# Patient Record
Sex: FEMALE | Race: WHITE | NOT HISPANIC OR LATINO | Employment: OTHER | URBAN - METROPOLITAN AREA
[De-identification: names, ages, dates, MRNs, and addresses within clinical notes are randomized per-mention and may not be internally consistent; named-entity substitution may affect disease eponyms.]

---

## 2017-10-02 RX ORDER — LEVOTHYROXINE SODIUM 112 UG/1
TABLET ORAL
Qty: 90 TABLET | Refills: 3 | Status: SHIPPED | OUTPATIENT
Start: 2017-10-02 | End: 2024-02-26 | Stop reason: SDUPTHER

## 2017-10-06 RX ORDER — METOPROLOL SUCCINATE 25 MG/1
TABLET, EXTENDED RELEASE ORAL
Qty: 90 TABLET | Refills: 3 | Status: SHIPPED | OUTPATIENT
Start: 2017-10-06 | End: 2024-02-12

## 2018-01-04 RX ORDER — HYDROCHLOROTHIAZIDE 25 MG/1
TABLET ORAL
Qty: 30 TABLET | Refills: 0 | Status: SHIPPED | OUTPATIENT
Start: 2018-01-04 | End: 2018-01-04 | Stop reason: SDUPTHER

## 2018-01-05 RX ORDER — HYDROCHLOROTHIAZIDE 25 MG/1
TABLET ORAL
Qty: 90 TABLET | Refills: 0 | Status: SHIPPED | OUTPATIENT
Start: 2018-01-05 | End: 2018-01-10 | Stop reason: SDUPTHER

## 2018-01-10 RX ORDER — HYDROCHLOROTHIAZIDE 25 MG/1
TABLET ORAL
Qty: 30 TABLET | Refills: 0 | Status: SHIPPED | OUTPATIENT
Start: 2018-01-10 | End: 2018-02-19 | Stop reason: SDUPTHER

## 2018-02-19 ENCOUNTER — OFFICE VISIT (OUTPATIENT)
Dept: PRIMARY CARE CLINIC | Facility: CLINIC | Age: 68
End: 2018-02-19
Payer: MEDICARE

## 2018-02-19 VITALS
OXYGEN SATURATION: 97 % | WEIGHT: 186 LBS | SYSTOLIC BLOOD PRESSURE: 127 MMHG | HEIGHT: 67 IN | BODY MASS INDEX: 29.19 KG/M2 | HEART RATE: 56 BPM | DIASTOLIC BLOOD PRESSURE: 79 MMHG | TEMPERATURE: 98 F | RESPIRATION RATE: 18 BRPM

## 2018-02-19 DIAGNOSIS — E78.5 HYPERLIPIDEMIA, UNSPECIFIED HYPERLIPIDEMIA TYPE: ICD-10-CM

## 2018-02-19 DIAGNOSIS — E89.0 POSTOPERATIVE HYPOTHYROIDISM: Primary | ICD-10-CM

## 2018-02-19 DIAGNOSIS — Z23 NEED FOR VACCINATION: ICD-10-CM

## 2018-02-19 DIAGNOSIS — Z85.850 HISTORY OF THYROID CANCER: ICD-10-CM

## 2018-02-19 DIAGNOSIS — I10 ESSENTIAL HYPERTENSION, BENIGN: ICD-10-CM

## 2018-02-19 PROCEDURE — 1159F MED LIST DOCD IN RCRD: CPT | Mod: ,,, | Performed by: FAMILY MEDICINE

## 2018-02-19 PROCEDURE — 99213 OFFICE O/P EST LOW 20 MIN: CPT | Mod: PBBFAC,PN,25 | Performed by: FAMILY MEDICINE

## 2018-02-19 PROCEDURE — 99214 OFFICE O/P EST MOD 30 MIN: CPT | Mod: S$PBB,25,, | Performed by: FAMILY MEDICINE

## 2018-02-19 PROCEDURE — 99999 PR PBB SHADOW E&M-EST. PATIENT-LVL III: CPT | Mod: PBBFAC,,, | Performed by: FAMILY MEDICINE

## 2018-02-19 PROCEDURE — 90732 PPSV23 VACC 2 YRS+ SUBQ/IM: CPT | Mod: PBBFAC,PN

## 2018-02-19 RX ORDER — FENOFIBRATE 54 MG/1
54 TABLET ORAL DAILY
Qty: 90 TABLET | Refills: 3 | Status: SHIPPED | OUTPATIENT
Start: 2018-02-19 | End: 2018-11-28

## 2018-02-19 RX ORDER — HYDROCHLOROTHIAZIDE 25 MG/1
25 TABLET ORAL DAILY
Qty: 90 TABLET | Refills: 3 | Status: SHIPPED | OUTPATIENT
Start: 2018-02-19 | End: 2018-08-01

## 2018-02-19 RX ORDER — LEVOTHYROXINE SODIUM 112 UG/1
1 TABLET ORAL DAILY
Refills: 3 | COMMUNITY
Start: 2017-12-05 | End: 2018-02-19 | Stop reason: SDUPTHER

## 2018-02-19 RX ORDER — FENOFIBRATE 54 MG/1
1 TABLET ORAL DAILY
Refills: 0 | COMMUNITY
Start: 2018-02-16 | End: 2018-02-19 | Stop reason: SDUPTHER

## 2018-02-19 RX ORDER — METOPROLOL SUCCINATE 25 MG/1
1 TABLET, EXTENDED RELEASE ORAL DAILY
COMMUNITY
Start: 2014-11-20 | End: 2018-02-19 | Stop reason: SDUPTHER

## 2018-02-19 RX ORDER — VITAMIN B COMPLEX
1 CAPSULE ORAL DAILY
Status: ON HOLD | COMMUNITY
End: 2023-05-16 | Stop reason: HOSPADM

## 2018-02-19 RX ORDER — MULTIVITAMIN
1 TABLET ORAL DAILY
COMMUNITY

## 2018-02-19 RX ORDER — METOPROLOL SUCCINATE 25 MG/1
25 TABLET, EXTENDED RELEASE ORAL DAILY
Qty: 90 TABLET | Refills: 3 | Status: SHIPPED | OUTPATIENT
Start: 2018-02-19 | End: 2019-05-08 | Stop reason: SDUPTHER

## 2018-02-19 RX ORDER — LEVOTHYROXINE SODIUM 112 UG/1
112 TABLET ORAL DAILY
Qty: 90 TABLET | Refills: 3 | Status: SHIPPED | OUTPATIENT
Start: 2018-02-19 | End: 2019-02-12 | Stop reason: SDUPTHER

## 2018-02-19 RX ORDER — ESTRADIOL 2 MG/1
1 TABLET ORAL DAILY
Refills: 3 | COMMUNITY
Start: 2017-12-13 | End: 2022-01-27

## 2018-02-19 NOTE — PROGRESS NOTES
Pt ID verified by  and Name. Allergies verified with pt. Pneuomovax 0.5mL vaccine administered to L Deltoid. Pt tolerated well. No adverse reactions noted.

## 2018-02-19 NOTE — PROGRESS NOTES
"Subjective:       Patient ID: Graciela Barton is a 67 y.o. female.    Chief Complaint: Medication Refill    Feeling fine, no particular concerns. Compliant with all meds, no adverse SE.  HTN - BP well controlled  HLD - compliant with meds, overdue for labs  Hypothyroidism - most recent TFT's WNL, due for labs      Review of Systems   Constitutional: Negative for chills, fatigue and fever.   HENT: Negative for congestion.    Eyes: Negative for visual disturbance.   Respiratory: Negative for cough and shortness of breath.    Cardiovascular: Negative for chest pain.   Gastrointestinal: Negative for abdominal pain, nausea and vomiting.   Genitourinary: Negative for difficulty urinating.   Musculoskeletal: Negative for arthralgias.   Skin: Negative for rash.   Neurological: Negative for dizziness.   Psychiatric/Behavioral: Negative for sleep disturbance.       Objective:      Vitals:    02/19/18 1246   BP: 127/79   BP Location: Left arm   Patient Position: Sitting   BP Method: Large (Automatic)   Pulse: (!) 56   Resp: 18   Temp: 98.2 °F (36.8 °C)   TempSrc: Oral   SpO2: 97%   Weight: 84.4 kg (186 lb)   Height: 5' 7" (1.702 m)     Physical Exam   Constitutional: She is oriented to person, place, and time. She appears well-developed and well-nourished.   HENT:   Head: Normocephalic and atraumatic.   Eyes: EOM are normal. Pupils are equal, round, and reactive to light.   Neck: Neck supple. No JVD present. Carotid bruit is not present.   Cardiovascular: Normal rate, regular rhythm and normal heart sounds.    Pulses:       Radial pulses are 2+ on the right side, and 2+ on the left side.   Pulmonary/Chest: Effort normal and breath sounds normal.   Abdominal: Soft. Bowel sounds are normal. She exhibits no distension. There is no tenderness.   Musculoskeletal: She exhibits no edema.   Neurological: She is alert and oriented to person, place, and time.   Skin: Skin is warm and dry.   Psychiatric: She has a normal mood and affect. " Her behavior is normal.   Nursing note and vitals reviewed.      Assessment:       1. Postoperative hypothyroidism    2. History of thyroid cancer    3. Essential hypertension, benign Stable   4. Hyperlipidemia, unspecified hyperlipidemia type    5. Need for vaccination        Plan:       Postoperative hypothyroidism  -     levothyroxine (SYNTHROID) 112 MCG tablet; Take 1 tablet (112 mcg total) by mouth once daily.  Dispense: 90 tablet; Refill: 3  -     TSH; Future; Expected date: 02/26/2018  -     T4, free; Future; Expected date: 02/26/2018  -     T3; Future; Expected date: 02/26/2018    History of thyroid cancer    Essential hypertension, benign  -     hydroCHLOROthiazide (HYDRODIURIL) 25 MG tablet; Take 1 tablet (25 mg total) by mouth once daily.  Dispense: 90 tablet; Refill: 3  -     metoprolol succinate (TOPROL-XL) 25 MG 24 hr tablet; Take 1 tablet (25 mg total) by mouth once daily.  Dispense: 90 tablet; Refill: 3  -     CBC auto differential; Future; Expected date: 02/26/2018    Hyperlipidemia, unspecified hyperlipidemia type  -     fenofibrate (TRICOR) 54 MG tablet; Take 1 tablet (54 mg total) by mouth once daily.  Dispense: 90 tablet; Refill: 3  -     Comprehensive metabolic panel; Future; Expected date: 02/26/2018  -     Lipid panel; Future; Expected date: 02/26/2018    Need for vaccination  -     Cancel: Pneumococcal Conjugate Vaccine (13 Valent) (IM)  -     Pneumococcal Polysaccharide Vaccine (23 Valent) (SQ/IM)      Medication List with Changes/Refills   Current Medications    B COMPLEX VITAMINS CAPSULE    Take 1 capsule by mouth once daily.    ESTRADIOL (ESTRACE) 2 MG TABLET    Take 1 tablet by mouth once daily.    MULTIVITAMIN (THERAGRAN) PER TABLET    Take 1 tablet by mouth once daily.   Changed and/or Refilled Medications    Modified Medication Previous Medication    FENOFIBRATE (TRICOR) 54 MG TABLET fenofibrate (TRICOR) 54 MG tablet       Take 1 tablet (54 mg total) by mouth once daily.    Take 1  tablet by mouth once daily.    HYDROCHLOROTHIAZIDE (HYDRODIURIL) 25 MG TABLET hydroCHLOROthiazide (HYDRODIURIL) 25 MG tablet       Take 1 tablet (25 mg total) by mouth once daily.    TAKE 1 TABLET BY MOUTH EVERY DAY    LEVOTHYROXINE (SYNTHROID) 112 MCG TABLET levothyroxine (SYNTHROID) 112 MCG tablet       Take 1 tablet (112 mcg total) by mouth once daily.    Take 1 tablet by mouth once daily.    METOPROLOL SUCCINATE (TOPROL-XL) 25 MG 24 HR TABLET metoprolol succinate (TOPROL-XL) 25 MG 24 hr tablet       Take 1 tablet (25 mg total) by mouth once daily.    Take 1 tablet by mouth once daily.

## 2018-02-22 LAB
ALBUMIN SERPL-MCNC: 4 G/DL (ref 3.6–5.1)
ALBUMIN/GLOB SERPL: 1.7 (CALC) (ref 1–2.5)
ALP SERPL-CCNC: 44 U/L (ref 33–130)
ALT SERPL-CCNC: 8 U/L (ref 6–29)
AST SERPL-CCNC: 18 U/L (ref 10–35)
BASOPHILS # BLD AUTO: 38 CELLS/UL (ref 0–200)
BASOPHILS NFR BLD AUTO: 0.7 %
BILIRUB SERPL-MCNC: 0.5 MG/DL (ref 0.2–1.2)
BUN SERPL-MCNC: 14 MG/DL (ref 7–25)
BUN/CREAT SERPL: NORMAL (CALC) (ref 6–22)
CALCIUM SERPL-MCNC: 9.8 MG/DL (ref 8.6–10.4)
CHLORIDE SERPL-SCNC: 104 MMOL/L (ref 98–110)
CHOLEST SERPL-MCNC: 205 MG/DL
CHOLEST/HDLC SERPL: 2.4 (CALC)
CO2 SERPL-SCNC: 28 MMOL/L (ref 20–31)
CREAT SERPL-MCNC: 0.93 MG/DL (ref 0.5–0.99)
EOSINOPHIL # BLD AUTO: 151 CELLS/UL (ref 15–500)
EOSINOPHIL NFR BLD AUTO: 2.8 %
ERYTHROCYTE [DISTWIDTH] IN BLOOD BY AUTOMATED COUNT: 12.2 % (ref 11–15)
GFR SERPL CREATININE-BSD FRML MDRD: 64 ML/MIN/1.73M2
GLOBULIN SER CALC-MCNC: 2.3 G/DL (CALC) (ref 1.9–3.7)
GLUCOSE SERPL-MCNC: 88 MG/DL (ref 65–99)
HCT VFR BLD AUTO: 39.4 % (ref 35–45)
HDLC SERPL-MCNC: 86 MG/DL
HGB BLD-MCNC: 13.2 G/DL (ref 11.7–15.5)
LDLC SERPL CALC-MCNC: 98 MG/DL (CALC)
LYMPHOCYTES # BLD AUTO: 1615 CELLS/UL (ref 850–3900)
LYMPHOCYTES NFR BLD AUTO: 29.9 %
MCH RBC QN AUTO: 31.5 PG (ref 27–33)
MCHC RBC AUTO-ENTMCNC: 33.5 G/DL (ref 32–36)
MCV RBC AUTO: 94 FL (ref 80–100)
MONOCYTES # BLD AUTO: 529 CELLS/UL (ref 200–950)
MONOCYTES NFR BLD AUTO: 9.8 %
NEUTROPHILS # BLD AUTO: 3067 CELLS/UL (ref 1500–7800)
NEUTROPHILS NFR BLD AUTO: 56.8 %
NONHDLC SERPL-MCNC: 119 MG/DL (CALC)
PLATELET # BLD AUTO: 292 THOUSAND/UL (ref 140–400)
PMV BLD REES-ECKER: 11.5 FL (ref 7.5–12.5)
POTASSIUM SERPL-SCNC: 4 MMOL/L (ref 3.5–5.3)
PROT SERPL-MCNC: 6.3 G/DL (ref 6.1–8.1)
RBC # BLD AUTO: 4.19 MILLION/UL (ref 3.8–5.1)
SODIUM SERPL-SCNC: 141 MMOL/L (ref 135–146)
T3 SERPL-MCNC: 90 NG/DL (ref 76–181)
T4 FREE SERPL-MCNC: 1.4 NG/DL (ref 0.8–1.8)
TRIGL SERPL-MCNC: 110 MG/DL
TSH SERPL-ACNC: 3.22 MIU/L (ref 0.4–4.5)
WBC # BLD AUTO: 5.4 THOUSAND/UL (ref 3.8–10.8)

## 2018-04-27 RX ORDER — HYDROCHLOROTHIAZIDE 25 MG/1
TABLET ORAL
Qty: 90 TABLET | Refills: 3 | Status: SHIPPED | OUTPATIENT
Start: 2018-04-27 | End: 2019-04-17 | Stop reason: SDUPTHER

## 2018-08-01 ENCOUNTER — OFFICE VISIT (OUTPATIENT)
Dept: PRIMARY CARE CLINIC | Facility: CLINIC | Age: 68
End: 2018-08-01
Payer: MEDICARE

## 2018-08-01 VITALS
SYSTOLIC BLOOD PRESSURE: 124 MMHG | DIASTOLIC BLOOD PRESSURE: 73 MMHG | OXYGEN SATURATION: 98 % | TEMPERATURE: 98 F | HEIGHT: 67 IN | HEART RATE: 63 BPM | WEIGHT: 191 LBS | RESPIRATION RATE: 18 BRPM | BODY MASS INDEX: 29.98 KG/M2

## 2018-08-01 DIAGNOSIS — H81.10 BENIGN PAROXYSMAL POSITIONAL VERTIGO, UNSPECIFIED LATERALITY: Primary | ICD-10-CM

## 2018-08-01 DIAGNOSIS — Z12.31 ENCOUNTER FOR SCREENING MAMMOGRAM FOR BREAST CANCER: ICD-10-CM

## 2018-08-01 DIAGNOSIS — E78.5 HYPERLIPIDEMIA, UNSPECIFIED HYPERLIPIDEMIA TYPE: ICD-10-CM

## 2018-08-01 DIAGNOSIS — Z12.11 COLON CANCER SCREENING: ICD-10-CM

## 2018-08-01 DIAGNOSIS — M89.9 DISORDER OF BONE: ICD-10-CM

## 2018-08-01 DIAGNOSIS — Z11.59 NEED FOR HEPATITIS C SCREENING TEST: ICD-10-CM

## 2018-08-01 DIAGNOSIS — E89.0 POSTOPERATIVE HYPOTHYROIDISM: ICD-10-CM

## 2018-08-01 PROCEDURE — 99213 OFFICE O/P EST LOW 20 MIN: CPT | Mod: S$PBB,,, | Performed by: FAMILY MEDICINE

## 2018-08-01 PROCEDURE — 99999 PR PBB SHADOW E&M-EST. PATIENT-LVL IV: CPT | Mod: PBBFAC,,, | Performed by: FAMILY MEDICINE

## 2018-08-01 PROCEDURE — 99214 OFFICE O/P EST MOD 30 MIN: CPT | Mod: PBBFAC,PN | Performed by: FAMILY MEDICINE

## 2018-08-01 RX ORDER — MECLIZINE HYDROCHLORIDE 25 MG/1
25 TABLET ORAL 3 TIMES DAILY PRN
Qty: 30 TABLET | Refills: 3 | Status: ON HOLD | OUTPATIENT
Start: 2018-08-01 | End: 2023-10-06 | Stop reason: HOSPADM

## 2018-08-01 RX ORDER — PROCHLORPERAZINE MALEATE 10 MG
10 TABLET ORAL EVERY 6 HOURS PRN
Qty: 30 TABLET | Refills: 3 | Status: SHIPPED | OUTPATIENT
Start: 2018-08-01 | End: 2021-07-27 | Stop reason: SDUPTHER

## 2018-08-01 NOTE — PROGRESS NOTES
"Subjective:       Patient ID: Graciela Barton is a 68 y.o. female.    Chief Complaint: Dizziness (off and on x a few months )    Dizziness:   Chronicity:  Chronic with acute exacerbation  Onset:  Over 10 years ago  Progression since onset:  Waxing and waning  Frequency:  Every few weeks  Severity:  Moderate  Dizziness characteristics:  Off-balance, motion-sickness and sensation of movement  Duration of Spells:  2 minutes   Associated symptoms: nausea.no hearing loss, no ear pain, no fever, no tinnitus, no syncope, no slurred speech and no chest pain.  Aggravated by:  Position changes  Treatments tried:  Meclizine  Improvements on treatment:  Significantno head trauma, no ear trauma, no head trauma and no ear tubes.    Review of Systems   Constitutional: Negative for fever.   HENT: Negative for ear pain, hearing loss and tinnitus.    Eyes: Negative for visual disturbance.   Respiratory: Negative for shortness of breath.    Cardiovascular: Negative for chest pain and syncope.   Gastrointestinal: Positive for nausea.   Skin: Negative for rash.   Neurological: Positive for dizziness.       Objective:      Vitals:    08/01/18 1339   BP: 124/73   BP Location: Left arm   Patient Position: Sitting   BP Method: Large (Automatic)   Pulse: 63   Resp: 18   Temp: 97.9 °F (36.6 °C)   TempSrc: Oral   SpO2: 98%   Weight: 86.6 kg (191 lb)   Height: 5' 7" (1.702 m)     Physical Exam   Constitutional: She is oriented to person, place, and time. She appears well-developed and well-nourished.   HENT:   Head: Normocephalic and atraumatic.   Eyes: EOM are normal.   Neck: Neck supple. No JVD present. Carotid bruit is not present.   Cardiovascular: Normal rate, regular rhythm and normal heart sounds.    Pulmonary/Chest: Effort normal and breath sounds normal.   Musculoskeletal: She exhibits no edema.   Neurological: She is alert and oriented to person, place, and time.   Skin: Skin is warm and dry.   Psychiatric: She has a normal mood and " affect. Her behavior is normal.   Nursing note and vitals reviewed.      Assessment:       1. Benign paroxysmal positional vertigo, unspecified laterality    2. Colon cancer screening    3. Need for hepatitis C screening test    4. Disorder of bone    5. Hyperlipidemia, unspecified hyperlipidemia type    6. Postoperative hypothyroidism    7. Encounter for screening mammogram for breast cancer        Plan:       Benign paroxysmal positional vertigo, unspecified laterality  -     CBC auto differential; Future; Expected date: 11/01/2018  -     meclizine (ANTIVERT) 25 mg tablet; Take 1 tablet (25 mg total) by mouth 3 (three) times daily as needed for Dizziness.  Dispense: 30 tablet; Refill: 3  -     prochlorperazine (COMPAZINE) 10 MG tablet; Take 1 tablet (10 mg total) by mouth every 6 (six) hours as needed (nausea).  Dispense: 30 tablet; Refill: 3    Colon cancer screening  -     Ambulatory referral to Colorectal Surgery    Need for hepatitis C screening test  -     Hepatitis C antibody; Future; Expected date: 11/01/2018    Disorder of bone  -     DXA Bone Density Spine And Hip; Future; Expected date: 08/08/2018    Hyperlipidemia, unspecified hyperlipidemia type  -     Comprehensive metabolic panel; Future; Expected date: 11/01/2018  -     Lipid panel; Future; Expected date: 11/01/2018    Postoperative hypothyroidism  -     TSH; Future; Expected date: 11/01/2018  -     T4, free; Future; Expected date: 11/01/2018  -     T3; Future; Expected date: 11/01/2018    Encounter for screening mammogram for breast cancer  -     Mammo Digital Screening Bilat without CA; Future; Expected date: 08/15/2018      Medication List with Changes/Refills   New Medications    MECLIZINE (ANTIVERT) 25 MG TABLET    Take 1 tablet (25 mg total) by mouth 3 (three) times daily as needed for Dizziness.    PROCHLORPERAZINE (COMPAZINE) 10 MG TABLET    Take 1 tablet (10 mg total) by mouth every 6 (six) hours as needed (nausea).   Current Medications     B COMPLEX VITAMINS CAPSULE    Take 1 capsule by mouth once daily.    ESTRADIOL (ESTRACE) 2 MG TABLET    Take 1 tablet by mouth once daily.    FENOFIBRATE (TRICOR) 54 MG TABLET    Take 1 tablet (54 mg total) by mouth once daily.    HYDROCHLOROTHIAZIDE (HYDRODIURIL) 25 MG TABLET    TAKE 1 TABLET BY MOUTH EVERY DAY    LEVOTHYROXINE (SYNTHROID) 112 MCG TABLET    Take 1 tablet (112 mcg total) by mouth once daily.    METOPROLOL SUCCINATE (TOPROL-XL) 25 MG 24 HR TABLET    Take 1 tablet (25 mg total) by mouth once daily.    MULTIVITAMIN (THERAGRAN) PER TABLET    Take 1 tablet by mouth once daily.   Discontinued Medications    HYDROCHLOROTHIAZIDE (HYDRODIURIL) 25 MG TABLET    Take 1 tablet (25 mg total) by mouth once daily.

## 2018-08-03 ENCOUNTER — TELEPHONE (OUTPATIENT)
Dept: PRIMARY CARE CLINIC | Facility: CLINIC | Age: 68
End: 2018-08-03

## 2018-08-03 NOTE — TELEPHONE ENCOUNTER
----- Message from Apryl Leggett sent at 8/3/2018  8:42 AM CDT -----  Contact: Patient  Type:  Patient Returning Call    Who Called:  Graciela, patient  Who Left Message for Patient:  ?  Does the patient know what this is regarding?:  Víctor  Best Call Back Number:  [phone]  Additional Information:  Missed your call, please call her back. Thanks.

## 2018-10-11 ENCOUNTER — TELEPHONE (OUTPATIENT)
Dept: SURGERY | Facility: CLINIC | Age: 68
End: 2018-10-11

## 2018-10-11 DIAGNOSIS — Z12.11 SCREEN FOR COLON CANCER: Primary | ICD-10-CM

## 2018-10-11 RX ORDER — SODIUM CHLORIDE, SODIUM LACTATE, POTASSIUM CHLORIDE, CALCIUM CHLORIDE 600; 310; 30; 20 MG/100ML; MG/100ML; MG/100ML; MG/100ML
INJECTION, SOLUTION INTRAVENOUS CONTINUOUS
Status: CANCELLED | OUTPATIENT
Start: 2018-10-11

## 2018-10-11 RX ORDER — SODIUM CHLORIDE 0.9 % (FLUSH) 0.9 %
3 SYRINGE (ML) INJECTION
Status: CANCELLED | OUTPATIENT
Start: 2018-10-11

## 2018-10-11 NOTE — TELEPHONE ENCOUNTER
----- Message from Rylan Marsh MA sent at 8/1/2018  2:19 PM CDT -----  Please contact patient to schedule a colonoscopy     Thank you

## 2018-10-11 NOTE — TELEPHONE ENCOUNTER
Colonoscopy is scheduled for 10/18/18 arrival time per the preop nurse.  Preop will call from [phone]  Fasting after midnight  Someone to drive you home      THE PREOP NURSE WILL CALL, SOMETIMES AS LATE AS 4 PM IN THE AFTERNOON THE DAY BEFORE SURGERY.        Special Instruction:Bowel Prep is included with this letter, call Dilip if you have any questions or concerns or if you need to reschedule your procedure at [phone].  Colonoscopy is at the [facility].    Yosvany Alejandra MD

## 2018-10-12 ENCOUNTER — TELEPHONE (OUTPATIENT)
Dept: SURGERY | Facility: CLINIC | Age: 68
End: 2018-10-12

## 2018-10-12 NOTE — TELEPHONE ENCOUNTER
----- Message from Janel Ortega sent at 10/12/2018  3:33 PM CDT -----  Contact: Graciela  Type: Needs Medical Advice    Who Called:  patient  Best Call Back Number: [phone]  Additional Information: Contact patient regarding instructions and prep for her colonoscopy scheduled next week. Thanks!

## 2018-10-12 NOTE — TELEPHONE ENCOUNTER
Spoke with patient regarding her pre procedure instructions. Made patient aware that prep instructions could be picked up in the clinic. Patient indicated understanding. No further issues discussed.

## 2018-10-18 PROBLEM — Z12.11 SCREEN FOR COLON CANCER: Status: ACTIVE | Noted: 2018-10-18

## 2018-10-18 PROBLEM — Z86.0100 HISTORY OF COLON POLYPS: Status: ACTIVE | Noted: 2018-10-18

## 2018-10-18 PROBLEM — Z86.010 HISTORY OF COLON POLYPS: Status: ACTIVE | Noted: 2018-10-18

## 2018-11-15 ENCOUNTER — TELEPHONE (OUTPATIENT)
Dept: PRIMARY CARE CLINIC | Facility: CLINIC | Age: 68
End: 2018-11-15

## 2018-11-15 NOTE — TELEPHONE ENCOUNTER
----- Message from Harini Baltazar sent at 11/15/2018  3:50 PM CST -----  Contact: self  Type:  Patient Returning Call    Who Called:  self  Who Left Message for Patient:  Not sure  Does the patient know what this is regarding?:  no  Best Call Back Number:  [phone]  Additional Information:  Patient just missed a call. Please call patient. Thanks!

## 2018-11-19 RX ORDER — METOPROLOL SUCCINATE 25 MG/1
TABLET, EXTENDED RELEASE ORAL
Qty: 90 TABLET | Refills: 0 | OUTPATIENT
Start: 2018-11-19

## 2018-11-28 ENCOUNTER — OFFICE VISIT (OUTPATIENT)
Dept: PRIMARY CARE CLINIC | Facility: CLINIC | Age: 68
End: 2018-11-28
Payer: MEDICARE

## 2018-11-28 VITALS
HEART RATE: 55 BPM | TEMPERATURE: 98 F | SYSTOLIC BLOOD PRESSURE: 133 MMHG | OXYGEN SATURATION: 100 % | DIASTOLIC BLOOD PRESSURE: 59 MMHG | WEIGHT: 197 LBS | RESPIRATION RATE: 18 BRPM | BODY MASS INDEX: 30.92 KG/M2 | HEIGHT: 67 IN

## 2018-11-28 DIAGNOSIS — J06.9 UPPER RESPIRATORY TRACT INFECTION, UNSPECIFIED TYPE: ICD-10-CM

## 2018-11-28 DIAGNOSIS — I10 ESSENTIAL HYPERTENSION, BENIGN: Primary | ICD-10-CM

## 2018-11-28 DIAGNOSIS — E78.00 PURE HYPERCHOLESTEROLEMIA: ICD-10-CM

## 2018-11-28 DIAGNOSIS — Z23 NEED FOR VACCINATION: ICD-10-CM

## 2018-11-28 DIAGNOSIS — E89.0 POSTOPERATIVE HYPOTHYROIDISM: ICD-10-CM

## 2018-11-28 PROCEDURE — 90662 IIV NO PRSV INCREASED AG IM: CPT | Mod: PBBFAC,PN

## 2018-11-28 PROCEDURE — 99213 OFFICE O/P EST LOW 20 MIN: CPT | Mod: PBBFAC,PN | Performed by: FAMILY MEDICINE

## 2018-11-28 PROCEDURE — G0008 ADMIN INFLUENZA VIRUS VAC: HCPCS | Mod: PBBFAC,PN

## 2018-11-28 PROCEDURE — 99214 OFFICE O/P EST MOD 30 MIN: CPT | Mod: S$PBB,,, | Performed by: FAMILY MEDICINE

## 2018-11-28 PROCEDURE — 99999 PR PBB SHADOW E&M-EST. PATIENT-LVL III: CPT | Mod: PBBFAC,,, | Performed by: FAMILY MEDICINE

## 2018-11-29 NOTE — PROGRESS NOTES
"Subjective:       Patient ID: Graciela Barton is a 68 y.o. female.    Chief Complaint: Hyperlipidemia (Patient is here to review lab results ) and Cough (coughing up dark green phelgm x1 week )    Hyperlipidemia-lipid profile looks great, HDL extremely high.  No history of heart disease. Has been following healthy diet.  Hypothyroidism-TFTs look good, patient is status post thyroidectomy  Has been having upper respiratory congestion, cough and runny nose for the past several days, gradually getting better.  No fevers or chills.      Review of Systems   Constitutional: Negative for fever.   HENT: Positive for congestion.    Eyes: Negative for visual disturbance.   Respiratory: Positive for cough. Negative for shortness of breath and wheezing.    Cardiovascular: Negative for chest pain.   Gastrointestinal: Negative for nausea and vomiting.   Genitourinary: Negative for difficulty urinating.   Musculoskeletal: Negative for myalgias.   Skin: Negative for rash.   Allergic/Immunologic: Negative for immunocompromised state.   Neurological: Negative for dizziness.   Hematological: Does not bruise/bleed easily.   Psychiatric/Behavioral: Negative for agitation and confusion.       Objective:      Vitals:    11/28/18 1609   BP: (!) 133/59   BP Location: Left arm   Patient Position: Sitting   BP Method: Large (Automatic)   Pulse: (!) 55   Resp: 18   Temp: 97.8 °F (36.6 °C)   TempSrc: Oral   SpO2: 100%   Weight: 89.4 kg (197 lb)   Height: 5' 7" (1.702 m)     Physical Exam   Constitutional: She is oriented to person, place, and time. She appears well-developed and well-nourished.   HENT:   Head: Normocephalic and atraumatic.   Right Ear: External ear normal.   Left Ear: External ear normal.   Mouth/Throat: Oropharynx is clear and moist.   Neck: Neck supple. No JVD present.   Cardiovascular: Normal rate, regular rhythm and normal heart sounds.   Pulmonary/Chest: Effort normal and breath sounds normal.   Musculoskeletal: She exhibits " no edema.   Neurological: She is alert and oriented to person, place, and time.   Skin: Skin is warm and dry.   Psychiatric: She has a normal mood and affect. Her behavior is normal.   Nursing note and vitals reviewed.      Assessment:       1. Essential hypertension, benign    2. Pure hypercholesterolemia    3. Postoperative hypothyroidism    4. Need for vaccination    5. Upper respiratory tract infection, unspecified type        Plan:       Essential hypertension, benign  Well controlled, continue current meds  Pure hypercholesterolemia  -     Comprehensive metabolic panel; Future; Expected date: 02/28/2019  -     Lipid panel; Future; Expected date: 02/28/2019  Stop fenofibrate, recheck labs in 3-4 months  Postoperative hypothyroidism  -     TSH; Future; Expected date: 02/28/2019  -     T4, free; Future; Expected date: 02/28/2019  -     T3; Future; Expected date: 02/28/2019  Stable  Need for vaccination  -     Influenza - High Dose (65+) (PF) (IM)    Upper respiratory tract infection, unspecified type  Likely viral, treat symptomatically       Medication List           Accurate as of 11/28/18  6:25 PM. If you have any questions, ask your nurse or doctor.               CONTINUE taking these medications    b complex vitamins capsule     estradiol 2 MG tablet  Commonly known as:  ESTRACE     hydroCHLOROthiazide 25 MG tablet  Commonly known as:  HYDRODIURIL  TAKE 1 TABLET BY MOUTH EVERY DAY     levothyroxine 112 MCG tablet  Commonly known as:  SYNTHROID  Take 1 tablet (112 mcg total) by mouth once daily.     meclizine 25 mg tablet  Commonly known as:  ANTIVERT  Take 1 tablet (25 mg total) by mouth 3 (three) times daily as needed for Dizziness.     metoprolol succinate 25 MG 24 hr tablet  Commonly known as:  TOPROL-XL  Take 1 tablet (25 mg total) by mouth once daily.     multivitamin per tablet  Commonly known as:  THERAGRAN     prochlorperazine 10 MG tablet  Commonly known as:  COMPAZINE  Take 1 tablet (10 mg total)  by mouth every 6 (six) hours as needed (nausea).        STOP taking these medications    fenofibrate 54 MG tablet  Commonly known as:  TRICOR  Stopped by:  Rocco Miller MD

## 2019-02-12 DIAGNOSIS — E78.5 HYPERLIPIDEMIA, UNSPECIFIED HYPERLIPIDEMIA TYPE: ICD-10-CM

## 2019-02-12 DIAGNOSIS — E89.0 POSTOPERATIVE HYPOTHYROIDISM: ICD-10-CM

## 2019-02-12 RX ORDER — FENOFIBRATE 54 MG/1
TABLET ORAL
Qty: 90 TABLET | Refills: 0 | OUTPATIENT
Start: 2019-02-12

## 2019-02-12 RX ORDER — LEVOTHYROXINE SODIUM 112 UG/1
TABLET ORAL
Qty: 90 TABLET | Refills: 0 | Status: SHIPPED | OUTPATIENT
Start: 2019-02-12 | End: 2019-05-08 | Stop reason: SDUPTHER

## 2019-04-17 ENCOUNTER — OFFICE VISIT (OUTPATIENT)
Dept: PRIMARY CARE CLINIC | Facility: CLINIC | Age: 69
End: 2019-04-17
Payer: MEDICARE

## 2019-04-17 ENCOUNTER — CLINICAL SUPPORT (OUTPATIENT)
Dept: PRIMARY CARE CLINIC | Facility: CLINIC | Age: 69
End: 2019-04-17
Payer: MEDICARE

## 2019-04-17 VITALS
BODY MASS INDEX: 31.55 KG/M2 | WEIGHT: 201 LBS | HEART RATE: 52 BPM | HEIGHT: 67 IN | OXYGEN SATURATION: 95 % | TEMPERATURE: 98 F | RESPIRATION RATE: 18 BRPM | SYSTOLIC BLOOD PRESSURE: 144 MMHG | DIASTOLIC BLOOD PRESSURE: 78 MMHG

## 2019-04-17 DIAGNOSIS — R11.0 CHRONIC NAUSEA: ICD-10-CM

## 2019-04-17 DIAGNOSIS — E78.00 PURE HYPERCHOLESTEROLEMIA: ICD-10-CM

## 2019-04-17 DIAGNOSIS — I10 ESSENTIAL HYPERTENSION, BENIGN: ICD-10-CM

## 2019-04-17 DIAGNOSIS — E89.0 POSTOPERATIVE HYPOTHYROIDISM: ICD-10-CM

## 2019-04-17 DIAGNOSIS — R06.09 EXERTIONAL DYSPNEA: ICD-10-CM

## 2019-04-17 DIAGNOSIS — R07.89 ATYPICAL CHEST PAIN: Primary | ICD-10-CM

## 2019-04-17 LAB
ALBUMIN SERPL BCP-MCNC: 3.7 G/DL (ref 3.5–5.2)
ALP SERPL-CCNC: 50 U/L (ref 38–126)
ALT SERPL W/O P-5'-P-CCNC: 12 U/L (ref 14–54)
ANION GAP SERPL CALC-SCNC: 10 MMOL/L (ref 8–16)
AST SERPL-CCNC: 23 U/L (ref 15–41)
BILIRUB SERPL-MCNC: 0.4 MG/DL (ref 0.3–1.2)
BUN SERPL-MCNC: 11 MG/DL (ref 8–23)
CALCIUM SERPL-MCNC: 9.4 MG/DL (ref 8.6–10)
CHLORIDE SERPL-SCNC: 102 MMOL/L (ref 101–111)
CHOLEST SERPL-MCNC: 283 MG/DL (ref 80–200)
CHOLEST/HDLC SERPL: 3.5 {RATIO} (ref 2–5)
CO2 SERPL-SCNC: 29 MMOL/L (ref 23–29)
CREAT SERPL-MCNC: 0.8 MG/DL (ref 0.5–1.4)
EST. GFR  (AFRICAN AMERICAN): >60 ML/MIN/1.73 M^2
EST. GFR  (NON AFRICAN AMERICAN): >60 ML/MIN/1.73 M^2
GLUCOSE SERPL-MCNC: 83 MG/DL (ref 74–118)
HDLC SERPL-MCNC: 82 MG/DL (ref 40–75)
HDLC SERPL: 29 % (ref 20–50)
LDLC SERPL CALC-MCNC: 144 MG/DL
NONHDLC SERPL-MCNC: 201 MG/DL
POTASSIUM SERPL-SCNC: 3.7 MMOL/L (ref 3.5–5.1)
PROT SERPL-MCNC: 6.8 G/DL (ref 6–8.4)
SODIUM SERPL-SCNC: 141 MMOL/L (ref 136–145)
T3 SERPL-MCNC: 94 NG/DL (ref 60–180)
T4 FREE SERPL-MCNC: 1.12 NG/DL (ref 0.61–1.12)
TRIGL SERPL-MCNC: 283 MG/DL (ref 30–150)
TSH SERPL DL<=0.005 MIU/L-ACNC: 4.32 UIU/ML (ref 0.45–5.33)

## 2019-04-17 PROCEDURE — 99214 PR OFFICE/OUTPT VISIT, EST, LEVL IV, 30-39 MIN: ICD-10-PCS | Mod: S$PBB,,, | Performed by: FAMILY MEDICINE

## 2019-04-17 PROCEDURE — 84480 ASSAY TRIIODOTHYRONINE (T3): CPT

## 2019-04-17 PROCEDURE — 99214 OFFICE O/P EST MOD 30 MIN: CPT | Mod: PBBFAC,PN,25 | Performed by: FAMILY MEDICINE

## 2019-04-17 PROCEDURE — 80061 LIPID PANEL: CPT

## 2019-04-17 PROCEDURE — 80053 COMPREHEN METABOLIC PANEL: CPT

## 2019-04-17 PROCEDURE — 99214 OFFICE O/P EST MOD 30 MIN: CPT | Mod: S$PBB,,, | Performed by: FAMILY MEDICINE

## 2019-04-17 PROCEDURE — 84443 ASSAY THYROID STIM HORMONE: CPT

## 2019-04-17 PROCEDURE — 84439 ASSAY OF FREE THYROXINE: CPT

## 2019-04-17 PROCEDURE — 99999 PR PBB SHADOW E&M-EST. PATIENT-LVL IV: ICD-10-PCS | Mod: PBBFAC,,, | Performed by: FAMILY MEDICINE

## 2019-04-17 PROCEDURE — 99999 PR PBB SHADOW E&M-EST. PATIENT-LVL IV: CPT | Mod: PBBFAC,,, | Performed by: FAMILY MEDICINE

## 2019-04-17 PROCEDURE — 93010 EKG 12-LEAD: ICD-10-PCS | Mod: S$PBB,,, | Performed by: INTERNAL MEDICINE

## 2019-04-17 PROCEDURE — 93010 ELECTROCARDIOGRAM REPORT: CPT | Mod: S$PBB,,, | Performed by: INTERNAL MEDICINE

## 2019-04-17 PROCEDURE — 93005 ELECTROCARDIOGRAM TRACING: CPT | Mod: PBBFAC,PN | Performed by: INTERNAL MEDICINE

## 2019-04-17 RX ORDER — HYDROCHLOROTHIAZIDE 25 MG/1
25 TABLET ORAL DAILY
Qty: 90 TABLET | Refills: 3 | Status: SHIPPED | OUTPATIENT
Start: 2019-04-17 | End: 2020-04-06

## 2019-04-17 NOTE — PROGRESS NOTES
"Subjective:       Patient ID: Graciela Barton is a 69 y.o. female.    Chief Complaint: Nausea; Belching (x a few months ); and Chest Pain (patient says she had a pain across her chest last week )    Has been having intermittent bouts of nausea at least once a day for the past few months, no identified triggers. Gets temporary relief from belching or taking OTC antacids. Has been taking Tums several times a week. Has never had upper GI workup. No melena or hematochezia. Last week had episode of nonexertional chest tightness, went away after a few minutes. Had one episode of exertional dyspnea last week. Strong FHx of heart disease (father, mother, MGM, MGF). Last stress test ~4 years ago while living in [place].    Review of Systems   Constitutional: Negative for chills, fatigue and fever.   HENT: Negative for congestion.    Eyes: Negative for visual disturbance.   Respiratory: Positive for shortness of breath. Negative for cough.    Cardiovascular: Positive for chest pain. Negative for palpitations.   Gastrointestinal: Positive for nausea. Negative for abdominal pain, blood in stool and vomiting.   Genitourinary: Negative for difficulty urinating.   Musculoskeletal: Negative for arthralgias.   Skin: Negative for rash.   Allergic/Immunologic: Negative for immunocompromised state.   Neurological: Negative for dizziness.   Hematological: Does not bruise/bleed easily.   Psychiatric/Behavioral: Negative for sleep disturbance.       Objective:      Vitals:    04/17/19 0856 04/17/19 0947   BP: (!) 152/70 (!) 144/78   BP Location: Left arm Left arm   Patient Position: Sitting Sitting   BP Method: Large (Automatic) Large (Manual)   Pulse: (!) 52    Resp: 18    Temp: 98.2 °F (36.8 °C)    TempSrc: Oral    SpO2: 95%    Weight: 91.2 kg (201 lb)    Height: 5' 7" (1.702 m)      Physical Exam   Constitutional: She is oriented to person, place, and time. She appears well-developed and well-nourished.   HENT:   Head: Normocephalic " and atraumatic.   Eyes: Pupils are equal, round, and reactive to light. EOM are normal.   Neck: Neck supple. No JVD present. Carotid bruit is not present.   Cardiovascular: Normal rate, regular rhythm and normal heart sounds.   Pulses:       Radial pulses are 2+ on the right side, and 2+ on the left side.   Pulmonary/Chest: Effort normal and breath sounds normal.   Abdominal: Soft. Bowel sounds are normal. She exhibits no distension. There is no tenderness.   Musculoskeletal: She exhibits edema (trace BLE).   Neurological: She is alert and oriented to person, place, and time.   Skin: Skin is warm and dry.   Psychiatric: She has a normal mood and affect. Her behavior is normal.   Nursing note and vitals reviewed.      Assessment:       1. Atypical chest pain    2. Essential hypertension, benign    3. Exertional dyspnea    4. Pure hypercholesterolemia    5. Chronic nausea        Plan:       Atypical chest pain  -     EKG 12-lead  -     Echocardiogram stress test (Cupid Only); Future  No acute ekg findings, but needs further risk stratification  Essential hypertension, benign  Continue current meds, BP check in a few weeks  Exertional dyspnea  -     EKG 12-lead  -     Echocardiogram stress test (Cupid Only); Future    Pure hypercholesterolemia  Labs today  Chronic nausea  -     Ambulatory Referral to Gastroenterology  -     ranitidine (ZANTAC) 150 MG tablet; Take 1 tablet (150 mg total) by mouth 2 (two) times daily.  Dispense: 60 tablet; Refill: 0  Likely needs EGD    Medication List with Changes/Refills   New Medications    RANITIDINE (ZANTAC) 150 MG TABLET    Take 1 tablet (150 mg total) by mouth 2 (two) times daily.   Current Medications    B COMPLEX VITAMINS CAPSULE    Take 1 capsule by mouth once daily.    ESTRADIOL (ESTRACE) 2 MG TABLET    Take 1 tablet by mouth once daily.    HYDROCHLOROTHIAZIDE (HYDRODIURIL) 25 MG TABLET    TAKE 1 TABLET BY MOUTH EVERY DAY    LEVOTHYROXINE (SYNTHROID) 112 MCG TABLET    TAKE 1  TABLET(112 MCG) BY MOUTH EVERY DAY    MECLIZINE (ANTIVERT) 25 MG TABLET    Take 1 tablet (25 mg total) by mouth 3 (three) times daily as needed for Dizziness.    METOPROLOL SUCCINATE (TOPROL-XL) 25 MG 24 HR TABLET    Take 1 tablet (25 mg total) by mouth once daily.    MULTIVITAMIN (THERAGRAN) PER TABLET    Take 1 tablet by mouth once daily.    PROCHLORPERAZINE (COMPAZINE) 10 MG TABLET    Take 1 tablet (10 mg total) by mouth every 6 (six) hours as needed (nausea).

## 2019-04-17 NOTE — TELEPHONE ENCOUNTER
----- Message from Apryl Betsey sent at 4/17/2019  3:30 PM CDT -----  Contact: Patient  Type:  RX Refill Request    Who Called:  Graciela, patient  Refill or New Rx:  Refill  RX Name and Strength:  hydroCHLOROthiazide (HYDRODIURIL) 25 MG tablet  How is the patient currently taking it? (ex. 1XDay):  TAKE 1 TABLET BY MOUTH EVERY DAY  Is this a 30 day or 90 day RX:  90  Preferred Pharmacy with phone number:    [id number] - [place] - [address] AT [facility] [address]  [place]  [address] [address]  Phone: [phone] Fax: [phone]  Local or Mail Order:  Local  Ordering Provider:  Dr Miller  [facility] Call Back Number:  [phone]  Additional Information:  Please advise. Thanks.

## 2019-04-22 DIAGNOSIS — R94.31 ABNORMAL ELECTROCARDIOGRAM DURING EXERCISE STRESS TEST: Primary | ICD-10-CM

## 2019-04-23 ENCOUNTER — TELEPHONE (OUTPATIENT)
Dept: PRIMARY CARE CLINIC | Facility: CLINIC | Age: 69
End: 2019-04-23

## 2019-04-23 NOTE — TELEPHONE ENCOUNTER
----- Message from Aleja Muniz sent at 4/23/2019  8:17 AM CDT -----  Contact: Self  Type:  Patient Returning Call    Who Called:  Patient  Who Left Message for Patient:  Don't know  Does the patient know what this is regarding?:  Possibly results  Best Call Back Number: [phone] (home)     Additional Information:  na

## 2019-05-01 ENCOUNTER — CLINICAL SUPPORT (OUTPATIENT)
Dept: PRIMARY CARE CLINIC | Facility: CLINIC | Age: 69
End: 2019-05-01
Payer: MEDICARE

## 2019-05-01 VITALS — HEART RATE: 57 BPM | SYSTOLIC BLOOD PRESSURE: 124 MMHG | OXYGEN SATURATION: 96 % | DIASTOLIC BLOOD PRESSURE: 68 MMHG

## 2019-05-01 PROCEDURE — 99999 PR PBB SHADOW E&M-EST. PATIENT-LVL I: CPT | Mod: PBBFAC,,,

## 2019-05-01 PROCEDURE — 99999 PR PBB SHADOW E&M-EST. PATIENT-LVL I: ICD-10-PCS | Mod: PBBFAC,,,

## 2019-05-01 PROCEDURE — 99211 OFF/OP EST MAY X REQ PHY/QHP: CPT | Mod: PBBFAC,PN

## 2019-05-01 NOTE — PROGRESS NOTES
Verified pt ID using name and . Obtained bp, p, and sp02. Notified physician of results. Instructed pt to continue current medications and to follow up as needed per PCP order. Pt verbalized understanding

## 2019-05-08 DIAGNOSIS — E89.0 POSTOPERATIVE HYPOTHYROIDISM: ICD-10-CM

## 2019-05-08 DIAGNOSIS — I10 ESSENTIAL HYPERTENSION, BENIGN: ICD-10-CM

## 2019-05-08 DIAGNOSIS — R11.0 CHRONIC NAUSEA: ICD-10-CM

## 2019-05-08 RX ORDER — METOPROLOL SUCCINATE 25 MG/1
TABLET, EXTENDED RELEASE ORAL
Qty: 90 TABLET | Refills: 1 | Status: SHIPPED | OUTPATIENT
Start: 2019-05-08 | End: 2019-11-16 | Stop reason: SDUPTHER

## 2019-05-08 RX ORDER — LEVOTHYROXINE SODIUM 112 UG/1
TABLET ORAL
Qty: 90 TABLET | Refills: 1 | Status: SHIPPED | OUTPATIENT
Start: 2019-05-08 | End: 2019-11-16 | Stop reason: SDUPTHER

## 2019-06-07 DIAGNOSIS — R11.0 CHRONIC NAUSEA: ICD-10-CM

## 2019-06-30 PROBLEM — E78.2 MIXED HYPERLIPIDEMIA: Status: ACTIVE | Noted: 2018-02-19

## 2019-09-03 ENCOUNTER — TELEPHONE (OUTPATIENT)
Dept: PRIMARY CARE CLINIC | Facility: CLINIC | Age: 69
End: 2019-09-03

## 2019-09-03 ENCOUNTER — OFFICE VISIT (OUTPATIENT)
Dept: PRIMARY CARE CLINIC | Facility: CLINIC | Age: 69
End: 2019-09-03
Payer: MEDICARE

## 2019-09-03 VITALS
SYSTOLIC BLOOD PRESSURE: 132 MMHG | TEMPERATURE: 98 F | OXYGEN SATURATION: 96 % | BODY MASS INDEX: 31.44 KG/M2 | HEART RATE: 58 BPM | WEIGHT: 200.31 LBS | DIASTOLIC BLOOD PRESSURE: 58 MMHG | HEIGHT: 67 IN | RESPIRATION RATE: 18 BRPM

## 2019-09-03 DIAGNOSIS — S70.02XA CONTUSION OF LEFT HIP, INITIAL ENCOUNTER: Primary | ICD-10-CM

## 2019-09-03 PROCEDURE — 99999 PR PBB SHADOW E&M-EST. PATIENT-LVL III: CPT | Mod: PBBFAC,,, | Performed by: FAMILY MEDICINE

## 2019-09-03 PROCEDURE — 99213 OFFICE O/P EST LOW 20 MIN: CPT | Mod: PBBFAC,PN | Performed by: FAMILY MEDICINE

## 2019-09-03 PROCEDURE — 99213 PR OFFICE/OUTPT VISIT, EST, LEVL III, 20-29 MIN: ICD-10-PCS | Mod: S$PBB,,, | Performed by: FAMILY MEDICINE

## 2019-09-03 PROCEDURE — 99999 PR PBB SHADOW E&M-EST. PATIENT-LVL III: ICD-10-PCS | Mod: PBBFAC,,, | Performed by: FAMILY MEDICINE

## 2019-09-03 PROCEDURE — 99213 OFFICE O/P EST LOW 20 MIN: CPT | Mod: S$PBB,,, | Performed by: FAMILY MEDICINE

## 2019-09-03 RX ORDER — VITAMIN E 268 MG
400 CAPSULE ORAL
Status: ON HOLD | COMMUNITY
End: 2023-05-16 | Stop reason: HOSPADM

## 2019-09-03 NOTE — TELEPHONE ENCOUNTER
----- Message from Apryl Leggett sent at 9/3/2019  2:23 PM CDT -----  Contact: Patient  Type:  Patient Returning Call    Who Called:  Graciela, patient  Who Left Message for Patient:  Rylan  Does the patient know what this is regarding?:  Xray result  Best Call Back Number:  [phone]  Additional Information:  Missed your call, please call her back. Thanks.

## 2019-09-03 NOTE — TELEPHONE ENCOUNTER
I received the results from [facility]. Gastric emptying study was normal. Tried calling patient, no answer and VM is full.

## 2019-09-03 NOTE — PROGRESS NOTES
"Subjective:       Patient ID: Graciela Barton is a 69 y.o. female.    Chief Complaint: Fall (patient fell 8/23/19. left hip hurts )    Slipped and fell onto left hip on concrete surface while cutting grass 8/23. Extensive bruising initially, slowly improving, but still having pain. No pain while sitting, but has pain with weight-bearing or pressure on that side. No lower leg weakness or numbness    Review of Systems   Constitutional: Negative for fever.   Eyes: Negative for visual disturbance.   Respiratory: Negative for shortness of breath.    Cardiovascular: Negative for chest pain.   Genitourinary: Negative for difficulty urinating.   Musculoskeletal: Positive for arthralgias and gait problem.   Neurological: Negative for dizziness and headaches.       Objective:      Vitals:    09/03/19 1231   BP: (!) 132/58   BP Location: Left arm   Patient Position: Sitting   BP Method: Large (Manual)   Pulse: (!) 58   Resp: 18   Temp: 97.8 °F (36.6 °C)   TempSrc: Oral   SpO2: 96%   Weight: 90.9 kg (200 lb 4.8 oz)   Height: 5' 7" (1.702 m)     Physical Exam   Constitutional: She is oriented to person, place, and time. She appears well-developed and well-nourished.   HENT:   Head: Normocephalic and atraumatic.   Cardiovascular: Normal rate, regular rhythm and normal heart sounds.   Pulmonary/Chest: Effort normal and breath sounds normal.   Musculoskeletal: She exhibits no edema.        Left hip: She exhibits tenderness (tender hematoma laterally with large area of surrounding ecchymosis) and swelling. She exhibits normal strength, no crepitus and no deformity.   Neurological: She is alert and oriented to person, place, and time.   Skin: Skin is warm and dry.   Nursing note and vitals reviewed.      Assessment:       1. Contusion of left hip, initial encounter        Plan:       Contusion of left hip, initial encounter  -     X-Ray Hip 2 View Left; Future; Expected date: 09/03/2019  X-rays negative    Medication List with " Changes/Refills   Current Medications    B COMPLEX VITAMINS CAPSULE    Take 1 capsule by mouth once daily.    ESTRADIOL (ESTRACE) 2 MG TABLET    Take 1 tablet by mouth once daily.    HYDROCHLOROTHIAZIDE (HYDRODIURIL) 25 MG TABLET    Take 1 tablet (25 mg total) by mouth once daily.    LEVOTHYROXINE (SYNTHROID) 112 MCG TABLET    TAKE 1 TABLET(112 MCG) BY MOUTH EVERY DAY    MECLIZINE (ANTIVERT) 25 MG TABLET    Take 1 tablet (25 mg total) by mouth 3 (three) times daily as needed for Dizziness.    METOPROLOL SUCCINATE (TOPROL-XL) 25 MG 24 HR TABLET    TAKE 1 TABLET(25 MG) BY MOUTH EVERY DAY    MULTIVITAMIN (THERAGRAN) PER TABLET    Take 1 tablet by mouth once daily.    PROCHLORPERAZINE (COMPAZINE) 10 MG TABLET    Take 1 tablet (10 mg total) by mouth every 6 (six) hours as needed (nausea).    VITAMIN E 400 UNIT CAPSULE    Take 400 Units by mouth.   Discontinued Medications    RANITIDINE (ZANTAC) 150 MG TABLET    TAKE 1 TABLET(150 MG) BY MOUTH TWICE DAILY

## 2019-09-03 NOTE — TELEPHONE ENCOUNTER
----- Message from Rocco Miller MD sent at 9/3/2019 12:54 PM CDT -----  Patient had gastric emptying study done by Dr. Saul Whatley at [facility], but never got results

## 2019-09-17 ENCOUNTER — TELEPHONE (OUTPATIENT)
Dept: PRIMARY CARE CLINIC | Facility: CLINIC | Age: 69
End: 2019-09-17

## 2019-09-17 NOTE — TELEPHONE ENCOUNTER
----- Message from Indiana Starr sent at 9/17/2019  2:33 PM CDT -----  Contact: self/[phone]  Patient called in regards needing to get the GI doctor name and if is possible the test results. Please call and advise. Thank you.

## 2019-09-17 NOTE — TELEPHONE ENCOUNTER
Patient notified the GI that did her EGD was Dr. Whatley. She was given the contact information to get her report.

## 2019-11-16 DIAGNOSIS — I10 ESSENTIAL HYPERTENSION, BENIGN: ICD-10-CM

## 2019-11-16 DIAGNOSIS — E89.0 POSTOPERATIVE HYPOTHYROIDISM: ICD-10-CM

## 2019-11-18 RX ORDER — LEVOTHYROXINE SODIUM 112 UG/1
TABLET ORAL
Qty: 90 TABLET | Refills: 1 | Status: SHIPPED | OUTPATIENT
Start: 2019-11-18 | End: 2019-12-10

## 2019-11-18 RX ORDER — METOPROLOL SUCCINATE 25 MG/1
TABLET, EXTENDED RELEASE ORAL
Qty: 90 TABLET | Refills: 1 | Status: SHIPPED | OUTPATIENT
Start: 2019-11-18 | End: 2020-05-15

## 2019-12-03 ENCOUNTER — CLINICAL SUPPORT (OUTPATIENT)
Dept: PRIMARY CARE CLINIC | Facility: CLINIC | Age: 69
End: 2019-12-03
Payer: MEDICARE

## 2019-12-03 DIAGNOSIS — Z85.850 HISTORY OF THYROID CANCER: ICD-10-CM

## 2019-12-03 DIAGNOSIS — E78.00 PURE HYPERCHOLESTEROLEMIA: Primary | ICD-10-CM

## 2019-12-03 DIAGNOSIS — I10 ESSENTIAL HYPERTENSION, BENIGN: ICD-10-CM

## 2019-12-03 LAB
ALBUMIN SERPL BCP-MCNC: 3.8 G/DL (ref 3.5–5.2)
ALP SERPL-CCNC: 51 U/L (ref 38–126)
ALT SERPL W/O P-5'-P-CCNC: 12 U/L (ref 14–54)
ANION GAP SERPL CALC-SCNC: 9 MMOL/L (ref 8–16)
AST SERPL-CCNC: 17 U/L (ref 15–41)
BASOPHILS # BLD AUTO: 0.1 K/UL (ref 0–0.2)
BASOPHILS NFR BLD: 0.7 % (ref 0–1.9)
BILIRUB SERPL-MCNC: 0.4 MG/DL (ref 0.3–1.2)
BUN SERPL-MCNC: 15 MG/DL (ref 8–23)
CALCIUM SERPL-MCNC: 9.6 MG/DL (ref 8.6–10)
CHLORIDE SERPL-SCNC: 100 MMOL/L (ref 101–111)
CHOLEST SERPL-MCNC: 268 MG/DL (ref 80–200)
CHOLEST/HDLC SERPL: 3.4 {RATIO} (ref 2–5)
CO2 SERPL-SCNC: 31 MMOL/L (ref 23–29)
CREAT SERPL-MCNC: 0.9 MG/DL (ref 0.5–1.4)
DIFFERENTIAL METHOD: ABNORMAL
EOSINOPHIL # BLD AUTO: 0.2 K/UL (ref 0–0.5)
EOSINOPHIL NFR BLD: 2.7 % (ref 0–8)
ERYTHROCYTE [DISTWIDTH] IN BLOOD BY AUTOMATED COUNT: 12.5 % (ref 11.5–14.5)
EST. GFR  (AFRICAN AMERICAN): >60 ML/MIN/1.73 M^2
EST. GFR  (NON AFRICAN AMERICAN): >60 ML/MIN/1.73 M^2
GLUCOSE SERPL-MCNC: 79 MG/DL (ref 74–118)
HCT VFR BLD AUTO: 39.8 % (ref 37–48.5)
HDLC SERPL-MCNC: 80 MG/DL (ref 40–75)
HDLC SERPL: 29.9 % (ref 20–50)
HGB BLD-MCNC: 13.2 G/DL (ref 12–16)
LDLC SERPL CALC-MCNC: 126 MG/DL
LYMPHOCYTES # BLD AUTO: 1.9 K/UL (ref 1–4.8)
LYMPHOCYTES NFR BLD: 23.6 % (ref 18–48)
MCH RBC QN AUTO: 31.4 PG (ref 27–31)
MCHC RBC AUTO-ENTMCNC: 33.1 G/DL (ref 32–36)
MCV RBC AUTO: 95 FL (ref 82–98)
MONOCYTES # BLD AUTO: 0.7 K/UL (ref 0.3–1)
MONOCYTES NFR BLD: 8.1 % (ref 4–15)
NEUTROPHILS # BLD AUTO: 5.3 K/UL (ref 1.8–7.7)
NEUTROPHILS NFR BLD: 64.9 % (ref 38–73)
NONHDLC SERPL-MCNC: 188 MG/DL
PLATELET # BLD AUTO: 316 K/UL (ref 150–350)
PMV BLD AUTO: 9.7 FL (ref 9.2–12.9)
POTASSIUM SERPL-SCNC: 3.6 MMOL/L (ref 3.5–5.1)
PROT SERPL-MCNC: 7 G/DL (ref 6–8.4)
RBC # BLD AUTO: 4.2 M/UL (ref 4–5.4)
SODIUM SERPL-SCNC: 140 MMOL/L (ref 136–145)
T3 SERPL-MCNC: 86 NG/DL (ref 60–180)
T4 FREE SERPL-MCNC: 1.09 NG/DL (ref 0.61–1.12)
TRIGL SERPL-MCNC: 311 MG/DL (ref 30–150)
TSH SERPL DL<=0.005 MIU/L-ACNC: 6.96 UIU/ML (ref 0.45–5.33)
WBC # BLD AUTO: 8.2 K/UL (ref 3.9–12.7)

## 2019-12-03 PROCEDURE — 80053 COMPREHEN METABOLIC PANEL: CPT

## 2019-12-03 PROCEDURE — 36415 COLL VENOUS BLD VENIPUNCTURE: CPT | Mod: PBBFAC,PN

## 2019-12-03 PROCEDURE — 84439 ASSAY OF FREE THYROXINE: CPT

## 2019-12-03 PROCEDURE — 84443 ASSAY THYROID STIM HORMONE: CPT

## 2019-12-03 PROCEDURE — 80061 LIPID PANEL: CPT

## 2019-12-03 PROCEDURE — 84480 ASSAY TRIIODOTHYRONINE (T3): CPT

## 2019-12-03 PROCEDURE — 85025 COMPLETE CBC W/AUTO DIFF WBC: CPT

## 2019-12-10 ENCOUNTER — OFFICE VISIT (OUTPATIENT)
Dept: PRIMARY CARE CLINIC | Facility: CLINIC | Age: 69
End: 2019-12-10
Payer: MEDICARE

## 2019-12-10 VITALS
SYSTOLIC BLOOD PRESSURE: 126 MMHG | HEART RATE: 60 BPM | BODY MASS INDEX: 31.55 KG/M2 | OXYGEN SATURATION: 99 % | HEIGHT: 67 IN | DIASTOLIC BLOOD PRESSURE: 64 MMHG | RESPIRATION RATE: 16 BRPM | TEMPERATURE: 99 F | WEIGHT: 201 LBS

## 2019-12-10 DIAGNOSIS — I10 ESSENTIAL HYPERTENSION, BENIGN: ICD-10-CM

## 2019-12-10 DIAGNOSIS — E78.2 MIXED HYPERLIPIDEMIA: ICD-10-CM

## 2019-12-10 DIAGNOSIS — E89.0 POSTOPERATIVE HYPOTHYROIDISM: Primary | ICD-10-CM

## 2019-12-10 DIAGNOSIS — Z23 NEED FOR VACCINATION: ICD-10-CM

## 2019-12-10 PROCEDURE — 99999 PR PBB SHADOW E&M-EST. PATIENT-LVL III: CPT | Mod: PBBFAC,,, | Performed by: FAMILY MEDICINE

## 2019-12-10 PROCEDURE — 1126F AMNT PAIN NOTED NONE PRSNT: CPT | Mod: ,,, | Performed by: FAMILY MEDICINE

## 2019-12-10 PROCEDURE — 99999 PR PBB SHADOW E&M-EST. PATIENT-LVL III: ICD-10-PCS | Mod: PBBFAC,,, | Performed by: FAMILY MEDICINE

## 2019-12-10 PROCEDURE — 99214 PR OFFICE/OUTPT VISIT, EST, LEVL IV, 30-39 MIN: ICD-10-PCS | Mod: S$PBB,,, | Performed by: FAMILY MEDICINE

## 2019-12-10 PROCEDURE — 1126F PR PAIN SEVERITY QUANTIFIED, NO PAIN PRESENT: ICD-10-PCS | Mod: ,,, | Performed by: FAMILY MEDICINE

## 2019-12-10 PROCEDURE — 1159F MED LIST DOCD IN RCRD: CPT | Mod: ,,, | Performed by: FAMILY MEDICINE

## 2019-12-10 PROCEDURE — 90662 IIV NO PRSV INCREASED AG IM: CPT | Mod: PBBFAC,PN

## 2019-12-10 PROCEDURE — 99213 OFFICE O/P EST LOW 20 MIN: CPT | Mod: PBBFAC,PN | Performed by: FAMILY MEDICINE

## 2019-12-10 PROCEDURE — 99214 OFFICE O/P EST MOD 30 MIN: CPT | Mod: S$PBB,,, | Performed by: FAMILY MEDICINE

## 2019-12-10 PROCEDURE — 1159F PR MEDICATION LIST DOCUMENTED IN MEDICAL RECORD: ICD-10-PCS | Mod: ,,, | Performed by: FAMILY MEDICINE

## 2019-12-10 RX ORDER — LEVOTHYROXINE SODIUM 125 UG/1
125 TABLET ORAL
Qty: 90 TABLET | Refills: 0 | Status: SHIPPED | OUTPATIENT
Start: 2019-12-10 | End: 2020-03-09

## 2019-12-10 NOTE — PROGRESS NOTES
Verified pt ID using name and . Verified allergies, VIS given. Administered high dose flu vaccine in left deltoid per physician order using aseptic technique. Aspirated and no blood return noted. Pt tolerated well with no adverse reactions noted.

## 2019-12-10 NOTE — PROGRESS NOTES
"Subjective:       Patient ID: Graciela Barton is a 69 y.o. female.    Chief Complaint: Hyperlipidemia (here to review lab results )    Cholesterol and triglycerides elevated on recent labs, but TSH also still elevated.  Compliant with all medications.  Left leg has been giving out, but seems to be getting better.  Denies chest pain, palpitations or shortness of breath.  No bowel or bladder incontinence    Review of Systems   Constitutional: Negative for fatigue and unexpected weight change.   Respiratory: Negative for shortness of breath.    Cardiovascular: Negative for chest pain and palpitations.   Genitourinary: Negative for difficulty urinating.   Musculoskeletal: Positive for back pain.   Skin: Negative for wound.       Objective:      Vitals:    12/10/19 1120   BP: 126/64   BP Location: Right arm   Patient Position: Sitting   BP Method: Large (Manual)   Pulse: 60   Resp: 16   Temp: 98.7 °F (37.1 °C)   TempSrc: Oral   SpO2: 99%   Weight: 91.2 kg (201 lb)   Height: 5' 7" (1.702 m)     Physical Exam   Constitutional: She is oriented to person, place, and time. She appears well-developed and well-nourished.   HENT:   Head: Normocephalic and atraumatic.   Cardiovascular: Normal rate, regular rhythm and normal heart sounds.   Pulmonary/Chest: Effort normal and breath sounds normal.   Musculoskeletal: She exhibits no edema.   Neurological: She is alert and oriented to person, place, and time. She has normal strength.   Reflex Scores:       Patellar reflexes are 2+ on the right side and 2+ on the left side.  Skin: Skin is warm and dry.   Nursing note and vitals reviewed.      Lab Results   Component Value Date    WBC 8.20 12/03/2019    HGB 13.2 12/03/2019    HCT 39.8 12/03/2019     12/03/2019    CHOL 268 (H) 12/03/2019    TRIG 311 (H) 12/03/2019    HDL 80 (H) 12/03/2019    ALT 12 (L) 12/03/2019    AST 17 12/03/2019     12/03/2019    K 3.6 12/03/2019     (L) 12/03/2019    CREATININE 0.9 12/03/2019    " BUN 15 12/03/2019    CO2 31 (H) 12/03/2019    TSH 6.96 (H) 12/03/2019      Assessment:       1. Postoperative hypothyroidism    2. Essential hypertension, benign    3. Mixed hyperlipidemia    4. Need for vaccination        Plan:       Postoperative hypothyroidism  -     levothyroxine (SYNTHROID) 125 MCG tablet; Take 1 tablet (125 mcg total) by mouth before breakfast.  Dispense: 90 tablet; Refill: 0  -     TSH; Future; Expected date: 01/21/2020  Increase levothyroxine, recheck TSH in 6-8 weeks  Essential hypertension, benign  Stable on current regimen  Mixed hyperlipidemia  Reduce carbohydrate intake.  Should also improve with adequate thyroid replacement  Need for vaccination  -     Influenza - High Dose (65+) (PF) (IM)      Medication List with Changes/Refills   Current Medications    B COMPLEX VITAMINS CAPSULE    Take 1 capsule by mouth once daily.    ESTRADIOL (ESTRACE) 2 MG TABLET    Take 1 tablet by mouth once daily.    HYDROCHLOROTHIAZIDE (HYDRODIURIL) 25 MG TABLET    Take 1 tablet (25 mg total) by mouth once daily.    MECLIZINE (ANTIVERT) 25 MG TABLET    Take 1 tablet (25 mg total) by mouth 3 (three) times daily as needed for Dizziness.    METOPROLOL SUCCINATE (TOPROL-XL) 25 MG 24 HR TABLET    TAKE 1 TABLET(25 MG) BY MOUTH EVERY DAY    MULTIVITAMIN (THERAGRAN) PER TABLET    Take 1 tablet by mouth once daily.    PROCHLORPERAZINE (COMPAZINE) 10 MG TABLET    Take 1 tablet (10 mg total) by mouth every 6 (six) hours as needed (nausea).    VITAMIN E 400 UNIT CAPSULE    Take 400 Units by mouth.   Changed and/or Refilled Medications    Modified Medication Previous Medication    LEVOTHYROXINE (SYNTHROID) 125 MCG TABLET levothyroxine (SYNTHROID) 112 MCG tablet       Take 1 tablet (125 mcg total) by mouth before breakfast.    TAKE 1 TABLET(112 MCG) BY MOUTH EVERY DAY

## 2020-01-21 ENCOUNTER — PATIENT OUTREACH (OUTPATIENT)
Dept: ADMINISTRATIVE | Facility: HOSPITAL | Age: 70
End: 2020-01-21

## 2020-02-04 ENCOUNTER — CLINICAL SUPPORT (OUTPATIENT)
Dept: PRIMARY CARE CLINIC | Facility: CLINIC | Age: 70
End: 2020-02-04
Payer: MEDICARE

## 2020-02-04 DIAGNOSIS — E89.0 POSTOPERATIVE HYPOTHYROIDISM: ICD-10-CM

## 2020-02-04 LAB — TSH SERPL DL<=0.005 MIU/L-ACNC: 2.53 UIU/ML (ref 0.45–5.33)

## 2020-02-04 PROCEDURE — 84443 ASSAY THYROID STIM HORMONE: CPT

## 2020-02-04 PROCEDURE — 36415 COLL VENOUS BLD VENIPUNCTURE: CPT | Mod: PBBFAC,PN

## 2020-02-06 ENCOUNTER — TELEPHONE (OUTPATIENT)
Dept: PRIMARY CARE CLINIC | Facility: CLINIC | Age: 70
End: 2020-02-06

## 2020-02-06 NOTE — TELEPHONE ENCOUNTER
Unable to leave  to call back about labs    ----- Message from Rocco Miller MD sent at 2/5/2020  5:47 PM CST -----  TSH now normal, continue current dose of levothyroxine

## 2020-02-11 ENCOUNTER — TELEPHONE (OUTPATIENT)
Dept: PRIMARY CARE CLINIC | Facility: CLINIC | Age: 70
End: 2020-02-11

## 2020-02-11 NOTE — TELEPHONE ENCOUNTER
Message was left in patients chart. She was notified TSH now normal, continue current dose of levothyroxine. She verbalized understanding

## 2020-02-11 NOTE — TELEPHONE ENCOUNTER
----- Message from Kenisha Lee sent at 2/11/2020  9:23 AM CST -----  Contact: Pt  Reason: Pt returning call she stated someone called her on yesterday regarding lab but no message was left in epic.    Communication: [phone]

## 2020-03-06 DIAGNOSIS — E89.0 POSTOPERATIVE HYPOTHYROIDISM: ICD-10-CM

## 2020-03-09 RX ORDER — LEVOTHYROXINE SODIUM 125 UG/1
TABLET ORAL
Qty: 90 TABLET | Refills: 1 | Status: SHIPPED | OUTPATIENT
Start: 2020-03-09 | End: 2020-08-24

## 2020-04-06 RX ORDER — HYDROCHLOROTHIAZIDE 25 MG/1
TABLET ORAL
Qty: 90 TABLET | Refills: 3 | Status: SHIPPED | OUTPATIENT
Start: 2020-04-06 | End: 2021-04-07

## 2020-05-15 DIAGNOSIS — I10 ESSENTIAL HYPERTENSION, BENIGN: ICD-10-CM

## 2020-05-15 RX ORDER — METOPROLOL SUCCINATE 25 MG/1
TABLET, EXTENDED RELEASE ORAL
Qty: 90 TABLET | Refills: 1 | Status: SHIPPED | OUTPATIENT
Start: 2020-05-15 | End: 2020-11-24 | Stop reason: SDUPTHER

## 2020-07-03 DIAGNOSIS — Z12.31 ENCOUNTER FOR SCREENING MAMMOGRAM FOR BREAST CANCER: ICD-10-CM

## 2020-11-24 ENCOUNTER — TELEPHONE (OUTPATIENT)
Dept: PRIMARY CARE CLINIC | Facility: CLINIC | Age: 70
End: 2020-11-24

## 2020-11-24 DIAGNOSIS — I10 ESSENTIAL HYPERTENSION, BENIGN: ICD-10-CM

## 2020-11-24 RX ORDER — METOPROLOL SUCCINATE 25 MG/1
25 TABLET, EXTENDED RELEASE ORAL DAILY
Qty: 90 TABLET | Refills: 0 | Status: SHIPPED | OUTPATIENT
Start: 2020-11-24 | End: 2021-02-22 | Stop reason: SDUPTHER

## 2020-11-24 NOTE — TELEPHONE ENCOUNTER
----- Message from Clare Dietz sent at 11/24/2020  1:14 PM CST -----  Contact: self [phone]  Requesting an RX refill or new RX.  Is this a refill or new RX: refill  RX name and strength:metoprolol succinate (TOPROL-XL) 25 MG 24 hr tablet  Is this a 30 day or 90 day RX:   Pharmacy name and phone # (copy/paste from chart):  [id number] [place] [place] - [address] AT [facility] [address] [phone] (Phone)  [phone] (Fax)  Comments:

## 2020-11-24 NOTE — TELEPHONE ENCOUNTER
----- Message from Clare Dietz sent at 11/24/2020  1:13 PM CST -----  Contact: self [phone]  Pt would like to set up appt to come to get her flu shot in the clinic. Pt states she would like to have a early morning appt. Please call and advise.

## 2020-11-27 ENCOUNTER — OFFICE VISIT (OUTPATIENT)
Dept: PRIMARY CARE CLINIC | Facility: CLINIC | Age: 70
End: 2020-11-27
Payer: MEDICARE

## 2020-11-27 VITALS
WEIGHT: 216.06 LBS | HEART RATE: 98 BPM | SYSTOLIC BLOOD PRESSURE: 138 MMHG | TEMPERATURE: 97 F | OXYGEN SATURATION: 98 % | HEIGHT: 67 IN | BODY MASS INDEX: 33.91 KG/M2 | RESPIRATION RATE: 18 BRPM | DIASTOLIC BLOOD PRESSURE: 84 MMHG

## 2020-11-27 DIAGNOSIS — E78.2 MIXED HYPERLIPIDEMIA: ICD-10-CM

## 2020-11-27 DIAGNOSIS — E89.0 POSTOPERATIVE HYPOTHYROIDISM: ICD-10-CM

## 2020-11-27 DIAGNOSIS — I10 ESSENTIAL HYPERTENSION, BENIGN: ICD-10-CM

## 2020-11-27 DIAGNOSIS — Z23 NEED FOR VACCINATION: ICD-10-CM

## 2020-11-27 DIAGNOSIS — Z00.00 ANNUAL PHYSICAL EXAM: Primary | ICD-10-CM

## 2020-11-27 DIAGNOSIS — Z12.31 ENCOUNTER FOR SCREENING MAMMOGRAM FOR MALIGNANT NEOPLASM OF BREAST: ICD-10-CM

## 2020-11-27 PROCEDURE — 99214 OFFICE O/P EST MOD 30 MIN: CPT | Mod: PBBFAC,PN | Performed by: FAMILY MEDICINE

## 2020-11-27 PROCEDURE — 99999 PR PBB SHADOW E&M-EST. PATIENT-LVL IV: ICD-10-PCS | Mod: PBBFAC,,, | Performed by: FAMILY MEDICINE

## 2020-11-27 PROCEDURE — G0008 ADMIN INFLUENZA VIRUS VAC: HCPCS | Mod: PBBFAC,PN

## 2020-11-27 PROCEDURE — 90694 VACC AIIV4 NO PRSRV 0.5ML IM: CPT | Mod: PBBFAC,PN

## 2020-11-27 PROCEDURE — 99213 OFFICE O/P EST LOW 20 MIN: CPT | Mod: S$PBB,,, | Performed by: FAMILY MEDICINE

## 2020-11-27 PROCEDURE — 99213 PR OFFICE/OUTPT VISIT, EST, LEVL III, 20-29 MIN: ICD-10-PCS | Mod: S$PBB,,, | Performed by: FAMILY MEDICINE

## 2020-11-27 PROCEDURE — 99999 PR PBB SHADOW E&M-EST. PATIENT-LVL IV: CPT | Mod: PBBFAC,,, | Performed by: FAMILY MEDICINE

## 2020-11-27 RX ORDER — ZOSTER VACCINE RECOMBINANT, ADJUVANTED 50 MCG/0.5
0.5 KIT INTRAMUSCULAR ONCE
Qty: 1 EACH | Refills: 0 | Status: SHIPPED | OUTPATIENT
Start: 2020-11-27 | End: 2020-11-27

## 2020-11-27 NOTE — PROGRESS NOTES
"Subjective:       Patient ID: Graciela Barton is a 70 y.o. female.    Chief Complaint: Annual Exam    Feeling generally well, no particular complaints or concerns at this time. Due for labs, but already ate today    Review of Systems   Constitutional: Negative for chills, fatigue and fever.   HENT: Negative for congestion.    Eyes: Negative for visual disturbance.   Respiratory: Negative for cough and shortness of breath.    Cardiovascular: Negative for chest pain.   Gastrointestinal: Negative for abdominal pain, nausea and vomiting.   Endocrine: Negative for polydipsia and polyuria.   Genitourinary: Negative for difficulty urinating.   Musculoskeletal: Positive for arthralgias.   Skin: Negative for rash.   Allergic/Immunologic: Negative for immunocompromised state.   Neurological: Negative for dizziness.   Hematological: Does not bruise/bleed easily.   Psychiatric/Behavioral: Negative for sleep disturbance.       Objective:      Vitals:    11/27/20 1223   BP: 138/84   BP Location: Right arm   Patient Position: Sitting   BP Method: Medium (Manual)   Pulse: 98   Resp: 18   Temp: 97.4 °F (36.3 °C)   TempSrc: Temporal   SpO2: 98%   Weight: 98 kg (216 lb 0.8 oz)   Height: 5' 7" (1.702 m)     Physical Exam  Vitals signs and nursing note reviewed.   Constitutional:       Appearance: She is well-developed.   HENT:      Head: Normocephalic and atraumatic.   Neck:      Vascular: No carotid bruit.   Cardiovascular:      Rate and Rhythm: Normal rate and regular rhythm.      Heart sounds: Normal heart sounds.   Pulmonary:      Effort: Pulmonary effort is normal.      Breath sounds: Normal breath sounds.   Skin:     General: Skin is warm and dry.   Neurological:      Mental Status: She is alert and oriented to person, place, and time.   Psychiatric:         Mood and Affect: Mood normal.         Behavior: Behavior normal.         Lab Results   Component Value Date    WBC 8.20 12/03/2019    HGB 13.2 12/03/2019    HCT 39.8 12/03/2019 "     12/03/2019    CHOL 268 (H) 12/03/2019    TRIG 311 (H) 12/03/2019    HDL 80 (H) 12/03/2019    ALT 12 (L) 12/03/2019    AST 17 12/03/2019     12/03/2019    K 3.6 12/03/2019     (L) 12/03/2019    CREATININE 0.9 12/03/2019    BUN 15 12/03/2019    CO2 31 (H) 12/03/2019    TSH 2.53 02/04/2020      Assessment:       1. Annual physical exam    2. Encounter for screening mammogram for malignant neoplasm of breast    3. Mixed hyperlipidemia    4. Essential hypertension, benign    5. Postoperative hypothyroidism    6. Need for vaccination        Plan:       Annual physical exam  -     CBC Auto Differential; Future; Expected date: 11/27/2020  -     Comprehensive Metabolic Panel; Future; Expected date: 11/27/2020  -     Lipid Panel; Future; Expected date: 11/27/2020  -     TSH; Future; Expected date: 11/27/2020    Encounter for screening mammogram for malignant neoplasm of breast  -     Mammo Digital Screening Bilat; Future; Expected date: 11/27/2020    Mixed hyperlipidemia  -     Comprehensive Metabolic Panel; Future; Expected date: 11/27/2020  -     Lipid Panel; Future; Expected date: 11/27/2020    Essential hypertension, benign  -     CBC Auto Differential; Future; Expected date: 11/27/2020    Postoperative hypothyroidism  -     TSH; Future; Expected date: 11/27/2020    Need for vaccination  -     Influenza - Quadrivalent (Adjuvanted)  -     varicella-zoster gE-AS01B, PF, (SHINGRIX, PF,) 50 mcg/0.5 mL injection; Inject 0.5 mLs into the muscle once. for 1 dose  Dispense: 1 each; Refill: 0      Medication List with Changes/Refills   New Medications    VARICELLA-ZOSTER GE-AS01B, PF, (SHINGRIX, PF,) 50 MCG/0.5 ML INJECTION    Inject 0.5 mLs into the muscle once. for 1 dose   Current Medications    B COMPLEX VITAMINS CAPSULE    Take 1 capsule by mouth once daily.    ESTRADIOL (ESTRACE) 2 MG TABLET    Take 1 tablet by mouth once daily.    HYDROCHLOROTHIAZIDE (HYDRODIURIL) 25 MG TABLET    TAKE 1 TABLET(25 MG)  BY MOUTH EVERY DAY    LEVOTHYROXINE (SYNTHROID) 125 MCG TABLET    TAKE 1 TABLET(125 MCG) BY MOUTH BEFORE BREAKFAST    MECLIZINE (ANTIVERT) 25 MG TABLET    Take 1 tablet (25 mg total) by mouth 3 (three) times daily as needed for Dizziness.    METOPROLOL SUCCINATE (TOPROL-XL) 25 MG 24 HR TABLET    Take 1 tablet (25 mg total) by mouth once daily.    MULTIVITAMIN (THERAGRAN) PER TABLET    Take 1 tablet by mouth once daily.    PROCHLORPERAZINE (COMPAZINE) 10 MG TABLET    Take 1 tablet (10 mg total) by mouth every 6 (six) hours as needed (nausea).    VITAMIN E 400 UNIT CAPSULE    Take 400 Units by mouth.

## 2021-01-25 ENCOUNTER — OFFICE VISIT (OUTPATIENT)
Dept: PRIMARY CARE CLINIC | Facility: CLINIC | Age: 71
End: 2021-01-25
Payer: MEDICARE

## 2021-01-25 VITALS
BODY MASS INDEX: 34.6 KG/M2 | OXYGEN SATURATION: 97 % | DIASTOLIC BLOOD PRESSURE: 88 MMHG | HEIGHT: 67 IN | HEART RATE: 77 BPM | SYSTOLIC BLOOD PRESSURE: 138 MMHG | WEIGHT: 220.44 LBS | TEMPERATURE: 97 F | RESPIRATION RATE: 16 BRPM

## 2021-01-25 DIAGNOSIS — E78.2 MIXED HYPERLIPIDEMIA: Primary | ICD-10-CM

## 2021-01-25 PROCEDURE — 99214 OFFICE O/P EST MOD 30 MIN: CPT | Mod: S$PBB,,, | Performed by: FAMILY MEDICINE

## 2021-01-25 PROCEDURE — 99214 PR OFFICE/OUTPT VISIT, EST, LEVL IV, 30-39 MIN: ICD-10-PCS | Mod: S$PBB,,, | Performed by: FAMILY MEDICINE

## 2021-01-25 PROCEDURE — 99214 OFFICE O/P EST MOD 30 MIN: CPT | Mod: PBBFAC,PN | Performed by: FAMILY MEDICINE

## 2021-01-25 PROCEDURE — 99999 PR PBB SHADOW E&M-EST. PATIENT-LVL IV: ICD-10-PCS | Mod: PBBFAC,,, | Performed by: FAMILY MEDICINE

## 2021-01-25 PROCEDURE — 99999 PR PBB SHADOW E&M-EST. PATIENT-LVL IV: CPT | Mod: PBBFAC,,, | Performed by: FAMILY MEDICINE

## 2021-01-25 RX ORDER — ROSUVASTATIN CALCIUM 10 MG/1
10 TABLET, COATED ORAL DAILY
Qty: 90 TABLET | Refills: 1 | Status: SHIPPED | OUTPATIENT
Start: 2021-01-25 | End: 2021-07-27 | Stop reason: SDUPTHER

## 2021-01-28 ENCOUNTER — PATIENT OUTREACH (OUTPATIENT)
Dept: ADMINISTRATIVE | Facility: HOSPITAL | Age: 71
End: 2021-01-28

## 2021-02-22 DIAGNOSIS — I10 ESSENTIAL HYPERTENSION, BENIGN: ICD-10-CM

## 2021-02-22 DIAGNOSIS — E89.0 POSTOPERATIVE HYPOTHYROIDISM: ICD-10-CM

## 2021-02-22 RX ORDER — METOPROLOL SUCCINATE 25 MG/1
25 TABLET, EXTENDED RELEASE ORAL DAILY
Qty: 90 TABLET | Refills: 1 | Status: SHIPPED | OUTPATIENT
Start: 2021-02-22 | End: 2021-07-27 | Stop reason: SDUPTHER

## 2021-02-22 RX ORDER — LEVOTHYROXINE SODIUM 125 UG/1
125 TABLET ORAL
Qty: 90 TABLET | Refills: 1 | Status: SHIPPED | OUTPATIENT
Start: 2021-02-22 | End: 2021-07-27 | Stop reason: SDUPTHER

## 2021-05-14 ENCOUNTER — PES CALL (OUTPATIENT)
Dept: ADMINISTRATIVE | Facility: CLINIC | Age: 71
End: 2021-05-14

## 2021-07-26 ENCOUNTER — TELEPHONE (OUTPATIENT)
Dept: PRIMARY CARE CLINIC | Facility: CLINIC | Age: 71
End: 2021-07-26

## 2021-07-27 DIAGNOSIS — I10 ESSENTIAL HYPERTENSION, BENIGN: ICD-10-CM

## 2021-07-27 DIAGNOSIS — E89.0 POSTOPERATIVE HYPOTHYROIDISM: ICD-10-CM

## 2021-07-27 DIAGNOSIS — H81.10 BENIGN PAROXYSMAL POSITIONAL VERTIGO, UNSPECIFIED LATERALITY: ICD-10-CM

## 2021-07-27 DIAGNOSIS — E78.2 MIXED HYPERLIPIDEMIA: ICD-10-CM

## 2021-07-27 RX ORDER — PROCHLORPERAZINE MALEATE 10 MG
10 TABLET ORAL EVERY 6 HOURS PRN
Qty: 30 TABLET | Refills: 1 | Status: SHIPPED | OUTPATIENT
Start: 2021-07-27 | End: 2022-08-26

## 2021-07-27 RX ORDER — LEVOTHYROXINE SODIUM 125 UG/1
125 TABLET ORAL
Qty: 90 TABLET | Refills: 1 | Status: SHIPPED | OUTPATIENT
Start: 2021-07-27 | End: 2022-02-02

## 2021-07-27 RX ORDER — METOPROLOL SUCCINATE 25 MG/1
25 TABLET, EXTENDED RELEASE ORAL DAILY
Qty: 90 TABLET | Refills: 1 | Status: SHIPPED | OUTPATIENT
Start: 2021-07-27 | End: 2022-02-25

## 2021-07-27 RX ORDER — HYDROCHLOROTHIAZIDE 25 MG/1
25 TABLET ORAL DAILY
Qty: 90 TABLET | Refills: 1 | Status: SHIPPED | OUTPATIENT
Start: 2021-07-27 | End: 2022-04-20

## 2021-07-27 RX ORDER — ROSUVASTATIN CALCIUM 10 MG/1
10 TABLET, COATED ORAL DAILY
Qty: 90 TABLET | Refills: 1 | Status: SHIPPED | OUTPATIENT
Start: 2021-07-27 | End: 2022-02-02 | Stop reason: SDUPTHER

## 2021-07-29 ENCOUNTER — OFFICE VISIT (OUTPATIENT)
Dept: PRIMARY CARE CLINIC | Facility: CLINIC | Age: 71
End: 2021-07-29
Payer: MEDICARE

## 2021-07-29 VITALS
WEIGHT: 208.13 LBS | HEART RATE: 75 BPM | DIASTOLIC BLOOD PRESSURE: 64 MMHG | SYSTOLIC BLOOD PRESSURE: 122 MMHG | BODY MASS INDEX: 32.67 KG/M2 | TEMPERATURE: 98 F | OXYGEN SATURATION: 95 % | RESPIRATION RATE: 16 BRPM | HEIGHT: 67 IN

## 2021-07-29 DIAGNOSIS — Z78.0 ASYMPTOMATIC MENOPAUSE: ICD-10-CM

## 2021-07-29 DIAGNOSIS — E78.2 MIXED HYPERLIPIDEMIA: Primary | ICD-10-CM

## 2021-07-29 DIAGNOSIS — E89.0 POSTOPERATIVE HYPOTHYROIDISM: ICD-10-CM

## 2021-07-29 DIAGNOSIS — I10 ESSENTIAL HYPERTENSION, BENIGN: ICD-10-CM

## 2021-07-29 DIAGNOSIS — Z23 NEED FOR SHINGLES VACCINE: ICD-10-CM

## 2021-07-29 PROCEDURE — 99214 OFFICE O/P EST MOD 30 MIN: CPT | Mod: S$PBB,,, | Performed by: FAMILY MEDICINE

## 2021-07-29 PROCEDURE — 99214 PR OFFICE/OUTPT VISIT, EST, LEVL IV, 30-39 MIN: ICD-10-PCS | Mod: S$PBB,,, | Performed by: FAMILY MEDICINE

## 2021-07-29 PROCEDURE — 99214 OFFICE O/P EST MOD 30 MIN: CPT | Mod: PBBFAC,PN | Performed by: FAMILY MEDICINE

## 2021-07-29 PROCEDURE — 99999 PR PBB SHADOW E&M-EST. PATIENT-LVL IV: CPT | Mod: PBBFAC,,, | Performed by: FAMILY MEDICINE

## 2021-07-29 PROCEDURE — 99999 PR PBB SHADOW E&M-EST. PATIENT-LVL IV: ICD-10-PCS | Mod: PBBFAC,,, | Performed by: FAMILY MEDICINE

## 2021-07-29 RX ORDER — ZOSTER VACCINE RECOMBINANT, ADJUVANTED 50 MCG/0.5
0.5 KIT INTRAMUSCULAR ONCE
Qty: 1 EACH | Refills: 1 | Status: SHIPPED | OUTPATIENT
Start: 2021-07-29 | End: 2021-07-29

## 2021-07-29 RX ORDER — ZOSTER VACCINE RECOMBINANT, ADJUVANTED 50 MCG/0.5
0.5 KIT INTRAMUSCULAR ONCE
Qty: 1 EACH | Refills: 0 | Status: CANCELLED | OUTPATIENT
Start: 2021-07-29 | End: 2021-07-29

## 2022-01-27 ENCOUNTER — OFFICE VISIT (OUTPATIENT)
Dept: PRIMARY CARE CLINIC | Facility: CLINIC | Age: 72
End: 2022-01-27
Payer: MEDICARE

## 2022-01-27 VITALS
BODY MASS INDEX: 31.66 KG/M2 | DIASTOLIC BLOOD PRESSURE: 70 MMHG | HEART RATE: 73 BPM | OXYGEN SATURATION: 98 % | SYSTOLIC BLOOD PRESSURE: 124 MMHG | WEIGHT: 201.75 LBS | HEIGHT: 67 IN | RESPIRATION RATE: 16 BRPM

## 2022-01-27 DIAGNOSIS — E89.0 POSTOPERATIVE HYPOTHYROIDISM: ICD-10-CM

## 2022-01-27 DIAGNOSIS — E78.2 MIXED HYPERLIPIDEMIA: ICD-10-CM

## 2022-01-27 DIAGNOSIS — I10 ESSENTIAL HYPERTENSION, BENIGN: ICD-10-CM

## 2022-01-27 DIAGNOSIS — Z23 NEED FOR VACCINATION: ICD-10-CM

## 2022-01-27 DIAGNOSIS — S63.501A SPRAIN OF RIGHT WRIST, INITIAL ENCOUNTER: Primary | ICD-10-CM

## 2022-01-27 PROCEDURE — 99999 PR PBB SHADOW E&M-EST. PATIENT-LVL IV: ICD-10-PCS | Mod: PBBFAC,,, | Performed by: FAMILY MEDICINE

## 2022-01-27 PROCEDURE — 99214 PR OFFICE/OUTPT VISIT, EST, LEVL IV, 30-39 MIN: ICD-10-PCS | Mod: S$PBB,,, | Performed by: FAMILY MEDICINE

## 2022-01-27 PROCEDURE — 99214 OFFICE O/P EST MOD 30 MIN: CPT | Mod: PBBFAC,PN | Performed by: FAMILY MEDICINE

## 2022-01-27 PROCEDURE — 90694 VACC AIIV4 NO PRSRV 0.5ML IM: CPT | Mod: PBBFAC,PN

## 2022-01-27 PROCEDURE — G0008 ADMIN INFLUENZA VIRUS VAC: HCPCS | Mod: PBBFAC,PN

## 2022-01-27 PROCEDURE — 99999 PR PBB SHADOW E&M-EST. PATIENT-LVL IV: CPT | Mod: PBBFAC,,, | Performed by: FAMILY MEDICINE

## 2022-01-27 PROCEDURE — 99214 OFFICE O/P EST MOD 30 MIN: CPT | Mod: S$PBB,,, | Performed by: FAMILY MEDICINE

## 2022-01-27 NOTE — PROGRESS NOTES
Pt self identified by name and    Allergy Vancomycin  Flu vaccine age 65+  Admin to left arm using aseptic technique

## 2022-01-27 NOTE — PROGRESS NOTES
"Subjective:       Patient ID: Graciela Barton is a 71 y.o. female.    Chief Complaint: Hyperlipidemia (Here for a check up ) and Wrist Pain (Right wrist pain after she fell a couple of weeks ago )    Tripped and fell at home 2 weeks ago, injured R wrist, still having pain, though a little better. Minimal swelling. No LOC  TSH slightly low, free T4 borderline elevated on recent labs.  Has been on same dose of levothyroxine for quite awhile, but says pharmacy recently changed manufactures, so she is getting a different pill.  Feels fine.  No tremors or weight loss.    Review of Systems   Constitutional: Negative for chills, fatigue and fever.   HENT: Negative for congestion.    Eyes: Negative for visual disturbance.   Respiratory: Negative for cough and shortness of breath.    Cardiovascular: Negative for chest pain.   Gastrointestinal: Negative for abdominal pain, nausea and vomiting.   Genitourinary: Negative for difficulty urinating.   Musculoskeletal: Positive for arthralgias.   Skin: Negative for rash.   Neurological: Negative for dizziness.   Psychiatric/Behavioral: Negative for sleep disturbance.       Objective:      Vitals:    01/27/22 0820   BP: 124/70   BP Location: Right arm   Patient Position: Sitting   Pulse: 73   Resp: 16   SpO2: 98%   Weight: 91.5 kg (201 lb 11.5 oz)   Height: 5' 7" (1.702 m)     Physical Exam  Vitals and nursing note reviewed.   Constitutional:       Appearance: She is well-developed and well-nourished.   HENT:      Head: Normocephalic and atraumatic.   Cardiovascular:      Rate and Rhythm: Normal rate and regular rhythm.      Heart sounds: Normal heart sounds.   Pulmonary:      Effort: Pulmonary effort is normal.      Breath sounds: Normal breath sounds.   Musculoskeletal:         General: No edema.      Right wrist: Tenderness present. No deformity, effusion, snuff box tenderness or crepitus. Normal range of motion. Normal pulse.   Skin:     General: Skin is warm and dry. "   Neurological:      Mental Status: She is alert and oriented to person, place, and time.         Lab Results   Component Value Date    WBC 7.80 01/26/2022    HGB 12.9 01/26/2022    HCT 39.3 01/26/2022     01/26/2022    CHOL 146 01/26/2022    TRIG 119 01/26/2022    HDL 56 01/26/2022    ALT 13 01/26/2022    AST 18 01/26/2022     01/26/2022    K 3.7 01/26/2022     01/26/2022    CREATININE 0.8 01/26/2022    BUN 26 (H) 01/26/2022    CO2 27 01/26/2022    TSH 0.365 (L) 01/26/2022      Assessment:       1. Sprain of right wrist, initial encounter    2. Need for vaccination    3. Postoperative hypothyroidism    4. Mixed hyperlipidemia    5. Essential hypertension, benign        Plan:       Sprain of right wrist, initial encounter  -     X-Ray Wrist Complete 3 views Right; Future; Expected date: 01/27/2022    Need for vaccination  -     Influenza (FLUAD) - Quadrivalent (Adjuvanted) *Preferred* (65+) (PF)    Postoperative hypothyroidism  -     TSH; Future; Expected date: 03/10/2022  Continue current meds for now, recheck TSH in 6-8 weeks.  Adjust dose if still abnormal  Mixed hyperlipidemia  Well controlled  Essential hypertension, benign  Well controlled    Medication List with Changes/Refills   Current Medications    B COMPLEX VITAMINS CAPSULE    Take 1 capsule by mouth once daily.    COLLAGEN MISC    by Misc.(Non-Drug; Combo Route) route.    HYDROCHLOROTHIAZIDE (HYDRODIURIL) 25 MG TABLET    Take 1 tablet (25 mg total) by mouth once daily.    LEVOTHYROXINE (SYNTHROID) 125 MCG TABLET    Take 1 tablet (125 mcg total) by mouth before breakfast.    MECLIZINE (ANTIVERT) 25 MG TABLET    Take 1 tablet (25 mg total) by mouth 3 (three) times daily as needed for Dizziness.    METOPROLOL SUCCINATE (TOPROL-XL) 25 MG 24 HR TABLET    Take 1 tablet (25 mg total) by mouth once daily.    MULTIVITAMIN (THERAGRAN) PER TABLET    Take 1 tablet by mouth once daily.    PROCHLORPERAZINE (COMPAZINE) 10 MG TABLET    Take 1 tablet  (10 mg total) by mouth every 6 (six) hours as needed (nausea).    ROSUVASTATIN (CRESTOR) 10 MG TABLET    Take 1 tablet (10 mg total) by mouth once daily.    VITAMIN E 400 UNIT CAPSULE    Take 400 Units by mouth.   Discontinued Medications    ESTRADIOL (ESTRACE) 2 MG TABLET    Take 1 tablet by mouth once daily.

## 2022-01-29 DIAGNOSIS — E89.0 POSTOPERATIVE HYPOTHYROIDISM: ICD-10-CM

## 2022-01-29 DIAGNOSIS — E78.2 MIXED HYPERLIPIDEMIA: ICD-10-CM

## 2022-01-29 NOTE — TELEPHONE ENCOUNTER
No new care gaps identified.  Powered by [employer]. Reference number: [id number].   1/29/2022 5:11:36 PM CST

## 2022-01-31 ENCOUNTER — PATIENT OUTREACH (OUTPATIENT)
Dept: ADMINISTRATIVE | Facility: HOSPITAL | Age: 72
End: 2022-01-31
Payer: MEDICARE

## 2022-02-02 DIAGNOSIS — E78.2 MIXED HYPERLIPIDEMIA: ICD-10-CM

## 2022-02-02 RX ORDER — LEVOTHYROXINE SODIUM 125 UG/1
TABLET ORAL
Qty: 90 TABLET | Refills: 3 | Status: SHIPPED | OUTPATIENT
Start: 2022-02-02 | End: 2023-02-07

## 2022-02-02 RX ORDER — ROSUVASTATIN CALCIUM 10 MG/1
10 TABLET, COATED ORAL DAILY
Qty: 90 TABLET | Refills: 3 | Status: SHIPPED | OUTPATIENT
Start: 2022-02-02 | End: 2023-03-21

## 2022-02-02 RX ORDER — ROSUVASTATIN CALCIUM 10 MG/1
TABLET, COATED ORAL
Qty: 90 TABLET | Refills: 3 | OUTPATIENT
Start: 2022-02-02

## 2022-02-02 NOTE — TELEPHONE ENCOUNTER
No new care gaps identified.  Powered by [employer]. Reference number: [id number].   2/02/2022 11:17:36 AM CST

## 2022-02-02 NOTE — TELEPHONE ENCOUNTER
Refill Routing Note   Medication(s) are not appropriate for processing by [facility] for the following reason(s):      - Drug-Disease Interaction (rosuvastatin and Postoperative hypothyroidism)    OR action(s):  Defer  Approve Medication-related problems identified: Drug-disease interaction     Medication Therapy Plan: Crestore- DDI; defer. Levothyroxine- mrm resolved; approve  --->Care Gap information included in message below if applicable.   Medication reconciliation completed: No   Automatic Epic Generated Protocol Data:    Orders Placed This Encounter    levothyroxine (SYNTHROID) 125 MCG tablet      Requested Prescriptions   Pending Prescriptions Disp Refills    rosuvastatin (CRESTOR) 10 MG tablet [Pharmacy Med Name: Rosuvastatin Calcium 10 MG Oral Tablet] 90 tablet 3     Sig: Take 1 tablet by mouth once daily       Cardiovascular:  Antilipid - Statins Passed - 1/29/2022  5:12 PM        Passed - Patient is at least 18 years old        Passed - Valid encounter within last 15 months     Recent Visits  Date Type Provider Dept   01/27/22 Office Visit Rocco Miller MD [facility]   07/29/21 Office Visit Rocco Miller MD [facility] [place]   01/25/21 Office Visit Rocco Miller MD [facility]   11/27/20 Office Visit Rocco Miller MD [facility]   Showing recent visits within past 720 days and meeting all other requirements  Future Appointments  No visits were found meeting these conditions.  Showing future appointments within next 150 days and meeting all other requirements      Future Appointments              [facility] month [facility]                Passed - ALT is 131 or below and within 360 days     ALT   Date Value Ref Range Status   01/26/2022 13 10 - 44 U/L Final   04/19/2021 16 10 - 44 U/L Final   12/01/2020 17 14 - 54 U/L Final              Passed - AST is 119 or below and within 360 days      AST   Date Value Ref Range Status   01/26/2022 18 10 - 40 U/L Final   04/19/2021 19 10 - 40 U/L Final   12/01/2020 20 15 - 41 U/L Final              Passed - Total Cholesterol within 360 days     Lab Results   Component Value Date    CHOL 146 01/26/2022    CHOL 152 04/19/2021    CHOL 290 (H) 12/01/2020              Passed - LDL within 360 days     LDL Cholesterol   Date Value Ref Range Status   01/26/2022 66.2 63.0 - 159.0 mg/dL Final     Comment:     The National Cholesterol Education Program (NCEP) has set the  following guidelines (reference values) for LDL Cholesterol:  Optimal.......................<130 mg/dL  Borderline High...............130-159 mg/dL  High..........................160-189 mg/dL  Very High.....................>190 mg/dL              Passed - HDL within 360 days     HDL   Date Value Ref Range Status   01/26/2022 56 40 - 75 mg/dL Final     Comment:     The National Cholesterol Education Program (NCEP) has set the  following guidelines (reference values) for HDL Cholesterol:  Low...............<40 mg/dL  Optimal...........>60 mg/dL              Passed - Triglycerides within 360 days     Lab Results   Component Value Date    TRIG 119 01/26/2022    TRIG 143 04/19/2021    TRIG 213 (H) 12/01/2020               Signed Prescriptions Disp Refills    levothyroxine (SYNTHROID) 125 MCG tablet 90 tablet 3     Sig: TAKE 1 TABLET BY MOUTH BEFORE BREAKFAST       Endocrinology:  Hypothyroid Agents Failed - 1/29/2022  5:12 PM        Failed - TSH in normal range and within 360 days     TSH   Date Value Ref Range Status   01/26/2022 0.365 (L) 0.400 - 4.000 uIU/mL Final   12/01/2020 1.16 0.45 - 5.33 uIU/mL Final   02/04/2020 2.53 0.45 - 5.33 uIU/mL Final              Passed - Patient is at least 18 years old        Passed - Valid encounter within last 15 months     Recent Visits  Date Type Provider Dept   01/27/22 Office Visit Rocco Miller MD [facility]   07/29/21 Office Visit Rocco Miller,  MD [facility]   01/25/21 [facility]   11/27/20 Office Visit Rocco Miller MD [facility]   Showing recent visits within past 720 days and meeting all other requirements  Future Appointments  No visits were found meeting these conditions.  Showing future appointments within next 150 days and meeting all other requirements      Future Appointments              In 1 month LAB, [facility] ([facility]                Passed - Manual Review: FT4 is not required if last TSH is WNL.        Passed - T4 free within 1080 days     T4, Free   Date Value Ref Range Status   02/21/2018 1.4 0.8 - 1.8 ng/dL Final     Free T4   Date Value Ref Range Status   01/26/2022 1.50 0.71 - 1.51 ng/dL Final   12/03/2019 1.09 0.61 - 1.12 ng/dL Final   04/17/2019 1.12 0.61 - 1.12 ng/dL Final                    Appointments  past 12m or future 3m with PCP    Date Provider   Last Visit   1/27/2022 Rocco Miller MD   Next Visit   Visit date not found Rocco Miller MD   ED visits in past 90 days: 0        Note composed:10:40 AM 02/02/2022

## 2022-02-02 NOTE — TELEPHONE ENCOUNTER
----- Message from Bernie Gonsales sent at 2/2/2022 11:09 AM CST -----  Contact: Self   Requesting an RX refill or new RX.  Is this a refill or new RX:   RX name and strength (copy/paste from chart):  rosuvastatin (CRESTOR) 10 MG tablet  Is this a 30 day or 90 day RX: 90  Pharmacy name and phone # (copy/paste from chart):    [facility] - [place] - [address]  [address] [place] [address]  Phone: [phone] Fax: [phone]     The doctors have asked that we provide their patients with the following 2 reminders -- prescription refills can take up to 72 hours, and a friendly reminder that in the future you can use your [facility] account to request refills: yes

## 2022-02-25 DIAGNOSIS — I10 ESSENTIAL HYPERTENSION, BENIGN: ICD-10-CM

## 2022-02-25 RX ORDER — METOPROLOL SUCCINATE 25 MG/1
TABLET, EXTENDED RELEASE ORAL
Qty: 90 TABLET | Refills: 3 | Status: SHIPPED | OUTPATIENT
Start: 2022-02-25 | End: 2023-03-21

## 2022-02-25 NOTE — TELEPHONE ENCOUNTER
Refill Authorization Note   Graciela Barton  is requesting a refill authorization.  Brief Assessment and Rationale for Refill:  Approve     Medication Therapy Plan:       Medication Reconciliation Completed: No   Comments:   --->Care Gap information included below if applicable.       Requested Prescriptions   Pending Prescriptions Disp Refills    metoprolol succinate (TOPROL-XL) 25 MG 24 hr tablet [Pharmacy Med Name: Metoprolol Succinate ER 25 MG Oral Tablet Extended Release 24 Hour] 90 tablet 3     Sig: Take 1 tablet by mouth once daily       Cardiovascular:  Beta Blockers Passed - 2/25/2022 12:09 PM        Passed - Patient is at least 18 years old        Passed - Last BP in normal range within 360 days     BP Readings from Last 1 Encounters:   01/27/22 124/70               Passed - Last Heart Rate in normal range within 360 days     Pulse Readings from Last 1 Encounters:   01/27/22 73              Passed - Valid encounter within last 15 months     Recent Visits  Date Type Provider Dept   01/27/22 Office Visit Rocco Miller MD [facility]   07/29/21 Office Visit Rocco Miller MD [facility]   01/25/21 Office Visit Rocco Miller MD [facility]   11/27/20 Office Visit Rocco Miller MD [facility]   Showing recent visits within past 720 days and meeting all other requirements  Future Appointments  No visits were found meeting these conditions.  Showing future appointments within next 150 days and meeting all other requirements      Future Appointments              In 1 month LAB, [facility]                    Appointments  past 12m or future 3m with PCP    Date Provider   Last Visit   1/27/2022 Rocco Miller MD   Next Visit   Visit date not found Rocco Miller MD   ED visits in past 90 days: 0     Note composed:12:39 PM 02/25/2022

## 2022-02-25 NOTE — TELEPHONE ENCOUNTER
No new care gaps identified.  Powered by [employer]. Reference number: [id number].   2/25/2022 12:09:53 PM CST

## 2022-04-20 RX ORDER — HYDROCHLOROTHIAZIDE 25 MG/1
TABLET ORAL
Qty: 90 TABLET | Refills: 3 | Status: SHIPPED | OUTPATIENT
Start: 2022-04-20 | End: 2022-08-10

## 2022-04-20 NOTE — TELEPHONE ENCOUNTER
Refill Authorization Note   Graciela Barton  is requesting a refill authorization.  Brief Assessment and Rationale for Refill:  Approve     Medication Therapy Plan:       Medication Reconciliation Completed: No   Comments:     No Care Gaps recommended.     Note composed:5:48 PM 04/20/2022

## 2022-04-20 NOTE — TELEPHONE ENCOUNTER
No new care gaps identified.  Powered by [employer]. Reference number: [id number].   4/20/2022 5:38:11 PM CDT

## 2022-06-20 PROBLEM — R53.1 WEAKNESS: Status: ACTIVE | Noted: 2022-06-20

## 2022-06-21 PROBLEM — M17.2 BILATERAL POST-TRAUMATIC OSTEOARTHRITIS OF KNEE: Status: ACTIVE | Noted: 2022-06-21

## 2022-06-22 PROBLEM — I47.9 PAROXYSMAL TACHYCARDIA: Status: ACTIVE | Noted: 2022-06-22

## 2022-06-24 ENCOUNTER — NURSE TRIAGE (OUTPATIENT)
Dept: ADMINISTRATIVE | Facility: CLINIC | Age: 72
End: 2022-06-24
Payer: MEDICARE

## 2022-06-25 NOTE — TELEPHONE ENCOUNTER
Reason for Disposition   Call about patient who is currently hospitalized    Additional Information   Negative: Lab calling with strep throat test results and triager can call in prescription   Negative: Lab calling with urinalysis test results and triager can call in prescription   Negative: Medication questions   Negative: Physician call to PCP   Negative: ED call to PCP    Protocols used:  PCP CALL - NO TRIAGE-A-  pt called re at seen at clinic yest am. sent to [facility] from [place]. pt now at [facility]. Pt states she needs neck surg. had MRI yest and today. yamil luke gave a list of things the matter with pts neck, slipped on floor from walker seat when trying to sit on chair of walker. Pt states told she needs surg of c3-T1 has a jam and T1=T9, trouble in lumbar L1-2, L3-4, L 4-5. Spoke with Naty at [facility] to offer contact info re neurosurg on call. Ashley states she will contact pts nurse and will talk with pt.pt notified. Urgent office message sent to dr bah office. Call back with questions

## 2022-06-27 ENCOUNTER — TELEPHONE (OUTPATIENT)
Dept: NEUROSURGERY | Facility: CLINIC | Age: 72
End: 2022-06-27
Payer: MEDICARE

## 2022-06-27 NOTE — TELEPHONE ENCOUNTER
Spoke with patient and she is getting transferred to [facility] and she thinks they have everything they need. Told patient if she needs anything to ask for Gianna with [facility] and Dr. Miller's office and I can assist her/them with anything they may need. Patient states verbal understanding.

## 2022-06-27 NOTE — TELEPHONE ENCOUNTER
The pt is still in patient at [facility] . She would have to request a transfer from hospital to hospital or be discharged  ----- Message from Janny Pino sent at 6/24/2022  7:00 PM CDT -----  Type:  Sooner Apoointment Request    Caller is requesting a sooner appointment.  Caller declined first available appointment listed below.  Caller will not accept being placed on the waitlist and is requesting a message be sent to doctor.  Name of Caller:pt   When is the first available appointment?  Would the patient rather a call back or a response via [facility]?call back   Best Call Back Number: [phone]  Additional Information: pt would like appt asap with either  or Dr. Dr Lemos  in [place]. Was advised from [facility] advisor to get it done asap.

## 2022-06-28 ENCOUNTER — TELEPHONE (OUTPATIENT)
Dept: NEUROSURGERY | Facility: CLINIC | Age: 72
End: 2022-06-28
Payer: MEDICARE

## 2022-06-28 NOTE — TELEPHONE ENCOUNTER
Patient has been called informed that see would need to speak with the physician at [facility] that was treating to discuss being transferred to [facility].

## 2022-06-28 NOTE — TELEPHONE ENCOUNTER
----- Message from Sen Mcdowell sent at 6/28/2022 12:02 PM CDT -----  Contact: Patient  Patient requesting call back in regards to speaking with a nurse before scheduling a visit.      Patient@[phone]

## 2022-07-05 ENCOUNTER — OFFICE VISIT (OUTPATIENT)
Dept: ORTHOPEDICS | Facility: CLINIC | Age: 72
End: 2022-07-05
Payer: MEDICARE

## 2022-07-05 VITALS — BODY MASS INDEX: 30.72 KG/M2 | WEIGHT: 195.75 LBS | HEIGHT: 67 IN

## 2022-07-05 DIAGNOSIS — M47.816 LUMBAR SPONDYLOSIS: Primary | ICD-10-CM

## 2022-07-05 DIAGNOSIS — M47.14 THORACIC MYELOPATHY: ICD-10-CM

## 2022-07-05 DIAGNOSIS — M54.16 LUMBAR RADICULOPATHY: ICD-10-CM

## 2022-07-05 DIAGNOSIS — M51.36 DDD (DEGENERATIVE DISC DISEASE), LUMBAR: ICD-10-CM

## 2022-07-05 PROCEDURE — 99999 PR PBB SHADOW E&M-EST. PATIENT-LVL III: ICD-10-PCS | Mod: PBBFAC,,, | Performed by: ORTHOPAEDIC SURGERY

## 2022-07-05 PROCEDURE — 99999 PR PBB SHADOW E&M-EST. PATIENT-LVL III: CPT | Mod: PBBFAC,,, | Performed by: ORTHOPAEDIC SURGERY

## 2022-07-05 PROCEDURE — 99204 OFFICE O/P NEW MOD 45 MIN: CPT | Mod: S$PBB,GC,, | Performed by: ORTHOPAEDIC SURGERY

## 2022-07-05 PROCEDURE — 99213 OFFICE O/P EST LOW 20 MIN: CPT | Mod: PBBFAC | Performed by: ORTHOPAEDIC SURGERY

## 2022-07-05 PROCEDURE — 99204 PR OFFICE/OUTPT VISIT, NEW, LEVL IV, 45-59 MIN: ICD-10-PCS | Mod: S$PBB,GC,, | Performed by: ORTHOPAEDIC SURGERY

## 2022-07-05 NOTE — PROGRESS NOTES
DATE: 7/5/2022  PATIENT: Graciela Barton    Attending Physician: Jesús Yoder M.D.    CHIEF COMPLAINT: imbalance, RLE pain    HISTORY:  Graciela Barton is a 72 y.o. female here for initial evaluation of LBP and RLE pain (Lumbar - 2, Leg - 2). The pain has been present for 2 weeks after she sustained a low energy fall. The patient describes the pain as sharp and it radiates laterally down to right shin. The pain is worse with activity and improved by rest. There is associated numbness and tingling. There is subjective weakness. Prior treatments have included tylenol, but no PT, MALKA or surgery.     Patient was admitted to [facility], where they recommended C2-T2 or C3-T1 PCDF (due to hx of dysphagia), and L1-2 and L4-5 laminectomy. She wanted a second opinion.     The Patient endorses myelopathic symptoms such as dropping things, handwriting changes or difficulty with buttons/coins/keys. She has had balance problems for 9 months, at which she started using a walker. Denies perineal paresthesias, bowel/bladder dysfunction.    The patient does not smoke, have DM or endorse IVDU. The patient is not on any blood thinners and does not take chronic narcotics. She is a retired rehab nurse.    PAST MEDICAL/SURGICAL HISTORY:  Past Medical History:   Diagnosis Date    Arthritis     Cancer     Thyroid    Edema     Hyperlipidemia     Hypertension     Thyroid disease      Past Surgical History:   Procedure Laterality Date    ADENOIDECTOMY      COLONOSCOPY  10/18/2018    COLONOSCOPY N/A 10/18/2018    Procedure: COLONOSCOPY;  Surgeon: Yosvany Alejandra MD;  Location: [facility];  Service: General;  Laterality: N/A;    HYSTERECTOMY  1992    total hyst     KNEE SURGERY      16 pins and two plates implanted    THYROIDECTOMY      TONSILLECTOMY         Current Medications:   Current Outpatient Medications:     aspirin (ECOTRIN) 81 MG EC tablet, Take 1 tablet (81 mg total) by mouth once daily., Disp: , Rfl: 0    atorvastatin (LIPITOR)  40 MG tablet, Take 1 tablet (40 mg total) by mouth once daily., Disp: 90 tablet, Rfl: 3    hydroCHLOROthiazide (HYDRODIURIL) 25 MG tablet, Take 1 tablet by mouth once daily, Disp: 90 tablet, Rfl: 3    levothyroxine (SYNTHROID) 125 MCG tablet, TAKE 1 TABLET BY MOUTH BEFORE BREAKFAST, Disp: 90 tablet, Rfl: 3    metoprolol succinate (TOPROL-XL) 25 MG 24 hr tablet, Take 1 tablet by mouth once daily, Disp: 90 tablet, Rfl: 3    prochlorperazine (COMPAZINE) 10 MG tablet, Take 1 tablet (10 mg total) by mouth every 6 (six) hours as needed (nausea)., Disp: 30 tablet, Rfl: 1    rosuvastatin (CRESTOR) 10 MG tablet, Take 1 tablet (10 mg total) by mouth once daily., Disp: 90 tablet, Rfl: 3    b complex vitamins capsule, Take 1 capsule by mouth once daily., Disp: , Rfl:     COLLAGEN MISC, by Misc.(Non-Drug; Combo Route) route., Disp: , Rfl:     dexamethasone (DECADRON) 4 mg/mL injection, Inject 1.5 mLs (6 mg total) into the vein every 6 (six) hours. (Patient not taking: Reported on 7/5/2022), Disp: , Rfl:     meclizine (ANTIVERT) 25 mg tablet, Take 1 tablet (25 mg total) by mouth 3 (three) times daily as needed for Dizziness. (Patient not taking: Reported on 7/5/2022), Disp: 30 tablet, Rfl: 3    multivitamin (THERAGRAN) per tablet, Take 1 tablet by mouth once daily., Disp: , Rfl:     vitamin E 400 UNIT capsule, Take 400 Units by mouth., Disp: , Rfl:     Social History:   Social History     Socioeconomic History    Marital status:    Tobacco Use    Smoking status: Never Smoker    Smokeless tobacco: Never Used   Substance and Sexual Activity    Alcohol use: No    Drug use: No    Sexual activity: Not Currently     Partners: Male       REVIEW OF SYSTEMS:  Constitution: Negative. Negative for chills, fever and night sweats.   Cardiovascular: Negative for chest pain and syncope.   Respiratory: Negative for cough and shortness of breath.   Gastrointestinal: See HPI. Negative for nausea/vomiting. Negative for  "abdominal pain.  Genitourinary: See HPI. Negative for discoloration or dysuria.  Skin: Negative for dry skin, itching and rash.   Hematologic/Lymphatic: negative for bleeding/clotting disorders.   Musculoskeletal: Negative for falls and muscle weakness.   Neurological: See HPI. no history of seizures. no history of cranial surgery or shunts.  Endocrine: Negative for polydipsia, polyphagia and polyuria.   Allergic/Immunologic: Negative for hives and persistent infections.  Psychiatric/Behavioral: Negative for depression and insomnia.         EXAM:  Ht 5' 7" (1.702 m)   Wt 88.8 kg (195 lb 12.3 oz)   BMI 30.66 kg/m²     General: The patient is a 72 y.o. female in no apparent distress, the patient is orientatied to person, place and time.  Psych: Normal mood and affect  HEENT: Vision grossly intact, hearing intact to the spoken word.  Lungs: Respirations unlabored.  Gait: Normal station and gait, no difficulty with toe or heel walk.   Skin: Cervical skin negative for rashes, lesions, hairy patches and surgical scars.  Range of motion: Cervical range of motion is acceptable. There is lower lumbar tenderness to palpation.  Spinal Balance: Global saggital and coronal spinal balance acceptable, no significant for scoliosis and kyphosis.  Musculoskeletal: No pain with the range of motion of the bilateral shoulders and elbows. Normal bulk and contour of the bilateral hands.  Vascular: Bilateral hands warm and well perfused, Radial pulses 2+ bilaterally.  Neurological: Normal strength and tone in all major motor groups in the bilateral upper and lower extremities except for 4/5 b/l hip flexion. Normal sensation to light touch in the C5-T1 and L2-S1 dermatomes bilaterally.  Deep tendon reflexes symmetric 2+ in the bilateral upper and lower extremities.  + L Betancourt's    IMAGING:   Today I independently reviewed the following images and my interpretations are as follows:    AP, Lat and Flex/Ex  upright T and L-spine films " demonstrate spondylosis without fractures or listhesis.    T-MRI showed T7-8 HNP with moderate-severe central stenosis. Increased signal within spinal cord throughout T-spine.    Lumbar MRI showed L1-2 and L5-S1 moderate central stenosis. L3-5 severe central stenosis. Moderate b/l L4-5 and severe b/l L5-S1 foraminal stenosis.    DEXA lumbar spine showed T-score of 5.3.    Body mass index is 30.66 kg/m².  Hemoglobin A1C   Date Value Ref Range Status   06/20/2022 5.5 4.0 - 5.6 % Final     Comment:     ADA Screening Guidelines:  5.7-6.4%  Consistent with prediabetes  >or=6.5%  Consistent with diabetes    High levels of fetal hemoglobin interfere with the HbA1C  assay. Heterozygous hemoglobin variants (HbS, HgC, etc)do  not significantly interfere with this assay.   However, presence of multiple variants may affect accuracy.         ASSESSMENT/PLAN:  Lumbar spondylosis    DDD (degenerative disc disease), lumbar    Thoracic myelopathy    Lumbar radiculopathy      Follow up in about 2 weeks (around 7/19/2022).    Patient has thoracic myelopathy and lumbar stenosis with RLE radiculopathy. She does not have her C-MRI disc with her; she will bring it next time and I will review it. I discussed the natural history of their diagnoses as well as surgical and nonsurgical treatment options. I educated the patient on the importance of core/back strengthening, correct posture, bending/lifting ergonomics, and low-impact aerobic exercises (walking, elliptical, and aquatherapy). Continue medications. Patient will follow up in 2 weeks for MRI review.    Jesús Yoder MD  Orthopaedic Spine Surgeon  Department of Orthopaedic Surgery  [phone]

## 2022-07-06 ENCOUNTER — TELEPHONE (OUTPATIENT)
Dept: ORTHOPEDICS | Facility: CLINIC | Age: 72
End: 2022-07-06
Payer: MEDICARE

## 2022-07-06 NOTE — TELEPHONE ENCOUNTER
Spoke to patient, informed her I have not received anything from [facility] as of yet. She is going to call them about obtaining the cervical MRI disk.

## 2022-07-06 NOTE — TELEPHONE ENCOUNTER
----- Message from Franny Gonzales sent at 7/6/2022  2:17 PM CDT -----      Graciela Barton MRN [id number] has appt w/Dr. Yoder 7/12 - she is asking if her full report has been sent from [facility], states she spoke w/them at 11 today    Please contact patient at your earliest opportunity today    Patient can be contacted @# [phone]

## 2022-07-08 ENCOUNTER — TELEPHONE (OUTPATIENT)
Dept: ORTHOPEDICS | Facility: CLINIC | Age: 72
End: 2022-07-08
Payer: MEDICARE

## 2022-07-08 NOTE — TELEPHONE ENCOUNTER
----- Message from Patricia Jang sent at 7/8/2022  1:20 PM CDT -----  Regarding: PT HAS MRI CD  Contact: Pt  Pt wants to know if its ok to bring in disk on today..    Confirmed contact info below:  Contact Name: Graciela Barton  Phone Number: [phone]

## 2022-07-12 ENCOUNTER — OFFICE VISIT (OUTPATIENT)
Dept: ORTHOPEDICS | Facility: CLINIC | Age: 72
End: 2022-07-12
Payer: MEDICARE

## 2022-07-12 VITALS — WEIGHT: 195 LBS | HEIGHT: 67 IN | BODY MASS INDEX: 30.61 KG/M2

## 2022-07-12 DIAGNOSIS — M54.16 LUMBAR RADICULOPATHY: ICD-10-CM

## 2022-07-12 DIAGNOSIS — M50.30 DDD (DEGENERATIVE DISC DISEASE), CERVICAL: ICD-10-CM

## 2022-07-12 DIAGNOSIS — M47.812 CERVICAL SPONDYLOSIS: ICD-10-CM

## 2022-07-12 DIAGNOSIS — M51.36 DDD (DEGENERATIVE DISC DISEASE), LUMBAR: ICD-10-CM

## 2022-07-12 DIAGNOSIS — M47.14 THORACIC MYELOPATHY: ICD-10-CM

## 2022-07-12 DIAGNOSIS — M47.816 LUMBAR SPONDYLOSIS: Primary | ICD-10-CM

## 2022-07-12 PROCEDURE — 99213 OFFICE O/P EST LOW 20 MIN: CPT | Mod: PBBFAC | Performed by: ORTHOPAEDIC SURGERY

## 2022-07-12 PROCEDURE — 99999 PR PBB SHADOW E&M-EST. PATIENT-LVL III: ICD-10-PCS | Mod: PBBFAC,,, | Performed by: ORTHOPAEDIC SURGERY

## 2022-07-12 PROCEDURE — 99999 PR PBB SHADOW E&M-EST. PATIENT-LVL III: CPT | Mod: PBBFAC,,, | Performed by: ORTHOPAEDIC SURGERY

## 2022-07-12 PROCEDURE — 99214 OFFICE O/P EST MOD 30 MIN: CPT | Mod: S$PBB,,, | Performed by: ORTHOPAEDIC SURGERY

## 2022-07-12 PROCEDURE — 99214 PR OFFICE/OUTPT VISIT, EST, LEVL IV, 30-39 MIN: ICD-10-PCS | Mod: S$PBB,,, | Performed by: ORTHOPAEDIC SURGERY

## 2022-07-12 NOTE — PROGRESS NOTES
"DATE: 7/12/2022  PATIENT: Graciela Barton    Attending Physician: Jesús Yoder M.D.    HISTORY:  Graciela Braton is a 72 y.o. female w/ a hx thyroid cx (s/p resection ans has dysphagia) who returns to me today of cervical MRI results. She has no neck pain or arm pain; she has no numbness/tingling of her BUE. She did admit to dropping things and left hand clumsiness. She has had balance problems for 9 months, at which she started using a walker. Denies perineal paresthesias, bowel/bladder dysfunction. She has no family history of neurological disorders such as Multiple Sclerosis.    Patient was admitted to [facility], where they recommended C2-T2 or C3-T1 PCDF, and L1-2 and L4-5 laminectomy.    PMH/PSH/FamHx/SocHx:  Unchanged from prior visit     ROS:  Positive for imbalance, LBP and RLE pain  Denies perineal paresthesias, bowel or bladder incontinence    EXAM:  Ht 5' 7" (1.702 m)   Wt 88.5 kg (195 lb)   BMI 30.54 kg/m²     My physical examination was notable for the following findings: Normal strength and tone in all major motor groups in the bilateral upper and lower extremities except for 4/5 b/l hip flexion. Normal sensation to light touch in the C5-T1 and L2-S1 dermatomes bilaterally. + L Betancourt's    IMAGING:  Today I independently reviewed the following images and my interpretations are as follows:    Previous L-spine XRs showed spondylosis without fractures or listhesis.    T-MRI showed T7-8 HNP with moderate-severe central stenosis. Increased signal within spinal cord throughout T-spine.    Lumbar MRI showed L1-2 and L5-S1 moderate central stenosis. L3-5 severe central stenosis. Moderate b/l L4-5 and severe b/l L5-S1 foraminal stenosis.    Cervical MRI (OSH, on a disc; hard to visualize [id number] sagittal & axial images) showed mild C3-4, mild L C4-5 and mild-moderate R C5-6 and mild R C6-7 central stenosis. There is myelomalacia throughout the cervical spine.    DEXA lumbar spine showed T-score of 5.3. "     ASSESSMENT/PLAN:  Patient has thoracic myelopathy and lumbar stenosis with RLE radiculopathy. She will likely need surgical intervention for her thoracic myelopathy. I discussed the natural history of their diagnoses as well as surgical and nonsurgical treatment options. I educated the patient on the importance of core/back strengthening, correct posture, bending/lifting ergonomics, and low-impact aerobic exercises (walking, elliptical, and aquatherapy). Continue medications. She has cervical myelomalacia without significant central stenosis. I will upload the images to our system and consult our [facility] radiologist. I will call her back after that. It is unclear at this point, if she will need cervical surgical decompression.    Jesús Yoder MD  Orthopaedic Spine Surgeon  Department of Orthopaedic Surgery  [phone]

## 2022-07-13 ENCOUNTER — TELEPHONE (OUTPATIENT)
Dept: ORTHOPEDICS | Facility: CLINIC | Age: 72
End: 2022-07-13
Payer: MEDICARE

## 2022-07-13 DIAGNOSIS — G95.9 CERVICAL MYELOPATHY: Primary | ICD-10-CM

## 2022-07-13 NOTE — TELEPHONE ENCOUNTER
----- Message from Jesús Yoder MD sent at 7/13/2022 11:10 AM CDT -----  Regarding: mri  I ordered brain mri for her. Could you schedule it and have her FU with me after it's done?

## 2022-07-13 NOTE — TELEPHONE ENCOUNTER
I spoke with the patient by telephone today regarding their cervical MRI and overall condition. We reviewed the results of the study and the options.     I reviewed her cervical and thoracic MRI with [facility] radiology. Cervical MRI demonstrates C5-6 moderate-severe central stenosis and C6-7 moderate central stenosis. There is extensive myelomalacia across the entire cervical spine. Thoracic MRI showed T7-8 calcified HNP with moderate central stenosis and myelomalacia.    The extensive myelomalacia cannot be explained by compressive spondylotic cervical myelopathy alone. We will proceed forward with a workup of demyelinating condition. I ordered MRI brain with demyelination protocol. Patient will RTC in 2 weeks for MRI review.    Jesús Yoder MD  Orthopaedic Spine Surgeon  Department of Orthopaedic Surgery  [phone]

## 2022-07-17 ENCOUNTER — HOSPITAL ENCOUNTER (OUTPATIENT)
Dept: RADIOLOGY | Facility: HOSPITAL | Age: 72
Discharge: HOME | End: 2022-07-17
Attending: ORTHOPAEDIC SURGERY
Payer: MEDICARE

## 2022-07-17 DIAGNOSIS — G95.9 CERVICAL MYELOPATHY: ICD-10-CM

## 2022-07-17 PROCEDURE — 70553 MRI BRAIN STEM W/O & W/DYE: CPT | Mod: 26,,, | Performed by: RADIOLOGY

## 2022-07-17 PROCEDURE — 70553 MRI BRAIN STEM W/O & W/DYE: CPT | Mod: TC

## 2022-07-17 PROCEDURE — 70553 MRI BRAIN DEMYELINATING W/ WO CONTRAST: ICD-10-PCS | Mod: 26,,, | Performed by: RADIOLOGY

## 2022-07-17 PROCEDURE — A9585 GADOBUTROL INJECTION: HCPCS | Performed by: ORTHOPAEDIC SURGERY

## 2022-07-17 PROCEDURE — 25500020 PHARM REV CODE 255: Performed by: ORTHOPAEDIC SURGERY

## 2022-07-17 RX ORDER — GADOBUTROL 604.72 MG/ML
9 INJECTION INTRAVENOUS
Status: COMPLETED | OUTPATIENT
Start: 2022-07-17 | End: 2022-07-17

## 2022-07-17 RX ADMIN — GADOBUTROL 9 ML: 604.72 INJECTION INTRAVENOUS at 02:07

## 2022-07-18 ENCOUNTER — TELEPHONE (OUTPATIENT)
Dept: ORTHOPEDICS | Facility: CLINIC | Age: 72
End: 2022-07-18
Payer: MEDICARE

## 2022-07-18 DIAGNOSIS — G95.89 MYELOMALACIA OF CERVICAL CORD: ICD-10-CM

## 2022-07-18 DIAGNOSIS — M54.16 LUMBAR RADICULOPATHY: ICD-10-CM

## 2022-07-18 DIAGNOSIS — M47.816 LUMBAR SPONDYLOSIS: Primary | ICD-10-CM

## 2022-07-18 NOTE — TELEPHONE ENCOUNTER
I spoke with the patient by telephone today regarding their MRI and overall condition. We reviewed the results of the study and the options.     The MRI brain demonstrates multiple areas of lesions, concerning for MS.    Previous Lumbar MRI showed L1-2 and L5-S1 moderate central stenosis. L3-5 severe central stenosis. Moderate b/l L4-5 and severe b/l L5-S1 foraminal stenosis.    We will proceed forward with referral to Neurology (MS). She also wanted relief for her LBP and RLE pain, so I ordered L3-4 MALKA. Patient will RTC in 4 weeks for re-evaluation.    Jesús Yoder MD  Orthopaedic Spine Surgeon  Department of Orthopaedic Surgery  [phone]

## 2022-07-19 ENCOUNTER — TELEPHONE (OUTPATIENT)
Dept: ORTHOPEDICS | Facility: CLINIC | Age: 72
End: 2022-07-19
Payer: MEDICARE

## 2022-07-19 ENCOUNTER — TELEPHONE (OUTPATIENT)
Dept: PAIN MEDICINE | Facility: OTHER | Age: 72
End: 2022-07-19
Payer: MEDICARE

## 2022-07-19 DIAGNOSIS — M54.16 LUMBAR RADICULOPATHY: Primary | ICD-10-CM

## 2022-07-19 NOTE — TELEPHONE ENCOUNTER
Scheduled appt with  on 8/16/22@10am. Informed patient the order for an MALKA was in the system and they will call her to schedule.

## 2022-08-09 ENCOUNTER — OFFICE VISIT (OUTPATIENT)
Dept: PRIMARY CARE CLINIC | Facility: CLINIC | Age: 72
End: 2022-08-09
Payer: MEDICARE

## 2022-08-09 VITALS
HEIGHT: 67 IN | TEMPERATURE: 100 F | BODY MASS INDEX: 30.61 KG/M2 | OXYGEN SATURATION: 97 % | SYSTOLIC BLOOD PRESSURE: 104 MMHG | DIASTOLIC BLOOD PRESSURE: 66 MMHG | WEIGHT: 195 LBS | RESPIRATION RATE: 18 BRPM | HEART RATE: 87 BPM

## 2022-08-09 DIAGNOSIS — M51.36 DDD (DEGENERATIVE DISC DISEASE), LUMBAR: ICD-10-CM

## 2022-08-09 DIAGNOSIS — I49.9 IRREGULAR CARDIAC RHYTHM: ICD-10-CM

## 2022-08-09 DIAGNOSIS — I48.91 ATRIAL FIBRILLATION, NEW ONSET: Primary | ICD-10-CM

## 2022-08-09 DIAGNOSIS — G35 MULTIPLE SCLEROSIS: ICD-10-CM

## 2022-08-09 DIAGNOSIS — M54.16 LUMBAR RADICULOPATHY: ICD-10-CM

## 2022-08-09 PROBLEM — M51.369 DDD (DEGENERATIVE DISC DISEASE), LUMBAR: Status: ACTIVE | Noted: 2022-08-09

## 2022-08-09 PROCEDURE — 93010 EKG 12-LEAD: ICD-10-PCS | Mod: S$PBB,,, | Performed by: INTERNAL MEDICINE

## 2022-08-09 PROCEDURE — 99214 OFFICE O/P EST MOD 30 MIN: CPT | Mod: S$PBB,,, | Performed by: FAMILY MEDICINE

## 2022-08-09 PROCEDURE — 99214 OFFICE O/P EST MOD 30 MIN: CPT | Mod: PBBFAC,PN | Performed by: FAMILY MEDICINE

## 2022-08-09 PROCEDURE — 99999 PR PBB SHADOW E&M-EST. PATIENT-LVL IV: ICD-10-PCS | Mod: PBBFAC,,, | Performed by: FAMILY MEDICINE

## 2022-08-09 PROCEDURE — 99214 PR OFFICE/OUTPT VISIT, EST, LEVL IV, 30-39 MIN: ICD-10-PCS | Mod: S$PBB,,, | Performed by: FAMILY MEDICINE

## 2022-08-09 PROCEDURE — 93005 ELECTROCARDIOGRAM TRACING: CPT | Mod: PBBFAC,PN | Performed by: INTERNAL MEDICINE

## 2022-08-09 PROCEDURE — 93010 ELECTROCARDIOGRAM REPORT: CPT | Mod: S$PBB,,, | Performed by: INTERNAL MEDICINE

## 2022-08-09 PROCEDURE — 99999 PR PBB SHADOW E&M-EST. PATIENT-LVL IV: CPT | Mod: PBBFAC,,, | Performed by: FAMILY MEDICINE

## 2022-08-09 NOTE — PROGRESS NOTES
"Subjective:       Patient ID: Graciela Barton is a 72 y.o. female.    Chief Complaint: ambulation (Difficulty ambulating  since July 6)    72-year-old female recently diagnosed with apparent new onset multiple sclerosis after MRI of the brain.  She has been referred to [facility], has initial consultation scheduled later this month.  Also requesting referral to [facility] for a 2nd opinion.  Of note, she has had progressively worsening weakness over the past several months, causing difficulty with ambulation.  No recent falls with injury.  Also struggling with severe, disabling lower back pain with bilateral lumbar radiculopathy.  She was scheduled for lumbar epidural steroid injection a few weeks ago, but this was canceled because she contracted COVID prior to her procedure.  Since her COVID diagnosis, she reports that she is feeling a little better overall, but is still easily fatigued and has an intermittent cough.  No fevers or chills.  Denies chest pain or palpitations.    Review of Systems   Constitutional: Positive for fatigue. Negative for fever.   HENT: Positive for congestion. Negative for trouble swallowing.    Respiratory: Positive for cough. Negative for shortness of breath.    Cardiovascular: Positive for leg swelling. Negative for chest pain and palpitations.   Gastrointestinal: Positive for constipation.   Genitourinary: Negative for difficulty urinating.   Musculoskeletal: Positive for back pain and gait problem.   Neurological: Positive for weakness.   Psychiatric/Behavioral: Positive for sleep disturbance. Negative for agitation and confusion.       Objective:      Vitals:    08/09/22 1014   BP: 104/66   BP Location: Right arm   Patient Position: Sitting   BP Method: Medium (Manual)   Pulse: 87   Resp: 18   Temp: 100 °F (37.8 °C)   TempSrc: Oral   SpO2: 97%   Weight: 88.5 kg (195 lb)   Height: 5' 7" (1.702 m)     Physical Exam  Vitals and nursing note reviewed.   Constitutional:       " Appearance: She is well-developed.   HENT:      Head: Normocephalic and atraumatic.   Cardiovascular:      Rate and Rhythm: Tachycardia present. Rhythm irregularly irregular.      Heart sounds: Normal heart sounds.   Pulmonary:      Effort: Pulmonary effort is normal.      Breath sounds: Normal breath sounds.   Musculoskeletal:      Right lower le+ Pitting Edema present.      Left lower le+ Pitting Edema present.   Skin:     General: Skin is warm and dry.   Neurological:      Mental Status: She is alert and oriented to person, place, and time.   Psychiatric:         Mood and Affect: Mood normal. Affect is tearful.         Behavior: Behavior normal.         Lab Results   Component Value Date    WBC 9.86 2022    HGB 13.6 2022    HCT 42.2 2022     2022    CHOL 127 2022    TRIG 135 2022    HDL 56 2022    ALT 13 2022    AST 16 2022     2022    K 3.3 (L) 2022     2022    CREATININE 0.8 2022    BUN 14 2022    CO2 27 2022    TSH 0.909 2022    HGBA1C 5.5 2022      Assessment:       1. Atrial fibrillation, new onset    2. Multiple sclerosis    3. DDD (degenerative disc disease), lumbar    4. Lumbar radiculopathy    5. Irregular cardiac rhythm        Plan:       Atrial fibrillation, new onset  -     Refer to Emergency Dept.  New onset atrial fibrillation with RVR.  ER physician at [facility] notified, patient taken down by wheelchair for evaluation and management.  Multiple sclerosis  -     Ambulatory referral/consult to Neurology; Future; Expected date: 2022  Follow-up with neurology as scheduled.  Will refer to [facility], as well, per patient request for 2nd opinion  DDD (degenerative disc disease), lumbar  Will need to follow-up with spine surgeon as scheduled  Lumbar radiculopathy    Irregular cardiac rhythm  -     EKG 12-lead      Medication List with Changes/Refills    Current Medications    ASPIRIN (ECOTRIN) 81 MG EC TABLET    Take 1 tablet (81 mg total) by mouth once daily.    ATORVASTATIN (LIPITOR) 40 MG TABLET    Take 1 tablet (40 mg total) by mouth once daily.    B COMPLEX VITAMINS CAPSULE    Take 1 capsule by mouth once daily.    COLLAGEN MISC    by Misc.(Non-Drug; Combo Route) route.    DEXAMETHASONE (DECADRON) 4 MG/ML INJECTION    Inject 1.5 mLs (6 mg total) into the vein every 6 (six) hours.    HYDROCHLOROTHIAZIDE (HYDRODIURIL) 25 MG TABLET    Take 1 tablet by mouth once daily    LEVOTHYROXINE (SYNTHROID) 125 MCG TABLET    TAKE 1 TABLET BY MOUTH BEFORE BREAKFAST    MECLIZINE (ANTIVERT) 25 MG TABLET    Take 1 tablet (25 mg total) by mouth 3 (three) times daily as needed for Dizziness.    METOPROLOL SUCCINATE (TOPROL-XL) 25 MG 24 HR TABLET    Take 1 tablet by mouth once daily    MULTIVITAMIN (THERAGRAN) PER TABLET    Take 1 tablet by mouth once daily.    PROCHLORPERAZINE (COMPAZINE) 10 MG TABLET    Take 1 tablet (10 mg total) by mouth every 6 (six) hours as needed (nausea).    ROSUVASTATIN (CRESTOR) 10 MG TABLET    Take 1 tablet (10 mg total) by mouth once daily.    VITAMIN E 400 UNIT CAPSULE    Take 400 Units by mouth.

## 2022-08-10 PROBLEM — I48.0 PAF (PAROXYSMAL ATRIAL FIBRILLATION): Status: ACTIVE | Noted: 2022-08-10

## 2022-08-10 PROBLEM — R53.1 WEAKNESS: Status: RESOLVED | Noted: 2022-06-20 | Resolved: 2022-08-10

## 2022-08-10 PROBLEM — I48.91 NEW ONSET ATRIAL FIBRILLATION: Status: RESOLVED | Noted: 2022-08-09 | Resolved: 2022-08-10

## 2022-08-11 ENCOUNTER — TELEPHONE (OUTPATIENT)
Dept: PRIMARY CARE CLINIC | Facility: CLINIC | Age: 72
End: 2022-08-11
Payer: MEDICARE

## 2022-08-11 ENCOUNTER — PATIENT OUTREACH (OUTPATIENT)
Dept: ADMINISTRATIVE | Facility: CLINIC | Age: 72
End: 2022-08-11
Payer: MEDICARE

## 2022-08-11 NOTE — TELEPHONE ENCOUNTER
Called pt regarding scheduling earlier hospital follow up appt. Pt stated she needs an appt before 8/18. Informed pt PCP is unavailable before requested date. Offered pt a earlier appt with different provider. Pt refused. Pt stated she will keep later appt with PCP.

## 2022-08-11 NOTE — TELEPHONE ENCOUNTER
----- Message from Briana Vogel MA sent at 8/10/2022  5:07 PM CDT -----  Regarding: FW: hospital follow up appt    ----- Message -----  From: GISELLA Golden  Sent: 8/10/2022  12:32 PM CDT  To: [facility] Clinical Support  Subject: hospital follow up appt                          Afternoon,     Can you please assist me in scheduling 7day hospital follow up appointment. I accept the 26th of August with Dr. Miller and added pt to wait list     Thank You,  Barbie Hernandez  [phone]

## 2022-08-11 NOTE — PROGRESS NOTES
C3 nurse spoke with Graciela Barton for a TCC post hospital discharge follow up call. The patient has a scheduled [facility] appointment with Rocco Miller MD on 8/26/22 @ 0171.

## 2022-08-16 ENCOUNTER — TELEPHONE (OUTPATIENT)
Dept: PRIMARY CARE CLINIC | Facility: CLINIC | Age: 72
End: 2022-08-16
Payer: MEDICARE

## 2022-08-16 ENCOUNTER — TELEPHONE (OUTPATIENT)
Dept: ORTHOPEDICS | Facility: CLINIC | Age: 72
End: 2022-08-16
Payer: MEDICARE

## 2022-08-16 NOTE — TELEPHONE ENCOUNTER
----- Message from Tremontana Chevalier sent at 8/16/2022  9:03 AM CDT -----  Regarding: appt  Contact: pt @ [phone]  Pt calling to reschedule appt with Dr. Jesús Yoder for today due to severe back pain knee to ankle, pt says having spasms. No avail appts in epic. Pt wants appts to be scheduled around the time of other appts in [facility], pls call pt @ [phone].

## 2022-08-16 NOTE — TELEPHONE ENCOUNTER
----- Message from Tiera Barragan sent at 8/16/2022  8:11 AM CDT -----  Contact: [phone]  [email or web address]     Patient is calling for Medical Advice regarding:Pain in back     How long has patient had these symptoms:x6 days     Pharmacy name and phone#:  [facility] - [place] - [address]  [address] [place] [address]  Phone: [phone] Fax: [phone]        Would like response via [employer]:  call    Comments:Pt is asking for a pain injection in her back. Please f/u

## 2022-08-17 ENCOUNTER — TELEPHONE (OUTPATIENT)
Dept: PRIMARY CARE CLINIC | Facility: CLINIC | Age: 72
End: 2022-08-17
Payer: MEDICARE

## 2022-08-17 NOTE — TELEPHONE ENCOUNTER
Really not appropriate to receive a steroid shot at this time, as she was just hospitalized with new onset atrial fibrillation, and steroids can exacerbate this.

## 2022-08-17 NOTE — TELEPHONE ENCOUNTER
Called pt regarding request for steroid injection. Informed pt PCP refused request, pt verbalized understanding.

## 2022-08-17 NOTE — TELEPHONE ENCOUNTER
----- Message from Hodan Dodson sent at 8/17/2022  9:27 AM CDT -----  Contact: [phone]  Pt called to advise that she missed a call about a steroid shot. Please Advise

## 2022-08-17 NOTE — TELEPHONE ENCOUNTER
Called pt regarding steroid injection request. Informed pt PCP refused request, pt verbalized understanding.

## 2022-08-17 NOTE — TELEPHONE ENCOUNTER
----- Message from Beronica Morel sent at 8/17/2022  2:41 PM CDT -----  Contact: [phone]  Patient is returning a phone call.  Who left a message for the patient: Alondra Whittington LPN   Does patient know what this is regarding:  yes   Would you like a call back, or a response hrough your [facility] portal?:   call back   Comments:

## 2022-08-17 NOTE — TELEPHONE ENCOUNTER
Pt called yesterday and was c/o back pain. I called her yesterday but she did not answer. She is calling today to request a steroid shot.

## 2022-08-17 NOTE — TELEPHONE ENCOUNTER
Attempted to contact patient but there was no answer. Phone just rings then states call can not be completed at this time.

## 2022-08-19 ENCOUNTER — OFFICE VISIT (OUTPATIENT)
Dept: CARDIOLOGY | Facility: CLINIC | Age: 72
End: 2022-08-19
Payer: MEDICARE

## 2022-08-19 VITALS
BODY MASS INDEX: 29.86 KG/M2 | DIASTOLIC BLOOD PRESSURE: 60 MMHG | WEIGHT: 190.25 LBS | HEART RATE: 68 BPM | OXYGEN SATURATION: 96 % | HEIGHT: 67 IN | SYSTOLIC BLOOD PRESSURE: 134 MMHG

## 2022-08-19 DIAGNOSIS — I49.8 OTHER SPECIFIED CARDIAC ARRHYTHMIAS: Primary | ICD-10-CM

## 2022-08-19 DIAGNOSIS — I48.0 PAF (PAROXYSMAL ATRIAL FIBRILLATION): ICD-10-CM

## 2022-08-19 DIAGNOSIS — E87.6 HYPOKALEMIA: Primary | ICD-10-CM

## 2022-08-19 DIAGNOSIS — I10 ESSENTIAL HYPERTENSION, BENIGN: ICD-10-CM

## 2022-08-19 PROCEDURE — 99213 OFFICE O/P EST LOW 20 MIN: CPT | Mod: S$PBB,,, | Performed by: NURSE PRACTITIONER

## 2022-08-19 PROCEDURE — 99215 OFFICE O/P EST HI 40 MIN: CPT | Mod: PBBFAC,PN | Performed by: NURSE PRACTITIONER

## 2022-08-19 PROCEDURE — 99999 PR PBB SHADOW E&M-EST. PATIENT-LVL V: ICD-10-PCS | Mod: PBBFAC,,, | Performed by: NURSE PRACTITIONER

## 2022-08-19 PROCEDURE — 99213 PR OFFICE/OUTPT VISIT, EST, LEVL III, 20-29 MIN: ICD-10-PCS | Mod: S$PBB,,, | Performed by: NURSE PRACTITIONER

## 2022-08-19 PROCEDURE — 99999 PR PBB SHADOW E&M-EST. PATIENT-LVL V: CPT | Mod: PBBFAC,,, | Performed by: NURSE PRACTITIONER

## 2022-08-19 NOTE — PROGRESS NOTES
Cardiology    8/19/2022  11:12 AM    Problem list  Patient Active Problem List   Diagnosis    Postoperative hypothyroidism    History of thyroid cancer    Essential hypertension, benign    Mixed hyperlipidemia    Benign paroxysmal positional vertigo    History of colon polyps    Bilateral post-traumatic osteoarthritis of knee    Paroxysmal tachycardia    DDD (degenerative disc disease), lumbar    Lumbar radiculopathy    PAF (paroxysmal atrial fibrillation)       CC:  Hospital follow up    HPI:  No chest pain, no shortness of breath.  Having terrible back pain that is interfering with sleep- unable to sleep at all. Taking eliquis, has some gum bleeding but nothing in urine or stool. No palpitations. No fainting, near syncope or dizziness.   Has had abnormal heart rhythms and hypotension in past.  Has almost no symptoms at all- feeling ok despite terrible pain and insomnia. Will be seeing pain management in coming weeks. Still having swelling in lower legs.     Medications  Current Outpatient Medications   Medication Sig Dispense Refill    apixaban (ELIQUIS) 5 mg Tab Take 1 tablet (5 mg total) by mouth 2 (two) times daily. 60 tablet 2    aspirin (ECOTRIN) 81 MG EC tablet Take 1 tablet (81 mg total) by mouth once daily.  0    b complex vitamins capsule Take 1 capsule by mouth once daily.      COLLAGEN MISC by Misc.(Non-Drug; Combo Route) route.      levothyroxine (SYNTHROID) 125 MCG tablet TAKE 1 TABLET BY MOUTH BEFORE BREAKFAST 90 tablet 3    losartan (COZAAR) 25 MG tablet Take 1 tablet (25 mg total) by mouth once daily. 90 tablet 3    meclizine (ANTIVERT) 25 mg tablet Take 1 tablet (25 mg total) by mouth 3 (three) times daily as needed for Dizziness. 30 tablet 3    metoprolol succinate (TOPROL-XL) 25 MG 24 hr tablet Take 1 tablet by mouth once daily 90 tablet 3    multivitamin (THERAGRAN) per tablet Take 1 tablet by mouth once daily.      prochlorperazine (COMPAZINE) 10 MG tablet Take 1  tablet (10 mg total) by mouth every 6 (six) hours as needed (nausea). 30 tablet 1    rosuvastatin (CRESTOR) 10 MG tablet Take 1 tablet (10 mg total) by mouth once daily. 90 tablet 3    vitamin E 400 UNIT capsule Take 400 Units by mouth.       No current facility-administered medications for this visit.      Prior to Admission medications    Medication Sig Start Date End Date Taking? Authorizing Provider   apixaban (ELIQUIS) 5 mg Tab Take 1 tablet (5 mg total) by mouth 2 (two) times daily. 8/10/22 11/8/22 Yes Santos Jones MD   aspirin (ECOTRIN) 81 MG EC tablet Take 1 tablet (81 mg total) by mouth once daily. 6/24/22 6/24/23 Yes Radha Gomez MD   b complex vitamins capsule Take 1 capsule by mouth once daily.   Yes Historical Provider   COLLAGEN MISC by Misc.(Non-Drug; Combo Route) route.   Yes Historical Provider   levothyroxine (SYNTHROID) 125 MCG tablet TAKE 1 TABLET BY MOUTH BEFORE BREAKFAST 2/2/22  Yes Rocco Miller MD   losartan (COZAAR) 25 MG tablet Take 1 tablet (25 mg total) by mouth once daily. 8/10/22 8/10/23 Yes Santos Jones MD   meclizine (ANTIVERT) 25 mg tablet Take 1 tablet (25 mg total) by mouth 3 (three) times daily as needed for Dizziness. 8/1/18  Yes Rocco Miller MD   metoprolol succinate (TOPROL-XL) 25 MG 24 hr tablet Take 1 tablet by mouth once daily 2/25/22  Yes Rocco Miller MD   multivitamin (THERAGRAN) per tablet Take 1 tablet by mouth once daily.   Yes Historical Provider   prochlorperazine (COMPAZINE) 10 MG tablet Take 1 tablet (10 mg total) by mouth every 6 (six) hours as needed (nausea). 7/27/21  Yes Rocco Miller MD   rosuvastatin (CRESTOR) 10 MG tablet Take 1 tablet (10 mg total) by mouth once daily. 2/2/22  Yes Rocco Miller MD   vitamin E 400 UNIT capsule Take 400 Units by mouth.   Yes Historical Provider   atorvastatin (LIPITOR) 40 MG tablet Take 1 tablet (40 mg total) by mouth once daily. 6/24/22 8/10/22  Radha Gomez MD   hydroCHLOROthiazide  (HYDRODIURIL) 25 MG tablet Take 1 tablet by mouth once daily 4/20/22 8/10/22  Rocco Miller MD         History  Past Medical History:   Diagnosis Date    Arthritis     Cancer     Thyroid    Edema     Hyperlipidemia     Hypertension     Thyroid disease      Past Surgical History:   Procedure Laterality Date    ADENOIDECTOMY      COLONOSCOPY  10/18/2018    COLONOSCOPY N/A 10/18/2018    Procedure: COLONOSCOPY;  Surgeon: Yosvany Alejandra MD;  [facility];  Service: General;  Laterality: N/A;    HYSTERECTOMY  1992    total hyst     KNEE SURGERY      16 pins and two plates implanted    THYROIDECTOMY      TONSILLECTOMY       Social History     Socioeconomic History    Marital status:    Tobacco Use    Smoking status: Never Smoker    Smokeless tobacco: Never Used   Substance and Sexual Activity    Alcohol use: No    Drug use: No    Sexual activity: Not Currently     Partners: Male         Allergies  Review of patient's allergies indicates:   Allergen Reactions    Vancomycin analogues Tinitus         Review of Systems   Review of Systems   Constitutional: Negative for diaphoresis and malaise/fatigue.   HENT: Negative.    Cardiovascular: Negative for chest pain, claudication, dyspnea on exertion, irregular heartbeat, leg swelling, near-syncope, orthopnea, palpitations, paroxysmal nocturnal dyspnea and syncope.   Respiratory: Negative for shortness of breath.    Endocrine: Negative for polydipsia, polyphagia and polyuria.   Hematologic/Lymphatic: Does not bruise/bleed easily.   Gastrointestinal: Negative for bloating, nausea and vomiting.   Genitourinary: Negative.    Neurological: Negative for excessive daytime sleepiness, dizziness, light-headedness, loss of balance and weakness.   Psychiatric/Behavioral: The patient has insomnia. The patient is not nervous/anxious.    Allergic/Immunologic: Negative.          Physical Exam  Wt Readings from Last 1 Encounters:   08/19/22 86.3 kg (190 lb  4.1 oz)     BP Readings from Last 3 Encounters:   08/19/22 134/60   08/10/22 (!) 116/56   08/09/22 104/66     Pulse Readings from Last 1 Encounters:   08/19/22 68     Body mass index is 29.8 kg/m².    Physical Exam  Vitals and nursing note reviewed.   Constitutional:       Appearance: Normal appearance.   HENT:      Head: Normocephalic and atraumatic.      Mouth/Throat:      Mouth: Mucous membranes are moist.   Eyes:      Pupils: Pupils are equal, round, and reactive to light.   Cardiovascular:      Rate and Rhythm: Normal rate. Rhythm irregular.      Pulses:           Radial pulses are 2+ on the right side and 2+ on the left side.        Dorsalis pedis pulses are 2+ on the right side and 2+ on the left side.        Posterior tibial pulses are 2+ on the right side and 2+ on the left side.      Heart sounds: No murmur heard.  Pulmonary:      Effort: Pulmonary effort is normal. No respiratory distress.      Breath sounds: Normal breath sounds.   Abdominal:      General: There is no distension.      Tenderness: There is no abdominal tenderness.   Musculoskeletal:      Cervical back: Normal range of motion.      Right lower leg: Edema ( +1/2 pitting at ankles) present.      Left lower leg: Edema ( +1/2 ankles) present.   Skin:     General: Skin is warm and dry.      Findings: No erythema.   Neurological:      General: No focal deficit present.      Mental Status: She is alert.   Psychiatric:         Mood and Affect: Mood normal.         Behavior: Behavior normal.       Problem List Items Addressed This Visit        Cardiac/Vascular    Essential hypertension, benign    Overview     Well controlled  Continue as now           Current Assessment & Plan     As above           PAF (paroxysmal atrial fibrillation)    Overview     Refer to EP  Continue DOAC  Continue metoprolol  Rate controlled today  May not be in a fib currently           Current Assessment & Plan     As above           Relevant Orders    Ambulatory  referral/consult to Cardiac Electrophysiology      Other Visit Diagnoses     Hypokalemia    -  Primary    Relevant Orders    COMPREHENSIVE METABOLIC PANEL                      Follow Up  3 months      @Екатерина Tamez DNP

## 2022-08-19 NOTE — PATIENT INSTRUCTIONS
I would like to recheck your blood chemistry    If it looks good we may restart the Hydrochlorothiazide at a lower dose    I am referring you to electrophysiology for your a fib    Continue your medications as you have

## 2022-08-22 ENCOUNTER — OFFICE VISIT (OUTPATIENT)
Dept: ELECTROPHYSIOLOGY | Facility: CLINIC | Age: 72
End: 2022-08-22
Payer: MEDICARE

## 2022-08-22 ENCOUNTER — HOSPITAL ENCOUNTER (OUTPATIENT)
Dept: CARDIOLOGY | Facility: CLINIC | Age: 72
Discharge: HOME | End: 2022-08-22
Payer: MEDICARE

## 2022-08-22 VITALS
HEIGHT: 67 IN | BODY MASS INDEX: 29.86 KG/M2 | HEART RATE: 70 BPM | WEIGHT: 190.25 LBS | SYSTOLIC BLOOD PRESSURE: 120 MMHG | DIASTOLIC BLOOD PRESSURE: 82 MMHG

## 2022-08-22 DIAGNOSIS — I49.8 OTHER SPECIFIED CARDIAC ARRHYTHMIAS: ICD-10-CM

## 2022-08-22 DIAGNOSIS — Z85.850 HISTORY OF THYROID CANCER: ICD-10-CM

## 2022-08-22 DIAGNOSIS — I48.0 PAF (PAROXYSMAL ATRIAL FIBRILLATION): Primary | ICD-10-CM

## 2022-08-22 DIAGNOSIS — E78.2 MIXED HYPERLIPIDEMIA: ICD-10-CM

## 2022-08-22 DIAGNOSIS — I10 ESSENTIAL HYPERTENSION, BENIGN: ICD-10-CM

## 2022-08-22 PROCEDURE — 99213 OFFICE O/P EST LOW 20 MIN: CPT | Mod: PBBFAC | Performed by: INTERNAL MEDICINE

## 2022-08-22 PROCEDURE — 99999 PR PBB SHADOW E&M-EST. PATIENT-LVL III: CPT | Mod: PBBFAC,,, | Performed by: INTERNAL MEDICINE

## 2022-08-22 PROCEDURE — 99204 OFFICE O/P NEW MOD 45 MIN: CPT | Mod: S$PBB,,, | Performed by: INTERNAL MEDICINE

## 2022-08-22 PROCEDURE — 93010 ELECTROCARDIOGRAM REPORT: CPT | Mod: S$PBB,,, | Performed by: INTERNAL MEDICINE

## 2022-08-22 PROCEDURE — 93010 RHYTHM STRIP: ICD-10-PCS | Mod: S$PBB,,, | Performed by: INTERNAL MEDICINE

## 2022-08-22 PROCEDURE — 99999 PR PBB SHADOW E&M-EST. PATIENT-LVL III: ICD-10-PCS | Mod: PBBFAC,,, | Performed by: INTERNAL MEDICINE

## 2022-08-22 PROCEDURE — 93005 ELECTROCARDIOGRAM TRACING: CPT | Mod: PBBFAC | Performed by: INTERNAL MEDICINE

## 2022-08-22 PROCEDURE — 99204 PR OFFICE/OUTPT VISIT, NEW, LEVL IV, 45-59 MIN: ICD-10-PCS | Mod: S$PBB,,, | Performed by: INTERNAL MEDICINE

## 2022-08-22 RX ORDER — PROPAFENONE HYDROCHLORIDE 225 MG/1
225 CAPSULE, EXTENDED RELEASE ORAL EVERY 12 HOURS
Qty: 60 CAPSULE | Refills: 11 | Status: SHIPPED | OUTPATIENT
Start: 2022-08-22 | End: 2022-08-24

## 2022-08-22 NOTE — PROGRESS NOTES
Subjective:     HPI    I had the pleasure of seeing Graciela Barton in consultation at your request for the evaluation of AF. She is a 72F with HTN, HLD, debilitating back pains, newly diagnosed MS, thyroid CA s/p resection (on synthroid), who was well until 8/2022 when she presented to [facility] with 2 weeks of worsening ELLIS and leg swelling and was found to be in AF with RVR. She was rate controlled, started on amiodarone GTT, and chemically converted to sinus rhythm. She was discharged on metoprolol and eliquis, and presents to me todat to discuss management options.    Echo in 6/2022 showed an EF of 60%. Exercise stress echo from 4/2019 showed no evidence of ischemia.    I reviewed all ECGs in the EMR. ECGs from 8/2022 show both AF and atrial flutter with RVR.    Ms. Barton is currently on eliquis for stroke prevention. She is not on an antiarrhythmic.    My interpretation of today's ECG is sinus rhythm with PACs at 70 bpm.    Review of Systems   Constitutional: Positive for malaise/fatigue. Negative for decreased appetite, weight gain and weight loss.   HENT: Negative for sore throat.    Eyes: Negative for blurred vision.   Cardiovascular: Negative for chest pain, dyspnea on exertion, irregular heartbeat, leg swelling, near-syncope, orthopnea, palpitations, paroxysmal nocturnal dyspnea and syncope.   Respiratory: Negative for shortness of breath.    Skin: Negative for rash.   Musculoskeletal: Positive for back pain. Negative for arthritis.   Gastrointestinal: Negative for abdominal pain.   Neurological: Negative for focal weakness.   Psychiatric/Behavioral: Negative for altered mental status.        Objective:    Physical Exam  Vitals and nursing note reviewed.   Constitutional:       General: She is not in acute distress.     Appearance: She is well-developed.   HENT:      Head: Normocephalic and atraumatic.   Eyes:      General: No scleral icterus.     Conjunctiva/sclera: Conjunctivae normal.      Pupils:  Pupils are equal, round, and reactive to light.   Neck:      Thyroid: No thyromegaly.      Vascular: No JVD.   Cardiovascular:      Rate and Rhythm: Normal rate and regular rhythm.      Pulses: Intact distal pulses.      Heart sounds: Normal heart sounds. No murmur heard.    No friction rub. No gallop.   Pulmonary:      Effort: Pulmonary effort is normal. No respiratory distress.      Breath sounds: Normal breath sounds.   Abdominal:      General: Bowel sounds are normal. There is no distension.      Palpations: Abdomen is soft.   Musculoskeletal:      Cervical back: Normal range of motion and neck supple.   Skin:     General: Skin is warm and dry.   Neurological:      Mental Status: She is alert and oriented to person, place, and time.   Psychiatric:         Behavior: Behavior normal.           Assessment:       1. PAF (paroxysmal atrial fibrillation)    2. Essential hypertension, benign    3. Mixed hyperlipidemia    4. History of thyroid cancer         Plan:       In summary, Graciela Barton is a 72F with symptomatic PAF. Her [id number] Score is 3 (age, female, HTN) so she should continue eliquis and stop aspirin.    We discussed in detail the pathophysiology of AF as well as treatment options, specifically medications for now. Plan is to start rythmol sr 225 mg bid. She will see me in 3 months.    Thank you for allowing me to participate in the care of this patient. Please do not hesitate to call me with any questions or concerns.

## 2022-08-24 ENCOUNTER — TELEPHONE (OUTPATIENT)
Dept: ELECTROPHYSIOLOGY | Facility: CLINIC | Age: 72
End: 2022-08-24
Payer: MEDICARE

## 2022-08-24 RX ORDER — PROPAFENONE HYDROCHLORIDE 150 MG/1
150 TABLET, COATED ORAL EVERY 8 HOURS
Qty: 90 TABLET | Refills: 11 | Status: SHIPPED | OUTPATIENT
Start: 2022-08-24 | End: 2023-07-17 | Stop reason: SDUPTHER

## 2022-08-24 NOTE — TELEPHONE ENCOUNTER
Returned call to pt. Advised that Medicare will not pay for propafenone SR. Explained that I would change to propafenone tablets 150 mg 3 times and it would be covered. Pt voiced understanding.          ----- Message from Jesús Richards MA sent at 8/23/2022  4:15 PM CDT -----  Regarding: FW: rx  Good afternoon Luly  See below please advise.  Thanks    ----- Message -----  From: Marisol Pendleton  Sent: 8/23/2022   3:45 PM CDT  To: [facility] Staff  Subject: rx                                               Pt states that Dr. Singletary needs to call pt's Insurance to get auth for her propafenone (RYTHMOL SR) 225 MG Cp12 to be filled.    Thank   you

## 2022-08-24 NOTE — TELEPHONE ENCOUNTER
----- Message from Jesús Richards MA sent at 8/24/2022 10:17 AM CDT -----  Regarding: FW: PA  Good morning Luly   See below please advise.   Thanks    ----- Message -----  From: Nadja Cobb  Sent: 8/24/2022   9:31 AM CDT  To: [facility] Staff  Subject: PA                                               The pt is calling to get a PA on her propafenone (RYTHMOL SR) 225 MG Cp12. Thanks, Nadja

## 2022-08-26 ENCOUNTER — OFFICE VISIT (OUTPATIENT)
Dept: PRIMARY CARE CLINIC | Facility: CLINIC | Age: 72
End: 2022-08-26
Payer: MEDICARE

## 2022-08-26 VITALS
TEMPERATURE: 97 F | WEIGHT: 188.81 LBS | SYSTOLIC BLOOD PRESSURE: 128 MMHG | DIASTOLIC BLOOD PRESSURE: 70 MMHG | HEIGHT: 67 IN | HEART RATE: 69 BPM | BODY MASS INDEX: 29.63 KG/M2 | OXYGEN SATURATION: 98 % | RESPIRATION RATE: 18 BRPM

## 2022-08-26 DIAGNOSIS — G35 MULTIPLE SCLEROSIS: ICD-10-CM

## 2022-08-26 DIAGNOSIS — I48.0 PAF (PAROXYSMAL ATRIAL FIBRILLATION): Primary | ICD-10-CM

## 2022-08-26 DIAGNOSIS — E87.6 HYPOKALEMIA: ICD-10-CM

## 2022-08-26 PROCEDURE — 99999 PR PBB SHADOW E&M-EST. PATIENT-LVL III: ICD-10-PCS | Mod: PBBFAC,,, | Performed by: FAMILY MEDICINE

## 2022-08-26 PROCEDURE — 99999 PR PBB SHADOW E&M-EST. PATIENT-LVL III: CPT | Mod: PBBFAC,,, | Performed by: FAMILY MEDICINE

## 2022-08-26 PROCEDURE — 99213 OFFICE O/P EST LOW 20 MIN: CPT | Mod: PBBFAC,PN | Performed by: FAMILY MEDICINE

## 2022-08-26 PROCEDURE — 99214 OFFICE O/P EST MOD 30 MIN: CPT | Mod: S$PBB,,, | Performed by: FAMILY MEDICINE

## 2022-08-26 PROCEDURE — 99214 PR OFFICE/OUTPT VISIT, EST, LEVL IV, 30-39 MIN: ICD-10-PCS | Mod: S$PBB,,, | Performed by: FAMILY MEDICINE

## 2022-08-26 NOTE — PROGRESS NOTES
"Subjective:       Patient ID: Graciela Barton is a 72 y.o. female.    Chief Complaint: Follow-up (Pt states in for a follow-up. )    Hospital Course:   Patient was admitted for AFib with RVR and given IV metoprolol started on Cardizem drip without response.  Cardiology was consulted and patient was placed on amiodarone drip and converted to normal sinus.  Patient improved quicker than expected.  Patient remained in normal sinus and will be discharged with metoprolol and Eliquis.  Risk and benefit of anticoagulation were discussed with patient and recommended to avoid NSAID and take Tylenol only for pain.  Hypokalemia was replaced and hydrochlorothiazide was discontinue and patient prescribed losartan.Patient will need to follow-up with PCP and Cardiology for further care.    She subsequently followed up with cardiology, saw EP earlier in the week, started on propafenone. Tolerating fine, compliant with meds, feeling a little better overall, but still very weak from newly diagnosed MS. Has consultation with neurologist on Monday. Denies CP, palpitations or SoB    Review of Systems   Constitutional: Negative for chills and fever.   HENT: Negative for trouble swallowing.    Respiratory: Negative for shortness of breath.    Cardiovascular: Negative for chest pain and palpitations.   Neurological: Positive for weakness. Negative for dizziness and light-headedness.   Psychiatric/Behavioral: Negative for agitation and confusion.       Objective:      Vitals:    08/26/22 1134   BP: 128/70   BP Location: Left arm   Patient Position: Sitting   BP Method: Medium (Manual)   Pulse: 69   Resp: 18   Temp: 97 °F (36.1 °C)   TempSrc: Temporal   SpO2: 98%   Weight: 85.6 kg (188 lb 13.2 oz)   Height: 5' 7" (1.702 m)     Physical Exam  Vitals and nursing note reviewed.   Constitutional:       Appearance: She is well-developed.   HENT:      Head: Normocephalic and atraumatic.   Cardiovascular:      Rate and Rhythm: Normal rate and regular " rhythm.      Heart sounds: Normal heart sounds.   Pulmonary:      Effort: Pulmonary effort is normal.      Breath sounds: Normal breath sounds.   Musculoskeletal:      Right lower leg: No edema.      Left lower leg: No edema.   Skin:     General: Skin is warm and dry.   Neurological:      Mental Status: She is alert and oriented to person, place, and time.         Lab Results   Component Value Date    WBC 8.50 08/10/2022    HGB 11.2 (L) 08/10/2022    HCT 35.1 (L) 08/10/2022     08/10/2022    CHOL 127 06/20/2022    TRIG 135 06/20/2022    HDL 56 06/20/2022    ALT 26 08/09/2022    AST 18 08/09/2022     08/10/2022    K 3.3 (L) 08/10/2022     08/10/2022    CREATININE 0.7 08/10/2022    BUN 30 (H) 08/10/2022    CO2 25 08/10/2022    TSH 2.559 08/09/2022    HGBA1C 5.5 06/20/2022    Transitional Care Note    Family and/or Caretaker present at visit?  No.  Diagnostic tests reviewed/disposition: I have reviewed all completed as well as pending diagnostic tests at the time of discharge.  Disease/illness education: yes  Home health/community services discussion/referrals: Patient does not have home health established from hospital visit.  They do not need home health.  If needed, we will set up home health for the patient.   Establishment or re-establishment of referral orders for community resources: No other necessary community resources.   Discussion with other health care providers: No discussion with other health care providers necessary.         Assessment:       1. PAF (paroxysmal atrial fibrillation)    2. Multiple sclerosis    3. Hypokalemia        Plan:       PAF (paroxysmal atrial fibrillation)  Continue current regimen, f/u as scheduled  Multiple sclerosis  F/u with neurology  Hypokalemia  -     Comprehensive Metabolic Panel; Future; Expected date: 08/26/2022  Repeat labs since meds adjusted (switched from HCTZ to losartan)    Medication List with Changes/Refills   Current Medications    APIXABAN  (ELIQUIS) 5 MG TAB    Take 1 tablet (5 mg total) by mouth 2 (two) times daily.    B COMPLEX VITAMINS CAPSULE    Take 1 capsule by mouth once daily.    LEVOTHYROXINE (SYNTHROID) 125 MCG TABLET    TAKE 1 TABLET BY MOUTH BEFORE BREAKFAST    LOSARTAN (COZAAR) 25 MG TABLET    Take 1 tablet (25 mg total) by mouth once daily.    MECLIZINE (ANTIVERT) 25 MG TABLET    Take 1 tablet (25 mg total) by mouth 3 (three) times daily as needed for Dizziness.    METOPROLOL SUCCINATE (TOPROL-XL) 25 MG 24 HR TABLET    Take 1 tablet by mouth once daily    MULTIVITAMIN (THERAGRAN) PER TABLET    Take 1 tablet by mouth once daily.    PROPAFENONE (RHTHYMOL) 150 MG TAB    Take 1 tablet (150 mg total) by mouth every 8 (eight) hours.    ROSUVASTATIN (CRESTOR) 10 MG TABLET    Take 1 tablet (10 mg total) by mouth once daily.    VITAMIN E 400 UNIT CAPSULE    Take 400 Units by mouth.   Discontinued Medications    COLLAGEN MISC    by Misc.(Non-Drug; Combo Route) route.    PROCHLORPERAZINE (COMPAZINE) 10 MG TABLET    Take 1 tablet (10 mg total) by mouth every 6 (six) hours as needed (nausea).

## 2022-08-29 ENCOUNTER — OFFICE VISIT (OUTPATIENT)
Dept: NEUROLOGY | Facility: CLINIC | Age: 72
End: 2022-08-29
Payer: MEDICARE

## 2022-08-29 VITALS
SYSTOLIC BLOOD PRESSURE: 169 MMHG | BODY MASS INDEX: 29.57 KG/M2 | HEART RATE: 53 BPM | HEIGHT: 67 IN | DIASTOLIC BLOOD PRESSURE: 65 MMHG

## 2022-08-29 DIAGNOSIS — G35 MULTIPLE SCLEROSIS: Primary | ICD-10-CM

## 2022-08-29 DIAGNOSIS — M54.31 SCIATICA OF RIGHT SIDE: ICD-10-CM

## 2022-08-29 PROCEDURE — 99999 PR PBB SHADOW E&M-EST. PATIENT-LVL V: CPT | Mod: PBBFAC,,, | Performed by: STUDENT IN AN ORGANIZED HEALTH CARE EDUCATION/TRAINING PROGRAM

## 2022-08-29 PROCEDURE — 99205 OFFICE O/P NEW HI 60 MIN: CPT | Mod: S$PBB,,, | Performed by: STUDENT IN AN ORGANIZED HEALTH CARE EDUCATION/TRAINING PROGRAM

## 2022-08-29 PROCEDURE — 99205 PR OFFICE/OUTPT VISIT, NEW, LEVL V, 60-74 MIN: ICD-10-PCS | Mod: S$PBB,,, | Performed by: STUDENT IN AN ORGANIZED HEALTH CARE EDUCATION/TRAINING PROGRAM

## 2022-08-29 PROCEDURE — 99999 PR PBB SHADOW E&M-EST. PATIENT-LVL V: ICD-10-PCS | Mod: PBBFAC,,, | Performed by: STUDENT IN AN ORGANIZED HEALTH CARE EDUCATION/TRAINING PROGRAM

## 2022-08-29 PROCEDURE — 99215 OFFICE O/P EST HI 40 MIN: CPT | Mod: PBBFAC | Performed by: STUDENT IN AN ORGANIZED HEALTH CARE EDUCATION/TRAINING PROGRAM

## 2022-08-29 RX ORDER — GABAPENTIN 300 MG/1
300 CAPSULE ORAL NIGHTLY
Qty: 30 CAPSULE | Refills: 11 | Status: SHIPPED | OUTPATIENT
Start: 2022-08-29 | End: 2022-12-01

## 2022-08-29 NOTE — PROGRESS NOTES
[facility]  New Patient Visit      Disease Summary     Principle neurological diagnosis: Likely MS    Date of symptom onset: mid 2010s   Date of diagnosis: TBD  Disease type at diagnosis: Progressive  Disease type currently: Progressive  Previous therapy: NA  Current therapy: NA  Last MRI Brain: 7/17/22  Last MRI C-spine: 7/13/22  Last MRI T-spine: 6/21/22  CSF: NA  JCV status: NA  Other relevant labs and tests: B12 307    History:     She has been neurologically asymptomatic until a month ago. She developed right leg pain (shooting and sharp from hip down her legs with tingling and burning pain) that led to inability to ambulate. 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 Now she's wheelchair bound. She's very tearful about this.     She was evaluated by [facility] spine for consideration of spinal fusion, and received IV steroids while she's inpatient. However, she refused surgery and had second opinion at [facility]. She underwent C-spine MRI that showed intramedullary lesions, and she subsequent had brain MRI that showed demyelinating lesions.     Retrospectively, she started experiencing weakness in hands a few years ago, she was dropping glasses but it never impaired her function.   She has bowel incontinence.     She was recently diagnosed with AF w/ RVR and had to undergo chemical conversion back to SR, now on Eliquis.    She also has HTN, and s/p thyroidectomy.     Denies motor weakness in legs, vision symptoms, sensory changes, dysarthria, dysphagia, cognitive dysfunction.       ROS:     A complete 9 system ROS was reviewed by me and otherwise negative .     Past Medical History:   Diagnosis Date    Arthritis     Cancer     Thyroid    Edema     Hyperlipidemia     Hypertension     Thyroid disease        Past Surgical History:   Procedure Laterality Date    ADENOIDECTOMY      COLONOSCOPY  10/18/2018     "COLONOSCOPY N/A 10/18/2018    Procedure: COLONOSCOPY;  Surgeon: Yosvany Alejandra MD;  Location: [facility];  Service: General;  Laterality: N/A;    HYSTERECTOMY  1992    total hyst     KNEE SURGERY      16 pins and two plates implanted    THYROIDECTOMY      TONSILLECTOMY         Family History   Problem Relation Age of Onset    Alcohol abuse Mother     Heart disease Mother     Alcohol abuse Father     Heart disease Father     Heart disease Maternal Grandmother     Heart disease Maternal Grandfather        Social History     Socioeconomic History    Marital status:    Tobacco Use    Smoking status: Never    Smokeless tobacco: Never   Substance and Sexual Activity    Alcohol use: No    Drug use: No    Sexual activity: Not Currently     Partners: Male       Review of patient's allergies indicates:   Allergen Reactions    Vancomycin analogues Tinitus       Living arrangements - the patient lives alone.        Exam:     Vitals:    08/29/22 0828   BP: (!) 169/65   Pulse: (!) 53   Height: 5' 7" (1.702 m)          In general, the patient is well nourished and appears to be tearful and sad.     MENTAL STATUS: language is fluent, normal verbal comprehension, short-term and remote memory is intact, attention is normal, patient is alert and oriented x 3, fund of knowlege is appropriate by vocabulary.     CRANIAL NERVE EXAM:  There is no intrernuclear ophthalmoplegia.  Extraocular muscles are intact. Pupils are equal, round, and reactive to light. No facial asymmetry. Facial sensation is intact bilaterally. There is no dysarthria. Uvula is midline, and palate moves symmetrically. Shoulder shrug intact bilaterlly. Tongue protrusion is midline. Hearing is intact to finger rub bilaterally. Neck is supple with full ROM  No Lhermitte's    MOTOR EXAM: Normal bulk and tone throughout UE and LE bilaterally.   No pronator drift; rapid sequential movements are normal; Strength is  5/5 in all groups in the lower extremities and upper " extremities except R dorsiflexor 4/5.     REFLEXES: 2+ in RUE, 3+ in L B, Br, and Tr, 1+ in b/l patella, and 1+ in b/l achilles; toes are up b/l; positive soares's on L.    SENSORY EXAM: Normal to light touch, decreased vibration in b/l LE, absent in LLE distally, intact proprioception.    Skin: Pitting edema in b/l LE below ankles    COORDINATION: Normal finger-to-nose exam. Unable to perform HTS due to pain.    GAIT: Unable to ambulate     Romberg: Unable to complete        Imaging (personally reviewed):         Results for orders placed during the hospital encounter of 07/17/22    MRI Brain Demyelinating W W/O Contrast    Impression  Several scattered T2 FLAIR hyperintense lesions throughout the brain parenchyma most concentrated in the supratentorial periventricular white matter several perpendicular to the lateral ventricles extending to the body of the corpus callosum.  Configuration while nonspecific concerning for possible prior demyelinating plaque.  There is no enhancing lesion or diffusion signal abnormality to suggest active demyelination.    Clinical correlation and follow-up advised.      Electronically signed by: Sam Cordero DO  Date:    07/17/2022  Time:    16:58    No results found for this or any previous visit.    No results found for this or any previous visit.      Labs:     No results found for: ERLGXCMD80ZZ  No results found for: JCVINDEX, JCVANTIBODY  No results found for: QE1BTGRE, ABSOLUTECD3, YS1ADOSN, ABSOLUTECD8, BT5NAVXX, ABSOLUTECD4, LABCD48  Lab Results   Component Value Date    WBC 8.50 08/10/2022    RBC 3.58 (L) 08/10/2022    HGB 11.2 (L) 08/10/2022    HCT 35.1 (L) 08/10/2022    MCV 98 08/10/2022    MCH 31.3 (H) 08/10/2022    MCHC 31.9 (L) 08/10/2022    RDW 13.9 08/10/2022     08/10/2022    MPV 11.2 08/10/2022    GRAN 5.1 08/10/2022    GRAN 60.1 08/10/2022    LYMPH 2.3 08/10/2022    LYMPH 26.5 08/10/2022    MONO 0.9 08/10/2022    MONO 10.4 08/10/2022    EOS 0.2 08/10/2022     BASO 0.05 08/10/2022    EOSINOPHIL 2.0 08/10/2022    BASOPHIL 0.6 08/10/2022     Sodium   Date Value Ref Range Status   08/26/2022 143 136 - 145 mmol/L Final     Potassium   Date Value Ref Range Status   08/26/2022 4.0 3.5 - 5.1 mmol/L Final     Chloride   Date Value Ref Range Status   08/26/2022 106 95 - 110 mmol/L Final     CO2   Date Value Ref Range Status   08/26/2022 26 23 - 29 mmol/L Final     Glucose   Date Value Ref Range Status   08/26/2022 120 (H) 70 - 110 mg/dL Final     BUN   Date Value Ref Range Status   08/26/2022 22 8 - 23 mg/dL Final     Creatinine   Date Value Ref Range Status   08/26/2022 0.8 0.5 - 1.4 mg/dL Final     Calcium   Date Value Ref Range Status   08/26/2022 9.4 8.7 - 10.5 mg/dL Final     Total Protein   Date Value Ref Range Status   08/26/2022 6.7 6.0 - 8.4 g/dL Final     Albumin   Date Value Ref Range Status   08/26/2022 3.7 3.5 - 5.2 g/dL Final     Total Bilirubin   Date Value Ref Range Status   08/26/2022 0.6 0.1 - 1.0 mg/dL Final     Comment:     For infants and newborns, interpretation of results should be based  on gestational age, weight and in agreement with clinical  observations.    Premature Infant recommended reference ranges:  Up to 24 hours.............<8.0 mg/dL  Up to 48 hours............<12.0 mg/dL  3-5 days..................<15.0 mg/dL  6-29 days.................<15.0 mg/dL       Alkaline Phosphatase   Date Value Ref Range Status   08/26/2022 66 55 - 135 U/L Final     AST   Date Value Ref Range Status   08/26/2022 17 10 - 40 U/L Final     ALT   Date Value Ref Range Status   08/26/2022 16 10 - 44 U/L Final     Anion Gap   Date Value Ref Range Status   08/26/2022 11 8 - 16 mmol/L Final     eGFR if    Date Value Ref Range Status   06/22/2022 >60.0 >60 mL/min/1.73 m^2 Final     eGFR if non    Date Value Ref Range Status   06/22/2022 >60.0 >60 mL/min/1.73 m^2 Final     Comment:     Calculation used to obtain the estimated glomerular  filtration  rate (eGFR) is the CKD-EPI equation.                   Diagnosis/Assessment/Plan:     Multiple Sclerosis  -Assessment: Radiographically does appear to have demyelinating disease, possible progressive symptom onset a few years ago with mild hand weakness. No evidence of clinical relapses. Exam without significant weakness, she does have myelopathic exam with hyperreflexia. Disability is mainly contributed to by sciatic pain in legs. I offered patient option for either LP to establish diagnosis if positive oligoclonal bands, vs monitoring with repeat MRI to see if any interval changes that may also help establish the diagnosis. MS being most likely at this point. Limited utility of DMTs at this time unless she has active relapses or active lesions on imaging.   -Imaging:Repeat brain, c and t spine in 6 months  -Disease Modifying Therapies: Monitoring off therapy for now.   Symptom management   -Pain manamgement   -Follow up with spine for significant DDD throughout C-L spine   -Trial of gabapentin 300mg at bedtime, monitor for drowsiness    -Neuro-PT referral  Lifestyle modifications for brain health discussed.  Return to clinic in 3 months.           Total time spent with the patient: 60 minutes, including face to face consultation, chart review and coordination of care, on the day of the visit. This includes face to face time and non-face to face time preparing to see the patient (eg, review of tests), obtaining and/or reviewing separately obtained history, documenting clinical information in the electronic or other health record, independently interpreting resultsand communicating results to the patient/family/caregiver, or care coordination.         Edilma Paul MD, MSc  Attending neurologist

## 2022-08-29 NOTE — PATIENT INSTRUCTIONS
Trial of gabapentin 300mg at bedtime, monitor for drowsiness     Updated MRI in 6 months     Neuro physical therapy referral     Re-establish care with Dr. Kumar (pain management)

## 2022-09-01 ENCOUNTER — OFFICE VISIT (OUTPATIENT)
Dept: ORTHOPEDICS | Facility: CLINIC | Age: 72
End: 2022-09-01
Payer: MEDICARE

## 2022-09-01 VITALS — HEIGHT: 67 IN | WEIGHT: 188.69 LBS | BODY MASS INDEX: 29.62 KG/M2

## 2022-09-01 DIAGNOSIS — M48.062 LUMBAR STENOSIS WITH NEUROGENIC CLAUDICATION: Primary | ICD-10-CM

## 2022-09-01 DIAGNOSIS — M51.36 DDD (DEGENERATIVE DISC DISEASE), LUMBAR: ICD-10-CM

## 2022-09-01 DIAGNOSIS — M47.816 LUMBAR SPONDYLOSIS: ICD-10-CM

## 2022-09-01 PROCEDURE — 99214 OFFICE O/P EST MOD 30 MIN: CPT | Mod: S$PBB,,, | Performed by: ORTHOPAEDIC SURGERY

## 2022-09-01 PROCEDURE — 99999 PR PBB SHADOW E&M-EST. PATIENT-LVL III: CPT | Mod: PBBFAC,,, | Performed by: ORTHOPAEDIC SURGERY

## 2022-09-01 PROCEDURE — 99213 OFFICE O/P EST LOW 20 MIN: CPT | Mod: PBBFAC | Performed by: ORTHOPAEDIC SURGERY

## 2022-09-01 PROCEDURE — 99214 PR OFFICE/OUTPT VISIT, EST, LEVL IV, 30-39 MIN: ICD-10-PCS | Mod: S$PBB,,, | Performed by: ORTHOPAEDIC SURGERY

## 2022-09-01 PROCEDURE — 99999 PR PBB SHADOW E&M-EST. PATIENT-LVL III: ICD-10-PCS | Mod: PBBFAC,,, | Performed by: ORTHOPAEDIC SURGERY

## 2022-09-01 NOTE — H&P (VIEW-ONLY)
"DATE: 9/1/2022  PATIENT: Graciela Barton    Attending Physician: Jesús Yoder M.D.    HISTORY:  Graciela Barton is a 72 y.o. female w/ a hx thyroid cx (s/p resection ans has dysphagia) who returns to me today for FU. She was diagnosed with MS. She still has LBP that radiates posteriorly down BLE (R>L). She is taking Eliquis for Afib. She missed her last MALKA appt, but she would like to try that.    She has no neck pain or arm pain; she has no numbness/tingling of her BUE. She did admit to dropping things and left hand clumsiness. She has had balance problems for 9 months, at which she started using a walker. Denies perineal paresthesias, bowel/bladder dysfunction.    PMH/PSH/FamHx/SocHx:  Unchanged from prior visit     ROS:  Positive for imbalance, LBP and BLE pain  Denies perineal paresthesias, bowel or bladder incontinence    EXAM:  Ht 5' 7" (1.702 m)   Wt 85.6 kg (188 lb 11.4 oz)   BMI 29.56 kg/m²     My physical examination was notable for the following findings: Normal strength and tone in all major motor groups in the bilateral upper and lower extremities except for 4/5 b/l hip flexion, 4/5 in R EHL/TA and 4/5 in left TA. Normal sensation to light touch in the C5-T1 and L2-S1 dermatomes bilaterally.    IMAGING:  Today I independently reviewed the following images and my interpretations are as follows:    Previous L-spine XRs showed spondylosis without fractures or listhesis.    T-MRI showed T7-8 HNP with moderate-severe central stenosis. Increased signal within spinal cord throughout T-spine.    Lumbar MRI showed L1-2 and L5-S1 moderate central stenosis. L3-5 severe central stenosis. Moderate b/l L4-5 and severe b/l L5-S1 foraminal stenosis.    DEXA lumbar spine showed T-score of 5.3.     ASSESSMENT/PLAN:  Patient has lumbar spondylosis and stenosis with neurogenic claudication. She has MS, so it's hard to discern her balance issues from her MS vs the thoracic spine. I discussed the natural history of their " diagnoses as well as surgical and nonsurgical treatment options. I educated the patient on the importance of core/back strengthening, correct posture, bending/lifting ergonomics, and low-impact aerobic exercises (walking, elliptical, and aquatherapy). Continue medications. I ordered L3-4 MALKA. She will FU with me in 4 weeks. Patient is a candidate for L3-5 decompression if she failed conservative management. She will need preop clearance.     Jesús Yoder MD  Orthopaedic Spine Surgeon  Department of Orthopaedic Surgery  [phone]

## 2022-09-01 NOTE — PROGRESS NOTES
"DATE: 9/1/2022  PATIENT: Graciela Barton    Attending Physician: Jesús Yoder M.D.    HISTORY:  Graciela Barton is a 72 y.o. female w/ a hx thyroid cx (s/p resection ans has dysphagia) who returns to me today for FU. She was diagnosed with MS. She still has LBP that radiates posteriorly down BLE (R>L). She is taking Eliquis for Afib. She missed her last MALKA appt, but she would like to try that.    She has no neck pain or arm pain; she has no numbness/tingling of her BUE. She did admit to dropping things and left hand clumsiness. She has had balance problems for 9 months, at which she started using a walker. Denies perineal paresthesias, bowel/bladder dysfunction.    PMH/PSH/FamHx/SocHx:  Unchanged from prior visit     ROS:  Positive for imbalance, LBP and BLE pain  Denies perineal paresthesias, bowel or bladder incontinence    EXAM:  Ht 5' 7" (1.702 m)   Wt 85.6 kg (188 lb 11.4 oz)   BMI 29.56 kg/m²     My physical examination was notable for the following findings: Normal strength and tone in all major motor groups in the bilateral upper and lower extremities except for 4/5 b/l hip flexion, 4/5 in R EHL/TA and 4/5 in left TA. Normal sensation to light touch in the C5-T1 and L2-S1 dermatomes bilaterally.    IMAGING:  Today I independently reviewed the following images and my interpretations are as follows:    Previous L-spine XRs showed spondylosis without fractures or listhesis.    T-MRI showed T7-8 HNP with moderate-severe central stenosis. Increased signal within spinal cord throughout T-spine.    Lumbar MRI showed L1-2 and L5-S1 moderate central stenosis. L3-5 severe central stenosis. Moderate b/l L4-5 and severe b/l L5-S1 foraminal stenosis.    DEXA lumbar spine showed T-score of 5.3.     ASSESSMENT/PLAN:  Patient has lumbar spondylosis and stenosis with neurogenic claudication. She has MS, so it's hard to discern her balance issues from her MS vs the thoracic spine. I discussed the natural history of their " diagnoses as well as surgical and nonsurgical treatment options. I educated the patient on the importance of core/back strengthening, correct posture, bending/lifting ergonomics, and low-impact aerobic exercises (walking, elliptical, and aquatherapy). Continue medications. I ordered L3-4 MALKA. She will FU with me in 4 weeks. Patient is a candidate for L3-5 decompression if she failed conservative management. She will need preop clearance.     Jesús Yoder MD  Orthopaedic Spine Surgeon  Department of Orthopaedic Surgery  [phone]

## 2022-09-07 ENCOUNTER — TELEPHONE (OUTPATIENT)
Dept: PAIN MEDICINE | Facility: OTHER | Age: 72
End: 2022-09-07
Payer: MEDICARE

## 2022-09-07 NOTE — TELEPHONE ENCOUNTER
----- Message from Juan Manuel Singletary MD sent at 9/7/2022 12:16 PM CDT -----  Regarding: RE: Request to hold Eliquis  No problem holding eliquis for a few days.    ----- Message -----  From: Kirstin Clayton LPN  Sent: 9/6/2022  12:43 PM CDT  To: Juan Manuel Singletary MD  Subject: FW: Request to hold Eliquis                        ----- Message -----  From: Екатерина Tamez DNP  Sent: 9/6/2022  12:11 PM CDT  To: Kirstin Clayton LPN  Subject: RE: Request to hold Eliquis                      I would contact EP for their opinion- she saw Dr. Singletary 8/22 who has adjusted meds. I'm not sure if he would recommend avoiding interruption or not.    Thanks,  Екатерина  ----- Message -----  From: Kirstin Clayton LPN  Sent: 9/6/2022  11:18 AM CDT  To: Екатерина Tamez DNP  Subject: Request to hold Eliquis                          Good morning,    Patient will be scheduled to have an L3/4 Epidural Steroid Injection with Pain Management. Staff is requesting to hold Eliquis for 3 days prior to procedure. Please advise.    Thank you,  Kirstin

## 2022-09-08 ENCOUNTER — TELEPHONE (OUTPATIENT)
Dept: PAIN MEDICINE | Facility: OTHER | Age: 72
End: 2022-09-08
Payer: MEDICARE

## 2022-09-12 ENCOUNTER — TELEPHONE (OUTPATIENT)
Dept: SPINE | Facility: CLINIC | Age: 72
End: 2022-09-12
Payer: MEDICARE

## 2022-09-12 ENCOUNTER — TELEPHONE (OUTPATIENT)
Dept: ORTHOPEDICS | Facility: CLINIC | Age: 72
End: 2022-09-12
Payer: MEDICARE

## 2022-09-12 NOTE — TELEPHONE ENCOUNTER
----- Message from Ilana Pascual sent at 9/12/2022 11:48 AM CDT -----    Patient Returning Call        Who Called:pt  Who Left Message for Patient:pt  Does the patient know what this is regarding?:want to talk to someone about her back injections  Would the patient rather a call back or a response via [facility]? Call  Best Call Back Number:[phone]  Additional Information:wants to no when can she receive her back injections

## 2022-09-12 NOTE — TELEPHONE ENCOUNTER
----- Message from Patricia Jang sent at 9/12/2022 12:24 PM CDT -----  Regarding: PT JORGED LIKE A CALL BACK FROM STAFF REGARDING BACK INJECTION  Contact: PT  Pt would like a call from staff to get clarification on injection appt.       Confirmed contact info below:  Contact Name: Graciela Barton  Phone Number: [phone]

## 2022-09-12 NOTE — TELEPHONE ENCOUNTER
Spoke with patient and explained that message was sent to Nicholas Kumar MD office and someone should reach out to her, if not call back and let us know. She stated an verbal understanding.

## 2022-09-15 DIAGNOSIS — M48.062 LUMBAR STENOSIS WITH NEUROGENIC CLAUDICATION: Primary | ICD-10-CM

## 2022-09-16 ENCOUNTER — TELEPHONE (OUTPATIENT)
Dept: PAIN MEDICINE | Facility: OTHER | Age: 72
End: 2022-09-16
Payer: MEDICARE

## 2022-09-16 DIAGNOSIS — M48.062 LUMBAR STENOSIS WITH NEUROGENIC CLAUDICATION: Primary | ICD-10-CM

## 2022-09-16 NOTE — TELEPHONE ENCOUNTER
Spoke to patient to schedule direct referral procedure. Patient is scheduled on 9/30/22 at 2:30 PM with Dr. Kumar. Patient was offered an opportunity to be scheduled in the Pain Management Clinic for a consult with the performing provider before scheduling. Patient declined provider consult. Date, time, and instructions for procedure given to patient. Patient verbalized understanding.

## 2022-09-21 ENCOUNTER — NURSE TRIAGE (OUTPATIENT)
Dept: ADMINISTRATIVE | Facility: CLINIC | Age: 72
End: 2022-09-21
Payer: MEDICARE

## 2022-09-22 ENCOUNTER — TELEPHONE (OUTPATIENT)
Dept: ADMINISTRATIVE | Facility: OTHER | Age: 72
End: 2022-09-22
Payer: MEDICARE

## 2022-09-22 NOTE — TELEPHONE ENCOUNTER
Patient states she has a procedure in Pain Management at the [facility] location on 9/30/22 and was calling for clarification of date to hold her prescription for Eliquis 5 mg. Patient states that she believes she was instructed by a Staff Member in the office of Dr. Nicholas Kumar to hold her Eliquis 5 mg on either the 26th or 27th of September, 2022, but needs clarification at this time.    Triage RN advised patient that a message will be sent to the office of Dr. BRISSA Kumar to contact patient regarding pre-op hold date of medication. Patient cites understanding and states no additional needs/concerns at this time.     Reason for Disposition   [1] Caller requesting NON-URGENT health information AND [2] PCP's office is the best resource    Additional Information   Negative: [1] Caller is not with the adult (patient) AND [2] reporting urgent symptoms   Negative: Lab result questions   Negative: Medication questions   Negative: Caller can't be reached by phone   Negative: Caller has already spoken to PCP or another triager   Negative: RN needs further essential information from caller in order to complete triage   Negative: Requesting regular office appointment    Protocols used: Information Only Call - No Triage-A-

## 2022-09-30 ENCOUNTER — HOSPITAL ENCOUNTER (OUTPATIENT)
Facility: OTHER | Age: 72
Discharge: HOME | End: 2022-09-30
Attending: ANESTHESIOLOGY | Admitting: ANESTHESIOLOGY
Payer: MEDICARE

## 2022-09-30 VITALS
WEIGHT: 190 LBS | TEMPERATURE: 98 F | SYSTOLIC BLOOD PRESSURE: 154 MMHG | OXYGEN SATURATION: 97 % | BODY MASS INDEX: 29.82 KG/M2 | DIASTOLIC BLOOD PRESSURE: 78 MMHG | RESPIRATION RATE: 16 BRPM | HEART RATE: 72 BPM | HEIGHT: 67 IN

## 2022-09-30 DIAGNOSIS — G89.29 CHRONIC PAIN: ICD-10-CM

## 2022-09-30 DIAGNOSIS — M54.16 LUMBAR RADICULOPATHY: Primary | ICD-10-CM

## 2022-09-30 DIAGNOSIS — M51.36 DDD (DEGENERATIVE DISC DISEASE), LUMBAR: ICD-10-CM

## 2022-09-30 PROCEDURE — 25000003 PHARM REV CODE 250: Performed by: STUDENT IN AN ORGANIZED HEALTH CARE EDUCATION/TRAINING PROGRAM

## 2022-09-30 PROCEDURE — 25500020 PHARM REV CODE 255: Performed by: ANESTHESIOLOGY

## 2022-09-30 PROCEDURE — 63600175 PHARM REV CODE 636 W HCPCS: Performed by: ANESTHESIOLOGY

## 2022-09-30 PROCEDURE — 25000003 PHARM REV CODE 250: Performed by: ANESTHESIOLOGY

## 2022-09-30 PROCEDURE — 62323 PR INJ LUMBAR/SACRAL, W/IMAGING GUIDANCE: ICD-10-PCS | Mod: ,,, | Performed by: ANESTHESIOLOGY

## 2022-09-30 PROCEDURE — 62323 NJX INTERLAMINAR LMBR/SAC: CPT | Mod: ,,, | Performed by: ANESTHESIOLOGY

## 2022-09-30 PROCEDURE — 62323 NJX INTERLAMINAR LMBR/SAC: CPT | Performed by: ANESTHESIOLOGY

## 2022-09-30 RX ORDER — LIDOCAINE HYDROCHLORIDE 10 MG/ML
INJECTION, SOLUTION EPIDURAL; INFILTRATION; INTRACAUDAL; PERINEURAL
Status: DISCONTINUED | OUTPATIENT
Start: 2022-09-30 | End: 2022-09-30 | Stop reason: HOSPADM

## 2022-09-30 RX ORDER — FENTANYL CITRATE 50 UG/ML
INJECTION, SOLUTION INTRAMUSCULAR; INTRAVENOUS
Status: DISCONTINUED | OUTPATIENT
Start: 2022-09-30 | End: 2022-09-30 | Stop reason: HOSPADM

## 2022-09-30 RX ORDER — LIDOCAINE HYDROCHLORIDE 20 MG/ML
INJECTION, SOLUTION INFILTRATION; PERINEURAL
Status: DISCONTINUED | OUTPATIENT
Start: 2022-09-30 | End: 2022-09-30 | Stop reason: HOSPADM

## 2022-09-30 RX ORDER — DEXAMETHASONE SODIUM PHOSPHATE 10 MG/ML
INJECTION INTRAMUSCULAR; INTRAVENOUS
Status: DISCONTINUED | OUTPATIENT
Start: 2022-09-30 | End: 2022-09-30 | Stop reason: HOSPADM

## 2022-09-30 RX ORDER — SODIUM CHLORIDE 9 MG/ML
500 INJECTION, SOLUTION INTRAVENOUS CONTINUOUS
Status: DISCONTINUED | OUTPATIENT
Start: 2022-09-30 | End: 2022-09-30 | Stop reason: HOSPADM

## 2022-09-30 NOTE — OP NOTE
Lumbar Interlaminar Epidural Steroid Injection under Fluoroscopic Guidance    The procedure, risks, benefits, and options were discussed with the patient. There are no contraindications to the procedure. The patent expressed understanding and agreed to the procedure. Informed written consent was obtained prior to the start of the procedure and can be found in the patient's chart.    PATIENT NAME: Graciela Barton   MRN: [id number]     DATE OF PROCEDURE: 09/30/2022    PROCEDURE: Lumbar Interlaminar Epidural Steroid Injection L3/L4 under Fluoroscopic Guidance    PRE-OP DIAGNOSIS: Lumbar stenosis with neurogenic claudication [M48.062] Lumbar radiculopathy [M54.16]    POST-OP DIAGNOSIS: Same    PHYSICIAN: Nicholas Kumar MD    ASSISTANTS: Dr. Joe     MEDICATIONS INJECTED: Preservative-free Decadron 10mg with 4cc of Lidocaine 1% MPF and preservative free normal saline    LOCAL ANESTHETIC INJECTED: Xylocaine 2%     SEDATION: Versed 0mg and Fentanyl 50mcg                                                                                                                                                                                     Conscious sedation ordered by M.D. Patient re-evaluation prior to administration of conscious sedation. No changes noted in patient's status from initial evaluation. The patient's vital signs were monitored by RN and patient remained hemodynamically stable throughout the procedure.    Event Time In   Sedation Start 1531   Sedation End 1539       ESTIMATED BLOOD LOSS: None    COMPLICATIONS: None    TECHNIQUE: Time-out was performed to identify the patient and procedure to be performed. With the patient laying in a prone position, the surgical area was prepped and draped in the usual sterile fashion using ChloraPrep and a fenestrated drape. The level was determined under fluoroscopy guidance. Skin anesthesia was achieved by injecting Lidocaine 2% over the injection site. The interlaminar space was then  approached with a 20 gauge,  3.5 inch Tuohy needle that was introduced under fluoroscopic guidance in the AP, lateral and/or contralateral oblique imaging. Once the Ligamentum flavum was encountered loss of resistance to saline was used to enter the epidural space. With positive loss of resistance and negative aspiration for CSF or Blood, contrast dye Omnipaque (240mg/mL) was injected to confirm placement and there was no vascular runoff. 5 mL of the medication mixture listed above was injected slowly. Displacement of the radio opaque contrast after injection of the medication confirmed that the medication went into the area of the epidural space. The needles were removed and bleeding was nil. A sterile dressing was applied. No specimens collected. The patient tolerated the procedure well.       The patient was monitored after the procedure in the recovery area. They were given post-procedure and discharge instructions to follow at home. The patient was discharged in a stable condition.    I reviewed and edited the fellow's note. I conducted my own interview and physical examination. I agree with the findings. I was present and supervising all critical portions of the procedure.    Nicholas Kumar MD

## 2022-09-30 NOTE — DISCHARGE INSTRUCTIONS

## 2022-09-30 NOTE — INTERVAL H&P NOTE
HPI  Patient presenting for Procedure(s) (LRB):  LUMBAR L3/4 MALKA CONTRAST DIRRECT REFERRAL MONICA CARRERA IN CHART (N/A)     Patient on Anti-coagulation Yes    No health changes since previous encounter    Past Medical History:   Diagnosis Date    Arthritis     Cancer     Thyroid    Edema     Hyperlipidemia     Hypertension     Thyroid disease      Past Surgical History:   Procedure Laterality Date    ADENOIDECTOMY      COLONOSCOPY  10/18/2018    COLONOSCOPY N/A 10/18/2018    Procedure: COLONOSCOPY;  Surgeon: Yosvany Alejandra MD;  Location: [facility];  Service: General;  Laterality: N/A;    HYSTERECTOMY  1992    total hyst     KNEE SURGERY      16 pins and two plates implanted    THYROIDECTOMY      TONSILLECTOMY       Review of patient's allergies indicates:   Allergen Reactions    Vancomycin analogues Tinitus      No current facility-administered medications for this encounter.       PMHx, PSHx, Allergies, Medications reviewed in epic    ROS negative except pain complaints in HPI    OBJECTIVE:    There were no vitals taken for this visit.    PHYSICAL EXAMINATION:    GENERAL: Well appearing, in no acute distress, alert and oriented x3.  PSYCH:  Mood and affect appropriate.  SKIN: Skin color, texture, turgor normal, no rashes or lesions which will impact the procedure.  CV: RRR with palpation of the radial artery.  PULM: No evidence of respiratory difficulty, symmetric chest rise. Clear to auscultation.  NEURO: Cranial nerves grossly intact.    Plan:    Proceed with procedure as planned Procedure(s) (LRB):  LUMBAR L3/4 MALKA CONTRAST DIRRECT REFERRAL MONICA CARRERA IN CHART (N/A)    Akhil Wright  09/30/2022

## 2022-09-30 NOTE — DISCHARGE SUMMARY
Discharge Note  Short Stay      SUMMARY     Admit Date: 9/30/2022    Attending Physician: Nicholas Kumar      Discharge Physician: Nicholas Kumar      Discharge Date: 9/30/2022 3:39 PM    Procedure(s) (LRB):  LUMBAR L3/4 MALKA CONTRAST DIRRECT REFERRAL MONICA CARRERA IN CHART (N/A)    Final Diagnosis: Lumbar stenosis with neurogenic claudication [M48.062]    Disposition: Home or self care    Patient Instructions:   Current Discharge Medication List        CONTINUE these medications which have NOT CHANGED    Details   apixaban (ELIQUIS) 5 mg Tab Take 1 tablet (5 mg total) by mouth 2 (two) times daily.  Qty: 60 tablet, Refills: 2      b complex vitamins capsule Take 1 capsule by mouth once daily.      gabapentin (NEURONTIN) 300 MG capsule Take 1 capsule (300 mg total) by mouth every evening.  Qty: 30 capsule, Refills: 11      levothyroxine (SYNTHROID) 125 MCG tablet TAKE 1 TABLET BY MOUTH BEFORE BREAKFAST  Qty: 90 tablet, Refills: 3    Associated Diagnoses: Postoperative hypothyroidism      losartan (COZAAR) 25 MG tablet Take 1 tablet (25 mg total) by mouth once daily.  Qty: 90 tablet, Refills: 3    Comments: .      meclizine (ANTIVERT) 25 mg tablet Take 1 tablet (25 mg total) by mouth 3 (three) times daily as needed for Dizziness.  Qty: 30 tablet, Refills: 3    Associated Diagnoses: Benign paroxysmal positional vertigo, unspecified laterality      metoprolol succinate (TOPROL-XL) 25 MG 24 hr tablet Take 1 tablet by mouth once daily  Qty: 90 tablet, Refills: 3    Associated Diagnoses: Essential hypertension, benign      multivitamin (THERAGRAN) per tablet Take 1 tablet by mouth once daily.      propafenone (RHTHYMOL) 150 MG Tab Take 1 tablet (150 mg total) by mouth every 8 (eight) hours.  Qty: 90 tablet, Refills: 11      rosuvastatin (CRESTOR) 10 MG tablet Take 1 tablet (10 mg total) by mouth once daily.  Qty: 90 tablet, Refills: 3    Associated Diagnoses: Mixed hyperlipidemia      vitamin E 400 UNIT capsule Take  400 Units by mouth.                 Discharge Diagnosis: Lumbar stenosis with neurogenic claudication [M48.062]  Condition on Discharge: Stable with no complications to procedure   Diet on Discharge: Same as before.  Activity: as per instruction sheet.  Discharge to: Home with a responsible adult.  Follow up: 2-4 weeks

## 2022-10-03 DIAGNOSIS — Z71.89 COMPLEX CARE COORDINATION: ICD-10-CM

## 2022-10-04 ENCOUNTER — OFFICE VISIT (OUTPATIENT)
Dept: ORTHOPEDICS | Facility: CLINIC | Age: 72
End: 2022-10-04
Payer: MEDICARE

## 2022-10-04 DIAGNOSIS — M47.816 LUMBAR SPONDYLOSIS: ICD-10-CM

## 2022-10-04 DIAGNOSIS — M48.062 LUMBAR STENOSIS WITH NEUROGENIC CLAUDICATION: Primary | ICD-10-CM

## 2022-10-04 DIAGNOSIS — M51.36 DDD (DEGENERATIVE DISC DISEASE), LUMBAR: ICD-10-CM

## 2022-10-04 PROCEDURE — 99213 OFFICE O/P EST LOW 20 MIN: CPT | Mod: PBBFAC | Performed by: ORTHOPAEDIC SURGERY

## 2022-10-04 PROCEDURE — 99214 OFFICE O/P EST MOD 30 MIN: CPT | Mod: S$PBB,,, | Performed by: ORTHOPAEDIC SURGERY

## 2022-10-04 PROCEDURE — 99999 PR PBB SHADOW E&M-EST. PATIENT-LVL III: CPT | Mod: PBBFAC,,, | Performed by: ORTHOPAEDIC SURGERY

## 2022-10-04 PROCEDURE — 99999 PR PBB SHADOW E&M-EST. PATIENT-LVL III: ICD-10-PCS | Mod: PBBFAC,,, | Performed by: ORTHOPAEDIC SURGERY

## 2022-10-04 PROCEDURE — 99214 PR OFFICE/OUTPT VISIT, EST, LEVL IV, 30-39 MIN: ICD-10-PCS | Mod: S$PBB,,, | Performed by: ORTHOPAEDIC SURGERY

## 2022-10-04 NOTE — PROGRESS NOTES
DATE: 10/4/2022  PATIENT: Garciela Barton    Attending Physician: Jesús Yoder M.D.    HISTORY:  Graciela Barton is a 72 y.o. female w/ a hx thyroid cx (s/p resection ans has dysphagia) who returns to me today for FU after getting L3-4 MALKA, which took away all of her pain. She was diagnosed with MS. She used to have LBP that radiates posteriorly down BLE (R>L). She is taking Eliquis for Afib.    She has no neck pain or arm pain; she has no numbness/tingling of her BUE. She did admit to dropping things and left hand clumsiness. She has had balance problems for 9 months, at which she started using a walker. Denies perineal paresthesias, bowel/bladder dysfunction.    PMH/PSH/FamHx/SocHx:  Unchanged from prior visit     ROS:  Positive for imbalance, LBP and BLE pain  Denies perineal paresthesias, bowel or bladder incontinence    EXAM:  There were no vitals taken for this visit.    My physical examination was notable for the following findings: Normal strength and tone in all major motor groups in the bilateral upper and lower extremities except for 4/5 b/l hip flexion, 4/5 in R EHL/TA and 4/5 in left TA. Normal sensation to light touch in the C5-T1 and L2-S1 dermatomes bilaterally.    IMAGING:  Today I independently reviewed the following images and my interpretations are as follows:    Previous L-spine XRs showed spondylosis without fractures or listhesis.    T-MRI showed T7-8 HNP with moderate-severe central stenosis. Increased signal within spinal cord throughout T-spine.    Lumbar MRI showed L1-2 and L5-S1 moderate central stenosis. L3-5 severe central stenosis. Moderate b/l L4-5 and severe b/l L5-S1 foraminal stenosis.    DEXA lumbar spine showed T-score of 5.3.     ASSESSMENT/PLAN:  Patient has lumbar spondylosis and stenosis with neurogenic claudication. I discussed the natural history of their diagnoses as well as surgical and nonsurgical treatment options. I educated the patient on the importance of core/back  strengthening, correct posture, bending/lifting ergonomics, and low-impact aerobic exercises (walking, elliptical, and aquatherapy). Continue medications and exercises. She will FU with me in 3 months. Patient is a candidate for L3-5 decompression if she failed conservative management. She will need preop clearance.     Jesús Yoder MD  Orthopaedic Spine Surgeon  Department of Orthopaedic Surgery  [phone]

## 2022-10-05 ENCOUNTER — OFFICE VISIT (OUTPATIENT)
Dept: CARDIOLOGY | Facility: CLINIC | Age: 72
End: 2022-10-05
Payer: MEDICARE

## 2022-10-05 VITALS
DIASTOLIC BLOOD PRESSURE: 68 MMHG | WEIGHT: 182.63 LBS | HEIGHT: 67 IN | BODY MASS INDEX: 28.66 KG/M2 | SYSTOLIC BLOOD PRESSURE: 132 MMHG | HEART RATE: 66 BPM | OXYGEN SATURATION: 95 %

## 2022-10-05 DIAGNOSIS — I10 ESSENTIAL HYPERTENSION, BENIGN: ICD-10-CM

## 2022-10-05 DIAGNOSIS — I48.0 PAF (PAROXYSMAL ATRIAL FIBRILLATION): ICD-10-CM

## 2022-10-05 PROCEDURE — 99214 OFFICE O/P EST MOD 30 MIN: CPT | Mod: PBBFAC,PN | Performed by: NURSE PRACTITIONER

## 2022-10-05 PROCEDURE — 99212 PR OFFICE/OUTPT VISIT, EST, LEVL II, 10-19 MIN: ICD-10-PCS | Mod: S$PBB,,, | Performed by: NURSE PRACTITIONER

## 2022-10-05 PROCEDURE — 99212 OFFICE O/P EST SF 10 MIN: CPT | Mod: S$PBB,,, | Performed by: NURSE PRACTITIONER

## 2022-10-05 PROCEDURE — 99999 PR PBB SHADOW E&M-EST. PATIENT-LVL IV: ICD-10-PCS | Mod: PBBFAC,,, | Performed by: NURSE PRACTITIONER

## 2022-10-05 PROCEDURE — 99999 PR PBB SHADOW E&M-EST. PATIENT-LVL IV: CPT | Mod: PBBFAC,,, | Performed by: NURSE PRACTITIONER

## 2022-10-05 RX ORDER — HYDROCHLOROTHIAZIDE 12.5 MG/1
12.5 TABLET ORAL DAILY
Qty: 30 TABLET | Refills: 11 | Status: SHIPPED | OUTPATIENT
Start: 2022-10-05 | End: 2023-08-02 | Stop reason: DRUGHIGH

## 2022-10-05 NOTE — PROGRESS NOTES
Cardiology    10/5/2022  1:33 PM    Problem list  Patient Active Problem List   Diagnosis    Postoperative hypothyroidism    History of thyroid cancer    Essential hypertension, benign    Mixed hyperlipidemia    Benign paroxysmal positional vertigo    History of colon polyps    Bilateral post-traumatic osteoarthritis of knee    Paroxysmal tachycardia    DDD (degenerative disc disease), lumbar    Lumbar radiculopathy    PAF (paroxysmal atrial fibrillation)       CC:    Results review, PAF    HPI:    Feeling well.  Sometimes has bleeding of her gums but not too bad. No palpitations, saw Dr. Singletary and was started on Rythmol.  She has lost some weight- doing portion control/ [employer] and tries to limit sodium.  Feels like some of the swelling in her legs has improved. May be a candidate for an orthopedic surgery to help with her spinal stenosis    Medications  Current Outpatient Medications   Medication Sig Dispense Refill    apixaban (ELIQUIS) 5 mg Tab Take 1 tablet (5 mg total) by mouth 2 (two) times daily. 60 tablet 2    b complex vitamins capsule Take 1 capsule by mouth once daily.      levothyroxine (SYNTHROID) 125 MCG tablet TAKE 1 TABLET BY MOUTH BEFORE BREAKFAST 90 tablet 3    losartan (COZAAR) 25 MG tablet Take 1 tablet (25 mg total) by mouth once daily. 90 tablet 3    meclizine (ANTIVERT) 25 mg tablet Take 1 tablet (25 mg total) by mouth 3 (three) times daily as needed for Dizziness. 30 tablet 3    metoprolol succinate (TOPROL-XL) 25 MG 24 hr tablet Take 1 tablet by mouth once daily 90 tablet 3    multivitamin (THERAGRAN) per tablet Take 1 tablet by mouth once daily.      propafenone (RHTHYMOL) 150 MG Tab Take 1 tablet (150 mg total) by mouth every 8 (eight) hours. 90 tablet 11    rosuvastatin (CRESTOR) 10 MG tablet Take 1 tablet (10 mg total) by mouth once daily. 90 tablet 3    vitamin E 400 UNIT capsule Take 400 Units by mouth.      gabapentin (NEURONTIN) 300 MG capsule Take 1 capsule (300 mg  total) by mouth every evening. (Patient not taking: No sig reported) 30 capsule 11     No current facility-administered medications for this visit.      Prior to Admission medications    Medication Sig Start Date End Date Taking? Authorizing Provider   apixaban (ELIQUIS) 5 mg Tab Take 1 tablet (5 mg total) by mouth 2 (two) times daily. 8/10/22 11/8/22 Yes Santos Jones MD   b complex vitamins capsule Take 1 capsule by mouth once daily.   Yes Historical Provider   levothyroxine (SYNTHROID) 125 MCG tablet TAKE 1 TABLET BY MOUTH BEFORE BREAKFAST 2/2/22  Yes Rocco Miller MD   losartan (COZAAR) 25 MG tablet Take 1 tablet (25 mg total) by mouth once daily. 8/10/22 8/10/23 Yes Santos Jones MD   meclizine (ANTIVERT) 25 mg tablet Take 1 tablet (25 mg total) by mouth 3 (three) times daily as needed for Dizziness. 8/1/18  Yes Rocco Miller MD   metoprolol succinate (TOPROL-XL) 25 MG 24 hr tablet Take 1 tablet by mouth once daily 2/25/22  Yes Rocco Miller MD   multivitamin (THERAGRAN) per tablet Take 1 tablet by mouth once daily.   Yes Historical Provider   propafenone (RHTHYMOL) 150 MG Tab Take 1 tablet (150 mg total) by mouth every 8 (eight) hours. 8/24/22 8/24/23 Yes Juan Manuel Singletary MD   rosuvastatin (CRESTOR) 10 MG tablet Take 1 tablet (10 mg total) by mouth once daily. 2/2/22  Yes Rocco Miller MD   vitamin E 400 UNIT capsule Take 400 Units by mouth.   Yes Historical Provider   gabapentin (NEURONTIN) 300 MG capsule Take 1 capsule (300 mg total) by mouth every evening.  Patient not taking: No sig reported 8/29/22 8/29/23  Edilma Paul MD   atorvastatin (LIPITOR) 40 MG tablet Take 1 tablet (40 mg total) by mouth once daily. 6/24/22 8/10/22  Radha Gomez MD   hydroCHLOROthiazide (HYDRODIURIL) 25 MG tablet Take 1 tablet by mouth once daily 4/20/22 8/10/22  Rocco M. Truxillo, MD         History  Past Medical History:   Diagnosis Date    Arthritis     Cancer     Thyroid    Edema     Hyperlipidemia      Hypertension     Thyroid disease      Past Surgical History:   Procedure Laterality Date    ADENOIDECTOMY      COLONOSCOPY  10/18/2018    COLONOSCOPY N/A 10/18/2018    Procedure: COLONOSCOPY;  Surgeon: Yosvany Alejandra MD;  Location: [facility];  Service: General;  Laterality: N/A;    EPIDURAL STEROID INJECTION N/A 9/30/2022    Procedure: LUMBAR L3/4 MALKA CONTRAST DIRRECT REFERRAL MONICA CARRERA IN CHART;  Surgeon: Nicholas Kumar MD;  Location: [facility];  Service: Pain Management;  Laterality: N/A;    HYSTERECTOMY  1992    total hyst     KNEE SURGERY      16 pins and two plates implanted    THYROIDECTOMY      TONSILLECTOMY       Social History     Socioeconomic History    Marital status:    Tobacco Use    Smoking status: Never    Smokeless tobacco: Never   Substance and Sexual Activity    Alcohol use: No    Drug use: No    Sexual activity: Not Currently     Partners: Male         Allergies  Review of patient's allergies indicates:   Allergen Reactions    Vancomycin analogues Tinitus         Review of Systems   Review of Systems   Constitutional: Negative for diaphoresis and malaise/fatigue.   HENT: Negative.     Cardiovascular:  Positive for leg swelling (improved). Negative for chest pain, claudication, dyspnea on exertion, irregular heartbeat, near-syncope, orthopnea, palpitations, paroxysmal nocturnal dyspnea and syncope.   Respiratory:  Negative for shortness of breath.    Endocrine: Negative for polydipsia, polyphagia and polyuria.   Hematologic/Lymphatic: Does not bruise/bleed easily.   Gastrointestinal:  Negative for bloating, nausea and vomiting.   Genitourinary: Negative.    Neurological:  Negative for excessive daytime sleepiness, dizziness, light-headedness, loss of balance and weakness.   Psychiatric/Behavioral:  The patient is not nervous/anxious.    Allergic/Immunologic: Negative.        Physical Exam  Wt Readings from Last 1 Encounters:   10/05/22 82.9 kg (182 lb 10.4 oz)     BP Readings  from Last 3 Encounters:   10/05/22 132/68   09/30/22 (!) 154/78   08/29/22 (!) 169/65     Pulse Readings from Last 1 Encounters:   10/05/22 66     Body mass index is 28.61 kg/m².    Physical Exam  Vitals and nursing note reviewed.   Constitutional:       Appearance: Normal appearance.   HENT:      Head: Normocephalic and atraumatic.      Mouth/Throat:      Mouth: Mucous membranes are moist.   Eyes:      Pupils: Pupils are equal, round, and reactive to light.   Cardiovascular:      Rate and Rhythm: Normal rate and regular rhythm.      Pulses:           Radial pulses are 2+ on the right side and 2+ on the left side.        Dorsalis pedis pulses are 2+ on the right side and 2+ on the left side.        Posterior tibial pulses are 2+ on the right side and 2+ on the left side.      Heart sounds: No murmur heard.  Pulmonary:      Effort: Pulmonary effort is normal. No respiratory distress.      Breath sounds: Normal breath sounds.   Abdominal:      General: There is no distension.      Tenderness: There is no abdominal tenderness.   Musculoskeletal:      Cervical back: Normal range of motion.      Right lower leg: No edema.      Left lower leg: No edema.   Skin:     General: Skin is warm and dry.      Findings: No erythema.   Neurological:      General: No focal deficit present.      Mental Status: She is alert.   Psychiatric:         Mood and Affect: Mood normal.         Behavior: Behavior normal.     Problem List Items Addressed This Visit          Cardiac/Vascular    Essential hypertension, benign    Overview     Well controlled  Have some SHANE, add in HCTZ 12.5 mg and monitor  Continue as now         Current Assessment & Plan     As above         PAF (paroxysmal atrial fibrillation)    Overview     EP  Continue DOAC  Continue metoprolol  Rate controlled today           Current Assessment & Plan     As above                      Follow Up    3 months    @Екатерина Tamez DNP

## 2022-10-05 NOTE — PATIENT INSTRUCTIONS
Continue your medications as you have- we are adding back in hydrochlorothiazide 12.5 mg daily.  Monitor your blood pressures and let us know if you have any issues like dizziness or lightheadedness    Follow up with Dr. Singletary as planned    We will see you back in 3 months, sooner if you need us

## 2022-10-06 DIAGNOSIS — M48.062 LUMBAR STENOSIS WITH NEUROGENIC CLAUDICATION: Primary | ICD-10-CM

## 2022-11-14 RX ORDER — APIXABAN 5 MG/1
5 TABLET, FILM COATED ORAL 2 TIMES DAILY
Qty: 60 TABLET | Refills: 11 | Status: SHIPPED | OUTPATIENT
Start: 2022-11-14 | End: 2023-07-17 | Stop reason: SDUPTHER

## 2022-11-14 RX ORDER — APIXABAN 5 MG/1
5 TABLET, FILM COATED ORAL 2 TIMES DAILY
COMMUNITY
Start: 2022-11-12 | End: 2022-11-14 | Stop reason: SDUPTHER

## 2022-11-14 NOTE — TELEPHONE ENCOUNTER
----- Message from Milana Bangura sent at 11/14/2022  1:22 PM CST -----  Contact: [phone]  Type:  RX Refill Request    Who Called:pt    Refill or New Rx:refill    RX Name and Strength:apixaban (ELIQUIS) 5 mg Tab    Preferred Pharmacy with phone number:  [facility] [place] - [address]  [address]) [place] [address]  Phone: [phone] Fax: [phone]        Local or Mail Order:local    Would the patient rather a call back or a response via [facility]? Call back    Best Call Back Number:[phone]      Additional Information: pt was giving this medication from the hospital said and needs to follow up with you for a refill

## 2022-12-01 ENCOUNTER — OFFICE VISIT (OUTPATIENT)
Dept: NEUROLOGY | Facility: CLINIC | Age: 72
End: 2022-12-01
Payer: MEDICARE

## 2022-12-01 VITALS
HEART RATE: 86 BPM | SYSTOLIC BLOOD PRESSURE: 147 MMHG | HEIGHT: 67 IN | WEIGHT: 180.44 LBS | BODY MASS INDEX: 28.32 KG/M2 | DIASTOLIC BLOOD PRESSURE: 71 MMHG

## 2022-12-01 DIAGNOSIS — G35 MULTIPLE SCLEROSIS: Primary | ICD-10-CM

## 2022-12-01 PROCEDURE — 99999 PR PBB SHADOW E&M-EST. PATIENT-LVL III: ICD-10-PCS | Mod: PBBFAC,,, | Performed by: STUDENT IN AN ORGANIZED HEALTH CARE EDUCATION/TRAINING PROGRAM

## 2022-12-01 PROCEDURE — 99214 PR OFFICE/OUTPT VISIT, EST, LEVL IV, 30-39 MIN: ICD-10-PCS | Mod: S$PBB,,, | Performed by: STUDENT IN AN ORGANIZED HEALTH CARE EDUCATION/TRAINING PROGRAM

## 2022-12-01 PROCEDURE — 99213 OFFICE O/P EST LOW 20 MIN: CPT | Mod: PBBFAC | Performed by: STUDENT IN AN ORGANIZED HEALTH CARE EDUCATION/TRAINING PROGRAM

## 2022-12-01 PROCEDURE — 99999 PR PBB SHADOW E&M-EST. PATIENT-LVL III: CPT | Mod: PBBFAC,,, | Performed by: STUDENT IN AN ORGANIZED HEALTH CARE EDUCATION/TRAINING PROGRAM

## 2022-12-01 PROCEDURE — 99214 OFFICE O/P EST MOD 30 MIN: CPT | Mod: S$PBB,,, | Performed by: STUDENT IN AN ORGANIZED HEALTH CARE EDUCATION/TRAINING PROGRAM

## 2022-12-01 NOTE — PROGRESS NOTES
"[facility]  Follow Up Patient Visit      Disease Summary     Principle neurological diagnosis: Likely MS     Date of symptom onset: mid 2010s   Date of diagnosis: TBD  Disease type at diagnosis: Progressive  Disease type currently: Progressive  Previous therapy: NA  Current therapy: NA  Last MRI Brain: 7/17/22  Last MRI C-spine: 7/13/22  Last MRI T-spine: 6/21/22  CSF: NA  JCV status: NA  Other relevant labs and tests: B12 307    Interval history:     She underwent MALKA which provided significant pain relief.     She's never started on gabapentin. Not currently on any muscle relaxers either and patient is not interested in starting.     She has persistent knee weakness and spasticity. Massaging her back helps relaxing her R knee and lower leg.     She is able to walk around her house with walker. Her right leg is the problematic one that she often drags. Improved since last time.     No significant worsening since last appointment.     ROS:     SOCIAL HISTORY  Social History     Tobacco Use    Smoking status: Never    Smokeless tobacco: Never   Substance Use Topics    Alcohol use: No    Drug use: No     Living arrangements - the patient lives alone.    Exam:     Vitals:    12/01/22 0933   BP: (!) 147/71   Pulse: 86   Weight: 81.8 kg (180 lb 7.1 oz)   Height: 5' 7" (1.702 m)          In general, the patient is well nourished and appears to be in no acute distress.    MENTAL STATUS: language is fluent, normal verbal comprehension, short-term and remote memory is intact, attention is normal, patient is alert and oriented x 3, fund of knowlege is appropriate by vocabulary.     CRANIAL NERVE EXAM:  There is no intrernuclear ophthalmoplegia.  Extraocular muscles are intact. No facial asymmetry.There is no dysarthria. Uvula is midline, and palate moves symmetrically. Shoulder shrug intact bilaterlly. Tongue protrusion is midline. Neck is supple with full ROM    MOTOR EXAM: Normal bulk, increased tone " in RLE (limited by knee pain).   No pronator drift; rapid sequential movements are normal;    Strength:  R deltoid 5/5, L deltoid 5/5  R biceps 5/5, L biceps 5/5  R triceps 5/5, L triceps 5/5  R finger flexors 5/5, L finger flexors 5/5  R finger extensors 5/5, L finger extensors 5/5  R finger abductors 5/5, L finger abductors 5/5  R  hip flexors 4+/5, L hip flexors 4+/5  R  hip extensors 5/5, L hip extensors 5/5  R knee extensors 3/5 (pain limiting), L knee extensors 5/5  R knee flexors 5/5, L knee flexors 5/5  R ankle dorsiflexors 3+/5, L ankle dorsiflexors 5/5  R ankle plantarflexors 5/5, L ankle plantarflexors 5/5    SENSORY EXAM: Normal to light touch throughout.    B/l pitting edema in b/l distal LE    COORDINATION: End point dysmetria on FTN b/l. HTS limited on R.      Imaging (personally reviewed):     No results found for this or any previous visit.    No results found for this or any previous visit.    No results found for this or any previous visit.    Results for orders placed during the hospital encounter of 07/17/22    MRI Brain Demyelinating W W/O Contrast    Impression  Several scattered T2 FLAIR hyperintense lesions throughout the brain parenchyma most concentrated in the supratentorial periventricular white matter several perpendicular to the lateral ventricles extending to the body of the corpus callosum.  Configuration while nonspecific concerning for possible prior demyelinating plaque.  There is no enhancing lesion or diffusion signal abnormality to suggest active demyelination.    Clinical correlation and follow-up advised.      Electronically signed by: Sam Cordero DO  Date:    07/17/2022  Time:    16:58    No results found for this or any previous visit.    No results found for this or any previous visit.      Labs:     No results found for: YXKGDNCY90IJ  No results found for: JCVINDEX, JCVANTIBODY  No results found for: GL1NKYTY, ABSOLUTECD3, YA6IHJEW, ABSOLUTECD8, PE7GYBFC, ABSOLUTECD4,  LABCD48  Lab Results   Component Value Date    WBC 8.50 08/10/2022    RBC 3.58 (L) 08/10/2022    HGB 11.2 (L) 08/10/2022    HCT 35.1 (L) 08/10/2022    MCV 98 08/10/2022    MCH 31.3 (H) 08/10/2022    MCHC 31.9 (L) 08/10/2022    RDW 13.9 08/10/2022     08/10/2022    MPV 11.2 08/10/2022    GRAN 5.1 08/10/2022    GRAN 60.1 08/10/2022    LYMPH 2.3 08/10/2022    LYMPH 26.5 08/10/2022    MONO 0.9 08/10/2022    MONO 10.4 08/10/2022    EOS 0.2 08/10/2022    BASO 0.05 08/10/2022    EOSINOPHIL 2.0 08/10/2022    BASOPHIL 0.6 08/10/2022     Sodium   Date Value Ref Range Status   08/26/2022 143 136 - 145 mmol/L Final     Potassium   Date Value Ref Range Status   08/26/2022 4.0 3.5 - 5.1 mmol/L Final     Chloride   Date Value Ref Range Status   08/26/2022 106 95 - 110 mmol/L Final     CO2   Date Value Ref Range Status   08/26/2022 26 23 - 29 mmol/L Final     Glucose   Date Value Ref Range Status   08/26/2022 120 (H) 70 - 110 mg/dL Final     BUN   Date Value Ref Range Status   08/26/2022 22 8 - 23 mg/dL Final     Creatinine   Date Value Ref Range Status   08/26/2022 0.8 0.5 - 1.4 mg/dL Final     Calcium   Date Value Ref Range Status   08/26/2022 9.4 8.7 - 10.5 mg/dL Final     Total Protein   Date Value Ref Range Status   08/26/2022 6.7 6.0 - 8.4 g/dL Final     Albumin   Date Value Ref Range Status   08/26/2022 3.7 3.5 - 5.2 g/dL Final     Total Bilirubin   Date Value Ref Range Status   08/26/2022 0.6 0.1 - 1.0 mg/dL Final     Comment:     For infants and newborns, interpretation of results should be based  on gestational age, weight and in agreement with clinical  observations.    Premature Infant recommended reference ranges:  Up to 24 hours.............<8.0 mg/dL  Up to 48 hours............<12.0 mg/dL  3-5 days..................<15.0 mg/dL  6-29 days.................<15.0 mg/dL       Alkaline Phosphatase   Date Value Ref Range Status   08/26/2022 66 55 - 135 U/L Final     AST   Date Value Ref Range Status   08/26/2022 17  10 - 40 U/L Final     ALT   Date Value Ref Range Status   08/26/2022 16 10 - 44 U/L Final     Anion Gap   Date Value Ref Range Status   08/26/2022 11 8 - 16 mmol/L Final     eGFR if    Date Value Ref Range Status   06/22/2022 >60.0 >60 mL/min/1.73 m^2 Final     eGFR if non    Date Value Ref Range Status   06/22/2022 >60.0 >60 mL/min/1.73 m^2 Final     Comment:     Calculation used to obtain the estimated glomerular filtration  rate (eGFR) is the CKD-EPI equation.                   Diagnosis/Assessment/Plan:     Multiple Sclerosis  -Assessment: Radiographically does appear to have demyelinating disease, possible progressive symptom onset a few years ago with mild hand weakness. No evidence of clinical relapses. Exam with RLE extensor muscle group weakness, some pain limiting, she does have myelopathic exam with hyperreflexia. Disability is mainly contributed to by back and leg pain. I previously have offered patient option for either LP to establish diagnosis if positive oligoclonal bands, vs monitoring with repeat MRI to see if any interval changes that may also help establish the diagnosis. Discussed with her that MS being most likely at this point. Limited utility of DMTs at this time unless she has active relapses or active lesions on imaging. Patient opted for monitoring.   -Imaging:Repeat brain, c and t spine (currently scheduled for Feb 28 2023), if stable, okay to monitor clinically thereafter  -Disease Modifying Therapies: Monitoring off therapy  Symptom management              -Pain management and spine follow up              -Start daily vitamin D supplement   -Continue daily stretching and strengthening exercises as she is doing  Lifestyle modifications for brain health discussed.  Return to clinic after MRI                    Total time spent with the patient: 30 minutes, including face to face consultation, chart review and coordination of care, on the day of the visit.  This includes face to face time and non-face to face time preparing to see the patient (eg, review of tests), obtaining and/or reviewing separately obtained history, documenting clinical information in the electronic or other health record, independently interpreting resultsand communicating results to the patient/family/caregiver, or care coordination.         Edilma Paul MD, MSc  Attending neurologist

## 2022-12-01 NOTE — PATIENT INSTRUCTIONS
Take vitamin D3 daily 2000 IU daily    MRI brain and spine 2/28/22 and follow up in clinic thereafter    Daily stretching

## 2022-12-12 ENCOUNTER — TELEPHONE (OUTPATIENT)
Dept: PODIATRY | Facility: CLINIC | Age: 72
End: 2022-12-12
Payer: MEDICARE

## 2022-12-12 NOTE — TELEPHONE ENCOUNTER
Called pt and explained that we didn't have a sooner appt pt states she wait until her original appt date

## 2022-12-16 ENCOUNTER — TELEPHONE (OUTPATIENT)
Dept: ELECTROPHYSIOLOGY | Facility: CLINIC | Age: 72
End: 2022-12-16
Payer: MEDICARE

## 2022-12-16 NOTE — TELEPHONE ENCOUNTER
Called pt to confirm appointment on Monday with Dr. Singletary. Pt verbally confirmed appointment date, time, and location, and thanked me for calling.

## 2022-12-21 ENCOUNTER — TELEPHONE (OUTPATIENT)
Dept: ORTHOPEDICS | Facility: CLINIC | Age: 72
End: 2022-12-21
Payer: MEDICARE

## 2023-01-05 ENCOUNTER — TELEPHONE (OUTPATIENT)
Dept: CARDIOLOGY | Facility: CLINIC | Age: 73
End: 2023-01-05
Payer: MEDICARE

## 2023-01-05 ENCOUNTER — OFFICE VISIT (OUTPATIENT)
Dept: CARDIOLOGY | Facility: CLINIC | Age: 73
End: 2023-01-05
Payer: MEDICARE

## 2023-01-05 VITALS
SYSTOLIC BLOOD PRESSURE: 144 MMHG | HEIGHT: 67 IN | WEIGHT: 179.25 LBS | BODY MASS INDEX: 28.13 KG/M2 | OXYGEN SATURATION: 97 % | HEART RATE: 69 BPM | DIASTOLIC BLOOD PRESSURE: 65 MMHG

## 2023-01-05 DIAGNOSIS — I10 ESSENTIAL HYPERTENSION, BENIGN: ICD-10-CM

## 2023-01-05 DIAGNOSIS — I47.9 PAROXYSMAL TACHYCARDIA: Primary | ICD-10-CM

## 2023-01-05 DIAGNOSIS — I48.0 PAF (PAROXYSMAL ATRIAL FIBRILLATION): ICD-10-CM

## 2023-01-05 PROCEDURE — 93005 ELECTROCARDIOGRAM TRACING: CPT | Mod: PBBFAC,PN | Performed by: INTERNAL MEDICINE

## 2023-01-05 PROCEDURE — 99212 OFFICE O/P EST SF 10 MIN: CPT | Mod: S$PBB,25,, | Performed by: NURSE PRACTITIONER

## 2023-01-05 PROCEDURE — 99999 PR PBB SHADOW E&M-EST. PATIENT-LVL IV: CPT | Mod: PBBFAC,,, | Performed by: NURSE PRACTITIONER

## 2023-01-05 PROCEDURE — 99212 PR OFFICE/OUTPT VISIT, EST, LEVL II, 10-19 MIN: ICD-10-PCS | Mod: S$PBB,25,, | Performed by: NURSE PRACTITIONER

## 2023-01-05 PROCEDURE — 93010 ELECTROCARDIOGRAM REPORT: CPT | Mod: S$PBB,,, | Performed by: INTERNAL MEDICINE

## 2023-01-05 PROCEDURE — 93010 EKG 12-LEAD: ICD-10-PCS | Mod: S$PBB,,, | Performed by: INTERNAL MEDICINE

## 2023-01-05 PROCEDURE — 99999 PR PBB SHADOW E&M-EST. PATIENT-LVL IV: ICD-10-PCS | Mod: PBBFAC,,, | Performed by: NURSE PRACTITIONER

## 2023-01-05 PROCEDURE — 99214 OFFICE O/P EST MOD 30 MIN: CPT | Mod: PBBFAC,PN | Performed by: NURSE PRACTITIONER

## 2023-01-05 RX ORDER — CHOLECALCIFEROL (VITAMIN D3) 25 MCG
2 TABLET ORAL DAILY
Status: ON HOLD | COMMUNITY
End: 2023-05-16 | Stop reason: HOSPADM

## 2023-01-05 NOTE — PROGRESS NOTES
Cardiology    1/5/2023  2:29 PM    Problem list  Patient Active Problem List   Diagnosis    Postoperative hypothyroidism    History of thyroid cancer    Essential hypertension, benign    Mixed hyperlipidemia    Benign paroxysmal positional vertigo    History of colon polyps    Bilateral post-traumatic osteoarthritis of knee    Paroxysmal tachycardia    DDD (degenerative disc disease), lumbar    Lumbar radiculopathy    PAF (paroxysmal atrial fibrillation)    Multiple sclerosis       CC:  A fib    HPI:  Was unable to make appt with Dr. Singletary due to weather.  Is feeling ok overall- has a cold but no other complaints.  No chest pain or Jang, some gingival bleeding with Eliquis    Medications  Current Outpatient Medications   Medication Sig Dispense Refill    b complex vitamins capsule Take 1 capsule by mouth once daily.      ELIQUIS 5 mg Tab Take 1 tablet (5 mg total) by mouth 2 (two) times daily. 60 tablet 11    hydroCHLOROthiazide (HYDRODIURIL) 12.5 MG Tab Take 1 tablet (12.5 mg total) by mouth once daily. 30 tablet 11    levothyroxine (SYNTHROID) 125 MCG tablet TAKE 1 TABLET BY MOUTH BEFORE BREAKFAST 90 tablet 3    losartan (COZAAR) 25 MG tablet Take 1 tablet (25 mg total) by mouth once daily. 90 tablet 3    meclizine (ANTIVERT) 25 mg tablet Take 1 tablet (25 mg total) by mouth 3 (three) times daily as needed for Dizziness. 30 tablet 3    metoprolol succinate (TOPROL-XL) 25 MG 24 hr tablet Take 1 tablet by mouth once daily 90 tablet 3    multivitamin (THERAGRAN) per tablet Take 1 tablet by mouth once daily.      propafenone (RHTHYMOL) 150 MG Tab Take 1 tablet (150 mg total) by mouth every 8 (eight) hours. 90 tablet 11    rosuvastatin (CRESTOR) 10 MG tablet Take 1 tablet (10 mg total) by mouth once daily. 90 tablet 3    vitamin D (VITAMIN D3) 1000 units Tab Take 2 Units by mouth once daily.      vitamin E 400 UNIT capsule Take 400 Units by mouth.       No current facility-administered medications for this  visit.      Prior to Admission medications    Medication Sig Start Date End Date Taking? Authorizing Provider   b complex vitamins capsule Take 1 capsule by mouth once daily.   Yes Historical Provider   ELIQUIS 5 mg Tab Take 1 tablet (5 mg total) by mouth 2 (two) times daily. 11/14/22  Yes Rocco Miller MD   hydroCHLOROthiazide (HYDRODIURIL) 12.5 MG Tab Take 1 tablet (12.5 mg total) by mouth once daily. 10/5/22 10/5/23 Yes Екатерина Tamez DNP   levothyroxine (SYNTHROID) 125 MCG tablet TAKE 1 TABLET BY MOUTH BEFORE BREAKFAST 2/2/22  Yes Rocco Miller MD   losartan (COZAAR) 25 MG tablet Take 1 tablet (25 mg total) by mouth once daily. 8/10/22 8/10/23 Yes Santos Jones MD   meclizine (ANTIVERT) 25 mg tablet Take 1 tablet (25 mg total) by mouth 3 (three) times daily as needed for Dizziness. 8/1/18  Yes Rocco Miller MD   metoprolol succinate (TOPROL-XL) 25 MG 24 hr tablet Take 1 tablet by mouth once daily 2/25/22  Yes Rocco Miller MD   multivitamin (THERAGRAN) per tablet Take 1 tablet by mouth once daily.   Yes Historical Provider   propafenone (RHTHYMOL) 150 MG Tab Take 1 tablet (150 mg total) by mouth every 8 (eight) hours. 8/24/22 8/24/23 Yes Juan Manuel Singletary MD   rosuvastatin (CRESTOR) 10 MG tablet Take 1 tablet (10 mg total) by mouth once daily. 2/2/22  Yes Rocco Miller MD   vitamin D (VITAMIN D3) 1000 units Tab Take 2 Units by mouth once daily.   Yes Historical Provider   vitamin E 400 UNIT capsule Take 400 Units by mouth.   Yes Historical Provider   atorvastatin (LIPITOR) 40 MG tablet Take 1 tablet (40 mg total) by mouth once daily. 6/24/22 8/10/22  Radha Gomez MD         History  Past Medical History:   Diagnosis Date    Arthritis     Cancer     Thyroid    Edema     Hyperlipidemia     Hypertension     Thyroid disease      Past Surgical History:   Procedure Laterality Date    ADENOIDECTOMY      COLONOSCOPY  10/18/2018    COLONOSCOPY N/A 10/18/2018    Procedure: COLONOSCOPY;   Surgeon: Yosvany Alejandra MD;  Location: [facility] ENDO;  Service: General;  Laterality: N/A;    EPIDURAL STEROID INJECTION N/A 9/30/2022    Procedure: LUMBAR L3/4 MALKA CONTRAST DIRRECT REFERRAL MONICA CARRERA IN CHART;  Surgeon: Nicholas Kumar MD;  Location: [facility] PAIN MGT;  Service: Pain Management;  Laterality: N/A;    HYSTERECTOMY  1992    total hyst     KNEE SURGERY      16 pins and two plates implanted    THYROIDECTOMY      TONSILLECTOMY       Social History     Socioeconomic History    Marital status:    Tobacco Use    Smoking status: Never    Smokeless tobacco: Never   Substance and Sexual Activity    Alcohol use: No    Drug use: No    Sexual activity: Not Currently     Partners: Male         Allergies  Review of patient's allergies indicates:   Allergen Reactions    Vancomycin analogues Tinitus         Review of Systems   Review of Systems   Constitutional: Negative for diaphoresis and malaise/fatigue.   HENT: Negative.     Cardiovascular:  Negative for chest pain, claudication, dyspnea on exertion, irregular heartbeat, leg swelling, near-syncope, orthopnea, palpitations, paroxysmal nocturnal dyspnea and syncope.   Respiratory:  Negative for shortness of breath.    Endocrine: Negative for polydipsia, polyphagia and polyuria.   Hematologic/Lymphatic: Does not bruise/bleed easily.   Gastrointestinal:  Negative for bloating, nausea and vomiting.   Genitourinary: Negative.    Neurological:  Negative for excessive daytime sleepiness, dizziness, light-headedness, loss of balance and weakness.   Psychiatric/Behavioral:  The patient is not nervous/anxious.    Allergic/Immunologic: Negative.        Physical Exam  Wt Readings from Last 1 Encounters:   01/05/23 81.3 kg (179 lb 3.7 oz)     BP Readings from Last 3 Encounters:   01/05/23 (!) 144/65   12/01/22 (!) 147/71   10/05/22 132/68     Pulse Readings from Last 1 Encounters:   01/05/23 69     Body mass index is 28.07 kg/m².    Physical Exam  Vitals and nursing  note reviewed.   Constitutional:       Appearance: Normal appearance.   HENT:      Head: Normocephalic and atraumatic.      Mouth/Throat:      Mouth: Mucous membranes are moist.   Eyes:      Pupils: Pupils are equal, round, and reactive to light.   Cardiovascular:      Rate and Rhythm: Normal rate and regular rhythm.      Pulses:           Radial pulses are 2+ on the right side and 2+ on the left side.        Dorsalis pedis pulses are 2+ on the right side and 2+ on the left side.        Posterior tibial pulses are 2+ on the right side and 2+ on the left side.      Heart sounds: No murmur heard.  Pulmonary:      Effort: Pulmonary effort is normal. No respiratory distress.      Breath sounds: Normal breath sounds.   Abdominal:      General: There is no distension.      Tenderness: There is no abdominal tenderness.   Musculoskeletal:      Cervical back: Normal range of motion.      Right lower leg: No edema.      Left lower leg: No edema.   Skin:     General: Skin is warm and dry.      Findings: No erythema.   Neurological:      General: No focal deficit present.      Mental Status: She is alert.   Psychiatric:         Mood and Affect: Mood normal.         Behavior: Behavior normal.             Problem List Items Addressed This Visit          Cardiac/Vascular    Essential hypertension, benign    Overview     Well controlled- slightly high but was waiting for a while as she did not have a way to get from lobby up to clinic  Have some SHANE, add in HCTZ 12.5 mg and monitor  Continue as now         Current Assessment & Plan     As above         Paroxysmal tachycardia - Primary    Relevant Orders    IN OFFICE EKG 12-LEAD (to Muse)    PAF (paroxysmal atrial fibrillation)    Overview     Await EP follow up, appreciate their expertise  Continue DOAC  Continue metoprolol  Rate controlled today           Current Assessment & Plan     As above                @Екатерина Tamez DNP

## 2023-01-05 NOTE — TELEPHONE ENCOUNTER
I transported patient from downstairs to cardiology appointment on 3 rd floor, patient currently being seen by NP Екатерина Tamez.

## 2023-01-05 NOTE — TELEPHONE ENCOUNTER
----- Message from Vivi Green sent at 1/5/2023  1:41 PM CST -----  Contact: self [phone]  Per pt in lobby need wheelchair for appt.    Please call and advise

## 2023-01-05 NOTE — PATIENT INSTRUCTIONS
We will get you set up for a follow up with Dr. Singletary    Continue your medications as you have been taking them    Continue to watch the sodium in your diet    We will see you back in 3-6 months.

## 2023-01-30 ENCOUNTER — OFFICE VISIT (OUTPATIENT)
Dept: ORTHOPEDICS | Facility: CLINIC | Age: 73
End: 2023-01-30
Payer: MEDICARE

## 2023-01-30 VITALS — HEIGHT: 67 IN | WEIGHT: 179.25 LBS | BODY MASS INDEX: 28.13 KG/M2

## 2023-01-30 DIAGNOSIS — M47.816 LUMBAR SPONDYLOSIS: ICD-10-CM

## 2023-01-30 DIAGNOSIS — M48.062 LUMBAR STENOSIS WITH NEUROGENIC CLAUDICATION: Primary | ICD-10-CM

## 2023-01-30 DIAGNOSIS — M51.36 DDD (DEGENERATIVE DISC DISEASE), LUMBAR: ICD-10-CM

## 2023-01-30 PROCEDURE — 99214 PR OFFICE/OUTPT VISIT, EST, LEVL IV, 30-39 MIN: ICD-10-PCS | Mod: S$PBB,,, | Performed by: ORTHOPAEDIC SURGERY

## 2023-01-30 PROCEDURE — 99999 PR PBB SHADOW E&M-EST. PATIENT-LVL III: ICD-10-PCS | Mod: PBBFAC,,, | Performed by: ORTHOPAEDIC SURGERY

## 2023-01-30 PROCEDURE — 99214 OFFICE O/P EST MOD 30 MIN: CPT | Mod: S$PBB,,, | Performed by: ORTHOPAEDIC SURGERY

## 2023-01-30 PROCEDURE — 99213 OFFICE O/P EST LOW 20 MIN: CPT | Mod: PBBFAC | Performed by: ORTHOPAEDIC SURGERY

## 2023-01-30 PROCEDURE — 99999 PR PBB SHADOW E&M-EST. PATIENT-LVL III: CPT | Mod: PBBFAC,,, | Performed by: ORTHOPAEDIC SURGERY

## 2023-01-30 NOTE — PROGRESS NOTES
"DATE: 1/30/2023  PATIENT: Graciela Barton    Attending Physician: Jesús Yoder M.D.    HISTORY:  Graciela Barton is a 72 y.o. female w/ a hx thyroid cx (s/p resection ans has dysphagia) who returns to me today for FU. She used to have LBP that radiates posteriorly down BLE (R>L).Her LLE pain has been good, but her RLE pain is pretty bad (8/10). She was diagnosed with MS. She is taking Eliquis for Afib. She does not want surgery and wants to continue conservative management.    9/30/22: L3-4 MALKA - took away LLE pain    She has no neck pain or arm pain; she has no numbness/tingling of her BUE. She did admit to dropping things and left hand clumsiness. She has had balance problems for 9 months, at which she started using a walker. Denies perineal paresthesias, bowel/bladder dysfunction.    PMH/PSH/FamHx/SocHx:  Unchanged from prior visit     ROS:  Positive for imbalance, LBP and BLE pain  Denies perineal paresthesias, bowel or bladder incontinence    EXAM:  Ht 5' 7" (1.702 m)   Wt 81.3 kg (179 lb 3.7 oz)   BMI 28.07 kg/m²     My physical examination was notable for the following findings: Normal strength and tone in all major motor groups in the bilateral upper and lower extremities except for 4/5 b/l hip flexion, 4/5 in R EHL/TA and 4/5 in left TA. Normal sensation to light touch in the C5-T1 and L2-S1 dermatomes bilaterally.    IMAGING:  Today I independently reviewed the following images and my interpretations are as follows:    Previous L-spine XRs showed spondylosis without fractures or listhesis.    T-MRI showed T7-8 HNP with moderate-severe central stenosis. Increased signal within spinal cord throughout T-spine.    Lumbar MRI showed L1-2 and L5-S1 moderate central stenosis. L3-5 severe central stenosis. Moderate b/l L4-5 and severe b/l L5-S1 foraminal stenosis.    DEXA lumbar spine showed T-score of 5.3.     ASSESSMENT/PLAN:  Patient has lumbar spondylosis and stenosis with neurogenic claudication. I discussed " the natural history of their diagnoses as well as surgical and nonsurgical treatment options. I educated the patient on the importance of core/back strengthening, correct posture, bending/lifting ergonomics, and low-impact aerobic exercises (walking, elliptical, and aquatherapy). Continue medications and exercises. I ordered PT for core/back strengthening. She will FU PRN. Next step is Pain Management injections.     Jesús Yoder MD  Orthopaedic Spine Surgeon  Department of Orthopaedic Surgery  [phone]

## 2023-02-02 ENCOUNTER — TELEPHONE (OUTPATIENT)
Dept: ORTHOPEDICS | Facility: CLINIC | Age: 73
End: 2023-02-02
Payer: MEDICARE

## 2023-02-02 NOTE — TELEPHONE ENCOUNTER
----- Message from Anu Escobar sent at 2/2/2023  3:05 PM CST -----  Regarding: self [phone]  .Type:  Patient Returning Call    Who Called:  self     Who Left Message for Patient: Jennifer Valles,    Does the patient know what this is regarding? yes    Would the patient rather a call back or a response via [facility]? Call back    Best Call Back Number: .[phone]

## 2023-02-02 NOTE — TELEPHONE ENCOUNTER
Spoke with patient and she stated that she would like to have the surgery and would also like for you to call her please she just has a few more questions about the procedure. She stated an verbal understanding.

## 2023-02-02 NOTE — TELEPHONE ENCOUNTER
----- Message from Cheryl [place] sent at 2/1/2023 10:54 AM CST -----  Regarding: Patient Call Back  .Type: Patient Call Back    Who called: self     What is the request in detail: would like to speak with office regarding scheduling surgery and has questions regarding the actual surgery. Please call     Can the clinic reply by [facility]? Call     Would the patient rather a call back or a response via [facility]? Call     Best call back number: .[phone]

## 2023-02-03 NOTE — TELEPHONE ENCOUNTER
Spoke with patient she stated she will call and see if her  will be able to take off and bring her in earlier than April. Patient stated an verbal understanding, and will call back with an answer.

## 2023-02-07 DIAGNOSIS — E89.0 POSTOPERATIVE HYPOTHYROIDISM: ICD-10-CM

## 2023-02-07 RX ORDER — LEVOTHYROXINE SODIUM 125 UG/1
TABLET ORAL
Qty: 90 TABLET | Refills: 1 | Status: SHIPPED | OUTPATIENT
Start: 2023-02-07 | End: 2023-09-06 | Stop reason: DRUGHIGH

## 2023-02-08 ENCOUNTER — TELEPHONE (OUTPATIENT)
Dept: NEUROSURGERY | Facility: CLINIC | Age: 73
End: 2023-02-08
Payer: MEDICARE

## 2023-02-08 NOTE — TELEPHONE ENCOUNTER
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 I stated to pt that she will have to see Taryn, which is 's PA, due to her being a new patient and there is no referral in the system for her. Pt stated that she can see Taryn. I gave pt next available for Taryn, which is Thursday 2/16/2023 at 1130 am. Pt voiced understanding

## 2023-02-08 NOTE — TELEPHONE ENCOUNTER
No new care gaps identified.  [facility] Embedded Care Gaps. Reference number: [id number]. 2/07/2023   7:05:21 PM CST

## 2023-02-13 ENCOUNTER — TELEPHONE (OUTPATIENT)
Dept: PAIN MEDICINE | Facility: CLINIC | Age: 73
End: 2023-02-13
Payer: MEDICARE

## 2023-02-13 DIAGNOSIS — M54.16 LUMBAR RADICULOPATHY: Primary | ICD-10-CM

## 2023-02-15 NOTE — PROGRESS NOTES
Ms. Barton is a patient of Dr. Singletary and was last seen in clinic 8/22/2022.      Subjective:   Patient ID:  Graciela Barton is a 72 y.o. female who presents for follow-up of Atrial Fibrillation  .     HPI:    Ms. Barton is a 72 y.o. female with HTN, HLD, back pain, MS, thyroid CA (s/p resection), AF here for follow up.    Background:    Graciela Barton has a hx of HTN, HLD, debilitating back pains, newly diagnosed MS, thyroid CA s/p resection (on synthroid), who was well until 8/2022 when she presented to [facility] with 2 weeks of worsening ELLIS and leg swelling and was found to be in AF with RVR. She was rate controlled, started on amiodarone GTT, and chemically converted to sinus rhythm. She was discharged on metoprolol and eliquis, and presents to me today to discuss management options.    Echo in 6/2022 showed an EF of 60%. Exercise stress echo from 4/2019 showed no evidence of ischemia.  CGs from 8/2022 show both AF and atrial flutter with RVR.  Ms. Barton is currently on eliquis for stroke prevention. She is not on an antiarrhythmic.    My interpretation of today's ECG is sinus rhythm with PACs at 70 bpm.  In summary, Graciela Barton is a 72F with symptomatic PAF. Her [id number] Score is 3 (age, female, HTN) so she should continue eliquis and stop aspirin.  We discussed in detail the pathophysiology of AF as well as treatment options, specifically medications for now. Plan is to start rythmol sr 225 mg bid. She will see me in 3 months.    Update (02/23/2023):    Today she says she has not been feeling any AF symptoms since starting propafenone. No new CP, worsening ELLIS, LH, syncope.    She is currently taking eliquis 5mg BID for stroke prophylaxis and denies significant bleeding episodes. She is currently being treated with propafenone 150mg TID for rhythm control and toprol 25mg daily for HR control. Kidney function is stable, with a creatinine of 0.8 on 8/26/2022.    I have personally reviewed the patient's EKG  today, which shows sinus bradycardia at 55bpm. NV interval is 178. QRS is 74. QT is 426.    Relevant Cardiac Test Results:    2D Echo (6/22/2022):  The left ventricle is normal in size with normal systolic function.  Normal left ventricular diastolic function.  The estimated ejection fraction is 60%.  Normal right ventricular size with normal right ventricular systolic function.  Mild tricuspid regurgitation.  Intermediate central venous pressure (8 mmHg).  The estimated PA systolic pressure is 30 mmHg.  Mild left atrial enlargement.  Frequent premature atrial contractions are noted on ECG rhythm strip    Current Outpatient Medications   Medication Sig    b complex vitamins capsule Take 1 capsule by mouth once daily.    ELIQUIS 5 mg Tab Take 1 tablet (5 mg total) by mouth 2 (two) times daily.    hydroCHLOROthiazide (HYDRODIURIL) 12.5 MG Tab Take 1 tablet (12.5 mg total) by mouth once daily.    levothyroxine (SYNTHROID) 125 MCG tablet TAKE 1 TABLET BY MOUTH BEFORE BREAKFAST    losartan (COZAAR) 25 MG tablet Take 1 tablet (25 mg total) by mouth once daily.    meclizine (ANTIVERT) 25 mg tablet Take 1 tablet (25 mg total) by mouth 3 (three) times daily as needed for Dizziness.    metoprolol succinate (TOPROL-XL) 25 MG 24 hr tablet Take 1 tablet by mouth once daily    multivitamin (THERAGRAN) per tablet Take 1 tablet by mouth once daily.    propafenone (RHTHYMOL) 150 MG Tab Take 1 tablet (150 mg total) by mouth every 8 (eight) hours.    rosuvastatin (CRESTOR) 10 MG tablet Take 1 tablet (10 mg total) by mouth once daily.    vitamin D (VITAMIN D3) 1000 units Tab Take 2 Units by mouth once daily.    vitamin E 400 UNIT capsule Take 400 Units by mouth.     No current facility-administered medications for this visit.       Review of Systems   Constitutional: Negative for malaise/fatigue.   Cardiovascular:  Negative for chest pain, dyspnea on exertion, irregular heartbeat, leg swelling and palpitations.   Respiratory:  Negative  "for shortness of breath.    Hematologic/Lymphatic: Negative for bleeding problem.   Skin:  Negative for rash.   Musculoskeletal:  Negative for myalgias.   Gastrointestinal:  Negative for hematemesis, hematochezia and nausea.   Genitourinary:  Negative for hematuria.   Neurological:  Negative for light-headedness.   Psychiatric/Behavioral:  Negative for altered mental status.    Allergic/Immunologic: Negative for persistent infections.     Objective:          /65   Pulse (!) 55   Ht 5' 7" (1.702 m)   Wt 81.6 kg (179 lb 14.3 oz)   BMI 28.18 kg/m²     Physical Exam  Vitals and nursing note reviewed.   Constitutional:       Appearance: Normal appearance. She is well-developed.   HENT:      Head: Normocephalic.      Nose: Nose normal.   Eyes:      Pupils: Pupils are equal, round, and reactive to light.   Cardiovascular:      Rate and Rhythm: Regular rhythm. Bradycardia present.   Pulmonary:      Effort: No respiratory distress.      Breath sounds: Normal breath sounds.   Musculoskeletal:         General: Normal range of motion.   Skin:     General: Skin is warm and dry.      Findings: No erythema.   Neurological:      Mental Status: She is alert and oriented to person, place, and time.   Psychiatric:         Speech: Speech normal.         Behavior: Behavior normal.         Lab Results   Component Value Date     08/26/2022    K 4.0 08/26/2022    MG 1.8 08/10/2022    BUN 22 08/26/2022    CREATININE 0.8 02/20/2023    ALT 16 08/26/2022    AST 17 08/26/2022    HGB 11.2 (L) 08/10/2022    HCT 35.1 (L) 08/10/2022    TSH 2.559 08/09/2022    LDLCALC 44.0 (L) 06/20/2022           Assessment:     1. PAF (paroxysmal atrial fibrillation)    2. Essential hypertension, benign    3. Anticoagulant long-term use    4. Encounter for monitoring anti-arrhythmic therapy      Plan:     In summary, Ms. Barton is a 72 y.o. female with HTN, HLD, back pain, MS, thyroid CA (s/p resection), AF here for follow up.  Stable from rhythm " standpoint on propafenone with no symptomatic or documented AF recurrence. EKG with acceptable intervals.  CHADSVASc 3 on eliquis.    Continue current regimen  RTC 6 mo, sooner if needed    *A copy of this note has been sent to Dr. Singletary*    Follow up in about 6 months (around 8/23/2023).    ------------------------------------------------------------------    TARA Moeller, NP-C  Cardiac Electrophysiology

## 2023-02-16 ENCOUNTER — OFFICE VISIT (OUTPATIENT)
Dept: NEUROSURGERY | Facility: CLINIC | Age: 73
End: 2023-02-16
Payer: MEDICARE

## 2023-02-16 VITALS
DIASTOLIC BLOOD PRESSURE: 73 MMHG | HEART RATE: 57 BPM | SYSTOLIC BLOOD PRESSURE: 161 MMHG | HEIGHT: 67 IN | WEIGHT: 179.25 LBS | BODY MASS INDEX: 28.13 KG/M2

## 2023-02-16 DIAGNOSIS — M51.04 HNP (HERNIATED NUCLEUS PULPOSUS WITH MYELOPATHY), THORACIC: ICD-10-CM

## 2023-02-16 DIAGNOSIS — M25.561 CHRONIC PAIN OF RIGHT KNEE: ICD-10-CM

## 2023-02-16 DIAGNOSIS — G89.29 CHRONIC PAIN OF RIGHT KNEE: ICD-10-CM

## 2023-02-16 DIAGNOSIS — M47.26 OTHER SPONDYLOSIS WITH RADICULOPATHY, LUMBAR REGION: ICD-10-CM

## 2023-02-16 DIAGNOSIS — M48.062 SPINAL STENOSIS, LUMBAR REGION WITH NEUROGENIC CLAUDICATION: ICD-10-CM

## 2023-02-16 DIAGNOSIS — M54.41 CHRONIC BILATERAL LOW BACK PAIN WITH RIGHT-SIDED SCIATICA: Primary | ICD-10-CM

## 2023-02-16 DIAGNOSIS — G89.29 CHRONIC BILATERAL LOW BACK PAIN WITH RIGHT-SIDED SCIATICA: Primary | ICD-10-CM

## 2023-02-16 DIAGNOSIS — M54.16 LUMBAR RADICULOPATHY: ICD-10-CM

## 2023-02-16 DIAGNOSIS — M51.36 DDD (DEGENERATIVE DISC DISEASE), LUMBAR: ICD-10-CM

## 2023-02-16 DIAGNOSIS — M48.02 CERVICAL SPINAL STENOSIS: ICD-10-CM

## 2023-02-16 PROCEDURE — 99999 PR PBB SHADOW E&M-EST. PATIENT-LVL V: ICD-10-PCS | Mod: PBBFAC,,, | Performed by: PHYSICIAN ASSISTANT

## 2023-02-16 PROCEDURE — 99204 PR OFFICE/OUTPT VISIT, NEW, LEVL IV, 45-59 MIN: ICD-10-PCS | Mod: S$PBB,,, | Performed by: PHYSICIAN ASSISTANT

## 2023-02-16 PROCEDURE — 99215 OFFICE O/P EST HI 40 MIN: CPT | Mod: PBBFAC,PN | Performed by: PHYSICIAN ASSISTANT

## 2023-02-16 PROCEDURE — 99999 PR PBB SHADOW E&M-EST. PATIENT-LVL V: CPT | Mod: PBBFAC,,, | Performed by: PHYSICIAN ASSISTANT

## 2023-02-16 PROCEDURE — 99204 OFFICE O/P NEW MOD 45 MIN: CPT | Mod: S$PBB,,, | Performed by: PHYSICIAN ASSISTANT

## 2023-02-16 NOTE — PROGRESS NOTES
Subjective:     Patient ID:  Graciela Barton is a 72 y.o. female.    [place]    Chief Complaint:  Difficulty walking.  Leg weakness.  Back and bilateral leg pain    HPI    Graciela Barton is a 72 y.o. female who presents with the above CC.  Patient here for a second opinion from Dr. Salter.    Patient has a complicated history.  She states in July 2022 she was using a stand up walker to help with posture and she slid down to the floor because her legs got numb and was unable to get up.  She started having leg weakness at that time.  She was admitted to [facility] where she was seen by Neurosurgery who recommended cervical spine surgery which she did not agree to.  After discharge from [facility] she followed up with Dr. Yoder in Orthopedic spine at [facility] who she has been seeing since then.  He recently recommended a lumbar laminectomy surgery for her condition but she wanted a 2nd opinion.    Patient states that she has pain in her low back with radiation down the right lateral leg to the knee.  She has severe right knee pain and swelling.  She has some pain on the left lateral leg but not near as much as the right.  She has weakness in both legs.  She walks with a walker but with great difficulty.    About a week ago she had a started having some neck stiffness and tingling in her right arm.  No arm pain.  She has difficulty with fine motor skills of her hands and she drops things.    She has mild midback pain but mostly low back pain.    She has also recently seen Neurology who has diagnosed her with multiple sclerosis.  She has a follow-up with Neurology coming up.    She has never had spine surgery.  She had a lumbar epidural in September 2022 that helped her left back and leg but not her right.      Before this episode in July 2022 she had no gait disturbance or back or leg pain.    Patient denies any recent accidents or trauma, no saddle anesthesias, and no bowel or bladder incontinence.      Review of Systems:    Review of  Systems   Constitutional:  Negative for chills, diaphoresis, fever, malaise/fatigue and weight loss.   HENT:  Negative for congestion, ear discharge, ear pain, hearing loss, nosebleeds, sinus pain, sore throat and tinnitus.    Eyes:  Negative for blurred vision, double vision, photophobia, pain, discharge and redness.   Respiratory:  Negative for cough, hemoptysis, sputum production, shortness of breath, wheezing and stridor.    Cardiovascular:  Negative for chest pain, palpitations, orthopnea, leg swelling and PND.   Gastrointestinal:  Negative for abdominal pain, blood in stool, constipation, diarrhea, heartburn, melena, nausea and vomiting.   Genitourinary:  Negative for dysuria, flank pain, frequency, hematuria and urgency.   Musculoskeletal:  Positive for back pain and myalgias. Negative for falls, joint pain and neck pain.   Skin:  Negative for itching and rash.   Neurological:  Positive for weakness. Negative for dizziness, tingling, tremors, sensory change, speech change, seizures, loss of consciousness and headaches.   Endo/Heme/Allergies:  Negative for environmental allergies and polydipsia. Does not bruise/bleed easily.   Psychiatric/Behavioral:  Negative for depression, hallucinations, memory loss and substance abuse. The patient is not nervous/anxious and does not have insomnia.        Past Medical History:   Diagnosis Date    Arthritis     Cancer     Thyroid    Edema     Hyperlipidemia     Hypertension     Thyroid disease      Past Surgical History:   Procedure Laterality Date    ADENOIDECTOMY      COLONOSCOPY  10/18/2018    COLONOSCOPY N/A 10/18/2018    Procedure: COLONOSCOPY;  Surgeon: Yosvany Alejandra MD;  Location: [facility] ENDO;  Service: General;  Laterality: N/A;    EPIDURAL STEROID INJECTION N/A 9/30/2022    Procedure: LUMBAR L3/4 MALKA CONTRAST DIRRECT REFERRAL MONICA CARRERA IN CHART;  Surgeon: Nicholas Kumar MD;  Location: [facility] PAIN MGT;  Service: Pain Management;  Laterality: N/A;     HYSTERECTOMY  1992    total hyst     KNEE SURGERY      16 pins and two plates implanted    THYROIDECTOMY      TONSILLECTOMY       Current Outpatient Medications on File Prior to Visit   Medication Sig Dispense Refill    b complex vitamins capsule Take 1 capsule by mouth once daily.      ELIQUIS 5 mg Tab Take 1 tablet (5 mg total) by mouth 2 (two) times daily. 60 tablet 11    hydroCHLOROthiazide (HYDRODIURIL) 12.5 MG Tab Take 1 tablet (12.5 mg total) by mouth once daily. 30 tablet 11    levothyroxine (SYNTHROID) 125 MCG tablet TAKE 1 TABLET BY MOUTH BEFORE BREAKFAST 90 tablet 1    losartan (COZAAR) 25 MG tablet Take 1 tablet (25 mg total) by mouth once daily. 90 tablet 3    meclizine (ANTIVERT) 25 mg tablet Take 1 tablet (25 mg total) by mouth 3 (three) times daily as needed for Dizziness. 30 tablet 3    metoprolol succinate (TOPROL-XL) 25 MG 24 hr tablet Take 1 tablet by mouth once daily 90 tablet 3    multivitamin (THERAGRAN) per tablet Take 1 tablet by mouth once daily.      propafenone (RHTHYMOL) 150 MG Tab Take 1 tablet (150 mg total) by mouth every 8 (eight) hours. 90 tablet 11    rosuvastatin (CRESTOR) 10 MG tablet Take 1 tablet (10 mg total) by mouth once daily. 90 tablet 3    vitamin D (VITAMIN D3) 1000 units Tab Take 2 Units by mouth once daily.      vitamin E 400 UNIT capsule Take 400 Units by mouth.      [DISCONTINUED] atorvastatin (LIPITOR) 40 MG tablet Take 1 tablet (40 mg total) by mouth once daily. 90 tablet 3     No current facility-administered medications on file prior to visit.     Review of patient's allergies indicates:   Allergen Reactions    Vancomycin analogues Tinitus     Social History     Socioeconomic History    Marital status:    Tobacco Use    Smoking status: Never    Smokeless tobacco: Never   Substance and Sexual Activity    Alcohol use: No    Drug use: No    Sexual activity: Not Currently     Partners: Male     Family History   Problem Relation Age of Onset    Alcohol abuse  "Mother     Heart disease Mother     Alcohol abuse Father     Heart disease Father     Heart disease Maternal Grandmother     Heart disease Maternal Grandfather        Objective:      Vitals:    02/16/23 1133   BP: (!) 161/73   Pulse: (!) 57   Weight: 81.3 kg (179 lb 3.7 oz)   Height: 5' 7" (1.702 m)   PainSc:   8   PainLoc: Back         Physical Exam: Exam done in the wheelchair      General:  Graciela Barton is well-developed, well-nourished, appears stated age, in no acute distress, alert and oriented to person, place, and time.    Pulmonary/Chest:  Respiratory effort normal  Abdominal: Exhibits no distension  Psychiatric:  Normal mood and affect.  Behavior is normal.  Judgement and thought content normal      Musculoskeletal:      Cervical ROM:   No pain in cervical spine flexion, extension, left rotation, and right rotation, left lateral bending, and right lateral bending.    Cervical Spine Inspection:  Normal with no surgical scars and no visible rashes.    Lumbar ROM:   Unable to assess    Right knee swollen.  No redness    Neurological:      Muscle strength against resistance:     Right Left   Deltoid  5 / 5 5 / 5   Biceps 5 / 5 5 / 5   Triceps 5 / 5 5 / 5   Wrist flexion  5 / 5 5 / 5   Wrist extension 5 / 5 5 / 5   Finger abduction 5 / 5 5 / 5   Thumb opposition 5 / 5 5 / 5   Handgrip 4 / 5 4 / 5   Hip flexion  5 / 5 5 / 5   Hip extension 5 / 5 5 / 5   Hip abduction 5 / 5 5 / 5   Hip adduction 5/ 5 5 / 5   Knee extension  4 / 5 5 / 5   Knee flexion  5 / 5 5 / 5   Dorsiflexion  5 / 5 5 / 5   EHL  4 / 5 5 / 5   Plantar flexion  5 / 5 5 / 5   Inversion of the feet 5 / 5 5 / 5   Eversion of the feet 5 / 5 5 / 5       Reflexes:     Right Left   Triceps 2+ 3+   Biceps 2+ 3+   Brachioradialis 2+ 3+   Patellar 2+ 2+   Achilles 2+ 2+     Clonus:  Negative bilaterally  Betancourt: Negative on the right.  Positive on the left.    On gross examination of the bilateral upper extremities, patient has no signs of clubbing, " cyanosis, or edema.  Some swelling of the bilateral le.      XRAY/MRI Interpretation:     MRI cspine C5-6 and C6-7 HNP with moderate central stenosis.    MRI tspine T7-8 HNP with stenosis    MRI lspine L3-4 and L4-5 severe central stenosis.    Lumbar spine xrays were personally reviewed today.  No fractures.  No movement on flexion and extension.  Multilevel DDD and spondylosis.    Assessment:          1. Chronic bilateral low back pain with right-sided sciatica    2. Chronic pain of right knee    3. Other spondylosis with radiculopathy, lumbar region    4. DDD (degenerative disc disease), lumbar    5. Lumbar radiculopathy    6. Spinal stenosis, lumbar region with neurogenic claudication    7. Cervical spinal stenosis    8. HNP (herniated nucleus pulposus with myelopathy), thoracic            Plan:          Orders Placed This Encounter    MRI Lumbar Spine W WO Contrast    X-Ray Knee 3 View Right    Creatinine, serum    Ambulatory referral/consult to Orthopedics     MRI cspine C5-6 and C6-7 HNP with moderate central stenosis.    MRI tspine T7-8 HNP with stenosis    MRI lspine L3-4 and L4-5 severe central stenosis.    Patient with new diagnosis of MS and is already scheduled to get MRI brain, cspine and tspine with demyelinating sequence.  Will add lumbar MRI also    Refer to Dr. Salter for spine surgery consult    Refer to orthopedics for right knee evaluation      Follow-Up:  Follow up in about 1 month (around 3/16/2023). If there are any questions prior to this, the patient was instructed to contact the office.       Taryn Tang, [facility], PA-C  Neurosurgery  [facility]  02/17/2023

## 2023-02-22 ENCOUNTER — TELEPHONE (OUTPATIENT)
Dept: ELECTROPHYSIOLOGY | Facility: CLINIC | Age: 73
End: 2023-02-22
Payer: MEDICARE

## 2023-02-22 NOTE — TELEPHONE ENCOUNTER
Called pt to confirm appointment with JONELLE Magaña tomorrow. Pt verbally confirmed appointment date, time, and location, and thanked me for calling.

## 2023-02-23 ENCOUNTER — OFFICE VISIT (OUTPATIENT)
Dept: ELECTROPHYSIOLOGY | Facility: CLINIC | Age: 73
End: 2023-02-23
Payer: MEDICARE

## 2023-02-23 ENCOUNTER — HOSPITAL ENCOUNTER (OUTPATIENT)
Dept: CARDIOLOGY | Facility: CLINIC | Age: 73
Discharge: HOME | End: 2023-02-23
Payer: MEDICARE

## 2023-02-23 VITALS
SYSTOLIC BLOOD PRESSURE: 125 MMHG | DIASTOLIC BLOOD PRESSURE: 65 MMHG | BODY MASS INDEX: 28.23 KG/M2 | WEIGHT: 179.88 LBS | HEIGHT: 67 IN | HEART RATE: 55 BPM

## 2023-02-23 DIAGNOSIS — I10 ESSENTIAL HYPERTENSION, BENIGN: ICD-10-CM

## 2023-02-23 DIAGNOSIS — Z79.899 ENCOUNTER FOR MONITORING ANTI-ARRHYTHMIC THERAPY: ICD-10-CM

## 2023-02-23 DIAGNOSIS — Z51.81 ENCOUNTER FOR MONITORING ANTI-ARRHYTHMIC THERAPY: ICD-10-CM

## 2023-02-23 DIAGNOSIS — I49.8 OTHER SPECIFIED CARDIAC ARRHYTHMIAS: ICD-10-CM

## 2023-02-23 DIAGNOSIS — Z79.01 ANTICOAGULANT LONG-TERM USE: ICD-10-CM

## 2023-02-23 DIAGNOSIS — I48.0 PAF (PAROXYSMAL ATRIAL FIBRILLATION): Primary | ICD-10-CM

## 2023-02-23 PROCEDURE — 99999 PR PBB SHADOW E&M-EST. PATIENT-LVL IV: CPT | Mod: PBBFAC,,, | Performed by: NURSE PRACTITIONER

## 2023-02-23 PROCEDURE — 93010 RHYTHM STRIP: ICD-10-PCS | Mod: S$PBB,,, | Performed by: INTERNAL MEDICINE

## 2023-02-23 PROCEDURE — 93010 ELECTROCARDIOGRAM REPORT: CPT | Mod: S$PBB,,, | Performed by: INTERNAL MEDICINE

## 2023-02-23 PROCEDURE — 99214 PR OFFICE/OUTPT VISIT, EST, LEVL IV, 30-39 MIN: ICD-10-PCS | Mod: S$PBB,,, | Performed by: NURSE PRACTITIONER

## 2023-02-23 PROCEDURE — 99214 OFFICE O/P EST MOD 30 MIN: CPT | Mod: S$PBB,,, | Performed by: NURSE PRACTITIONER

## 2023-02-23 PROCEDURE — 99999 PR PBB SHADOW E&M-EST. PATIENT-LVL IV: ICD-10-PCS | Mod: PBBFAC,,, | Performed by: NURSE PRACTITIONER

## 2023-02-23 PROCEDURE — 99214 OFFICE O/P EST MOD 30 MIN: CPT | Mod: PBBFAC | Performed by: NURSE PRACTITIONER

## 2023-02-23 PROCEDURE — 93005 ELECTROCARDIOGRAM TRACING: CPT | Mod: PBBFAC | Performed by: INTERNAL MEDICINE

## 2023-02-23 RX ORDER — VIT A/VIT C/VIT E/ZINC/COPPER 4296-226
CAPSULE ORAL
Status: ON HOLD | COMMUNITY
End: 2023-05-16 | Stop reason: HOSPADM

## 2023-02-23 RX ORDER — PERPHENAZINE 4 MG
TABLET ORAL
Status: ON HOLD | COMMUNITY
End: 2023-05-16 | Stop reason: HOSPADM

## 2023-02-28 ENCOUNTER — HOSPITAL ENCOUNTER (OUTPATIENT)
Dept: RADIOLOGY | Facility: HOSPITAL | Age: 73
Discharge: HOME | End: 2023-02-28
Attending: STUDENT IN AN ORGANIZED HEALTH CARE EDUCATION/TRAINING PROGRAM
Payer: MEDICARE

## 2023-02-28 DIAGNOSIS — G35 MULTIPLE SCLEROSIS: ICD-10-CM

## 2023-02-28 PROCEDURE — 70553 MRI BRAIN DEMYELINATING W/ WO CONTRAST: ICD-10-PCS | Mod: 26,,, | Performed by: RADIOLOGY

## 2023-02-28 PROCEDURE — 72156 MRI NECK SPINE W/O & W/DYE: CPT | Mod: TC

## 2023-02-28 PROCEDURE — 70553 MRI BRAIN STEM W/O & W/DYE: CPT | Mod: TC

## 2023-02-28 PROCEDURE — 70553 MRI BRAIN STEM W/O & W/DYE: CPT | Mod: 26,,, | Performed by: RADIOLOGY

## 2023-02-28 PROCEDURE — 72156 MRI NECK SPINE W/O & W/DYE: CPT | Mod: 26,,, | Performed by: RADIOLOGY

## 2023-02-28 PROCEDURE — A9585 GADOBUTROL INJECTION: HCPCS | Performed by: STUDENT IN AN ORGANIZED HEALTH CARE EDUCATION/TRAINING PROGRAM

## 2023-02-28 PROCEDURE — 72157 MRI CHEST SPINE W/O & W/DYE: CPT | Mod: TC

## 2023-02-28 PROCEDURE — 25500020 PHARM REV CODE 255: Performed by: STUDENT IN AN ORGANIZED HEALTH CARE EDUCATION/TRAINING PROGRAM

## 2023-02-28 PROCEDURE — 72156 MRI CERVICAL SPINE DEMYELINATING W W/O CONTRAST: ICD-10-PCS | Mod: 26,,, | Performed by: RADIOLOGY

## 2023-02-28 PROCEDURE — 72157 MRI CHEST SPINE W/O & W/DYE: CPT | Mod: 26,,, | Performed by: RADIOLOGY

## 2023-02-28 PROCEDURE — 72157 MRI THORACIC SPINE DEMYELINATING W W/O CONTRAST: ICD-10-PCS | Mod: 26,,, | Performed by: RADIOLOGY

## 2023-02-28 RX ORDER — GADOBUTROL 604.72 MG/ML
8 INJECTION INTRAVENOUS
Status: COMPLETED | OUTPATIENT
Start: 2023-02-28 | End: 2023-02-28

## 2023-02-28 RX ADMIN — GADOBUTROL 8 ML: 604.72 INJECTION INTRAVENOUS at 12:02

## 2023-03-08 ENCOUNTER — OFFICE VISIT (OUTPATIENT)
Dept: NEUROLOGY | Facility: CLINIC | Age: 73
End: 2023-03-08
Payer: MEDICARE

## 2023-03-08 ENCOUNTER — TELEPHONE (OUTPATIENT)
Dept: NEUROSURGERY | Facility: CLINIC | Age: 73
End: 2023-03-08
Payer: MEDICARE

## 2023-03-08 VITALS
WEIGHT: 175.94 LBS | HEART RATE: 46 BPM | HEIGHT: 67 IN | BODY MASS INDEX: 27.61 KG/M2 | DIASTOLIC BLOOD PRESSURE: 68 MMHG | SYSTOLIC BLOOD PRESSURE: 127 MMHG

## 2023-03-08 DIAGNOSIS — Z71.89 COUNSELING REGARDING GOALS OF CARE: ICD-10-CM

## 2023-03-08 DIAGNOSIS — G35 MULTIPLE SCLEROSIS: Primary | ICD-10-CM

## 2023-03-08 PROCEDURE — 99215 PR OFFICE/OUTPT VISIT, EST, LEVL V, 40-54 MIN: ICD-10-PCS | Mod: S$PBB,,, | Performed by: CLINICAL NURSE SPECIALIST

## 2023-03-08 PROCEDURE — 99999 PR PBB SHADOW E&M-EST. PATIENT-LVL III: ICD-10-PCS | Mod: PBBFAC,,, | Performed by: CLINICAL NURSE SPECIALIST

## 2023-03-08 PROCEDURE — 99999 PR PBB SHADOW E&M-EST. PATIENT-LVL III: CPT | Mod: PBBFAC,,, | Performed by: CLINICAL NURSE SPECIALIST

## 2023-03-08 PROCEDURE — 99215 OFFICE O/P EST HI 40 MIN: CPT | Mod: S$PBB,,, | Performed by: CLINICAL NURSE SPECIALIST

## 2023-03-08 PROCEDURE — 99213 OFFICE O/P EST LOW 20 MIN: CPT | Mod: PBBFAC | Performed by: CLINICAL NURSE SPECIALIST

## 2023-03-08 NOTE — PROGRESS NOTES
Subjective:          Patient ID: Graciela Barton is a 72 y.o. female who presents today for a routine clinic visit for MS.  She was last seen by Dr. Paul in December 2022. The history has been provided by the patient.     Principle neurological diagnosis: Likely MS     Date of symptom onset: mid 2010s   Date of diagnosis: TBD  Disease type at diagnosis: Progressive  Disease type currently: Progressive  Previous therapy: NA  Current therapy: NA  Last MRI Brain: 7/17/22  Last MRI C-spine: 7/13/22  Last MRI T-spine: 6/21/22  CSF: NA  JCV status: NA  Other relevant labs and tests: B12 307    MS HPI:  DMT: None  Taking vitamin D3 as recommended? Yes -  Dose: 2000 units daily   She denies any new neurological symptoms.   She denies any recent infections.   She lives alone.    She walks around the house during the day. She uses a standard walker around the house.       Medications:  Current Outpatient Medications   Medication Sig    b complex vitamins capsule Take 1 capsule by mouth once daily.    collagen-biotin-ascorbic acid (COLLAGEN 1500 PLUS C) 500 mg-800 mcg- 50 mg Cap Take by mouth.    ELIQUIS 5 mg Tab Take 1 tablet (5 mg total) by mouth 2 (two) times daily.    hydroCHLOROthiazide (HYDRODIURIL) 12.5 MG Tab Take 1 tablet (12.5 mg total) by mouth once daily.    levothyroxine (SYNTHROID) 125 MCG tablet TAKE 1 TABLET BY MOUTH BEFORE BREAKFAST    losartan (COZAAR) 25 MG tablet Take 1 tablet (25 mg total) by mouth once daily.    meclizine (ANTIVERT) 25 mg tablet Take 1 tablet (25 mg total) by mouth 3 (three) times daily as needed for Dizziness.    metoprolol succinate (TOPROL-XL) 25 MG 24 hr tablet Take 1 tablet by mouth once daily    multivitamin (THERAGRAN) per tablet Take 1 tablet by mouth once daily.    propafenone (RHTHYMOL) 150 MG Tab Take 1 tablet (150 mg total) by mouth every 8 (eight) hours.    rosuvastatin (CRESTOR) 10 MG tablet Take 1 tablet (10 mg total) by mouth once daily.    vitamin D (VITAMIN D3) 1000 units  Tab Take 2 Units by mouth once daily.    vitamin E 400 UNIT capsule Take 400 Units by mouth.    vitamins A,C,E-zinc-copper (ICAPS AREDS) 4,296 mcg-226 mg-90 mg Cap Take by mouth.       SOCIAL HISTORY  Social History     Tobacco Use    Smoking status: Never    Smokeless tobacco: Never   Substance Use Topics    Alcohol use: No    Drug use: No       Living arrangements - the patient lives alone.    ROS:  Fatigue: She takes a nap during the day.   Sleep: She generally doesn't sleep well. This is an ongoing issue. She feels like she gets enough sleep during the day.   Bladder: No bladder issues. Denies UTIs.   Bowels: Regular; has prune juice on hand just in case  Spasms: has some stiffness in the right knee   Vision: takes Areds; wears glasses all the time.   Cognition: no issues   Mood: she admits to depression, mostly situational.   Walking: She denies any falls, but uses her walker or uses her wheelchair around the house.   Hand function: no issues  Pain: right knee pain  Speech/swallowing: no issues   Heat sensitivity: She is more cold sensitive than heat sensitive.   Adaptive Needs: She has a walker, wheelchair, shower chair in her walk-in shower.   Support System: Jennifer, a neighbor, is a good support for her. She does [employer] delivery for groceries.          Objective:        1. 25 foot timed walk: Deferred     Neurologic Exam    In general, the patient is well nourished and appears to be in no acute distress.     MENTAL STATUS: language is fluent, normal verbal comprehension, short-term and remote memory is intact, attention is normal, patient is alert and oriented x 3, fund of knowlege is appropriate by vocabulary.      CRANIAL NERVE EXAM:  There is no internuclear ophthalmoplegia.  Extraocular muscles are intact. No facial asymmetry.There is no dysarthria. Uvula is midline, and palate moves symmetrically. Shoulder shrug intact bilaterally. Tongue protrusion is midline. Neck is supple with full ROM     MOTOR  EXAM: Normal bulk, increased tone in RLE (limited by knee pain).  Rapid sequential movements are normal in UE and LE.      Strength:  R deltoid 5/5, L deltoid 5/5  R biceps 5/5, L biceps 5/5  R triceps 5/5, L triceps 5/5  R finger flexors 5/5, L finger flexors 5/5  R finger extensors 5/5, L finger extensors 5/5  R finger abductors 5/5, L finger abductors 5/5  R  hip flexors 4+/5, L hip flexors 4+/5  R  hip extensors 5/5, L hip extensors 5/5  R knee extensors 3/5 (pain limiting), L knee extensors 5/5  R knee flexors 5/5, L knee flexors 5/5  R ankle dorsiflexors 3+/5, L ankle dorsiflexors 5/5  R ankle plantarflexors 5/5, L ankle plantarflexors 5/5     SENSORY EXAM: Normal to vibration throughout.     COORDINATION: End point dysmetria on FTN b/l. HTS limited on R.     Imaging:         Results for orders placed during the hospital encounter of 02/28/23    MRI Brain Demyelinating W W/O Contrast    Impression  MRI brain: Allowing for artifact from motion there is interval development of a new T2 FLAIR lesion within the right parietal white matter without enhancement.  Concerning for interval possible subacute demyelination history.  No evidence for definite enhancing lesion to suggest active demyelination.  No evidence for acute infarction.  Clinical correlation and follow-up advised.    MRI cervical spine: Continued multifocal ill-defined somewhat overlapping T2 stir hyperintense lesions throughout the cervical cord allowing for motion artifacts no definite new lesion overall concerning for prior areas of demyelination in light of history    Continued scattered degenerative changes    MRI thoracic spine: Allowing for artifact from motion no thoracic cord signal abnormality to suggest sequela of demyelination.    Continued scattered degenerative changes similar to prior with overall most pronounced at T7/T8 level with continued central disc extrusion with resultant mild moderate central canal stenosis.  Formatted the  thoracic cord similar to prior without cord signal abnormality to suggest edema    This report was flagged in Epic as abnormal.      Electronically signed by: Sam Cordero DO  Date:    02/28/2023  Time:    16:57    Results for orders placed during the hospital encounter of 02/28/23    MRI Cervical Spine Demyelinating W W/O Contrast    Impression  MRI brain: Allowing for artifact from motion there is interval development of a new T2 FLAIR lesion within the right parietal white matter without enhancement.  Concerning for interval possible subacute demyelination history.  No evidence for definite enhancing lesion to suggest active demyelination.  No evidence for acute infarction.  Clinical correlation and follow-up advised.    MRI cervical spine: Continued multifocal ill-defined somewhat overlapping T2 stir hyperintense lesions throughout the cervical cord allowing for motion artifacts no definite new lesion overall concerning for prior areas of demyelination in light of history    Continued scattered degenerative changes    MRI thoracic spine: Allowing for artifact from motion no thoracic cord signal abnormality to suggest sequela of demyelination.    Continued scattered degenerative changes similar to prior with overall most pronounced at T7/T8 level with continued central disc extrusion with resultant mild moderate central canal stenosis.  Formatted the thoracic cord similar to prior without cord signal abnormality to suggest edema    This report was flagged in Epic as abnormal.      Electronically signed by: Sam Cordero DO  Date:    02/28/2023  Time:    16:57    Results for orders placed during the hospital encounter of 02/28/23    MRI Thoracic Spine Demyelinating W W/O Contrast    Impression  MRI brain: Allowing for artifact from motion there is interval development of a new T2 FLAIR lesion within the right parietal white matter without enhancement.  Concerning for interval possible subacute demyelination  history.  No evidence for definite enhancing lesion to suggest active demyelination.  No evidence for acute infarction.  Clinical correlation and follow-up advised.    MRI cervical spine: Continued multifocal ill-defined somewhat overlapping T2 stir hyperintense lesions throughout the cervical cord allowing for motion artifacts no definite new lesion overall concerning for prior areas of demyelination in light of history    Continued scattered degenerative changes    MRI thoracic spine: Allowing for artifact from motion no thoracic cord signal abnormality to suggest sequela of demyelination.    Continued scattered degenerative changes similar to prior with overall most pronounced at T7/T8 level with continued central disc extrusion with resultant mild moderate central canal stenosis.  Formatted the thoracic cord similar to prior without cord signal abnormality to suggest edema    This report was flagged in Epic as abnormal.      Electronically signed by: Sam Cordero DO  Date:    02/28/2023  Time:    16:57        Labs:     Lab Results   Component Value Date    WBC 8.50 08/10/2022    RBC 3.58 (L) 08/10/2022    HGB 11.2 (L) 08/10/2022    HCT 35.1 (L) 08/10/2022    MCV 98 08/10/2022    MCH 31.3 (H) 08/10/2022    MCHC 31.9 (L) 08/10/2022    RDW 13.9 08/10/2022     08/10/2022    MPV 11.2 08/10/2022    GRAN 5.1 08/10/2022    GRAN 60.1 08/10/2022    LYMPH 2.3 08/10/2022    LYMPH 26.5 08/10/2022    MONO 0.9 08/10/2022    MONO 10.4 08/10/2022    EOS 0.2 08/10/2022    BASO 0.05 08/10/2022    EOSINOPHIL 2.0 08/10/2022    BASOPHIL 0.6 08/10/2022     Sodium   Date Value Ref Range Status   08/26/2022 143 136 - 145 mmol/L Final     Potassium   Date Value Ref Range Status   08/26/2022 4.0 3.5 - 5.1 mmol/L Final     Chloride   Date Value Ref Range Status   08/26/2022 106 95 - 110 mmol/L Final     CO2   Date Value Ref Range Status   08/26/2022 26 23 - 29 mmol/L Final     Glucose   Date Value Ref Range Status   08/26/2022 120  (H) 70 - 110 mg/dL Final     BUN   Date Value Ref Range Status   08/26/2022 22 8 - 23 mg/dL Final     Creatinine   Date Value Ref Range Status   02/20/2023 0.8 0.5 - 1.4 mg/dL Final     Calcium   Date Value Ref Range Status   08/26/2022 9.4 8.7 - 10.5 mg/dL Final     Total Protein   Date Value Ref Range Status   08/26/2022 6.7 6.0 - 8.4 g/dL Final     Albumin   Date Value Ref Range Status   08/26/2022 3.7 3.5 - 5.2 g/dL Final     Total Bilirubin   Date Value Ref Range Status   08/26/2022 0.6 0.1 - 1.0 mg/dL Final     Comment:     For infants and newborns, interpretation of results should be based  on gestational age, weight and in agreement with clinical  observations.    Premature Infant recommended reference ranges:  Up to 24 hours.............<8.0 mg/dL  Up to 48 hours............<12.0 mg/dL  3-5 days..................<15.0 mg/dL  6-29 days.................<15.0 mg/dL       Alkaline Phosphatase   Date Value Ref Range Status   08/26/2022 66 55 - 135 U/L Final     AST   Date Value Ref Range Status   08/26/2022 17 10 - 40 U/L Final     ALT   Date Value Ref Range Status   08/26/2022 16 10 - 44 U/L Final     Anion Gap   Date Value Ref Range Status   08/26/2022 11 8 - 16 mmol/L Final     eGFR if    Date Value Ref Range Status   06/22/2022 >60.0 >60 mL/min/1.73 m^2 Final     eGFR if non    Date Value Ref Range Status   06/22/2022 >60.0 >60 mL/min/1.73 m^2 Final     Comment:     Calculation used to obtain the estimated glomerular filtration  rate (eGFR) is the CKD-EPI equation.        MS Impression and Plan:     NEURO MULTIPLE SCLEROSIS IMPRESSION:   MS Status:     Clinical Progression:  Clinically Stable  Plan:     DMT comment:  In light of new brain lesion seen on MRI, we discussed starting Copaxone as disease modifying therapy. Literature on the medication was provided to the patient. I will follow up with her next week to discuss more and make a final decision.     Symptom Management:   No change in symptom management    She will follow up with Dr. Paul in May or June.     Total time spent with patient: 45 minutes   Total time spent on encounter: 55 minutes        TARA Yanez, CNS

## 2023-03-10 ENCOUNTER — OFFICE VISIT (OUTPATIENT)
Dept: NEUROSURGERY | Facility: CLINIC | Age: 73
End: 2023-03-10
Payer: MEDICARE

## 2023-03-10 VITALS
HEIGHT: 67 IN | HEART RATE: 50 BPM | BODY MASS INDEX: 27.61 KG/M2 | WEIGHT: 175.94 LBS | SYSTOLIC BLOOD PRESSURE: 127 MMHG | DIASTOLIC BLOOD PRESSURE: 68 MMHG

## 2023-03-10 DIAGNOSIS — M54.16 SPINAL STENOSIS OF LUMBAR REGION WITH RADICULOPATHY: ICD-10-CM

## 2023-03-10 DIAGNOSIS — M47.14 THORACIC SPONDYLOSIS WITH MYELOPATHY: ICD-10-CM

## 2023-03-10 DIAGNOSIS — M48.061 SPINAL STENOSIS OF LUMBAR REGION WITH RADICULOPATHY: ICD-10-CM

## 2023-03-10 DIAGNOSIS — M47.12 CERVICAL SPONDYLOSIS WITH MYELOPATHY: Primary | ICD-10-CM

## 2023-03-10 DIAGNOSIS — G37.9 DEMYELINATING DISORDER: ICD-10-CM

## 2023-03-10 DIAGNOSIS — M48.062 LUMBAR STENOSIS WITH NEUROGENIC CLAUDICATION: ICD-10-CM

## 2023-03-10 PROCEDURE — 99999 PR PBB SHADOW E&M-EST. PATIENT-LVL III: ICD-10-PCS | Mod: PBBFAC,,, | Performed by: NEUROLOGICAL SURGERY

## 2023-03-10 PROCEDURE — 99213 OFFICE O/P EST LOW 20 MIN: CPT | Mod: PBBFAC,PN | Performed by: NEUROLOGICAL SURGERY

## 2023-03-10 PROCEDURE — 99999 PR PBB SHADOW E&M-EST. PATIENT-LVL III: CPT | Mod: PBBFAC,,, | Performed by: NEUROLOGICAL SURGERY

## 2023-03-10 PROCEDURE — 99215 PR OFFICE/OUTPT VISIT, EST, LEVL V, 40-54 MIN: ICD-10-PCS | Mod: S$PBB,,, | Performed by: NEUROLOGICAL SURGERY

## 2023-03-10 PROCEDURE — 99215 OFFICE O/P EST HI 40 MIN: CPT | Mod: S$PBB,,, | Performed by: NEUROLOGICAL SURGERY

## 2023-03-10 NOTE — PROGRESS NOTES
NEUROSURGICAL NEUROSURGERY CONSULT NOTE    DATE OF SERVICE:  03/10/2023    ATTENDING PHYSICIAN:  Scooter Salter MD    SUBJECTIVE:    INTERIM HISTORY:    This is a very pleasant 72 y.o. female, who was active at the beginning of 2022, mowing her lawn then suddenly started to have difficulty walking, severe back pain and right leg pain.  She has developed a paralysis involving the right leg.  Her right leg appears spastic.  She is mainly complaining of right knee pain. She is being mobilized in a wheelchair.  She admits still being able to drive her car.  In June 2022 she had a thoracic and lumbar spine MRI.  She is being investigated for possible multiple sclerosis.  She reports having no loss of sensation to pain or temperature.  She has no voiding difficulty or incontinence.  She reports right more than left hand numbness and dropping things frequently.              PAST MEDICAL HISTORY:  Active Ambulatory Problems     Diagnosis Date Noted    Postoperative hypothyroidism 02/19/2018    History of thyroid cancer 02/19/2018    Essential hypertension, benign 02/19/2018    Mixed hyperlipidemia 02/19/2018    Benign paroxysmal positional vertigo 08/01/2018    History of colon polyps 10/18/2018    Bilateral post-traumatic osteoarthritis of knee 06/21/2022    Paroxysmal tachycardia 06/22/2022    DDD (degenerative disc disease), lumbar 08/09/2022    Lumbar radiculopathy 08/09/2022    PAF (paroxysmal atrial fibrillation) 08/10/2022    Multiple sclerosis 12/01/2022    Anticoagulant long-term use 02/23/2023     Resolved Ambulatory Problems     Diagnosis Date Noted    Weakness 06/20/2022    New onset atrial fibrillation 08/09/2022     Past Medical History:   Diagnosis Date    Arthritis     Cancer     Edema     Hyperlipidemia     Hypertension     Thyroid disease        PAST SURGICAL HISTORY:  Past Surgical History:   Procedure Laterality Date    ADENOIDECTOMY      COLONOSCOPY  10/18/2018    COLONOSCOPY N/A 10/18/2018     Procedure: COLONOSCOPY;  Surgeon: Yosvany Alejandra MD;  Location: [facility] ENDO;  Service: General;  Laterality: N/A;    EPIDURAL STEROID INJECTION N/A 9/30/2022    Procedure: LUMBAR L3/4 MALKA CONTRAST DIRRECT REFERRAL MONICA CARRERA IN CHART;  Surgeon: Nicholas Kumar MD;  Location: [facility] [place] [facility]IN [facility]GT;  Service: Pain Management;  Laterality: N/A;    HYSTERECTOMY  1992    total hyst     KNEE SURGERY      16 pins and two plates implanted    THYROIDECTOMY      TONSILLECTOMY         SOCIAL HISTORY:   Social History     Socioeconomic History    Marital status:    Tobacco Use    Smoking status: Never    Smokeless tobacco: Never   Substance and Sexual Activity    Alcohol use: No    Drug use: No    Sexual activity: Not Currently     Partners: Male       FAMILY HISTORY:  Family History   Problem Relation Age of Onset    Alcohol abuse Mother     Heart disease Mother     Alcohol abuse Father     Heart disease Father     Heart disease Maternal Grandmother     Heart disease Maternal Grandfather        CURRENTS MEDICATIONS:  Current Outpatient Medications on File Prior to Visit   Medication Sig Dispense Refill    b complex vitamins capsule Take 1 capsule by mouth once daily.      collagen-biotin-ascorbic acid (COLLAGEN 1500 PLUS C) 500 mg-800 mcg- 50 mg Cap Take by mouth.      ELIQUIS 5 mg Tab Take 1 tablet (5 mg total) by mouth 2 (two) times daily. 60 tablet 11    hydroCHLOROthiazide (HYDRODIURIL) 12.5 MG Tab Take 1 tablet (12.5 mg total) by mouth once daily. 30 tablet 11    levothyroxine (SYNTHROID) 125 MCG tablet TAKE 1 TABLET BY MOUTH BEFORE BREAKFAST 90 tablet 1    losartan (COZAAR) 25 MG tablet Take 1 tablet (25 mg total) by mouth once daily. 90 tablet 3    meclizine (ANTIVERT) 25 mg tablet Take 1 tablet (25 mg total) by mouth 3 (three) times daily as needed for Dizziness. 30 tablet 3    metoprolol succinate (TOPROL-XL) 25 MG 24 hr tablet Take 1 tablet by mouth once daily 90 tablet 3    multivitamin (THERAGRAN) per tablet  Take 1 tablet by mouth once daily.      propafenone (RHTHYMOL) 150 MG Tab Take 1 tablet (150 mg total) by mouth every 8 (eight) hours. 90 tablet 11    rosuvastatin (CRESTOR) 10 MG tablet Take 1 tablet (10 mg total) by mouth once daily. 90 tablet 3    vitamin D (VITAMIN D3) 1000 units Tab Take 2 Units by mouth once daily.      vitamin E 400 UNIT capsule Take 400 Units by mouth.      vitamins A,C,E-zinc-copper (ICAPS AREDS) 4,296 mcg-226 mg-90 mg Cap Take by mouth.      [DISCONTINUED] atorvastatin (LIPITOR) 40 MG tablet Take 1 tablet (40 mg total) by mouth once daily. 90 tablet 3     No current facility-administered medications on file prior to visit.       ALLERGIES:  Review of patient's allergies indicates:   Allergen Reactions    Vancomycin analogues Tinitus       REVIEW OF SYSTEMS:  Review of Systems   Constitutional:  Negative for diaphoresis, fever and weight loss.   Respiratory:  Negative for shortness of breath.    Cardiovascular:  Negative for chest pain.   Gastrointestinal:  Negative for blood in stool.   Genitourinary:  Negative for hematuria.   Endo/Heme/Allergies:  Does not bruise/bleed easily.   All other systems reviewed and are negative.      OBJECTIVE:    PHYSICAL EXAMINATION:   Vitals:    03/10/23 1321   BP: 127/68   Pulse: (!) 50       Physical Exam:  Vitals reviewed.    Constitutional: She appears well-developed and well-nourished.     Eyes: Pupils are equal, round, and reactive to light. Conjunctivae and EOM are normal.     Cardiovascular: Normal distal pulses and no edema.     Abdominal: Soft.     Skin: Skin displays no rash on trunk and no rash on extremities. Skin displays no lesions on trunk and no lesions on extremities.     Psych/Behavior: She is alert. She is oriented to person, place, and time. She has a normal mood and affect.     Musculoskeletal:        Neck: Range of motion is full.     Neurological:        DTRs: Tricep reflexes are 2+ on the right side and 2+ on the left side. Bicep  reflexes are 2+ on the right side and 2+ on the left side. Brachioradialis reflexes are 2+ on the right side and 2+ on the left side. Patellar reflexes are 2+ on the right side and 2+ on the left side. Achilles reflexes are 0 on the right side and 0 on the left side.     Back Exam     Muscle Strength   Right Quadriceps:  2/5   Left Quadriceps:  5/5   Right Hamstrings:  2/5   Left Hamstrings:  5/5             Neurologic Exam     Mental Status   Oriented to person, place, and time.   Speech: speech is normal   Level of consciousness: alert    Cranial Nerves   Cranial nerves II through XII intact.     CN III, IV, VI   Pupils are equal, round, and reactive to light.  Extraocular motions are normal.     Motor Exam   Muscle bulk: normal  Overall muscle tone: normal    Strength   Right deltoid: 5/5  Left deltoid: 5/5  Right biceps: 5/5  Left biceps: 5/5  Right triceps: 5/5  Left triceps: 5/5  Right wrist flexion: 5/5  Left wrist flexion: 5/5  Right wrist extension: 5/5  Left wrist extension: 5/5  Right interossei: 5/5  Left interossei: 5/5  Right iliopsoas: 3/5  Left iliopsoas: 5/5  Right quadriceps: 2/5  Left quadriceps: 5/5  Right hamstrin/5  Left hamstrin/5  Right anterior tibial: 2/5  Left anterior tibial: 5/5  Right posterior tibial: 2/5  Left posterior tibial: 5/5  Right peroneal: 2/5  Left peroneal: 5/5  Right gastroc: 2/5  Left gastroc: 5/5    Sensory Exam   Light touch normal.   Pinprick normal.     Gait, Coordination, and Reflexes     Reflexes   Right brachioradialis: 2+  Left brachioradialis: 2+  Right biceps: 2+  Left biceps: 2+  Right triceps: 2+  Left triceps: 2+  Right patellar: 2+  Left patellar: 2+  Right achilles: 0  Left achilles: 0  Right plantar: normal  Left plantar: normal  Right Betancourt: absent  Left Betancourt: present  Right ankle clonus: absent  Left ankle clonus: absent      DIAGNOSTIC DATA:  I personally interpreted the following imaging:   A compared the thoracic and lumbar spine MRI from  June 2022 with the thoracic and lumbar spinal MRI from 2023 and I have reviewed the cervical spine MRI of 2023.  She has moderate stenosis at C5-6 and C6-7 with some cord compression, she has a right posterolateral T 6-7 disc herniation causing some cord displacement and a central T7-8 disc herniation causing severe stenosis.  She has worsening L3-4 spinal stenosis now severe, so she has severe L4-5 spinal stenosis and severe L5-S1 bilateral foraminal stenosis with some lateral recess stenosis  The brain MRI shows sequela of chronic demyelination but no active demyelination    ASSESMENT:  This is a 72 y.o. female with     Problem List Items Addressed This Visit    None  Visit Diagnoses       Cervical spondylosis with myelopathy    -  Primary    Thoracic spondylosis with myelopathy        Lumbar stenosis with neurogenic claudication        Spinal stenosis of lumbar region with radiculopathy        Demyelinating disorder                  PLAN:  I explained to her that her situation is complex.  She has significant right lower extremity weakness with spasticity.  Most likely the right leg weakness is coming from the thoracic cord compression.  She also has significant spinal stenosis at L3-4 and L4-5 that also can contribute to the right leg weakness.  She appears to have also some symptoms of cervical myelopathy.    Is hard to define which stenosis needs to be treated primarily.  I explained to her that she might have permanent spinal cord or nerve root damage explaining the right leg weakness.  I can not guarantee the right leg weakness will improve over time.  I think it is reasonable to proceed 1st with a right retropleural T6-7 and T7-8 anterior diskectomy for cord decompression in order to prevent worsening myelopathy coming from the spondylosis at those levels.  The surgery can be followed  3 months later with a L3-4 and L4-5 laminectomy if the patient recovered well from the anterior thoracic diskectomy  surgery.    I explained the natural history of the disease and all treatment options.    We have discussed the risks of surgery including death, coma, bleeding, infection, failure of surgery, CSF leak, nerve root injury, spinal cord injury, ureter injury, weakness, paralysis, peripheral neuropathy, malplaced hardware, migration of hardware, non-union, need for reoperation. Patient understands the risks and would like to proceed with surgery.          Scooter Salter MD  Cell:[phone]

## 2023-03-13 ENCOUNTER — TELEPHONE (OUTPATIENT)
Dept: PRIMARY CARE CLINIC | Facility: CLINIC | Age: 73
End: 2023-03-13
Payer: MEDICARE

## 2023-03-13 NOTE — TELEPHONE ENCOUNTER
----- Message from Rafael Armando sent at 3/13/2023  2:56 PM CDT -----  Regarding: Sooner Appt  .Type:  Sooner Appointment Request    Patient is requesting a sooner appointment.  Patient declined first available appointment listed as well as another facility and provider .  Patient will not accept being placed on the waitlist and is requesting a message be sent to doctor.    Name of Caller: Self     When is the first available appointment? 04/10/2023    Symptoms: Pt is having back surgery and was advised by surgeon to retrieve a clearance for the surgery from pts pcp. Please advise.    Would the patient rather a call back or a response via My "[employer]ner? Call back    Best Call Back Number: [phone]     Additional Information:

## 2023-03-13 NOTE — TELEPHONE ENCOUNTER
Called pt regarding message. Pt needs surgical clearance from PCP. Pt was scheduled for 03/14 with PCP. Pt verbalized understanding.

## 2023-03-14 ENCOUNTER — OFFICE VISIT (OUTPATIENT)
Dept: PRIMARY CARE CLINIC | Facility: CLINIC | Age: 73
End: 2023-03-14
Payer: MEDICARE

## 2023-03-14 VITALS
WEIGHT: 176.25 LBS | HEART RATE: 60 BPM | HEIGHT: 67 IN | RESPIRATION RATE: 18 BRPM | OXYGEN SATURATION: 99 % | BODY MASS INDEX: 27.66 KG/M2 | DIASTOLIC BLOOD PRESSURE: 72 MMHG | SYSTOLIC BLOOD PRESSURE: 126 MMHG | TEMPERATURE: 98 F

## 2023-03-14 DIAGNOSIS — I48.0 PAF (PAROXYSMAL ATRIAL FIBRILLATION): ICD-10-CM

## 2023-03-14 DIAGNOSIS — M51.04 THORACIC DISC DISEASE WITH MYELOPATHY: ICD-10-CM

## 2023-03-14 DIAGNOSIS — G95.20 SPINAL CORD COMPRESSION: ICD-10-CM

## 2023-03-14 DIAGNOSIS — I70.0 AORTIC ATHEROSCLEROSIS: ICD-10-CM

## 2023-03-14 DIAGNOSIS — G35 MULTIPLE SCLEROSIS: ICD-10-CM

## 2023-03-14 DIAGNOSIS — Z01.818 PREOPERATIVE EXAMINATION: Primary | ICD-10-CM

## 2023-03-14 DIAGNOSIS — Z12.31 ENCOUNTER FOR SCREENING MAMMOGRAM FOR BREAST CANCER: ICD-10-CM

## 2023-03-14 PROCEDURE — 99999 PR PBB SHADOW E&M-EST. PATIENT-LVL V: CPT | Mod: PBBFAC,,, | Performed by: FAMILY MEDICINE

## 2023-03-14 PROCEDURE — 99214 PR OFFICE/OUTPT VISIT, EST, LEVL IV, 30-39 MIN: ICD-10-PCS | Mod: S$PBB,,, | Performed by: FAMILY MEDICINE

## 2023-03-14 PROCEDURE — 99999 PR PBB SHADOW E&M-EST. PATIENT-LVL V: ICD-10-PCS | Mod: PBBFAC,,, | Performed by: FAMILY MEDICINE

## 2023-03-14 PROCEDURE — 99214 OFFICE O/P EST MOD 30 MIN: CPT | Mod: S$PBB,,, | Performed by: FAMILY MEDICINE

## 2023-03-14 PROCEDURE — 93010 ELECTROCARDIOGRAM REPORT: CPT | Mod: S$PBB,,, | Performed by: INTERNAL MEDICINE

## 2023-03-14 PROCEDURE — 99215 OFFICE O/P EST HI 40 MIN: CPT | Mod: PBBFAC,PN | Performed by: FAMILY MEDICINE

## 2023-03-14 PROCEDURE — 93005 ELECTROCARDIOGRAM TRACING: CPT | Mod: PBBFAC,PN | Performed by: INTERNAL MEDICINE

## 2023-03-14 PROCEDURE — 93010 EKG 12-LEAD: ICD-10-PCS | Mod: S$PBB,,, | Performed by: INTERNAL MEDICINE

## 2023-03-14 NOTE — PROGRESS NOTES
"Subjective:       Patient ID: Graciela Barton is a 73 y.o. female.    Chief Complaint: Pre-op Exam (Pt in for surgical clearance for upcoming lumbar surgery not scheduled at this time with Dr. Loredo)    Patient being followed by Neurology for probable MS, recently also evaluated by Neurosurgery, complex picture with lumbar, thoracic and cervical myelopathy.  Neurosurgery has recommended "right retropleural T6-7 and T7-8 anterior diskectomy for cord decompression in order to prevent worsening myelopathy coming from the spondylosis at those levels.  The surgery can be followed  3 months later with a L3-4 and L4-5 laminectomy if the patient recovered well from the anterior thoracic diskectomy surgery."  Patient reports no prior surgical complications.  No recent acute illness or injury.  She has a preop appointment scheduled with Cardiology tomorrow for operative clearance, as she is currently anticoagulated for atrial fibrillation.    Review of Systems   Constitutional:  Negative for chills and fever.   HENT:  Negative for sore throat and trouble swallowing.    Respiratory:  Negative for cough, shortness of breath and wheezing.    Cardiovascular:  Negative for chest pain and palpitations.   Gastrointestinal:  Negative for blood in stool, diarrhea and vomiting.   Genitourinary:  Negative for dysuria.   Musculoskeletal:  Positive for gait problem.   Skin:  Negative for wound.   Allergic/Immunologic: Negative for immunocompromised state.   Neurological:  Positive for weakness. Negative for dizziness and light-headedness.   Hematological:  Does not bruise/bleed easily.   Psychiatric/Behavioral:  Negative for agitation and confusion.      Objective:      Vitals:    03/14/23 1100   BP: 126/72   BP Location: Right arm   Patient Position: Sitting   BP Method: Medium (Manual)   Pulse: 60   Resp: 18   Temp: 97.7 °F (36.5 °C)   TempSrc: Temporal   SpO2: 99%   Weight: 79.9 kg (176 lb 4.1 oz)   Height: 5' 7" (1.702 m)     Physical " Exam  Vitals and nursing note reviewed.   Constitutional:       General: She is not in acute distress.     Appearance: Normal appearance. She is well-developed.   HENT:      Head: Normocephalic and atraumatic.      Mouth/Throat:      Mouth: Mucous membranes are moist.      Pharynx: Oropharynx is clear.      Comments: Mallampati class 1  Neck:      Vascular: No carotid bruit.   Cardiovascular:      Rate and Rhythm: Normal rate and regular rhythm.      Heart sounds: Normal heart sounds.   Pulmonary:      Effort: Pulmonary effort is normal.      Breath sounds: Normal breath sounds.   Musculoskeletal:      Right lower leg: No edema.      Left lower leg: No edema.   Skin:     General: Skin is warm and dry.   Neurological:      Mental Status: She is alert and oriented to person, place, and time.   Psychiatric:         Mood and Affect: Mood normal.         Behavior: Behavior normal.       Lab Results   Component Value Date    WBC 6.00 03/14/2023    HGB 13.0 03/14/2023    HCT 40.5 03/14/2023     03/14/2023    CHOL 127 06/20/2022    TRIG 135 06/20/2022    HDL 56 06/20/2022    ALT 16 08/26/2022    AST 17 08/26/2022     03/14/2023    K 3.9 03/14/2023     03/14/2023    CREATININE 0.8 03/14/2023    BUN 17 03/14/2023    CO2 29 03/14/2023    TSH 2.559 08/09/2022    HGBA1C 5.5 06/20/2022      Assessment:       1. Preoperative examination    2. Spinal cord compression    3. Thoracic disc disease with myelopathy    4. PAF (paroxysmal atrial fibrillation)    5. Multiple sclerosis    6. Aortic atherosclerosis    7. Encounter for screening mammogram for breast cancer        Plan:       Preoperative examination  -     EKG 12-lead  -     CBC Auto Differential; Future; Expected date: 03/14/2023  -     Basic Metabolic Panel; Future; Expected date: 03/14/2023  CBC and BMP look fine.  Sinus arrhythmia on EKG, but no acutely worrisome findings.  RCRI class II risk.  From my standpoint point, patient is medically cleared for  proposed back surgery, and she will be evaluated by cardiology tomorrow for clearance, as well as guidance with regard to anticoagulation management  Spinal cord compression    Thoracic disc disease with myelopathy    PAF (paroxysmal atrial fibrillation)    Multiple sclerosis  Patient says she would like to start Copaxone.  Will reach out to Neurology  Aortic atherosclerosis  Maintain good control of blood pressure and lipids.  Continue current meds.  Encounter for screening mammogram for breast cancer  -     Mammo Digital Screening Bilat w/ [place]; Future; Expected date: 03/14/2023      Medication List with Changes/Refills   Current Medications    B COMPLEX VITAMINS CAPSULE    Take 1 capsule by mouth once daily.    COLLAGEN-BIOTIN-ASCORBIC ACID (COLLAGEN 1500 PLUS C) 500 MG-800 MCG- 50 MG CAP    Take by mouth.    ELIQUIS 5 MG TAB    Take 1 tablet (5 mg total) by mouth 2 (two) times daily.    HYDROCHLOROTHIAZIDE (HYDRODIURIL) 12.5 MG TAB    Take 1 tablet (12.5 mg total) by mouth once daily.    LEVOTHYROXINE (SYNTHROID) 125 MCG TABLET    TAKE 1 TABLET BY MOUTH BEFORE BREAKFAST    LOSARTAN (COZAAR) 25 MG TABLET    Take 1 tablet (25 mg total) by mouth once daily.    MECLIZINE (ANTIVERT) 25 MG TABLET    Take 1 tablet (25 mg total) by mouth 3 (three) times daily as needed for Dizziness.    METOPROLOL SUCCINATE (TOPROL-XL) 25 MG 24 HR TABLET    Take 1 tablet by mouth once daily    MULTIVITAMIN (THERAGRAN) PER TABLET    Take 1 tablet by mouth once daily.    PROPAFENONE (RHTHYMOL) 150 MG TAB    Take 1 tablet (150 mg total) by mouth every 8 (eight) hours.    ROSUVASTATIN (CRESTOR) 10 MG TABLET    Take 1 tablet (10 mg total) by mouth once daily.    VITAMIN D (VITAMIN D3) 1000 UNITS TAB    Take 2 Units by mouth once daily.    VITAMIN E 400 UNIT CAPSULE    Take 400 Units by mouth.    VITAMINS A,C,E-ZINC-COPPER (ICAPS AREDS) 4,296 MCG-226 MG-90 MG CAP    Take by mouth.

## 2023-03-15 ENCOUNTER — TELEPHONE (OUTPATIENT)
Dept: CARDIOLOGY | Facility: CLINIC | Age: 73
End: 2023-03-15
Payer: MEDICARE

## 2023-03-15 ENCOUNTER — OFFICE VISIT (OUTPATIENT)
Dept: CARDIOLOGY | Facility: CLINIC | Age: 73
End: 2023-03-15
Payer: MEDICARE

## 2023-03-15 VITALS
HEIGHT: 67 IN | DIASTOLIC BLOOD PRESSURE: 62 MMHG | BODY MASS INDEX: 27.07 KG/M2 | WEIGHT: 172.5 LBS | OXYGEN SATURATION: 96 % | SYSTOLIC BLOOD PRESSURE: 130 MMHG | HEART RATE: 72 BPM

## 2023-03-15 DIAGNOSIS — M25.561 CHRONIC PAIN OF RIGHT KNEE: Primary | ICD-10-CM

## 2023-03-15 DIAGNOSIS — Z01.810 PREPROCEDURAL CARDIOVASCULAR EXAMINATION: ICD-10-CM

## 2023-03-15 DIAGNOSIS — G35 MULTIPLE SCLEROSIS: Primary | ICD-10-CM

## 2023-03-15 DIAGNOSIS — G89.29 CHRONIC PAIN OF RIGHT KNEE: Primary | ICD-10-CM

## 2023-03-15 PROCEDURE — 99214 OFFICE O/P EST MOD 30 MIN: CPT | Mod: PBBFAC,PN | Performed by: NURSE PRACTITIONER

## 2023-03-15 PROCEDURE — 99999 PR PBB SHADOW E&M-EST. PATIENT-LVL IV: CPT | Mod: PBBFAC,,, | Performed by: NURSE PRACTITIONER

## 2023-03-15 PROCEDURE — 99212 PR OFFICE/OUTPT VISIT, EST, LEVL II, 10-19 MIN: ICD-10-PCS | Mod: S$PBB,,, | Performed by: NURSE PRACTITIONER

## 2023-03-15 PROCEDURE — 99212 OFFICE O/P EST SF 10 MIN: CPT | Mod: S$PBB,,, | Performed by: NURSE PRACTITIONER

## 2023-03-15 PROCEDURE — 99999 PR PBB SHADOW E&M-EST. PATIENT-LVL IV: ICD-10-PCS | Mod: PBBFAC,,, | Performed by: NURSE PRACTITIONER

## 2023-03-15 RX ORDER — GLATIRAMER 40 MG/ML
40 INJECTION, SOLUTION SUBCUTANEOUS
Qty: 12 EACH | Refills: 5 | Status: SHIPPED | OUTPATIENT
Start: 2023-03-15 | End: 2023-06-13

## 2023-03-15 NOTE — PROGRESS NOTES
Cardiology    3/15/2023  1:21 PM    Problem list  Patient Active Problem List   Diagnosis    Postoperative hypothyroidism    History of thyroid cancer    Essential hypertension, benign    Mixed hyperlipidemia    Benign paroxysmal positional vertigo    History of colon polyps    Bilateral post-traumatic osteoarthritis of knee    Paroxysmal tachycardia    DDD (degenerative disc disease), lumbar    Lumbar radiculopathy    PAF (paroxysmal atrial fibrillation)    Multiple sclerosis    Anticoagulant long-term use    Thoracic disc disease with myelopathy    Spinal cord compression    Aortic atherosclerosis       CC:  Preprocedural evaluation    HPI:  Needs cardiac evaluation prior to a spine surgery that she will need.  Will be done in 3 phases.  No problems with anesthesia. No chest pain  or shortness of breath.  Overall doing well, difficulty lying flat due to back pain not orthopnea. Dr. Salter will be doing the surgery- he has told her that she may not have MS and disc disease could be contributing to her problems, paralysis of great concern. Did not take fluid pill this AM as she would be out and about. Dr. Salter has asked her to hold her Eliquis 5 days prior to surgery.  There is no date for the surgery at the time of this note.     Medications  Current Outpatient Medications   Medication Sig Dispense Refill    b complex vitamins capsule Take 1 capsule by mouth once daily.      collagen-biotin-ascorbic acid (COLLAGEN 1500 PLUS C) 500 mg-800 mcg- 50 mg Cap Take by mouth.      ELIQUIS 5 mg Tab Take 1 tablet (5 mg total) by mouth 2 (two) times daily. 60 tablet 11    hydroCHLOROthiazide (HYDRODIURIL) 12.5 MG Tab Take 1 tablet (12.5 mg total) by mouth once daily. 30 tablet 11    levothyroxine (SYNTHROID) 125 MCG tablet TAKE 1 TABLET BY MOUTH BEFORE BREAKFAST 90 tablet 1    losartan (COZAAR) 25 MG tablet Take 1 tablet (25 mg total) by mouth once daily. 90 tablet 3    meclizine (ANTIVERT) 25 mg tablet Take 1 tablet  (25 mg total) by mouth 3 (three) times daily as needed for Dizziness. 30 tablet 3    metoprolol succinate (TOPROL-XL) 25 MG 24 hr tablet Take 1 tablet by mouth once daily 90 tablet 3    multivitamin (THERAGRAN) per tablet Take 1 tablet by mouth once daily.      propafenone (RHTHYMOL) 150 MG Tab Take 1 tablet (150 mg total) by mouth every 8 (eight) hours. 90 tablet 11    rosuvastatin (CRESTOR) 10 MG tablet Take 1 tablet (10 mg total) by mouth once daily. 90 tablet 3    vitamin D (VITAMIN D3) 1000 units Tab Take 2 Units by mouth once daily.      vitamin E 400 UNIT capsule Take 400 Units by mouth.      vitamins A,C,E-zinc-copper (ICAPS AREDS) 4,296 mcg-226 mg-90 mg Cap Take by mouth.       No current facility-administered medications for this visit.      Prior to Admission medications    Medication Sig Start Date End Date Taking? Authorizing Provider   b complex vitamins capsule Take 1 capsule by mouth once daily.   Yes Historical Provider   collagen-biotin-ascorbic acid (COLLAGEN 1500 PLUS C) 500 mg-800 mcg- 50 mg Cap Take by mouth.   Yes Historical Provider   ELIQUIS 5 mg Tab Take 1 tablet (5 mg total) by mouth 2 (two) times daily. 11/14/22  Yes Rocco Miller MD   hydroCHLOROthiazide (HYDRODIURIL) 12.5 MG Tab Take 1 tablet (12.5 mg total) by mouth once daily. 10/5/22 10/5/23 Yes Екатерина Tamez DNP   levothyroxine (SYNTHROID) 125 MCG tablet TAKE 1 TABLET BY MOUTH BEFORE BREAKFAST 2/7/23  Yes Rocco Miller MD   losartan (COZAAR) 25 MG tablet Take 1 tablet (25 mg total) by mouth once daily. 8/10/22 8/10/23 Yes Santos Jones MD   meclizine (ANTIVERT) 25 mg tablet Take 1 tablet (25 mg total) by mouth 3 (three) times daily as needed for Dizziness. 8/1/18  Yes Rocco Miller MD   metoprolol succinate (TOPROL-XL) 25 MG 24 hr tablet Take 1 tablet by mouth once daily 2/25/22  Yes Rocco Miller MD   multivitamin (THERAGRAN) per tablet Take 1 tablet by mouth once daily.   Yes Historical Provider   propafenone  (RHTHYMOL) 150 MG Tab Take 1 tablet (150 mg total) by mouth every 8 (eight) hours. 8/24/22 8/24/23 Yes Juan Manuel Singletary MD   rosuvastatin (CRESTOR) 10 MG tablet Take 1 tablet (10 mg total) by mouth once daily. 2/2/22  Yes Rocco Miller MD   vitamin D (VITAMIN D3) 1000 units Tab Take 2 Units by mouth once daily.   Yes Historical Provider   vitamin E 400 UNIT capsule Take 400 Units by mouth.   Yes Historical Provider   vitamins A,C,E-zinc-copper (ICAPS AREDS) 4,296 mcg-226 mg-90 mg Cap Take by mouth.   Yes Historical Provider   atorvastatin (LIPITOR) 40 MG tablet Take 1 tablet (40 mg total) by mouth once daily. 6/24/22 8/10/22  Radha Gomez MD         History  Past Medical History:   Diagnosis Date    Arthritis     Cancer     Thyroid    Edema     Hyperlipidemia     Hypertension     Thyroid disease      Past Surgical History:   Procedure Laterality Date    ADENOIDECTOMY      COLONOSCOPY  10/18/2018    COLONOSCOPY N/A 10/18/2018    Procedure: COLONOSCOPY;  Surgeon: Yosvany Alejandra MD;  Location: [facility] ENDO;  Service: General;  Laterality: N/A;    EPIDURAL STEROID INJECTION N/A 9/30/2022    Procedure: LUMBAR L3/4 MALKA CONTRAST DIRRECT REFERRAL MONICA CARRERA IN CHART;  Surgeon: Nicholas Kumar MD;  Location: [facility];  Service: Pain Management;  Laterality: N/A;    HYSTERECTOMY  1992    total hyst     KNEE SURGERY      16 pins and two plates implanted    THYROIDECTOMY      TONSILLECTOMY       Social History     Socioeconomic History    Marital status:    Tobacco Use    Smoking status: Never    Smokeless tobacco: Never   Substance and Sexual Activity    Alcohol use: No    Drug use: No    Sexual activity: Not Currently     Partners: Male         Allergies  Review of patient's allergies indicates:   Allergen Reactions    Vancomycin analogues Tinitus         Review of Systems   Review of Systems   Constitutional: Negative for diaphoresis and malaise/fatigue.   HENT: Negative.     Cardiovascular:   Negative for chest pain, claudication, dyspnea on exertion, irregular heartbeat, leg swelling, near-syncope, orthopnea, palpitations, paroxysmal nocturnal dyspnea and syncope.   Respiratory:  Negative for shortness of breath.    Endocrine: Negative for polydipsia, polyphagia and polyuria.   Hematologic/Lymphatic: Does not bruise/bleed easily.   Gastrointestinal:  Negative for bloating, nausea and vomiting.   Genitourinary: Negative.    Neurological:  Negative for excessive daytime sleepiness, dizziness, light-headedness, loss of balance and weakness.   Psychiatric/Behavioral:  The patient is not nervous/anxious.    Allergic/Immunologic: Negative.        Physical Exam  Wt Readings from Last 1 Encounters:   03/15/23 78.3 kg (172 lb 8.2 oz)     BP Readings from Last 3 Encounters:   03/15/23 130/62   03/14/23 126/72   03/10/23 127/68     Pulse Readings from Last 1 Encounters:   03/15/23 72     Body mass index is 27.02 kg/m².    Physical Exam  Vitals and nursing note reviewed.   Constitutional:       Appearance: Normal appearance.   HENT:      Head: Normocephalic and atraumatic.      Mouth/Throat:      Mouth: Mucous membranes are moist.   Eyes:      Pupils: Pupils are equal, round, and reactive to light.   Cardiovascular:      Rate and Rhythm: Normal rate. Rhythm irregular.      Pulses:           Radial pulses are 2+ on the right side and 2+ on the left side.        Dorsalis pedis pulses are 2+ on the right side and 2+ on the left side.        Posterior tibial pulses are 2+ on the right side and 2+ on the left side.      Heart sounds: No murmur heard.  Pulmonary:      Effort: Pulmonary effort is normal. No respiratory distress.      Breath sounds: Normal breath sounds.   Abdominal:      General: There is no distension.      Tenderness: There is no abdominal tenderness.   Musculoskeletal:      Cervical back: Normal range of motion.      Right lower leg: Edema present.      Left lower leg: Edema present.   Skin:      General: Skin is warm and dry.      Findings: No erythema.   Neurological:      General: No focal deficit present.      Mental Status: She is alert.   Psychiatric:         Mood and Affect: Mood normal.         Behavior: Behavior normal.         Problem List Items Addressed This Visit          Cardiac/Vascular    Preprocedural cardiovascular examination    Overview     Pending neurosurgery to evaluate spinal cord compression that could result in paralysis  Is followed by EP for pAF and is on Rythmol, metoprolol and eliquis  Discussed risks of blood clot that may arise with holding Eliquis which will be held for 5 days prior to the procedure. Would recommend restarting Eliquis as soon as able barring any bleeding problems  Would not recommend against pursuing surgery from a cardiovascular standpoint.          Current Assessment & Plan     As above                    Follow Up  3 months      @Екатерина Tamez DNP

## 2023-03-15 NOTE — PATIENT INSTRUCTIONS
I do not recommend against pursuing the surgery for your back    There is a small risk of blood clots with holding Eliquis- follow Dr. Salter's instructions regarding holding the Eliquis 5 days prior to surgery.  We would want you to restart as soon as you are able.    Continue your current medications    We will see you back in 3 months or so

## 2023-03-15 NOTE — TELEPHONE ENCOUNTER
----- Message from Patricia Jang sent at 3/15/2023 12:33 PM CDT -----  Regarding: PT IS CURRENTLY THERE FOR APPT BUT IS AT THE DOOR NEEDING ASSISTANCE  Contact: PT  Confirmed contact info below:  Contact Name: Graciela Barton  Phone Number: [phone]

## 2023-03-15 NOTE — TELEPHONE ENCOUNTER
Patient transported to clinic by family member.  She was seen in clinic today and transported to her car.      ----- Message from Patricia Jang sent at 3/15/2023 12:33 PM CDT -----  Regarding: PT IS CURRENTLY THERE FOR APPT BUT IS AT THE DOOR NEEDING ASSISTANCE  Contact: PT  Confirmed contact info below:  Contact Name: Graciela Barton  Phone Number: [phone]

## 2023-03-16 ENCOUNTER — SPECIALTY PHARMACY (OUTPATIENT)
Dept: PHARMACY | Facility: CLINIC | Age: 73
End: 2023-03-16
Payer: MEDICARE

## 2023-03-16 DIAGNOSIS — G35 MULTIPLE SCLEROSIS: Primary | ICD-10-CM

## 2023-03-17 NOTE — TELEPHONE ENCOUNTER
Rx received for Glatiramir. PA not required per insurance rep at [email or web address]. $1884 copay on test claim. Forwarding to BI/FA.

## 2023-03-20 NOTE — TELEPHONE ENCOUNTER
Outgoing call to patient to notify her of copayment. She stated that she cannot afford this medication. No funding/grants/PAP are currently available but if funding does become available, I will let her know. Otherwise, she will discuss with MD about changing to a different drug.

## 2023-03-20 NOTE — TELEPHONE ENCOUNTER
Benefit Investigation    Copaxone (drug name)  [facility]/[employer]   No LIS  Estimated copay: $1800.28   $869.85 attributed to coverage gap  [facility]    Forward to FA

## 2023-03-20 NOTE — TELEPHONE ENCOUNTER
"Outgoing call to [facility] to verify if drug needs a prior authorization since claim states "paid under transition fill." Spoke to Adali dempsey, who verified that drug will need a prior authorization for next month.   "

## 2023-03-20 NOTE — TELEPHONE ENCOUNTER
Messaged [facility] to notify of brand Copaxone requirement and provide website to apply for autoject device.

## 2023-03-21 ENCOUNTER — TELEPHONE (OUTPATIENT)
Dept: NEUROSURGERY | Facility: CLINIC | Age: 73
End: 2023-03-21
Payer: MEDICARE

## 2023-03-21 ENCOUNTER — TELEPHONE (OUTPATIENT)
Dept: NEUROLOGY | Facility: CLINIC | Age: 73
End: 2023-03-21

## 2023-03-21 DIAGNOSIS — E78.2 MIXED HYPERLIPIDEMIA: ICD-10-CM

## 2023-03-21 DIAGNOSIS — I10 ESSENTIAL HYPERTENSION, BENIGN: ICD-10-CM

## 2023-03-21 RX ORDER — METOPROLOL SUCCINATE 25 MG/1
TABLET, EXTENDED RELEASE ORAL
Qty: 90 TABLET | Refills: 3 | Status: SHIPPED | OUTPATIENT
Start: 2023-03-21 | End: 2023-07-14 | Stop reason: SDUPTHER

## 2023-03-21 RX ORDER — ROSUVASTATIN CALCIUM 10 MG/1
TABLET, COATED ORAL
Qty: 90 TABLET | Refills: 0 | Status: SHIPPED | OUTPATIENT
Start: 2023-03-21 | End: 2023-06-14

## 2023-03-21 NOTE — TELEPHONE ENCOUNTER
Care Due:                  Date            Visit Type   Department     Provider  --------------------------------------------------------------------------------                                EP -                              PRIMARY      [facility]  Last Visit: 03-      CARE ([facility])   PRIMARY CARE   Rocco Mliler  Next Visit: None Scheduled  None         None Found                                                            Last  Test          Frequency    Reason                     Performed    Due Date  --------------------------------------------------------------------------------    Lipid Panel.  12 months..  rosuvastatin.............  06- 06-    [facility] Embedded Care Gaps. Reference number: [id number]. 3/21/2023   1:49:04 PM CDT

## 2023-03-21 NOTE — TELEPHONE ENCOUNTER
----- Message from Ramo Matta sent at 3/21/2023  2:19 PM CDT -----  Type:  Procedure    Who Called:Pt  Does the patient know what this is regarding?:procedure details and when she needs to stop taking elquis  Would the patient rather a call back or a response via [facility]? call  Best Call Back Number:[phone]  Additional Information: Pt stated she is still waiting for a call

## 2023-03-22 NOTE — TELEPHONE ENCOUNTER
Refill Decision Note   Graciela Barton  is requesting a refill authorization.  Brief Assessment and Rationale for Refill:  Approve     Medication Therapy Plan:       Medication Reconciliation Completed: No   Comments:       Provider Staff:     Action is required for this patient.   Please see care gap opportunities below in Care Due Message.     Thanks!  [facility]     Appointments      Date Provider   Last Visit   3/14/2023 Rocco Miller MD   Next Visit   Visit date not found Rocco Miller MD     Note composed:10:37 PM 03/21/2023           Note composed:10:37 PM 03/21/2023

## 2023-03-23 ENCOUNTER — HOSPITAL ENCOUNTER (OUTPATIENT)
Dept: RADIOLOGY | Facility: HOSPITAL | Age: 73
Discharge: HOME | End: 2023-03-23
Attending: FAMILY MEDICINE
Payer: MEDICARE

## 2023-03-23 VITALS — BODY MASS INDEX: 27 KG/M2 | HEIGHT: 67 IN | WEIGHT: 172 LBS

## 2023-03-23 DIAGNOSIS — Z12.31 ENCOUNTER FOR SCREENING MAMMOGRAM FOR BREAST CANCER: ICD-10-CM

## 2023-03-23 PROCEDURE — 77067 MAMMO DIGITAL SCREENING BILAT WITH TOMO: ICD-10-PCS | Mod: 26,,, | Performed by: RADIOLOGY

## 2023-03-23 PROCEDURE — 77063 MAMMO DIGITAL SCREENING BILAT WITH TOMO: ICD-10-PCS | Mod: 26,,, | Performed by: RADIOLOGY

## 2023-03-23 PROCEDURE — 77067 SCR MAMMO BI INCL CAD: CPT | Mod: TC

## 2023-03-23 PROCEDURE — 77063 BREAST TOMOSYNTHESIS BI: CPT | Mod: 26,,, | Performed by: RADIOLOGY

## 2023-03-23 PROCEDURE — 77067 SCR MAMMO BI INCL CAD: CPT | Mod: 26,,, | Performed by: RADIOLOGY

## 2023-03-31 ENCOUNTER — TELEPHONE (OUTPATIENT)
Dept: NEUROSURGERY | Facility: CLINIC | Age: 73
End: 2023-03-31
Payer: MEDICARE

## 2023-03-31 DIAGNOSIS — M51.04 THORACIC DISC DISEASE WITH MYELOPATHY: Primary | ICD-10-CM

## 2023-04-04 ENCOUNTER — TELEPHONE (OUTPATIENT)
Dept: PRIMARY CARE CLINIC | Facility: CLINIC | Age: 73
End: 2023-04-04
Payer: MEDICARE

## 2023-04-04 NOTE — TELEPHONE ENCOUNTER
----- Message from Silvia Giordano NP sent at 4/3/2023  2:50 PM CDT -----  We have received the results of the patient mammogram and there is no evidence of malignancy.  Repeat yearly.

## 2023-04-13 ENCOUNTER — TELEPHONE (OUTPATIENT)
Dept: ELECTROPHYSIOLOGY | Facility: CLINIC | Age: 73
End: 2023-04-13
Payer: MEDICARE

## 2023-04-13 ENCOUNTER — TELEPHONE (OUTPATIENT)
Dept: PODIATRY | Facility: CLINIC | Age: 73
End: 2023-04-13
Payer: MEDICARE

## 2023-04-13 NOTE — TELEPHONE ENCOUNTER
----- Message from Maverick Del Rosario sent at 4/12/2023  5:26 PM CDT -----  Contact: pt  Type:  Sooner Appointment Request    Caller is requesting a sooner appointment.  Caller declined first available appointment listed below.  Caller will not accept being placed on the waitlist and is requesting a message be sent to doctor.  Name of Caller:pt   When is the first available appointment?books are closed   Symptoms: 1 year f/u; have to be seen before prep op  Would the patient rather a call back or a response via [facility]? call  Best Call Back Number:[phone]  Additional Information:   Pt stated if provider  not available ; she willing to see np or anyone in that office  Pt prefers to be seen on 05/02  have to be seen before prep op appt

## 2023-04-13 NOTE — TELEPHONE ENCOUNTER
Spoke with pt in regards to message below. Pt was confused about cardiac clearance but she already received clearance from NP Екатерина Tamez. Pt has been scheduled anyway for her 6 month follow up with Dr. Singletary. Pt verbally confirmed appointment date, time, and location, and thanked me for calling.

## 2023-04-14 ENCOUNTER — TELEPHONE (OUTPATIENT)
Dept: ORTHOPEDICS | Facility: CLINIC | Age: 73
End: 2023-04-14
Payer: MEDICARE

## 2023-04-14 NOTE — TELEPHONE ENCOUNTER
----- Message from Demi Lakhani sent at 4/14/2023  2:26 PM CDT -----  Regarding: Appointment  Contact: [phone]  Calling to reschedule appointment due to book out. Please call and schedule.

## 2023-05-02 ENCOUNTER — HOSPITAL ENCOUNTER (OUTPATIENT)
Dept: PREADMISSION TESTING | Facility: HOSPITAL | Age: 73
Discharge: HOME | End: 2023-05-02
Attending: NEUROLOGICAL SURGERY
Payer: MEDICARE

## 2023-05-02 ENCOUNTER — ANESTHESIA EVENT (OUTPATIENT)
Dept: SURGERY | Facility: HOSPITAL | Age: 73
DRG: 519 | End: 2023-05-02
Payer: MEDICARE

## 2023-05-02 VITALS
HEIGHT: 67 IN | BODY MASS INDEX: 26.99 KG/M2 | HEART RATE: 74 BPM | DIASTOLIC BLOOD PRESSURE: 73 MMHG | WEIGHT: 171.94 LBS | RESPIRATION RATE: 16 BRPM | TEMPERATURE: 98 F | SYSTOLIC BLOOD PRESSURE: 147 MMHG | OXYGEN SATURATION: 97 %

## 2023-05-02 RX ORDER — SODIUM CHLORIDE, SODIUM LACTATE, POTASSIUM CHLORIDE, CALCIUM CHLORIDE 600; 310; 30; 20 MG/100ML; MG/100ML; MG/100ML; MG/100ML
INJECTION, SOLUTION INTRAVENOUS CONTINUOUS
Status: CANCELLED | OUTPATIENT
Start: 2023-05-02

## 2023-05-02 RX ORDER — LIDOCAINE HYDROCHLORIDE 10 MG/ML
1 INJECTION, SOLUTION EPIDURAL; INFILTRATION; INTRACAUDAL; PERINEURAL ONCE
Status: CANCELLED | OUTPATIENT
Start: 2023-05-02 | End: 2023-05-02

## 2023-05-02 NOTE — ANESTHESIA PREPROCEDURE EVALUATION
"                                                                                                             05/12/2023  Graciela Barton is a 73 y.o., female scheduled for DISCECTOMY, SPINE, THORACIC Right T6-7, T7-8 interior discectomy retropleural (Right: Chest) on 5/12/23.    Per primary care Dr. Miller, "From my standpoint point, patient is medically cleared for proposed back surgery, and she will be evaluated by cardiology tomorrow for clearance, as well as guidance with regard to anticoagulation management".  Per cardiology NP Dashawn, "Would not recommend against pursuing surgery from a cardiovascular standpoint".    Past Medical History:   Diagnosis Date    Arthritis     Cancer     Thyroid    Edema     Hyperlipidemia     Hypertension     Thyroid disease      Past Surgical History:   Procedure Laterality Date    ADENOIDECTOMY      COLONOSCOPY  10/18/2018    COLONOSCOPY N/A 10/18/2018    Procedure: COLONOSCOPY;  Surgeon: Yosvany Alejandra MD;  Location: [facility] ENDO;  Service: General;  Laterality: N/A;    EPIDURAL STEROID INJECTION N/A 9/30/2022    Procedure: LUMBAR L3/4 MALKA CONTRAST DIRRECT REFERRAL MONICA CARRERA IN CHART;  Surgeon: Nicholas Kumar MD;  Location: [facility] PAIN MGT;  Service: Pain Management;  Laterality: N/A;    HYSTERECTOMY  1992    total hyst     KNEE SURGERY      16 pins and two plates implanted    THYROIDECTOMY      TONSILLECTOMY       Current Outpatient Medications   Medication Instructions    b complex vitamins capsule 1 capsule, Oral, Daily    collagen-biotin-ascorbic acid (COLLAGEN 1500 PLUS C) 500 mg-800 mcg- 50 mg Cap Oral    ELIQUIS 5 mg, Oral, 2 times daily    glatiramer (COPAXONE, GLATOPA) 40 mg, Subcutaneous, Three times weekly    hydroCHLOROthiazide (HYDRODIURIL) 12.5 mg, Oral, Daily    levothyroxine (SYNTHROID) 125 MCG tablet TAKE 1 TABLET BY MOUTH BEFORE BREAKFAST    losartan (COZAAR) 25 mg, Oral, Daily    meclizine (ANTIVERT) 25 mg, Oral, 3 times daily PRN "    metoprolol succinate (TOPROL-XL) 25 MG 24 hr tablet Take 1 tablet by mouth once daily    multivitamin (THERAGRAN) per tablet 1 tablet, Oral, Daily    propafenone (RHTHYMOL) 150 mg, Oral, Every 8 hours    rosuvastatin (CRESTOR) 10 MG tablet Take 1 tablet by mouth once daily    vitamin D (VITAMIN D3) 2 Units, Oral, Daily    vitamin E 400 Units, Oral    vitamins A,C,E-zinc-copper (ICAPS AREDS) 4,296 mcg-226 mg-90 mg Cap Oral       Pre-op Assessment    I have reviewed the Patient Summary Reports.     I have reviewed the Nursing Notes. I have reviewed the NPO Status.   I have reviewed the Medications.     Review of Systems  Anesthesia Hx:  No problems with previous Anesthesia  History of prior surgery of interest to airway management or planning:  Denies Personal Hx of Anesthesia complications.   Social:  Non-Smoker, No Alcohol Use    Hematology/Oncology:         -- Cancer in past history (thyroid): surgery    Cardiovascular:   Exercise tolerance: good Hypertension, well controlled Dysrhythmias (paroxysmal atrial fibrillation) atrial fibrillation Denies Angina. hyperlipidemia  Denies ELLIS.    Pulmonary:  Pulmonary Normal  Denies COPD.  Denies Asthma.  Denies Shortness of breath.  Denies Sleep Apnea.    Renal/:  Renal/ Normal  Denies Chronic Renal Disease.     Hepatic/GI:  Hepatic/GI Normal  Denies GERD. Denies Liver Disease.    Musculoskeletal:   Arthritis   Spine Disorders: lumbar and thoracic Disc disease and Degenerative disease    Neurological:   Denies TIA. Denies CVA. Neuromuscular Disease,  Denies Seizures. Difficulty walking     Reports MS concern, surgeon doesn't think pt has diagnosis.  Pt scheduled for upcoming neurology appointment.  Neuromuscular Disease, Multiple Sclerosis   Endocrine:   Hypothyroidism    Psych:  Psychiatric Normal           Physical Exam  General: Well nourished, Cooperative and Alert  Leg hematoma form eliquis nad mechanical trauma   Airway:  Mallampati: III / II  Mouth  Opening: Small, but > 3cm  TM Distance: Normal  Tongue: Normal  Neck ROM: Normal ROM    Dental:  Intact    Chest/Lungs:  Clear to auscultation, Normal Respiratory Rate    Heart:  Rate: Normal  Rhythm: Regular Rhythm  Sounds: Normal      Lab Results   Component Value Date    WBC 6.00 03/14/2023    HGB 13.0 03/14/2023    HCT 40.5 03/14/2023     03/14/2023    CHOL 127 06/20/2022    TRIG 135 06/20/2022    HDL 56 06/20/2022    ALT 16 08/26/2022    AST 17 08/26/2022     03/14/2023    K 3.9 03/14/2023     03/14/2023    CREATININE 0.8 03/14/2023    BUN 17 03/14/2023    CO2 29 03/14/2023    TSH 2.559 08/09/2022    HGBA1C 5.5 06/20/2022     Results for orders placed or performed in visit on 03/14/23   EKG 12-lead    Collection Time: 03/14/23 11:37 AM    Narrative    Test Reason : Z01.818,    Vent. Rate : 058 BPM     Atrial Rate : 058 BPM     P-R Int : 172 ms          QRS Dur : 066 ms      QT Int : 412 ms       P-R-T Axes : 082 065 070 degrees     QTc Int : 404 ms    Sinus bradycardia with marked sinus arrythmia and PAC  biphasic T wave V6  Otherwise normal ECG  When compared with ECG of 14-MAR-2023 11:37,  No significant change was found  Confirmed by Rl Liao MD ([id number]) on 3/15/2023 11:25:18 AM    Referred By:             Confirmed By:Rl Liao MD         Anesthesia Plan  Type of Anesthesia, risks & benefits discussed:    Anesthesia Type: Gen ETT  Intra-op Monitoring Plan: Standard ASA Monitors  Post Op Pain Control Plan: multimodal analgesia and IV/PO Opioids PRN  Induction:  IV  Airway Plan: Video, Post-Induction  Informed Consent: Informed consent signed with the Patient and all parties understand the risks and agree with anesthesia plan.  All questions answered. Patient consented to blood products? Yes  ASA Score: 3  Day of Surgery Review of History & Physical: H&P Update referred to the surgeon/provider.  Anesthesia Plan Notes:       Ready For Surgery From Anesthesia Perspective.      .

## 2023-05-02 NOTE — DISCHARGE INSTRUCTIONS
Your surgery is scheduled for 5/12/23.    Please report to [facility] Lobby on the 1st Floor at 0530 a.m.    THIS TIME IS SUBJECT TO CHANGE.  YOU WILL RECEIVE A PHONE CALL THE DAY BEFORE SURGERY BY 3:30 PM TO CONFIRM YOUR TIME OF ARRIVAL.  IF YOU HAVE NOT RECEIVED A PHONE CALL BY 3:30 PM THE DAY BEFORE YOUR SURGERY PLEASE CALL [phone]     INSTRUCTIONS IMPORTANT!!!  ¨ Do not eat or drink after 12 midnight-including water, candy, gum, & mints. OK to brush teeth.      ____  Proceed to [facility] on *** for additional testing.        ____  Do not wear makeup, including mascara.  ____  No powder, lotions or creams to surgical area.  ____  Please remove all jewelry, including piercings and leave at home.  ____  No money or valuables needed. Please leave at home.  ____  Please bring any documents given by your doctor.  ____  If going home the same day, arrange for a ride home. You will not be able to             drive if Anesthesia was used.  ____  Children under 18 years require a parent / guardian present the entire time             they are in surgery / recovery.  ____  Wear loose fitting clothing. Allow for dressings, bandages.  ____  Stop Aspirin, Ibuprofen, Motrin, Aleve, Goody's/BC powders, Excedrine and Naproxen (NSAIDS) at least 3-5 days before surgery, unless otherwise instructed by your doctor, or the nurse.   You MAY use Tylenol/acetaminophen until day of surgery.  ____  Wash the surgical area with Hibiclens the night before surgery, and again the             morning of surgery.  Be sure to rinse hibiclens off completely (if instructed by   nurse).  ____  If you take diabetic medication, do not take am of surgery unless instructed by Doctor.  ____  Call MD for temperature above 101 degrees or any other signs of infection such as Urinary (bladder) infection, Upper respiratory infection, skin boils, etc.  ____ Stop taking any Fish Oil supplement or any Vitamins that contain Vitamin E at  least 5 days prior to surgery.  ____ Do Not wear your contact lenses the day of your procedure.  You may wear your glasses.      ____Do not shave surgical site for 3 days prior to surgery.  ____ Practice Good hand washing before, during, and after procedure.      I have read or had read and explained to me, and understand the above information.  Additional comments or instructions:  For additional questions call [phone]      ANESTHESIA SIDE EFFECTS  -For the first 24 hours after surgery:  Do not drive, use heavy equipment, make important decisions, or drink alcohol  -It is normal to feel sleepy for several hours.  Rest until you are more awake.  -Have someone stay with you, if needed.  They can watch for problems and help keep you safe.  -Some possible post anesthesia side effects include: nausea and vomiting, sore throat and hoarseness, sleepiness, and dizziness.        Pre-Op Bathing Instructions    Before surgery, you can play an important role in your own health.    Because skin is not sterile, we need to be sure that your skin is as free of germs as possible. By following the instructions below, you can reduce the number of germs on your skin before surgery.    IMPORTANT: You will need to shower with a special soap called Hibiclens*, available at any pharmacy.  If you are allergic to Chlorhexidine (the antiseptic in Hibiclens), use an antibacterial soap such as Dial Soap for your preoperative shower.  You will shower with Hibiclens both the night before your surgery and the morning of your surgery.  Do not use Hibiclens on the head, face or genitals to avoid injury to those areas.    STEP #1: THE NIGHT BEFORE YOUR SURGERY     Do not shave the area of your body where your surgery will be performed.  Shower and wash your hair and body as usual with your normal soap and shampoo.  Rinse your hair and body thoroughly after you shower to remove all soap residue.  With your hand, apply one packet of Hibiclens soap to  the surgical site.   Wash the site gently for five (5) minutes. Do not scrub your skin too hard.   Do not wash with your regular soap after Hibiclens is used.  Rinse your body thoroughly.  Pat yourself dry with a clean, soft towel.  Do not use lotion, cream, or powder.  Wear clean clothes.    STEP #2: THE MORNING OF YOUR SURGERY     Repeat Step #1.    * Not to be used by people allergic to Chlorhexidine.

## 2023-05-03 DIAGNOSIS — Z71.89 COMPLEX CARE COORDINATION: ICD-10-CM

## 2023-05-05 ENCOUNTER — TELEPHONE (OUTPATIENT)
Dept: NEUROSURGERY | Facility: CLINIC | Age: 73
End: 2023-05-05
Payer: COMMERCIAL

## 2023-05-05 NOTE — TELEPHONE ENCOUNTER
Returned pt's call, pt stated that at her pre-admit appointment today they addressed all her other medication but they didn't address if she had to stop taking or when to stop taking the hydrochlorothiazide and Crestor. I stated that I will send a message to someone in the pre-admit department and will give her a call back. Pt voiced understanding

## 2023-05-12 ENCOUNTER — ANESTHESIA (OUTPATIENT)
Dept: SURGERY | Facility: HOSPITAL | Age: 73
DRG: 519 | End: 2023-05-12
Payer: MEDICARE

## 2023-05-12 ENCOUNTER — HOSPITAL ENCOUNTER (INPATIENT)
Facility: HOSPITAL | Age: 73
LOS: 4 days | Discharge: NURSING, REHAB OR LONG-TERM CARE | DRG: 519 | End: 2023-05-16
Attending: NEUROLOGICAL SURGERY | Admitting: NEUROLOGICAL SURGERY
Payer: MEDICARE

## 2023-05-12 DIAGNOSIS — D72.825 BANDEMIA: ICD-10-CM

## 2023-05-12 DIAGNOSIS — I48.0 PAF (PAROXYSMAL ATRIAL FIBRILLATION): ICD-10-CM

## 2023-05-12 DIAGNOSIS — R00.0 TACHYCARDIA, UNSPECIFIED: ICD-10-CM

## 2023-05-12 DIAGNOSIS — M51.04 THORACIC DISC DISEASE WITH MYELOPATHY: Primary | ICD-10-CM

## 2023-05-12 DIAGNOSIS — M47.14 THORACIC MYELOPATHY: ICD-10-CM

## 2023-05-12 DIAGNOSIS — G35 MULTIPLE SCLEROSIS: ICD-10-CM

## 2023-05-12 DIAGNOSIS — I10 ESSENTIAL HYPERTENSION, BENIGN: ICD-10-CM

## 2023-05-12 DIAGNOSIS — J98.11 ATELECTASIS: ICD-10-CM

## 2023-05-12 LAB
ABO + RH BLD: NORMAL
BLD GP AB SCN CELLS X3 SERPL QL: NORMAL
SPECIMEN OUTDATE: NORMAL

## 2023-05-12 PROCEDURE — 37000009 HC ANESTHESIA EA ADD 15 MINS: Performed by: NEUROLOGICAL SURGERY

## 2023-05-12 PROCEDURE — 63077 PR DISK SURG,THORAX,SINGLE LEVEL: ICD-10-PCS | Mod: ,,, | Performed by: NEUROLOGICAL SURGERY

## 2023-05-12 PROCEDURE — 36415 COLL VENOUS BLD VENIPUNCTURE: CPT | Performed by: NEUROLOGICAL SURGERY

## 2023-05-12 PROCEDURE — 25000003 PHARM REV CODE 250: Performed by: NURSE ANESTHETIST, CERTIFIED REGISTERED

## 2023-05-12 PROCEDURE — 36000711: Performed by: NEUROLOGICAL SURGERY

## 2023-05-12 PROCEDURE — 63600175 PHARM REV CODE 636 W HCPCS: Performed by: STUDENT IN AN ORGANIZED HEALTH CARE EDUCATION/TRAINING PROGRAM

## 2023-05-12 PROCEDURE — 63600175 PHARM REV CODE 636 W HCPCS: Performed by: NEUROLOGICAL SURGERY

## 2023-05-12 PROCEDURE — P9045 ALBUMIN (HUMAN), 5%, 250 ML: HCPCS | Mod: JZ,JG | Performed by: NURSE ANESTHETIST, CERTIFIED REGISTERED

## 2023-05-12 PROCEDURE — 11000001 HC ACUTE MED/SURG PRIVATE ROOM

## 2023-05-12 PROCEDURE — 63600175 PHARM REV CODE 636 W HCPCS: Performed by: NURSE PRACTITIONER

## 2023-05-12 PROCEDURE — 94799 UNLISTED PULMONARY SVC/PX: CPT

## 2023-05-12 PROCEDURE — 27201423 OPTIME MED/SURG SUP & DEVICES STERILE SUPPLY: Performed by: NEUROLOGICAL SURGERY

## 2023-05-12 PROCEDURE — 63600175 PHARM REV CODE 636 W HCPCS: Mod: JZ,JG | Performed by: NURSE ANESTHETIST, CERTIFIED REGISTERED

## 2023-05-12 PROCEDURE — 25000003 PHARM REV CODE 250: Performed by: NEUROLOGICAL SURGERY

## 2023-05-12 PROCEDURE — 86850 RBC ANTIBODY SCREEN: CPT | Performed by: NEUROLOGICAL SURGERY

## 2023-05-12 PROCEDURE — D9220A PRA ANESTHESIA: Mod: CRNA,,, | Performed by: NURSE ANESTHETIST, CERTIFIED REGISTERED

## 2023-05-12 PROCEDURE — D9220A PRA ANESTHESIA: ICD-10-PCS | Mod: CRNA,,, | Performed by: NURSE ANESTHETIST, CERTIFIED REGISTERED

## 2023-05-12 PROCEDURE — 37000008 HC ANESTHESIA 1ST 15 MINUTES: Performed by: NEUROLOGICAL SURGERY

## 2023-05-12 PROCEDURE — 63600175 PHARM REV CODE 636 W HCPCS: Performed by: NURSE ANESTHETIST, CERTIFIED REGISTERED

## 2023-05-12 PROCEDURE — 63077 SPINE DISK SURGERY THORAX: CPT | Mod: ,,, | Performed by: NEUROLOGICAL SURGERY

## 2023-05-12 PROCEDURE — 94761 N-INVAS EAR/PLS OXIMETRY MLT: CPT

## 2023-05-12 PROCEDURE — 63078: ICD-10-PCS | Mod: ,,, | Performed by: NEUROLOGICAL SURGERY

## 2023-05-12 PROCEDURE — 36000710: Performed by: NEUROLOGICAL SURGERY

## 2023-05-12 PROCEDURE — D9220A PRA ANESTHESIA: ICD-10-PCS | Mod: ANES,,, | Performed by: STUDENT IN AN ORGANIZED HEALTH CARE EDUCATION/TRAINING PROGRAM

## 2023-05-12 PROCEDURE — 71000033 HC RECOVERY, INTIAL HOUR: Performed by: NEUROLOGICAL SURGERY

## 2023-05-12 PROCEDURE — D9220A PRA ANESTHESIA: Mod: ANES,,, | Performed by: STUDENT IN AN ORGANIZED HEALTH CARE EDUCATION/TRAINING PROGRAM

## 2023-05-12 PROCEDURE — 63078 SPINE DISK SURGERY THORAX: CPT | Mod: ,,, | Performed by: NEUROLOGICAL SURGERY

## 2023-05-12 PROCEDURE — 71000039 HC RECOVERY, EACH ADD'L HOUR: Performed by: NEUROLOGICAL SURGERY

## 2023-05-12 RX ORDER — PROPAFENONE HYDROCHLORIDE 150 MG/1
150 TABLET, COATED ORAL EVERY 8 HOURS
Status: DISCONTINUED | OUTPATIENT
Start: 2023-05-12 | End: 2023-05-16 | Stop reason: HOSPADM

## 2023-05-12 RX ORDER — MECLIZINE HYDROCHLORIDE 25 MG/1
25 TABLET ORAL 3 TIMES DAILY PRN
Status: DISCONTINUED | OUTPATIENT
Start: 2023-05-12 | End: 2023-05-16 | Stop reason: HOSPADM

## 2023-05-12 RX ORDER — CEFAZOLIN SODIUM 2 G/50ML
2 SOLUTION INTRAVENOUS ONCE
Status: COMPLETED | OUTPATIENT
Start: 2023-05-12 | End: 2023-05-12

## 2023-05-12 RX ORDER — LIDOCAINE HYDROCHLORIDE 10 MG/ML
1 INJECTION, SOLUTION EPIDURAL; INFILTRATION; INTRACAUDAL; PERINEURAL ONCE
Status: DISCONTINUED | OUTPATIENT
Start: 2023-05-12 | End: 2023-05-12 | Stop reason: HOSPADM

## 2023-05-12 RX ORDER — FENTANYL CITRATE 50 UG/ML
25 INJECTION, SOLUTION INTRAMUSCULAR; INTRAVENOUS EVERY 5 MIN PRN
Status: DISCONTINUED | OUTPATIENT
Start: 2023-05-12 | End: 2023-05-12 | Stop reason: HOSPADM

## 2023-05-12 RX ORDER — ONDANSETRON 2 MG/ML
INJECTION INTRAMUSCULAR; INTRAVENOUS
Status: DISCONTINUED | OUTPATIENT
Start: 2023-05-12 | End: 2023-05-12

## 2023-05-12 RX ORDER — MAG HYDROX/ALUMINUM HYD/SIMETH 200-200-20
30 SUSPENSION, ORAL (FINAL DOSE FORM) ORAL EVERY 4 HOURS PRN
Status: DISCONTINUED | OUTPATIENT
Start: 2023-05-12 | End: 2023-05-16 | Stop reason: HOSPADM

## 2023-05-12 RX ORDER — METOPROLOL SUCCINATE 25 MG/1
25 TABLET, EXTENDED RELEASE ORAL DAILY
Status: DISCONTINUED | OUTPATIENT
Start: 2023-05-13 | End: 2023-05-16 | Stop reason: HOSPADM

## 2023-05-12 RX ORDER — ROCURONIUM BROMIDE 10 MG/ML
INJECTION, SOLUTION INTRAVENOUS
Status: DISCONTINUED | OUTPATIENT
Start: 2023-05-12 | End: 2023-05-12

## 2023-05-12 RX ORDER — ACETAMINOPHEN 325 MG/1
650 TABLET ORAL
Status: COMPLETED | OUTPATIENT
Start: 2023-05-12 | End: 2023-05-12

## 2023-05-12 RX ORDER — PROPOFOL 10 MG/ML
VIAL (ML) INTRAVENOUS CONTINUOUS PRN
Status: DISCONTINUED | OUTPATIENT
Start: 2023-05-12 | End: 2023-05-12

## 2023-05-12 RX ORDER — TRAMADOL HYDROCHLORIDE 50 MG/1
50 TABLET ORAL EVERY 6 HOURS PRN
Status: DISCONTINUED | OUTPATIENT
Start: 2023-05-12 | End: 2023-05-16 | Stop reason: HOSPADM

## 2023-05-12 RX ORDER — EPHEDRINE SULFATE 50 MG/ML
INJECTION, SOLUTION INTRAVENOUS
Status: DISCONTINUED | OUTPATIENT
Start: 2023-05-12 | End: 2023-05-12

## 2023-05-12 RX ORDER — BISACODYL 10 MG
10 SUPPOSITORY, RECTAL RECTAL DAILY
Status: DISCONTINUED | OUTPATIENT
Start: 2023-05-12 | End: 2023-05-16 | Stop reason: HOSPADM

## 2023-05-12 RX ORDER — OXYCODONE HCL 10 MG/1
10 TABLET, FILM COATED, EXTENDED RELEASE ORAL
Status: COMPLETED | OUTPATIENT
Start: 2023-05-12 | End: 2023-05-12

## 2023-05-12 RX ORDER — AMOXICILLIN 250 MG
2 CAPSULE ORAL NIGHTLY PRN
Status: DISCONTINUED | OUTPATIENT
Start: 2023-05-12 | End: 2023-05-16 | Stop reason: HOSPADM

## 2023-05-12 RX ORDER — SODIUM CHLORIDE, SODIUM LACTATE, POTASSIUM CHLORIDE, CALCIUM CHLORIDE 600; 310; 30; 20 MG/100ML; MG/100ML; MG/100ML; MG/100ML
INJECTION, SOLUTION INTRAVENOUS CONTINUOUS
Status: DISCONTINUED | OUTPATIENT
Start: 2023-05-12 | End: 2023-05-14

## 2023-05-12 RX ORDER — PREGABALIN 75 MG/1
75 CAPSULE ORAL
Status: COMPLETED | OUTPATIENT
Start: 2023-05-12 | End: 2023-05-12

## 2023-05-12 RX ORDER — CEFAZOLIN SODIUM 2 G/50ML
2 SOLUTION INTRAVENOUS
Status: COMPLETED | OUTPATIENT
Start: 2023-05-12 | End: 2023-05-13

## 2023-05-12 RX ORDER — LEVOTHYROXINE SODIUM 125 UG/1
125 TABLET ORAL
Status: DISCONTINUED | OUTPATIENT
Start: 2023-05-13 | End: 2023-05-16 | Stop reason: HOSPADM

## 2023-05-12 RX ORDER — PROPOFOL 10 MG/ML
VIAL (ML) INTRAVENOUS
Status: DISCONTINUED | OUTPATIENT
Start: 2023-05-12 | End: 2023-05-12

## 2023-05-12 RX ORDER — MIDAZOLAM HYDROCHLORIDE 1 MG/ML
INJECTION, SOLUTION INTRAMUSCULAR; INTRAVENOUS
Status: DISCONTINUED | OUTPATIENT
Start: 2023-05-12 | End: 2023-05-12

## 2023-05-12 RX ORDER — FENTANYL CITRATE 50 UG/ML
INJECTION, SOLUTION INTRAMUSCULAR; INTRAVENOUS
Status: DISCONTINUED | OUTPATIENT
Start: 2023-05-12 | End: 2023-05-12

## 2023-05-12 RX ORDER — BUPIVACAINE HCL/EPINEPHRINE 0.5-1:200K
VIAL (ML) INJECTION
Status: DISCONTINUED | OUTPATIENT
Start: 2023-05-12 | End: 2023-05-12 | Stop reason: HOSPADM

## 2023-05-12 RX ORDER — ALBUMIN HUMAN 50 G/1000ML
SOLUTION INTRAVENOUS CONTINUOUS PRN
Status: DISCONTINUED | OUTPATIENT
Start: 2023-05-12 | End: 2023-05-12

## 2023-05-12 RX ORDER — DEXAMETHASONE SODIUM PHOSPHATE 4 MG/ML
INJECTION, SOLUTION INTRA-ARTICULAR; INTRALESIONAL; INTRAMUSCULAR; INTRAVENOUS; SOFT TISSUE
Status: DISCONTINUED | OUTPATIENT
Start: 2023-05-12 | End: 2023-05-12

## 2023-05-12 RX ORDER — ACETAMINOPHEN 325 MG/1
650 TABLET ORAL EVERY 6 HOURS
Status: DISCONTINUED | OUTPATIENT
Start: 2023-05-12 | End: 2023-05-16 | Stop reason: HOSPADM

## 2023-05-12 RX ORDER — HEPARIN SODIUM 5000 [USP'U]/ML
5000 INJECTION, SOLUTION INTRAVENOUS; SUBCUTANEOUS EVERY 12 HOURS
Status: DISCONTINUED | OUTPATIENT
Start: 2023-05-12 | End: 2023-05-16 | Stop reason: HOSPADM

## 2023-05-12 RX ORDER — SUCCINYLCHOLINE CHLORIDE 20 MG/ML
INJECTION INTRAMUSCULAR; INTRAVENOUS
Status: DISCONTINUED | OUTPATIENT
Start: 2023-05-12 | End: 2023-05-12

## 2023-05-12 RX ORDER — CYCLOBENZAPRINE HCL 10 MG
10 TABLET ORAL
Status: COMPLETED | OUTPATIENT
Start: 2023-05-12 | End: 2023-05-12

## 2023-05-12 RX ORDER — ONDANSETRON 8 MG/1
8 TABLET, ORALLY DISINTEGRATING ORAL EVERY 6 HOURS PRN
Status: DISCONTINUED | OUTPATIENT
Start: 2023-05-12 | End: 2023-05-16 | Stop reason: HOSPADM

## 2023-05-12 RX ORDER — CELECOXIB 100 MG/1
200 CAPSULE ORAL
Status: COMPLETED | OUTPATIENT
Start: 2023-05-12 | End: 2023-05-12

## 2023-05-12 RX ORDER — OXYCODONE HYDROCHLORIDE 5 MG/1
10 TABLET ORAL EVERY 6 HOURS PRN
Status: DISCONTINUED | OUTPATIENT
Start: 2023-05-12 | End: 2023-05-16 | Stop reason: HOSPADM

## 2023-05-12 RX ORDER — HALOPERIDOL 5 MG/ML
0.5 INJECTION INTRAMUSCULAR EVERY 10 MIN PRN
Status: DISCONTINUED | OUTPATIENT
Start: 2023-05-12 | End: 2023-05-12 | Stop reason: HOSPADM

## 2023-05-12 RX ORDER — PROCHLORPERAZINE EDISYLATE 5 MG/ML
5 INJECTION INTRAMUSCULAR; INTRAVENOUS EVERY 6 HOURS PRN
Status: DISCONTINUED | OUTPATIENT
Start: 2023-05-12 | End: 2023-05-16 | Stop reason: HOSPADM

## 2023-05-12 RX ORDER — HYDROMORPHONE HYDROCHLORIDE 2 MG/ML
1 INJECTION, SOLUTION INTRAMUSCULAR; INTRAVENOUS; SUBCUTANEOUS
Status: DISCONTINUED | OUTPATIENT
Start: 2023-05-12 | End: 2023-05-16 | Stop reason: HOSPADM

## 2023-05-12 RX ORDER — LIDOCAINE HYDROCHLORIDE 20 MG/ML
INJECTION INTRAVENOUS
Status: DISCONTINUED | OUTPATIENT
Start: 2023-05-12 | End: 2023-05-12

## 2023-05-12 RX ORDER — METHOCARBAMOL 750 MG/1
750 TABLET, FILM COATED ORAL 3 TIMES DAILY
Status: DISCONTINUED | OUTPATIENT
Start: 2023-05-12 | End: 2023-05-16 | Stop reason: HOSPADM

## 2023-05-12 RX ORDER — HYDROMORPHONE HYDROCHLORIDE 2 MG/ML
0.2 INJECTION, SOLUTION INTRAMUSCULAR; INTRAVENOUS; SUBCUTANEOUS EVERY 5 MIN PRN
Status: DISCONTINUED | OUTPATIENT
Start: 2023-05-12 | End: 2023-05-12 | Stop reason: HOSPADM

## 2023-05-12 RX ORDER — SODIUM CHLORIDE, SODIUM LACTATE, POTASSIUM CHLORIDE, CALCIUM CHLORIDE 600; 310; 30; 20 MG/100ML; MG/100ML; MG/100ML; MG/100ML
INJECTION, SOLUTION INTRAVENOUS CONTINUOUS
Status: DISCONTINUED | OUTPATIENT
Start: 2023-05-12 | End: 2023-05-12

## 2023-05-12 RX ORDER — MUPIROCIN 20 MG/G
OINTMENT TOPICAL 2 TIMES DAILY
Status: DISCONTINUED | OUTPATIENT
Start: 2023-05-12 | End: 2023-05-16 | Stop reason: HOSPADM

## 2023-05-12 RX ADMIN — EPHEDRINE SULFATE 20 MG: 50 INJECTION, SOLUTION INTRAMUSCULAR; INTRAVENOUS; SUBCUTANEOUS at 07:05

## 2023-05-12 RX ADMIN — ROCURONIUM BROMIDE 15 MG: 10 INJECTION, SOLUTION INTRAVENOUS at 07:05

## 2023-05-12 RX ADMIN — EPHEDRINE SULFATE 10 MG: 50 INJECTION, SOLUTION INTRAMUSCULAR; INTRAVENOUS; SUBCUTANEOUS at 07:05

## 2023-05-12 RX ADMIN — GLYCOPYRROLATE 0.2 MG: 0.2 INJECTION, SOLUTION INTRAMUSCULAR; INTRAVITREAL at 07:05

## 2023-05-12 RX ADMIN — DEXAMETHASONE SODIUM PHOSPHATE 8 MG: 4 INJECTION, SOLUTION INTRA-ARTICULAR; INTRALESIONAL; INTRAMUSCULAR; INTRAVENOUS; SOFT TISSUE at 10:05

## 2023-05-12 RX ADMIN — ONDANSETRON 8 MG: 2 INJECTION, SOLUTION INTRAMUSCULAR; INTRAVENOUS at 10:05

## 2023-05-12 RX ADMIN — ALBUMIN (HUMAN): 12.5 SOLUTION INTRAVENOUS at 09:05

## 2023-05-12 RX ADMIN — ACETAMINOPHEN 650 MG: 325 TABLET ORAL at 06:05

## 2023-05-12 RX ADMIN — OXYCODONE HYDROCHLORIDE 10 MG: 10 TABLET, FILM COATED, EXTENDED RELEASE ORAL at 06:05

## 2023-05-12 RX ADMIN — SUGAMMADEX 200 MG: 100 INJECTION, SOLUTION INTRAVENOUS at 10:05

## 2023-05-12 RX ADMIN — ROCURONIUM BROMIDE 5 MG: 10 INJECTION, SOLUTION INTRAVENOUS at 07:05

## 2023-05-12 RX ADMIN — HYDROMORPHONE HYDROCHLORIDE 0.2 MG: 2 INJECTION, SOLUTION INTRAMUSCULAR; INTRAVENOUS; SUBCUTANEOUS at 01:05

## 2023-05-12 RX ADMIN — PROPOFOL 100 MG: 10 INJECTION, EMULSION INTRAVENOUS at 07:05

## 2023-05-12 RX ADMIN — SODIUM CHLORIDE, SODIUM LACTATE, POTASSIUM CHLORIDE, AND CALCIUM CHLORIDE: .6; .31; .03; .02 INJECTION, SOLUTION INTRAVENOUS at 07:05

## 2023-05-12 RX ADMIN — MIDAZOLAM 2 MG: 1 INJECTION INTRAMUSCULAR; INTRAVENOUS at 07:05

## 2023-05-12 RX ADMIN — METHOCARBAMOL TABLETS 750 MG: 750 TABLET, COATED ORAL at 03:05

## 2023-05-12 RX ADMIN — SUCCINYLCHOLINE CHLORIDE 120 MG: 20 INJECTION, SOLUTION INTRAMUSCULAR; INTRAVENOUS at 07:05

## 2023-05-12 RX ADMIN — FENTANYL CITRATE 25 MCG: 50 INJECTION, SOLUTION INTRAMUSCULAR; INTRAVENOUS at 08:05

## 2023-05-12 RX ADMIN — PROPAFENONE HYDROCHLORIDE 150 MG: 150 TABLET, FILM COATED ORAL at 03:05

## 2023-05-12 RX ADMIN — MUPIROCIN: 20 OINTMENT TOPICAL at 09:05

## 2023-05-12 RX ADMIN — METHOCARBAMOL TABLETS 750 MG: 750 TABLET, COATED ORAL at 09:05

## 2023-05-12 RX ADMIN — PROPAFENONE HYDROCHLORIDE 150 MG: 150 TABLET, FILM COATED ORAL at 09:05

## 2023-05-12 RX ADMIN — SODIUM CHLORIDE, POTASSIUM CHLORIDE, SODIUM LACTATE AND CALCIUM CHLORIDE: 600; 310; 30; 20 INJECTION, SOLUTION INTRAVENOUS at 02:05

## 2023-05-12 RX ADMIN — PHENYLEPHRINE HYDROCHLORIDE 0.5 MCG/KG/MIN: 10 INJECTION INTRAVENOUS at 07:05

## 2023-05-12 RX ADMIN — CEFAZOLIN SODIUM 2 G: 2 SOLUTION INTRAVENOUS at 07:05

## 2023-05-12 RX ADMIN — HYDROMORPHONE HYDROCHLORIDE 0.2 MG: 2 INJECTION, SOLUTION INTRAMUSCULAR; INTRAVENOUS; SUBCUTANEOUS at 10:05

## 2023-05-12 RX ADMIN — FENTANYL CITRATE 75 MCG: 50 INJECTION, SOLUTION INTRAMUSCULAR; INTRAVENOUS at 07:05

## 2023-05-12 RX ADMIN — PROPOFOL 150 MCG/KG/MIN: 10 INJECTION, EMULSION INTRAVENOUS at 07:05

## 2023-05-12 RX ADMIN — CELECOXIB 200 MG: 100 CAPSULE ORAL at 06:05

## 2023-05-12 RX ADMIN — HEPARIN SODIUM 5000 UNITS: 5000 INJECTION INTRAVENOUS; SUBCUTANEOUS at 09:05

## 2023-05-12 RX ADMIN — CEFAZOLIN SODIUM 2 G: 2 SOLUTION INTRAVENOUS at 03:05

## 2023-05-12 RX ADMIN — OXYCODONE HYDROCHLORIDE 10 MG: 5 TABLET ORAL at 09:05

## 2023-05-12 RX ADMIN — SODIUM CHLORIDE 0.2 MCG/KG/MIN: 9 INJECTION, SOLUTION INTRAVENOUS at 07:05

## 2023-05-12 RX ADMIN — SODIUM CHLORIDE, POTASSIUM CHLORIDE, SODIUM LACTATE AND CALCIUM CHLORIDE: 600; 310; 30; 20 INJECTION, SOLUTION INTRAVENOUS at 06:05

## 2023-05-12 RX ADMIN — PREGABALIN 75 MG: 75 CAPSULE ORAL at 06:05

## 2023-05-12 RX ADMIN — CYCLOBENZAPRINE 10 MG: 10 TABLET, FILM COATED ORAL at 06:05

## 2023-05-12 RX ADMIN — PROPOFOL 40 MG: 10 INJECTION, EMULSION INTRAVENOUS at 08:05

## 2023-05-12 RX ADMIN — LIDOCAINE HYDROCHLORIDE 100 MG: 20 INJECTION, SOLUTION INTRAVENOUS at 07:05

## 2023-05-12 NOTE — OP NOTE
Date of surgery 05/12/2023    Preop diagnosis   1. T6-7 and T7-8 disc herniation with myelopathy    Postop diagnosis   Same    Surgery   1. Anterior T6-7 and T7-8 minimally invasive retro pleural diskectomy for spinal cord decompression  2. Fluoroscopy  3. Neuro monitoring using MEP, SSEP, EMG    Surgeon   Scooter Salter MD    Indication  This is a very pleasant 72 y.o. female, who was active at the beginning of 2022, mowing her lawn then suddenly started to have difficulty walking, severe back pain and right leg pain.  She has developed a paralysis involving the right leg.  Her right leg appears spastic.  She is mainly complaining of right knee pain. She is being mobilized in a wheelchair.  She admits still being able to drive her car.  In June 2022 she had a thoracic and lumbar spine MRI.  She is being investigated for possible multiple sclerosis.  She reports having no loss of sensation to pain or temperature.  She has no voiding difficulty or incontinence.  She reports right more than left hand numbness and dropping things frequently.    Procedure  The patient was intubated under general anesthesia and positioned in lateral decubitus, right side up on a radiolucent table.  All pressure points were carefully padded and the patient was securely taped on the table.  Using fluoroscopy we planned a lateral incision in line with the T7 vertebral body and with the underlying rib.  The patient thoracic area and back area was prepped and draped in a typical sterile fashion.  Local anesthesia with 0.5 Marcaine with epi.  The skin was incised and hemostasis was carried out over the subcutaneous tissue.  The muscular fascia was divided and the underlying scapula was dissected.  We then removed the distal tip of the scapula to expose the underlying rib.  The rib in his superior portion was dissected and removed with Kerrison partially.  We then dissected the retropleural space with a finger and using tubular retractor.  We  then placed a 3 blade retractor with the middle blade retracting the parietal pleura over the T 7 8 disc space and T8 rib head.  Good positioning of the retractor was confirmed with fluoroscopy.  We then dissected with the long Bovie the parietal pleura over the rib head.  The rib head was drilled down to the disc space.  The inferior posterior endplate of T7 and superior posterior endplate of T8 was also drilled on each side of the posterior disc space.  The canal was then penetrated and the PLL was identified.  Disc herniation between the PLL and the disc were removed with pituitary rongeur is.  The PLL was divided to make sure the dura was fully decompressed.  Hemostasis was carried out.  The same procedure was done in the same fashion through the same incision at T6-7.  The tubular retractor was removed and we left a large Hemovac drain in the retropleural space.  We irrigated and no air bubbles were noted.  The drain was tunneled through the skin and fixated with 2-0 nylon.  The muscle and fascial layer was closed with interrupted 0 Vicryl.  The dermal layer was closed with inverted interrupted 2-0 Vicryl.  The skin was closed with staples.  No complication.  Blood loss was estimated at 150 cc.

## 2023-05-12 NOTE — H&P
NEUROSURGICAL NEUROSURGERY CONSULT NOTE     DATE OF SERVICE:  05/12/23     ATTENDING PHYSICIAN:  Scooter Salter MD     SUBJECTIVE:     INTERIM HISTORY:     This is a very pleasant 72 y.o. female, who was active at the beginning of 2022, mowing her lawn then suddenly started to have difficulty walking, severe back pain and right leg pain.  She has developed a paralysis involving the right leg.  Her right leg appears spastic.  She is mainly complaining of right knee pain. She is being mobilized in a wheelchair.  She admits still being able to drive her car.  In June 2022 she had a thoracic and lumbar spine MRI.  She is being investigated for possible multiple sclerosis.  She reports having no loss of sensation to pain or temperature.  She has no voiding difficulty or incontinence.  She reports right more than left hand numbness and dropping things frequently.           PAST MEDICAL HISTORY:       Active Ambulatory Problems     Diagnosis Date Noted    Postoperative hypothyroidism 02/19/2018    History of thyroid cancer 02/19/2018    Essential hypertension, benign 02/19/2018    Mixed hyperlipidemia 02/19/2018    Benign paroxysmal positional vertigo 08/01/2018    History of colon polyps 10/18/2018    Bilateral post-traumatic osteoarthritis of knee 06/21/2022    Paroxysmal tachycardia 06/22/2022    DDD (degenerative disc disease), lumbar 08/09/2022    Lumbar radiculopathy 08/09/2022    PAF (paroxysmal atrial fibrillation) 08/10/2022    Multiple sclerosis 12/01/2022    Anticoagulant long-term use 02/23/2023           Resolved Ambulatory Problems     Diagnosis Date Noted    Weakness 06/20/2022    New onset atrial fibrillation 08/09/2022           Past Medical History:   Diagnosis Date    Arthritis      Cancer      Edema      Hyperlipidemia      Hypertension      Thyroid disease           PAST SURGICAL HISTORY:        Past Surgical History:   Procedure Laterality Date    ADENOIDECTOMY        COLONOSCOPY   10/18/2018     COLONOSCOPY N/A 10/18/2018     Procedure: COLONOSCOPY;  Surgeon: Yosvany Alejandra MD;  Location: [facility] ENDO;  Service: General;  Laterality: N/A;    EPIDURAL STEROID INJECTION N/A 9/30/2022     Procedure: LUMBAR L3/4 MALKA CONTRAST DIRRECT REFERRAL MONICA CARRERA IN CHART;  Surgeon: Nicholas Kumar MD;  Location: [facility] PAIN MGT;  Service: Pain Management;  Laterality: N/A;    HYSTERECTOMY   1992     total hyst     KNEE SURGERY         16 pins and two plates implanted    THYROIDECTOMY        TONSILLECTOMY             SOCIAL HISTORY:   Social History               Socioeconomic History    Marital status:    Tobacco Use    Smoking status: Never    Smokeless tobacco: Never   Substance and Sexual Activity    Alcohol use: No    Drug use: No    Sexual activity: Not Currently       Partners: Male            FAMILY HISTORY:        Family History   Problem Relation Age of Onset    Alcohol abuse Mother      Heart disease Mother      Alcohol abuse Father      Heart disease Father      Heart disease Maternal Grandmother      Heart disease Maternal Grandfather           CURRENTS MEDICATIONS:         Current Outpatient Medications on File Prior to Visit   Medication Sig Dispense Refill    b complex vitamins capsule Take 1 capsule by mouth once daily.        collagen-biotin-ascorbic acid (COLLAGEN 1500 PLUS C) 500 mg-800 mcg- 50 mg Cap Take by mouth.        ELIQUIS 5 mg Tab Take 1 tablet (5 mg total) by mouth 2 (two) times daily. 60 tablet 11    hydroCHLOROthiazide (HYDRODIURIL) 12.5 MG Tab Take 1 tablet (12.5 mg total) by mouth once daily. 30 tablet 11    levothyroxine (SYNTHROID) 125 MCG tablet TAKE 1 TABLET BY MOUTH BEFORE BREAKFAST 90 tablet 1    losartan (COZAAR) 25 MG tablet Take 1 tablet (25 mg total) by mouth once daily. 90 tablet 3    meclizine (ANTIVERT) 25 mg tablet Take 1 tablet (25 mg total) by mouth 3 (three) times daily as needed for Dizziness. 30 tablet 3    metoprolol succinate (TOPROL-XL) 25 MG 24 hr tablet  Take 1 tablet by mouth once daily 90 tablet 3    multivitamin (THERAGRAN) per tablet Take 1 tablet by mouth once daily.        propafenone (RHTHYMOL) 150 MG Tab Take 1 tablet (150 mg total) by mouth every 8 (eight) hours. 90 tablet 11    rosuvastatin (CRESTOR) 10 MG tablet Take 1 tablet (10 mg total) by mouth once daily. 90 tablet 3    vitamin D (VITAMIN D3) 1000 units Tab Take 2 Units by mouth once daily.        vitamin E 400 UNIT capsule Take 400 Units by mouth.        vitamins A,C,E-zinc-copper (ICAPS AREDS) 4,296 mcg-226 mg-90 mg Cap Take by mouth.        [DISCONTINUED] atorvastatin (LIPITOR) 40 MG tablet Take 1 tablet (40 mg total) by mouth once daily. 90 tablet 3      No current facility-administered medications on file prior to visit.         ALLERGIES:       Review of patient's allergies indicates:   Allergen Reactions    Vancomycin analogues Tinitus         REVIEW OF SYSTEMS:  Review of Systems   Constitutional:  Negative for diaphoresis, fever and weight loss.   Respiratory:  Negative for shortness of breath.    Cardiovascular:  Negative for chest pain.   Gastrointestinal:  Negative for blood in stool.   Genitourinary:  Negative for hematuria.   Endo/Heme/Allergies:  Does not bruise/bleed easily.   All other systems reviewed and are negative.        OBJECTIVE:     PHYSICAL EXAMINATION:       Vitals:     03/10/23 1321   BP: 127/68   Pulse: (!) 50         Physical Exam:  Vitals reviewed.     Constitutional: She appears well-developed and well-nourished.      Eyes: Pupils are equal, round, and reactive to light. Conjunctivae and EOM are normal.      Cardiovascular: Normal distal pulses and no edema.      Abdominal: Soft.      Skin: Skin displays no rash on trunk and no rash on extremities. Skin displays no lesions on trunk and no lesions on extremities.      Psych/Behavior: She is alert. She is oriented to person, place, and time. She has a normal mood and affect.      Musculoskeletal:        Neck: Range of  motion is full.      Neurological:        DTRs: Tricep reflexes are 2+ on the right side and 2+ on the left side. Bicep reflexes are 2+ on the right side and 2+ on the left side. Brachioradialis reflexes are 2+ on the right side and 2+ on the left side. Patellar reflexes are 2+ on the right side and 2+ on the left side. Achilles reflexes are 0 on the right side and 0 on the left side.      Back Exam      Muscle Strength   Right Quadriceps:  2/5   Left Quadriceps:  5/5   Right Hamstrings:  2/5   Left Hamstrings:  5/5                  Neurologic Exam      Mental Status   Oriented to person, place, and time.   Speech: speech is normal   Level of consciousness: alert     Cranial Nerves   Cranial nerves II through XII intact.      CN III, IV, VI   Pupils are equal, round, and reactive to light.  Extraocular motions are normal.      Motor Exam   Muscle bulk: normal  Overall muscle tone: normal     Strength   Right deltoid: 5/5  Left deltoid: 5/5  Right biceps: 5/5  Left biceps: 5/5  Right triceps: 5/5  Left triceps: 5/5  Right wrist flexion: 5/5  Left wrist flexion: 5/5  Right wrist extension: 5/5  Left wrist extension: 5/5  Right interossei: 5/5  Left interossei: 5/5  Right iliopsoas: 3/5  Left iliopsoas: 5/5  Right quadriceps: 2/5  Left quadriceps: 5/5  Right hamstrin/5  Left hamstrin/5  Right anterior tibial: 2/5  Left anterior tibial: 5/5  Right posterior tibial: 2/5  Left posterior tibial: 5/5  Right peroneal: 2/5  Left peroneal: 5/5  Right gastroc: 2/5  Left gastroc: 5/5     Sensory Exam   Light touch normal.   Pinprick normal.      Gait, Coordination, and Reflexes      Reflexes   Right brachioradialis: 2+  Left brachioradialis: 2+  Right biceps: 2+  Left biceps: 2+  Right triceps: 2+  Left triceps: 2+  Right patellar: 2+  Left patellar: 2+  Right achilles: 0  Left achilles: 0  Right plantar: normal  Left plantar: normal  Right Betancourt: absent  Left Betancourt: present  Right ankle clonus: absent  Left ankle  clonus: absent        DIAGNOSTIC DATA:  I personally interpreted the following imaging:   A compared the thoracic and lumbar spine MRI from June 2022 with the thoracic and lumbar spinal MRI from 2023 and I have reviewed the cervical spine MRI of 2023.  She has moderate stenosis at C5-6 and C6-7 with some cord compression, she has a right posterolateral T 6-7 disc herniation causing some cord displacement and a central T7-8 disc herniation causing severe stenosis.  She has worsening L3-4 spinal stenosis now severe, so she has severe L4-5 spinal stenosis and severe L5-S1 bilateral foraminal stenosis with some lateral recess stenosis  The brain MRI shows sequela of chronic demyelination but no active demyelination     ASSESMENT:  This is a 72 y.o. female with      Problem List Items Addressed This Visit    None  Visit Diagnoses         Cervical spondylosis with myelopathy    -  Primary     Thoracic spondylosis with myelopathy         Lumbar stenosis with neurogenic claudication         Spinal stenosis of lumbar region with radiculopathy         Demyelinating disorder                       PLAN:  I explained to her that her situation is complex.  She has significant right lower extremity weakness with spasticity.  Most likely the right leg weakness is coming from the thoracic cord compression.  She also has significant spinal stenosis at L3-4 and L4-5 that also can contribute to the right leg weakness.  She appears to have also some symptoms of cervical myelopathy.     Is hard to define which stenosis needs to be treated primarily.  I explained to her that she might have permanent spinal cord or nerve root damage explaining the right leg weakness.  I can not guarantee the right leg weakness will improve over time.  I think it is reasonable to proceed 1st with a right retropleural T6-7 and T7-8 anterior diskectomy for cord decompression in order to prevent worsening myelopathy coming from the spondylosis at those levels.   The surgery can be followed  3 months later with a L3-4 and L4-5 laminectomy if the patient recovered well from the anterior thoracic diskectomy surgery.     I explained the natural history of the disease and all treatment options.     We have discussed the risks of surgery including death, coma, bleeding, infection, failure of surgery, CSF leak, nerve root injury, spinal cord injury, ureter injury, weakness, paralysis, peripheral neuropathy, malplaced hardware, migration of hardware, non-union, need for reoperation. Patient understands the risks and would like to proceed with surgery.             Scooter Salter MD  Cell:[phone]

## 2023-05-12 NOTE — PROGRESS NOTES
Patient arrived to unit. VSS. Patient drowsy and requesting something to eat. Denies any pain at this time. Drain intact. IV fluids started. Bed alarm in place.

## 2023-05-12 NOTE — PT/OT/SLP PROGRESS
Occupational Therapy  Visit Attempt     Patient Name:  Graciela Barton   MRN:  [id number]    Patient not seen today secondary to Other (Comment) (pt initially agreeable to participate, however, following home environment and PLOF questioning, then declining participation). Will follow-up as available.        Pt resides alone, in 2 story home, 3 steps to enter, no rail, WIS. Pt reports mod I from w/c level for ADLs (transport w/c), only ambulates with RW in bathroom 2/2 w/c not fitting into doorway; shower chair for bathing. Reports she gets groceries delivered and heats up meals in microwave. Sleeps in recliner .       5/12/2023

## 2023-05-12 NOTE — PLAN OF CARE
Patient AAOX4, patient has SCDs. Tolerating diet, no nausea or vomiting. Patient's dressing clean, dry, and intact. Call bell in reach. Safety maintained. Will continue to monitor.   Problem: Adult Inpatient Plan of Care  Goal: Plan of Care Review  Outcome: Ongoing, Progressing  Goal: Patient-Specific Goal (Individualized)  Outcome: Ongoing, Progressing  Goal: Absence of Hospital-Acquired Illness or Injury  Outcome: Ongoing, Progressing  Goal: Optimal Comfort and Wellbeing  Outcome: Ongoing, Progressing  Goal: Readiness for Transition of Care  Outcome: Ongoing, Progressing     Problem: Infection  Goal: Absence of Infection Signs and Symptoms  Outcome: Ongoing, Progressing     Problem: Bleeding (Spinal Surgery)  Goal: Absence of Bleeding  Outcome: Ongoing, Progressing     Problem: Bowel Motility Impaired (Spinal Surgery)  Goal: Effective Bowel Elimination  Outcome: Ongoing, Progressing     Problem: Fluid and Electrolyte Imbalance (Spinal Surgery)  Goal: Fluid and Electrolyte Balance  Outcome: Ongoing, Progressing     Problem: Functional Ability Impaired (Spinal Surgery)  Goal: Optimal Functional Ability  Outcome: Ongoing, Progressing     Problem: Infection (Spinal Surgery)  Goal: Absence of Infection Signs and Symptoms  Outcome: Ongoing, Progressing     Problem: Neurologic Impairment (Spinal Surgery)  Goal: Optimal Neurologic Function  Outcome: Ongoing, Progressing     Problem: Ongoing Anesthesia Effects (Spinal Surgery)  Goal: Anesthesia/Sedation Recovery  Outcome: Ongoing, Progressing     Problem: Pain (Spinal Surgery)  Goal: Acceptable Pain Control  Outcome: Ongoing, Progressing     Problem: Postoperative Nausea and Vomiting (Spinal Surgery)  Goal: Nausea and Vomiting Relief  Outcome: Ongoing, Progressing     Problem: Postoperative Urinary Retention (Spinal Surgery)  Goal: Effective Urinary Elimination  Outcome: Ongoing, Progressing     Problem: Respiratory Compromise (Spinal Surgery)  Goal: Effective Oxygenation  and Ventilation  Outcome: Ongoing, Progressing

## 2023-05-12 NOTE — ANESTHESIA POSTPROCEDURE EVALUATION
Anesthesia Post Evaluation    Patient: Graciela Barton    Procedure(s) Performed: Procedure(s) (LRB):  DISCECTOMY, SPINE, THORACIC Right T6-7, T7-8 interior discectomy retropleural (Right)    Final Anesthesia Type: general      Patient location during evaluation: PACU  Patient participation: Yes- Able to Participate  Level of consciousness: awake  Post-procedure vital signs: reviewed and stable  Pain management: adequate  Airway patency: patent    PONV status at discharge: No PONV  Anesthetic complications: no      Cardiovascular status: blood pressure returned to baseline  Respiratory status: unassisted  Hydration status: euvolemic  Follow-up not needed.          Vitals Value Taken Time   /72 05/12/23 1501   Temp 35.9 °C (96.7 °F) 05/12/23 1501   Pulse 75 05/12/23 1501   Resp 17 05/12/23 1501   SpO2 96 % 05/12/23 1548         Event Time   Out of Recovery 14:41:35         Pain/Tyler Score: Pain Rating Prior to Med Admin: 6 (5/12/2023  1:35 PM)  Tyler Score: 10 (5/12/2023  2:17 PM)

## 2023-05-12 NOTE — ANESTHESIA PROCEDURE NOTES
Intubation    Date/Time: 5/12/2023 7:26 AM  Performed by: Lawanda León CRNA  Authorized by: Scooter Salter MD     Intubation:     Induction:  Intravenous    Intubated:  Postinduction    Mask Ventilation:  Easy mask    Attempts:  1    Attempted By:  CRNA    Method of Intubation:  Video laryngoscopy    Blade:  Chawla 3    Laryngeal View Grade: Grade I - full view of cords      Difficult Airway Encountered?: No      Complications:  None    Airway Device:  Oral endotracheal tube    Airway Device Size:  7.0    Style/Cuff Inflation:  Cuffed (inflated to minimal occlusive pressure)    Tube secured:  22    Secured at:  The teeth    Placement Verified By:  Capnometry    Complicating Factors:  None    Findings Post-Intubation:  BS equal bilateral and atraumatic/condition of teeth unchanged

## 2023-05-12 NOTE — PT/OT/SLP PROGRESS
Physical Therapy      Patient Name:  Graciela Barton   MRN:  [id number]    Patient not seen today secondary to pt initially agreeable to evaluation then upon initiating mobility pt deferred mobility today.  Pt lives alone in a 2 story house with 3 CHRIS with WIS and shower chair  DME: RW, shower chair, rollator, transport w/c    Pt primarily uses a transport w/c for mobility, ambulating short distance from bathroom entrance to toilet with RW.  Pt gets [employer] food delivery and warms up TV dinners.    Will follow-up.    5/12/2023

## 2023-05-12 NOTE — TRANSFER OF CARE
"Anesthesia Transfer of Care Note    Patient: Graciela Barton    Procedure(s) Performed: Procedure(s) (LRB):  DISCECTOMY, SPINE, THORACIC Right T6-7, T7-8 interior discectomy retropleural (Right)    Patient location: PACU    Transport from OR: Transported from OR on 6-10 L/min O2 by face mask with adequate spontaneous ventilation    Post pain: adequate analgesia    Post assessment: no apparent anesthetic complications and tolerated procedure well    Post vital signs: stable    Level of consciousness: responds to stimulation and sedated    Nausea/Vomiting: no nausea/vomiting    Complications: none    Transfer of care protocol was followed      Last vitals:   Visit Vitals  BP (!) 164/72 (BP Location: Right arm, Patient Position: Lying)   Pulse 64   Temp 36.7 °C (98.1 °F) (Skin)   Resp 16   Ht 5' 7" (1.702 m)   SpO2 97%   Breastfeeding No   BMI 26.93 kg/m²     "

## 2023-05-12 NOTE — PLAN OF CARE
05/12/23 1719   Admission   Initial VN Admission Questions Complete   Communication Issues? None   Shift   Virtual Nurse - Rounding Complete   Virtual Nurse - Patient Verbalized Approval Of Camera Use;VN Rounding  (Pt asleep, friend/Caregiver at beside and assisting with admit questions.)   Safety/Activity   Patient Rounds bed in low position;placement of personal items at bedside;call light in patient/parent reach;visualized patient;clutter free environment maintained   Safety Promotion/Fall Prevention assistive device/personal item within reach;Fall Risk reviewed with patient/family;side rails raised x 2;instructed to call staff for mobility   Positioning   Head of Bed (HOB) Positioning HOB at 30-45 degrees   Pain/Comfort/Sleep   Preferred Pain Scale FACES (Daniels-Horne FACES Pain Rating Scale)   FACES Pain Rating: Rest 0-->no hurt   POSS (Pasero Opioid-Induced Sed Scale) 2 - Slightly drowsy, easily aroused   VN cued into patient's  room for introduction with patient's caregiver's permission.  Pt awakens and falls back asleep. VN role explained and informed patient and Caregiver  that VN would be working with bedside nurse and rest of care team.  Fall risk and bed alarm protocol education provided.  Instructed patient to call for assistance.  Patient's chart, labs and vital signs reviewed.  Allowed time for questions,  Pt is without signs of pain, anxiety or dyspnea.  Will continue to be available as needed.

## 2023-05-13 LAB
ANION GAP SERPL CALC-SCNC: 6 MMOL/L (ref 8–16)
BASOPHILS # BLD AUTO: 0.01 K/UL (ref 0–0.2)
BASOPHILS NFR BLD: 0.1 % (ref 0–1.9)
BUN SERPL-MCNC: 16 MG/DL (ref 8–23)
CALCIUM SERPL-MCNC: 8.7 MG/DL (ref 8.7–10.5)
CHLORIDE SERPL-SCNC: 105 MMOL/L (ref 95–110)
CO2 SERPL-SCNC: 29 MMOL/L (ref 23–29)
CREAT SERPL-MCNC: 0.8 MG/DL (ref 0.5–1.4)
DIFFERENTIAL METHOD: ABNORMAL
EOSINOPHIL # BLD AUTO: 0 K/UL (ref 0–0.5)
EOSINOPHIL NFR BLD: 0.2 % (ref 0–8)
ERYTHROCYTE [DISTWIDTH] IN BLOOD BY AUTOMATED COUNT: 12 % (ref 11.5–14.5)
EST. GFR  (NO RACE VARIABLE): >60 ML/MIN/1.73 M^2
GLUCOSE SERPL-MCNC: 134 MG/DL (ref 70–110)
HCT VFR BLD AUTO: 27.4 % (ref 37–48.5)
HGB BLD-MCNC: 8.9 G/DL (ref 12–16)
IMM GRANULOCYTES # BLD AUTO: 0.06 K/UL (ref 0–0.04)
IMM GRANULOCYTES NFR BLD AUTO: 0.5 % (ref 0–0.5)
LYMPHOCYTES # BLD AUTO: 1.1 K/UL (ref 1–4.8)
LYMPHOCYTES NFR BLD: 8.9 % (ref 18–48)
MCH RBC QN AUTO: 31.7 PG (ref 27–31)
MCHC RBC AUTO-ENTMCNC: 32.5 G/DL (ref 32–36)
MCV RBC AUTO: 98 FL (ref 82–98)
MONOCYTES # BLD AUTO: 1.1 K/UL (ref 0.3–1)
MONOCYTES NFR BLD: 8.3 % (ref 4–15)
NEUTROPHILS # BLD AUTO: 10.4 K/UL (ref 1.8–7.7)
NEUTROPHILS NFR BLD: 82 % (ref 38–73)
NRBC BLD-RTO: 0 /100 WBC
PLATELET # BLD AUTO: 255 K/UL (ref 150–450)
PMV BLD AUTO: 11 FL (ref 9.2–12.9)
POTASSIUM SERPL-SCNC: 3.8 MMOL/L (ref 3.5–5.1)
RBC # BLD AUTO: 2.81 M/UL (ref 4–5.4)
SODIUM SERPL-SCNC: 140 MMOL/L (ref 136–145)
WBC # BLD AUTO: 12.71 K/UL (ref 3.9–12.7)

## 2023-05-13 PROCEDURE — 97161 PT EVAL LOW COMPLEX 20 MIN: CPT

## 2023-05-13 PROCEDURE — 80048 BASIC METABOLIC PNL TOTAL CA: CPT | Performed by: NEUROLOGICAL SURGERY

## 2023-05-13 PROCEDURE — 85025 COMPLETE CBC W/AUTO DIFF WBC: CPT | Performed by: NEUROLOGICAL SURGERY

## 2023-05-13 PROCEDURE — 97530 THERAPEUTIC ACTIVITIES: CPT

## 2023-05-13 PROCEDURE — 11000001 HC ACUTE MED/SURG PRIVATE ROOM

## 2023-05-13 PROCEDURE — 25000003 PHARM REV CODE 250: Performed by: NEUROLOGICAL SURGERY

## 2023-05-13 PROCEDURE — 99900035 HC TECH TIME PER 15 MIN (STAT)

## 2023-05-13 PROCEDURE — 97166 OT EVAL MOD COMPLEX 45 MIN: CPT

## 2023-05-13 PROCEDURE — 36415 COLL VENOUS BLD VENIPUNCTURE: CPT | Performed by: NEUROLOGICAL SURGERY

## 2023-05-13 PROCEDURE — 94799 UNLISTED PULMONARY SVC/PX: CPT

## 2023-05-13 PROCEDURE — 63600175 PHARM REV CODE 636 W HCPCS: Performed by: NEUROLOGICAL SURGERY

## 2023-05-13 PROCEDURE — 94761 N-INVAS EAR/PLS OXIMETRY MLT: CPT

## 2023-05-13 PROCEDURE — 97535 SELF CARE MNGMENT TRAINING: CPT

## 2023-05-13 RX ADMIN — HEPARIN SODIUM 5000 UNITS: 5000 INJECTION INTRAVENOUS; SUBCUTANEOUS at 09:05

## 2023-05-13 RX ADMIN — OXYCODONE HYDROCHLORIDE 10 MG: 5 TABLET ORAL at 09:05

## 2023-05-13 RX ADMIN — SODIUM CHLORIDE, POTASSIUM CHLORIDE, SODIUM LACTATE AND CALCIUM CHLORIDE: 600; 310; 30; 20 INJECTION, SOLUTION INTRAVENOUS at 11:05

## 2023-05-13 RX ADMIN — PROPAFENONE HYDROCHLORIDE 150 MG: 150 TABLET, FILM COATED ORAL at 05:05

## 2023-05-13 RX ADMIN — PROPAFENONE HYDROCHLORIDE 150 MG: 150 TABLET, FILM COATED ORAL at 02:05

## 2023-05-13 RX ADMIN — PROPAFENONE HYDROCHLORIDE 150 MG: 150 TABLET, FILM COATED ORAL at 09:05

## 2023-05-13 RX ADMIN — SODIUM CHLORIDE, POTASSIUM CHLORIDE, SODIUM LACTATE AND CALCIUM CHLORIDE: 600; 310; 30; 20 INJECTION, SOLUTION INTRAVENOUS at 12:05

## 2023-05-13 RX ADMIN — METHOCARBAMOL TABLETS 750 MG: 750 TABLET, COATED ORAL at 09:05

## 2023-05-13 RX ADMIN — DOCUSATE SODIUM AND SENNOSIDES 2 TABLET: 8.6; 5 TABLET, FILM COATED ORAL at 09:05

## 2023-05-13 RX ADMIN — LEVOTHYROXINE SODIUM 125 MCG: 125 TABLET ORAL at 05:05

## 2023-05-13 RX ADMIN — ACETAMINOPHEN 650 MG: 325 TABLET ORAL at 05:05

## 2023-05-13 RX ADMIN — MUPIROCIN: 20 OINTMENT TOPICAL at 09:05

## 2023-05-13 RX ADMIN — METHOCARBAMOL TABLETS 750 MG: 750 TABLET, COATED ORAL at 02:05

## 2023-05-13 RX ADMIN — METOPROLOL SUCCINATE 25 MG: 25 TABLET, EXTENDED RELEASE ORAL at 09:05

## 2023-05-13 RX ADMIN — CEFAZOLIN SODIUM 2 G: 2 SOLUTION INTRAVENOUS at 12:05

## 2023-05-13 RX ADMIN — ACETAMINOPHEN 650 MG: 325 TABLET ORAL at 11:05

## 2023-05-13 RX ADMIN — DOCUSATE SODIUM AND SENNOSIDES 2 TABLET: 8.6; 5 TABLET, FILM COATED ORAL at 05:05

## 2023-05-13 RX ADMIN — BISACODYL 10 MG: 10 SUPPOSITORY RECTAL at 09:05

## 2023-05-13 RX ADMIN — ACETAMINOPHEN 650 MG: 325 TABLET ORAL at 12:05

## 2023-05-13 NOTE — PLAN OF CARE
Problem: Adult Inpatient Plan of Care  Goal: Plan of Care Review  Outcome: Ongoing, Progressing   Updated care plan. Chart review complete.

## 2023-05-13 NOTE — PLAN OF CARE
Patient AAOX4, VSS, patient c/o minimal pain. Patient up to chair and BSC with assistance. Free from falls. Patient tolerating diet, no nausea or vomiting. John bell in reach. Safety maintained. Incision clean dry and intact.   Problem: Adult Inpatient Plan of Care  Goal: Plan of Care Review  Outcome: Ongoing, Progressing  Goal: Patient-Specific Goal (Individualized)  Outcome: Ongoing, Progressing  Goal: Absence of Hospital-Acquired Illness or Injury  Outcome: Ongoing, Progressing  Goal: Optimal Comfort and Wellbeing  Outcome: Ongoing, Progressing  Goal: Readiness for Transition of Care  Outcome: Ongoing, Progressing     Problem: Infection  Goal: Absence of Infection Signs and Symptoms  Outcome: Ongoing, Progressing     Problem: Bleeding (Spinal Surgery)  Goal: Absence of Bleeding  Outcome: Ongoing, Progressing     Problem: Bowel Motility Impaired (Spinal Surgery)  Goal: Effective Bowel Elimination  Outcome: Ongoing, Progressing     Problem: Fluid and Electrolyte Imbalance (Spinal Surgery)  Goal: Fluid and Electrolyte Balance  Outcome: Ongoing, Progressing     Problem: Functional Ability Impaired (Spinal Surgery)  Goal: Optimal Functional Ability  Outcome: Ongoing, Progressing     Problem: Infection (Spinal Surgery)  Goal: Absence of Infection Signs and Symptoms  Outcome: Ongoing, Progressing     Problem: Neurologic Impairment (Spinal Surgery)  Goal: Optimal Neurologic Function  Outcome: Ongoing, Progressing     Problem: Ongoing Anesthesia Effects (Spinal Surgery)  Goal: Anesthesia/Sedation Recovery  Outcome: Ongoing, Progressing     Problem: Pain (Spinal Surgery)  Goal: Acceptable Pain Control  Outcome: Ongoing, Progressing     Problem: Postoperative Nausea and Vomiting (Spinal Surgery)  Goal: Nausea and Vomiting Relief  Outcome: Ongoing, Progressing     Problem: Postoperative Urinary Retention (Spinal Surgery)  Goal: Effective Urinary Elimination  Outcome: Ongoing, Progressing     Problem: Respiratory Compromise  (Spinal Surgery)  Goal: Effective Oxygenation and Ventilation  Outcome: Ongoing, Progressing     Problem: Fall Injury Risk  Goal: Absence of Fall and Fall-Related Injury  Outcome: Ongoing, Progressing     Problem: Hypertension Comorbidity  Goal: Blood Pressure in Desired Range  Outcome: Ongoing, Progressing

## 2023-05-13 NOTE — PT/OT/SLP EVAL
Physical Therapy Evaluation/Treatment    Patient Name:  Graciela Barton   MRN:  [id number]    Recommendations:     Discharge Recommendations: rehabilitation facility   Discharge Equipment Recommendations:  (TBD at next level of care)   Barriers to discharge: Decreased caregiver support and increased caregiver burden of care.     Assessment:     Graciela Barton is a 73 y.o. female admitted with a medical diagnosis of Thoracic disc disease with myelopathy.  She presents with the following impairments/functional limitations: weakness, impaired endurance, impaired sensation, impaired self care skills, impaired functional mobility, gait instability, impaired balance, pain, decreased safety awareness, decreased lower extremity function, decreased upper extremity function. Pt with very poor safety awareness and history of multiple falls at home. Pt is unsafe to discharge home at this time 2* to poor balance, safety awareness, weakness and history of multiple falls. Pt would benefit from IPR and would tolerate the required 3 hours of therapy daily. Pt with very poor safety awareness and requires frequent redirection to improved safety.     Rehab Prognosis: Good; patient would benefit from acute skilled PT services to address these deficits and reach maximum level of function.    Recent Surgery: Procedure(s) (LRB):  DISCECTOMY, SPINE, THORACIC Right T6-7, T7-8 interior discectomy retropleural (Right) 1 Day Post-Op    Plan:     During this hospitalization, patient to be seen daily to address the identified rehab impairments via gait training, therapeutic activities, therapeutic exercises and progress toward the following goals:    Plan of Care Expires:  06/13/23    Subjective     Chief Complaint: Pain  Patient/Family Comments/goals: to improve mobility.   Pain/Comfort:  Pain Rating 1:  (Does not rate)    Patients cultural, spiritual, Shinto conflicts given the current situation:      Living Environment:  Pt lives alone in a [facility]  with 2 CHRIS.   Prior to admission, patients level of function was Mod I. Pt utilized a WC as her primary means of mobility walking only short distance from bathroom door >toilet with RW. Pt uses robotic vacuum and [employer] delivery for groceries. Equipment used at home:  (rolling walker, rollator, W/C, BSC.).  DME owned (not currently used): none.  Upon discharge, patient will have assistance from limited, has a neighbor across the street.     Objective:     Communicated with RN prior to session.  Patient found HOB elevated with  (Hemovac, PIV, bed alarm)  upon PT entry to room.    General Precautions: Standard, fall  Orthopedic Precautions:spinal precautions   Braces:    Respiratory Status: Room air    Exams:  Cognitive Exam:  Patient is oriented to Person, Place, Time, and Situation  Postural Exam:  Patient presented with the following abnormalities:    -       Rounded shoulders  RLE ROM: WFL  RLE Strength: 3/5 Based on clinical observation.   LLE ROM: WFL  LLE Strength: 3/5 based on clinical observation.     Functional Mobility:  Bed Mobility:     Supine to Sit: contact guard assistance and minimum assistance  Sit to Supine: minimum assistance  Transfers:     Bed to Chair: minimum assistance with  rolling walker  using  Stand Pivot  BSC Transfer: minimum assistance and moderate assistance with  rolling walker  using  Stand Pivot  Balance: Fair sitting balance, pt demonstrates a right lateral lean only slightly improved with upright position.       AM-PAC 6 CLICK MOBILITY  Total Score:13       Treatment & Education:  Bed mobility and transfers as listed above.   Supine >sit first trial with Min A, second trial (to the right) with CGA.   Stand pivot transfer first one to the left with Mod A, second to the right with Min A. Both with use of RW.   Pt was educated on role of PT, POC. Pt asked PT to lower RW, discussed with PT that at this time her personal RW is at a good height, lowering the RW would promote increased  trunk flexion and pt is to be upright. Pt verbalized understanding.     Patient left seated in bedside chair with all lines intact, chair alarm on, and OT present.     GOALS:   Multidisciplinary Problems       Physical Therapy Goals          Problem: Physical Therapy    Goal Priority Disciplines Outcome Goal Variances Interventions   Physical Therapy Goal     PT, PT/OT Ongoing, Progressing     Description: Goals to be met by: 23    Patient will increase functional independence with mobility by performin) Supine<>Sit with Supervision  2) Sit <>Stand with Supervision with RW  3) Bed <>Chair with Supervision with RW  4) Pt to ambulate 10 feet with RW and Supervision.                               History:     Past Medical History:   Diagnosis Date    Arthritis     Cancer     Thyroid    Edema     Hyperlipidemia     Hypertension     Thyroid disease        Past Surgical History:   Procedure Laterality Date    ADENOIDECTOMY      COLONOSCOPY  10/18/2018    COLONOSCOPY N/A 10/18/2018    Procedure: COLONOSCOPY;  Surgeon: Yosvany Alejandra MD;  Location: [facility] ENDO;  Service: General;  Laterality: N/A;    EPIDURAL STEROID INJECTION N/A 2022    Procedure: LUMBAR L3/4 MALKA CONTRAST DIRRECT REFERRAL MONICA CARRERA IN CHART;  Surgeon: Nicholas Kumar MD;  Location: [facility] [place] PAIN MGT;  Service: Pain Management;  Laterality: N/A;    HYSTERECTOMY      total hyst     KNEE SURGERY      16 pins and two plates implanted    THYROIDECTOMY      TONSILLECTOMY         Time Tracking:     PT Received On: 23  PT Start Time: 1035     PT Stop Time: 1110  PT Total Time (min): 35 min     Billable Minutes: Evaluation 10 and Therapeutic Activity 13 Co-Evaluation with OT      2023

## 2023-05-13 NOTE — PLAN OF CARE
Problem: Physical Therapy  Goal: Physical Therapy Goal  Description: Goals to be met by: 23    Patient will increase functional independence with mobility by performin) Supine<>Sit with Supervision  2) Sit <>Stand with Supervision with RW  3) Bed <>Chair with Supervision with RW  4) Pt to ambulate 10 feet with RW and Supervision.          Outcome: Ongoing, Progressing

## 2023-05-13 NOTE — NURSING
Assist x2 to BSC. Pt able to void.Returned to bed with HOB at 30o, SCDs in use, bed in low/locked position  and call light within reach.

## 2023-05-13 NOTE — NURSING
Assumed care of pt from LAURYN Quinonez. Pt drowsy, but easily aroused to voice. No c/o pain at his time. Hemovac drain set to bulb suction with scant sanguinous drainage. SCDs in use. Removed SCD sleeves to allow pt's legs to air out. Incision sites' dressings in place; dried drainage on all dressings. PT requested to get up to go to toilet. Explained to pt she could not get us as she had declined to work earlier with PT. Explained external catheter to pt. Pt agreed to give external catheter a try. Plan of care and safety protocols reviewed with pt. Bed in low/locked position with call light within reach.

## 2023-05-13 NOTE — PROGRESS NOTES
NEUROSURGICAL POST-OPERATIVE PROGRESS NOTE    DATE OF SERVICE:  2023      ATTENDING PHYSICIAN:  Scooter Salter MD    SUBJECTIVE:    INTERIM HISTORY:    This is a very pleasant 73 y.o. y.o. female, who is status postop day 1 T6-7 and T7-8 anterior diskectomy and spinal cord decompression.  Patient is doing well this morning.  Reports improvement in her leg strength.  Voiding appropriately on the bedside commode.  No other complaint..               OBJECTIVE:    PHYSICAL EXAMINATION:   Vitals:    23 0745   BP: (!) 110/53   Pulse: 76   Resp: 18   Temp: 98 °F (36.7 °C)       Neurosurgery Physical Exam    Ortho Exam    Neurologic Exam  Motor Exam   Muscle bulk: normal  Overall muscle tone: normal     Strength   Right deltoid: 5/5  Left deltoid: 5/5  Right biceps: 5/5  Left biceps: 5/5  Right triceps: 5/5  Left triceps: 5/5  Right wrist flexion: 5/5  Left wrist flexion: 5/5  Right wrist extension: 5/5  Left wrist extension: 5/5  Right interossei: 5/5  Left interossei: 5/5  Right iliopsoas: 3/5  Left iliopsoas: 5/5  Right quadriceps: 2/5  Left quadriceps: 5/5  Right hamstrin/5  Left hamstrin/5  Right anterior tibial: 2/5  Left anterior tibial: 5/5  Right posterior tibial: 2/5  Left posterior tibial: 5/5  Right peroneal: 2/5  Left peroneal: 5/5  Right gastroc: 2/5  Left gastroc: 5/5    Dressing tinted with serosanguinous fluid    DIAGNOSTIC DATA:    Chest x-ray, repeated shows no significant pneumothorax    ASSESMENT:    This is a 73 y.o. female who is s/p day 1 T6-8 anterior diskectomy.  Doing well.    Problem List Items Addressed This Visit          Neuro    Multiple sclerosis    Relevant Orders    Vital signs    Turn cough deep breathe    Oral Care    Neurovascular checks    Intake and output    Place sequential compression device    Chlorohexidine Gluconate Bath    Incentive spirometry    Pulse Oximetry Q4H    Incentive spirometry    PT evaluate and treat    OT evaluate and treat    Transfer patient  (Completed)    PT assistance with ambulation    Hemovac drain (Completed)    Drain - full suction (Completed)    CBC auto differential (Completed)    Basic metabolic panel (Completed)    * (Principal) Thoracic disc disease with myelopathy - Primary    Relevant Orders    Admit to Inpatient (Completed)    Place ZEN hose    Compression Device (SCD)    Full code    Vital signs    Turn cough deep breathe    Oral Care    Neurovascular checks    Intake and output    Place sequential compression device    Chlorohexidine Gluconate Bath    Incentive spirometry    Pulse Oximetry Q4H    Incentive spirometry    PT evaluate and treat    OT evaluate and treat    Transfer patient (Completed)    PT assistance with ambulation    Hemovac drain (Completed)    Drain - full suction (Completed)    CBC auto differential (Completed)    Basic metabolic panel (Completed)    Diet Adult Regular (IDDSI Level 7)    X-Ray Chest 1 View    Thoracic myelopathy    Relevant Orders    Vital signs    Turn cough deep breathe    Oral Care    Neurovascular checks    Intake and output    Place sequential compression device    Chlorohexidine Gluconate Bath    Incentive spirometry    Pulse Oximetry Q4H    Incentive spirometry    PT evaluate and treat    OT evaluate and treat    Transfer patient (Completed)    PT assistance with ambulation    Hemovac drain (Completed)    Drain - full suction (Completed)    CBC auto differential (Completed)    Basic metabolic panel (Completed)       Pulmonary    Atelectasis    Relevant Orders    X-Ray Chest 1 View       Cardiac/Vascular    Essential hypertension, benign    Overview     Well controlled- slightly high but was waiting for a while as she did not have a way to get from lobby up to clinic  Have some SHANE, add in HCTZ 12.5 mg and monitor  Continue as now           Relevant Orders    Vital signs    Turn cough deep breathe    Oral Care    Neurovascular checks    Intake and output    Place sequential compression device     Chlorohexidine Gluconate Bath    Incentive spirometry    Pulse Oximetry Q4H    Incentive spirometry    PT evaluate and treat    OT evaluate and treat    Transfer patient (Completed)    PT assistance with ambulation    Hemovac drain (Completed)    Drain - full suction (Completed)    CBC auto differential (Completed)    Basic metabolic panel (Completed)    PAF (paroxysmal atrial fibrillation)    Overview     Await EP follow up, appreciate their expertise  Continue DOAC  Continue metoprolol  Rate controlled today             Relevant Orders    Vital signs    Turn cough deep breathe    Oral Care    Neurovascular checks    Intake and output    Place sequential compression device    Chlorohexidine Gluconate Bath    Incentive spirometry    Pulse Oximetry Q4H    Incentive spirometry    PT evaluate and treat    OT evaluate and treat    Transfer patient (Completed)    PT assistance with ambulation    Hemovac drain (Completed)    Drain - full suction (Completed)    CBC auto differential (Completed)    Basic metabolic panel (Completed)       PLAN:    Continue to mobilize with PT.  Keep drain in.  All questions answered.        Scooter Saletr MD  Pager: [phone]

## 2023-05-14 PROCEDURE — 11000001 HC ACUTE MED/SURG PRIVATE ROOM

## 2023-05-14 PROCEDURE — 63600175 PHARM REV CODE 636 W HCPCS: Performed by: NEUROLOGICAL SURGERY

## 2023-05-14 PROCEDURE — 94761 N-INVAS EAR/PLS OXIMETRY MLT: CPT

## 2023-05-14 PROCEDURE — 99900035 HC TECH TIME PER 15 MIN (STAT)

## 2023-05-14 PROCEDURE — 25000003 PHARM REV CODE 250: Performed by: NEUROLOGICAL SURGERY

## 2023-05-14 PROCEDURE — 27000221 HC OXYGEN, UP TO 24 HOURS

## 2023-05-14 PROCEDURE — 97530 THERAPEUTIC ACTIVITIES: CPT | Mod: CQ

## 2023-05-14 PROCEDURE — 94799 UNLISTED PULMONARY SVC/PX: CPT

## 2023-05-14 RX ADMIN — METHOCARBAMOL TABLETS 750 MG: 750 TABLET, COATED ORAL at 02:05

## 2023-05-14 RX ADMIN — HEPARIN SODIUM 5000 UNITS: 5000 INJECTION INTRAVENOUS; SUBCUTANEOUS at 09:05

## 2023-05-14 RX ADMIN — MUPIROCIN: 20 OINTMENT TOPICAL at 09:05

## 2023-05-14 RX ADMIN — ACETAMINOPHEN 650 MG: 325 TABLET ORAL at 06:05

## 2023-05-14 RX ADMIN — METHOCARBAMOL TABLETS 750 MG: 750 TABLET, COATED ORAL at 08:05

## 2023-05-14 RX ADMIN — SODIUM CHLORIDE, POTASSIUM CHLORIDE, SODIUM LACTATE AND CALCIUM CHLORIDE: 600; 310; 30; 20 INJECTION, SOLUTION INTRAVENOUS at 04:05

## 2023-05-14 RX ADMIN — PROPAFENONE HYDROCHLORIDE 150 MG: 150 TABLET, FILM COATED ORAL at 09:05

## 2023-05-14 RX ADMIN — HEPARIN SODIUM 5000 UNITS: 5000 INJECTION INTRAVENOUS; SUBCUTANEOUS at 08:05

## 2023-05-14 RX ADMIN — PROPAFENONE HYDROCHLORIDE 150 MG: 150 TABLET, FILM COATED ORAL at 06:05

## 2023-05-14 RX ADMIN — METOPROLOL SUCCINATE 25 MG: 25 TABLET, EXTENDED RELEASE ORAL at 08:05

## 2023-05-14 RX ADMIN — MUPIROCIN: 20 OINTMENT TOPICAL at 08:05

## 2023-05-14 RX ADMIN — PROPAFENONE HYDROCHLORIDE 150 MG: 150 TABLET, FILM COATED ORAL at 01:05

## 2023-05-14 RX ADMIN — ACETAMINOPHEN 650 MG: 325 TABLET ORAL at 05:05

## 2023-05-14 RX ADMIN — LEVOTHYROXINE SODIUM 125 MCG: 125 TABLET ORAL at 06:05

## 2023-05-14 RX ADMIN — METHOCARBAMOL TABLETS 750 MG: 750 TABLET, COATED ORAL at 09:05

## 2023-05-14 RX ADMIN — BISACODYL 10 MG: 10 SUPPOSITORY RECTAL at 08:05

## 2023-05-14 RX ADMIN — HYDROMORPHONE HYDROCHLORIDE 1 MG: 2 INJECTION, SOLUTION INTRAMUSCULAR; INTRAVENOUS; SUBCUTANEOUS at 12:05

## 2023-05-14 RX ADMIN — TRAMADOL HYDROCHLORIDE 50 MG: 50 TABLET, COATED ORAL at 12:05

## 2023-05-14 NOTE — PLAN OF CARE
Problem: Occupational Therapy  Goal: Occupational Therapy Goal  Description: Goals to be met by: 6/13/2023     Patient will increase functional independence with ADLs by performing:    UE Dressing with Modified Blanco.  LE Dressing with Modified Blanco and Assistive Devices as needed.  Grooming while seated with Modified Blanco.  Toileting from bedside commode with Modified Blanco for hygiene and clothing management.   Supine to sit with Modified Blanco.  Toilet transfer to bedside commode with Modified Blanco.    Outcome: Ongoing, Progressing   OT eval completed and tx initiated. Pt not safe to return home alone at his time. She is a high fall risk and has hx multiple falls with injury ay ome. Pt performed bed mobility Min A supine<>sit, sit<>stand Mod A to RW, bed <>BSC Mod A without RW and Min -Mod A with RW. Pt performed toileting Mod A.  Pt. lacks  insight into functional implication of her deficits and lacks good safety/judgement. She is not back to her baseline as she was living alone and caring for her own needs at Mod I level prior to onset.  OT recommending: IPR. Pt cam tolerate 3 hrs of therapy in an inpatient rehab setting. Continue with OT POC.

## 2023-05-14 NOTE — PLAN OF CARE
Problem: Physical Therapy  Goal: Physical Therapy Goal  Description: Goals to be met by: 23    Patient will increase functional independence with mobility by performin) Supine<>Sit with Supervision  2) Sit <>Stand with Supervision with RW  3) Bed <>Chair with Supervision with RW  4) Pt to ambulate 10 feet with RW and Supervision.          Outcome: Ongoing, Progressing   Continue working toward goals.

## 2023-05-14 NOTE — PROGRESS NOTES
NEUROSURGICAL POST-OPERATIVE PROGRESS NOTE    DATE OF SERVICE:  05/14/2023      ATTENDING PHYSICIAN:  Scooter Salter MD    SUBJECTIVE:    INTERIM HISTORY:    This is a very pleasant 73 y.o. y.o. female, who is status postop day 2 T6-7 and T7-8 anterior diskectomy.  Complains of more rib pain today.  Did get up with physical therapy.  Preop right quadricep weakness relatively stable.                OBJECTIVE:    PHYSICAL EXAMINATION:   Vitals:    05/14/23 0752   BP: (!) 121/58   Pulse: 77   Resp: 17   Temp: 98.4 °F (36.9 °C)       Physical Exam:  Vitals reviewed.    Constitutional: She appears well-developed and well-nourished.     Eyes: Pupils are equal, round, and reactive to light. Conjunctivae and EOM are normal.     Cardiovascular: Normal distal pulses and no edema.     Abdominal: Soft.     Skin: Skin displays no rash on trunk and no rash on extremities. Skin displays no lesions on trunk and no lesions on extremities.     Psych/Behavior: She is alert. She is oriented to person, place, and time. She has a normal mood and affect.     Musculoskeletal:        Neck: Range of motion is full.     Back Exam     Muscle Strength   Right Quadriceps:  4/5   Left Quadriceps:  5/5           Neurologic Exam     Mental Status   Oriented to person, place, and time.     Cranial Nerves     CN III, IV, VI   Pupils are equal, round, and reactive to light.  Extraocular motions are normal.     Motor Exam     Strength   Right iliopsoas: 5/5  Left iliopsoas: 5/5  Right quadriceps: 4/5  Left quadriceps: 5/5  Right anterior tibial: 5/5  Left anterior tibial: 5/5  Right gastroc: 5/5  Left gastroc: 5/5    Wound has healed.    DIAGNOSTIC DATA:  Drain output not recorded      ASSESMENT:    This is a 73 y.o. female who is s/p day 2 T6-7 T7-8 anterior diskectomy for thoracic myelopathy.  Patient also has concomitant severe lumbar spinal stenosis with neurogenic claudication and radiculopathy.    Problem List Items Addressed This Visit           Neuro    Multiple sclerosis    Relevant Orders    Vital signs    Turn cough deep breathe    Oral Care    Neurovascular checks    Intake and output    Place sequential compression device    Chlorohexidine Gluconate Bath    Incentive spirometry    PT evaluate and treat    OT evaluate and treat    Transfer patient (Completed)    PT assistance with ambulation    Hemovac drain (Completed)    Drain - full suction (Completed)    CBC auto differential (Completed)    Basic metabolic panel (Completed)    * (Principal) Thoracic disc disease with myelopathy - Primary    Relevant Orders    Admit to Inpatient (Completed)    Place ZEN hose    Compression Device (SCD)    Full code    Vital signs    Turn cough deep breathe    Oral Care    Neurovascular checks    Intake and output    Place sequential compression device    Chlorohexidine Gluconate Bath    Incentive spirometry    PT evaluate and treat    OT evaluate and treat    Transfer patient (Completed)    PT assistance with ambulation    Hemovac drain (Completed)    Drain - full suction (Completed)    CBC auto differential (Completed)    Basic metabolic panel (Completed)    Diet Adult Regular (IDDSI Level 7)    X-Ray Chest 1 View (Completed)    Thoracic myelopathy    Relevant Orders    Vital signs    Turn cough deep breathe    Oral Care    Neurovascular checks    Intake and output    Place sequential compression device    Chlorohexidine Gluconate Bath    Incentive spirometry    PT evaluate and treat    OT evaluate and treat    Transfer patient (Completed)    PT assistance with ambulation    Hemovac drain (Completed)    Drain - full suction (Completed)    CBC auto differential (Completed)    Basic metabolic panel (Completed)       Pulmonary    Atelectasis    Relevant Orders    X-Ray Chest 1 View (Completed)       Cardiac/Vascular    Essential hypertension, benign    Overview     Well controlled- slightly high but was waiting for a while as she did not have a way to get from lobby  up to clinic  Have some SHANE, add in HCTZ 12.5 mg and monitor  Continue as now           Relevant Orders    Vital signs    Turn cough deep breathe    Oral Care    Neurovascular checks    Intake and output    Place sequential compression device    Chlorohexidine Gluconate Bath    Incentive spirometry    PT evaluate and treat    OT evaluate and treat    Transfer patient (Completed)    PT assistance with ambulation    Hemovac drain (Completed)    Drain - full suction (Completed)    CBC auto differential (Completed)    Basic metabolic panel (Completed)    PAF (paroxysmal atrial fibrillation)    Overview     Await EP follow up, appreciate their expertise  Continue DOAC  Continue metoprolol  Rate controlled today             Relevant Orders    Vital signs    Turn cough deep breathe    Oral Care    Neurovascular checks    Intake and output    Place sequential compression device    Chlorohexidine Gluconate Bath    Incentive spirometry    PT evaluate and treat    OT evaluate and treat    Transfer patient (Completed)    PT assistance with ambulation    Hemovac drain (Completed)    Drain - full suction (Completed)    CBC auto differential (Completed)    Basic metabolic panel (Completed)       PLAN:    Keep drain in  PT/OT        Scooter Salter MD  Pager: [phone]

## 2023-05-14 NOTE — PT/OT/SLP EVAL
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 Diagnoses of Thoracic myelopathy, Essential hypertension, benign, Multiple sclerosis, PAF (paroxysmal atrial fibrillation), and Atelectasis were also pertinent to this visit.    Recommendations:     Discharge Recommendations: rehabilitation facility  Discharge Equipment Recommendations:  3-in-1 commode, hip kit  Barriers to discharge:  Decreased caregiver support, Inaccessible home environment (multiple falls at home)    Assessment:     Graciela Barton is a 73 y.o. female with a medical diagnosis of Thoracic disc disease with myelopathy.  She presents with Performance deficits affecting function: impaired endurance, impaired self care skills, impaired functional mobility, gait instability, impaired balance, impaired cognition, decreased coordination, decreased upper extremity function, decreased lower extremity function, decreased safety awareness, decreased ROM, impaired coordination, impaired skin, orthopedic precautions.       OT eval completed and tx initiated. Pt not safe to return home alone at his time. She is a high fall risk and has hx multiple falls with injury at home. Pt performed bed mobility Min A supine<>sit, sit<>stand Mod A to RW, bed <>BSC Mod A without RW and Min -Mod A with RW. Pt performed toileting Mod A.  Pt. lacks  insight into functional implications of her deficits and lacks good safety/judgement. She is not back to her baseline as she was living alone and caring for her own needs at Mod I level prior to onset.  OT recommending: IPR. Pt can tolerate 3 hrs of therapy in an inpatient rehab setting. Continue with OT POC.       Rehab Prognosis: Good and  Fair; patient would benefit from acute skilled OT services to address these deficits and reach maximum level of function.       Plan:     Patient to be seen 5 x/week to address the above listed problems via self-care/home management, therapeutic activities, therapeutic exercises  Plan of Care Expires: 06/14/23  Plan of Care Reviewed with: patient    Subjective     Chief Complaint: need to use the BS  Patient/Family Comments/goals: pt stated that she can dress herself and take care of her self despite needing 2 person assist via nursing to transder onto [facility] prior to OT visit.    Occupational Profile:  Living Environment: Pt lives alone in a 2SH with 2 CHRIS; all living quarters on first floor; pt does nit access second level. WIS with shower chair and GB.   Previous level of function: Pt was Mod I. Pt utilized a WC as her primary mode of locomotion and ambulated only short distance from bathroom door >toilet with RW. Pt uses robotic vacuum and [employer] delivery for groceries; eats microwaved meals. Multiple falls at home.  Roles and Routines: active homemaker; retired nurse  Equipment Used at Home: wheelchair, walker, rolling, shower chair, grab bar  Assistance upon Discharge: neighbor across the street otherwise no support    Pain/Comfort:   none    Patients cultural, spiritual, Roman Catholic conflicts given the current situation: no    Objective:     Communicated with: nurse prior to session.  Patient found HOB elevated with bed alarm, hemovac, peripheral IV upon OT entry to room.    General Precautions: Standard, fall  Orthopedic Precautions: spinal precautions  Braces: N/A  Respiratory Status: Room air    Occupational Performance:    Bed Mobility:  log roll for spinal precautions  Patient completed Rolling/Turning to Left with  contact guard assistance  Patient completed Scooting/Bridging with contact guard assistance  Patient completed Supine to Sit with minimum assistance  Patient completed Sit to Supine with  minimum assistance    Functional Mobility/Transfers:  Patient completed Sit <> Stand Transfer with minimum assistance  with  rolling walker   Patient completed Bed <> Chair Transfer using Step Transfer technique with minimum assistance and moderate assistance with rolling walker  Patient completed Toilet Transfer Step Transfer technique with moderate assistance without  rolling walker and Min-moderate Assist with RW  Functional Mobility: ambulated few steps to BS chair in-mod A with RW, unsteady, fall risk, poor balance.    Activities of Daily Living:  Feeding:  independence .  Grooming: independence seated  Lower Body Dressing: total assistance socks  Toileting: moderate assistance clothes management seated on BSC and standing with RW; poor balance    Cognitive/Visual Perceptual:  Cognitive/Psychosocial Skills:     -       Oriented to: Person, Place, Time, and Situation   -       Follows Commands/attention:Easily distracted and Follows one-step commands  -       Communication: clear/fluent  -       Memory: Poor immediate recall  -       Safety awareness/insight to disability: impaired, poor safety/judgement, poor insight  -       Mood/Affect/Coping skills/emotional control: Cooperative  Visual/Perceptual:      -Intact .    Physical Exam:  Balance:    -       sitting: fair plus, leans to right, CGA to realign to midline  dynamic: fair   standing: poor plus   dynamic: absent  Postural examination/scapula alignment:    -       Rounded shoulders  -       lateral Weight shift to right  Skin integrity: post op wound and hemovac R upper lateral trunk, dried blood L scalp  Dominant hand:    -       right  Upper Extremity Range of Motion:     -       Right Upper Extremity: WFL  -       Left Upper Extremity: WFL  Upper Extremity Strength:    -       Right Upper Extremity: Deficits: 4-/5  -       Left Upper Extremity: Deficits: 4-/5   Strength:    -       Right Upper Extremity: WFL  -       Left Upper Extremity:  WFL    [facility] 6 Click ADL:  AMPA Total Score:      Treatment & Education:  Purpose of OT and POC  Educated in spinal precautions  Placed pillow for R lateral trunk support seated up in chair to maintain midline for ADLS, feeding etc.  Provided pt insight into deficits and functional implications and discussed discharge planning  Discussed post acute placement recommendation, ongoing education needed as pt expressed her ability to care for herself despite being a high risk for falls and unexpected readmission.  All questions/concerns addressed within scope.  Call don't fall discussed with pt and call bell issued. She verbalized understanding    Patient left up in chair with all lines intact, call button in reach, chair alarm on, and nurse notified    GOALS:   Multidisciplinary Problems       Occupational Therapy Goals          Problem: Occupational Therapy    Goal Priority Disciplines Outcome Interventions   Occupational Therapy Goal     OT, PT/OT Ongoing, Progressing    Description: Goals to be met by: 6/13/2023     Patient will increase functional independence with ADLs by performing:    UE Dressing with Modified [place].  LE Dressing with Modified [place] and Assistive Devices as needed.  Grooming while seated with Modified [place].  Toileting from bedside commode with Modified [place] for hygiene and clothing management.   Supine to sit with Modified [place].  Toilet transfer to bedside commode with Modified [place].                         History:     Past Medical History:   Diagnosis Date    Arthritis     Cancer     Thyroid    Edema     Hyperlipidemia     Hypertension     Thyroid disease          Past Surgical History:   Procedure Laterality Date    ADENOIDECTOMY      COLONOSCOPY  10/18/2018    COLONOSCOPY N/A 10/18/2018    Procedure: COLONOSCOPY;  Surgeon: Yosvany Alejandra MD;  [facility]: [facility];  Service: General;  Laterality: N/A;    EPIDURAL STEROID INJECTION N/A 9/30/2022     Procedure: LUMBAR L3/4 MALKA CONTRAST DIRRECT REFERRAL MONICA CARRERA IN CHART;  Surgeon: Nicholas Kumar MD;  Location: [facility];  Service: Pain Management;  Laterality: N/A;    HYSTERECTOMY  1992    total hyst     KNEE SURGERY      16 pins and two plates implanted    THYROIDECTOMY      TONSILLECTOMY         Time Tracking:     OT Date of Treatment: 05/13/23  OT Start Time: 1035  OT Stop Time: 1110  OT Total Time (min): 35 min coeval with PT    Billable Minutes:Evaluation 10  Self Care/Home Management 15  Therapeutic Activity 10  Total Time 35    5/14/2023

## 2023-05-14 NOTE — PT/OT/SLP PROGRESS
Physical Therapy Treatment    Patient Name:  Graciela Barton   MRN:  [id number]    Recommendations:     Discharge Recommendations: rehabilitation facility  Discharge Equipment Recommendations:  (TBD at next level of care)  Barriers to discharge:  decreased caregiver support/decreased functional mobility needing increased assistance at this time    Assessment:     Graciela Barton is a 73 y.o. female admitted with a medical diagnosis of Thoracic disc disease with myelopathy.  She presents with the following impairments/functional limitations: weakness, impaired endurance, impaired self care skills, impaired functional mobility, gait instability, impaired balance, decreased upper extremity function, decreased lower extremity function, impaired coordination, decreased coordination, abnormal tone, decreased ROM, impaired muscle length, orthopedic precautions, impaired skin Pt would continue to benefit from P.T. To address impairments listed above.  .    Rehab Prognosis: Fair; patient would benefit from acute skilled PT services to address these deficits and reach maximum level of function.    Recent Surgery: Procedure(s) (LRB):  DISCECTOMY, SPINE, THORACIC Right T6-7, T7-8 interior discectomy retropleural (Right) 2 Days Post-Op    Plan:     During this hospitalization, patient to be seen daily to address the identified rehab impairments via gait training, therapeutic activities, therapeutic exercises and progress toward the following goals:    Plan of Care Expires:  06/13/23    Subjective       Patient/Family Comments/goals: Pt agreed to tx.  Pain/Comfort:  Pain Rating 1: 0/10  Pain Rating Post-Intervention 1: 0/10      Objective:     Communicated with RN prior to session.  Patient found supine with bed alarm, peripheral IV, hemovac upon PT entry to room.     General Precautions: Standard, fall  Orthopedic Precautions: spinal precautions  Braces:    Respiratory Status: Room air     Functional Mobility:  Bed Mobility:     Rolling  Left:  minimum assistance and with b/r for assist  Scooting: contact guard assistance and with b/r for support  Supine to Sit: moderate assistance and maximal assistance  Sit to Supine: moderate assistance  Transfers:     Sit to Stand:  minimum assistance and moderate assistance with rolling walker and x 3 from EOB and b/s commode  Toilet Transfer: moderate assistance with  rolling walker  using  Step Transfer  Gait: 4 small turning steps x 2 reps with Rw and Mod A from EOB <> b/s commode.  Pt places increased w/cathryn through BUEs onto RW and requires assistance for rW management and stability.  Pt stands with increased trunk flexion and on ball of right foot with right knee and hip flexed and lsome left knee flexion, but foot better positioned flat on floor with only mild heel elevation. 3pt gait with slow nichole and decreased step, NBOS    Balance: sitting good-, standing fair/fair-, gait poor+      AM-PAC 6 CLICK MOBILITY  Turning over in bed (including adjusting bedclothes, sheets and blankets)?: 3  Sitting down on and standing up from a chair with arms (e.g., wheelchair, bedside commode, etc.): 2  Moving from lying on back to sitting on the side of the bed?: 2  Moving to and from a bed to a chair (including a wheelchair)?: 3  Need to walk in hospital room?: 1  Climbing 3-5 steps with a railing?: 1  Basic Mobility Total Score: 12       Treatment & Education:  Pt was supine with trunk in lateral bend to the right.  Pt was assisted with midline positioning as much as possible prior to transferring to sitting EOB. Purewick was removed prior to pt sitting EOB to use b/s commode.  Pt's LEs are adducted and require increased time for spasticity to decreased to gently try to abduct to remove Purewick. Pt then transferred to b/s commode as above.   Pt was able to do a push up using BUES on arms of b/s commode so that PTA could assist pt with hygiene needs.  Pt returned to bed supine and performed AAROM as able secondary  to increased spasticity.  Pt's right foot was placed on a pillow while PTA performed a manual stretch to hamstrings with R knee unable to achieve full extension.   Pillow placed between pt's knees to assist with pressure relief secondary to pt's LEs in increased ADDuction.  Pt's trunk was also positioned midline with a pillow at pt's right side to assist with maintaining midline orientation.    Patient left supine with all lines intact, call button in reach, bed alarm on, and Rn notified..    GOALS:   Multidisciplinary Problems       Physical Therapy Goals          Problem: Physical Therapy    Goal Priority Disciplines Outcome Goal Variances Interventions   Physical Therapy Goal     PT, PT/OT Ongoing, Progressing     Description: Goals to be met by: 23    Patient will increase functional independence with mobility by performin) Supine<>Sit with Supervision  2) Sit <>Stand with Supervision with RW  3) Bed <>Chair with Supervision with RW  4) Pt to ambulate 10 feet with RW and Supervision.                               Time Tracking:     PT Received On: 23  PT Start Time: 1250     PT Stop Time: 1314  PT Total Time (min): 24 min     Billable Minutes: Therapeutic Activity 24    Treatment Type: Treatment  PT/PTA: PTA     Number of PTA visits since last PT visit: 2023

## 2023-05-14 NOTE — PLAN OF CARE
Problem: Adult Inpatient Plan of Care  Goal: Plan of Care Review  Outcome: Ongoing, Progressing  Goal: Patient-Specific Goal (Individualized)  Outcome: Ongoing, Progressing  Goal: Absence of Hospital-Acquired Illness or Injury  Outcome: Ongoing, Progressing  Goal: Optimal Comfort and Wellbeing  Outcome: Ongoing, Progressing  Goal: Readiness for Transition of Care  Outcome: Ongoing, Progressing     Problem: Infection  Goal: Absence of Infection Signs and Symptoms  Outcome: Ongoing, Progressing     Problem: Bleeding (Spinal Surgery)  Goal: Absence of Bleeding  Outcome: Ongoing, Progressing     Problem: Bowel Motility Impaired (Spinal Surgery)  Goal: Effective Bowel Elimination  Outcome: Ongoing, Progressing     Problem: Fluid and Electrolyte Imbalance (Spinal Surgery)  Goal: Fluid and Electrolyte Balance  Outcome: Ongoing, Progressing     Problem: Functional Ability Impaired (Spinal Surgery)  Goal: Optimal Functional Ability  Outcome: Ongoing, Progressing     Problem: Infection (Spinal Surgery)  Goal: Absence of Infection Signs and Symptoms  Outcome: Ongoing, Progressing     Problem: Neurologic Impairment (Spinal Surgery)  Goal: Optimal Neurologic Function  Outcome: Ongoing, Progressing     Problem: Ongoing Anesthesia Effects (Spinal Surgery)  Goal: Anesthesia/Sedation Recovery  Outcome: Ongoing, Progressing     Problem: Pain (Spinal Surgery)  Goal: Acceptable Pain Control  Outcome: Ongoing, Progressing     Problem: Postoperative Nausea and Vomiting (Spinal Surgery)  Goal: Nausea and Vomiting Relief  Outcome: Ongoing, Progressing     Problem: Postoperative Urinary Retention (Spinal Surgery)  Goal: Effective Urinary Elimination  Outcome: Ongoing, Progressing

## 2023-05-14 NOTE — NURSING
Assumed care of pt from LAURYN Quinonez. Pt appeared asleep, but easily roused to voice. Hemovac drain set to bulb suction in place; scant drainage noted. SCDs in use. Pt reported pain 8/10 at this time. Bed in low/locked position with call light within reach.

## 2023-05-15 LAB
ANION GAP SERPL CALC-SCNC: 9 MMOL/L (ref 8–16)
BACTERIA #/AREA URNS HPF: NORMAL /HPF
BASOPHILS # BLD AUTO: 0.03 K/UL (ref 0–0.2)
BASOPHILS NFR BLD: 0.3 % (ref 0–1.9)
BILIRUB UR QL STRIP: NEGATIVE
BUN SERPL-MCNC: 12 MG/DL (ref 8–23)
CALCIUM SERPL-MCNC: 8.7 MG/DL (ref 8.7–10.5)
CHLORIDE SERPL-SCNC: 106 MMOL/L (ref 95–110)
CLARITY UR: CLEAR
CO2 SERPL-SCNC: 26 MMOL/L (ref 23–29)
COLOR UR: YELLOW
CREAT SERPL-MCNC: 0.7 MG/DL (ref 0.5–1.4)
DIFFERENTIAL METHOD: ABNORMAL
EOSINOPHIL # BLD AUTO: 0.3 K/UL (ref 0–0.5)
EOSINOPHIL NFR BLD: 2.6 % (ref 0–8)
ERYTHROCYTE [DISTWIDTH] IN BLOOD BY AUTOMATED COUNT: 11.9 % (ref 11.5–14.5)
EST. GFR  (NO RACE VARIABLE): >60 ML/MIN/1.73 M^2
GLUCOSE SERPL-MCNC: 88 MG/DL (ref 70–110)
GLUCOSE UR QL STRIP: NEGATIVE
HCT VFR BLD AUTO: 27.3 % (ref 37–48.5)
HGB BLD-MCNC: 8.9 G/DL (ref 12–16)
HGB UR QL STRIP: NEGATIVE
IMM GRANULOCYTES # BLD AUTO: 0.04 K/UL (ref 0–0.04)
IMM GRANULOCYTES NFR BLD AUTO: 0.4 % (ref 0–0.5)
KETONES UR QL STRIP: ABNORMAL
LEUKOCYTE ESTERASE UR QL STRIP: ABNORMAL
LYMPHOCYTES # BLD AUTO: 1.2 K/UL (ref 1–4.8)
LYMPHOCYTES NFR BLD: 12.2 % (ref 18–48)
MCH RBC QN AUTO: 31.4 PG (ref 27–31)
MCHC RBC AUTO-ENTMCNC: 32.6 G/DL (ref 32–36)
MCV RBC AUTO: 97 FL (ref 82–98)
MICROSCOPIC COMMENT: NORMAL
MONOCYTES # BLD AUTO: 1 K/UL (ref 0.3–1)
MONOCYTES NFR BLD: 9.7 % (ref 4–15)
NEUTROPHILS # BLD AUTO: 7.3 K/UL (ref 1.8–7.7)
NEUTROPHILS NFR BLD: 74.8 % (ref 38–73)
NITRITE UR QL STRIP: NEGATIVE
NON-SQ EPI CELLS #/AREA URNS HPF: 0 /HPF
NRBC BLD-RTO: 0 /100 WBC
PH UR STRIP: 6 [PH] (ref 5–8)
PLATELET # BLD AUTO: 253 K/UL (ref 150–450)
PMV BLD AUTO: 10.2 FL (ref 9.2–12.9)
POTASSIUM SERPL-SCNC: 3.6 MMOL/L (ref 3.5–5.1)
PROT UR QL STRIP: NEGATIVE
RBC # BLD AUTO: 2.83 M/UL (ref 4–5.4)
RBC #/AREA URNS HPF: 1 /HPF (ref 0–4)
SODIUM SERPL-SCNC: 141 MMOL/L (ref 136–145)
SP GR UR STRIP: 1.01 (ref 1–1.03)
SQUAMOUS #/AREA URNS HPF: 2 /HPF
URN SPEC COLLECT METH UR: ABNORMAL
UROBILINOGEN UR STRIP-ACNC: NEGATIVE EU/DL
WBC # BLD AUTO: 9.8 K/UL (ref 3.9–12.7)
WBC #/AREA URNS HPF: 5 /HPF (ref 0–5)

## 2023-05-15 PROCEDURE — 81000 URINALYSIS NONAUTO W/SCOPE: CPT | Performed by: NEUROLOGICAL SURGERY

## 2023-05-15 PROCEDURE — 99900035 HC TECH TIME PER 15 MIN (STAT)

## 2023-05-15 PROCEDURE — 85025 COMPLETE CBC W/AUTO DIFF WBC: CPT | Performed by: NEUROLOGICAL SURGERY

## 2023-05-15 PROCEDURE — 36415 COLL VENOUS BLD VENIPUNCTURE: CPT | Performed by: NEUROLOGICAL SURGERY

## 2023-05-15 PROCEDURE — 93010 ELECTROCARDIOGRAM REPORT: CPT | Mod: ,,, | Performed by: INTERNAL MEDICINE

## 2023-05-15 PROCEDURE — 25000003 PHARM REV CODE 250: Performed by: NEUROLOGICAL SURGERY

## 2023-05-15 PROCEDURE — 93005 ELECTROCARDIOGRAM TRACING: CPT

## 2023-05-15 PROCEDURE — 97535 SELF CARE MNGMENT TRAINING: CPT | Mod: CO

## 2023-05-15 PROCEDURE — 63600175 PHARM REV CODE 636 W HCPCS: Performed by: NEUROLOGICAL SURGERY

## 2023-05-15 PROCEDURE — 80048 BASIC METABOLIC PNL TOTAL CA: CPT | Performed by: NEUROLOGICAL SURGERY

## 2023-05-15 PROCEDURE — 93010 EKG 12-LEAD: ICD-10-PCS | Mod: ,,, | Performed by: INTERNAL MEDICINE

## 2023-05-15 PROCEDURE — 27000221 HC OXYGEN, UP TO 24 HOURS

## 2023-05-15 PROCEDURE — 97530 THERAPEUTIC ACTIVITIES: CPT | Mod: CQ

## 2023-05-15 PROCEDURE — 11000001 HC ACUTE MED/SURG PRIVATE ROOM

## 2023-05-15 RX ADMIN — MUPIROCIN: 20 OINTMENT TOPICAL at 08:05

## 2023-05-15 RX ADMIN — LEVOTHYROXINE SODIUM 125 MCG: 125 TABLET ORAL at 05:05

## 2023-05-15 RX ADMIN — HEPARIN SODIUM 5000 UNITS: 5000 INJECTION INTRAVENOUS; SUBCUTANEOUS at 08:05

## 2023-05-15 RX ADMIN — PROPAFENONE HYDROCHLORIDE 150 MG: 150 TABLET, FILM COATED ORAL at 06:05

## 2023-05-15 RX ADMIN — METHOCARBAMOL TABLETS 750 MG: 750 TABLET, COATED ORAL at 03:05

## 2023-05-15 RX ADMIN — METOPROLOL SUCCINATE 25 MG: 25 TABLET, EXTENDED RELEASE ORAL at 08:05

## 2023-05-15 RX ADMIN — PROPAFENONE HYDROCHLORIDE 150 MG: 150 TABLET, FILM COATED ORAL at 09:05

## 2023-05-15 RX ADMIN — METHOCARBAMOL TABLETS 750 MG: 750 TABLET, COATED ORAL at 08:05

## 2023-05-15 RX ADMIN — ACETAMINOPHEN 650 MG: 325 TABLET ORAL at 06:05

## 2023-05-15 RX ADMIN — BISACODYL 10 MG: 10 SUPPOSITORY RECTAL at 04:05

## 2023-05-15 RX ADMIN — ACETAMINOPHEN 650 MG: 325 TABLET ORAL at 11:05

## 2023-05-15 RX ADMIN — PROPAFENONE HYDROCHLORIDE 150 MG: 150 TABLET, FILM COATED ORAL at 03:05

## 2023-05-15 RX ADMIN — ACETAMINOPHEN 650 MG: 325 TABLET ORAL at 05:05

## 2023-05-15 NOTE — PROGRESS NOTES
NEUROSURGICAL POST-OPERATIVE PROGRESS NOTE    DATE OF SERVICE:  05/15/2023      ATTENDING PHYSICIAN:  Scooter Salter MD    SUBJECTIVE:    INTERIM HISTORY:    This is a very pleasant 73 y.o. y.o. female, who is status postop day 3 T6-8 anterior diskectomy.  Reports some numbness along ribcage on the right side.  Difficulty mobilizing at baseline due to chronic myelopathy and severe spinal stenosis in the lumbar spine.  She also has multiple sclerosis.  Voiding in the PureWick but leakage noticed.                OBJECTIVE:    PHYSICAL EXAMINATION:   Vitals:    05/15/23 0334   BP: (!) 157/69   Pulse: 95   Resp: 18   Temp: 100 °F (37.8 °C)       Neurosurgery Physical Exam    Ortho Exam    Neurologic Exam    Incision clean and dry    DIAGNOSTIC DATA:        ASSESMENT:    This is a 73 y.o. female who is s/p Day 3 T6-8 thoracic diskectomy for myelopathy.  Concomitant multiple sclerosis and severe spinal stenosis with difficulty to mobilize    Problem List Items Addressed This Visit          Neuro    Multiple sclerosis    Relevant Orders    Vital signs    Turn cough deep breathe    Oral Care    Neurovascular checks    Intake and output    Place sequential compression device    Chlorohexidine Gluconate Bath    Incentive spirometry    PT evaluate and treat    OT evaluate and treat    Transfer patient (Completed)    PT assistance with ambulation    Hemovac drain (Completed)    Drain - full suction (Completed)    CBC auto differential (Completed)    Basic metabolic panel (Completed)    * (Principal) Thoracic disc disease with myelopathy - Primary    Relevant Orders    Admit to Inpatient (Completed)    Place ZEN hose    Compression Device (SCD)    Full code    Vital signs    Turn cough deep breathe    Oral Care    Neurovascular checks    Intake and output    Place sequential compression device    Chlorohexidine Gluconate Bath    Incentive spirometry    PT evaluate and treat    OT evaluate and treat    Transfer patient  (Completed)    PT assistance with ambulation    Hemovac drain (Completed)    Drain - full suction (Completed)    CBC auto differential (Completed)    Basic metabolic panel (Completed)    Diet Adult Regular (IDDSI Level 7)    X-Ray Chest 1 View (Completed)    Thoracic myelopathy    Relevant Orders    Vital signs    Turn cough deep breathe    Oral Care    Neurovascular checks    Intake and output    Place sequential compression device    Chlorohexidine Gluconate Bath    Incentive spirometry    PT evaluate and treat    OT evaluate and treat    Transfer patient (Completed)    PT assistance with ambulation    Hemovac drain (Completed)    Drain - full suction (Completed)    CBC auto differential (Completed)    Basic metabolic panel (Completed)       Pulmonary    Atelectasis    Relevant Orders    X-Ray Chest 1 View (Completed)       Cardiac/Vascular    Essential hypertension, benign    Overview     Well controlled- slightly high but was waiting for a while as she did not have a way to get from lobby up to clinic  Have some SHANE, add in HCTZ 12.5 mg and monitor  Continue as now           Relevant Orders    Vital signs    Turn cough deep breathe    Oral Care    Neurovascular checks    Intake and output    Place sequential compression device    Chlorohexidine Gluconate Bath    Incentive spirometry    PT evaluate and treat    OT evaluate and treat    Transfer patient (Completed)    PT assistance with ambulation    Hemovac drain (Completed)    Drain - full suction (Completed)    CBC auto differential (Completed)    Basic metabolic panel (Completed)    PAF (paroxysmal atrial fibrillation)    Overview     Await EP follow up, appreciate their expertise  Continue DOAC  Continue metoprolol  Rate controlled today             Relevant Orders    Vital signs    Turn cough deep breathe    Oral Care    Neurovascular checks    Intake and output    Place sequential compression device    Chlorohexidine Gluconate Bath    Incentive spirometry     PT evaluate and treat    OT evaluate and treat    Transfer patient (Completed)    PT assistance with ambulation    Hemovac drain (Completed)    Drain - full suction (Completed)    CBC auto differential (Completed)    Basic metabolic panel (Completed)       PLAN:    UA for some being febrile an elevated white blood cell  Repeat CBC/BMP  Continue mobilizing with PT        Scooter Salter MD  Pager: [phone]

## 2023-05-15 NOTE — NURSING
Patient  mews 5, VS rechecked. HR 130s- 140s. TEMp 100.1 pt on scheduled tylenol.NAD.sitting up in chair.denies any pain. notified.orders taken.

## 2023-05-15 NOTE — PLAN OF CARE
AAOX4. Respirations even and unlabored. Breath sounds clear. Denies N/V, SOB, distress, and pain. Regular diet continued. Abd soft, nontender. Bowel sounds active. Stayed in bed through the night. Weight shifting assistance provided. Vital signs stable. Medications given per MAR. Bed alarm set. Call bell within reach. Instructed to call for assistance.

## 2023-05-15 NOTE — PROGRESS NOTES
[facility] Surg  Adult Nutrition  Progress Note    SUMMARY       Recommendations    Recommendation:   1. Add Boost Plus 1xday  2. Monitor need for supplements.   3. Monitor weight/labs.   4. RD to follow to monitor po intake    Goals:  Pt will tolerate diet with at least 50-75% intake at meals by RD follow up  Nutrition Goal Status: new  Communication of RD Recs: reviewed with RN    Assessment and Plan  Nutrition Problem  Increased protein needs    Related to (etiology):   Wound healing    Signs and Symptoms (as evidenced by):   S/p surgery     Interventions:  Collaboration with other providers  Commercial beverage    Nutrition Diagnosis Status:   New     Malnutrition Assessment  Weight Loss (Malnutrition):  (9% x 10 months)   Orbital Region (Subcutaneous Fat Loss): well nourished  Upper Arm Region (Subcutaneous Fat Loss): well nourished  Thoracic and Lumbar Region: well nourished   [place] Region (Muscle Loss): well nourished  Clavicle Bone Region (Muscle Loss): well nourished  Scapular Bone Region (Muscle Loss): well nourished  Patellar Region (Muscle Loss): well nourished  Anterior Thigh Region (Muscle Loss): well nourished  Posterior Calf Region (Muscle Loss): well nourished       Reason for Assessment  Reason For Assessment: identified at risk by screening criteria ([facility])  Diagnosis:  (thoracic disc disease)  Relevant Medical History: arthritis, thyroid cancer, HTN, HLD, ademoidectomy, thyroidectomy, knee surgery  General Information Comments: Pt on Regular diet. Reports decreased appetite. States she is not a big eater. Eats a lot of lean cuisines at home. Jason 19- R chest incision, back drain. Reports -220lb in July 2022. s/p thoracic spine discectomy 5/12. NFPE completed todday-appears nourished.  Nutrition Discharge Planning: pt to d/c on Regular diet    Nutrition Risk Screen  Nutrition Risk Screen: no indicators present    Nutrition/Diet History  Food Preferences: no Lutheran or cultural food  "prefs identified  Spiritual, Cultural Beliefs, Mandaeism Practices, Values that Affect Care: no  Factors Affecting Nutritional Intake: None identified at this time    Anthropometrics  Temp: 97.9 °F (36.6 °C)  Height Method: Stated  Height: 5' 7" (170.2 cm)  Height (inches): 67 in  Weight Method: Bed Scale  Weight: 91 kg (200 lb 9.9 oz)  Weight (lb): 200.62 lb  Ideal Body Weight (IBW), Female: 135 lb  % Ideal Body Weight, Female (lb): 148.61 %  BMI (Calculated): 31.4  BMI Grade: 30 - 34.9- obesity - grade I  Usual Body Weight (UBW), k kg (2022 per pt)  % Usual Body Weight: 91.19  % Weight Change From Usual Weight: -9 %     Lab/Procedures/Meds  Pertinent Labs Reviewed: reviewed  Pertinent Medications Reviewed: reviewed  Pertinent Medications Comments: tylenol, heparin    Estimated/Assessed Needs  Weight Used For Calorie Calculations: 91 kg (200 lb 9.9 oz)  Energy Calorie Requirements (kcal): 1742  Energy Need Method: Oxford-St Jeor (x1.2)  Protein Requirements: 72g (0.8g/kg)  Weight Used For Protein Calculations: 91 kg (200 lb 9.9 oz)  Estimated Fluid Requirement Method: RDA Method  RDA Method (mL): 1742     Nutrition Prescription Ordered  Current Diet Order: Regular    Evaluation of Received Nutrient/Fluid Intake  I/O: 4206/905  Energy Calories Required: not meeting needs  Protein Required: not meeting needs  Fluid Required: not meeting needs  Comments: LBM 5/11  % Intake of Estimated Energy Needs: 25 - 50 %  % Meal Intake: 25 - 50 %    Nutrition Risk  Level of Risk/Frequency of Follow-up:  (2xweekly)     Monitor and Evaluation  Food and Nutrient Intake: food and beverage intake  Food and Nutrient Adminstration: diet order  Physical Activity and Function: nutrition-related ADLs and IADLs  Anthropometric Measurements: weight  Biochemical Data, Medical Tests and Procedures: electrolyte and renal panel  Nutrition-Focused Physical Findings: overall appearance     Nutrition Follow-Up  RD Follow-up?: Yes      "

## 2023-05-15 NOTE — PT/OT/SLP PROGRESS
Physical Therapy Treatment    Patient Name:  Graciela Barton   MRN:  [id number]    Recommendations:     Discharge Recommendations: rehabilitation facility  Discharge Equipment Recommendations:  (TBD)  Barriers to discharge:  decreased mobility,strength and endurance    Assessment:     Graciela Barton is a 73 y.o. female admitted with a medical diagnosis of Thoracic disc disease with myelopathy.  She presents with the following impairments/functional limitations: weakness, impaired endurance, impaired functional mobility, gait instability, impaired balance, decreased lower extremity function, decreased ROM, impaired coordination, impaired skin, impaired joint extensibility, abnormal tone,pt with good participation and requires assistance with all mobility at this time,pt will benefit from IPR upon discharge to address above functional limitations.    Rehab Prognosis: Fair; patient would benefit from acute skilled PT services to address these deficits and reach maximum level of function.    Recent Surgery: Procedure(s) (LRB):  DISCECTOMY, SPINE, THORACIC Right T6-7, T7-8 interior discectomy retropleural (Right) 3 Days Post-Op    Plan:     During this hospitalization, patient to be seen daily to address the identified rehab impairments via gait training, therapeutic activities, therapeutic exercises, neuromuscular re-education and progress toward the following goals:    Plan of Care Expires:  06/13/23    Subjective     Chief Complaint: n/a  Patient/Family Comments/goals: pt agreeable to rx.  Pain/Comfort:  Pain Rating 1: 0/10      Objective:     Communicated with nsg prior to session.  Patient found supine with bed alarm, oxygen, PureWick upon PT entry to room.     General Precautions: Standard, fall  Orthopedic Precautions: spinal precautions  Braces: N/A  Respiratory Status: Nasal cannula, flow 1 L/min     Functional Mobility:  Bed Mobility:     Supine to Sit: moderate assistance  Sit to Supine: moderate  assistance  Transfers:     Sit to Stand:  moderate assistance with rolling walker  Toilet Transfer: moderate assistance with  rolling walker  using  Step Transfer  Balance: fair sitting balance      AM-PAC 6 CLICK MOBILITY  Turning over in bed (including adjusting bedclothes, sheets and blankets)?: 3  Sitting down on and standing up from a chair with arms (e.g., wheelchair, bedside commode, etc.): 2  Moving from lying on back to sitting on the side of the bed?: 2  Moving to and from a bed to a chair (including a wheelchair)?: 2  Need to walk in hospital room?: 1  Climbing 3-5 steps with a railing?: 1  Basic Mobility Total Score: 11       Treatment & Education: increased time required with mobility,pt raised to HOB with Max A x 2,pt's brief also changed,rolling to L and R with Min A      Patient left supine with all lines intact, call button in reach, bed alarm on, and nsg notified..    GOALS:   Multidisciplinary Problems       Physical Therapy Goals          Problem: Physical Therapy    Goal Priority Disciplines Outcome Goal Variances Interventions   Physical Therapy Goal     PT, PT/OT Ongoing, Progressing     Description: Goals to be met by: 23    Patient will increase functional independence with mobility by performin) Supine<>Sit with Supervision  2) Sit <>Stand with Supervision with RW  3) Bed <>Chair with Supervision with RW  4) Pt to ambulate 10 feet with RW and Supervision.                               Time Tracking:     PT Received On: 05/15/23  PT Start Time: 915     PT Stop Time: 0950  PT Total Time (min): 35 min     Billable Minutes: Therapeutic Activity 35       PT/PTA: PTA     Number of PTA visits since last PT visit: 2     05/15/2023

## 2023-05-15 NOTE — PT/OT/SLP PROGRESS
"Occupational Therapy   Treatment    Name: Graciela Barton  MRN: [id number]  Admitting Diagnosis:  Thoracic disc disease with myelopathy  3 Days Post-Op    Recommendations:     Discharge Recommendations: rehabilitation facility  Discharge Equipment Recommendations:  to be determined by next level of care  Barriers to discharge:  Decreased caregiver support, Inaccessible home environment (multiple falls at home)    Assessment:     Graciela Barton is a 73 y.o. female with a medical diagnosis of Thoracic disc disease with myelopathy.  Performance deficits affecting function are weakness, impaired endurance, impaired self care skills, impaired functional mobility, gait instability, impaired balance, decreased lower extremity function, decreased coordination, impaired joint extensibility, abnormal tone, decreased ROM, pain, orthopedic precautions, decreased safety awareness.     Rehab Prognosis:  Good; patient would benefit from acute skilled OT services to address these deficits and reach maximum level of function.       Plan:     Patient to be seen 5 x/week to address the above listed problems via self-care/home management, therapeutic activities, therapeutic exercises, neuromuscular re-education  Plan of Care Expires: 06/14/23  Plan of Care Reviewed with: patient    Subjective     Chief Complaint: "You're going to need 2 people"  Patient/Family Comments/goals: return to PLOF  Pain/Comfort:  Pain Rating 1: 0/10    Objective:     Communicated with: nurseMakayla prior to session.  Patient found HOB elevated with bed alarm, oxygen upon OT entry to room.    General Precautions: Standard, fall    Orthopedic Precautions:spinal precautions  Braces: N/A  Respiratory Status: Nasal cannula, flow 1 L/min    Bed Mobility:    Patient completed Rolling/Turning to Left with  contact guard assistance and with side rail  Patient completed Rolling/Turning to Right with contact guard assistance and with side rail  Patient completed " Scooting/Bridging with CGA/Manny to EOB; MaxA of 2 to HOB (patient assisting using headboard)  Patient completed Supine to Sit with moderate assistance  Patient completed Sit to Supine with minimum assistance     Functional Mobility/Transfers:  Patient completed Sit <> Stand Transfer with moderate assistance  with  rolling walker   Patient completed Toilet Transfer Step Transfer technique with moderate assistance with  rolling walker and bedside commode    Activities of Daily Living:  Toileting: maximal assistance for hygiene in stance; patient unable to complete    AMPAC 6 Click ADL: 17    Treatment & Education:  -Educated on purpose/role of OT  -Requesting to use BSC for BM  -Requires significantly increased time for all tasks/transitions 2/2 increased BLE tone/spasticity   -BSC transfer as above; flexed posture (unable to assume upright)  -Unable to have BM; required Max/TotalA for hygiene /p urinating  -Returned to bed level and clean adult brief donned bed level  -Pillow placed between knees to encourage adduction    Patient left HOB elevated with all lines intact, call button in reach, bed alarm on, and nsg notified    GOALS:   Multidisciplinary Problems       Occupational Therapy Goals          Problem: Occupational Therapy    Goal Priority Disciplines Outcome Interventions   Occupational Therapy Goal     OT, PT/OT Ongoing, Progressing    Description: Goals to be met by: 6/13/2023     Patient will increase functional independence with ADLs by performing:    UE Dressing with Modified [place].  LE Dressing with Modified [place] and Assistive Devices as needed.  Grooming while seated with Modified [place].  Toileting from bedside commode with Modified [place] for hygiene and clothing management.   Supine to sit with Modified [place].  Toilet transfer to bedside commode with Modified [place].                         Time Tracking:     OT Date of Treatment: 05/15/23  OT Start Time:  0915  OT Stop Time: 0954  OT Total Time (min): 39 min    Billable Minutes:Self Care/Home Management 39    OT/LUPIS: LUPIS     Number of LUPIS visits since last OT visit: 1    5/15/2023

## 2023-05-15 NOTE — PLAN OF CARE
Problem: Occupational Therapy  Goal: Occupational Therapy Goal  Description: Goals to be met by: 6/13/2023     Patient will increase functional independence with ADLs by performing:    UE Dressing with Modified [place].  LE Dressing with Modified [place] and Assistive Devices as needed.  Grooming while seated with Modified [place].  Toileting from bedside commode with Modified [place] for hygiene and clothing management.   Supine to sit with Modified [place].  Toilet transfer to bedside commode with Modified [place].    Outcome: Ongoing, Progressing     Patient able to complete BSC transfer this date. Requires increased time for all tasks/transitions 2/2 increased tone/spasticity in BLEs.  Patient will benefit from continued skilled OT to address deficits and improve performance in functional ADL tasks. Recommend IPR upon D/C.

## 2023-05-15 NOTE — PLAN OF CARE
Patient AAOX4.   Care plan reviewed. Urine specimen sent to lab.  PT/OT session. No BM as of yet.   Assited up to chair, tolerated well.     Problem: Adult Inpatient Plan of Care  Goal: Plan of Care Review  Outcome: Ongoing, Progressing  Goal: Patient-Specific Goal (Individualized)  Outcome: Ongoing, Progressing  Goal: Absence of Hospital-Acquired Illness or Injury  Outcome: Ongoing, Progressing  Goal: Optimal Comfort and Wellbeing  Outcome: Ongoing, Progressing  Goal: Readiness for Transition of Care  Outcome: Ongoing, Progressing     Problem: Infection  Goal: Absence of Infection Signs and Symptoms  Outcome: Ongoing, Progressing     Problem: Bleeding (Spinal Surgery)  Goal: Absence of Bleeding  Outcome: Ongoing, Progressing     Problem: Bowel Motility Impaired (Spinal Surgery)  Goal: Effective Bowel Elimination  Outcome: Ongoing, Progressing     Problem: Fluid and Electrolyte Imbalance (Spinal Surgery)  Goal: Fluid and Electrolyte Balance  Outcome: Ongoing, Progressing     Problem: Functional Ability Impaired (Spinal Surgery)  Goal: Optimal Functional Ability  Outcome: Ongoing, Progressing     Problem: Infection (Spinal Surgery)  Goal: Absence of Infection Signs and Symptoms  Outcome: Ongoing, Progressing     Problem: Neurologic Impairment (Spinal Surgery)  Goal: Optimal Neurologic Function  Outcome: Ongoing, Progressing     Problem: Ongoing Anesthesia Effects (Spinal Surgery)  Goal: Anesthesia/Sedation Recovery  Outcome: Ongoing, Progressing     Problem: Pain (Spinal Surgery)  Goal: Acceptable Pain Control  Outcome: Ongoing, Progressing     Problem: Postoperative Nausea and Vomiting (Spinal Surgery)  Goal: Nausea and Vomiting Relief  Outcome: Ongoing, Progressing     Problem: Postoperative Urinary Retention (Spinal Surgery)  Goal: Effective Urinary Elimination  Outcome: Ongoing, Progressing     Problem: Respiratory Compromise (Spinal Surgery)  Goal: Effective Oxygenation and Ventilation  Outcome: Ongoing,  Progressing     Problem: Fall Injury Risk  Goal: Absence of Fall and Fall-Related Injury  Outcome: Ongoing, Progressing     Problem: Hypertension Comorbidity  Goal: Blood Pressure in Desired Range  Outcome: Ongoing, Progressing     Problem: Osteoarthritis Comorbidity  Goal: Maintenance of Osteoarthritis Symptom Control  Outcome: Ongoing, Progressing     Problem: Functional Ability Impaired (Multiple Sclerosis)  Goal: Optimal Functional Ability  Outcome: Ongoing, Progressing     Problem: Pain (Multiple Sclerosis)  Goal: Optimal Pain Control  Outcome: Ongoing, Progressing

## 2023-05-15 NOTE — PLAN OF CARE
Recommendation:   1. Add Boost Plus 1xday  2. Monitor need for supplements.   3. Monitor weight/labs.   4. RD to follow to monitor po intake     Goals:  Pt will tolerate diet with at least 50-75% intake at meals by RD follow up  Nutrition Goal Status: new

## 2023-05-15 NOTE — PLAN OF CARE
CM met with pt   s/p T6-8 Anterior Diskectomy;   hx of MS;  Spinal Stenosis       therapy recc Rehab     Pt has a RW, stand up walker and a transport WC   Pt is current with [facility]    HOANG discussed post acute placement  with pt - she requested a list of area IPR's   - she is open to placement.   Reviewed list with pt - she asked that HOANG sent referrals to [facility] and [facility]  - info sent per [facility] --Livia is aware.         05/15/23 4192   Discharge Assessment   Assessment Type Discharge Planning Assessment   Confirmed/corrected address, phone number and insurance Yes   Confirmed Demographics Correct on Facesheet   Source of Information patient;health record   Communicated ASYA with patient/caregiver Yes   Reason For Admission S/p anterior diskectomy;  spinal stenosis   People in Home alone   Do you expect to return to your current living situation? No  (IPR)   Do you have help at home or someone to help you manage your care at home? No   Prior to hospitilization cognitive status: Alert/Oriented   Current cognitive status: Alert/Oriented   Walking or Climbing Stairs ambulation difficulty, requires equipment   Home Accessibility wheelchair accessible   Equipment Currently Used at Home wheelchair;walker, rolling  (transport WC;)   Readmission within 30 days? No   Patient currently being followed by outpatient case management? No   Do you currently have service(s) that help you manage your care at home? No   Do you take prescription medications? Yes   How do you get to doctors appointments? family or friend will provide   Are you on dialysis? No   Do you take coumadin? No   Discharge Plan A Rehab   DME Needed Upon Discharge  none   Discharge Plan discussed with: Patient   Transition of Care Barriers No family/friends to help

## 2023-05-16 VITALS
TEMPERATURE: 99 F | HEART RATE: 80 BPM | HEIGHT: 67 IN | OXYGEN SATURATION: 96 % | RESPIRATION RATE: 18 BRPM | BODY MASS INDEX: 31.49 KG/M2 | WEIGHT: 200.63 LBS | SYSTOLIC BLOOD PRESSURE: 110 MMHG | DIASTOLIC BLOOD PRESSURE: 59 MMHG

## 2023-05-16 PROCEDURE — 94761 N-INVAS EAR/PLS OXIMETRY MLT: CPT

## 2023-05-16 PROCEDURE — 99900035 HC TECH TIME PER 15 MIN (STAT)

## 2023-05-16 PROCEDURE — 97530 THERAPEUTIC ACTIVITIES: CPT | Mod: CQ

## 2023-05-16 PROCEDURE — 27000221 HC OXYGEN, UP TO 24 HOURS

## 2023-05-16 PROCEDURE — 97530 THERAPEUTIC ACTIVITIES: CPT | Mod: CO

## 2023-05-16 PROCEDURE — 97116 GAIT TRAINING THERAPY: CPT | Mod: CQ

## 2023-05-16 PROCEDURE — 25000003 PHARM REV CODE 250: Performed by: NEUROLOGICAL SURGERY

## 2023-05-16 PROCEDURE — 63600175 PHARM REV CODE 636 W HCPCS: Performed by: NEUROLOGICAL SURGERY

## 2023-05-16 RX ORDER — AMOXICILLIN 250 MG
2 CAPSULE ORAL NIGHTLY PRN
Qty: 1 TABLET | Refills: 0 | Status: ON HOLD
Start: 2023-05-16 | End: 2023-10-06 | Stop reason: HOSPADM

## 2023-05-16 RX ORDER — OXYCODONE HYDROCHLORIDE 10 MG/1
10 TABLET ORAL EVERY 6 HOURS PRN
Qty: 1 TABLET | Refills: 0
Start: 2023-05-16 | End: 2023-09-06

## 2023-05-16 RX ORDER — ONDANSETRON 8 MG/1
8 TABLET, ORALLY DISINTEGRATING ORAL EVERY 6 HOURS PRN
Qty: 1 TABLET | Refills: 0
Start: 2023-05-16 | End: 2023-09-06

## 2023-05-16 RX ORDER — METHOCARBAMOL 750 MG/1
750 TABLET, FILM COATED ORAL 3 TIMES DAILY PRN
Qty: 1 TABLET | Refills: 0 | Status: ON HOLD
Start: 2023-05-16 | End: 2023-10-06 | Stop reason: HOSPADM

## 2023-05-16 RX ORDER — MAG HYDROX/ALUMINUM HYD/SIMETH 200-200-20
30 SUSPENSION, ORAL (FINAL DOSE FORM) ORAL EVERY 4 HOURS PRN
Qty: 1 ML | Refills: 0
Start: 2023-05-16 | End: 2023-09-06

## 2023-05-16 RX ORDER — BISACODYL 10 MG
10 SUPPOSITORY, RECTAL RECTAL DAILY PRN
Qty: 1 SUPPOSITORY | Refills: 0
Start: 2023-05-16 | End: 2023-06-13

## 2023-05-16 RX ADMIN — METHOCARBAMOL TABLETS 750 MG: 750 TABLET, COATED ORAL at 03:05

## 2023-05-16 RX ADMIN — ACETAMINOPHEN 650 MG: 325 TABLET ORAL at 01:05

## 2023-05-16 RX ADMIN — ACETAMINOPHEN 650 MG: 325 TABLET ORAL at 05:05

## 2023-05-16 RX ADMIN — PROPAFENONE HYDROCHLORIDE 150 MG: 150 TABLET, FILM COATED ORAL at 01:05

## 2023-05-16 RX ADMIN — MUPIROCIN: 20 OINTMENT TOPICAL at 08:05

## 2023-05-16 RX ADMIN — METOPROLOL SUCCINATE 25 MG: 25 TABLET, EXTENDED RELEASE ORAL at 08:05

## 2023-05-16 RX ADMIN — METHOCARBAMOL TABLETS 750 MG: 750 TABLET, COATED ORAL at 08:05

## 2023-05-16 RX ADMIN — HEPARIN SODIUM 5000 UNITS: 5000 INJECTION INTRAVENOUS; SUBCUTANEOUS at 08:05

## 2023-05-16 RX ADMIN — LEVOTHYROXINE SODIUM 125 MCG: 125 TABLET ORAL at 05:05

## 2023-05-16 RX ADMIN — PROPAFENONE HYDROCHLORIDE 150 MG: 150 TABLET, FILM COATED ORAL at 05:05

## 2023-05-16 NOTE — PLAN OF CARE
[facility] Surg  Facility Transfer Orders        Admit to: [facility]    Diagnoses:   Active Hospital Problems    Diagnosis  POA    *Thoracic disc disease with myelopathy [M51.04]  Yes    Atelectasis [J98.11]  No    Multiple sclerosis [G35]  Yes    Essential hypertension, benign [I10]  Yes     Well controlled- slightly high but was waiting for a while as she did not have a way to get from lobby up to clinic  Have some SHANE, add in HCTZ 12.5 mg and monitor  Continue as now          Resolved Hospital Problems   No resolved problems to display.     Allergies:   Review of patient's allergies indicates:   Allergen Reactions    Vancomycin analogues Tinitus       Code Status: Full    Vitals: Routine       Diet: regular diet  Boost plus daily    Activity: Activity as tolerated    Nursing:  I/Os  Teds/SCDs  OOB/Up in chair with meals  IS every 4 hours    Bed/Surface: Normal    Consults: PT and OT evaluate and treat    Oxygen: room air      Pending Diagnostic Studies:       None                 Medications: Discontinue all previous medication orders, if any. See new list below.  Current Discharge Medication List        START taking these medications    Details   aluminum-magnesium hydroxide-simethicone (MAALOX) 200-200-20 mg/5 mL Susp Take 30 mLs by mouth every 4 (four) hours as needed (indigestion).  Qty: 1 mL, Refills: 0      bisacodyL (DULCOLAX) 10 mg Supp Place 1 suppository (10 mg total) rectally daily as needed (constipation).  Qty: 1 suppository, Refills: 0      methocarbamoL (ROBAXIN) 750 MG Tab Take 1 tablet (750 mg total) by mouth 3 (three) times daily as needed (muscle spasms).  Qty: 1 tablet, Refills: 0      ondansetron (ZOFRAN-ODT) 8 MG TbDL Take 1 tablet (8 mg total) by mouth every 6 (six) hours as needed (nausea/vomiting).  Qty: 1 tablet, Refills: 0      oxyCODONE (ROXICODONE) 10 mg Tab immediate release tablet Take 1 tablet (10 mg total) by mouth every 6 (six) hours as needed for Pain.  Qty: 1 tablet,  Refills: 0    Comments: Quantity prescribed more than 7 day supply? No      senna-docusate 8.6-50 mg (PERICOLACE) 8.6-50 mg per tablet Take 2 tablets by mouth nightly as needed for Constipation.  Qty: 1 tablet, Refills: 0           CONTINUE these medications which have NOT CHANGED    Details   levothyroxine (SYNTHROID) 125 MCG tablet TAKE 1 TABLET BY MOUTH BEFORE BREAKFAST  Qty: 90 tablet, Refills: 1    Associated Diagnoses: Postoperative hypothyroidism      losartan (COZAAR) 25 MG tablet Take 1 tablet (25 mg total) by mouth once daily.  Qty: 90 tablet, Refills: 3    Comments: .      metoprolol succinate (TOPROL-XL) 25 MG 24 hr tablet Take 1 tablet by mouth once daily  Qty: 90 tablet, Refills: 3    Associated Diagnoses: Essential hypertension, benign      propafenone (RHTHYMOL) 150 MG Tab Take 1 tablet (150 mg total) by mouth every 8 (eight) hours.  Qty: 90 tablet, Refills: 11      ELIQUIS 5 mg Tab Take 1 tablet (5 mg total) by mouth 2 (two) times daily.  Qty: 60 tablet, Refills: 11      glatiramer (COPAXONE, GLATOPA) 40 mg/mL injection Inject 40 mg into the skin 3 (three) times a week.  Qty: 12 each, Refills: 5    Associated Diagnoses: Multiple sclerosis      hydroCHLOROthiazide (HYDRODIURIL) 12.5 MG Tab Take 1 tablet (12.5 mg total) by mouth once daily.  Qty: 30 tablet, Refills: 11    Comments: .      meclizine (ANTIVERT) 25 mg tablet Take 1 tablet (25 mg total) by mouth 3 (three) times daily as needed for Dizziness.  Qty: 30 tablet, Refills: 3    Associated Diagnoses: Benign paroxysmal positional vertigo, unspecified laterality      multivitamin (THERAGRAN) per tablet Take 1 tablet by mouth once daily.      rosuvastatin (CRESTOR) 10 MG tablet Take 1 tablet by mouth once daily  Qty: 90 tablet, Refills: 0    Associated Diagnoses: Mixed hyperlipidemia           STOP taking these medications       b complex vitamins capsule Comments:   Reason for Stopping:         collagen-biotin-ascorbic acid (COLLAGEN 1500 PLUS C)  500 mg-800 mcg- 50 mg Cap Comments:   Reason for Stopping:         vitamin D (VITAMIN D3) 1000 units Tab Comments:   Reason for Stopping:         vitamin E 400 UNIT capsule Comments:   Reason for Stopping:         vitamins A,C,E-zinc-copper (ICAPS AREDS) 4,296 mcg-226 mg-90 mg Cap Comments:   Reason for Stopping:             Follow up:       Immunizations Administered as of 5/16/2023       Name Date Dose VIS Date Route Exp Date    COVID-19, MRNA, LN-S, PF (Pfizer) (Purple Cap) 3/16/2021 11:09 AM 0.3 mL 12/12/2020 Intramuscular 6/30/2021    Site: Left deltoid     Given By: Jennifer Finn     : [employer]     Lot: [id number]     COVID-19, MRNA, LN-S, PF (Pfizer) (Purple Cap) 2/23/2021  1:43 PM 0.3 mL 12/12/2020 Intramuscular 6/30/2021    Site: Left deltoid     Given By: Jennifer Finn     : Pf[employer]     Lot: [id number]             This patient has had both covid vaccinations    Some patients may experience side effects after vaccination.  These may include fever, headache, muscle or joint aches.  Most symptoms resolve with 24-48 hours and do not require urgent medical evaluation unless they persist for more than 72 hours or symptoms are concerning for an unrelated medical condition.          Taryn Tang PA-C

## 2023-05-16 NOTE — PLAN OF CARE
Pt for d/c to [facility] today   Orders placed per Neurosurgery ANDREINA Bhakta with IPR intake relayed that two doses of pt's meds propafenone --would not be available - CM and pt's nurse spoke with [facility] Pharmacist Caren  - [phone]  - Pharmacist was able to arrange to have meds delivered to facility from [facility].   This was confirmed per Livia.      CM confirmed with Grover at [facility] - pt's stretcher transport has been re-arranged as d/c was as Pharmacy confirmed that all of pt's meds will be available.       Bedside nurse to call report:  Report can be called to [facility] upon Transport . Call [phone] or [phone]. Pt is going to [facility] nurse station, room assigned on arrival     05/16/23 8503   Final Note   Assessment Type Final Discharge Note   Anticipated Discharge Disposition Rehab  ([facility])   What phone number can be called within the next 1-3 days to see how you are doing after discharge? [phone]   Hospital Resources/Appts/Education Provided Post-Acute resouces added to AVS;Appointments scheduled and added to AVS   Post-Acute Status   Post-Acute Authorization Placement   Post-Acute Placement Status Set-up Complete/Auth obtained   Discharge Delays None known at this time

## 2023-05-16 NOTE — PT/OT/SLP PROGRESS
Physical Therapy Treatment    Patient Name:  Graciela Barton   MRN:  [id number]    Recommendations:     Discharge Recommendations: rehabilitation facility  Discharge Equipment Recommendations:  (TBD)  Barriers to discharge:  decreased mobility,strength and endurance    Assessment:     Graciela Barton is a 73 y.o. female admitted with a medical diagnosis of Thoracic disc disease with myelopathy.  She presents with the following impairments/functional limitations: weakness, impaired endurance, impaired functional mobility, gait instability, impaired balance, decreased lower extremity function, decreased upper extremity function, decreased ROM, abnormal tone, impaired joint extensibility,pt with good participation and improving mobility today,pt requires assistance with all mobility and will benefit from IPR upon discharge,pt able to meet IPR criteria.    Rehab Prognosis: Good; patient would benefit from acute skilled PT services to address these deficits and reach maximum level of function.    Recent Surgery: Procedure(s) (LRB):  DISCECTOMY, SPINE, THORACIC Right T6-7, T7-8 interior discectomy retropleural (Right) 4 Days Post-Op    Plan:     During this hospitalization, patient to be seen daily to address the identified rehab impairments via gait training, therapeutic activities, therapeutic exercises, neuromuscular re-education and progress toward the following goals:    Plan of Care Expires:  06/13/23    Subjective     Chief Complaint: n/a  Patient/Family Comments/goals: pt states she feels good sitting up in chair.  Pain/Comfort:  Pain Rating 1: 0/10      Objective:     Communicated with nsg prior to session.  Patient found supine with bed alarm, oxygen, PureWick upon PT entry to room.     General Precautions: Standard, fall  Orthopedic Precautions: spinal precautions  Braces: N/A  Respiratory Status:  supplemental O2     Functional Mobility:  Bed Mobility:     Supine to Sit: minimum assistance  Transfers:     Sit to  Stand:  minimum assistance with rolling walker  Bed to Chair: minimum assistance with  rolling walker  using  ambulation  Gait: amb ~4' and ~14' with RW and Min A f/b chair for safety  Balance: fair standing balance with RW      AM-PAC 6 CLICK MOBILITY  Turning over in bed (including adjusting bedclothes, sheets and blankets)?: 3  Sitting down on and standing up from a chair with arms (e.g., wheelchair, bedside commode, etc.): 2  Moving from lying on back to sitting on the side of the bed?: 3  Moving to and from a bed to a chair (including a wheelchair)?: 3  Need to walk in hospital room?: 3 (f/b chair)  Climbing 3-5 steps with a railing?: 1  Basic Mobility Total Score: 15       Treatment & Education: rest periods PRN with activity,pt ambulates with flexed posture due to limitations.          Sit-stand to RW with Min A,chair-bed with RW and Min A,sit-sup Mod A at le's,pt supine with nsg present.(PM)      Patient left up in chair with all lines intact, call button in reach, chair alarm on, and nsg notified..    GOALS: see general POC  Multidisciplinary Problems       Physical Therapy Goals          Problem: Physical Therapy    Goal Priority Disciplines Outcome Goal Variances Interventions   Physical Therapy Goal     PT, PT/OT Ongoing, Progressing     Description: Goals to be met by: 23    Patient will increase functional independence with mobility by performin) Supine<>Sit with Supervision  2) Sit <>Stand with Supervision with RW  3) Bed <>Chair with Supervision with RW  4) Pt to ambulate 10 feet with RW and Supervision.                               Time Tracking:     PT Received On: 23  PT Start Time: 900   1314(PM)  PT Stop Time: 930   1325(PM)  PT Total Time (min): 30 min(PM)    Billable Minutes: Gait Training 16 and Therapeutic Activity 14      TA 11 min(PM)       PT/PTA: PTA     Number of PTA visits since last PT visit: 3     2023

## 2023-05-16 NOTE — PROGRESS NOTES
[facility]  Neurosurgery  Progress Note    Subjective:     Interval History: Mild incisional pain.  No leg pain or paresthesias. Walked some with PT yesterday in her room.  Sat in chair.  Had BM yesterday. Denies chills, sweats.  Denies SOB, CP, or coughing.      Post-Op Info:  Procedure(s) (LRB):  DISCECTOMY, SPINE, THORACIC Right T6-7, T7-8 interior discectomy retropleural (Right)   4 Days Post-Op      Medications:  Continuous Infusions:  Scheduled Meds:   acetaminophen  650 mg Oral Q6H    bisacodyL  10 mg Rectal Daily    heparin (porcine)  5,000 Units Subcutaneous Q12H    levothyroxine  125 mcg Oral Before breakfast    methocarbamoL  750 mg Oral TID    metoprolol succinate  25 mg Oral Daily    mupirocin   Nasal BID    propafenone  150 mg Oral Q8H     PRN Meds:aluminum-magnesium hydroxide-simethicone, HYDROmorphone, meclizine, ondansetron, oxyCODONE, prochlorperazine, senna-docusate 8.6-50 mg, traMADoL     Review of Systems  Objective:     Weight: 91 kg (200 lb 9.9 oz)  Body mass index is 31.42 kg/m².  Vital Signs (Most Recent):  Temp: 97.1 °F (36.2 °C) (05/16/23 0755)  Pulse: 82 (05/16/23 0755)  Resp: 18 (05/16/23 0755)  BP: (!) 159/72 (05/16/23 0755)  SpO2: (!) 93 % (05/16/23 0755) Vital Signs (24h Range):  Temp:  [97.1 °F (36.2 °C)-100.4 °F (38 °C)] 97.1 °F (36.2 °C)  Pulse:  [] 82  Resp:  [14-20] 18  SpO2:  [93 %-97 %] 93 %  BP: ()/(54-72) 159/72                              Neurosurgery Physical Exam    General: well developed, well nourished, no distress  Mental Status: Awake, Alert, Oriented X3.Thought content appropriate  GCS: Motor: 6/Verbal: 5/Eyes: 4 GCS Total: 15    Motor Strength:  Strength                                HF KF KE DF PF EHL   Lower: R 5/5 5/5 5/5 5/5 5/5 4/5    L 5/5 5/5 5/5 5/5 5/5 5/5       Betancourt: absent on left.  Positive on right.  Clonus: absent B/L  Incision- CDI        Significant Labs:  Recent Labs   Lab 05/15/23  0747   GLU 88      K 3.6       CO2 26   BUN 12   CREATININE 0.7   CALCIUM 8.7     Recent Labs   Lab 05/15/23  0747   WBC 9.80   HGB 8.9*   HCT 27.3*        No results for input(s): LABPT, INR, APTT in the last 48 hours.  Microbiology Results (last 7 days)       ** No results found for the last 168 hours. **              Assessment/Plan:     Active Diagnoses:    Diagnosis Date Noted POA    PRINCIPAL PROBLEM:  Thoracic disc disease with myelopathy [M51.04] 03/14/2023 Yes    Atelectasis [J98.11] 05/12/2023 No    Multiple sclerosis [G35] 12/01/2022 Yes    Essential hypertension, benign [I10] 02/19/2018 Yes      Problems Resolved During this Admission:     UA negative  Repeat CBC shows normal WBC  Continue mobilizing with PT  Pending placement    All of the above discussed and reviewed with Dr. Salter.         Taryn Tang PA-C  Neurosurgery  [place] [facility]

## 2023-05-16 NOTE — PT/OT/SLP PROGRESS
"Occupational Therapy   Treatment    Name: Graciela Barton  MRN: [id number]  Admitting Diagnosis:  Thoracic disc disease with myelopathy  4 Days Post-Op    Recommendations:     Discharge Recommendations: rehabilitation facility  Discharge Equipment Recommendations:  to be determined by next level of care  Barriers to discharge:  Decreased caregiver support, Inaccessible home environment    Assessment:     Graciela Barton is a 73 y.o. female with a medical diagnosis of Thoracic disc disease with myelopathy.  Performance deficits affecting function are weakness, impaired endurance, impaired self care skills, impaired functional mobility, gait instability, impaired balance, decreased upper extremity function, decreased lower extremity function, decreased safety awareness, decreased ROM, impaired joint extensibility.     Rehab Prognosis:  Fair; patient would benefit from acute skilled OT services to address these deficits and reach maximum level of function.       Plan:     Patient to be seen 5 x/week to address the above listed problems via self-care/home management, therapeutic activities, therapeutic exercises, neuromuscular re-education  Plan of Care Expires: 06/14/23  Plan of Care Reviewed with: patient    Subjective     Chief Complaint: "My legs are always stuck together"  Patient/Family Comments/goals: return to PLOF  Pain/Comfort:  Pain Rating 1: 0/10  Pain Rating Post-Intervention 1: 0/10    Objective:     Communicated with: nurseNinoska prior to session.  Patient found HOB elevated with bed alarm, PureWick upon OT entry to room.    General Precautions: Standard, fall    Orthopedic Precautions:spinal precautions  Braces: N/A  Respiratory Status: Room air    Bed Mobility:    Patient completed Rolling/Turning to Right with contact guard assistance and with side rail  Patient completed Scooting/Bridging with minimum assistance  Patient completed Supine to Sit with minimum assistance     Functional " Mobility/Transfers:  Patient completed Sit <> Stand Transfer with minimum assistance  with  rolling walker   Patient completed Bed <> Chair Transfer using Step Transfer technique with contact guard assistance with rolling walker    Activities of Daily Living:  [place]    [facility] 6 Click ADL: 17    Treatment & Education:  Patient eager to participate in therapy. Increased time, effort and VCs for all tasks/transitions. Patient /c slightly decreased tone/spasticity this date, which allowed for easier transitional movements. Completed multiple stands from bed and chair as above. See PT note for gait details; patient ambulates /c narrow SEAN, knees adducted and flexed trunk.     Patient left up in chair with all lines intact, call button in reach, chair alarm on, and nsg notified    GOALS:   Multidisciplinary Problems       Occupational Therapy Goals          Problem: Occupational Therapy    Goal Priority Disciplines Outcome Interventions   Occupational Therapy Goal     OT, PT/OT Ongoing, Progressing    Description: Goals to be met by: 6/13/2023     Patient will increase functional independence with ADLs by performing:    UE Dressing with Modified [place].  LE Dressing with Modified [place] and Assistive Devices as needed.  Grooming while seated with Modified [place].  Toileting from bedside commode with Modified [place] for hygiene and clothing management.   Supine to sit with Modified [place].  Toilet transfer to bedside commode with Modified [place].                         Time Tracking:     OT Date of Treatment: 05/16/23  OT Start Time: 0900  OT Stop Time: 0930  OT Total Time (min): 30 min co-tx /c PT    Billable Minutes:Therapeutic Activity 30    OT/LUPIS: LUPIS     Number of LUPIS visits since last OT visit: 2    5/16/2023

## 2023-05-17 ENCOUNTER — TELEPHONE (OUTPATIENT)
Dept: NEUROSURGERY | Facility: CLINIC | Age: 73
End: 2023-05-17
Payer: COMMERCIAL

## 2023-05-17 NOTE — TELEPHONE ENCOUNTER
Attempted to derik pt's nurse at [facility], no answer left a message with the

## 2023-05-17 NOTE — DISCHARGE SUMMARY
[facility] S[facility]  Neurosurgery  Discharge Summary      Patient Name: Graciela Barton  MRN: [id number]  Admission Date: 5/12/2023  Hospital Length of Stay: 4 days  Discharge Date and Time: 5/16/2023  5:55 PM  Attending Physician: Scooter Salter MD  Discharging Provider: Taryn Tang PA-C  Primary Care Provider: Rocco Miller MD     HPI:     This is a very pleasant 72 y.o. female, who was active at the beginning of 2022, mowing her lawn then suddenly started to have difficulty walking, severe back pain and right leg pain.  She has developed a paralysis involving the right leg.  Her right leg appears spastic.  She is mainly complaining of right knee pain. She is being mobilized in a wheelchair.  She admits still being able to drive her car.  In June 2022 she had a thoracic and lumbar spine MRI.  She is being investigated for possible multiple sclerosis.  She reports having no loss of sensation to pain or temperature.  She has no voiding difficulty or incontinence.  She reports right more than left hand numbness and dropping things frequently.       Procedure(s) (LRB):  DISCECTOMY, SPINE, THORACIC Right T6-7, T7-8 interior discectomy retropleural (Right)     Hospital Course:     Patient had the above-named procedure.  Postoperatively had some difficulty ambulating with Physical therapy.  Of note she also has lumbar stenosis and multiple sclerosis.  She had elevated temperature and mild elevated heart rate.  UA was ordered which was negative.  White blood cell count was elevated initially and then returned to normal.  Patient denied any symptoms of infection.  Drain was removed and patient was discharged to [facility] in stable condition on postop day 4.      Consults:   Consults (From admission, onward)          Status Ordering Provider     IP consult to case management  Once        Provider:  (Not yet assigned)    Completed SCOOTER SALTER                Pending Diagnostic Studies:       None           Final Active Diagnoses:    Diagnosis Date Noted POA    PRINCIPAL PROBLEM:  Thoracic disc disease with myelopathy [M51.04] 03/14/2023 Yes    Atelectasis [J98.11] 05/12/2023 No    Multiple sclerosis [G35] 12/01/2022 Yes    Essential hypertension, benign [I10] 02/19/2018 Yes      Problems Resolved During this Admission:      Discharged Condition: good    Disposition: Rehab Facility    Follow Up:    Patient Instructions:   No discharge procedures on file.  Medications:  Reconciled Home Medications:      Medication List        START taking these medications      aluminum-magnesium hydroxide-simethicone 200-200-20 mg/5 mL Susp  Commonly known as: MAALOX  Take 30 mLs by mouth every 4 (four) hours as needed (indigestion).     bisacodyL 10 mg Supp  Commonly known as: DULCOLAX  Place 1 suppository (10 mg total) rectally daily as needed (constipation).     methocarbamoL 750 MG Tab  Commonly known as: ROBAXIN  Take 1 tablet (750 mg total) by mouth 3 (three) times daily as needed (muscle spasms).     ondansetron 8 MG Tbdl  Commonly known as: ZOFRAN-ODT  Take 1 tablet (8 mg total) by mouth every 6 (six) hours as needed (nausea/vomiting).     oxyCODONE 10 mg Tab immediate release tablet  Commonly known as: ROXICODONE  Take 1 tablet (10 mg total) by mouth every 6 (six) hours as needed for Pain.     senna-docusate 8.6-50 mg 8.6-50 mg per tablet  Commonly known as: PERICOLACE  Take 2 tablets by mouth nightly as needed for Constipation.            CONTINUE taking these medications      ELIQUIS 5 mg Tab  Generic drug: apixaban  Take 1 tablet (5 mg total) by mouth 2 (two) times daily.     glatiramer 40 mg/mL injection  Commonly known as: COPAXONE, GLATOPA  Inject 40 mg into the skin 3 (three) times a week.     hydroCHLOROthiazide 12.5 MG Tab  Commonly known as: HYDRODIURIL  Take 1 tablet (12.5 mg total) by mouth once daily.     levothyroxine 125 MCG tablet  Commonly known as: SYNTHROID  TAKE 1 TABLET BY MOUTH BEFORE BREAKFAST      losartan 25 MG tablet  Commonly known as: COZAAR  Take 1 tablet (25 mg total) by mouth once daily.     meclizine 25 mg tablet  Commonly known as: ANTIVERT  Take 1 tablet (25 mg total) by mouth 3 (three) times daily as needed for Dizziness.     metoprolol succinate 25 MG 24 hr tablet  Commonly known as: TOPROL-XL  Take 1 tablet by mouth once daily     multivitamin per tablet  Commonly known as: THERAGRAN  Take 1 tablet by mouth once daily.     propafenone 150 MG Tab  Commonly known as: RHTHYMOL  Take 1 tablet (150 mg total) by mouth every 8 (eight) hours.     rosuvastatin 10 MG tablet  Commonly known as: CRESTOR  Take 1 tablet by mouth once daily            STOP taking these medications      b complex vitamins capsule     COLLAGEN 1500 PLUS C 500 mg-800 mcg- 50 mg Cap  Generic drug: collagen-biotin-ascorbic acid     ICAPS AREDS 4,296 mcg-226 mg-90 mg Cap  Generic drug: vitamins A,C,E-zinc-copper     vitamin D 1000 units Tab  Commonly known as: VITAMIN D3     vitamin E 400 UNIT capsule              Taryn Tang PA-C  Neurosurgery  [place] [facility]

## 2023-05-17 NOTE — TELEPHONE ENCOUNTER
Patient scheduled this Friday at 1pm and that appointment needs to be cancelled.    Call [facility] and tell them the nurse can take out her staples at rehab.    Thanks,  Taryn Tang [facility], PA-C  Neurosurgery  [facility]  05/17/2023

## 2023-06-13 ENCOUNTER — OFFICE VISIT (OUTPATIENT)
Dept: PRIMARY CARE CLINIC | Facility: CLINIC | Age: 73
End: 2023-06-13
Payer: MEDICARE

## 2023-06-13 VITALS
HEIGHT: 67 IN | WEIGHT: 170.06 LBS | BODY MASS INDEX: 26.69 KG/M2 | TEMPERATURE: 98 F | RESPIRATION RATE: 16 BRPM | SYSTOLIC BLOOD PRESSURE: 130 MMHG | DIASTOLIC BLOOD PRESSURE: 62 MMHG | HEART RATE: 68 BPM | OXYGEN SATURATION: 95 %

## 2023-06-13 DIAGNOSIS — Z79.01 ANTICOAGULANT LONG-TERM USE: Primary | ICD-10-CM

## 2023-06-13 DIAGNOSIS — I48.0 PAF (PAROXYSMAL ATRIAL FIBRILLATION): ICD-10-CM

## 2023-06-13 DIAGNOSIS — R60.0 FLUID RETENTION IN LEGS: ICD-10-CM

## 2023-06-13 DIAGNOSIS — G95.20 SPINAL CORD COMPRESSION: ICD-10-CM

## 2023-06-13 DIAGNOSIS — E78.2 MIXED HYPERLIPIDEMIA: ICD-10-CM

## 2023-06-13 DIAGNOSIS — G35 MULTIPLE SCLEROSIS: ICD-10-CM

## 2023-06-13 PROCEDURE — 99214 OFFICE O/P EST MOD 30 MIN: CPT | Mod: PBBFAC,PN | Performed by: NURSE PRACTITIONER

## 2023-06-13 PROCEDURE — 99999 PR PBB SHADOW E&M-EST. PATIENT-LVL IV: ICD-10-PCS | Mod: PBBFAC,,, | Performed by: NURSE PRACTITIONER

## 2023-06-13 PROCEDURE — 99999 PR PBB SHADOW E&M-EST. PATIENT-LVL IV: CPT | Mod: PBBFAC,,, | Performed by: NURSE PRACTITIONER

## 2023-06-13 PROCEDURE — 99214 OFFICE O/P EST MOD 30 MIN: CPT | Mod: S$PBB,,, | Performed by: NURSE PRACTITIONER

## 2023-06-13 PROCEDURE — 99214 PR OFFICE/OUTPT VISIT, EST, LEVL IV, 30-39 MIN: ICD-10-PCS | Mod: S$PBB,,, | Performed by: NURSE PRACTITIONER

## 2023-06-13 RX ORDER — HYDROCHLOROTHIAZIDE 25 MG/1
25 TABLET ORAL DAILY
Qty: 30 TABLET | Refills: 11 | Status: SHIPPED | OUTPATIENT
Start: 2023-06-13 | End: 2023-11-17

## 2023-06-13 RX ORDER — ACETAMINOPHEN 325 MG/1
650 TABLET ORAL EVERY 6 HOURS PRN
COMMUNITY
Start: 2023-05-30 | End: 2023-09-06

## 2023-06-13 NOTE — PROGRESS NOTES
Chief Complaint  Chief Complaint   Patient presents with    Hospital Follow Up       HPI    Post-op - Ms. Barton presents today for follow up with Primary care after recent discectomy. She reports that she lives alone, but is able to complete all of her ADL's independently and has been going to physical therapy twice a week. She denies any issues with constipation, diarrhea, urgency, or frequency. She denies any pain today and is having no pain or irritation  from surgical sites. She denies any recent fever, chills, or illness since hospitalizations. She reports she is taking all of her medications with out concern or side effects and confirms that she has restarted her Eliquis post-op. She notes that her feet are more swollen since her surgery, which she attributes to decrease in mobility and decreasing her HCTZ.            PAST MEDICAL HISTORY:  Past Medical History:   Diagnosis Date    Arthritis     Cancer     Thyroid    Edema     Hyperlipidemia     Hypertension     Thyroid disease        PAST SURGICAL HISTORY:  Past Surgical History:   Procedure Laterality Date    ADENOIDECTOMY      COLONOSCOPY  10/18/2018    COLONOSCOPY N/A 10/18/2018    Procedure: COLONOSCOPY;  Surgeon: Yosvany Alejandra MD;  Location: [facility] ENDO;  Service: General;  Laterality: N/A;    EPIDURAL STEROID INJECTION N/A 9/30/2022    Procedure: LUMBAR L3/4 MALKA CONTRAST DIRRECT REFERRAL MONICA CARRERA IN CHART;  Surgeon: Nicholas Kumar MD;  Location: [facility] PAIN MGT;  Service: Pain Management;  Laterality: N/A;    HYSTERECTOMY  1992    total hyst     KNEE SURGERY      16 pins and two plates implanted    THORACIC DISCECTOMY Right 5/12/2023    Procedure: DISCECTOMY, SPINE, THORACIC Right T6-7, T7-8 interior discectomy retropleural;  Surgeon: Scooter Salter MD;  Location: [facility] OR;  Service: Neurosurgery;  Laterality: Right;  general  eliquis   type and screen  C-arm  Metrx  EMG bairon notified cc  SEP  MEP  Regular bed   lateral left down   globus  (Jose Enrique) notified cc    THYROIDECTOMY      TONSILLECTOMY         SOCIAL HISTORY:  Social History     Socioeconomic History    Marital status:    Tobacco Use    Smoking status: Never    Smokeless tobacco: Never   Substance and Sexual Activity    Alcohol use: No    Drug use: No    Sexual activity: Not Currently     Partners: Male       FAMILY HISTORY:  Family History   Problem Relation Age of Onset    Alcohol abuse Mother     Heart disease Mother     Alcohol abuse Father     Heart disease Father     Heart disease Maternal Grandmother     Heart disease Maternal Grandfather        ALLERGIES AND MEDICATIONS: updated and reviewed.  Review of patient's allergies indicates:   Allergen Reactions    Vancomycin Tinitus    Vancomycin analogues Tinitus     Current Outpatient Medications   Medication Sig Dispense Refill    acetaminophen (TYLENOL) 325 MG tablet Take 650 mg by mouth every 6 (six) hours as needed.      aluminum-magnesium hydroxide-simethicone (MAALOX) 200-200-20 mg/5 mL Susp Take 30 mLs by mouth every 4 (four) hours as needed (indigestion). 1 mL 0    ELIQUIS 5 mg Tab Take 1 tablet (5 mg total) by mouth 2 (two) times daily. 60 tablet 11    hydroCHLOROthiazide (HYDRODIURIL) 12.5 MG Tab Take 1 tablet (12.5 mg total) by mouth once daily. 30 tablet 11    levothyroxine (SYNTHROID) 125 MCG tablet TAKE 1 TABLET BY MOUTH BEFORE BREAKFAST 90 tablet 1    losartan (COZAAR) 25 MG tablet Take 1 tablet (25 mg total) by mouth once daily. 90 tablet 3    meclizine (ANTIVERT) 25 mg tablet Take 1 tablet (25 mg total) by mouth 3 (three) times daily as needed for Dizziness. 30 tablet 3    methocarbamoL (ROBAXIN) 750 MG Tab Take 1 tablet (750 mg total) by mouth 3 (three) times daily as needed (muscle spasms). 1 tablet 0    metoprolol succinate (TOPROL-XL) 25 MG 24 hr tablet Take 1 tablet by mouth once daily 90 tablet 3    multivitamin (THERAGRAN) per tablet Take 1 tablet by mouth once daily.      ondansetron (ZOFRAN-ODT) 8 MG TbDL  "Take 1 tablet (8 mg total) by mouth every 6 (six) hours as needed (nausea/vomiting). 1 tablet 0    oxyCODONE (ROXICODONE) 10 mg Tab immediate release tablet Take 1 tablet (10 mg total) by mouth every 6 (six) hours as needed for Pain. 1 tablet 0    propafenone (RHTHYMOL) 150 MG Tab Take 1 tablet (150 mg total) by mouth every 8 (eight) hours. 90 tablet 11    rosuvastatin (CRESTOR) 10 MG tablet Take 1 tablet by mouth once daily 90 tablet 0    senna-docusate 8.6-50 mg (PERICOLACE) 8.6-50 mg per tablet Take 2 tablets by mouth nightly as needed for Constipation. 1 tablet 0    hydroCHLOROthiazide (HYDRODIURIL) 25 MG tablet Take 1 tablet (25 mg total) by mouth once daily. 30 tablet 11     No current facility-administered medications for this visit.         ROS  Review of Systems   Constitutional:  Positive for activity change (Working with PT.). Negative for appetite change and fatigue.   HENT:  Negative for congestion, sinus pressure and sinus pain.    Respiratory:  Negative for apnea, cough and shortness of breath.    Cardiovascular:  Positive for leg swelling. Negative for chest pain.   Gastrointestinal:  Negative for abdominal distention, abdominal pain, diarrhea, nausea and vomiting.   Endocrine: Negative for cold intolerance and heat intolerance.   Genitourinary:  Negative for difficulty urinating, dyspareunia, frequency and urgency.   Musculoskeletal:  Positive for gait problem (Pt. with MS). Negative for arthralgias and back pain.   Skin:  Negative for color change.   Neurological:  Negative for dizziness, tremors, seizures, weakness, numbness and headaches.   Psychiatric/Behavioral:  Negative for agitation.          PHYSICAL EXAM  Vitals:    06/13/23 1416   BP: 130/62   BP Location: Right arm   Patient Position: Sitting   BP Method: Medium (Manual)   Pulse: 68   Resp: 16   Temp: 97.6 °F (36.4 °C)   TempSrc: Temporal   SpO2: 95%   Weight: 77.2 kg (170 lb 1.4 oz)   Height: 5' 7" (1.702 m)    Body mass index is 26.64 " "kg/m².  Weight: 77.2 kg (170 lb 1.4 oz)   Height: 5' 7" (170.2 cm)     Physical Exam  Constitutional:       Appearance: Normal appearance.   HENT:      Head: Normocephalic.   Cardiovascular:      Rate and Rhythm: Normal rate.      Pulses:           Radial pulses are 2+ on the right side and 2+ on the left side.   Pulmonary:      Effort: Pulmonary effort is normal.      Breath sounds: Normal breath sounds.   Abdominal:      General: Abdomen is flat. Bowel sounds are normal.   Musculoskeletal:      Cervical back: Normal range of motion.        Back:       Right lower le+ Pitting Edema present.      Left lower le+ Pitting Edema present.      Comments: Incision to Right posterior thoracic area from surgery that is closed, dry, reddened, and without drg noted.    Skin:     General: Skin is warm and dry.      Capillary Refill: Capillary refill takes 2 to 3 seconds.   Neurological:      General: No focal deficit present.      Mental Status: She is alert and oriented to person, place, and time.   Psychiatric:         Mood and Affect: Mood normal.         Behavior: Behavior normal. Behavior is cooperative.       Health Maintenance         Date Due Completion Date    Shingles Vaccine (2 of 3) 2015    COVID-19 Vaccine (3 - Pfizer series) 2021 3/16/2021    Influenza Vaccine (Season Ended) 2023    Colorectal Cancer Screening 10/18/2023 10/18/2018    Mammogram 2024 3/23/2023    High Dose Statin 2024    DEXA Scan 2024    TETANUS VACCINE 2025    Lipid Panel 2027              Assessment & Plan    Problem List Items Addressed This Visit          Neuro    Multiple sclerosis    Spinal cord compression  Continue to follow up with Neuro, next appointment on  per chart record. Patient did not know about apt. I was able to give her a printed copy of upcoming appointments.        Cardiac/Vascular    PAF (paroxysmal atrial " fibrillation)  Continue eliquis, f/u EP               Hematology    Anticoagulant long-term use - Primary  Continue Eliquis        Other Visit Diagnoses       Fluid retention in legs      Increase HCTZ from 12.5 to 25mg and will check Potassium level in 2 weeks.             Follow-up: Follow up in about 2 weeks (around 6/27/2023) for lab work.    Silvia Giordano    Medication List with Changes/Refills   New Medications    HYDROCHLOROTHIAZIDE (HYDRODIURIL) 25 MG TABLET    Take 1 tablet (25 mg total) by mouth once daily.   Current Medications    ACETAMINOPHEN (TYLENOL) 325 MG TABLET    Take 650 mg by mouth every 6 (six) hours as needed.    ALUMINUM-MAGNESIUM HYDROXIDE-SIMETHICONE (MAALOX) 200-200-20 MG/5 ML SUSP    Take 30 mLs by mouth every 4 (four) hours as needed (indigestion).    ELIQUIS 5 MG TAB    Take 1 tablet (5 mg total) by mouth 2 (two) times daily.    HYDROCHLOROTHIAZIDE (HYDRODIURIL) 12.5 MG TAB    Take 1 tablet (12.5 mg total) by mouth once daily.    LEVOTHYROXINE (SYNTHROID) 125 MCG TABLET    TAKE 1 TABLET BY MOUTH BEFORE BREAKFAST    LOSARTAN (COZAAR) 25 MG TABLET    Take 1 tablet (25 mg total) by mouth once daily.    MECLIZINE (ANTIVERT) 25 MG TABLET    Take 1 tablet (25 mg total) by mouth 3 (three) times daily as needed for Dizziness.    METHOCARBAMOL (ROBAXIN) 750 MG TAB    Take 1 tablet (750 mg total) by mouth 3 (three) times daily as needed (muscle spasms).    METOPROLOL SUCCINATE (TOPROL-XL) 25 MG 24 HR TABLET    Take 1 tablet by mouth once daily    MULTIVITAMIN (THERAGRAN) PER TABLET    Take 1 tablet by mouth once daily.    ONDANSETRON (ZOFRAN-ODT) 8 MG TBDL    Take 1 tablet (8 mg total) by mouth every 6 (six) hours as needed (nausea/vomiting).    OXYCODONE (ROXICODONE) 10 MG TAB IMMEDIATE RELEASE TABLET    Take 1 tablet (10 mg total) by mouth every 6 (six) hours as needed for Pain.    PROPAFENONE (RHTHYMOL) 150 MG TAB    Take 1 tablet (150 mg total) by mouth every 8 (eight) hours.     ROSUVASTATIN (CRESTOR) 10 MG TABLET    Take 1 tablet by mouth once daily    SENNA-DOCUSATE 8.6-50 MG (PERICOLACE) 8.6-50 MG PER TABLET    Take 2 tablets by mouth nightly as needed for Constipation.   Discontinued Medications    BISACODYL (DULCOLAX) 10 MG SUPP    Place 1 suppository (10 mg total) rectally daily as needed (constipation).    GLATIRAMER (COPAXONE, GLATOPA) 40 MG/ML INJECTION    Inject 40 mg into the skin 3 (three) times a week.

## 2023-06-13 NOTE — TELEPHONE ENCOUNTER
Care Due:                  Date            Visit Type   Department     Provider  --------------------------------------------------------------------------------                                EP -                              PRIMARY      [facility]  Last Visit: 03-      CARE ([facility])   PRIMARY CARE   Rocco Miller  Next Visit: None Scheduled  None         None Found                                                            Last  Test          Frequency    Reason                     Performed    Due Date  --------------------------------------------------------------------------------    CMP.........  12 months..  rosuvastatin.............  08- 08-    Lipid Panel.  12 months..  rosuvastatin.............  06- 06-    [employer] Embedded Care Due Messages. Reference number: [id number].   6/13/2023 12:29:32 PM CDT

## 2023-06-13 NOTE — TELEPHONE ENCOUNTER
Refill Routing Note   Medication(s) are not appropriate for processing by [facility] for the following reason(s):      Patient seen in ED/Hospital since LOV with PCP    [facility] action(s):  Route Care Due:  Labs due        Pharmacist review requested: Yes     Appointments  past 12m or future 3m with PCP    Date Provider   Last Visit   3/14/2023 Rocco Miller MD   Next Visit   Visit date not found Rocco Miller MD   ED visits in past 90 days: 0        Note composed:1:54 PM 06/13/2023

## 2023-06-14 ENCOUNTER — TELEPHONE (OUTPATIENT)
Dept: CARDIOLOGY | Facility: CLINIC | Age: 73
End: 2023-06-14
Payer: COMMERCIAL

## 2023-06-14 RX ORDER — ROSUVASTATIN CALCIUM 10 MG/1
TABLET, COATED ORAL
Qty: 90 TABLET | Refills: 0 | Status: SHIPPED | OUTPATIENT
Start: 2023-06-14 | End: 2023-07-14 | Stop reason: SDUPTHER

## 2023-06-14 NOTE — TELEPHONE ENCOUNTER
Patient asking for an [employer] to pick her up and bring her home for cardiology appointment tomorrow with JONELLE Tamez.  Advised patient she will need to contact her insurance to ask for and schedule ride options.  Asked patient to contact our office if appointment needs to be rescheduled.

## 2023-06-14 NOTE — TELEPHONE ENCOUNTER
Refill Decision Note   Graciela Barton  is requesting a refill authorization.  Brief Assessment and Rationale for Refill:  Approve     Medication Therapy Plan:  Not true ED visit    Medication Reconciliation Completed: No   Comments:     Provider Staff:     Action is required for this patient.   Please see care gap opportunities below in Care Due Message.     Thanks!  [facility]     Appointments      Date Provider   Last Visit   3/14/2023 Rocco Miller MD   Next Visit   Visit date not found Rocco Miller MD     Note composed:3:45 AM 06/14/2023           Note composed:3:45 AM 06/14/2023

## 2023-06-15 ENCOUNTER — OFFICE VISIT (OUTPATIENT)
Dept: CARDIOLOGY | Facility: CLINIC | Age: 73
End: 2023-06-15
Payer: MEDICARE

## 2023-06-15 VITALS
BODY MASS INDEX: 26.64 KG/M2 | OXYGEN SATURATION: 95 % | DIASTOLIC BLOOD PRESSURE: 59 MMHG | HEART RATE: 71 BPM | SYSTOLIC BLOOD PRESSURE: 123 MMHG | HEIGHT: 67 IN

## 2023-06-15 DIAGNOSIS — I10 ESSENTIAL HYPERTENSION, BENIGN: ICD-10-CM

## 2023-06-15 DIAGNOSIS — I48.0 PAF (PAROXYSMAL ATRIAL FIBRILLATION): ICD-10-CM

## 2023-06-15 PROCEDURE — 99999 PR PBB SHADOW E&M-EST. PATIENT-LVL IV: CPT | Mod: PBBFAC,,, | Performed by: NURSE PRACTITIONER

## 2023-06-15 PROCEDURE — 99212 OFFICE O/P EST SF 10 MIN: CPT | Mod: S$PBB,,, | Performed by: NURSE PRACTITIONER

## 2023-06-15 PROCEDURE — 99212 PR OFFICE/OUTPT VISIT, EST, LEVL II, 10-19 MIN: ICD-10-PCS | Mod: S$PBB,,, | Performed by: NURSE PRACTITIONER

## 2023-06-15 PROCEDURE — 99999 PR PBB SHADOW E&M-EST. PATIENT-LVL IV: ICD-10-PCS | Mod: PBBFAC,,, | Performed by: NURSE PRACTITIONER

## 2023-06-15 PROCEDURE — 99214 OFFICE O/P EST MOD 30 MIN: CPT | Mod: PBBFAC,PN | Performed by: NURSE PRACTITIONER

## 2023-06-15 NOTE — PROGRESS NOTES
Cardiology    6/15/2023  2:15 PM    Problem list  Patient Active Problem List   Diagnosis    Postoperative hypothyroidism    History of thyroid cancer    Essential hypertension, benign    Mixed hyperlipidemia    Benign paroxysmal positional vertigo    History of colon polyps    Bilateral post-traumatic osteoarthritis of knee    Paroxysmal tachycardia    DDD (degenerative disc disease), lumbar    Lumbar radiculopathy    PAF (paroxysmal atrial fibrillation)    Multiple sclerosis    Anticoagulant long-term use    Thoracic disc disease with myelopathy    Spinal cord compression    Aortic atherosclerosis    Preprocedural cardiovascular examination    Thoracic myelopathy    Atelectasis       CC:  AF    HPI:  No bleeding issues with Eliquis, working with PT and is working very hard.  Not having any issues with PT.  No bleeding with Eliquis.  Will see Dr. Coto next month.  Having some increased swelling in left ankle.     Medications  Current Outpatient Medications   Medication Sig Dispense Refill    acetaminophen (TYLENOL) 325 MG tablet Take 650 mg by mouth every 6 (six) hours as needed.      aluminum-magnesium hydroxide-simethicone (MAALOX) 200-200-20 mg/5 mL Susp Take 30 mLs by mouth every 4 (four) hours as needed (indigestion). 1 mL 0    ELIQUIS 5 mg Tab Take 1 tablet (5 mg total) by mouth 2 (two) times daily. 60 tablet 11    hydroCHLOROthiazide (HYDRODIURIL) 25 MG tablet Take 1 tablet (25 mg total) by mouth once daily. 30 tablet 11    levothyroxine (SYNTHROID) 125 MCG tablet TAKE 1 TABLET BY MOUTH BEFORE BREAKFAST 90 tablet 1    losartan (COZAAR) 25 MG tablet Take 1 tablet (25 mg total) by mouth once daily. 90 tablet 3    meclizine (ANTIVERT) 25 mg tablet Take 1 tablet (25 mg total) by mouth 3 (three) times daily as needed for Dizziness. 30 tablet 3    methocarbamoL (ROBAXIN) 750 MG Tab Take 1 tablet (750 mg total) by mouth 3 (three) times daily as needed (muscle spasms). 1 tablet 0    metoprolol succinate  (TOPROL-XL) 25 MG 24 hr tablet Take 1 tablet by mouth once daily 90 tablet 3    multivitamin (THERAGRAN) per tablet Take 1 tablet by mouth once daily.      ondansetron (ZOFRAN-ODT) 8 MG TbDL Take 1 tablet (8 mg total) by mouth every 6 (six) hours as needed (nausea/vomiting). 1 tablet 0    oxyCODONE (ROXICODONE) 10 mg Tab immediate release tablet Take 1 tablet (10 mg total) by mouth every 6 (six) hours as needed for Pain. 1 tablet 0    propafenone (RHTHYMOL) 150 MG Tab Take 1 tablet (150 mg total) by mouth every 8 (eight) hours. (Patient taking differently: Take 150 mg by mouth every 8 (eight) hours. Pt takes 1 1/2 tabs BID, states ok per doctor) 90 tablet 11    rosuvastatin (CRESTOR) 10 MG tablet Take 1 tablet by mouth once daily 90 tablet 0    senna-docusate 8.6-50 mg (PERICOLACE) 8.6-50 mg per tablet Take 2 tablets by mouth nightly as needed for Constipation. 1 tablet 0    hydroCHLOROthiazide (HYDRODIURIL) 12.5 MG Tab Take 1 tablet (12.5 mg total) by mouth once daily. 30 tablet 11     No current facility-administered medications for this visit.      Prior to Admission medications    Medication Sig Start Date End Date Taking? Authorizing Provider   acetaminophen (TYLENOL) 325 MG tablet Take 650 mg by mouth every 6 (six) hours as needed. 5/30/23  Yes Historical Provider   aluminum-magnesium hydroxide-simethicone (MAALOX) 200-200-20 mg/5 mL Susp Take 30 mLs by mouth every 4 (four) hours as needed (indigestion). 5/16/23 5/15/24 Yes Taryn Tang PA-C   ELIQUIS 5 mg Tab Take 1 tablet (5 mg total) by mouth 2 (two) times daily. 11/14/22  Yes Rocco Miller MD   hydroCHLOROthiazide (HYDRODIURIL) 25 MG tablet Take 1 tablet (25 mg total) by mouth once daily. 6/13/23 6/12/24 Yes Silvia Giordano, JONELLE   levothyroxine (SYNTHROID) 125 MCG tablet TAKE 1 TABLET BY MOUTH BEFORE BREAKFAST 2/7/23  Yes Rocco Miller MD   losartan (COZAAR) 25 MG tablet Take 1 tablet (25 mg total) by mouth once daily. 8/10/22 8/10/23 Yes  Santos Jones MD   meclizine (ANTIVERT) 25 mg tablet Take 1 tablet (25 mg total) by mouth 3 (three) times daily as needed for Dizziness. 8/1/18  Yes Rocco Miller MD   methocarbamoL (ROBAXIN) 750 MG Tab Take 1 tablet (750 mg total) by mouth 3 (three) times daily as needed (muscle spasms). 5/16/23  Yes Taryn Tang PA-C   metoprolol succinate (TOPROL-XL) 25 MG 24 hr tablet Take 1 tablet by mouth once daily 3/21/23  Yes Rocco Miller MD   multivitamin (THERAGRAN) per tablet Take 1 tablet by mouth once daily.   Yes Historical Provider   ondansetron (ZOFRAN-ODT) 8 MG TbDL Take 1 tablet (8 mg total) by mouth every 6 (six) hours as needed (nausea/vomiting). 5/16/23  Yes Taryn Tang PA-C   oxyCODONE (ROXICODONE) 10 mg Tab immediate release tablet Take 1 tablet (10 mg total) by mouth every 6 (six) hours as needed for Pain. 5/16/23  Yes Taryn Tang PA-C   propafenone (RHTHYMOL) 150 MG Tab Take 1 tablet (150 mg total) by mouth every 8 (eight) hours.  Patient taking differently: Take 150 mg by mouth every 8 (eight) hours. Pt takes 1 1/2 tabs BID, states ok per doctor 8/24/22 8/24/23 Yes Juan Manuel Singletary MD   rosuvastatin (CRESTOR) 10 MG tablet Take 1 tablet by mouth once daily 6/14/23  Yes Rocco Miller MD   senna-docusate 8.6-50 mg (PERICOLACE) 8.6-50 mg per tablet Take 2 tablets by mouth nightly as needed for Constipation. 5/16/23  Yes Taryn Tang PA-C   hydroCHLOROthiazide (HYDRODIURIL) 12.5 MG Tab Take 1 tablet (12.5 mg total) by mouth once daily. 10/5/22 10/5/23  Екатерина Tamez, COLIN   atorvastatin (LIPITOR) 40 MG tablet Take 1 tablet (40 mg total) by mouth once daily. 6/24/22 8/10/22  Radha Gomez MD         History  Past Medical History:   Diagnosis Date    Arthritis     Cancer     Thyroid    Edema     Hyperlipidemia     Hypertension     Thyroid disease      Past Surgical History:   Procedure Laterality Date    ADENOIDECTOMY      COLONOSCOPY  10/18/2018     COLONOSCOPY N/A 10/18/2018    Procedure: COLONOSCOPY;  Surgeon: Yosvany Alejandra MD;  Location: [facility] ENDO;  Service: General;  Laterality: N/A;    EPIDURAL STEROID INJECTION N/A 9/30/2022    Procedure: LUMBAR L3/4 MALKA CONTRAST DIRRECT REFERRAL HAMILTONQUROWENA CARRERA IN CHART;  Surgeon: Nicholas Kumar MD;  Location: [facility] PAIN MGT;  Service: Pain Management;  Laterality: N/A;    HYSTERECTOMY  1992    total hyst     KNEE SURGERY      16 pins and two plates implanted    THORACIC DISCECTOMY Right 5/12/2023    Procedure: DISCECTOMY, SPINE, THORACIC Right T6-7, T7-8 interior discectomy retropleural;  Surgeon: Scooter Salter MD;  Location: [facility] OR;  Service: Neurosurgery;  Laterality: Right;  general  eliquis   type and screen  C-arm  Metrx  EMG bairon notified cc  SEP  MEP  Regular bed   lateral left down   globus (Jose Enrique) notified cc    THYROIDECTOMY      TONSILLECTOMY       Social History     Socioeconomic History    Marital status:    Tobacco Use    Smoking status: Never    Smokeless tobacco: Never   Substance and Sexual Activity    Alcohol use: No    Drug use: No    Sexual activity: Not Currently     Partners: Male         Allergies  Review of patient's allergies indicates:   Allergen Reactions    Vancomycin Tinitus    Vancomycin analogues Tinitus         Review of Systems   Review of Systems   Constitutional: Negative for diaphoresis and malaise/fatigue.   HENT: Negative.     Cardiovascular:  Positive for leg swelling. Negative for chest pain, claudication, dyspnea on exertion, irregular heartbeat, near-syncope, orthopnea, palpitations, paroxysmal nocturnal dyspnea and syncope.   Respiratory:  Negative for shortness of breath.    Endocrine: Negative for polydipsia, polyphagia and polyuria.   Hematologic/Lymphatic: Does not bruise/bleed easily.   Gastrointestinal:  Negative for bloating, nausea and vomiting.   Genitourinary: Negative.    Neurological:  Negative for excessive daytime sleepiness, dizziness,  light-headedness, loss of balance and weakness.   Psychiatric/Behavioral:  The patient is not nervous/anxious.    Allergic/Immunologic: Negative.        Physical Exam  Wt Readings from Last 1 Encounters:   06/13/23 77.2 kg (170 lb 1.4 oz)     BP Readings from Last 3 Encounters:   06/15/23 (!) 123/59   06/13/23 130/62   05/16/23 (!) 110/59     Pulse Readings from Last 1 Encounters:   06/15/23 71     Body mass index is 26.64 kg/m².    Physical Exam  Vitals and nursing note reviewed.   Constitutional:       Appearance: Normal appearance.   HENT:      Head: Normocephalic and atraumatic.      Mouth/Throat:      Mouth: Mucous membranes are moist.   Eyes:      Pupils: Pupils are equal, round, and reactive to light.   Cardiovascular:      Rate and Rhythm: Normal rate and regular rhythm.      Pulses:           Radial pulses are 2+ on the right side and 2+ on the left side.        Dorsalis pedis pulses are 2+ on the right side and 2+ on the left side.        Posterior tibial pulses are 2+ on the right side and 2+ on the left side.      Heart sounds: No murmur heard.  Pulmonary:      Effort: Pulmonary effort is normal. No respiratory distress.      Breath sounds: Normal breath sounds.   Abdominal:      General: There is no distension.      Tenderness: There is no abdominal tenderness.   Musculoskeletal:      Cervical back: Normal range of motion.      Right lower leg: Edema (+1) present.      Left lower leg: Edema (+2) present.   Skin:     General: Skin is warm and dry.      Findings: No erythema.      Comments: Healing incision right side mid back   Neurological:      General: No focal deficit present.      Mental Status: She is alert.   Psychiatric:         Mood and Affect: Mood normal.         Behavior: Behavior normal.             Problem List Items Addressed This Visit          Cardiac/Vascular    Essential hypertension, benign    Overview     Well controlled- slightly high but was waiting for a while as she did not have  a way to get from lobby up to clinic  Have some SHANE, agree with resuming HCTZ 12.5 mg   Continue as now         Current Assessment & Plan     As above         PAF (paroxysmal atrial fibrillation)    Overview       Continue DOAC  Continue metoprolol  Rate controlled today           Current Assessment & Plan     As above              Follow Up    3-6 months, will need eval prior to next surgery    @Екатерина Tamez DNP

## 2023-06-15 NOTE — PATIENT INSTRUCTIONS
Continue your current medications, I agree with the higher dose of HCTZ    Let us know when you get the second surgery scheduled and we will check in with you to make sure it is ok to hold the Eliquis again.      Keep up the good work with therapy!

## 2023-06-22 RX ORDER — LOSARTAN POTASSIUM 25 MG/1
25 TABLET ORAL DAILY
Qty: 90 TABLET | Refills: 1 | Status: SHIPPED | OUTPATIENT
Start: 2023-06-22 | End: 2023-09-06

## 2023-06-22 NOTE — TELEPHONE ENCOUNTER
Refill Routing Note   Medication(s) are not appropriate for processing by [facility] for the following reason(s):      Patient seen in ED/Hospital since LOV with PCP  No active prescription written by PCP    [facility] action(s):  Defer Care Due:  None identified          Appointments  past 12m or future 3m with PCP    Date Provider   Last Visit   3/14/2023 Rocco Miller MD   Next Visit   Visit date not found Rocco Miller MD   ED visits in past 90 days: 0        Note composed:3:29 PM 06/22/2023

## 2023-06-22 NOTE — TELEPHONE ENCOUNTER
----- Message from Beronica Morel sent at 6/22/2023 11:21 AM CDT -----  Contact: [phone]  Requesting an RX refill or new RX.  Is this a refill or new RX: refill  RX name and strength (copy/paste from chart):  losartan (COZAAR) 25 MG tablet  Is this a 30 day or 90 day RX: 30  Pharmacy name and phone # (copy/paste from chart):    [address]  [address] [facility] [address]  Phone: [phone] Fax: [phone]     The doctors have asked that we provide their patients with the following 2 reminders -- prescription refills can take up to 72 hours, and a friendly reminder that in the future you can use your [facility] account to request refills: yes she states the pharmacy states she needs a new prescription

## 2023-06-22 NOTE — TELEPHONE ENCOUNTER
No care due was identified.  [facility] Embedded Care Due Messages. Reference number: [id number].   6/22/2023 11:29:28 AM CDT

## 2023-06-29 ENCOUNTER — TELEPHONE (OUTPATIENT)
Dept: NEUROSURGERY | Facility: CLINIC | Age: 73
End: 2023-06-29
Payer: COMMERCIAL

## 2023-06-30 ENCOUNTER — OFFICE VISIT (OUTPATIENT)
Dept: NEUROSURGERY | Facility: CLINIC | Age: 73
End: 2023-06-30
Payer: MEDICARE

## 2023-06-30 VITALS
HEART RATE: 65 BPM | DIASTOLIC BLOOD PRESSURE: 61 MMHG | WEIGHT: 170.19 LBS | BODY MASS INDEX: 26.71 KG/M2 | HEIGHT: 67 IN | SYSTOLIC BLOOD PRESSURE: 121 MMHG

## 2023-06-30 DIAGNOSIS — M47.14 THORACIC MYELOPATHY: Primary | ICD-10-CM

## 2023-06-30 DIAGNOSIS — M54.16 LUMBAR RADICULOPATHY: ICD-10-CM

## 2023-06-30 DIAGNOSIS — M48.062 SPINAL STENOSIS OF LUMBAR REGION WITH NEUROGENIC CLAUDICATION: ICD-10-CM

## 2023-06-30 DIAGNOSIS — Z98.890 S/P DISCECTOMY: ICD-10-CM

## 2023-06-30 PROCEDURE — 99024 POSTOP FOLLOW-UP VISIT: CPT | Mod: POP,,, | Performed by: PHYSICIAN ASSISTANT

## 2023-06-30 PROCEDURE — 99214 OFFICE O/P EST MOD 30 MIN: CPT | Mod: PBBFAC,PN | Performed by: PHYSICIAN ASSISTANT

## 2023-06-30 PROCEDURE — 99024 PR POST-OP FOLLOW-UP VISIT: ICD-10-PCS | Mod: POP,,, | Performed by: PHYSICIAN ASSISTANT

## 2023-06-30 PROCEDURE — 99999 PR PBB SHADOW E&M-EST. PATIENT-LVL IV: ICD-10-PCS | Mod: PBBFAC,,, | Performed by: PHYSICIAN ASSISTANT

## 2023-06-30 PROCEDURE — 99999 PR PBB SHADOW E&M-EST. PATIENT-LVL IV: CPT | Mod: PBBFAC,,, | Performed by: PHYSICIAN ASSISTANT

## 2023-06-30 NOTE — PROGRESS NOTES
Subjective:     Patient ID:  Graciela Barton is a 73 y.o. female.    Gaetano    Chief Complaint:  6 week po fu    Date of surgery 05/12/2023     Preop diagnosis   1. 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 Anterior T6-7 and T7-8 minimally invasive retro pleural diskectomy for spinal cord decompression  2. Fluoroscopy  3. Neuro monitoring using MEP, SSEP, EMG     Surgeon   Scooter Salter MD    HPI    Graciela Barton is a 73 y.o. female who presents for follow up.  She is a poor historian.  Patient states that after the surgery she got relief of her right leg pain temporarily.  She felt like her walking and balance got better while she was inpatient at rehab.  She has been at home for the past 3 weeks and has not been walking much.  She states initially when she came home she was walking without the walker but now walks with a walker but gets exhausted and is not as steady on her feet.  She has pain in the right groin down the right anterior thigh to the knee especially with walking.  This has gotten worse.  She denies any left leg pain or paresthesias.      She states she followed up with her neurologist who is now outside of [facility].  Patient states that they are unsure if she indeed has multiple sclerosis and is not treating her for this currently.    She states that she has not had any home health physical therapy since her discharge from rehab.    Patient denies any recent accidents or trauma, no saddle anesthesias, and no bowel or bladder incontinence.    Review of Systems:  Constitution: Negative for chills, fever, night sweats and weight loss.   Musculoskeletal: Negative for falls.   Gastrointestinal: Negative for bowel incontinence, nausea and vomiting.   Genitourinary: Negative for bladder incontinence.   Neurological: Negative for disturbances in coordination and loss of balance.      Objective:      Vitals:    06/30/23 1343   BP: 121/61   Pulse: 65   Weight: 77.2 kg  "(170 lb 3.1 oz)   Height: 5' 7" (1.702 m)   PainSc:   8   PainLoc: Leg       Physical Exam: Exam done in the wheelchair      General:  Graciela Barton is well-developed, well-nourished, appears stated age, in no acute distress, alert and oriented to person, place, and time.    Pulmonary/Chest:  Respiratory effort normal  Abdominal: Exhibits no distension  Psychiatric:  Normal mood and affect.  Behavior is normal.  Judgement and thought content normal      Musculoskeletal:    Healed right thoracic incision      Neurological:     Muscle strength against resistance:     Right Left   Deltoid  5 / 5 5 / 5   Biceps  5 / 5 5 / 5   Triceps 5 / 5 5 / 5   Wrist flexion  5 / 5 5 / 5   Wrist extension 5 / 5 5 / 5   Finger abduction 5 / 5 5 / 5   Thumb opposition 5 / 5 5 / 5   Handgrip 5 / 5 5 / 5   Hip flexion  5 / 5 5 / 5   Hip extension 5 / 5 5 / 5   Hip abduction 5 / 5 5 / 5   Hip adduction 5/ 5 5 / 5   Knee extension  4 / 5 5 / 5   Knee flexion  4 / 5 5 / 5   Dorsiflexion  5 / 5 5 / 5   EHL  4 / 5 5 / 5   Plantar flexion  5 / 5 5 / 5   Inversion of the feet 5 / 5 5 / 5   Eversion of the feet 5 / 5 5 / 5       Reflexes:     Right Left   Triceps 2+ 2+   Biceps 2+ 2+   Brachioradialis 2+ 2+   Patellar 2+ 2+   Achilles 2+ 2+     Right leg with spasticity  Clonus:  Negative bilaterally  Betancourt: Negative bilaterally    On gross examination of the bilateral upper and lower extremities, patient has no signs of clubbing, cyanosis, or edema.         Assessment:          1. Thoracic myelopathy    2. Spinal stenosis of lumbar region with neurogenic claudication    3. Lumbar radiculopathy    4. S/P discectomy            Plan:          Orders Placed This Encounter    Ambulatory referral/consult to Physical/Occupational Therapy     Patient 6 weeks sp disectomy for thoracic hnp with myelopathy.  Initially improved with inpatient rehab now ambulation seems to be regressing some. Will order outpatient PT but if unable to get there she will " call back and home health PTOT will be ordered.    She also has severe stenosis at L3-4 and L4-5 that Dr. Salter mentioned would need surgery in the future.  This is most likely the source of her right leg pain.    Fu with Dr. Salter at 3 months po      Follow-Up:  Follow up in about 1 month (around 7/30/2023). If there are any questions prior to this, the patient was instructed to contact the office.       Taryn Tang [facility], PA-C  Neurosurgery  [facility]  06/30/2023

## 2023-07-14 DIAGNOSIS — I10 ESSENTIAL HYPERTENSION, BENIGN: ICD-10-CM

## 2023-07-14 DIAGNOSIS — E78.2 MIXED HYPERLIPIDEMIA: ICD-10-CM

## 2023-07-14 RX ORDER — METOPROLOL SUCCINATE 25 MG/1
25 TABLET, EXTENDED RELEASE ORAL DAILY
Qty: 30 TABLET | Refills: 2 | Status: SHIPPED | OUTPATIENT
Start: 2023-07-14 | End: 2023-08-08

## 2023-07-14 RX ORDER — ROSUVASTATIN CALCIUM 10 MG/1
10 TABLET, COATED ORAL DAILY
Qty: 90 TABLET | Refills: 0 | Status: SHIPPED | OUTPATIENT
Start: 2023-07-14 | End: 2024-02-07

## 2023-07-14 NOTE — TELEPHONE ENCOUNTER
----- Message from Beronica Morel sent at 7/14/2023 11:07 AM CDT -----  Contact: [phone]  Requesting an RX refill or new RX.  Is this a refill or new RX:refill   RX name and strength (copy/paste from chart):  metoprolol succinate (TOPROL-XL) 25 MG 24 hr tablet  Is this a 30 day or 90 day RX: 30  Pharmacy name and phone # (copy/paste from chart):    [place] [id number] [facility] [place] - [address]  [address]  [facility] [address]  Phone: [phone] Fax: [phone]     The doctors have asked that we provide their patients with the following 2 reminders -- prescription refills can take up to 72 hours, and a friendly reminder that in the future you can use your [facility] account to request refills: yes     Requesting an RX refill or new RX.  Is this a refill or new RX: refill  RX name and strength (copy/paste from chart):  rosuvastatin (CRESTOR) 10 MG tablet  Is this a 30 day or 90 day RX: 30  Pharmacy name and phone # (copy/paste from chart):    [place] [id number]  [place] - [address]  [address]  [facility] [address]  Phone: [phone] Fax: [phone]     The doctors have asked that we provide their patients with the following 2 reminders -- prescription refills can take up to 72 hours, and a friendly reminder that in the future you can use your [facility] account to request refills: yes

## 2023-07-14 NOTE — TELEPHONE ENCOUNTER
Refill Routing Note   Medication(s) are not appropriate for processing by [facility] for the following reason(s):      Patient seen in ED/Hospital since LOV with PCP  Required labs outdated    [facility] action(s):  Defer None identified          Appointments  past 12m or future 3m with PCP    Date Provider   Last Visit   3/14/2023 Rocco Miller MD   Next Visit   Visit date not found Rocco Miller MD   ED visits in past 90 days: 0        Note composed:4:45 PM 07/14/2023

## 2023-07-14 NOTE — TELEPHONE ENCOUNTER
----- Message from Beronicahunter Morel sent at 7/14/2023 11:05 AM CDT -----  Contact: [phone]  Requesting an RX refill or new RX.  Is this a refill or new RX: refill  RX name and strength (copy/paste from chart): ELIQUIS 5 mg Tab   Is this a 30 day or 90 day RX: 30  Pharmacy name and phone # (copy/paste from chart):    [address] [place] - [address]  [address] [facility]  [facility] [address]  Phone: [phone] Fax: [phone]     The doctors have asked that we provide their patients with the following 2 reminders -- prescription refills can take up to 72 hours, and a friendly reminder that in the future you can use your [facility] account to request refills: yes     Requesting an RX refill or new RX.  Is this a refill or new RX: refill  RX name and strength (copy/paste from chart):  propafenone (RHTHYMOL) 150 MG Tab  Is this a 30 day or 90 day RX: 30  Pharmacy name and phone # (copy/paste from chart):    [address] [place] - [address]  [address]  [facility] [address]  Phone: [phone] Fax: [phone]     The doctors have asked that we provide their patients with the following 2 reminders -- prescription refills can take up to 72 hours, and a friendly reminder that in the future you can use your [facility] account to request refills: yes

## 2023-07-14 NOTE — TELEPHONE ENCOUNTER
No care due was identified.  [facility] Embedded Care Due Messages. Reference number: [id number].   7/14/2023 11:13:25 AM CDT

## 2023-07-17 RX ORDER — APIXABAN 5 MG/1
5 TABLET, FILM COATED ORAL 2 TIMES DAILY
Qty: 60 TABLET | Refills: 11 | Status: SHIPPED | OUTPATIENT
Start: 2023-07-17 | End: 2024-03-19 | Stop reason: SDUPTHER

## 2023-07-17 RX ORDER — PROPAFENONE HYDROCHLORIDE 150 MG/1
150 TABLET, COATED ORAL EVERY 8 HOURS
Qty: 90 TABLET | Refills: 11 | Status: ON HOLD | OUTPATIENT
Start: 2023-07-17 | End: 2023-10-06 | Stop reason: HOSPADM

## 2023-07-17 NOTE — TELEPHONE ENCOUNTER
----- Message from Cece Moore sent at 7/17/2023 10:32 AM CDT -----  Contact: Pt [phone]  Requesting an RX refill or new RX.  Is this a refill or new RX: Refill  RX name and strength (copy/paste from chart):  propafenone (RHTHYMOL) 150 MG Tab  Is this a 30 day or 90 day RX: 90  Pharmacy name and phone # (copy/paste from chart):    [facility] - [place] - [address]  [address]  [facility] [address]  Phone: [phone] Fax: [phone]    Requesting an RX refill or new RX.  Is this a refill or new RX: Refill  RX name and strength (copy/paste from chart):  ELIQUIS 5 mg Tab  Is this a 30 day or 90 day RX: 30  Pharmacy name and phone # (copy/paste from chart):    [facility]  [place], [place] - [address]  [address]  [facility] [address]  Phone: [phone] Fax: [phone]    Patient is out of these medications.    Please call and advise.    Thank You

## 2023-07-24 ENCOUNTER — TELEPHONE (OUTPATIENT)
Dept: PRIMARY CARE CLINIC | Facility: CLINIC | Age: 73
End: 2023-07-24
Payer: COMMERCIAL

## 2023-07-24 NOTE — TELEPHONE ENCOUNTER
----- Message from Guerda Green sent at 7/24/2023 11:29 AM CDT -----  Contact: [phone]  Pt is waiting on a phone call to schedule for physical therapy. States she has been waiting for too long and no one has called her but keep rescheduling her.

## 2023-07-31 ENCOUNTER — TELEPHONE (OUTPATIENT)
Dept: PRIMARY CARE CLINIC | Facility: CLINIC | Age: 73
End: 2023-07-31
Payer: COMMERCIAL

## 2023-07-31 RX ORDER — CEPHALEXIN 500 MG/1
500 CAPSULE ORAL 4 TIMES DAILY
Qty: 28 CAPSULE | Refills: 0 | Status: SHIPPED | OUTPATIENT
Start: 2023-07-31 | End: 2023-08-07

## 2023-07-31 NOTE — TELEPHONE ENCOUNTER
----- Message from Hodan Dodson sent at 7/31/2023  8:44 AM CDT -----  Contact: [phone]  Pt called to advise that she has a scrape on her ankle that she believes may require antibiotics. Pt says around the ankle is red and warm to the touch. She santi she would like Keflex. Please Advise       [address] [place]  [address] [facility] [address]  [place] [address]  Phone: [phone] Fax: [phone]

## 2023-07-31 NOTE — TELEPHONE ENCOUNTER
Called pt regarding message. Pt had a fall on July 13th. Pt scraped left ankle. Pt stated her ankle is red and warm to touch. Pt stated she has a upcoming surgery and would like antibiotics.

## 2023-08-02 ENCOUNTER — OFFICE VISIT (OUTPATIENT)
Dept: NEUROSURGERY | Facility: CLINIC | Age: 73
End: 2023-08-02
Payer: MEDICARE

## 2023-08-02 VITALS — HEIGHT: 67 IN | WEIGHT: 170 LBS | BODY MASS INDEX: 26.68 KG/M2

## 2023-08-02 DIAGNOSIS — M48.062 LUMBAR STENOSIS WITH NEUROGENIC CLAUDICATION: ICD-10-CM

## 2023-08-02 DIAGNOSIS — Z98.1 S/P FUSION OF THORACIC SPINE: Primary | ICD-10-CM

## 2023-08-02 DIAGNOSIS — M47.12 CERVICAL SPONDYLOSIS WITH MYELOPATHY: ICD-10-CM

## 2023-08-02 DIAGNOSIS — M48.061 FORAMINAL STENOSIS OF LUMBAR REGION: ICD-10-CM

## 2023-08-02 DIAGNOSIS — M54.16 LUMBAR RADICULOPATHY: ICD-10-CM

## 2023-08-02 PROCEDURE — 99213 OFFICE O/P EST LOW 20 MIN: CPT | Mod: PBBFAC,PN | Performed by: NEUROLOGICAL SURGERY

## 2023-08-02 PROCEDURE — 99999 PR PBB SHADOW E&M-EST. PATIENT-LVL III: CPT | Mod: PBBFAC,,, | Performed by: NEUROLOGICAL SURGERY

## 2023-08-02 PROCEDURE — 99024 PR POST-OP FOLLOW-UP VISIT: ICD-10-PCS | Mod: S$PBB,,, | Performed by: NEUROLOGICAL SURGERY

## 2023-08-02 PROCEDURE — 99024 POSTOP FOLLOW-UP VISIT: CPT | Mod: S$PBB,,, | Performed by: NEUROLOGICAL SURGERY

## 2023-08-02 PROCEDURE — 99999 PR PBB SHADOW E&M-EST. PATIENT-LVL III: ICD-10-PCS | Mod: PBBFAC,,, | Performed by: NEUROLOGICAL SURGERY

## 2023-08-02 NOTE — PROGRESS NOTES
NEUROSURGICAL PROGRESS NOTE    DATE OF SERVICE:  08/02/2023    ATTENDING PHYSICIAN:  Scooter Salter MD    SUBJECTIVE:  03/10/23  This is a very pleasant 72 y.o. female, who was active at the beginning of 2022, mowing her lawn then suddenly started to have difficulty walking, severe back pain and right leg pain.  She has developed a paralysis involving the right leg.  Her right leg appears spastic.  She is mainly complaining of right knee pain. She is being mobilized in a wheelchair.  She admits still being able to drive her car.  In June 2022 she had a thoracic and lumbar spine MRI.  She is being investigated for possible multiple sclerosis.  She reports having no loss of sensation to pain or temperature.  She has no voiding difficulty or incontinence.  She reports right more than left hand numbness and dropping things frequently.    INTERIM HISTORY:    This is a very pleasant 73 y.o. female, she is T6-7 T7-8 3 months status post anterior diskectomy for thoracic myelopathy.  Following that surgery she reports mild improvement in her balance.  She is still walking with a walker.  Her mobilization is still limited due to severe neurogenic claudication.  She has severe leg pain leg numbness when she stands.  She is unable to walk for long period of time.  She has persistent right knee pain and right quadriceps weakness.  She has persistent bilateral proximal leg weakness.  She is dropping things.  She has fallen few times.              PAST MEDICAL HISTORY:  Active Ambulatory Problems     Diagnosis Date Noted    Postoperative hypothyroidism 02/19/2018    History of thyroid cancer 02/19/2018    Essential hypertension, benign 02/19/2018    Mixed hyperlipidemia 02/19/2018    Benign paroxysmal positional vertigo 08/01/2018    History of colon polyps 10/18/2018    Bilateral post-traumatic osteoarthritis of knee 06/21/2022    Paroxysmal tachycardia 06/22/2022    DDD (degenerative disc disease), lumbar 08/09/2022    Lumbar  radiculopathy 08/09/2022    PAF (paroxysmal atrial fibrillation) 08/10/2022    Multiple sclerosis 12/01/2022    Anticoagulant long-term use 02/23/2023    Thoracic disc disease with myelopathy 03/14/2023    Spinal cord compression 03/14/2023    Aortic atherosclerosis 03/14/2023    Preprocedural cardiovascular examination 03/15/2023    Thoracic myelopathy 05/12/2023    Atelectasis 05/12/2023     Resolved Ambulatory Problems     Diagnosis Date Noted    Weakness 06/20/2022    New onset atrial fibrillation 08/09/2022     Past Medical History:   Diagnosis Date    Arthritis     Cancer     Edema     Hyperlipidemia     Hypertension     Thyroid disease        PAST SURGICAL HISTORY:  Past Surgical History:   Procedure Laterality Date    ADENOIDECTOMY      COLONOSCOPY  10/18/2018    COLONOSCOPY N/A 10/18/2018    Procedure: COLONOSCOPY;  Surgeon: Yosvany Alejandra MD;  Location: [facility] ENDO;  Service: General;  Laterality: N/A;    EPIDURAL STEROID INJECTION N/A 9/30/2022    Procedure: LUMBAR L3/4 MALKA CONTRAST DIRRECT REFERRAL MONICA CARRERA IN CHART;  Surgeon: Nicholas Kumar MD;  Location: [facility] PAIN MGT;  Service: Pain Management;  Laterality: N/A;    HYSTERECTOMY  1992    total hyst     KNEE SURGERY      16 pins and two plates implanted    THORACIC DISCECTOMY Right 5/12/2023    Procedure: DISCECTOMY, SPINE, THORACIC Right T6-7, T7-8 interior discectomy retropleural;  Surgeon: Scooter Salter MD;  Location: [facility] OR;  Service: Neurosurgery;  Laterality: Right;  general  eliquis   type and screen  C-arm  Metrx  EMG bairon notified cc  SEP  MEP  Regular bed   lateral left down   globus (Jose Enrique) notified cc    THYROIDECTOMY      TONSILLECTOMY         SOCIAL HISTORY:   Social History     Socioeconomic History    Marital status:    Tobacco Use    Smoking status: Never    Smokeless tobacco: Never   Substance and Sexual Activity    Alcohol use: No    Drug use: No    Sexual activity: Not Currently     Partners: Male        FAMILY HISTORY:  Family History   Problem Relation Age of Onset    Alcohol abuse Mother     Heart disease Mother     Alcohol abuse Father     Heart disease Father     Heart disease Maternal Grandmother     Heart disease Maternal Grandfather        CURRENTS MEDICATIONS:  Current Outpatient Medications on File Prior to Visit   Medication Sig Dispense Refill    acetaminophen (TYLENOL) 325 MG tablet Take 650 mg by mouth every 6 (six) hours as needed.      aluminum-magnesium hydroxide-simethicone (MAALOX) 200-200-20 mg/5 mL Susp Take 30 mLs by mouth every 4 (four) hours as needed (indigestion). 1 mL 0    cephALEXin (KEFLEX) 500 MG capsule Take 1 capsule (500 mg total) by mouth 4 (four) times daily. for 7 days 28 capsule 0    ELIQUIS 5 mg Tab Take 1 tablet (5 mg total) by mouth 2 (two) times daily. 60 tablet 11    hydroCHLOROthiazide (HYDRODIURIL) 25 MG tablet Take 1 tablet (25 mg total) by mouth once daily. 30 tablet 11    levothyroxine (SYNTHROID) 125 MCG tablet TAKE 1 TABLET BY MOUTH BEFORE BREAKFAST 90 tablet 1    losartan (COZAAR) 25 MG tablet Take 1 tablet (25 mg total) by mouth once daily. 90 tablet 1    meclizine (ANTIVERT) 25 mg tablet Take 1 tablet (25 mg total) by mouth 3 (three) times daily as needed for Dizziness. 30 tablet 3    methocarbamoL (ROBAXIN) 750 MG Tab Take 1 tablet (750 mg total) by mouth 3 (three) times daily as needed (muscle spasms). 1 tablet 0    metoprolol succinate (TOPROL-XL) 25 MG 24 hr tablet Take 1 tablet (25 mg total) by mouth once daily. 30 tablet 2    multivitamin (THERAGRAN) per tablet Take 1 tablet by mouth once daily.      ondansetron (ZOFRAN-ODT) 8 MG TbDL Take 1 tablet (8 mg total) by mouth every 6 (six) hours as needed (nausea/vomiting). 1 tablet 0    oxyCODONE (ROXICODONE) 10 mg Tab immediate release tablet Take 1 tablet (10 mg total) by mouth every 6 (six) hours as needed for Pain. 1 tablet 0    propafenone (RHTHYMOL) 150 MG Tab Take 1 tablet (150 mg total) by mouth  every 8 (eight) hours. 90 tablet 11    rosuvastatin (CRESTOR) 10 MG tablet Take 1 tablet (10 mg total) by mouth once daily. 90 tablet 0    senna-docusate 8.6-50 mg (PERICOLACE) 8.6-50 mg per tablet Take 2 tablets by mouth nightly as needed for Constipation. 1 tablet 0    [DISCONTINUED] hydroCHLOROthiazide (HYDRODIURIL) 12.5 MG Tab Take 1 tablet (12.5 mg total) by mouth once daily. 30 tablet 11    [DISCONTINUED] atorvastatin (LIPITOR) 40 MG tablet Take 1 tablet (40 mg total) by mouth once daily. 90 tablet 3     No current facility-administered medications on file prior to visit.       ALLERGIES:  Review of patient's allergies indicates:   Allergen Reactions    Vancomycin Tinitus    Vancomycin analogues Tinitus       REVIEW OF SYSTEMS:  Review of Systems   Constitutional:  Negative for diaphoresis, fever and weight loss.   Respiratory:  Negative for shortness of breath.    Cardiovascular:  Positive for leg swelling. Negative for chest pain.   Gastrointestinal:  Negative for blood in stool.   Genitourinary:  Negative for hematuria.   Endo/Heme/Allergies:  Does not bruise/bleed easily.   All other systems reviewed and are negative.        OBJECTIVE:    PHYSICAL EXAMINATION:   There were no vitals filed for this visit.    Physical Exam:  Vitals reviewed.    Constitutional: She appears well-developed and well-nourished.     Eyes: Pupils are equal, round, and reactive to light. Conjunctivae and EOM are normal.     Cardiovascular: Normal distal pulses and no edema.     Abdominal: Soft.     Skin: Skin displays no rash on trunk and no rash on extremities. Skin displays no lesions on trunk and no lesions on extremities.     Psych/Behavior: She is alert. She is oriented to person, place, and time. She has a normal mood and affect.     Musculoskeletal:        Neck: Range of motion is limited.     Neurological:        DTRs: Tricep reflexes are 2+ on the right side and 2+ on the left side. Bicep reflexes are 2+ on the right side  and 2+ on the left side. Brachioradialis reflexes are 2+ on the right side and 2+ on the left side. Patellar reflexes are 0 on the right side and 0 on the left side. Achilles reflexes are 0 on the right side and 0 on the left side.       Back Exam     Muscle Strength   Right Quadriceps:  2/5   Left Quadriceps:  5/5   Right Hamstrings:  5/5   Left Hamstrings:  5/5                 Neurologic Exam     Mental Status   Oriented to person, place, and time.   Speech: speech is normal   Level of consciousness: alert    Cranial Nerves   Cranial nerves II through XII intact.     CN III, IV, VI   Pupils are equal, round, and reactive to light.  Extraocular motions are normal.     Motor Exam   Muscle bulk: normal  Overall muscle tone: increased    Strength   Right deltoid: 5/5  Left deltoid: 5/5  Right biceps: 5/5  Left biceps: 5/5  Right triceps: 5/5  Left triceps: 5/5  Right wrist flexion: 5/5  Left wrist flexion: 5/5  Right wrist extension: 5/5  Left wrist extension: 5/5  Right interossei: 5/5  Left interossei: 5/5  Right iliopsoas: 4/5  Left iliopsoas: 4/5  Right quadriceps: 2/5  Left quadriceps: 5/5  Right hamstrin/5  Left hamstrin/5  Right anterior tibial: 5/5  Left anterior tibial: 5/5  Right posterior tibial: 5/5  Left posterior tibial: 5/5  Right peroneal: 5/5  Left peroneal: 5/5  Right gastroc: 5/5  Left gastroc: 5/5    Sensory Exam   Light touch normal.   Pinprick normal.     Gait, Coordination, and Reflexes     Gait  Gait: spastic    Reflexes   Right brachioradialis: 2+  Left brachioradialis: 2+  Right biceps: 2+  Left biceps: 2+  Right triceps: 2+  Left triceps: 2+  Right patellar: 0  Left patellar: 0  Right achilles: 0  Left achilles: 0  Right plantar: normal  Left plantar: normal  Right Betancourt: absent  Left Betancourt: absent  Right ankle clonus: absent  Left ankle clonus: absent        DIAGNOSTIC DATA:  I personally interpreted the following imaging:   Cervical spine MRI 2023 shows  moderate-to-severe spinal stenosis at C5-6 with cord compression and balance intramedullary signal change, C6-7 moderate spent 12 canal stenosis and severe bilateral foraminal stenosis  Lumbar spine MRI March 2023 shows severe spinal stenosis with near occlusion of the spinal canal at L3-4 and L4-5, severe bilateral L5-S1 foraminal stenosis severe bilateral foraminal stenosis at L3-4 and L4-5, L4-5 degenerative spondylolisthesis, grade 1  Lumbar AP lateral flexion-extension x-ray shows mild lumbar degenerative scoliosis, grade 1 L4-5 spondylolisthesis    ASSESMENT:  This is a 73 y.o. female with     Problem List Items Addressed This Visit          Neuro    Lumbar radiculopathy     Other Visit Diagnoses       S/P fusion of thoracic spine    -  Primary    Relevant Orders    X-Ray Thoracic Spine AP Lateral    Ambulatory referral/consult to Home Health    Cervical spondylosis with myelopathy        Relevant Orders    Ambulatory referral/consult to Home Health    Lumbar stenosis with neurogenic claudication        Relevant Orders    Ambulatory referral/consult to Home Health    Foraminal stenosis of lumbar region                  PLAN:  I explained the natural history of the disease and all treatment options. I recommended a C5-6 and C6-7 anterior cervical diskectomy and instrumented fusion, placement of interbody spacer and anterior instrumentation using Globus TPS cages and X-tend plate, L3-4, L4-5 oblique interbody fusion and placement of interbody spacer using allograft BMP using Globus [employer] TPS cage, L5-S1 anterior interbody fusion using Globus ALIF [place] cage, L3 to pelvis posterior segmental instrumentation used Globus Creo screw system, L3-L5 laminectomy, medial facetectomy, foraminotomy, L3-S1 posterolateral fusion using autograft harvested from the same incision.     Patient has concomitant severe cervical spine stenosis and lumbar spine stenosis.  She has both myelopathy and lumbar stenosis  with neurogenic claudication and radiculopathy.  A lumbar fusion is indicated because the patient has severe spinal stenosis and severe foraminal stenosis.  The extensive decompression that is needed we will the stabilize her spine therefore spinal fusion is indicated in the same sitting.    We have discussed the risks of surgery including death, coma, bleeding, infection, failure of surgery, CSF leak, nerve root injury, dysphagia, vocal cord paralysis, bowel injury, spinal cord injury, ureter injury, weakness, paralysis, peripheral neuropathy, malplaced hardware, migration of hardware, non-union, need for reoperation. Patient understands the risks and would like to proceed with surgery.            Scooter Salter MD  Cell:[phone]

## 2023-08-03 PROCEDURE — G0179 MD RECERTIFICATION HHA PT: HCPCS | Mod: ,,, | Performed by: FAMILY MEDICINE

## 2023-08-03 PROCEDURE — G0179 PR HOME HEALTH MD RECERTIFICATION: ICD-10-PCS | Mod: ,,, | Performed by: FAMILY MEDICINE

## 2023-08-04 ENCOUNTER — TELEPHONE (OUTPATIENT)
Dept: PRIMARY CARE CLINIC | Facility: CLINIC | Age: 73
End: 2023-08-04
Payer: COMMERCIAL

## 2023-08-04 NOTE — TELEPHONE ENCOUNTER
Called Yunior with [facility] regarding message. Informed Yunior pt should go to the ER. Called pt regarding message informed pt to go to the ER. Pt verbalized understanding.

## 2023-08-04 NOTE — TELEPHONE ENCOUNTER
----- Message from Vivi Green sent at 8/4/2023 11:02 AM CDT -----  Contact: Yunior [phone]  Yunior from [facility] stated he just left the pt but she have a high heart rate what would he like him to do.    Please call and advise

## 2023-08-04 NOTE — TELEPHONE ENCOUNTER
Called Yunior regarding message. Yunior stated pt's pulse was 112 on 08/03 and 130 today. Yunior believes pt is A-fib and wanted PCP recommendations.

## 2023-08-07 ENCOUNTER — TELEPHONE (OUTPATIENT)
Dept: PRIMARY CARE CLINIC | Facility: CLINIC | Age: 73
End: 2023-08-07
Payer: COMMERCIAL

## 2023-08-07 DIAGNOSIS — E89.0 POSTOPERATIVE HYPOTHYROIDISM: ICD-10-CM

## 2023-08-07 DIAGNOSIS — R73.9 ELEVATED SERUM GLUCOSE: ICD-10-CM

## 2023-08-07 DIAGNOSIS — E78.2 MIXED HYPERLIPIDEMIA: Primary | ICD-10-CM

## 2023-08-07 NOTE — TELEPHONE ENCOUNTER
Called pt regarding message. Pt stated at ER visit on 08/04 TSH level was high. Pt requested thyroid panel to recheck levels.

## 2023-08-07 NOTE — TELEPHONE ENCOUNTER
Fasting labs, including a full thyroid panel, have been ordered.  Please ensure that she is been taking levothyroxine consistently.

## 2023-08-07 NOTE — TELEPHONE ENCOUNTER
----- Message from Vivi Green sent at 8/7/2023  8:42 AM CDT -----  Contact: self [phone]  Per pt need lab order to check her thyroid levels prior to 9/15/23 appt.    Please call and advise

## 2023-08-08 ENCOUNTER — TELEPHONE (OUTPATIENT)
Dept: NEUROSURGERY | Facility: CLINIC | Age: 73
End: 2023-08-08
Payer: COMMERCIAL

## 2023-08-08 ENCOUNTER — ANESTHESIA EVENT (OUTPATIENT)
Dept: SURGERY | Facility: HOSPITAL | Age: 73
DRG: 454 | End: 2023-08-08
Payer: MEDICARE

## 2023-08-08 ENCOUNTER — TELEPHONE (OUTPATIENT)
Dept: PRIMARY CARE CLINIC | Facility: CLINIC | Age: 73
End: 2023-08-08
Payer: COMMERCIAL

## 2023-08-08 DIAGNOSIS — M54.16 LUMBAR RADICULOPATHY: ICD-10-CM

## 2023-08-08 DIAGNOSIS — I10 ESSENTIAL HYPERTENSION, BENIGN: ICD-10-CM

## 2023-08-08 DIAGNOSIS — M47.12 CERVICAL SPONDYLOSIS WITH MYELOPATHY: ICD-10-CM

## 2023-08-08 DIAGNOSIS — M48.061 FORAMINAL STENOSIS OF LUMBAR REGION: Primary | ICD-10-CM

## 2023-08-08 DIAGNOSIS — M48.062 LUMBAR STENOSIS WITH NEUROGENIC CLAUDICATION: ICD-10-CM

## 2023-08-08 DIAGNOSIS — Z98.1 S/P FUSION OF THORACIC SPINE: ICD-10-CM

## 2023-08-08 DIAGNOSIS — M48.061 FORAMINAL STENOSIS OF LUMBAR REGION: ICD-10-CM

## 2023-08-08 DIAGNOSIS — M48.062 LUMBAR STENOSIS WITH NEUROGENIC CLAUDICATION: Primary | ICD-10-CM

## 2023-08-08 RX ORDER — METOPROLOL SUCCINATE 25 MG/1
25 TABLET, EXTENDED RELEASE ORAL 2 TIMES DAILY
Qty: 180 TABLET | Refills: 1 | Status: SHIPPED | OUTPATIENT
Start: 2023-08-08 | End: 2023-09-06

## 2023-08-08 NOTE — TELEPHONE ENCOUNTER
Returned call to pt, pt is requesting to know the date of her surgery with . I stated to pt that her surgery date is 9/29 with . Pt confirmed and voiced understanding

## 2023-08-08 NOTE — TELEPHONE ENCOUNTER
Pt went to ER on Friday and has been discharged. Lucas stated pt is back in A-fib. Lucas asked would you like pt to back to ER or order EKG..

## 2023-08-08 NOTE — TELEPHONE ENCOUNTER
Increase metoprolol to 25 mg b.i.d. instead of once daily.  Continue to monitor heart rate and blood pressure.  To ER for any chest pain, shortness of breath or any other concerns

## 2023-08-08 NOTE — TELEPHONE ENCOUNTER
----- Message from Meron Jiménez sent at 8/8/2023 11:37 AM CDT -----  Contact: [facility] [phone]  Patient is returning a phone call.  Who left a message for the patient: Tisha  Does patient know what this is regarding: n/a  Would you like a call back, or a response through your [facility] portal?:   call     Please call and advise.    Thank You

## 2023-08-08 NOTE — TELEPHONE ENCOUNTER
----- Message from Rasheeda Stewart sent at 8/8/2023 10:31 AM CDT -----  Contact: Yunior with [facility]    Patient would like to get medical advice.  Symptoms (please be specific):  Yunior states the pt was sent to the ER on Friday with arrhythmia. Yunior states the pt is having the same symptoms today and wants to speak to the office before sending the pt to the ER today  How long have you had these symptoms: N/A  Would you like a call back, or a response through your [facility] portal?:   call back  Pharmacy name and phone # (copy from chart):   N/A  Comments:

## 2023-08-15 ENCOUNTER — TELEPHONE (OUTPATIENT)
Dept: PRIMARY CARE CLINIC | Facility: CLINIC | Age: 73
End: 2023-08-15
Payer: COMMERCIAL

## 2023-08-15 NOTE — TELEPHONE ENCOUNTER
----- Message from Nancy Quick sent at 8/15/2023  9:33 AM CDT -----  Contact: [phone] @ patient  Good morning patient would like help getting out of her car to make it to her apt this morning at 10. Patient is in a white [employer]onda in the handicap parking. Please give patient a call back [phone]

## 2023-08-15 NOTE — TELEPHONE ENCOUNTER
Called pt regarding message. Pt stated she needed help getting out of her car. Informed pt we will be down stairs to help her.

## 2023-08-22 ENCOUNTER — TELEPHONE (OUTPATIENT)
Dept: ELECTROPHYSIOLOGY | Facility: CLINIC | Age: 73
End: 2023-08-22
Payer: COMMERCIAL

## 2023-08-22 NOTE — TELEPHONE ENCOUNTER
Attempted to call patient to confirm appointments on 8/28/23. No answer. Unable to leave voice messages to confirm appointment.

## 2023-08-25 ENCOUNTER — DOCUMENT SCAN (OUTPATIENT)
Dept: HOME HEALTH SERVICES | Facility: HOSPITAL | Age: 73
End: 2023-08-25
Payer: COMMERCIAL

## 2023-08-30 ENCOUNTER — TELEPHONE (OUTPATIENT)
Dept: PODIATRY | Facility: CLINIC | Age: 73
End: 2023-08-30
Payer: COMMERCIAL

## 2023-08-30 ENCOUNTER — OFFICE VISIT (OUTPATIENT)
Dept: PODIATRY | Facility: CLINIC | Age: 73
End: 2023-08-30
Payer: MEDICARE

## 2023-08-30 VITALS — SYSTOLIC BLOOD PRESSURE: 86 MMHG | HEART RATE: 120 BPM | DIASTOLIC BLOOD PRESSURE: 60 MMHG

## 2023-08-30 DIAGNOSIS — L60.0 ONYCHOMYCOSIS WITH INGROWN TOENAIL: Primary | ICD-10-CM

## 2023-08-30 DIAGNOSIS — B35.1 ONYCHOMYCOSIS WITH INGROWN TOENAIL: Primary | ICD-10-CM

## 2023-08-30 DIAGNOSIS — Q84.5 ENLARGED AND HYPERTROPHIC NAILS: ICD-10-CM

## 2023-08-30 DIAGNOSIS — G35 MULTIPLE SCLEROSIS: ICD-10-CM

## 2023-08-30 DIAGNOSIS — Z79.01 LONG TERM CURRENT USE OF ANTICOAGULANT: ICD-10-CM

## 2023-08-30 DIAGNOSIS — M54.31 SCIATIC NERVE PAIN, RIGHT: ICD-10-CM

## 2023-08-30 DIAGNOSIS — I48.91 ATRIAL FIBRILLATION, UNSPECIFIED TYPE: ICD-10-CM

## 2023-08-30 DIAGNOSIS — R60.0 BILATERAL LOWER EXTREMITY EDEMA: ICD-10-CM

## 2023-08-30 DIAGNOSIS — M79.676 PAIN AROUND TOENAIL: ICD-10-CM

## 2023-08-30 PROCEDURE — 99999 PR PBB SHADOW E&M-EST. PATIENT-LVL III: ICD-10-PCS | Mod: PBBFAC,,, | Performed by: PODIATRIST

## 2023-08-30 PROCEDURE — 11721 PR DEBRIDEMENT OF NAILS, 6 OR MORE: ICD-10-PCS | Mod: S$PBB,,, | Performed by: PODIATRIST

## 2023-08-30 PROCEDURE — 11721 DEBRIDE NAIL 6 OR MORE: CPT | Mod: S$PBB,,, | Performed by: PODIATRIST

## 2023-08-30 PROCEDURE — 99213 OFFICE O/P EST LOW 20 MIN: CPT | Mod: PBBFAC,25,PN | Performed by: PODIATRIST

## 2023-08-30 PROCEDURE — 99203 OFFICE O/P NEW LOW 30 MIN: CPT | Mod: 25,S$PBB,, | Performed by: PODIATRIST

## 2023-08-30 PROCEDURE — 99999 PR PBB SHADOW E&M-EST. PATIENT-LVL III: CPT | Mod: PBBFAC,,, | Performed by: PODIATRIST

## 2023-08-30 PROCEDURE — 11721 DEBRIDE NAIL 6 OR MORE: CPT | Mod: PBBFAC,PN | Performed by: PODIATRIST

## 2023-08-30 PROCEDURE — 99203 PR OFFICE/OUTPT VISIT, NEW, LEVL III, 30-44 MIN: ICD-10-PCS | Mod: 25,S$PBB,, | Performed by: PODIATRIST

## 2023-08-30 NOTE — TELEPHONE ENCOUNTER
----- Message from Franny Gonzales sent at 8/30/2023  1:27 PM CDT -----  Regarding: shayne states she is outside the front door in a white [place]  shayne states she is outside the front door in a white [facility]

## 2023-08-30 NOTE — PROCEDURES
Nail debridement    Date/Time: 8/30/2023 2:00 PM    Performed by: Indiana Galdamez DPM  Authorized by: Indiana Galdamez DPM    Consent Done?:  Yes (Verbal)    Nail Care Type:  Debride  Location(s): All  (Left 1st Toe, Left 3rd Toe, Left 2nd Toe, Left 4th Toe, Left 5th Toe, Right 1st Toe, Right 2nd Toe, Right 3rd Toe, Right 4th Toe and Right 5th Toe)  Patient tolerance:  Patient tolerated the procedure well with no immediate complications

## 2023-08-30 NOTE — PROGRESS NOTES
Subjective:      Patient ID: Graciela Barton is a 73 y.o. female.    Chief Complaint: Nail Care    Graciela is a 73 y.o. female who presents new to the clinic for evaluation and treatment of high risk feet.The patient's chief complaint is long, thick toenails. Painful in shoes. Unable to trim them herself d/t sciatica R - her pain is from waist to knee R - pseeing a neurosurgeon as pain level 6/10 >> her toenails.      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 She uses a walker but presents in the MOB wheelchair.  brought up by someone in building. She did drive herself here    PCP: Rocco Miller MD  /Silvia Giordano NP 6/13/23    Current shoe gear: slip on casual soft shoe.     Past Medical History:   Diagnosis Date    Arthritis     Cancer     Thyroid    Edema     Hyperlipidemia     Hypertension     Thyroid disease      Patient Active Problem List   Diagnosis    Postoperative hypothyroidism    History of thyroid cancer    Essential hypertension, benign    Mixed hyperlipidemia    Benign paroxysmal positional vertigo    History of colon polyps    Bilateral post-traumatic osteoarthritis of knee    Paroxysmal tachycardia    DDD (degenerative disc disease), lumbar    Lumbar radiculopathy    PAF (paroxysmal atrial fibrillation)    Multiple sclerosis    Anticoagulant long-term use    Thoracic disc disease with myelopathy    Spinal cord compression    Aortic atherosclerosis    Preprocedural cardiovascular examination    Thoracic myelopathy    Atelectasis     Hemoglobin A1C   Date Value Ref Range Status   06/20/2022 5.5 4.0 - 5.6 % Final     Comment:     ADA Screening Guidelines:  5.7-6.4%  Consistent with prediabetes  >or=6.5%  Consistent with diabetes    High levels of fetal hemoglobin interfere with the HbA1C  assay. Heterozygous hemoglobin variants (HbS, HgC, etc)do  not significantly interfere with this assay.   However, presence of multiple variants may  affect accuracy.        Objective:      Review of Systems   Constitutional: Positive for malaise/fatigue.   Skin:  Positive for color change and nail changes. Negative for dry skin, poor wound healing, rash and suspicious lesions.   Musculoskeletal:  Positive for arthritis, back pain, falls, joint pain, joint swelling, muscle weakness, myalgias and neck pain.   Neurological:  Positive for focal weakness and paresthesias. Negative for dizziness, light-headedness and weakness.   Psychiatric/Behavioral:  Positive for memory loss. The patient is not nervous/anxious.      Physical Exam  Vitals (hypotensive (BP taken before & after visit) & tachycardic.) reviewed.   Constitutional:       General: She is not in acute distress.     Appearance: She is well-developed and overweight. She is ill-appearing.   Cardiovascular:      Pulses:           Dorsalis pedis pulses are 1+ on the right side and 1+ on the left side.   Musculoskeletal:         General: No swelling or tenderness.      Right lower le+ Edema present.      Left lower le+ Edema present.   Feet:      Right foot:      Skin integrity: Skin integrity normal.      Left foot:      Skin integrity: Skin integrity normal.      Comments: Difficult to assess MS strength of extrinsics to foot & ankle B/L as patient appears to be slightly confused & slowed; also, has difficulty lifting her legs on & off treatment chair, even w/ assistance, remaining seated in wheelchair.    Toenails 1st, 2nd, 3rd, 4th, 5th  B/L are hypertrophic, mildly thickened, discolored yellow, w/ subungual debris. Distal digital tuft impingement, tender to distal nail plate pressure, w/out periungual skin abnormality noted.     Skin:     General: Skin is cool and dry.      Capillary Refill: Capillary refill takes 2 to 3 seconds.      Coloration: Skin is pale and sallow.      Findings: No bruising, erythema or lesion.   Neurological:      Mental Status: She is oriented to person, place, and time. She  is lethargic.      Motor: Weakness present. No abnormal muscle tone.      Gait: Gait abnormal (wheelchair currently but drove & transferred w/ walker).      Comments: Paresthesias including shooting pain BLE includes R truck/back to knee only.   Psychiatric:         Mood and Affect: Affect normal. Mood is not anxious.         Behavior: Behavior normal. Behavior is cooperative.         Cognition and Memory: Memory is impaired (?).      Comments: confused         Assessment:      Encounter Diagnoses   Name Primary?    Long term current use of anticoagulant     Multiple sclerosis     Sciatic nerve pain, right     Atrial fibrillation, unspecified type     Bilateral lower extremity edema     Enlarged and hypertrophic nails     Onychomycosis with ingrown toenail Yes       Plan:       Graciela was seen today for nail care.    Diagnoses and all orders for this visit:    Onychomycosis with ingrown toenail  -     Nail debridement    Long term current use of anticoagulant  -     Nail debridement    Multiple sclerosis    Sciatic nerve pain, right    Atrial fibrillation, unspecified type  -     Nail debridement    Bilateral lower extremity edema  -     Nail debridement    Enlarged and hypertrophic nails  -     Nail debridement    I counseled the patient on her conditions, their implications and medical management.    - Shoe inspection. Patient instructed on proper foot hygeine. We discussed wearing proper shoe gear, daily foot inspections, never walking without protective shoe gear, never putting sharp instruments to feet, routine podiatric nail visits every 2-3 months.      - With patient's permission, nails were aggressively reduced and debrided x 10 to their soft tissue attachment mechanically, removing all offending nail and debris. Utilizing sterile toenail nippers, I aggressively debrided the offending nail border approximately 3 mm from its edge and carried the nail plate incision down at an angle in order to wedge out the  offending cryptotic portion of the nail plate in toto. The area was cleansed with alcohol. Patient tolerated the procedure well and related significant relief.        A total of 36 mins.was spent on chart review, patient visit & documentation.         Note: patient transported in wheelchair to ED for evaluation of hypotension & tachycardia.

## 2023-09-06 ENCOUNTER — OFFICE VISIT (OUTPATIENT)
Dept: PRIMARY CARE CLINIC | Facility: CLINIC | Age: 73
End: 2023-09-06
Payer: MEDICARE

## 2023-09-06 ENCOUNTER — TELEPHONE (OUTPATIENT)
Dept: PRIMARY CARE CLINIC | Facility: CLINIC | Age: 73
End: 2023-09-06
Payer: COMMERCIAL

## 2023-09-06 VITALS
TEMPERATURE: 97 F | BODY MASS INDEX: 26.53 KG/M2 | DIASTOLIC BLOOD PRESSURE: 70 MMHG | WEIGHT: 169 LBS | RESPIRATION RATE: 18 BRPM | HEIGHT: 67 IN | OXYGEN SATURATION: 95 % | SYSTOLIC BLOOD PRESSURE: 120 MMHG | HEART RATE: 122 BPM

## 2023-09-06 DIAGNOSIS — Z01.818 PREOPERATIVE EXAMINATION: ICD-10-CM

## 2023-09-06 DIAGNOSIS — Z79.01 LONG TERM (CURRENT) USE OF ANTICOAGULANTS: ICD-10-CM

## 2023-09-06 DIAGNOSIS — I48.0 PAF (PAROXYSMAL ATRIAL FIBRILLATION): Primary | ICD-10-CM

## 2023-09-06 DIAGNOSIS — E89.0 POSTOPERATIVE HYPOTHYROIDISM: ICD-10-CM

## 2023-09-06 DIAGNOSIS — I10 ESSENTIAL HYPERTENSION, BENIGN: ICD-10-CM

## 2023-09-06 PROCEDURE — 99214 OFFICE O/P EST MOD 30 MIN: CPT | Mod: S$PBB,,, | Performed by: FAMILY MEDICINE

## 2023-09-06 PROCEDURE — 99999 PR PBB SHADOW E&M-EST. PATIENT-LVL IV: ICD-10-PCS | Mod: PBBFAC,,, | Performed by: FAMILY MEDICINE

## 2023-09-06 PROCEDURE — 99999 PR PBB SHADOW E&M-EST. PATIENT-LVL IV: CPT | Mod: PBBFAC,,, | Performed by: FAMILY MEDICINE

## 2023-09-06 PROCEDURE — 99214 PR OFFICE/OUTPT VISIT, EST, LEVL IV, 30-39 MIN: ICD-10-PCS | Mod: S$PBB,,, | Performed by: FAMILY MEDICINE

## 2023-09-06 PROCEDURE — 99214 OFFICE O/P EST MOD 30 MIN: CPT | Mod: PBBFAC,PN | Performed by: FAMILY MEDICINE

## 2023-09-06 RX ORDER — LEVOTHYROXINE SODIUM 137 UG/1
137 TABLET ORAL
Qty: 90 TABLET | Refills: 0 | Status: SHIPPED | OUTPATIENT
Start: 2023-09-06

## 2023-09-06 RX ORDER — METOPROLOL SUCCINATE 25 MG/1
37.5 TABLET, EXTENDED RELEASE ORAL 2 TIMES DAILY
Qty: 270 TABLET | Refills: 1 | Status: SHIPPED | OUTPATIENT
Start: 2023-09-06 | End: 2023-09-15

## 2023-09-06 NOTE — TELEPHONE ENCOUNTER
----- Message from Guerda Green sent at 9/6/2023 10:47 AM CDT -----  Contact: [phone]  [email or web address]     Patient is calling for Medical Advice regarding: pt is outside requesting wheelchair assistance.     How long has patient had these symptoms:    Pharmacy name and phone#:    Would like response via [facility]:  no    Comments:

## 2023-09-06 NOTE — PROGRESS NOTES
"Subjective:       Patient ID: Graciela Barton is a 73 y.o. female.    Chief Complaint: Hospital Follow Up    Has been to the emergency room a few times recently for asymptomatic hypotension and tachycardia.  No recent illness.  Metoprolol was increased about a month ago due to persistent tachycardia.  Denies chest pain, palpitations or shortness of breath.  Denies dizziness or lightheadedness.  Compliant with all medications.  TSH markedly elevated at recent ER visit.  Insists that she is taking levothyroxine every day.      Review of Systems   Gastrointestinal:  Negative for nausea and vomiting.   Musculoskeletal:  Positive for gait problem.   Neurological:  Positive for weakness.       Objective:      Vitals:    09/06/23 1137   BP: 120/70   BP Location: Right arm   Patient Position: Sitting   BP Method: Medium (Manual)   Pulse: (!) 122   Resp: 18   Temp: 97 °F (36.1 °C)   TempSrc: Temporal   SpO2: 95%   Weight: 76.7 kg (169 lb)   Height: 5' 7" (1.702 m)     BP Readings from Last 5 Encounters:   09/06/23 120/70   08/30/23 (!) 122/91   08/30/23 (!) 86/60   08/04/23 136/87   06/30/23 121/61     Wt Readings from Last 5 Encounters:   09/06/23 76.7 kg (169 lb)   08/30/23 76.7 kg (169 lb)   08/04/23 77.1 kg (170 lb)   08/02/23 77.1 kg (170 lb)   06/30/23 77.2 kg (170 lb 3.1 oz)     Physical Exam  Vitals and nursing note reviewed.   Constitutional:       General: She is not in acute distress.     Appearance: Normal appearance. She is well-developed.   HENT:      Head: Normocephalic and atraumatic.   Cardiovascular:      Rate and Rhythm: Regular rhythm. Tachycardia present.      Heart sounds: Normal heart sounds.   Pulmonary:      Effort: Pulmonary effort is normal.      Breath sounds: Normal breath sounds.   Musculoskeletal:      Right lower leg: No edema.      Left lower leg: No edema.   Skin:     General: Skin is warm and dry.   Neurological:      Mental Status: She is alert and oriented to person, place, and time. "   Psychiatric:         Mood and Affect: Mood normal.         Behavior: Behavior normal.         Lab Results   Component Value Date    WBC 8.11 08/30/2023    HGB 12.2 08/30/2023    HCT 40.0 08/30/2023     08/30/2023    CHOL 127 06/20/2022    TRIG 135 06/20/2022    HDL 56 06/20/2022    ALT 56 (H) 08/30/2023    AST 54 (H) 08/30/2023     08/30/2023    K 3.8 08/30/2023     08/30/2023    CREATININE 0.9 08/30/2023    BUN 24 (H) 08/30/2023    CO2 29 08/30/2023    TSH 34.849 (H) 08/30/2023    HGBA1C 5.5 06/20/2022      Assessment:       1. PAF (paroxysmal atrial fibrillation)    2. Postoperative hypothyroidism    3. Essential hypertension, benign Stable   4. Preoperative examination    5. Long term (current) use of anticoagulants        Plan:       PAF (paroxysmal atrial fibrillation)  -     metoprolol succinate (TOPROL-XL) 25 MG 24 hr tablet; Take 1.5 tablets (37.5 mg total) by mouth 2 (two) times daily.  Dispense: 270 tablet; Refill: 1  Adjust beta-blocker to better control tachycardia.  May need ultimately to follow-up with cardiology  Postoperative hypothyroidism  -     levothyroxine (SYNTHROID) 137 MCG Tab tablet; Take 1 tablet (137 mcg total) by mouth before breakfast.  Dispense: 90 tablet; Refill: 0  -     TSH; Future; Expected date: 10/18/2023  Increase levothyroxine, recheck TSH in 6 weeks  Essential hypertension, benign  -     metoprolol succinate (TOPROL-XL) 25 MG 24 hr tablet; Take 1.5 tablets (37.5 mg total) by mouth 2 (two) times daily.  Dispense: 270 tablet; Refill: 1  Discontinue losartan in light of recent hypotension, particularly with increased dose of metoprolol.  Monitor blood pressure and heart rate at home, and follow-up as scheduled next week  Preoperative examination  -     X-Ray Chest PA And Lateral; Future; Expected date: 09/06/2023  -     PROTIME-INR; Future; Expected date: 09/06/2023    Long term (current) use of anticoagulants  -     PROTIME-INR; Future; Expected date:  09/06/2023      Medication List with Changes/Refills   New Medications    LEVOTHYROXINE (SYNTHROID) 137 MCG TAB TABLET    Take 1 tablet (137 mcg total) by mouth before breakfast.   Current Medications    CA COMB NO.1/VIT D3/B6/FA/B12 (VITAMIN D3, CALCIUM CIT-PHOS, ORAL)    Take by mouth.    ELIQUIS 5 MG TAB    Take 1 tablet (5 mg total) by mouth 2 (two) times daily.    HYDROCHLOROTHIAZIDE (HYDRODIURIL) 25 MG TABLET    Take 1 tablet (25 mg total) by mouth once daily.    MECLIZINE (ANTIVERT) 25 MG TABLET    Take 1 tablet (25 mg total) by mouth 3 (three) times daily as needed for Dizziness.    METHOCARBAMOL (ROBAXIN) 750 MG TAB    Take 1 tablet (750 mg total) by mouth 3 (three) times daily as needed (muscle spasms).    MULTIVITAMIN (THERAGRAN) PER TABLET    Take 1 tablet by mouth once daily.    PROPAFENONE (RHTHYMOL) 150 MG TAB    Take 1 tablet (150 mg total) by mouth every 8 (eight) hours.    ROSUVASTATIN (CRESTOR) 10 MG TABLET    Take 1 tablet (10 mg total) by mouth once daily.    SENNA-DOCUSATE 8.6-50 MG (PERICOLACE) 8.6-50 MG PER TABLET    Take 2 tablets by mouth nightly as needed for Constipation.   Changed and/or Refilled Medications    Modified Medication Previous Medication    METOPROLOL SUCCINATE (TOPROL-XL) 25 MG 24 HR TABLET metoprolol succinate (TOPROL-XL) 25 MG 24 hr tablet       Take 1.5 tablets (37.5 mg total) by mouth 2 (two) times daily.    Take 1 tablet (25 mg total) by mouth 2 (two) times daily.   Discontinued Medications    ACETAMINOPHEN (TYLENOL) 325 MG TABLET    Take 650 mg by mouth every 6 (six) hours as needed.    ALUMINUM-MAGNESIUM HYDROXIDE-SIMETHICONE (MAALOX) 200-200-20 MG/5 ML SUSP    Take 30 mLs by mouth every 4 (four) hours as needed (indigestion).    LEVOTHYROXINE (SYNTHROID) 125 MCG TABLET    TAKE 1 TABLET BY MOUTH BEFORE BREAKFAST    LOSARTAN (COZAAR) 25 MG TABLET    Take 1 tablet (25 mg total) by mouth once daily.    ONDANSETRON (ZOFRAN-ODT) 8 MG TBDL    Take 1 tablet (8 mg total) by  mouth every 6 (six) hours as needed (nausea/vomiting).    OXYCODONE (ROXICODONE) 10 MG TAB IMMEDIATE RELEASE TABLET    Take 1 tablet (10 mg total) by mouth every 6 (six) hours as needed for Pain.

## 2023-09-13 ENCOUNTER — TELEPHONE (OUTPATIENT)
Dept: PRIMARY CARE CLINIC | Facility: CLINIC | Age: 73
End: 2023-09-13
Payer: COMMERCIAL

## 2023-09-13 NOTE — TELEPHONE ENCOUNTER
----- Message from Hodan Dodson sent at 9/13/2023  4:23 PM CDT -----  Contact: [phone]  Pt called to advise that she only took her heart rate today and it was 109. He says her HH nurse comes tomorrow and does take her BP and heart rate. Please Advise

## 2023-09-13 NOTE — TELEPHONE ENCOUNTER
----- Message from Rocco Miller MD sent at 9/6/2023 11:51 AM CDT -----  Please call and get updated home BP and pulse readings

## 2023-09-14 ENCOUNTER — TELEPHONE (OUTPATIENT)
Dept: PRIMARY CARE CLINIC | Facility: CLINIC | Age: 73
End: 2023-09-14
Payer: COMMERCIAL

## 2023-09-14 NOTE — TELEPHONE ENCOUNTER
----- Message from Apryl Leggett sent at 9/14/2023  8:40 AM CDT -----  Contact: Patient, [phone]  Patient is returning a phone call.  Who left a message for the patient: Tamie  Does patient know what this is regarding:  Advise  Would you like a call back, or a response through your [facility] portal?:   Call back  Comments:  Missed your call, please call hr back. Thanks.

## 2023-09-15 ENCOUNTER — OFFICE VISIT (OUTPATIENT)
Dept: PRIMARY CARE CLINIC | Facility: CLINIC | Age: 73
End: 2023-09-15
Payer: MEDICARE

## 2023-09-15 ENCOUNTER — EXTERNAL HOME HEALTH (OUTPATIENT)
Dept: HOME HEALTH SERVICES | Facility: HOSPITAL | Age: 73
End: 2023-09-15
Payer: MEDICARE

## 2023-09-15 ENCOUNTER — TELEPHONE (OUTPATIENT)
Dept: PRIMARY CARE CLINIC | Facility: CLINIC | Age: 73
End: 2023-09-15
Payer: COMMERCIAL

## 2023-09-15 VITALS
SYSTOLIC BLOOD PRESSURE: 126 MMHG | HEIGHT: 67 IN | OXYGEN SATURATION: 96 % | WEIGHT: 169 LBS | BODY MASS INDEX: 26.53 KG/M2 | HEART RATE: 122 BPM | DIASTOLIC BLOOD PRESSURE: 68 MMHG | RESPIRATION RATE: 18 BRPM | TEMPERATURE: 98 F

## 2023-09-15 DIAGNOSIS — Z01.818 PREOPERATIVE EXAMINATION: Primary | ICD-10-CM

## 2023-09-15 DIAGNOSIS — I48.92 ATRIAL FLUTTER, UNSPECIFIED TYPE: ICD-10-CM

## 2023-09-15 PROCEDURE — 93005 ELECTROCARDIOGRAM TRACING: CPT | Mod: PBBFAC,PN | Performed by: INTERNAL MEDICINE

## 2023-09-15 PROCEDURE — 99214 PR OFFICE/OUTPT VISIT, EST, LEVL IV, 30-39 MIN: ICD-10-PCS | Mod: S$PBB,,, | Performed by: FAMILY MEDICINE

## 2023-09-15 PROCEDURE — 93010 ELECTROCARDIOGRAM REPORT: CPT | Mod: S$PBB,,, | Performed by: INTERNAL MEDICINE

## 2023-09-15 PROCEDURE — 99214 OFFICE O/P EST MOD 30 MIN: CPT | Mod: PBBFAC,PN | Performed by: FAMILY MEDICINE

## 2023-09-15 PROCEDURE — 99214 OFFICE O/P EST MOD 30 MIN: CPT | Mod: S$PBB,,, | Performed by: FAMILY MEDICINE

## 2023-09-15 PROCEDURE — 93010 EKG 12-LEAD: ICD-10-PCS | Mod: S$PBB,,, | Performed by: INTERNAL MEDICINE

## 2023-09-15 PROCEDURE — 99999 PR PBB SHADOW E&M-EST. PATIENT-LVL IV: ICD-10-PCS | Mod: PBBFAC,,, | Performed by: FAMILY MEDICINE

## 2023-09-15 PROCEDURE — 99999 PR PBB SHADOW E&M-EST. PATIENT-LVL IV: CPT | Mod: PBBFAC,,, | Performed by: FAMILY MEDICINE

## 2023-09-15 RX ORDER — METOPROLOL TARTRATE 50 MG/1
50 TABLET ORAL 2 TIMES DAILY
Qty: 60 TABLET | Refills: 0 | Status: ON HOLD | OUTPATIENT
Start: 2023-09-15 | End: 2023-10-06 | Stop reason: HOSPADM

## 2023-09-15 NOTE — TELEPHONE ENCOUNTER
----- Message from Jenniffer Starkey sent at 9/15/2023  9:36 AM CDT -----  Regarding: Pt called to speak to the nurse due to waiting for someone from the office to transport her from outside of the building to her 10 am vickie tthis morning  Patient Advice:    Pt called to speak to the nurse due to waiting for someone from the office to transport her from outside of the building to her 10 am vickie tthis morning. Pt states that she is waiting outside of [address] and she will blow her horn to let the person know where she is at in her car.    Pt can be reached at [phone]

## 2023-09-15 NOTE — PROGRESS NOTES
"Subjective:       Patient ID: Graciela Barton is a 73 y.o. female.    Chief Complaint: Surgery Clearance    Scheduled for cervical fusion later this month.  No previous surgical complications.  Blood pressure has been okay on home monitoring, but remains tachycardic with heart rates in the 120s to 130s.  Has not noticed any significant change since increasing metoprolol succinate from 25 mg b.i.d. to 37.5 mg b.i.d. Denies chest pain or shortness of breath.      Review of Systems   Constitutional:  Negative for fever.   Respiratory:  Negative for shortness of breath.    Cardiovascular:  Negative for chest pain.   Neurological:  Positive for weakness.       Objective:      Vitals:    09/15/23 1003   BP: 126/68   BP Location: Right arm   Patient Position: Sitting   BP Method: Medium (Manual)   Pulse: (!) 122   Resp: 18   Temp: 97.5 °F (36.4 °C)   TempSrc: Temporal   SpO2: 96%   Weight: 76.7 kg (169 lb)   Height: 5' 7" (1.702 m)     BP Readings from Last 5 Encounters:   09/15/23 126/68   09/06/23 120/70   08/30/23 (!) 122/91   08/30/23 (!) 86/60   08/04/23 136/87     Wt Readings from Last 5 Encounters:   09/15/23 76.7 kg (169 lb)   09/06/23 76.7 kg (169 lb)   08/30/23 76.7 kg (169 lb)   08/04/23 77.1 kg (170 lb)   08/02/23 77.1 kg (170 lb)     Physical Exam  Vitals and nursing note reviewed.   Constitutional:       General: She is not in acute distress.     Appearance: Normal appearance. She is well-developed.   HENT:      Head: Normocephalic and atraumatic.   Cardiovascular:      Rate and Rhythm: Regular rhythm. Tachycardia present.      Heart sounds: Normal heart sounds.   Pulmonary:      Effort: Pulmonary effort is normal.      Breath sounds: Normal breath sounds.   Musculoskeletal:      Right lower leg: No edema.      Left lower leg: No edema.   Skin:     General: Skin is warm and dry.   Neurological:      Mental Status: She is alert and oriented to person, place, and time.   Psychiatric:         Mood and Affect: Mood " normal.         Behavior: Behavior normal.         Lab Results   Component Value Date    WBC 8.11 08/30/2023    HGB 12.2 08/30/2023    HCT 40.0 08/30/2023     08/30/2023    CHOL 127 06/20/2022    TRIG 135 06/20/2022    HDL 56 06/20/2022    ALT 56 (H) 08/30/2023    AST 54 (H) 08/30/2023     08/30/2023    K 3.8 08/30/2023     08/30/2023    CREATININE 0.9 08/30/2023    BUN 24 (H) 08/30/2023    CO2 29 08/30/2023    TSH 34.849 (H) 08/30/2023    INR 1.1 09/06/2023    HGBA1C 5.5 06/20/2022      Assessment:       1. Preoperative examination    2. Atrial flutter, unspecified type        Plan:       Preoperative examination  -     EKG 12-lead  -     Ambulatory referral/consult to Cardiology; Future; Expected date: 09/22/2023  Needs to be evaluated by Cardiology for clearance.  Need better rate control.  Atrial flutter, unspecified type  -     metoprolol tartrate (LOPRESSOR) 50 MG tablet; Take 1 tablet (50 mg total) by mouth 2 (two) times daily.  Dispense: 60 tablet; Refill: 0  -     Ambulatory referral/consult to Cardiology; Future; Expected date: 09/22/2023  Increase metoprolol to 50 mg b.i.d., follow-up with cardiology next week    Medication List with Changes/Refills   New Medications    METOPROLOL TARTRATE (LOPRESSOR) 50 MG TABLET    Take 1 tablet (50 mg total) by mouth 2 (two) times daily.   Current Medications    CA COMB NO.1/VIT D3/B6/FA/B12 (VITAMIN D3, CALCIUM CIT-PHOS, ORAL)    Take by mouth.    ELIQUIS 5 MG TAB    Take 1 tablet (5 mg total) by mouth 2 (two) times daily.    HYDROCHLOROTHIAZIDE (HYDRODIURIL) 25 MG TABLET    Take 1 tablet (25 mg total) by mouth once daily.    LEVOTHYROXINE (SYNTHROID) 137 MCG TAB TABLET    Take 1 tablet (137 mcg total) by mouth before breakfast.    MECLIZINE (ANTIVERT) 25 MG TABLET    Take 1 tablet (25 mg total) by mouth 3 (three) times daily as needed for Dizziness.    METHOCARBAMOL (ROBAXIN) 750 MG TAB    Take 1 tablet (750 mg total) by mouth 3 (three) times daily  as needed (muscle spasms).    MULTIVITAMIN (THERAGRAN) PER TABLET    Take 1 tablet by mouth once daily.    PROPAFENONE (RHTHYMOL) 150 MG TAB    Take 1 tablet (150 mg total) by mouth every 8 (eight) hours.    ROSUVASTATIN (CRESTOR) 10 MG TABLET    Take 1 tablet (10 mg total) by mouth once daily.    SENNA-DOCUSATE 8.6-50 MG (PERICOLACE) 8.6-50 MG PER TABLET    Take 2 tablets by mouth nightly as needed for Constipation.   Discontinued Medications    METOPROLOL SUCCINATE (TOPROL-XL) 25 MG 24 HR TABLET    Take 1.5 tablets (37.5 mg total) by mouth 2 (two) times daily.

## 2023-09-18 ENCOUNTER — OFFICE VISIT (OUTPATIENT)
Dept: CARDIOLOGY | Facility: CLINIC | Age: 73
End: 2023-09-18
Payer: MEDICARE

## 2023-09-18 VITALS
SYSTOLIC BLOOD PRESSURE: 90 MMHG | OXYGEN SATURATION: 97 % | DIASTOLIC BLOOD PRESSURE: 57 MMHG | BODY MASS INDEX: 26.47 KG/M2 | HEART RATE: 108 BPM | HEIGHT: 67 IN

## 2023-09-18 DIAGNOSIS — Z01.810 PREPROCEDURAL CARDIOVASCULAR EXAMINATION: ICD-10-CM

## 2023-09-18 DIAGNOSIS — I48.92 ATRIAL FLUTTER, UNSPECIFIED TYPE: ICD-10-CM

## 2023-09-18 DIAGNOSIS — Z01.818 PREOPERATIVE EXAMINATION: ICD-10-CM

## 2023-09-18 PROCEDURE — 93005 ELECTROCARDIOGRAM TRACING: CPT | Mod: PBBFAC,PN | Performed by: INTERNAL MEDICINE

## 2023-09-18 PROCEDURE — 99999 PR PBB SHADOW E&M-EST. PATIENT-LVL V: CPT | Mod: PBBFAC,,, | Performed by: NURSE PRACTITIONER

## 2023-09-18 PROCEDURE — 99214 PR OFFICE/OUTPT VISIT, EST, LEVL IV, 30-39 MIN: ICD-10-PCS | Mod: S$PBB,,, | Performed by: NURSE PRACTITIONER

## 2023-09-18 PROCEDURE — 99215 OFFICE O/P EST HI 40 MIN: CPT | Mod: PBBFAC,PN | Performed by: NURSE PRACTITIONER

## 2023-09-18 PROCEDURE — 99999 PR PBB SHADOW E&M-EST. PATIENT-LVL V: ICD-10-PCS | Mod: PBBFAC,,, | Performed by: NURSE PRACTITIONER

## 2023-09-18 PROCEDURE — 99214 OFFICE O/P EST MOD 30 MIN: CPT | Mod: S$PBB,,, | Performed by: NURSE PRACTITIONER

## 2023-09-18 PROCEDURE — 93010 ELECTROCARDIOGRAM REPORT: CPT | Mod: S$PBB,,, | Performed by: INTERNAL MEDICINE

## 2023-09-18 PROCEDURE — 93010 EKG 12-LEAD: ICD-10-PCS | Mod: S$PBB,,, | Performed by: INTERNAL MEDICINE

## 2023-09-18 NOTE — PATIENT INSTRUCTIONS
Do not recommend against pursuing the surgery from a cardiology standpoint.    Ok to hold Eliquis as directed by neurosurgery with the hope of restarting as soon as possible after the surgery- there is a small risk of stroke with holding the Eliquis.    I would recommend looking into a cardioversion to get you back into a normal sinus rhythm- you may need more more medication to help control your heart rate but with your blood pressure being lower I would not increase the medication at this time.

## 2023-09-18 NOTE — PROGRESS NOTES
Cardiology    9/21/2023  2:01 PM    Problem list  Patient Active Problem List   Diagnosis    Postoperative hypothyroidism    History of thyroid cancer    Essential hypertension, benign    Mixed hyperlipidemia    Benign paroxysmal positional vertigo    History of colon polyps    Bilateral post-traumatic osteoarthritis of knee    Paroxysmal tachycardia    DDD (degenerative disc disease), lumbar    Lumbar radiculopathy    PAF (paroxysmal atrial fibrillation)    Multiple sclerosis    Anticoagulant long-term use    Thoracic disc disease with myelopathy    Spinal cord compression    Aortic atherosclerosis    Preprocedural cardiovascular examination    Thoracic myelopathy    Atelectasis    Atrial flutter       CC:  Preprocedural evaluation    HPI:  Patient will be having second part of her spinal surgery 9/29/23 with Dr. Salter. There were no complications with her last procedure 8/8/23.  No dizziness or lightheadedness.  Does IS and is up to 2500. No syncope or near syncope. Yesterday thought she had chest pain but belched and it was gone. Can feel the sciatic nerve all the way down.  No cardiac complaints but is anxious to get surgery.  No issues with first surgery. Has been taking Eliquis as prescribed with no bleeding issues.     Medications  Current Outpatient Medications   Medication Sig Dispense Refill    Ca comb no.1/vit D3/B6/FA/B12 (VITAMIN D3, CALCIUM CIT-PHOS, ORAL) Take by mouth.      collagen/biotin/ascorbic acid (COLLAGEN 1500 PLUS C ORAL) Take 1 tablet by mouth once daily.      ELIQUIS 5 mg Tab Take 1 tablet (5 mg total) by mouth 2 (two) times daily. 60 tablet 11    hydroCHLOROthiazide (HYDRODIURIL) 25 MG tablet Take 1 tablet (25 mg total) by mouth once daily. 30 tablet 11    levothyroxine (SYNTHROID) 137 MCG Tab tablet Take 1 tablet (137 mcg total) by mouth before breakfast. 90 tablet 0    metoprolol tartrate (LOPRESSOR) 50 MG tablet Take 1 tablet (50 mg total) by mouth 2 (two) times daily. 60  tablet 0    multivitamin (THERAGRAN) per tablet Take 1 tablet by mouth once daily.      propafenone (RHTHYMOL) 150 MG Tab Take 1 tablet (150 mg total) by mouth every 8 (eight) hours. 90 tablet 11    rosuvastatin (CRESTOR) 10 MG tablet Take 1 tablet (10 mg total) by mouth once daily. 90 tablet 0    vit A/vit C/vit E/zinc/copper (ICAPS AREDS ORAL) Take 1 tablet by mouth 2 (two) times a day.      meclizine (ANTIVERT) 25 mg tablet Take 1 tablet (25 mg total) by mouth 3 (three) times daily as needed for Dizziness. (Patient not taking: Reported on 9/18/2023) 30 tablet 3    methocarbamoL (ROBAXIN) 750 MG Tab Take 1 tablet (750 mg total) by mouth 3 (three) times daily as needed (muscle spasms). (Patient not taking: Reported on 9/18/2023) 1 tablet 0    senna-docusate 8.6-50 mg (PERICOLACE) 8.6-50 mg per tablet Take 2 tablets by mouth nightly as needed for Constipation. (Patient not taking: Reported on 9/18/2023) 1 tablet 0     No current facility-administered medications for this visit.      Prior to Admission medications    Medication Sig Start Date End Date Taking? Authorizing Provider   Ca comb no.1/vit D3/B6/FA/B12 (VITAMIN D3, CALCIUM CIT-PHOS, ORAL) Take by mouth.   Yes Provider, Historical   collagen/biotin/ascorbic acid (COLLAGEN 1500 PLUS C ORAL) Take 1 tablet by mouth once daily.   Yes Provider, Historical   ELIQUIS 5 mg Tab Take 1 tablet (5 mg total) by mouth 2 (two) times daily. 7/17/23  Yes Silvia Giordano NP   hydroCHLOROthiazide (HYDRODIURIL) 25 MG tablet Take 1 tablet (25 mg total) by mouth once daily. 6/13/23 6/12/24 Yes Silvia Giordano NP   levothyroxine (SYNTHROID) 137 MCG Tab tablet Take 1 tablet (137 mcg total) by mouth before breakfast. 9/6/23  Yes Rocco Miller MD   metoprolol tartrate (LOPRESSOR) 50 MG tablet Take 1 tablet (50 mg total) by mouth 2 (two) times daily. 9/15/23  Yes Rocco Miller MD   multivitamin (THERAGRAN) per tablet Take 1 tablet by mouth once daily.   Yes Provider,  Historical   propafenone (RHTHYMOL) 150 MG Tab Take 1 tablet (150 mg total) by mouth every 8 (eight) hours. 7/17/23 7/16/24 Yes Silvia Giordano, NP   rosuvastatin (CRESTOR) 10 MG tablet Take 1 tablet (10 mg total) by mouth once daily. 7/14/23  Yes Charles Rios MD   vit A/vit C/vit E/zinc/copper (ICAPS AREDS ORAL) Take 1 tablet by mouth 2 (two) times a day.   Yes Provider, Historical   meclizine (ANTIVERT) 25 mg tablet Take 1 tablet (25 mg total) by mouth 3 (three) times daily as needed for Dizziness.  Patient not taking: Reported on 9/18/2023 8/1/18   Rocco Miller MD   methocarbamoL (ROBAXIN) 750 MG Tab Take 1 tablet (750 mg total) by mouth 3 (three) times daily as needed (muscle spasms).  Patient not taking: Reported on 9/18/2023 5/16/23   Taryn Tang PA-C   senna-docusate 8.6-50 mg (PERICOLACE) 8.6-50 mg per tablet Take 2 tablets by mouth nightly as needed for Constipation.  Patient not taking: Reported on 9/18/2023 5/16/23   Taryn Tang PA-C   atorvastatin (LIPITOR) 40 MG tablet Take 1 tablet (40 mg total) by mouth once daily. 6/24/22 8/10/22  Radha Gomez MD         History  Past Medical History:   Diagnosis Date    Arthritis     Cancer     Thyroid    Edema     Hyperlipidemia     Hypertension     Thyroid disease      Past Surgical History:   Procedure Laterality Date    ADENOIDECTOMY      COLONOSCOPY  10/18/2018    COLONOSCOPY N/A 10/18/2018    Procedure: COLONOSCOPY;  Surgeon: Yosvany Alejandra MD;  Location: [facility] ENDO;  Service: General;  Laterality: N/A;    EPIDURAL STEROID INJECTION N/A 9/30/2022    Procedure: LUMBAR L3/4 MALKA CONTRAST DIRRECT REFERRAL ELIQUROWENA CARRERA IN CHART;  Surgeon: Nicholas Kumar MD;  Location: [facility] PAIN MGT;  Service: Pain Management;  Laterality: N/A;    HYSTERECTOMY  1992    total hyst     KNEE SURGERY      16 pins and two plates implanted    THORACIC DISCECTOMY Right 5/12/2023    Procedure: DISCECTOMY, SPINE, THORACIC Right T6-7, T7-8  interior discectomy retropleural;  Surgeon: Scooter Salter MD;  Location: [facility];  Service: Neurosurgery;  Laterality: Right;  general  eliquis   type and screen  C-arm  Metrx  EMG bairon notified cc  SEP  MEP  Regular bed   lateral left down   globus (Jose Enrique) notified cc    THYROIDECTOMY      TONSILLECTOMY       Social History     Socioeconomic History    Marital status:    Tobacco Use    Smoking status: Never    Smokeless tobacco: Never   Substance and Sexual Activity    Alcohol use: No    Drug use: No    Sexual activity: Not Currently     Partners: Male         Allergies  Review of patient's allergies indicates:   Allergen Reactions    Vancomycin Tinitus    Vancomycin analogues Tinitus         Review of Systems   Review of Systems   Constitutional: Negative for diaphoresis and malaise/fatigue.   HENT: Negative.     Cardiovascular:  Positive for palpitations. Negative for chest pain, claudication, dyspnea on exertion, irregular heartbeat, leg swelling, near-syncope, orthopnea, paroxysmal nocturnal dyspnea and syncope.   Respiratory:  Negative for shortness of breath.    Endocrine: Negative for polydipsia, polyphagia and polyuria.   Hematologic/Lymphatic: Does not bruise/bleed easily.   Musculoskeletal:  Positive for back pain.   Gastrointestinal:  Negative for bloating, nausea and vomiting.   Genitourinary: Negative.    Neurological:  Positive for numbness and paresthesias. Negative for excessive daytime sleepiness, dizziness, light-headedness, loss of balance and weakness.   Psychiatric/Behavioral:  The patient is not nervous/anxious.    Allergic/Immunologic: Negative.          Physical Exam  Wt Readings from Last 1 Encounters:   09/21/23 80.8 kg (178 lb 3.9 oz)     BP Readings from Last 3 Encounters:   09/21/23 109/72   09/18/23 (!) 90/57   09/15/23 126/68     Pulse Readings from Last 1 Encounters:   09/21/23 106     Body mass index is 26.47 kg/m².    Physical Exam  Vitals and nursing note reviewed.    Constitutional:       Appearance: Normal appearance.   HENT:      Head: Normocephalic and atraumatic.      Mouth/Throat:      Mouth: Mucous membranes are moist.   Eyes:      Pupils: Pupils are equal, round, and reactive to light.   Cardiovascular:      Rate and Rhythm: Normal rate and regular rhythm.      Pulses:           Radial pulses are 2+ on the right side and 2+ on the left side.        Dorsalis pedis pulses are 2+ on the right side and 2+ on the left side.        Posterior tibial pulses are 2+ on the right side and 2+ on the left side.      Heart sounds: No murmur heard.  Pulmonary:      Effort: Pulmonary effort is normal. No respiratory distress.      Breath sounds: Normal breath sounds.   Abdominal:      General: There is no distension.      Tenderness: There is no abdominal tenderness.   Musculoskeletal:      Cervical back: Normal range of motion.      Right lower leg: Edema present.      Left lower leg: Edema present.   Skin:     General: Skin is warm and dry.      Findings: No erythema.   Neurological:      General: No focal deficit present.      Mental Status: She is alert.      Motor: Weakness (BLE) present.      Gait: Gait abnormal.   Psychiatric:         Mood and Affect: Mood normal.         Behavior: Behavior normal.             Problem List Items Addressed This Visit          Cardiac/Vascular    Preprocedural cardiovascular examination    Overview     Pending neurosurgery to evaluate spinal cord compression that could result in paralysis  Is followed by EP for pAF and is on Rythmol, metoprolol and Eliquis, due for f/u in August 2023, unable to attend that appointment unfortunately.  There is a risk of blood clots and stroke with holding Eliquis and this was discussed with patient at length. She wishes to hold med so she may proceed with surgery.  Neurosurgery requesting Eliquis to be held 5 days prior, this was confirmed with Dr. Salter.  Would recommend restarting Eliquis as soon as possible  barring any bleeding problems, defer to Dr. Salter.  Would not recommend against pursuing surgery from a cardiovascular standpoint.          Current Assessment & Plan     As above         Atrial flutter    Overview     With 2:1 conduction, rate in 90s  EKG reviewed with Dr. Weller and cardioversion discussed.  This may be scheduled after surgery when she has been back on anticoagulation 6-8 weeks.    Metoprolol dosing increased by PCP which has modestly improved her HR.  She has been seen by EP but has been unable to follow up with their service due to her impaired function. Continue Rythmol (prescribed TID but taking 1.5 tabs BID), continue metoprolol 50 BID, continue DOAC.  Likely needs a higher dose of BB but BP is low end normal in clinic today.  Had been bradycardic at last visit with EP and hope to be able to get her for follow up soon         Current Assessment & Plan     As above         Relevant Orders    IN OFFICE EKG 12-LEAD (to [place]) (Completed)     Other Visit Diagnoses       Preoperative examination        Relevant Orders    IN OFFICE EKG 12-LEAD (to [place]) (Completed)              Follow Up  6-8 weeks with Dr. Weller to discuss cardioversion      @Екатерина Tamez DNP

## 2023-09-21 ENCOUNTER — TELEPHONE (OUTPATIENT)
Dept: CARDIOLOGY | Facility: CLINIC | Age: 73
End: 2023-09-21
Payer: COMMERCIAL

## 2023-09-21 ENCOUNTER — HOSPITAL ENCOUNTER (OUTPATIENT)
Dept: PREADMISSION TESTING | Facility: HOSPITAL | Age: 73
Discharge: HOME | End: 2023-09-21
Attending: NURSE PRACTITIONER
Payer: MEDICARE

## 2023-09-21 ENCOUNTER — TELEPHONE (OUTPATIENT)
Dept: ANESTHESIOLOGY | Facility: HOSPITAL | Age: 73
End: 2023-09-21
Payer: COMMERCIAL

## 2023-09-21 VITALS
SYSTOLIC BLOOD PRESSURE: 109 MMHG | TEMPERATURE: 98 F | HEIGHT: 67 IN | OXYGEN SATURATION: 97 % | BODY MASS INDEX: 27.98 KG/M2 | WEIGHT: 178.25 LBS | DIASTOLIC BLOOD PRESSURE: 72 MMHG | HEART RATE: 106 BPM

## 2023-09-21 NOTE — ANESTHESIA PREPROCEDURE EVALUATION
09/21/2023  Graciela Barton is a 73 y.o., female scheduled for FUSION, SPINE, LUMBAR, TLIF, MINIMALLY INVASIVE (Spine Lumbar) - L3-L5 OLIF, and  FUSION, SPINE, LUMBAR, TLIF, MINIMALLY INVASIVE (Spine Lumbar) - L3-S1 posterior instrumentation   L3-L5 Lmainectomy sacro pelvic fixation 9/29/23.    PMH: aflutter w/ 2:1 conduction (on metoprolol, propafenone and eliquis. Per cardiology patient's HR runs in the 90s and requires a higher dose BB but BP is too low at baseline. Per cards, patient to follow with EP in the near future for cardioversion but has been able to yet.) Questionable hx of Multiple sclerosis, prior thoracic surgery w/o issue, HTN, HLD.     METs < 4  NPO > 8    TTE 6/22/22:   The left ventricle is normal in size with normal systolic function.   Normal left ventricular diastolic function.   The estimated ejection fraction is 60%.   Normal right ventricular size with normal right ventricular systolic function.   Mild tricuspid regurgitation.   Intermediate central venous pressure (8 mmHg).   The estimated PA systolic pressure is 30 mmHg.   Mild left atrial enlargement.   Frequent premature atrial contractions are noted on ECG rhythm strip    Prev airway: easy mask, grade 1 view with VL.     Past Medical History:   Diagnosis Date    Arthritis     Cancer     Thyroid    Edema     Hyperlipidemia     Hypertension     Thyroid disease      Past Surgical History:   Procedure Laterality Date    ADENOIDECTOMY      COLONOSCOPY  10/18/2018    COLONOSCOPY N/A 10/18/2018    Procedure: COLONOSCOPY;  Surgeon: Yosvany Alejandra MD;  Location: [facility] ENDO;  Service: General;  Laterality: N/A;    EPIDURAL STEROID INJECTION N/A 9/30/2022    Procedure: LUMBAR L3/4 MALKA CONTRAST DIRRECT REFERRAL MONICA CARRERA IN CHART;  Surgeon: Nicholas Kumar MD;  Location: [facility] PAIN MGT;  Service: Pain Management;   Laterality: N/A;    HYSTERECTOMY  1992    total hyst     KNEE SURGERY      16 pins and two plates implanted    THORACIC DISCECTOMY Right 5/12/2023    Procedure: DISCECTOMY, SPINE, THORACIC Right T6-7, T7-8 interior discectomy retropleural;  Surgeon: Scooter Salter MD;  Location: [facility];  Service: Neurosurgery;  Laterality: Right;  general  eliquis   type and screen  C-arm  Metrx  EMG bairon notified cc  SEP  MEP  Regular bed   lateral left down   globus (Jose Enrique) notified cc    THYROIDECTOMY      TONSILLECTOMY       Review of patient's allergies indicates:   Allergen Reactions    Vancomycin Tinitus    Vancomycin analogues Tinitus     Current Outpatient Medications   Medication Instructions    Ca comb no.1/vit D3/B6/FA/B12 (VITAMIN D3, CALCIUM CIT-PHOS, ORAL) Oral    collagen/biotin/ascorbic acid (COLLAGEN 1500 PLUS C ORAL) 1 tablet, Oral, Daily    ELIQUIS 5 mg, Oral, 2 times daily    hydroCHLOROthiazide (HYDRODIURIL) 25 mg, Oral, Daily    levothyroxine (SYNTHROID) 137 mcg, Oral, Before breakfast    meclizine (ANTIVERT) 25 mg, Oral, 3 times daily PRN    methocarbamoL (ROBAXIN) 750 mg, Oral, 3 times daily PRN    metoprolol tartrate (LOPRESSOR) 50 mg, Oral, 2 times daily    multivitamin (THERAGRAN) per tablet 1 tablet, Oral, Daily    propafenone (RHTHYMOL) 150 mg, Oral, Every 8 hours    rosuvastatin (CRESTOR) 10 mg, Oral, Daily    senna-docusate 8.6-50 mg (PERICOLACE) 8.6-50 mg per tablet 2 tablets, Oral, Nightly PRN    vit A/vit C/vit E/zinc/copper (ICAPS AREDS ORAL) 1 tablet, Oral, 2 times daily       Pre-op Assessment    I have reviewed the Patient Summary Reports.     I have reviewed the Nursing Notes.    I have reviewed the Medications.     Review of Systems  Anesthesia Hx:  No problems with previous Anesthesia  History of prior surgery of interest to airway management or planning:  Denies Personal Hx of Anesthesia complications.   Social:  Non-Smoker, No Alcohol Use     Hematology/Oncology:         -- Cancer in past history (thyroid):   Cardiovascular:   Exercise tolerance: good Denies Pacemaker. Hypertension, well controlled Dysrhythmias (paroxysmal tachycardia and a fib, a flutter)  hyperlipidemia    Pulmonary:  Pulmonary Normal  Denies Shortness of breath.    Hepatic/GI:  Hepatic/GI Normal  Denies GERD.    Musculoskeletal:   Arthritis   Spine Disorders: lumbar and thoracic Disc disease and Degenerative disease    Neurological:   Denies TIA. Denies CVA. Neuromuscular Disease, (MS)  Denies Seizures.    Endocrine:   Denies Diabetes. Hypothyroidism        Physical Exam  General: Well nourished and Cooperative    Airway:  Mallampati: IV / III  Mouth Opening: Normal  TM Distance: Normal  Tongue: Normal  Neck ROM: Normal ROM    Dental:    Chest/Lungs:  Clear to auscultation, Normal Respiratory Rate    Heart:  Rate: Normal  Rhythm: Regular Rhythm  Sounds: Normal      Lab Results   Component Value Date    WBC 8.11 08/30/2023    HGB 12.2 08/30/2023    HCT 40.0 08/30/2023     08/30/2023    CHOL 127 06/20/2022    TRIG 135 06/20/2022    HDL 56 06/20/2022    ALT 56 (H) 08/30/2023    AST 54 (H) 08/30/2023     08/30/2023    K 3.8 08/30/2023     08/30/2023    CREATININE 0.9 08/30/2023    BUN 24 (H) 08/30/2023    CO2 29 08/30/2023    TSH 34.849 (H) 08/30/2023    INR 1.1 09/06/2023    HGBA1C 5.5 06/20/2022     Results for orders placed or performed in visit on 09/18/23   IN OFFICE EKG 12-LEAD (to [place])    Collection Time: 09/18/23  2:27 PM    Narrative    Test Reason : Z01.818,I48.92,    Vent. Rate : 092 BPM     Atrial Rate : 220 BPM     P-R Int : 000 ms          QRS Dur : 090 ms      QT Int : 272 ms       P-R-T Axes : -85 095 -83 degrees     QTc Int : 336 ms    Atrial flutter with variable A-V block  Rightward axis  Septal infarct (cited on or before 15-SEP-2023)  ST and T wave abnormality, consider inferior ischemia  Abnormal ECG  When compared with ECG of 15-SEP-2023  10:23,  No significant change was found  Confirmed by Rl Liao MD ([id number]) on 9/20/2023 4:18:35 PM    Referred By: MESHA BRANDT           Confirmed By:Rl Liao MD         Anesthesia Plan  Type of Anesthesia, risks & benefits discussed:    Anesthesia Type: Gen ETT  Intra-op Monitoring Plan: Standard ASA Monitors and Art Line  Post Op Pain Control Plan: multimodal analgesia and IV/PO Opioids PRN  Induction:  IV  Airway Plan: Video, Post-Induction  Informed Consent: Informed consent signed with the Patient and all parties understand the risks and agree with anesthesia plan.  All questions answered.   ASA Score: 3  Day of Surgery Review of History & Physical: H&P Update referred to the surgeon/provider.  Anesthesia Plan Notes: Anesthesia consent to be signed prior to surgery 9/29/23      Ready For Surgery From Anesthesia Perspective.     .

## 2023-09-21 NOTE — TELEPHONE ENCOUNTER
Patient is scheduled for procedure with Dr. Salter on 9/29 and will need blood thinner recommendations. Cardiology states Dr. Salter provided the instructions but they are not documented in her chart. Please advise.

## 2023-09-21 NOTE — TELEPHONE ENCOUNTER
Spoke with patient, advised to hold Eliquis 5 days prior to surgery with Dr. Scooter Salter.  Informed that there is a risk of blood clots and stroke with stopping Eliquis, patient verbalizes understanding.  Date of restarting Eliquis after surgery will be up to surgeon.  Advised patient to feel free to contact our office with any questions or concerns.  Patient asking how long she will be in hospital post surgery, advised to contact her surgeon.

## 2023-09-21 NOTE — TELEPHONE ENCOUNTER
----- Message from Екатерина Tamez DNP sent at 9/21/2023  3:15 PM CDT -----  Could we confirm with pt that per Dr. Salter, the neurosurgeon, her Eliquis will be held 5 days prior to her surgery. There has been a lot of confusion about this and I want to make sure she is aware that he requested 5 days instead of our usual 48 hours.  I'm not sure if anyone from his office or pre-op will reach out to her.    Thanks,  Екатерина

## 2023-09-29 ENCOUNTER — HOSPITAL ENCOUNTER (INPATIENT)
Facility: HOSPITAL | Age: 73
LOS: 11 days | Discharge: NURSING, REHAB OR LONG-TERM CARE | DRG: 454 | End: 2023-10-10
Attending: NEUROLOGICAL SURGERY | Admitting: NEUROLOGICAL SURGERY
Payer: MEDICARE

## 2023-09-29 ENCOUNTER — ANESTHESIA (OUTPATIENT)
Dept: SURGERY | Facility: HOSPITAL | Age: 73
DRG: 454 | End: 2023-09-29
Payer: MEDICARE

## 2023-09-29 DIAGNOSIS — I48.0 PAF (PAROXYSMAL ATRIAL FIBRILLATION): ICD-10-CM

## 2023-09-29 DIAGNOSIS — I48.4 ATYPICAL ATRIAL FLUTTER: ICD-10-CM

## 2023-09-29 DIAGNOSIS — I48.0 PAROXYSMAL ATRIAL FIBRILLATION: ICD-10-CM

## 2023-09-29 DIAGNOSIS — M48.00 SPINAL STENOSIS: ICD-10-CM

## 2023-09-29 DIAGNOSIS — M48.062 LUMBAR STENOSIS WITH NEUROGENIC CLAUDICATION: Primary | ICD-10-CM

## 2023-09-29 DIAGNOSIS — R60.0 BILATERAL LEG EDEMA: ICD-10-CM

## 2023-09-29 DIAGNOSIS — Z74.1 ASSISTANCE NEEDED FOR MOBILITY: ICD-10-CM

## 2023-09-29 DIAGNOSIS — G95.9 CHRONIC MYELOPATHY: ICD-10-CM

## 2023-09-29 DIAGNOSIS — R60.0 FLUID RETENTION IN LEGS: ICD-10-CM

## 2023-09-29 DIAGNOSIS — R07.9 CHEST PAIN: ICD-10-CM

## 2023-09-29 DIAGNOSIS — M48.061 FORAMINAL STENOSIS OF LUMBAR REGION: ICD-10-CM

## 2023-09-29 DIAGNOSIS — R00.0 TACHYCARDIA: ICD-10-CM

## 2023-09-29 DIAGNOSIS — I48.92 ATRIAL FLUTTER: ICD-10-CM

## 2023-09-29 DIAGNOSIS — M25.661 DECREASED RANGE OF MOTION OF RIGHT KNEE: ICD-10-CM

## 2023-09-29 DIAGNOSIS — I48.91 A-FIB: ICD-10-CM

## 2023-09-29 DIAGNOSIS — I48.92 ATRIAL FLUTTER, UNSPECIFIED TYPE: ICD-10-CM

## 2023-09-29 LAB
ABO + RH BLD: NORMAL
ALLENS TEST: ABNORMAL
ALLENS TEST: ABNORMAL
BASOPHILS # BLD AUTO: 0.04 K/UL (ref 0–0.2)
BASOPHILS NFR BLD: 0.3 % (ref 0–1.9)
BLD GP AB SCN CELLS X3 SERPL QL: NORMAL
DIFFERENTIAL METHOD: ABNORMAL
EOSINOPHIL # BLD AUTO: 0 K/UL (ref 0–0.5)
EOSINOPHIL NFR BLD: 0.2 % (ref 0–8)
ERYTHROCYTE [DISTWIDTH] IN BLOOD BY AUTOMATED COUNT: 13.5 % (ref 11.5–14.5)
GLUCOSE SERPL-MCNC: 117 MG/DL (ref 70–110)
GLUCOSE SERPL-MCNC: 146 MG/DL (ref 70–110)
HCO3 UR-SCNC: 24.2 MMOL/L (ref 24–28)
HCO3 UR-SCNC: 24.3 MMOL/L (ref 24–28)
HCT VFR BLD AUTO: 30 % (ref 37–48.5)
HCT VFR BLD CALC: 24 %PCV (ref 36–54)
HCT VFR BLD CALC: 28 %PCV (ref 36–54)
HGB BLD-MCNC: 10 G/DL
HGB BLD-MCNC: 8 G/DL
HGB BLD-MCNC: 9.7 G/DL (ref 12–16)
IMM GRANULOCYTES # BLD AUTO: 0.11 K/UL (ref 0–0.04)
IMM GRANULOCYTES NFR BLD AUTO: 0.9 % (ref 0–0.5)
LYMPHOCYTES # BLD AUTO: 0.7 K/UL (ref 1–4.8)
LYMPHOCYTES NFR BLD: 6 % (ref 18–48)
MCH RBC QN AUTO: 30.3 PG (ref 27–31)
MCHC RBC AUTO-ENTMCNC: 32.3 G/DL (ref 32–36)
MCV RBC AUTO: 94 FL (ref 82–98)
MONOCYTES # BLD AUTO: 0.4 K/UL (ref 0.3–1)
MONOCYTES NFR BLD: 3 % (ref 4–15)
NEUTROPHILS # BLD AUTO: 10.9 K/UL (ref 1.8–7.7)
NEUTROPHILS NFR BLD: 89.6 % (ref 38–73)
NRBC BLD-RTO: 0 /100 WBC
PCO2 BLDA: 38.1 MMHG (ref 35–45)
PCO2 BLDA: 38.5 MMHG (ref 35–45)
PH SMN: 7.41 [PH] (ref 7.35–7.45)
PH SMN: 7.41 [PH] (ref 7.35–7.45)
PLATELET # BLD AUTO: 245 K/UL (ref 150–450)
PMV BLD AUTO: 10.5 FL (ref 9.2–12.9)
PO2 BLDA: 155 MMHG (ref 80–100)
PO2 BLDA: 203 MMHG (ref 80–100)
POC BE: -1 MMOL/L
POC BE: 0 MMOL/L
POC SATURATED O2: 100 % (ref 95–100)
POC SATURATED O2: 99 % (ref 95–100)
POC TCO2: 25 MMOL/L (ref 23–27)
POC TCO2: 25 MMOL/L (ref 23–27)
POTASSIUM BLD-SCNC: 3.5 MMOL/L (ref 3.5–5.1)
POTASSIUM BLD-SCNC: 3.6 MMOL/L (ref 3.5–5.1)
RBC # BLD AUTO: 3.2 M/UL (ref 4–5.4)
SAMPLE: ABNORMAL
SAMPLE: ABNORMAL
SITE: ABNORMAL
SITE: ABNORMAL
SODIUM BLD-SCNC: 140 MMOL/L (ref 136–145)
SODIUM BLD-SCNC: 142 MMOL/L (ref 136–145)
SPECIMEN OUTDATE: NORMAL
WBC # BLD AUTO: 12.21 K/UL (ref 3.9–12.7)

## 2023-09-29 PROCEDURE — 37000008 HC ANESTHESIA 1ST 15 MINUTES: Performed by: NEUROLOGICAL SURGERY

## 2023-09-29 PROCEDURE — 20000000 HC ICU ROOM

## 2023-09-29 PROCEDURE — 63047 LAM FACETEC & FORAMOT LUMBAR: CPT | Mod: 51,ICN,, | Performed by: NEUROLOGICAL SURGERY

## 2023-09-29 PROCEDURE — 63600175 PHARM REV CODE 636 W HCPCS: Mod: JG

## 2023-09-29 PROCEDURE — 20930 PR ALLOGRAFT FOR SPINE SURGERY ONLY MORSELIZED: ICD-10-PCS | Mod: ICN,,, | Performed by: NEUROLOGICAL SURGERY

## 2023-09-29 PROCEDURE — 22614 ARTHRD PST TQ 1NTRSPC EA ADD: CPT | Mod: ICN,,, | Performed by: NEUROLOGICAL SURGERY

## 2023-09-29 PROCEDURE — 86920 COMPATIBILITY TEST SPIN: CPT | Performed by: NEUROLOGICAL SURGERY

## 2023-09-29 PROCEDURE — 86850 RBC ANTIBODY SCREEN: CPT | Performed by: NEUROLOGICAL SURGERY

## 2023-09-29 PROCEDURE — 22558 PR ARTHRODESIS ANT INTERBODY MIN DISCECTOMY,LUMBAR: ICD-10-PCS | Mod: ICN,,, | Performed by: NEUROLOGICAL SURGERY

## 2023-09-29 PROCEDURE — 63048 PR LAMINECT/FACETECT/FORAMINOT, EA ADDTL VERTEBRAL SEGM: ICD-10-PCS | Mod: ICN,,, | Performed by: NEUROLOGICAL SURGERY

## 2023-09-29 PROCEDURE — 63600175 PHARM REV CODE 636 W HCPCS: Performed by: NURSE ANESTHETIST, CERTIFIED REGISTERED

## 2023-09-29 PROCEDURE — 63048 LAM FACETEC &FORAMOT EA ADDL: CPT | Mod: ICN,,, | Performed by: NEUROLOGICAL SURGERY

## 2023-09-29 PROCEDURE — 22585 PR ARTHRODESIS ANT INTERBODY MIN DISCECTOMY,EA ADDL: ICD-10-PCS | Mod: ICN,,, | Performed by: NEUROLOGICAL SURGERY

## 2023-09-29 PROCEDURE — 63047 PR LAMINEC/FACETECT/FORAMIN,LUMBAR 1 SEG: ICD-10-PCS | Mod: 51,ICN,, | Performed by: NEUROLOGICAL SURGERY

## 2023-09-29 PROCEDURE — P9045 ALBUMIN (HUMAN), 5%, 250 ML: HCPCS | Mod: JZ,JG | Performed by: STUDENT IN AN ORGANIZED HEALTH CARE EDUCATION/TRAINING PROGRAM

## 2023-09-29 PROCEDURE — P9047 ALBUMIN (HUMAN), 25%, 50ML: HCPCS | Mod: JG

## 2023-09-29 PROCEDURE — P9021 RED BLOOD CELLS UNIT: HCPCS | Performed by: NEUROLOGICAL SURGERY

## 2023-09-29 PROCEDURE — 36000710: Performed by: NEUROLOGICAL SURGERY

## 2023-09-29 PROCEDURE — 85025 COMPLETE CBC W/AUTO DIFF WBC: CPT | Performed by: NEUROLOGICAL SURGERY

## 2023-09-29 PROCEDURE — 27800903 OPTIME MED/SURG SUP & DEVICES OTHER IMPLANTS: Performed by: NEUROLOGICAL SURGERY

## 2023-09-29 PROCEDURE — 63600175 PHARM REV CODE 636 W HCPCS: Mod: JZ,JG | Performed by: STUDENT IN AN ORGANIZED HEALTH CARE EDUCATION/TRAINING PROGRAM

## 2023-09-29 PROCEDURE — 27201423 OPTIME MED/SURG SUP & DEVICES STERILE SUPPLY: Performed by: NEUROLOGICAL SURGERY

## 2023-09-29 PROCEDURE — 22842 INSERT SPINE FIXATION DEVICE: CPT | Mod: ICN,,, | Performed by: NEUROLOGICAL SURGERY

## 2023-09-29 PROCEDURE — C1713 ANCHOR/SCREW BN/BN,TIS/BN: HCPCS | Performed by: NEUROLOGICAL SURGERY

## 2023-09-29 PROCEDURE — 22612 ARTHRD PST TQ 1NTRSPC LUMBAR: CPT | Mod: 51,ICN,, | Performed by: NEUROLOGICAL SURGERY

## 2023-09-29 PROCEDURE — 20936 PR AUTOGRAFT SPINE SURGERY LOCAL FROM SAME INCISION: ICD-10-PCS | Mod: ICN,,, | Performed by: NEUROLOGICAL SURGERY

## 2023-09-29 PROCEDURE — 20930 SP BONE ALGRFT MORSEL ADD-ON: CPT | Mod: ICN,,, | Performed by: NEUROLOGICAL SURGERY

## 2023-09-29 PROCEDURE — 61783 SCAN PROC SPINAL: CPT | Mod: 59,ICN,, | Performed by: NEUROLOGICAL SURGERY

## 2023-09-29 PROCEDURE — 20936 SP BONE AGRFT LOCAL ADD-ON: CPT | Mod: ICN,,, | Performed by: NEUROLOGICAL SURGERY

## 2023-09-29 PROCEDURE — 22558 ARTHRD ANT NTRBD MIN DSC LUM: CPT | Mod: ICN,,, | Performed by: NEUROLOGICAL SURGERY

## 2023-09-29 PROCEDURE — 22612 PR ARTHRODESIS, POST/POSTLAT, SNGL INTERSPACE, LUMBAR: ICD-10-PCS | Mod: 51,ICN,, | Performed by: NEUROLOGICAL SURGERY

## 2023-09-29 PROCEDURE — 22853 PR INSERT BIOMECH DEV W/INTERBODY ARTHRODESIS, EA CONTIGUOUS DEFECT: ICD-10-PCS | Mod: ICN,,, | Performed by: NEUROLOGICAL SURGERY

## 2023-09-29 PROCEDURE — D9220A PRA ANESTHESIA: Mod: ANES,,, | Performed by: STUDENT IN AN ORGANIZED HEALTH CARE EDUCATION/TRAINING PROGRAM

## 2023-09-29 PROCEDURE — 37000009 HC ANESTHESIA EA ADD 15 MINS: Performed by: NEUROLOGICAL SURGERY

## 2023-09-29 PROCEDURE — D9220A PRA ANESTHESIA: ICD-10-PCS | Mod: CRNA,,, | Performed by: NURSE ANESTHETIST, CERTIFIED REGISTERED

## 2023-09-29 PROCEDURE — 36415 COLL VENOUS BLD VENIPUNCTURE: CPT | Performed by: NEUROLOGICAL SURGERY

## 2023-09-29 PROCEDURE — 86920 COMPATIBILITY TEST SPIN: CPT | Performed by: NURSE PRACTITIONER

## 2023-09-29 PROCEDURE — 22842 PR POSTERIOR SEGMENTAL INSTRUMENTATION 3-6 VRT SEG: ICD-10-PCS | Mod: ICN,,, | Performed by: NEUROLOGICAL SURGERY

## 2023-09-29 PROCEDURE — 22853 INSJ BIOMECHANICAL DEVICE: CPT | Mod: ICN,,, | Performed by: NEUROLOGICAL SURGERY

## 2023-09-29 PROCEDURE — 22614 PR ARTHRODESIS, POST/POSTLAT, SNGL INTERSPACE, EA ADDTL: ICD-10-PCS | Mod: ICN,,, | Performed by: NEUROLOGICAL SURGERY

## 2023-09-29 PROCEDURE — 25000003 PHARM REV CODE 250: Performed by: NURSE ANESTHETIST, CERTIFIED REGISTERED

## 2023-09-29 PROCEDURE — 99900035 HC TECH TIME PER 15 MIN (STAT)

## 2023-09-29 PROCEDURE — 25000003 PHARM REV CODE 250: Performed by: NEUROLOGICAL SURGERY

## 2023-09-29 PROCEDURE — D9220A PRA ANESTHESIA: Mod: CRNA,,, | Performed by: NURSE ANESTHETIST, CERTIFIED REGISTERED

## 2023-09-29 PROCEDURE — 22585 ARTHRD ANT NTRBD MIN DSC EA: CPT | Mod: ICN,,, | Performed by: NEUROLOGICAL SURGERY

## 2023-09-29 PROCEDURE — D9220A PRA ANESTHESIA: ICD-10-PCS | Mod: ANES,,, | Performed by: STUDENT IN AN ORGANIZED HEALTH CARE EDUCATION/TRAINING PROGRAM

## 2023-09-29 PROCEDURE — 61783 PR STEREOTACTIC COMP ASSIST PROC,SPINAL: ICD-10-PCS | Mod: 59,ICN,, | Performed by: NEUROLOGICAL SURGERY

## 2023-09-29 PROCEDURE — 94760 N-INVAS EAR/PLS OXIMETRY 1: CPT

## 2023-09-29 PROCEDURE — 63600175 PHARM REV CODE 636 W HCPCS: Performed by: NEUROLOGICAL SURGERY

## 2023-09-29 PROCEDURE — 36000711: Performed by: NEUROLOGICAL SURGERY

## 2023-09-29 PROCEDURE — 25000003 PHARM REV CODE 250: Performed by: STUDENT IN AN ORGANIZED HEALTH CARE EDUCATION/TRAINING PROGRAM

## 2023-09-29 PROCEDURE — 94002 VENT MGMT INPAT INIT DAY: CPT

## 2023-09-29 DEVICE — CAP SPINAL LOCK THRD CREO 5.5: Type: IMPLANTABLE DEVICE | Site: SPINE LUMBAR | Status: FUNCTIONAL

## 2023-09-29 DEVICE — IMPLANTABLE DEVICE: Type: IMPLANTABLE DEVICE | Site: SPINE LUMBAR | Status: FUNCTIONAL

## 2023-09-29 DEVICE — SCREW CREO CANN MOD 6.5X45MM: Type: IMPLANTABLE DEVICE | Site: SPINE LUMBAR | Status: FUNCTIONAL

## 2023-09-29 DEVICE — SCREW BONE SPINAL CREO 6.5X45: Type: IMPLANTABLE DEVICE | Site: BACK | Status: FUNCTIONAL

## 2023-09-29 DEVICE — BONE GRAFT KIT 7510200 INFUSE SMALL
Type: IMPLANTABLE DEVICE | Site: BACK | Status: FUNCTIONAL
Brand: INFUSE® BONE GRAFT

## 2023-09-29 DEVICE — GRAFT PRIME DBM HD BONE 2.5CC: Type: IMPLANTABLE DEVICE | Site: BACK | Status: FUNCTIONAL

## 2023-09-29 DEVICE — TRANSCONTINENTAL LLIF TPS SPACER, SMALL, 6°, 11MM
Type: IMPLANTABLE DEVICE | Site: BACK | Status: FUNCTIONAL
Brand: TRANSCONTINENTAL

## 2023-09-29 RX ORDER — OXYCODONE HYDROCHLORIDE 5 MG/1
10 TABLET ORAL EVERY 6 HOURS PRN
Status: DISCONTINUED | OUTPATIENT
Start: 2023-09-29 | End: 2023-10-10 | Stop reason: HOSPADM

## 2023-09-29 RX ORDER — METOPROLOL TARTRATE 50 MG/1
50 TABLET ORAL 2 TIMES DAILY
Status: DISCONTINUED | OUTPATIENT
Start: 2023-09-29 | End: 2023-10-01

## 2023-09-29 RX ORDER — PROPOFOL 10 MG/ML
VIAL (ML) INTRAVENOUS CONTINUOUS PRN
Status: DISCONTINUED | OUTPATIENT
Start: 2023-09-29 | End: 2023-09-29

## 2023-09-29 RX ORDER — HYDROMORPHONE HYDROCHLORIDE 2 MG/ML
0.2 INJECTION, SOLUTION INTRAMUSCULAR; INTRAVENOUS; SUBCUTANEOUS EVERY 5 MIN PRN
Status: DISCONTINUED | OUTPATIENT
Start: 2023-09-29 | End: 2023-09-29 | Stop reason: HOSPADM

## 2023-09-29 RX ORDER — CEFAZOLIN SODIUM 1 G/3ML
INJECTION, POWDER, FOR SOLUTION INTRAMUSCULAR; INTRAVENOUS
Status: DISCONTINUED | OUTPATIENT
Start: 2023-09-29 | End: 2023-09-29

## 2023-09-29 RX ORDER — HYDROCODONE BITARTRATE AND ACETAMINOPHEN 500; 5 MG/1; MG/1
TABLET ORAL
Status: DISCONTINUED | OUTPATIENT
Start: 2023-09-29 | End: 2023-10-02

## 2023-09-29 RX ORDER — MAG HYDROX/ALUMINUM HYD/SIMETH 200-200-20
30 SUSPENSION, ORAL (FINAL DOSE FORM) ORAL EVERY 4 HOURS PRN
Status: DISCONTINUED | OUTPATIENT
Start: 2023-09-29 | End: 2023-10-10 | Stop reason: HOSPADM

## 2023-09-29 RX ORDER — ROCURONIUM BROMIDE 10 MG/ML
INJECTION, SOLUTION INTRAVENOUS
Status: DISCONTINUED | OUTPATIENT
Start: 2023-09-29 | End: 2023-09-29

## 2023-09-29 RX ORDER — HYDROMORPHONE HYDROCHLORIDE 2 MG/ML
1 INJECTION, SOLUTION INTRAMUSCULAR; INTRAVENOUS; SUBCUTANEOUS
Status: DISCONTINUED | OUTPATIENT
Start: 2023-09-29 | End: 2023-10-05

## 2023-09-29 RX ORDER — LIDOCAINE HYDROCHLORIDE AND EPINEPHRINE 10; 10 MG/ML; UG/ML
INJECTION, SOLUTION INFILTRATION; PERINEURAL
Status: DISCONTINUED | OUTPATIENT
Start: 2023-09-29 | End: 2023-09-29 | Stop reason: HOSPADM

## 2023-09-29 RX ORDER — PROPOFOL 10 MG/ML
VIAL (ML) INTRAVENOUS
Status: DISCONTINUED | OUTPATIENT
Start: 2023-09-29 | End: 2023-09-29

## 2023-09-29 RX ORDER — ACETAMINOPHEN 10 MG/ML
INJECTION, SOLUTION INTRAVENOUS
Status: DISCONTINUED | OUTPATIENT
Start: 2023-09-29 | End: 2023-09-29

## 2023-09-29 RX ORDER — BISACODYL 10 MG
10 SUPPOSITORY, RECTAL RECTAL DAILY
Status: DISCONTINUED | OUTPATIENT
Start: 2023-09-30 | End: 2023-10-10 | Stop reason: HOSPADM

## 2023-09-29 RX ORDER — PROCHLORPERAZINE EDISYLATE 5 MG/ML
5 INJECTION INTRAMUSCULAR; INTRAVENOUS EVERY 6 HOURS PRN
Status: DISCONTINUED | OUTPATIENT
Start: 2023-09-29 | End: 2023-10-10 | Stop reason: HOSPADM

## 2023-09-29 RX ORDER — ONDANSETRON 2 MG/ML
INJECTION INTRAMUSCULAR; INTRAVENOUS
Status: DISCONTINUED | OUTPATIENT
Start: 2023-09-29 | End: 2023-09-29

## 2023-09-29 RX ORDER — CHLORHEXIDINE GLUCONATE ORAL RINSE 1.2 MG/ML
15 SOLUTION DENTAL 2 TIMES DAILY
Status: DISCONTINUED | OUTPATIENT
Start: 2023-09-30 | End: 2023-09-30

## 2023-09-29 RX ORDER — HEPARIN SODIUM 5000 [USP'U]/ML
5000 INJECTION, SOLUTION INTRAVENOUS; SUBCUTANEOUS EVERY 12 HOURS
Status: DISCONTINUED | OUTPATIENT
Start: 2023-09-29 | End: 2023-10-03

## 2023-09-29 RX ORDER — SODIUM CHLORIDE 0.9 % (FLUSH) 0.9 %
10 SYRINGE (ML) INJECTION DAILY PRN
Status: DISCONTINUED | OUTPATIENT
Start: 2023-09-29 | End: 2023-09-29 | Stop reason: HOSPADM

## 2023-09-29 RX ORDER — TRANEXAMIC ACID 100 MG/ML
INJECTION, SOLUTION INTRAVENOUS
Status: DISCONTINUED | OUTPATIENT
Start: 2023-09-29 | End: 2023-09-29

## 2023-09-29 RX ORDER — MIDAZOLAM HYDROCHLORIDE 1 MG/ML
INJECTION INTRAMUSCULAR; INTRAVENOUS
Status: DISCONTINUED | OUTPATIENT
Start: 2023-09-29 | End: 2023-09-29

## 2023-09-29 RX ORDER — PROCHLORPERAZINE EDISYLATE 5 MG/ML
5 INJECTION INTRAMUSCULAR; INTRAVENOUS EVERY 30 MIN PRN
Status: DISCONTINUED | OUTPATIENT
Start: 2023-09-29 | End: 2023-09-29 | Stop reason: HOSPADM

## 2023-09-29 RX ORDER — KETAMINE HCL IN 0.9 % NACL 50 MG/5 ML
SYRINGE (ML) INTRAVENOUS
Status: DISCONTINUED | OUTPATIENT
Start: 2023-09-29 | End: 2023-09-29

## 2023-09-29 RX ORDER — ALBUMIN HUMAN 50 G/1000ML
SOLUTION INTRAVENOUS CONTINUOUS PRN
Status: DISCONTINUED | OUTPATIENT
Start: 2023-09-29 | End: 2023-09-29

## 2023-09-29 RX ORDER — ONDANSETRON 8 MG/1
8 TABLET, ORALLY DISINTEGRATING ORAL EVERY 6 HOURS PRN
Status: DISCONTINUED | OUTPATIENT
Start: 2023-09-29 | End: 2023-10-10 | Stop reason: HOSPADM

## 2023-09-29 RX ORDER — SODIUM CHLORIDE, SODIUM LACTATE, POTASSIUM CHLORIDE, CALCIUM CHLORIDE 600; 310; 30; 20 MG/100ML; MG/100ML; MG/100ML; MG/100ML
INJECTION, SOLUTION INTRAVENOUS CONTINUOUS
Status: DISCONTINUED | OUTPATIENT
Start: 2023-09-29 | End: 2023-10-01

## 2023-09-29 RX ORDER — ALBUMIN HUMAN 250 G/1000ML
SOLUTION INTRAVENOUS CONTINUOUS PRN
Status: DISCONTINUED | OUTPATIENT
Start: 2023-09-29 | End: 2023-09-29

## 2023-09-29 RX ORDER — FENTANYL CITRATE 50 UG/ML
INJECTION, SOLUTION INTRAMUSCULAR; INTRAVENOUS
Status: DISCONTINUED | OUTPATIENT
Start: 2023-09-29 | End: 2023-09-29

## 2023-09-29 RX ORDER — TRAMADOL HYDROCHLORIDE 50 MG/1
100 TABLET ORAL EVERY 6 HOURS PRN
Status: DISCONTINUED | OUTPATIENT
Start: 2023-09-29 | End: 2023-10-10 | Stop reason: HOSPADM

## 2023-09-29 RX ORDER — LIDOCAINE HYDROCHLORIDE 20 MG/ML
INJECTION INTRAVENOUS
Status: DISCONTINUED | OUTPATIENT
Start: 2023-09-29 | End: 2023-09-29

## 2023-09-29 RX ORDER — AMOXICILLIN 250 MG
2 CAPSULE ORAL NIGHTLY PRN
Status: DISCONTINUED | OUTPATIENT
Start: 2023-09-29 | End: 2023-10-02

## 2023-09-29 RX ORDER — METHOCARBAMOL 750 MG/1
750 TABLET, FILM COATED ORAL 3 TIMES DAILY
Status: DISCONTINUED | OUTPATIENT
Start: 2023-09-30 | End: 2023-10-07

## 2023-09-29 RX ORDER — DEXAMETHASONE SODIUM PHOSPHATE 4 MG/ML
INJECTION, SOLUTION INTRA-ARTICULAR; INTRALESIONAL; INTRAMUSCULAR; INTRAVENOUS; SOFT TISSUE
Status: DISCONTINUED | OUTPATIENT
Start: 2023-09-29 | End: 2023-09-29

## 2023-09-29 RX ORDER — PROPAFENONE HYDROCHLORIDE 150 MG/1
150 TABLET, COATED ORAL EVERY 8 HOURS
Status: DISCONTINUED | OUTPATIENT
Start: 2023-09-30 | End: 2023-10-01

## 2023-09-29 RX ORDER — MUPIROCIN 20 MG/G
OINTMENT TOPICAL 2 TIMES DAILY
Status: DISPENSED | OUTPATIENT
Start: 2023-09-29 | End: 2023-10-04

## 2023-09-29 RX ORDER — ACETAMINOPHEN 325 MG/1
650 TABLET ORAL EVERY 6 HOURS
Status: DISCONTINUED | OUTPATIENT
Start: 2023-09-30 | End: 2023-09-30

## 2023-09-29 RX ADMIN — ROCURONIUM BROMIDE 30 MG: 10 INJECTION, SOLUTION INTRAVENOUS at 02:09

## 2023-09-29 RX ADMIN — PHENYLEPHRINE HYDROCHLORIDE 100 MCG: 10 INJECTION INTRAVENOUS at 08:09

## 2023-09-29 RX ADMIN — SODIUM CHLORIDE, SODIUM LACTATE, POTASSIUM CHLORIDE, AND CALCIUM CHLORIDE: .6; .31; .03; .02 INJECTION, SOLUTION INTRAVENOUS at 08:09

## 2023-09-29 RX ADMIN — ONDANSETRON 4 MG: 2 INJECTION, SOLUTION INTRAMUSCULAR; INTRAVENOUS at 02:09

## 2023-09-29 RX ADMIN — MIDAZOLAM HYDROCHLORIDE 1 MG: 1 INJECTION INTRAMUSCULAR; INTRAVENOUS at 01:09

## 2023-09-29 RX ADMIN — LIDOCAINE HYDROCHLORIDE 100 MG: 20 INJECTION, SOLUTION INTRAVENOUS at 02:09

## 2023-09-29 RX ADMIN — ALBUMIN (HUMAN): 12.5 SOLUTION INTRAVENOUS at 03:09

## 2023-09-29 RX ADMIN — PROPOFOL 150 MG: 10 INJECTION, EMULSION INTRAVENOUS at 02:09

## 2023-09-29 RX ADMIN — PHENYLEPHRINE HYDROCHLORIDE 0.2 MCG/KG/MIN: 10 INJECTION INTRAVENOUS at 05:09

## 2023-09-29 RX ADMIN — PROPOFOL 125 MCG/KG/MIN: 10 INJECTION, EMULSION INTRAVENOUS at 02:09

## 2023-09-29 RX ADMIN — SODIUM CHLORIDE, SODIUM LACTATE, POTASSIUM CHLORIDE, AND CALCIUM CHLORIDE: .6; .31; .03; .02 INJECTION, SOLUTION INTRAVENOUS at 01:09

## 2023-09-29 RX ADMIN — TRANEXAMIC ACID 1000 MG: 100 INJECTION, SOLUTION INTRAVENOUS at 05:09

## 2023-09-29 RX ADMIN — Medication 30 MG: at 02:09

## 2023-09-29 RX ADMIN — ALBUMIN (HUMAN): 12.5 SOLUTION INTRAVENOUS at 08:09

## 2023-09-29 RX ADMIN — ALBUMIN HUMAN: 250 SOLUTION INTRAVENOUS at 06:09

## 2023-09-29 RX ADMIN — FENTANYL CITRATE 100 MCG: 50 INJECTION, SOLUTION INTRAMUSCULAR; INTRAVENOUS at 02:09

## 2023-09-29 RX ADMIN — CEFAZOLIN 2 G: 330 INJECTION, POWDER, FOR SOLUTION INTRAMUSCULAR; INTRAVENOUS at 06:09

## 2023-09-29 RX ADMIN — PHENYLEPHRINE HYDROCHLORIDE 100 MCG: 10 INJECTION INTRAVENOUS at 09:09

## 2023-09-29 RX ADMIN — Medication 20 MG: at 05:09

## 2023-09-29 RX ADMIN — DEXAMETHASONE SODIUM PHOSPHATE 8 MG: 4 INJECTION, SOLUTION INTRA-ARTICULAR; INTRALESIONAL; INTRAMUSCULAR; INTRAVENOUS; SOFT TISSUE at 02:09

## 2023-09-29 RX ADMIN — CEFAZOLIN 2 G: 330 INJECTION, POWDER, FOR SOLUTION INTRAMUSCULAR; INTRAVENOUS at 02:09

## 2023-09-29 RX ADMIN — ROCURONIUM BROMIDE 10 MG: 10 INJECTION, SOLUTION INTRAVENOUS at 03:09

## 2023-09-29 RX ADMIN — SODIUM CHLORIDE 0.05 MCG/KG/MIN: 9 INJECTION, SOLUTION INTRAVENOUS at 02:09

## 2023-09-29 RX ADMIN — SODIUM CHLORIDE, SODIUM LACTATE, POTASSIUM CHLORIDE, AND CALCIUM CHLORIDE: .6; .31; .03; .02 INJECTION, SOLUTION INTRAVENOUS at 05:09

## 2023-09-29 RX ADMIN — ACETAMINOPHEN 1000 MG: 10 INJECTION, SOLUTION INTRAVENOUS at 06:09

## 2023-09-29 RX ADMIN — PHENYLEPHRINE HYDROCHLORIDE 200 MCG: 10 INJECTION INTRAVENOUS at 09:09

## 2023-09-29 NOTE — H&P
NEUROSURGICAL PROGRESS NOTE     DATE OF SERVICE:  09/29/23     ATTENDING PHYSICIAN:  Scooter Salter MD     SUBJECTIVE:  03/10/23  This is a very pleasant 72 y.o. female, who was active at the beginning of 2022, mowing her lawn then suddenly started to have difficulty walking, severe back pain and right leg pain.  She has developed a paralysis involving the right leg.  Her right leg appears spastic.  She is mainly complaining of right knee pain. She is being mobilized in a wheelchair.  She admits still being able to drive her car.  In June 2022 she had a thoracic and lumbar spine MRI.  She is being investigated for possible multiple sclerosis.  She reports having no loss of sensation to pain or temperature.  She has no voiding difficulty or incontinence.  She reports right more than left hand numbness and dropping things frequently.     INTERIM HISTORY:     This is a very pleasant 73 y.o. female, she is T6-7 T7-8 3 months status post anterior diskectomy for thoracic myelopathy.  Following that surgery she reports mild improvement in her balance.  She is still walking with a walker.  Her mobilization is still limited due to severe neurogenic claudication.  She has severe leg pain leg numbness when she stands.  She is unable to walk for long period of time.  She has persistent right knee pain and right quadriceps weakness.  She has persistent bilateral proximal leg weakness.  She is dropping things.  She has fallen few times.           PAST MEDICAL HISTORY:       Active Ambulatory Problems     Diagnosis Date Noted    Postoperative hypothyroidism 02/19/2018    History of thyroid cancer 02/19/2018    Essential hypertension, benign 02/19/2018    Mixed hyperlipidemia 02/19/2018    Benign paroxysmal positional vertigo 08/01/2018    History of colon polyps 10/18/2018    Bilateral post-traumatic osteoarthritis of knee 06/21/2022    Paroxysmal tachycardia 06/22/2022    DDD (degenerative disc disease), lumbar 08/09/2022     Lumbar radiculopathy 08/09/2022    PAF (paroxysmal atrial fibrillation) 08/10/2022    Multiple sclerosis 12/01/2022    Anticoagulant long-term use 02/23/2023    Thoracic disc disease with myelopathy 03/14/2023    Spinal cord compression 03/14/2023    Aortic atherosclerosis 03/14/2023    Preprocedural cardiovascular examination 03/15/2023    Thoracic myelopathy 05/12/2023    Atelectasis 05/12/2023           Resolved Ambulatory Problems     Diagnosis Date Noted    Weakness 06/20/2022    New onset atrial fibrillation 08/09/2022           Past Medical History:   Diagnosis Date    Arthritis      Cancer      Edema      Hyperlipidemia      Hypertension      Thyroid disease           PAST SURGICAL HISTORY:        Past Surgical History:   Procedure Laterality Date    ADENOIDECTOMY        COLONOSCOPY   10/18/2018    COLONOSCOPY N/A 10/18/2018     Procedure: COLONOSCOPY;  Surgeon: Yosvany Alejandra MD;  Location: [facility] ENDO;  Service: General;  Laterality: N/A;    EPIDURAL STEROID INJECTION N/A 9/30/2022     Procedure: LUMBAR L3/4 MALKA CONTRAST DIRRECT REFERRAL MONICA CARRERA IN CHART;  Surgeon: Nicholas Kumar MD;  Location: [facility] PAIN MGT;  Service: Pain Management;  Laterality: N/A;    HYSTERECTOMY   1992     total hyst     KNEE SURGERY         16 pins and two plates implanted    THORACIC DISCECTOMY Right 5/12/2023     Procedure: DISCECTOMY, SPINE, THORACIC Right T6-7, T7-8 interior discectomy retropleural;  Surgeon: Scooter Salter MD;  Location: [facility] OR;  Service: Neurosurgery;  Laterality: Right;  general  eliquis   type and screen  C-arm  Metrx  EMG bairon notified cc  SEP  MEP  Regular bed   lateral left down   globus (Jose Enrique) notified cc    THYROIDECTOMY        TONSILLECTOMY             SOCIAL HISTORY:   Social History               Socioeconomic History    Marital status:    Tobacco Use    Smoking status: Never    Smokeless tobacco: Never   Substance and Sexual Activity    Alcohol use: No    Drug use: No     Sexual activity: Not Currently       Partners: Male            FAMILY HISTORY:        Family History   Problem Relation Age of Onset    Alcohol abuse Mother      Heart disease Mother      Alcohol abuse Father      Heart disease Father      Heart disease Maternal Grandmother      Heart disease Maternal Grandfather           CURRENTS MEDICATIONS:         Current Outpatient Medications on File Prior to Visit   Medication Sig Dispense Refill    acetaminophen (TYLENOL) 325 MG tablet Take 650 mg by mouth every 6 (six) hours as needed.        aluminum-magnesium hydroxide-simethicone (MAALOX) 200-200-20 mg/5 mL Susp Take 30 mLs by mouth every 4 (four) hours as needed (indigestion). 1 mL 0    cephALEXin (KEFLEX) 500 MG capsule Take 1 capsule (500 mg total) by mouth 4 (four) times daily. for 7 days 28 capsule 0    ELIQUIS 5 mg Tab Take 1 tablet (5 mg total) by mouth 2 (two) times daily. 60 tablet 11    hydroCHLOROthiazide (HYDRODIURIL) 25 MG tablet Take 1 tablet (25 mg total) by mouth once daily. 30 tablet 11    levothyroxine (SYNTHROID) 125 MCG tablet TAKE 1 TABLET BY MOUTH BEFORE BREAKFAST 90 tablet 1    losartan (COZAAR) 25 MG tablet Take 1 tablet (25 mg total) by mouth once daily. 90 tablet 1    meclizine (ANTIVERT) 25 mg tablet Take 1 tablet (25 mg total) by mouth 3 (three) times daily as needed for Dizziness. 30 tablet 3    methocarbamoL (ROBAXIN) 750 MG Tab Take 1 tablet (750 mg total) by mouth 3 (three) times daily as needed (muscle spasms). 1 tablet 0    metoprolol succinate (TOPROL-XL) 25 MG 24 hr tablet Take 1 tablet (25 mg total) by mouth once daily. 30 tablet 2    multivitamin (THERAGRAN) per tablet Take 1 tablet by mouth once daily.        ondansetron (ZOFRAN-ODT) 8 MG TbDL Take 1 tablet (8 mg total) by mouth every 6 (six) hours as needed (nausea/vomiting). 1 tablet 0    oxyCODONE (ROXICODONE) 10 mg Tab immediate release tablet Take 1 tablet (10 mg total) by mouth every 6 (six) hours as needed for Pain. 1 tablet  0    propafenone (RHTHYMOL) 150 MG Tab Take 1 tablet (150 mg total) by mouth every 8 (eight) hours. 90 tablet 11    rosuvastatin (CRESTOR) 10 MG tablet Take 1 tablet (10 mg total) by mouth once daily. 90 tablet 0    senna-docusate 8.6-50 mg (PERICOLACE) 8.6-50 mg per tablet Take 2 tablets by mouth nightly as needed for Constipation. 1 tablet 0    [DISCONTINUED] hydroCHLOROthiazide (HYDRODIURIL) 12.5 MG Tab Take 1 tablet (12.5 mg total) by mouth once daily. 30 tablet 11    [DISCONTINUED] atorvastatin (LIPITOR) 40 MG tablet Take 1 tablet (40 mg total) by mouth once daily. 90 tablet 3      No current facility-administered medications on file prior to visit.         ALLERGIES:       Review of patient's allergies indicates:   Allergen Reactions    Vancomycin Tinitus    Vancomycin analogues Tinitus         REVIEW OF SYSTEMS:  Review of Systems   Constitutional:  Negative for diaphoresis, fever and weight loss.   Respiratory:  Negative for shortness of breath.    Cardiovascular:  Positive for leg swelling. Negative for chest pain.   Gastrointestinal:  Negative for blood in stool.   Genitourinary:  Negative for hematuria.   Endo/Heme/Allergies:  Does not bruise/bleed easily.   All other systems reviewed and are negative.           OBJECTIVE:     PHYSICAL EXAMINATION:   There were no vitals filed for this visit.     Physical Exam:  Vitals reviewed.     Constitutional: She appears well-developed and well-nourished.      Eyes: Pupils are equal, round, and reactive to light. Conjunctivae and EOM are normal.      Cardiovascular: Normal distal pulses and no edema.      Abdominal: Soft.      Skin: Skin displays no rash on trunk and no rash on extremities. Skin displays no lesions on trunk and no lesions on extremities.      Psych/Behavior: She is alert. She is oriented to person, place, and time. She has a normal mood and affect.      Musculoskeletal:        Neck: Range of motion is limited.      Neurological:        DTRs: Tricep  reflexes are 2+ on the right side and 2+ on the left side. Bicep reflexes are 2+ on the right side and 2+ on the left side. Brachioradialis reflexes are 2+ on the right side and 2+ on the left side. Patellar reflexes are 0 on the right side and 0 on the left side. Achilles reflexes are 0 on the right side and 0 on the left side.         Back Exam      Muscle Strength   Right Quadriceps:  2/5   Left Quadriceps:  5/5   Right Hamstrings:  5/5   Left Hamstrings:  5/5                        Neurologic Exam      Mental Status   Oriented to person, place, and time.   Speech: speech is normal   Level of consciousness: alert     Cranial Nerves   Cranial nerves II through XII intact.      CN III, IV, VI   Pupils are equal, round, and reactive to light.  Extraocular motions are normal.      Motor Exam   Muscle bulk: normal  Overall muscle tone: increased     Strength   Right deltoid: 5/5  Left deltoid: 5/5  Right biceps: 5/5  Left biceps: 5/5  Right triceps: 5/5  Left triceps: 5/5  Right wrist flexion: 5/5  Left wrist flexion: 5/5  Right wrist extension: 5/5  Left wrist extension: 5/5  Right interossei: 5/5  Left interossei: 5/5  Right iliopsoas: 4/5  Left iliopsoas: 4/5  Right quadriceps: 2/5  Left quadriceps: 5/5  Right hamstrin/5  Left hamstrin/5  Right anterior tibial: 5/5  Left anterior tibial: 5/5  Right posterior tibial: 5/5  Left posterior tibial: 5/5  Right peroneal: 5/5  Left peroneal: 5/5  Right gastroc: 5/5  Left gastroc: 5/5     Sensory Exam   Light touch normal.   Pinprick normal.      Gait, Coordination, and Reflexes      Gait  Gait: spastic     Reflexes   Right brachioradialis: 2+  Left brachioradialis: 2+  Right biceps: 2+  Left biceps: 2+  Right triceps: 2+  Left triceps: 2+  Right patellar: 0  Left patellar: 0  Right achilles: 0  Left achilles: 0  Right plantar: normal  Left plantar: normal  Right Betancourt: absent  Left Betancourt: absent  Right ankle clonus: absent  Left ankle clonus: absent            DIAGNOSTIC DATA:  I personally interpreted the following imaging:   Cervical spine MRI February 2023 shows moderate-to-severe spinal stenosis at C5-6 with cord compression and balance intramedullary signal change, C6-7 moderate spent 12 canal stenosis and severe bilateral foraminal stenosis  Lumbar spine MRI March 2023 shows severe spinal stenosis with near occlusion of the spinal canal at L3-4 and L4-5, severe bilateral L5-S1 foraminal stenosis severe bilateral foraminal stenosis at L3-4 and L4-5, L4-5 degenerative spondylolisthesis, grade 1  Lumbar AP lateral flexion-extension x-ray shows mild lumbar degenerative scoliosis, grade 1 L4-5 spondylolisthesis     ASSESMENT:  This is a 73 y.o. female with      Problem List Items Addressed This Visit                  Neuro     Lumbar radiculopathy      Other Visit Diagnoses         S/P fusion of thoracic spine    -  Primary     Relevant Orders     X-Ray Thoracic Spine AP Lateral     Ambulatory referral/consult to Home Health     Cervical spondylosis with myelopathy         Relevant Orders     Ambulatory referral/consult to Home Health     Lumbar stenosis with neurogenic claudication         Relevant Orders     Ambulatory referral/consult to Home Health     Foraminal stenosis of lumbar region                       PLAN:  I explained the natural history of the disease and all treatment options. I recommended a L3-4, L4-5 oblique interbody fusion and placement of interbody spacer using allograft BMP using Globus [employer] TPS cage, L5-S1 anterior interbody fusion using Globus ALIF [place] cage, L3 to pelvis posterior segmental instrumentation used Globus Creo screw system, L3-L5 laminectomy, medial facetectomy, foraminotomy, L3-S1 posterolateral fusion using autograft harvested from the same incision.      Patient has concomitant severe cervical spine stenosis and lumbar spine stenosis.  She has both myelopathy and lumbar stenosis with neurogenic claudication  and radiculopathy.  A lumbar fusion is indicated because the patient has severe spinal stenosis and severe foraminal stenosis.  The extensive decompression that is needed we will the stabilize her spine therefore spinal fusion is indicated in the same sitting.     We have discussed the risks of surgery including death, coma, bleeding, infection, failure of surgery, CSF leak, nerve root injury, dysphagia, vocal cord paralysis, bowel injury, spinal cord injury, ureter injury, weakness, paralysis, peripheral neuropathy, malplaced hardware, migration of hardware, non-union, need for reoperation. Patient understands the risks and would like to proceed with surgery.                Scooter Salter MD  Cell:[phone]

## 2023-09-29 NOTE — ANESTHESIA PROCEDURE NOTES
Intubation    Date/Time: 9/29/2023 2:09 PM    Performed by: Jennifer Jenkins CRNA  Authorized by: Vj Delgado MD    Intubation:     Induction:  Intravenous    Intubated:  Postinduction    Mask Ventilation:  Easy mask    Attempts:  1    Attempted By:  CRNA    Method of Intubation:  Video laryngoscopy    Blade:  Chawla 3    Laryngeal View Grade: Grade I - full view of cords      Difficult Airway Encountered?: No      Complications:  None    Airway Device:  Oral endotracheal tube    Airway Device Size:  7.0    Style/Cuff Inflation:  Cuffed (inflated to minimal occlusive pressure)    Inflation Amount (mL):  6    Tube secured:  21    Secured at:  The lips    Placement Verified By:  Capnometry    Complicating Factors:  None    Findings Post-Intubation:  BS equal bilateral and atraumatic/condition of teeth unchanged

## 2023-09-29 NOTE — ANESTHESIA PROCEDURE NOTES
Arterial    Diagnosis: DDD    Patient location during procedure: done in OR  Procedure start time: 9/29/2023 2:45 PM  Timeout: 9/29/2023 2:45 PM  Procedure end time: 9/29/2023 2:45 PM    Staffing  Authorizing Provider: Vj Delgado MD  Performing Provider: Vj Delgado MD    Staffing  Performed by: Vj eDlgado MD  Authorized by: Vj Delgado MD    Anesthesiologist was present at the time of the procedure.    Preanesthetic Checklist  Completed: patient identified, IV checked, site marked, risks and benefits discussed, surgical consent, monitors and equipment checked, pre-op evaluation, timeout performed and anesthesia consent givenArterial  Skin Prep: chlorhexidine gluconate  Local Infiltration: none  Orientation: left  Location: radial    Catheter Size: 20 G  Catheter placement by Ultrasound guidance. Heme positive aspiration all ports.   Vessel Caliber: medium, patent, compressibility normal  Vascular Doppler:  not done  Needle advanced into vessel with real time Ultrasound guidance.Insertion Attempts: 1  Assessment  Dressing: secured with tape and tegaderm and secured with tape  Patient: Tolerated well

## 2023-09-30 LAB
ANION GAP SERPL CALC-SCNC: 9 MMOL/L (ref 8–16)
ASCENDING AORTA: 2.93 CM
AV INDEX (PROSTH): 0.51
AV MEAN GRADIENT: 4 MMHG
AV PEAK GRADIENT: 7 MMHG
AV VALVE AREA BY VELOCITY RATIO: 2.42 CM²
AV VALVE AREA: 1.6 CM²
AV VELOCITY RATIO: 0.77
BASOPHILS # BLD AUTO: 0.01 K/UL (ref 0–0.2)
BASOPHILS NFR BLD: 0.1 % (ref 0–1.9)
BSA FOR ECHO PROCEDURE: 1.9 M2
BUN SERPL-MCNC: 24 MG/DL (ref 8–23)
CALCIUM SERPL-MCNC: 8 MG/DL (ref 8.7–10.5)
CHLORIDE SERPL-SCNC: 109 MMOL/L (ref 95–110)
CO2 SERPL-SCNC: 23 MMOL/L (ref 23–29)
CREAT SERPL-MCNC: 0.8 MG/DL (ref 0.5–1.4)
CV ECHO LV RWT: 0.36 CM
DIFFERENTIAL METHOD: ABNORMAL
DOP CALC AO PEAK VEL: 1.31 M/S
DOP CALC AO VTI: 31.9 CM
DOP CALC LVOT AREA: 3.1 CM2
DOP CALC LVOT DIAMETER: 2 CM
DOP CALC LVOT PEAK VEL: 1.01 M/S
DOP CALC LVOT STROKE VOLUME: 51.18 CM3
DOP CALC MV VTI: 21.8 CM
DOP CALCLVOT PEAK VEL VTI: 16.3 CM
ECHO LV POSTERIOR WALL: 0.74 CM (ref 0.6–1.1)
EJECTION FRACTION: 55 %
EOSINOPHIL # BLD AUTO: 0 K/UL (ref 0–0.5)
EOSINOPHIL NFR BLD: 0 % (ref 0–8)
ERYTHROCYTE [DISTWIDTH] IN BLOOD BY AUTOMATED COUNT: 13.6 % (ref 11.5–14.5)
EST. GFR  (NO RACE VARIABLE): >60 ML/MIN/1.73 M^2
FRACTIONAL SHORTENING: 28 % (ref 28–44)
GLUCOSE SERPL-MCNC: 156 MG/DL (ref 70–110)
HCT VFR BLD AUTO: 25.9 % (ref 37–48.5)
HGB BLD-MCNC: 8.5 G/DL (ref 12–16)
IMM GRANULOCYTES # BLD AUTO: 0.08 K/UL (ref 0–0.04)
IMM GRANULOCYTES NFR BLD AUTO: 0.7 % (ref 0–0.5)
INTERVENTRICULAR SEPTUM: 0.87 CM (ref 0.6–1.1)
IVC DIAMETER: 2.15 CM
LA MAJOR: 5.13 CM
LA WIDTH: 4.3 CM
LACTATE SERPL-SCNC: 1 MMOL/L (ref 0.5–2.2)
LEFT ATRIUM SIZE: 3.83 CM
LEFT INTERNAL DIMENSION IN SYSTOLE: 2.95 CM (ref 2.1–4)
LEFT VENTRICLE DIASTOLIC VOLUME INDEX: 38.69 ML/M2
LEFT VENTRICLE DIASTOLIC VOLUME: 72.74 ML
LEFT VENTRICLE MASS INDEX: 52 G/M2
LEFT VENTRICLE SYSTOLIC VOLUME INDEX: 17.8 ML/M2
LEFT VENTRICLE SYSTOLIC VOLUME: 33.51 ML
LEFT VENTRICULAR INTERNAL DIMENSION IN DIASTOLE: 4.07 CM (ref 3.5–6)
LEFT VENTRICULAR MASS: 96.97 G
LVOT MG: 2.53 MMHG
LVOT MV: 0.76 CM/S
LYMPHOCYTES # BLD AUTO: 0.7 K/UL (ref 1–4.8)
LYMPHOCYTES NFR BLD: 5.3 % (ref 18–48)
MAGNESIUM SERPL-MCNC: 1.7 MG/DL (ref 1.6–2.6)
MCH RBC QN AUTO: 31.5 PG (ref 27–31)
MCHC RBC AUTO-ENTMCNC: 32.8 G/DL (ref 32–36)
MCV RBC AUTO: 96 FL (ref 82–98)
MONOCYTES # BLD AUTO: 1.1 K/UL (ref 0.3–1)
MONOCYTES NFR BLD: 8.5 % (ref 4–15)
MV MEAN GRADIENT: 2 MMHG
MV PEAK GRADIENT: 5 MMHG
MV VALVE AREA BY CONTINUITY EQUATION: 2.35 CM2
NEUTROPHILS # BLD AUTO: 10.5 K/UL (ref 1.8–7.7)
NEUTROPHILS NFR BLD: 85.4 % (ref 38–73)
NRBC BLD-RTO: 0 /100 WBC
OHS LV EJECTION FRACTION SIMPSONS BIPLANE MOD: 64 %
PHOSPHATE SERPL-MCNC: 4.3 MG/DL (ref 2.7–4.5)
PISA MRMAX VEL: 4.15 M/S
PISA TR MAX VEL: 2.71 M/S
PLATELET # BLD AUTO: 168 K/UL (ref 150–450)
PMV BLD AUTO: 10.7 FL (ref 9.2–12.9)
POCT GLUCOSE: 182 MG/DL (ref 70–110)
POTASSIUM SERPL-SCNC: 3.9 MMOL/L (ref 3.5–5.1)
PV PEAK GRADIENT: 4 MMHG
PV PEAK VELOCITY: 1 M/S
RA MAJOR: 5.45 CM
RA PRESSURE ESTIMATED: 15 MMHG
RA WIDTH: 4.05 CM
RBC # BLD AUTO: 2.7 M/UL (ref 4–5.4)
RV TB RVSP: 18 MMHG
RV TISSUE DOPPLER FREE WALL SYSTOLIC VELOCITY 1 (APICAL 4 CHAMBER VIEW): 9.74 CM/S
SINUS: 2.67 CM
SODIUM SERPL-SCNC: 141 MMOL/L (ref 136–145)
STJ: 2.62 CM
T4 FREE SERPL-MCNC: 0.97 NG/DL (ref 0.71–1.51)
TDI LATERAL: 0.26 M/S
TDI SEPTAL: 0.26 M/S
TDI: 0.26 M/S
TR MAX PG: 29 MMHG
TRICUSPID ANNULAR PLANE SYSTOLIC EXCURSION: 2.27 CM
TSH SERPL DL<=0.005 MIU/L-ACNC: 7.45 UIU/ML (ref 0.4–4)
TV REST PULMONARY ARTERY PRESSURE: 44 MMHG
WBC # BLD AUTO: 12.29 K/UL (ref 3.9–12.7)
Z-SCORE OF LEFT VENTRICULAR DIMENSION IN END DIASTOLE: -2.55
Z-SCORE OF LEFT VENTRICULAR DIMENSION IN END SYSTOLE: -0.74

## 2023-09-30 PROCEDURE — 27200966 HC CLOSED SUCTION SYSTEM

## 2023-09-30 PROCEDURE — 99900035 HC TECH TIME PER 15 MIN (STAT)

## 2023-09-30 PROCEDURE — 94799 UNLISTED PULMONARY SVC/PX: CPT

## 2023-09-30 PROCEDURE — 80048 BASIC METABOLIC PNL TOTAL CA: CPT | Performed by: NEUROLOGICAL SURGERY

## 2023-09-30 PROCEDURE — 94761 N-INVAS EAR/PLS OXIMETRY MLT: CPT

## 2023-09-30 PROCEDURE — 83605 ASSAY OF LACTIC ACID: CPT | Performed by: STUDENT IN AN ORGANIZED HEALTH CARE EDUCATION/TRAINING PROGRAM

## 2023-09-30 PROCEDURE — 25000003 PHARM REV CODE 250: Performed by: STUDENT IN AN ORGANIZED HEALTH CARE EDUCATION/TRAINING PROGRAM

## 2023-09-30 PROCEDURE — 83735 ASSAY OF MAGNESIUM: CPT | Performed by: NEUROLOGICAL SURGERY

## 2023-09-30 PROCEDURE — 63600175 PHARM REV CODE 636 W HCPCS: Performed by: STUDENT IN AN ORGANIZED HEALTH CARE EDUCATION/TRAINING PROGRAM

## 2023-09-30 PROCEDURE — 27100171 HC OXYGEN HIGH FLOW UP TO 24 HOURS

## 2023-09-30 PROCEDURE — 84443 ASSAY THYROID STIM HORMONE: CPT | Performed by: STUDENT IN AN ORGANIZED HEALTH CARE EDUCATION/TRAINING PROGRAM

## 2023-09-30 PROCEDURE — 94003 VENT MGMT INPAT SUBQ DAY: CPT

## 2023-09-30 PROCEDURE — 63600175 PHARM REV CODE 636 W HCPCS: Performed by: NEUROLOGICAL SURGERY

## 2023-09-30 PROCEDURE — 99900017 HC EXTUBATION W/PARAMETERS (STAT)

## 2023-09-30 PROCEDURE — 93005 ELECTROCARDIOGRAM TRACING: CPT

## 2023-09-30 PROCEDURE — 20000000 HC ICU ROOM

## 2023-09-30 PROCEDURE — 84100 ASSAY OF PHOSPHORUS: CPT | Performed by: NEUROLOGICAL SURGERY

## 2023-09-30 PROCEDURE — 85025 COMPLETE CBC W/AUTO DIFF WBC: CPT | Performed by: NEUROLOGICAL SURGERY

## 2023-09-30 PROCEDURE — 93010 EKG 12-LEAD: ICD-10-PCS | Mod: ,,, | Performed by: INTERNAL MEDICINE

## 2023-09-30 PROCEDURE — 25000003 PHARM REV CODE 250

## 2023-09-30 PROCEDURE — 93010 ELECTROCARDIOGRAM REPORT: CPT | Mod: ,,, | Performed by: INTERNAL MEDICINE

## 2023-09-30 PROCEDURE — 25000003 PHARM REV CODE 250: Performed by: NEUROLOGICAL SURGERY

## 2023-09-30 PROCEDURE — 84439 ASSAY OF FREE THYROXINE: CPT | Performed by: STUDENT IN AN ORGANIZED HEALTH CARE EDUCATION/TRAINING PROGRAM

## 2023-09-30 RX ORDER — METOPROLOL TARTRATE 1 MG/ML
5 INJECTION, SOLUTION INTRAVENOUS EVERY 5 MIN PRN
Status: DISCONTINUED | OUTPATIENT
Start: 2023-09-30 | End: 2023-10-07

## 2023-09-30 RX ORDER — ACETAMINOPHEN 650 MG/20.3ML
650 LIQUID ORAL EVERY 6 HOURS PRN
Status: DISCONTINUED | OUTPATIENT
Start: 2023-09-30 | End: 2023-10-10 | Stop reason: HOSPADM

## 2023-09-30 RX ORDER — PHENYLEPHRINE HYDROCHLORIDE 10 MG/ML
INJECTION INTRAVENOUS
Status: DISCONTINUED | OUTPATIENT
Start: 2023-09-29 | End: 2023-09-30

## 2023-09-30 RX ORDER — DEXMEDETOMIDINE HYDROCHLORIDE 4 UG/ML
0-1.4 INJECTION, SOLUTION INTRAVENOUS CONTINUOUS
Status: DISCONTINUED | OUTPATIENT
Start: 2023-09-30 | End: 2023-09-30

## 2023-09-30 RX ORDER — FENTANYL CITRATE 50 UG/ML
25 INJECTION, SOLUTION INTRAMUSCULAR; INTRAVENOUS
Status: COMPLETED | OUTPATIENT
Start: 2023-09-30 | End: 2023-09-30

## 2023-09-30 RX ORDER — CALCIUM GLUCONATE 20 MG/ML
1 INJECTION, SOLUTION INTRAVENOUS ONCE
Status: COMPLETED | OUTPATIENT
Start: 2023-09-30 | End: 2023-09-30

## 2023-09-30 RX ORDER — METOPROLOL TARTRATE 25 MG/1
25 TABLET, FILM COATED ORAL 2 TIMES DAILY
Status: DISCONTINUED | OUTPATIENT
Start: 2023-09-30 | End: 2023-09-30

## 2023-09-30 RX ADMIN — AMIODARONE HYDROCHLORIDE 1 MG/MIN: 1.8 INJECTION, SOLUTION INTRAVENOUS at 06:09

## 2023-09-30 RX ADMIN — HEPARIN SODIUM 5000 UNITS: 5000 INJECTION INTRAVENOUS; SUBCUTANEOUS at 03:09

## 2023-09-30 RX ADMIN — AMIODARONE HYDROCHLORIDE 1 MG/MIN: 1.8 INJECTION, SOLUTION INTRAVENOUS at 11:09

## 2023-09-30 RX ADMIN — DEXMEDETOMIDINE HYDROCHLORIDE 0.2 MCG/KG/HR: 4 INJECTION, SOLUTION INTRAVENOUS at 01:09

## 2023-09-30 RX ADMIN — METHOCARBAMOL TABLETS 750 MG: 750 TABLET, COATED ORAL at 09:09

## 2023-09-30 RX ADMIN — DOCUSATE SODIUM AND SENNOSIDES 2 TABLET: 8.6; 5 TABLET, FILM COATED ORAL at 08:09

## 2023-09-30 RX ADMIN — METOPROLOL TARTRATE 50 MG: 50 TABLET, FILM COATED ORAL at 08:09

## 2023-09-30 RX ADMIN — METOPROLOL TARTRATE 5 MG: 5 INJECTION, SOLUTION INTRAVENOUS at 09:09

## 2023-09-30 RX ADMIN — MUPIROCIN: 20 OINTMENT TOPICAL at 08:09

## 2023-09-30 RX ADMIN — OXYCODONE HYDROCHLORIDE 10 MG: 5 TABLET ORAL at 08:09

## 2023-09-30 RX ADMIN — PROPAFENONE HYDROCHLORIDE 150 MG: 150 TABLET, FILM COATED ORAL at 02:09

## 2023-09-30 RX ADMIN — HYDROMORPHONE HYDROCHLORIDE 1 MG: 2 INJECTION, SOLUTION INTRAMUSCULAR; INTRAVENOUS; SUBCUTANEOUS at 01:09

## 2023-09-30 RX ADMIN — CALCIUM GLUCONATE 1 G: 20 INJECTION, SOLUTION INTRAVENOUS at 07:09

## 2023-09-30 RX ADMIN — HYDROMORPHONE HYDROCHLORIDE 1 MG: 2 INJECTION, SOLUTION INTRAMUSCULAR; INTRAVENOUS; SUBCUTANEOUS at 12:09

## 2023-09-30 RX ADMIN — ONDANSETRON 8 MG: 8 TABLET, ORALLY DISINTEGRATING ORAL at 09:09

## 2023-09-30 RX ADMIN — METOPROLOL TARTRATE 50 MG: 50 TABLET, FILM COATED ORAL at 09:09

## 2023-09-30 RX ADMIN — SODIUM CHLORIDE, POTASSIUM CHLORIDE, SODIUM LACTATE AND CALCIUM CHLORIDE: 600; 310; 30; 20 INJECTION, SOLUTION INTRAVENOUS at 02:09

## 2023-09-30 RX ADMIN — AMIODARONE HYDROCHLORIDE 150 MG: 1.5 INJECTION, SOLUTION INTRAVENOUS at 05:09

## 2023-09-30 RX ADMIN — METHOCARBAMOL TABLETS 750 MG: 750 TABLET, COATED ORAL at 02:09

## 2023-09-30 RX ADMIN — HYDROMORPHONE HYDROCHLORIDE 1 MG: 2 INJECTION, SOLUTION INTRAMUSCULAR; INTRAVENOUS; SUBCUTANEOUS at 04:09

## 2023-09-30 RX ADMIN — SODIUM CHLORIDE, POTASSIUM CHLORIDE, SODIUM LACTATE AND CALCIUM CHLORIDE: 600; 310; 30; 20 INJECTION, SOLUTION INTRAVENOUS at 12:09

## 2023-09-30 RX ADMIN — SODIUM CHLORIDE, POTASSIUM CHLORIDE, SODIUM LACTATE AND CALCIUM CHLORIDE 500 ML: 600; 310; 30; 20 INJECTION, SOLUTION INTRAVENOUS at 02:09

## 2023-09-30 RX ADMIN — CHLORHEXIDINE GLUCONATE 0.12% ORAL RINSE 15 ML: 1.2 LIQUID ORAL at 08:09

## 2023-09-30 RX ADMIN — MUPIROCIN: 20 OINTMENT TOPICAL at 12:09

## 2023-09-30 RX ADMIN — FENTANYL CITRATE 25 MCG: 50 INJECTION INTRAMUSCULAR; INTRAVENOUS at 06:09

## 2023-09-30 RX ADMIN — BISACODYL 10 MG: 10 SUPPOSITORY RECTAL at 08:09

## 2023-09-30 RX ADMIN — FENTANYL CITRATE 25 MCG: 50 INJECTION INTRAMUSCULAR; INTRAVENOUS at 03:09

## 2023-09-30 RX ADMIN — PROPAFENONE HYDROCHLORIDE 150 MG: 150 TABLET, FILM COATED ORAL at 09:09

## 2023-09-30 RX ADMIN — OXYCODONE HYDROCHLORIDE 10 MG: 5 TABLET ORAL at 09:09

## 2023-09-30 RX ADMIN — HEPARIN SODIUM 5000 UNITS: 5000 INJECTION INTRAVENOUS; SUBCUTANEOUS at 05:09

## 2023-09-30 RX ADMIN — SODIUM CHLORIDE, POTASSIUM CHLORIDE, SODIUM LACTATE AND CALCIUM CHLORIDE 500 ML: 600; 310; 30; 20 INJECTION, SOLUTION INTRAVENOUS at 06:09

## 2023-09-30 NOTE — PT/OT/SLP PROGRESS
Occupational Therapy      Patient Name:  Graciela Barton   MRN:  [id number]    3:48 PM Patient not seen today secondary to Patient fatigue, Patient unwilling to participate (RN ok's bed level evaluation 2/2 pt has not yet had doppler for possible DVT but pt declined services at this time). Will follow-up as able.    9/30/2023

## 2023-09-30 NOTE — TRANSFER OF CARE
"Anesthesia Transfer of Care Note    Patient: Graciela Barton    Procedure(s) Performed: Procedure(s) (LRB):  FUSION, SPINE, LUMBAR, TLIF, MINIMALLY INVASIVE (N/A)  FUSION, SPINE, LUMBAR, PLIF (N/A)  LAMINECTOMY, SPINE (N/A)  FORAMINOTOMY, SPINE (N/A)  FACETECTOMY (N/A)    Patient location: ICU    Anesthesia Type: general    Transport from OR: Transported from OR intubated on 100% O2 by AMBU with adequate controlled ventilation. Upon arrival to PACU/ICU, patient attached to ventilator and auscultated to confirm bilateral breath sounds and adequate TV. Continuous ECG monitoring in transport. Continuous SpO2 monitoring in transport. Continuos invasive BP monitoring in transport    Post pain: adequate analgesia    Post assessment: no apparent anesthetic complications and tolerated procedure well    Level of consciousness: sedated    Nausea/Vomiting: no nausea/vomiting    Complications: none    Transfer of care protocol was followed      Last vitals:   Visit Vitals  /86 (BP Location: Right arm, Patient Position: Lying)   Pulse 109   Temp 36.8 °C (98.2 °F) (Skin)   Resp 18   Ht 5' 7" (1.702 m)   Wt 76.7 kg (169 lb)   SpO2 100%   Breastfeeding No   BMI 26.47 kg/m²     "

## 2023-09-30 NOTE — PROGRESS NOTES
[facility] - Intensive Care  Neurosurgery  Progress Note    Subjective:     Interval History: Patient just extubated.  Somewhat confused. In Afib.  Back pain but no leg pain.    History of Present Illness:   This is a very pleasant 72 y.o. female, who was active at the beginning of 2022, mowing her lawn then suddenly started to have difficulty walking, severe back pain and right leg pain.  She has developed a paralysis involving the right leg.  Her right leg appears spastic.  She is mainly complaining of right knee pain. She is being mobilized in a wheelchair.  She admits still being able to drive her car.  In June 2022 she had a thoracic and lumbar spine MRI.  She is being investigated for possible multiple sclerosis.  She reports having no loss of sensation to pain or temperature.  She has no voiding difficulty or incontinence.  She reports right more than left hand numbness and dropping things frequently.      Post-Op Info:  Procedure(s) (LRB):  FUSION, SPINE, LUMBAR, TLIF, MINIMALLY INVASIVE (N/A)  FUSION, SPINE, LUMBAR, PLIF (N/A)  LAMINECTOMY, SPINE (N/A)  FORAMINOTOMY, SPINE (N/A)  FACETECTOMY (N/A)   1 Day Post-Op      Medications:  Continuous Infusions:   lactated ringers 75 mL/hr at 09/30/23 1318     Scheduled Meds:   bisacodyL  10 mg Rectal Daily    heparin (porcine)  5,000 Units Subcutaneous Q12H    levothyroxine  137 mcg Oral Before breakfast    methocarbamoL  750 mg Oral TID    metoprolol tartrate  50 mg Oral BID    mupirocin   Nasal BID    propafenone  150 mg Oral Q8H     PRN Meds:0.9%  NaCl infusion (for blood administration), acetaminophen, aluminum-magnesium hydroxide-simethicone, HYDROmorphone, metoprolol, ondansetron, oxyCODONE, prochlorperazine, senna-docusate 8.6-50 mg, traMADoL     Review of Systems  Objective:     Weight: 76.7 kg (169 lb)  Body mass index is 26.47 kg/m².  Vital Signs (Most Recent):  Temp: 97.6 °F (36.4 °C) (09/30/23 1126)  Pulse: (!) 132 (09/30/23 1319)  Resp: 17 (09/30/23  "1319)  BP: (!) 98/57 (09/30/23 1300)  SpO2: 99 % (09/30/23 1319) Vital Signs (24h Range):  Temp:  [97.2 °F (36.2 °C)-98.4 °F (36.9 °C)] 97.6 °F (36.4 °C)  Pulse:  [] 132  Resp:  [0-29] 17  SpO2:  [79 %-100 %] 99 %  BP: ()/() 98/57  Arterial Line BP: ()/() 83/46     Date 09/30/23 0700 - 10/01/23 0659   Shift 8022-2100 3271-8797 9586-0334 24 Hour Total   INTAKE   I.V.(mL/kg) 640.1(8.3)   640.1(8.3)   NG/   120   IV Piggyback 107.1   107.1   Shift Total(mL/kg) 867.2(11.3)   867.2(11.3)   OUTPUT   Urine(mL/kg/hr) 240   240   Drains 25   25   Shift Total(mL/kg) 265(3.5)   265(3.5)   Weight (kg) 76.7 76.7 76.7 76.7              Vent Mode: SPONT-PS  Oxygen Concentration (%):  [] 100  Resp Rate Total:  [18 br/min-36 br/min] 28 br/min  Vt Set:  [380 mL-400 mL] 380 mL  PEEP/CPAP:  [0.1 cmH20-5.3 cmH20] 0.1 cmH20  Mean Airway Pressure:  [9.1 wkD03-20.4 cmH20] 11.4 cmH20             Closed/Suction Drain 09/29/23 2123 Medial Back Bulb 10 Fr. (Active)   Site Description Unable to view 09/30/23 1319   Dressing Type Transparent (Tegaderm);Other (Comment) 09/30/23 1319   Dressing Status Clean;Dry;Intact 09/30/23 1319   Dressing Intervention First dressing 09/30/23 1319   Drainage Bloody 09/30/23 1319   Status To bulb suction 09/30/23 1319   Output (mL) 25 mL 09/30/23 1156            Urethral Catheter 09/29/23 Silicone (Active)   Site Assessment Clean;Intact;Dry 09/30/23 1319   Collection Container Urimeter 09/30/23 1319   Securement Method secured to top of thigh w/ adhesive device 09/30/23 1319   Catheter Care Performed yes 09/30/23 1040   Reason for Continuing Urinary Catheterization Critically ill in ICU and requiring hourly monitoring of intake/output 09/30/23 1319   CAUTI Prevention Bundle Securement Device in place with 1" slack;Intact seal between catheter & drainage tubing;Drainage bag/urimeter off the floor;Sheeting clip in use;No dependent loops or kinks;Drainage bag/urimeter not " "overfilled (<2/3 full);Drainage bag/urimeter below bladder 09/30/23 1319   Output (mL) 75 mL 09/30/23 1150       Neurosurgery Physical Exam    General: well developed, well nourished, no distress  Mental Status: Awake, Alert, Oriented X3.Thought content appropriate  GCS: Motor: 6/Verbal: 5/Eyes: 4 GCS Total: 15    Motor Strength:  Strength                                HF KF KE DF PF EHL   Lower: R 5/5 5/5 5/5 5/5 5/5 5/5    L 5/5 5/5 5/5 5/5 5/5 5/5         Incision- CDI  Drain- 150      Significant Labs:  Recent Labs   Lab 09/30/23  0321   *      K 3.9      CO2 23   BUN 24*   CREATININE 0.8   CALCIUM 8.0*   MG 1.7     Recent Labs   Lab 09/29/23  1909 09/29/23  2051 09/30/23  0321   WBC 12.21  --  12.29   HGB 9.7*  --  8.5*   HCT 30.0* 24* 25.9*     --  168     No results for input(s): "LABPT", "INR", "APTT" in the last 48 hours.  Microbiology Results (last 7 days)       ** No results found for the last 168 hours. **              Assessment/Plan:     Active Diagnoses:    Diagnosis Date Noted POA    PRINCIPAL PROBLEM:  Lumbar stenosis with neurogenic claudication [M48.062] 09/29/2023 Yes    Foraminal stenosis of lumbar region [M48.061] 09/29/2023 Yes    Chronic myelopathy [G95.9] 09/29/2023 Yes    Decreased range of motion of right knee [M25.661] 09/29/2023 Yes    Bilateral leg edema [R60.0] 09/29/2023 Yes    Assistance needed for mobility [Z74.1] 09/29/2023 Not Applicable    PAF (paroxysmal atrial fibrillation) [I48.0] 08/10/2022 Yes      Problems Resolved During this Admission:       A/P:  POD #1 Left L3-4, L4-5 OLIF, L5-S1 ALIF and L3-S1 posterior instrumented fusion     --Neurologically stable          -q4h neuro checks  --Imaging: Post op xrays pending.  BL LE US pending  --Drains: Continue  --Pain control: Tylenol 650mg Q 6 hours, Tramadol 100mg Q 6 hours PRN, Oxycodone IR 10mg Q 6 hours PRN, Robaxin 750mg TID, Dilaudid 1mg SQ Q 3 hours PRN   --DVT ppx: TEDs/SCDs/SQH  --Activity: " PT/OT, OOB. TLSO brace  --Diet: Cardiac  --Bowel regimen: PRN suppository, senna PRN  --Urinary: Carver  --Atelectasis ppx: Encourage IS hourly  --Daily labs  --Patient in AFIB.  Appreciate critical care recs     Dispo: Remain in ICU today    All of the above discussed and reviewed with Dr. Salter.      MALCOLM BagleyC  Neurosurgery  [place] - Intensive Care

## 2023-09-30 NOTE — NURSING
Per report from OR RN, 1 unit of RBC O neg- blood started in the OR at 2146. Blood finished at 2300. Estimated blood received 300ml.

## 2023-09-30 NOTE — ANESTHESIA POSTPROCEDURE EVALUATION
Anesthesia Post Evaluation    Patient: Graciela Barton    Procedure(s) Performed: Procedure(s) (LRB):  FUSION, SPINE, LUMBAR, TLIF, MINIMALLY INVASIVE (N/A)  FUSION, SPINE, LUMBAR, PLIF (N/A)  LAMINECTOMY, SPINE (N/A)  FORAMINOTOMY, SPINE (N/A)  FACETECTOMY (N/A)    Final Anesthesia Type: general      Patient location during evaluation: ICU  Patient participation: No - Unable to Participate, Intubation  Level of consciousness: sedated  Post-procedure vital signs: reviewed and stable  Pain management: adequate  Airway patency: patent  ERIC mitigation strategies: Multimodal analgesia  PONV status at discharge: No PONV  Anesthetic complications: no      Cardiovascular status: BP remains stable on low dose vasopressor support. Afib with RVR unresponsive to beta blocker intraop.   Respiratory status: ETT and ventilator  Hydration status: euvolemic  Follow-up not needed.          Vitals Value Taken Time   /60 09/30/23 1247   Temp 36.4 °C (97.6 °F) 09/30/23 1126   Pulse 125 09/30/23 1248   Resp 18 09/30/23 1248   SpO2 97 % 09/30/23 1248   Vitals shown include unvalidated device data.      No case tracking events are documented in the log.      Pain/Tyler Score: Pain Rating Prior to Med Admin: -- (pt nods head yes when asked if in pain, intubated) (9/30/2023  8:56 AM)  Pain Rating Post Med Admin: 2 (9/30/2023  9:56 AM)

## 2023-09-30 NOTE — CONSULTS
Consult Note  [facility]    Pulm/Critical Care Attending: Jaret Alexandre MD  Resident: Jorge Noriega  Admit Date: 9/29/2023  Today's Date: 09/30/2023  Reason for Consult:  Lumbar stenosis with neurogenic claudication    SUBJECTIVE:     HPI: 73 y.o. F with a PMHx of thyroid cancer status post thyroidectomy in 2018, HDL, Hypothyroidism, A-fib on Apixiban, s/p T6-7 T7-8 diskectomy 3 months prior who was admitted for L3-4 and L4-5 laminectomy due to lumbar stenosis with neurogenic claudication.  At baseline patient has limited mobility, but is able to walk with a walker.  After procedure patient noted to have softer BP's however maps noted to be greater than 65.  Patient noted to be tachycardic.  Initiated of amiodarone gtt. Transfused 1 unit of PRBCs due to blood loss during procedure and given 500 cc of LR.  Patient remained intubated post procedure and admitted to ICU for closer monitoring.     Interval history: Patient stable BP, and MAP's overnight. Remains tachycardic. In A-fib with RVR. Noted to be agitated overnight requiring increased in precedex drip and restraints. Patient denies any pain at this point in time. Endorses discomfort from chest tube.     Review of patient's allergies indicates:   Allergen Reactions    Vancomycin Tinitus    Vancomycin analogues Tinitus       Past Medical History:   Diagnosis Date    Arthritis     Cancer     Thyroid    Edema     Hyperlipidemia     Hypertension     Thyroid disease      Past Surgical History:   Procedure Laterality Date    ADENOIDECTOMY      COLONOSCOPY  10/18/2018    COLONOSCOPY N/A 10/18/2018    Procedure: COLONOSCOPY;  Surgeon: Yosvany Alejandra MD;  Location: [facility] ENDO;  Service: General;  Laterality: N/A;    EPIDURAL STEROID INJECTION N/A 9/30/2022    Procedure: LUMBAR L3/4 MALKA CONTRAST DIRRECT REFERRAL MONICA CARRERA IN CHART;  Surgeon: Nicholas Kumar MD;  Location: [facility] PAIN MGT;  Service: Pain Management;  Laterality:  N/A;    HYSTERECTOMY  1992    total hyst     KNEE SURGERY      16 pins and two plates implanted    THORACIC DISCECTOMY Right 5/12/2023    Procedure: DISCECTOMY, SPINE, THORACIC Right T6-7, T7-8 interior discectomy retropleural;  Surgeon: Scooter Salter MD;  Location: [facility];  Service: Neurosurgery;  Laterality: Right;  general  eliquis   type and screen  C-arm  Metrx  EMG bairon notified cc  SEP  MEP  Regular bed   lateral left down   globus (Jose Enrique) notified cc    THYROIDECTOMY      TONSILLECTOMY       Family History   Problem Relation Age of Onset    Alcohol abuse Mother     Heart disease Mother     Alcohol abuse Father     Heart disease Father     Heart disease Maternal Grandmother     Heart disease Maternal Grandfather      Social History     Tobacco Use    Smoking status: Never    Smokeless tobacco: Never   Substance Use Topics    Alcohol use: No    Drug use: No       All medications reviewed.    Review of Systems   Constitutional:  Negative for chills and fever.   HENT:  Negative for congestion and sore throat.    Eyes:  Negative for blurred vision.   Respiratory:  Negative for cough, shortness of breath and wheezing.    Cardiovascular:  Negative for chest pain and leg swelling.   Gastrointestinal:  Negative for abdominal pain, nausea and vomiting.   Genitourinary:  Negative for dysuria.   Musculoskeletal:  Negative for joint pain and myalgias.   Skin:  Negative for rash.   Neurological:  Negative for dizziness and headaches.       OBJECTIVE:     Vital Signs Trends/Hx Reviewed  Vitals:    09/30/23 0615 09/30/23 0700 09/30/23 0724 09/30/23 0737   BP:  133/79 (!) 144/68 (!) 85/50   BP Location:  Right arm     Patient Position:  Lying     Pulse:  (!) 131 (!) 132 82   Resp: 18 14 14 13   Temp:  97.7 °F (36.5 °C)     TempSrc:  Axillary     SpO2:  100% 100% 100%   Weight:       Height:           Ventilator settings: Mode: ACMV, , Flow 60, FiO2 30, RR 18    Physical Exam:  General: NAD, cooperative &  interactive.  HEENT: AT/NC, PERRL, EOMI, oral and nasal mucosa moist.   Neck: Supple without JVD or palpable LAD.   Cardiac: normal rate, regular rhythm, with no MRG with brisk cap refill and symmetric pulses in distal extremities.  Respiratory: Intubated.Normal inspection. Symmetric chest rise. Normal palpation and percussion. Auscultation clear bilaterally. No increased work of breathing noted.   Abdomen: Soft, NT/ND. +BS. No hepatosplenomegaly.   Extremities: Left leg larger than right. No skin changes. Palpable pulses.  Neuro: Grossly intact to brief exam. Unable to access AAO questions as patient currently intubated      Laboratory:  Recent Labs   Lab 09/29/23 2051   PH 7.406   PCO2 38.5   PO2 203*   HCO3 24.2   POCSATURATED 100   BE -1     Recent Labs   Lab 09/30/23 0321   WBC 12.29   RBC 2.70*   HGB 8.5*   HCT 25.9*      MCV 96   MCH 31.5*   MCHC 32.8     Recent Labs   Lab 09/30/23 0321      K 3.9      CO2 23   BUN 24*   CREATININE 0.8   CALCIUM 8.0*   MG 1.7       Microbiology Data:   Microbiology Results (last 7 days)       ** No results found for the last 168 hours. **             Chest Imaging:   No new imaging.     Infusions:     amiodarone in dextrose 5% 1 mg/min (09/30/23 0749)    amiodarone in dextrose 5%      dexmedeTOMIDine (Precedex) infusion (titrating) 0.1 mcg/kg/hr (09/30/23 0749)    lactated ringers 75 mL/hr at 09/30/23 0708       Scheduled Medications:    bisacodyL  10 mg Rectal Daily    calcium gluconate IVPB  1 g Intravenous Once    chlorhexidine  15 mL Mouth/Throat BID    heparin (porcine)  5,000 Units Subcutaneous Q12H    levothyroxine  137 mcg Oral Before breakfast    methocarbamoL  750 mg Oral TID    metoprolol tartrate  50 mg Oral BID    mupirocin   Nasal BID    propafenone  150 mg Oral Q8H       PRN Medications:   0.9%  NaCl infusion (for blood administration), acetaminophen, aluminum-magnesium hydroxide-simethicone, HYDROmorphone, ondansetron, oxyCODONE,  prochlorperazine, senna-docusate 8.6-50 mg, traMADoL    Assessment & Plan:   Patient Active Problem List   Diagnosis    Postoperative hypothyroidism    History of thyroid cancer    Essential hypertension, benign    Mixed hyperlipidemia    Benign paroxysmal positional vertigo    History of colon polyps    Bilateral post-traumatic osteoarthritis of knee    Paroxysmal tachycardia    DDD (degenerative disc disease), lumbar    Lumbar radiculopathy    PAF (paroxysmal atrial fibrillation)    Multiple sclerosis    Anticoagulant long-term use    Thoracic disc disease with myelopathy    Spinal cord compression    Aortic atherosclerosis    Preprocedural cardiovascular examination    Thoracic myelopathy    Atelectasis    Atrial flutter    Lumbar stenosis with neurogenic claudication    Foraminal stenosis of lumbar region    Chronic myelopathy    Decreased range of motion of right knee    Bilateral leg edema    Assistance needed for mobility       ASSESSMENT & RECOMMENDATIONS       Neuro/Psych  Patient is currently intubated  Sedated on Precedex  Current RASS: +1 anxious, apprehensive but not aggressive  Wean sedation as able  Daily SBT/SAT  Plan to extubate today.     #S/P L3-4 and L4-5 laminectomy for management of lumbar stenosis  Pain management per neurosurgery  -Hydromorphone 1 mg PRN q3H, Tramadol 100 mg q6H, Oxycodone 10 mg q6H PRN and acetaminophen 650mg q6H PRN  -Robaxin 750 mg TID    CV    #Atrial Fibrillation with RVR   Likely due to stress vs some component of hypothyroid disease. No electrolyte abnormalities on labs.   On Eliquis 5 mg BID, Loppresor 50 mg BID and Propafenone 150 mg at home.  Currently on Amiodarone 1 mg/kg   Remains in A-fib with RVR  Last echo done one year ago notable for EF of 60%, mild TR, mild left atrial enlargement and PA pressure of 30 mmHg    Plan  Discontinue Amiodarone gtt.  Loppresor 5 mg PRN for sustained  or greater  Resume home Loppresor and propafenone    Echo      Pulm  #Intubated for airway protection post op  Wean vent settings as tolerated and SBT once appropriate  Oxygen PRN to keep O2 > 88%    #Concern for DVT  Left leg swelling    Plan  DVT US    FEN/GI  F: Amiodarone and Precedex gtt  E: Na 141, K 3.9, Cl 109, Mag 1.7, Phos 4.3  Diet: NPO, Bedside swallow eval post extubation advanced as tolerated  Fluid restriction   Keep Mg 2, K 4  Zofran prn for n/v     RENAL  UOP: 1,275 cc , In: 4,688 cc, net +3,263 cc in last 24 hrs  Strict I&Os  Avoid renal toxic meds  BUN/Cr: 24/0.8, at baseline   Continue to monitor renal status and urine output     Heme  #Blood loss   S/p 1 unit PRBC   H/H 8.5/25.9; stable  WBC 12.29  DVT prophylaxis: Heparin (prophylactic)    Endo  #Hypothyroidism secondary to thyroidectomy for thyroid cancer  On synthroid 137 mcg at home  TSH 34  and Free T4 0.88 one month ago    Plan  Resume home medication  Repeat TSH and Free T4      #Glucose  Maintain glucose 140-180  BG currently at goal    ID  No known issues at this time.       DVT PPx: Heparin   Diet: NPO  GI PPX: None  Deconditioning: PT/OT  Code Status: Full    Feeding: NPO  Analgesia: Precedex  Sedation: None   Thromboprophylaxis: Heparin  Head up position: Above 30 degrees  Ulcer prophylaxis: None  Glycemic control: None. At goal  SBT: To be done today  Bowel Care: Bisacodyl 10 mg and senna-docusate 8.5-50 mg PRN  Indwelling catheter: PIV, Urethral catheter  De-escalation of Antibiotics: None    Dispo  -  Plan for extubation today. Will restart home medications for rate control as patient remains in Afib with RVR.      Thank you for allowing us to participate in the care of this patient. We will continue to follow. Please call with questions.      Jorge Noriega MD  \[facility]\"" Family Medicine, PGY-2  09/30/2023

## 2023-09-30 NOTE — EICU
eICU Intevention    HR still in the 140s despite 500 cc fluid bolus  BP 98/57  Lactic acid 1, Ca 8, K 3.9  H/H 8.5/25.9  EKG a flutter    Ordered amiodarone bolus then drip  Ordered calcium calcium 1 g IVPB  Add on Mg and Phos level  ETT advance 4 cm with repeat CXR ETT in proper position

## 2023-09-30 NOTE — EICU
Zack Dawn     Received request to shift PO meds to via OG    Shifted acetaminophen to per OG    Due meds that will be on hold for now until bedside team can reassess:  1) metoprolol, BP borderline and HR now controlled on amiodarone drip  2) levothyroxine, long half life  3) Propafenone, now rate controlled on amiodarone drip

## 2023-09-30 NOTE — OP NOTE
09/29/2023    Preop diagnosis   1. Severe spinal stenosis at L3-4 and L4-5 with neurogenic claudication and radiculopathy  2. Foraminal stenosis with lumbar radiculopathy    Postop diagnosis   Same    Surgery   1. Left L3-4, L4-5 oblique interbody fusion with placement of interbody spacer, Globus, Transcontinental TPS cage filled with allograft DBM and BMP  2. L5-S1 anterior interbody fusion with placement of interbody spacer, Globus, Intercontinental TPS cage filled with allograft DBM and BMP  3. L3-S1 posterior segmental instrumentation using Globus Creo screws  4. L3-4, L4-5 laminectomy, medial facetectomy, foraminotomy  5. L3-4, L4-5, L5-S1 posterolateral arthrodesis using autograft harvested from the same incision  6. Registration of navigated instruments using intraop 3D imaging Ziehm and Brain[employer] station  7. Neuro monitoring using SSEP, EMG, MEP    Surgeon   Scooter Salter MD    Indication   This is a very pleasant 72 y.o. female, who was active at the beginning of 2022, mowing her lawn then suddenly started to have difficulty walking, severe back pain and right leg pain.  She has developed a paralysis involving the right leg.  Her right leg appears spastic.  She is mainly complaining of right knee pain. She is being mobilized in a wheelchair.  She admits still being able to drive her car.  In June 2022 she had a thoracic and lumbar spine MRI.  She is being investigated for possible multiple sclerosis.  She reports having no loss of sensation to pain or temperature.  She has no voiding difficulty or incontinence.  She reports right more than left hand numbness and dropping things frequently.     INTERIM HISTORY:     This is a very pleasant 73 y.o. female, she is T6-7 T7-8 3 months status post anterior diskectomy for thoracic myelopathy.  Following that surgery she reports mild improvement in her balance.  She is still walking with a walker.  Her mobilization is still limited due to severe neurogenic  claudication.  She has severe leg pain leg numbness when she stands.  She is unable to walk for long period of time.  She has persistent right knee pain and right quadriceps weakness.  She has persistent bilateral proximal leg weakness.  She is dropping things.  She has fallen few times.    Procedure   The patient was intubated under general anesthesia and positioned in lateral decubitus, left side up on a radiolucent table.  All pressure points were carefully padded.  The left flank and abdominal area was prepped and draped in a typical sterile fashion.  Using fluoroscopy we planned 3 incision, each 1 in line with the disc space at L3-4, L4-5, L5-S1.  Local anesthesia with 0.5 Marcaine with epi.  The skin was incised and hemostasis was carried out down to the muscular fascia.  We started at L4-5.  The fascia was divided sharply in the muscle layer was divided bluntly.  The retroperitoneal space was dissected with a finger and the space was enlarged with blunt dissection.  A quarter was created in front of the psoas muscle.  The iliac vein and iliac artery were divided from the psoas muscle.  Using serial dilation we placed our 3 blade retractor just in front of the psoas muscle at L4-5.  Blunt dissection over the disc space.  Using angled Castillo and under fluoroscopic guidance we divided the ipsilateral and contralateral annulus.  All disc material were shaved from the endplates.  We then used serial trials and placed a final interbody spacer measuring 18 mm by 45 mm by 11 mm with 6° of lordosis filled with allograft in the L4-5 disc space.  Good positioning of the spacer was confirmed with fluoroscopy.  We then proceeded to the L5-S1 level.  At L5-S1 we used a between the bifurcation approach.  After dissecting the retroperitoneal space using the same technique, the L5-S1 disc space was dissected with a finger.  The bilateral iliac artery were palpated.  We then used serial dilation and placed our 3 blade retractor  between the bifurcation.  We proceeded with blunt dissection.  Hemostasis.  Confirmed the midline with fluoroscopy.  We then added a 4th blade and a 5th plate to completely expose the L5-S1 disc space.  Under fluoroscopic guidance we divided the annulus and used serial Stephanie.  All disc material were scraped from the endplates.  We then used serial trials and placed a final interbody spacer measuring 11 mm, small size with 15° of lordosis filled with allograft.  We then proceeded at L3-4 we used the same technique that was performed at L4-5.  At L3-4 we placed a 18 mm x 45 mm x 13 mm with 6 degree of lordosis spacer.  Spacer was filled with allograft.  The positioning of the spacer was confirmed with fluoroscopy.  Irrigation hemostasis.  The muscular fascia was closed with interrupted 0 Vicryl.  The dermal layer was closed with inverted interrupted 3-0 Vicryl.  The skin was closed with staples.  The incision was dressed.    Patient was then flipped prone on the Maximo table.  All pressure points were carefully padded.  The thoracolumbar area was prepped and draped in a typical sterile fashion.  Using fluoroscopy we planned a midline incision from L3-S1.  Local anesthesia with 0.5 Marcaine with epi.  The skin was incised and hemostasis was carried out down to the muscular fascia was divided sharply.  Subperiosteal dissection over the posterior elements from L3-S1.  Placement of orthostatic retractor.  Hemostasis.  We then inserted 2 array pins in the right PSIS and installed our navigation array.  The patient was covered with a clear drape.  We then proceeded with a 3D spin and the reconstructed imaging were transferred to the Brain[facility] station.  We then registered our drill guide and pedicle screwdriver.  We also registered the tap.  We used the pointer to confirm accuracy.  We then planned our pedicle screw trajectory with the pointer and used the drill guide at 30 mm of depth to cannulate the pedicles using  navigation.  We then navigated pedicle screws.  We used 6.5 x 45 mm screws at L3, L4, L5, S1 bilaterally good positioning of the screws was confirmed with fluoroscopy.    We then proceeded with the decompression.  We removed the inferior spinous process of L3, the whole spinous process of L4 and the superior spinous process of L5.  Using osteotome we removed the inferior articulating process of L3 and L4 bilaterally, the bone was kept for autograft.  We then proceeded with a laminectomy.  The inferior L3 and L4 lamina as well as the superior L5 lamina and superior L4 lamina were resected with Kerrisons.  We drilled the hole inferior facet of L3 and L4 bilaterally towards the pars to completely unroof the foramen bilaterally.  We then removed the yellow ligament to decompress the dura between L3-4 and L4-5 bilaterally.  Lateral recess were also decompressed.  There was some scar tissue at L3-4 over the midline and a small durotomy was noted.  The durotomy was closed under microscopic visualization with a running 4-0 Nurolon.  We proceeded with a Valsalva up to 40 without any CSF leak.  Hemostasis and irrigation.     We then contoured titanium rods and reduced the rods over the screw tulips with caps.  All caps were torqued.  Good positioning of the instrumentation was confirmed with fluoroscopy.       We decorticated the exposed posterior elements including the pars, superior articular process of L4 and L5, transverse processes of L3, L4, L5 and S1, the L5-S1 facet joints.  The bone dust was left in place.  We then placed our autograft and placed it laterally over the decorticated bone to achieve the posterolateral arthrodesis at L3-4, L4-5, and L5-S1.    We then placed a 10 Slovak Ramy drain and tunneled a drain superiorly through the skin.  The drain was fixated with 2-0 nylon.  The muscular layer and fascial layer was closed with interrupted 0 Vicryl.  The fat layer was closed with interrupted 0 Vicryl.  The  dermal layer was closed with inverted interrupted 2-0 Vicryl.  The skin was closed with staples.  The blood loss was estimated at 1000 cc.  No complication.

## 2023-09-30 NOTE — PT/OT/SLP PROGRESS
Physical Therapy      Patient Name:  Graciela Barton   MRN:  [id number]    Patient not seen today secondary to Other (Comment). Pt awaiting doppler US due to concerns for DVT in the LE.  Will follow-up Sunday if cleared by medical for safe mobilization.

## 2023-09-30 NOTE — NURSING
Patient very agitated, pulling at medical equipment MD in room gave okay to increase precedex to 1.2mcg/kg/hr. Sunce then have decreased due to MAP of 65.

## 2023-09-30 NOTE — EICU
New Patient Evaluation    HPI:  73 F history of secondary hypothyroidism, dyslipidemia, paroxysmal afib on apixaban, s/p T6-7 T7-8 diskectomy 3 months prior for thoracic myelopathy with some improvement but continues to have weakness and numbness. Underwent L3-4 and L4-5 laminectomy. EBL 1000 cc. Remained intubated post op due to borderline BP.    Camera Assessment:  /116   O2 100%  Seen sedated on Precedex  Intubated VAC 18/400/30%/5 PEEP    Data:  Na 143, K 3.8, BUN 24, CO2 29, creatinine 0.9  WBC 12, H/H 9.7/31 (from 12/40)  ABG 7.41/38/203    Assessment and Plans:   Relative hypotension and tachycardic. Oliguric as well. Significant blood loss received 1 unit PRBC in the OR. Ordered to bolus 500 cc LR and monitor. Getting lactic acid. May need blood transfusion if PRBC < 8. Getting EKG  Respiratory failure. Vent order placed. Repeat CXR to ensure proper ETT position.  Starting to wake up and biting tube. Has hydromorphone prn for pain. Ordered Fentanyl prn for sedation     Right fibula and tibia

 

HISTORY: Right leg pain

 

2 views of the right leg

 

Bone mineralization, joint spaces and alignment are maintained.

 

IMPRESSION: Normal right leg

## 2023-10-01 PROBLEM — D62 ACUTE BLOOD LOSS ANEMIA: Status: ACTIVE | Noted: 2023-10-01

## 2023-10-01 LAB
ANION GAP SERPL CALC-SCNC: 9 MMOL/L (ref 8–16)
BASOPHILS # BLD AUTO: 0.02 K/UL (ref 0–0.2)
BASOPHILS NFR BLD: 0.2 % (ref 0–1.9)
BLD PROD TYP BPU: NORMAL
BLOOD UNIT EXPIRATION DATE: NORMAL
BLOOD UNIT TYPE CODE: 9500
BLOOD UNIT TYPE: NORMAL
BUN SERPL-MCNC: 23 MG/DL (ref 8–23)
CALCIUM SERPL-MCNC: 8.3 MG/DL (ref 8.7–10.5)
CHLORIDE SERPL-SCNC: 105 MMOL/L (ref 95–110)
CO2 SERPL-SCNC: 26 MMOL/L (ref 23–29)
CODING SYSTEM: NORMAL
CREAT SERPL-MCNC: 0.8 MG/DL (ref 0.5–1.4)
CROSSMATCH INTERPRETATION: NORMAL
DIFFERENTIAL METHOD: ABNORMAL
DISPENSE STATUS: NORMAL
EOSINOPHIL # BLD AUTO: 0.1 K/UL (ref 0–0.5)
EOSINOPHIL NFR BLD: 0.4 % (ref 0–8)
ERYTHROCYTE [DISTWIDTH] IN BLOOD BY AUTOMATED COUNT: 13.9 % (ref 11.5–14.5)
EST. GFR  (NO RACE VARIABLE): >60 ML/MIN/1.73 M^2
GLUCOSE SERPL-MCNC: 106 MG/DL (ref 70–110)
HCT VFR BLD AUTO: 22.8 % (ref 37–48.5)
HGB BLD-MCNC: 7.4 G/DL (ref 12–16)
IMM GRANULOCYTES # BLD AUTO: 0.09 K/UL (ref 0–0.04)
IMM GRANULOCYTES NFR BLD AUTO: 0.7 % (ref 0–0.5)
LYMPHOCYTES # BLD AUTO: 0.9 K/UL (ref 1–4.8)
LYMPHOCYTES NFR BLD: 6.4 % (ref 18–48)
MAGNESIUM SERPL-MCNC: 1.8 MG/DL (ref 1.6–2.6)
MCH RBC QN AUTO: 31.4 PG (ref 27–31)
MCHC RBC AUTO-ENTMCNC: 32.5 G/DL (ref 32–36)
MCV RBC AUTO: 97 FL (ref 82–98)
MONOCYTES # BLD AUTO: 1.4 K/UL (ref 0.3–1)
MONOCYTES NFR BLD: 10.3 % (ref 4–15)
NEUTROPHILS # BLD AUTO: 10.8 K/UL (ref 1.8–7.7)
NEUTROPHILS NFR BLD: 82 % (ref 38–73)
NRBC BLD-RTO: 0 /100 WBC
PLATELET # BLD AUTO: 162 K/UL (ref 150–450)
PMV BLD AUTO: 11.3 FL (ref 9.2–12.9)
POTASSIUM SERPL-SCNC: 3.8 MMOL/L (ref 3.5–5.1)
RBC # BLD AUTO: 2.36 M/UL (ref 4–5.4)
SODIUM SERPL-SCNC: 140 MMOL/L (ref 136–145)
TRANS ERYTHROCYTES VOL PATIENT: NORMAL ML
WBC # BLD AUTO: 13.2 K/UL (ref 3.9–12.7)

## 2023-10-01 PROCEDURE — P9021 RED BLOOD CELLS UNIT: HCPCS | Performed by: NEUROLOGICAL SURGERY

## 2023-10-01 PROCEDURE — 97112 NEUROMUSCULAR REEDUCATION: CPT

## 2023-10-01 PROCEDURE — 97167 OT EVAL HIGH COMPLEX 60 MIN: CPT

## 2023-10-01 PROCEDURE — P9021 RED BLOOD CELLS UNIT: HCPCS | Performed by: NURSE PRACTITIONER

## 2023-10-01 PROCEDURE — 94761 N-INVAS EAR/PLS OXIMETRY MLT: CPT

## 2023-10-01 PROCEDURE — 85025 COMPLETE CBC W/AUTO DIFF WBC: CPT | Performed by: PHYSICIAN ASSISTANT

## 2023-10-01 PROCEDURE — 11000001 HC ACUTE MED/SURG PRIVATE ROOM

## 2023-10-01 PROCEDURE — 27000221 HC OXYGEN, UP TO 24 HOURS

## 2023-10-01 PROCEDURE — 63600175 PHARM REV CODE 636 W HCPCS: Performed by: NURSE PRACTITIONER

## 2023-10-01 PROCEDURE — 25000003 PHARM REV CODE 250: Performed by: HEALTH CARE PROVIDER

## 2023-10-01 PROCEDURE — 25000003 PHARM REV CODE 250

## 2023-10-01 PROCEDURE — 36415 COLL VENOUS BLD VENIPUNCTURE: CPT | Performed by: INTERNAL MEDICINE

## 2023-10-01 PROCEDURE — 99900035 HC TECH TIME PER 15 MIN (STAT)

## 2023-10-01 PROCEDURE — 97535 SELF CARE MNGMENT TRAINING: CPT

## 2023-10-01 PROCEDURE — 97162 PT EVAL MOD COMPLEX 30 MIN: CPT

## 2023-10-01 PROCEDURE — 63600175 PHARM REV CODE 636 W HCPCS: Performed by: NEUROLOGICAL SURGERY

## 2023-10-01 PROCEDURE — 99223 PR INITIAL HOSPITAL CARE,LEVL III: ICD-10-PCS | Mod: ,,, | Performed by: INTERNAL MEDICINE

## 2023-10-01 PROCEDURE — 99223 1ST HOSP IP/OBS HIGH 75: CPT | Mod: ,,, | Performed by: INTERNAL MEDICINE

## 2023-10-01 PROCEDURE — 63600175 PHARM REV CODE 636 W HCPCS: Performed by: INTERNAL MEDICINE

## 2023-10-01 PROCEDURE — 25000003 PHARM REV CODE 250: Performed by: NEUROLOGICAL SURGERY

## 2023-10-01 PROCEDURE — 36430 TRANSFUSION BLD/BLD COMPNT: CPT

## 2023-10-01 PROCEDURE — 80048 BASIC METABOLIC PNL TOTAL CA: CPT | Performed by: PHYSICIAN ASSISTANT

## 2023-10-01 PROCEDURE — 94799 UNLISTED PULMONARY SVC/PX: CPT

## 2023-10-01 PROCEDURE — 97530 THERAPEUTIC ACTIVITIES: CPT

## 2023-10-01 PROCEDURE — 83735 ASSAY OF MAGNESIUM: CPT | Performed by: INTERNAL MEDICINE

## 2023-10-01 PROCEDURE — 63600175 PHARM REV CODE 636 W HCPCS: Performed by: STUDENT IN AN ORGANIZED HEALTH CARE EDUCATION/TRAINING PROGRAM

## 2023-10-01 PROCEDURE — 25000003 PHARM REV CODE 250: Performed by: NURSE PRACTITIONER

## 2023-10-01 PROCEDURE — 36415 COLL VENOUS BLD VENIPUNCTURE: CPT | Performed by: PHYSICIAN ASSISTANT

## 2023-10-01 RX ORDER — DIGOXIN 0.25 MG/ML
250 INJECTION INTRAMUSCULAR; INTRAVENOUS EVERY 8 HOURS
Status: COMPLETED | OUTPATIENT
Start: 2023-10-01 | End: 2023-10-02

## 2023-10-01 RX ORDER — METOPROLOL TARTRATE 50 MG/1
50 TABLET ORAL 2 TIMES DAILY
Status: DISCONTINUED | OUTPATIENT
Start: 2023-10-01 | End: 2023-10-01

## 2023-10-01 RX ORDER — MAGNESIUM SULFATE HEPTAHYDRATE 40 MG/ML
2 INJECTION, SOLUTION INTRAVENOUS ONCE
Status: COMPLETED | OUTPATIENT
Start: 2023-10-01 | End: 2023-10-01

## 2023-10-01 RX ORDER — HYDROCODONE BITARTRATE AND ACETAMINOPHEN 500; 5 MG/1; MG/1
TABLET ORAL
Status: DISCONTINUED | OUTPATIENT
Start: 2023-10-01 | End: 2023-10-02

## 2023-10-01 RX ORDER — ESMOLOL HYDROCHLORIDE 20 MG/ML
0-300 INJECTION, SOLUTION INTRAVENOUS CONTINUOUS
Status: DISCONTINUED | OUTPATIENT
Start: 2023-10-01 | End: 2023-10-02

## 2023-10-01 RX ORDER — POTASSIUM CHLORIDE 20 MEQ/1
20 TABLET, EXTENDED RELEASE ORAL ONCE
Status: COMPLETED | OUTPATIENT
Start: 2023-10-01 | End: 2023-10-01

## 2023-10-01 RX ORDER — METOPROLOL TARTRATE 50 MG/1
100 TABLET ORAL 2 TIMES DAILY
Status: DISCONTINUED | OUTPATIENT
Start: 2023-10-01 | End: 2023-10-01

## 2023-10-01 RX ORDER — HYDROCODONE BITARTRATE AND ACETAMINOPHEN 500; 5 MG/1; MG/1
TABLET ORAL
Status: DISCONTINUED | OUTPATIENT
Start: 2023-10-01 | End: 2023-10-01

## 2023-10-01 RX ADMIN — DIGOXIN 250 MCG: 250 INJECTION, SOLUTION INTRAMUSCULAR; INTRAVENOUS at 09:10

## 2023-10-01 RX ADMIN — METOPROLOL TARTRATE 5 MG: 5 INJECTION, SOLUTION INTRAVENOUS at 06:10

## 2023-10-01 RX ADMIN — METOPROLOL TARTRATE 50 MG: 50 TABLET, FILM COATED ORAL at 08:10

## 2023-10-01 RX ADMIN — SODIUM CHLORIDE, POTASSIUM CHLORIDE, SODIUM LACTATE AND CALCIUM CHLORIDE: 600; 310; 30; 20 INJECTION, SOLUTION INTRAVENOUS at 03:10

## 2023-10-01 RX ADMIN — HEPARIN SODIUM 5000 UNITS: 5000 INJECTION INTRAVENOUS; SUBCUTANEOUS at 05:10

## 2023-10-01 RX ADMIN — ESMOLOL HYDROCHLORIDE 125 MCG/KG/MIN: 20 INJECTION INTRAVENOUS at 11:10

## 2023-10-01 RX ADMIN — OXYCODONE HYDROCHLORIDE 10 MG: 5 TABLET ORAL at 04:10

## 2023-10-01 RX ADMIN — METOPROLOL TARTRATE 5 MG: 5 INJECTION, SOLUTION INTRAVENOUS at 07:10

## 2023-10-01 RX ADMIN — ESMOLOL HYDROCHLORIDE 50 MCG/KG/MIN: 20 INJECTION INTRAVENOUS at 08:10

## 2023-10-01 RX ADMIN — ONDANSETRON 8 MG: 8 TABLET, ORALLY DISINTEGRATING ORAL at 05:10

## 2023-10-01 RX ADMIN — METHOCARBAMOL TABLETS 750 MG: 750 TABLET, COATED ORAL at 02:10

## 2023-10-01 RX ADMIN — METHOCARBAMOL TABLETS 750 MG: 750 TABLET, COATED ORAL at 09:10

## 2023-10-01 RX ADMIN — METOPROLOL TARTRATE 5 MG: 5 INJECTION, SOLUTION INTRAVENOUS at 04:10

## 2023-10-01 RX ADMIN — POTASSIUM CHLORIDE 20 MEQ: 1500 TABLET, EXTENDED RELEASE ORAL at 02:10

## 2023-10-01 RX ADMIN — MUPIROCIN: 20 OINTMENT TOPICAL at 09:10

## 2023-10-01 RX ADMIN — PROPAFENONE HYDROCHLORIDE 150 MG: 150 TABLET, FILM COATED ORAL at 05:10

## 2023-10-01 RX ADMIN — MAGNESIUM SULFATE HEPTAHYDRATE 2 G: 40 INJECTION, SOLUTION INTRAVENOUS at 02:10

## 2023-10-01 RX ADMIN — DIGOXIN 250 MCG: 250 INJECTION, SOLUTION INTRAMUSCULAR; INTRAVENOUS at 02:10

## 2023-10-01 RX ADMIN — LEVOTHYROXINE SODIUM 137 MCG: 25 TABLET ORAL at 05:10

## 2023-10-01 RX ADMIN — OXYCODONE HYDROCHLORIDE 10 MG: 5 TABLET ORAL at 10:10

## 2023-10-01 RX ADMIN — METHOCARBAMOL TABLETS 750 MG: 750 TABLET, COATED ORAL at 10:10

## 2023-10-01 RX ADMIN — HYDROMORPHONE HYDROCHLORIDE 1 MG: 2 INJECTION, SOLUTION INTRAMUSCULAR; INTRAVENOUS; SUBCUTANEOUS at 02:10

## 2023-10-01 RX ADMIN — MUPIROCIN: 20 OINTMENT TOPICAL at 08:10

## 2023-10-01 RX ADMIN — HYDROMORPHONE HYDROCHLORIDE 1 MG: 2 INJECTION, SOLUTION INTRAMUSCULAR; INTRAVENOUS; SUBCUTANEOUS at 10:10

## 2023-10-01 NOTE — PLAN OF CARE
HR 140s, MDs aware, cardio consulted and seen pt today, meds given as ordered, see MAR, all other vss, nadn. Pain controlled with prn meds ordered. Pt getting her 2nd unit of blood, thad'ing well.  Transfer orders to floor, no bed available. Awake, alert, calm, coop. See flow sheet for emelina info.

## 2023-10-01 NOTE — ASSESSMENT & PLAN NOTE
Atrial flutter  Anticoagulant long-term use  -chronic, has been in Aflutter for quite some time  -RVR rate worsened after admission in setting of neurosurgical procedure and chronic hypothyroidism  -Cardiology following with recommendations to transfuse due to anemia contributing to RVR, stop home propafenone (failed drug in setting of several months history of AFlutter), continue home metoprolol (titrations limited due to BP), and give 3 doses of IV digoxin  -will need outpt follow up with EP for consideration of DCCV +/- ablation  -continue tele monitoring  -EKG PRN concerns  -trend labs, address/replete electrolytes as indicated  -monitor

## 2023-10-01 NOTE — PLAN OF CARE
Problem: Occupational Therapy  Goal: Occupational Therapy Goal  Description: Goals to be met by: 11/01/2023      Patient will increase functional independence with ADLs by performing:    UE Dressing with Minimal Assistance.  LE Dressing with Maximum Assistance.  Grooming while seated with Stand-by Assistance.  Toileting from bedside commode with Minimal Assistance for hygiene and clothing management.   Toilet transfer to bedside commode with Moderate Assistance.  Increased functional strength to WFL for self care.  Upper extremity exercise program x10 reps per handout, with assistance as needed.      Outcome: Ongoing, Progressing   Pt would benefit from continued OT to address deficits in self care and functional mobility. Recommending high intensity therapy; DME needs TBD pending progress with therapies but will likely defer to next level of care given significant challenges to safe mobilization including hypertonicity, possible spasticity BLE especially in B hip adductors and poor motor planning and coordination with inability to appropriately contract/relax muscles volitionally as well as BLE knee contractures (L>R).

## 2023-10-01 NOTE — PLAN OF CARE
"  Care Plan    Pt remains in ICU at this time. No acute events overnight. VSS. AAOX4. Aflutter on telemetry. O2 sats 95%-98% on 2L NC. Carver remains in place. LR infusing @75ml/hr.   POC reviewed with pt and family. Questions and concerns addressed. Safety and infection precautions in place. See below and flowsheets for full assessment and VS info.     Neuro:  Olden Coma Scale  Best Eye Response: 4-->(E4) spontaneous  Best Motor Response: 6-->(M6) obeys commands  Best Verbal Response: 5-->(V5) oriented  Olden Coma Scale Score: 15  Assessment Qualifiers: no eye obstruction present, patient not sedated/intubated  Pupil PERRLA: yes  24 hr Temp:  [97.6 °F (36.4 °C)-99.3 °F (37.4 °C)]      CV:  Rhythm: atrial rhythm  DVT prophylaxis: VTE Required Core Measure: (SCDs) Sequential compression device initiated/maintained, Pharmacological prophylaxis initiated/maintained    Resp:     Vent Mode: SPONT-PS  Set Rate: 18 BPM  Oxygen Concentration (%): 100  Vt Set: 380 mL  PEEP/CPAP: 0.1 cmH20    GI/:  GI prophylaxis: no  Diet/Nutrition Received: low saturated fat/low cholesterol, no added salt, 2 gram sodium  Last Bowel Movement:  (Pt is unsure)  Voiding Characteristics: urethral catheter (bladder)       Urethral Catheter 09/29/23 Silicone-Reason for Continuing Urinary Catheterization: Urinary retention, Critically ill in ICU and requiring hourly monitoring of intake/output, Post operative   Intake/Output Summary (Last 24 hours) at 10/1/2023 0712  Last data filed at 10/1/2023 0709  Gross per 24 hour   Intake 2616.75 ml   Output 920 ml   Net 1696.75 ml       Labs/Accuchecks:  Recent Labs   Lab 09/30/23  0321   WBC 12.29   RBC 2.70*   HGB 8.5*   HCT 25.9*         Recent Labs   Lab 09/30/23  0321      K 3.9   CO2 23      BUN 24*   CREATININE 0.8    No results for input(s): "PROTIME", "INR", "APTT", "HEPANTIXA" in the last 168 hours. No results for input(s): "CPK", "CPKMB", "TROPONINI", "MB" in the last 168 " hours.    Electrolytes: N/A - electrolytes WDL  Accuchecks: none    Gtts/LDAs:   lactated ringers 75 mL/hr at 10/01/23 0709       Lines/Drains/Airways       Drain  Duration                  Urethral Catheter 09/29/23 Silicone 2 days         Closed/Suction Drain 09/29/23 2123 Medial Back Bulb 10 Fr. 1 day              Peripheral Intravenous Line  Duration                  Peripheral IV - Single Lumen 09/29/23 1416 18 G Right Wrist 1 day         Peripheral IV - Single Lumen 09/29/23 2300 18 G Right;Anterior Forearm 1 day         Peripheral IV - Single Lumen 09/30/23 0300 20 G Anterior;Right Forearm 1 day                    Skin/Wounds  Bathing/Skin Care: bath, partial (09/29/23 1139)  Wounds: No  Wound care consulted: No    Consults

## 2023-10-01 NOTE — PLAN OF CARE
Pt would benefit from continued OT to address deficits in self care and functional mobility. Recommending moderate to high intensity therapies; DME needs TBD pending progress with therapies

## 2023-10-01 NOTE — PROGRESS NOTES
"[facility] Pulmonary/Critical Care Fellow Progress Note:    Primary Attending:  Scooter Salter MD   Consultant Attending: Jaret Alexandre MD   Consultant Fellow: Robert Landis HO-VI     Admit Date: 2023   Hospital LOS: 2     Brief Summary of Hospitalization:   73 y.o. F with a PMHx of thyroid cancer status post thyroidectomy in 2018, HDL, Hypothyroidism, A-fib on Apixiban, s/p T6-7 T7-8 diskectomy 3 months prior who was admitted for L3-4 and L4-5 laminectomy due to lumbar stenosis with neurogenic claudication.  At baseline patient has limited mobility, but is able to walk with a walker.  After procedure patient noted to have softer BP's however maps noted to be greater than 65.  Patient noted to be tachycardic.  Initiated of amiodarone gtt. Transfused 1 unit of PRBCs due to blood loss during procedure and given 500 cc of LR.  Patient remained intubated post procedure and admitted to ICU for closer monitoring.      Interval Hx:  Remains in AF RVR despite home doses of toprol-XL and propafenone meds restarted. Otherwise denies c/o chest pain, dyspnea, palpitations. Extubated on rounds yesterday w/o event.     Objective:  Last 24 Hour Vital Signs:  BP  Min: 87/51  Max: 139/62  Temp  Av.5 °F (36.9 °C)  Min: 97.6 °F (36.4 °C)  Max: 99.3 °F (37.4 °C)  Pulse  Av.3  Min: 117  Max: 251  Resp  Av.3  Min: 8  Max: 37  SpO2  Av.8 %  Min: 67 %  Max: 100 %  Height  Av' 7" (170.2 cm)  Min: 5' 7" (170.2 cm)  Max: 5' 7" (170.2 cm)  Weight  Av.7 kg (169 lb)  Min: 76.7 kg (169 lb)  Max: 76.7 kg (169 lb)  Body mass index is 26.47 kg/m².  I & O (Last 24H):  Intake/Output Summary (Last 24 hours) at 10/1/2023 1259  Last data filed at 10/1/2023 1103  Gross per 24 hour   Intake 2320.37 ml   Output 835 ml   Net 1485.37 ml       Physical Exam:  GEN: non-toxic appearing and appears stated age  HEENT: MMM, no scleral icterus, EOMI  NECK: Supple, midline trachea, no raised JVP or a-waves notable  CV: " "Irregularly irregular, tachycardic, no MRG, pulses equal and symmetric 2+ at radial  PULM: CTAB  ABDOMEN: Soft, non-tender, non-distended, no rebound or guarding  SKIN: Warm, dry, intact, no rashes  MSK: No deformity, no clubbing, cyanosis, or lower extremity edema  NEURO: RASS 0, CAM -, awake and following commands, at neurologic baseline  LINES: Intact, no extravasation or induration, no erythema to PIV or support devices    I have reviewed all labs / images / cultures  Pertinent labs are as follows:   No results for input(s): "PH", "PCO2", "PO2", "HCO3", "POCSATURATED", "BE" in the last 24 hours.  Recent Labs   Lab 10/01/23  1216   WBC 13.20*   RBC 2.36*   HGB 7.4*   HCT 22.8*      MCV 97   MCH 31.4*   MCHC 32.5     Recent Labs   Lab 10/01/23  1216      K 3.8      CO2 26   BUN 23   CREATININE 0.8   CALCIUM 8.3*       Meds:   bisacodyL  10 mg Rectal Daily    heparin (porcine)  5,000 Units Subcutaneous Q12H    levothyroxine  137 mcg Oral Before breakfast    methocarbamoL  750 mg Oral TID    metoprolol tartrate  50 mg Oral BID    mupirocin   Nasal BID    propafenone  150 mg Oral Q8H       Assessment & Plan: Neuro/Psych    NEURO:  #S/P L3-4 and L4-5 laminectomy for management of lumbar stenosis  Pain management per neurosurgery  -Hydromorphone 1 mg PRN q3H, Tramadol 100 mg q6H, Oxycodone 10 mg q6H PRN and acetaminophen 650mg q6H PRN  -Robaxin 750 mg TID  - Lumbar drain in place, AC to resume after drain is removed.     CV     #Atrial Fibrillation with RVR   Likely due to stress vs some component of hypothyroid disease. No electrolyte abnormalities on labs.   On Eliquis 5 mg BID, Loppresor 50 mg BID and Propafenone 150 mg at home.  Briefly on amiodarone, transitioned to home meds: lopressor 50 BID, propafenone 150mg still with Afib RVR  TTE repeated 9/30: LVEF >55%, LA mild dilation, RA mild dilation, normal RV size and function, mild MR, mild TR, RVSP 44mmHg.  Consulting cardiology to assist with " management        Pulm  S/p extubation. LUCIEN     #Concern for DVT  Left leg swelling: DVT US negative 9/30     FEN/GI  F: Net even  E: Wnl. Replete as necessary. Keep Mg 2, K 4  Diet: Tolerating enteral    Zofran prn for n/v      RENAL  Intake/Output Summary (Last 24 hours) at 10/1/2023 1308  Last data filed at 10/1/2023 1103  Gross per 24 hour   Intake 2320.37 ml   Output 835 ml   Net 1485.37 ml     Strict I&Os  Avoid renal toxic meds  BUN/Cr: Good urine output, cr. Ok.  Continue to monitor renal status and urine output     Heme  #Blood loss   S/p 1 unit PRBC   H/H 8.5/25.9; stable  WBC 12.29  DVT prophylaxis: Heparin (prophylactic). On eliquis at home, to resume on removal of lumbar drain.     Endo  #Hypothyroidism secondary to thyroidectomy for thyroid cancer  On synthroid 137 mcg at home  TSH 34  and Free T4 0.88 one month ago     Plan  Resume home medication  Repeat TSH improved from 34 to 7. FT4 normal.     #Glucose  Maintain glucose 140-180  BG currently at goal     ID  No known issues at this time.      DVT PPx: Heparin   Diet: NPO  GI PPX: None  Deconditioning: PT/OT  Code Status: Full     Feeding: NPO  Analgesia: dilaudid  Sedation: None   Thromboprophylaxis: Heparin  Head up position: Above 30 degrees  Ulcer prophylaxis: None  Glycemic control: None. At goal  SBT: n/r  Bowel Care: Bisacodyl 10 mg and senna-docusate 8.5-50 mg PRN  Indwelling catheter: PIV, Urethral catheter  De-escalation of Antibiotics: None     Dispo  - Management of A-flutter  - Follow up cardiology recs  - Drain management per neurosurgery    Patient seen and examined with Dr. Alexandre    We will continue to follow with you, thank you for the opportunity to be involved in Mr./MsVictor Manuel Barton's care.    MELCHOR Lang  [facility]

## 2023-10-01 NOTE — CONSULTS
[facility] - [facility] Care  [facility]  Consult Note    Patient Name: Graciela Barton  MRN: [id number]  Admission Date: 9/29/2023  Hospital Length of Stay: 2 days  Attending Physician: Scooter Salter MD   Primary Care Provider: Rocco Miller MD     Patient information was obtained from patient, past medical records and ER records.     Inpatient consult to [facility]  Consult performed by: Josselin Maloney, NP  Consult ordered by: Taryn Tang PA-C        Subjective:     Principal Problem: Lumbar stenosis with neurogenic claudication    Chief Complaint: No chief complaint on file.       HPI: Ms. Barton is a 73 YOF with PMHx of HTN, HLD, BPPV, PAfib/flutter on OAC, history of thyroid cancer s/p thyroidectomy in 2018 with hypothyroidism, thoracic myelopathy with neurogenic claudication s/p s/p T6-7 T7-8 anterior diskectomy (05/12/2023) and now s/p left L3-4, L4-5 OLIF, L5-S1 ALIF and L3-S1 posterior instrumented fusion (09/29/2023).     Admission testing reviewed noting WBC 12 >>13, hemoglobin 9.7 >> 8.5 >> 7.4, hematocrit 30 >> 25.9 >> 22.8; chemistry stable; lactic 1.0; TSH 7.448 with FT4 0.97; CXR with clearing of pleural fluid and atelectasis in the right lung base; BLE US without evidence of DVT; and TTE with LVEF >55%, LA mild dilation, RA mild dilation, normal RV size and function, mild MR, mild TR, RVSP 44mmHg. From neurosurgery perspective she is progressing well in post operative course however the course has been complicated by AFib/flutter with RVR refractory to home medications, propafenone and metoprolol, and amiodarone gtt.     She is seen today in consult for medical management. She notes that overall pain is well controlled however she is developing cramping sensation in BLEs. She denies palpitations, SOB, ELLIS, CP, abdominal pain, N/V/D, constipation, fever, chills, light headiness, dizziness, seizures, or syncope.     Hospital Medicine Service was consulted for  assistance with medical management.       Past Medical History:   Diagnosis Date    Arthritis     Cancer     Thyroid    Edema     Hyperlipidemia     Hypertension     Thyroid disease        Past Surgical History:   Procedure Laterality Date    ADENOIDECTOMY      COLONOSCOPY  10/18/2018    COLONOSCOPY N/A 10/18/2018    Procedure: COLONOSCOPY;  Surgeon: Yosvany Alejandra MD;  Location: [facility] ENDO;  Service: General;  Laterality: N/A;    EPIDURAL STEROID INJECTION N/A 9/30/2022    Procedure: LUMBAR L3/4 MALKA CONTRAST DIRRECT REFERRAL ELIQUROWENA CARRERA IN CHART;  Surgeon: Nicholas Kumar MD;  Location: [facility] PAIN MGT;  Service: Pain Management;  Laterality: N/A;    HYSTERECTOMY  1992    total hyst     KNEE SURGERY      16 pins and two plates implanted    THORACIC DISCECTOMY Right 5/12/2023    Procedure: DISCECTOMY, SPINE, THORACIC Right T6-7, T7-8 interior discectomy retropleural;  Surgeon: Scooter Salter MD;  Location: [facility] OR;  Service: Neurosurgery;  Laterality: Right;  general  eliquis   type and screen  C-arm  Metrx  EMG bairon notified cc  SEP  MEP  Regular bed   lateral left down   globus (Jose Enrique) notified cc    THYROIDECTOMY      TONSILLECTOMY         Review of patient's allergies indicates:   Allergen Reactions    Vancomycin Tinitus    Vancomycin analogues Tinitus       No current facility-administered medications on file prior to encounter.     Current Outpatient Medications on File Prior to Encounter   Medication Sig    hydroCHLOROthiazide (HYDRODIURIL) 25 MG tablet Take 1 tablet (25 mg total) by mouth once daily.    multivitamin (THERAGRAN) per tablet Take 1 tablet by mouth once daily.    propafenone (RHTHYMOL) 150 MG Tab Take 1 tablet (150 mg total) by mouth every 8 (eight) hours.    rosuvastatin (CRESTOR) 10 MG tablet Take 1 tablet (10 mg total) by mouth once daily.    ELIQUIS 5 mg Tab Take 1 tablet (5 mg total) by mouth 2 (two) times daily.    meclizine (ANTIVERT) 25 mg tablet Take 1  tablet (25 mg total) by mouth 3 (three) times daily as needed for Dizziness. (Patient not taking: Reported on 9/18/2023)    methocarbamoL (ROBAXIN) 750 MG Tab Take 1 tablet (750 mg total) by mouth 3 (three) times daily as needed (muscle spasms). (Patient not taking: Reported on 9/18/2023)    senna-docusate 8.6-50 mg (PERICOLACE) 8.6-50 mg per tablet Take 2 tablets by mouth nightly as needed for Constipation. (Patient not taking: Reported on 9/18/2023)    [DISCONTINUED] atorvastatin (LIPITOR) 40 MG tablet Take 1 tablet (40 mg total) by mouth once daily.     Family History       Problem Relation (Age of Onset)    Alcohol abuse Mother, Father    Heart disease Mother, Father, Maternal Grandmother, Maternal Grandfather          Tobacco Use    Smoking status: Never    Smokeless tobacco: Never   Substance and Sexual Activity    Alcohol use: No    Drug use: No    Sexual activity: Not Currently     Partners: Male     Review of Systems   Constitutional:  Positive for activity change. Negative for appetite change, chills, diaphoresis, fatigue and fever.   HENT:  Negative for congestion and sore throat.    Respiratory:  Negative for cough, chest tightness, shortness of breath and wheezing.    Cardiovascular:  Positive for leg swelling. Negative for chest pain and palpitations.   Gastrointestinal:  Negative for abdominal pain, constipation, diarrhea, nausea and vomiting.   Musculoskeletal:  Positive for arthralgias, back pain and gait problem.   Neurological:  Negative for dizziness, syncope, weakness, light-headedness and headaches.       Objective:     Vital Signs (Most Recent):  Temp: 97.6 °F (36.4 °C) (10/01/23 0700)  Pulse: (!) 128 (10/01/23 1353)  Resp: 14 (10/01/23 1353)  BP: 100/61 (10/01/23 1330)  SpO2: 100 % (10/01/23 1353) Vital Signs (24h Range):  Temp:  [97.6 °F (36.4 °C)-99.3 °F (37.4 °C)] 97.6 °F (36.4 °C)  Pulse:  [118-251] 128  Resp:  [8-37] 14  SpO2:  [67 %-100 %] 100 %  BP: ()/(50-76) 100/61      Weight: 76.7 kg (169 lb)  Body mass index is 26.47 kg/m².     Physical Exam  Vitals and nursing note reviewed.   Constitutional:       General: She is not in acute distress.     Appearance: Normal appearance. She is well-developed. She is not toxic-appearing.   HENT:      Head: Normocephalic and atraumatic.      Mouth/Throat:      Dentition: Normal dentition.   Eyes:      General: Lids are normal.      Extraocular Movements: Extraocular movements intact.      Conjunctiva/sclera: Conjunctivae normal.   Cardiovascular:      Rate and Rhythm: Tachycardia present. Rhythm irregular.   Pulmonary:      Effort: Pulmonary effort is normal.      Breath sounds: Normal breath sounds.   Abdominal:      General: Bowel sounds are normal.      Palpations: Abdomen is soft.   Musculoskeletal:      Cervical back: Neck supple.      Right lower leg: Edema present.      Left lower leg: Edema present.   Skin:     General: Skin is warm and dry.      Findings: No erythema or rash.   Neurological:      Mental Status: She is alert and oriented to person, place, and time.          Significant Labs: All pertinent labs within the past 24 hours have been reviewed.  CBC:   Recent Labs   Lab 09/29/23  1909 09/29/23  2051 09/30/23  0321 10/01/23  1216   WBC 12.21  --  12.29 13.20*   HGB 9.7*  --  8.5* 7.4*   HCT 30.0* 24* 25.9* 22.8*     --  168 162     CMP:   Recent Labs   Lab 09/30/23  0321 10/01/23  1216    140   K 3.9 3.8    105   CO2 23 26   * 106   BUN 24* 23   CREATININE 0.8 0.8   CALCIUM 8.0* 8.3*   ANIONGAP 9 9     TSH:   Recent Labs   Lab 09/30/23  1148   TSH 7.448*       Significant Imaging: I have reviewed all pertinent imaging results/findings within the past 24 hours.    Assessment/Plan:     * Lumbar stenosis with neurogenic claudication  Chronic myelopathy  Foraminal stenosis of lumbar region  Neurogenic claudication   DDD, lumbar  Decreased ROM of R knee  Assistance needed for mobility   -thoracic  myelopathy with neurogenic claudication s/p s/p T6-7 T7-8 anterior diskectomy (05/12/2023) and now s/p left L3-4, L4-5 OLIF, L5-S1 ALIF and L3-S1 posterior instrumented fusion (09/29/2023)  -management per primary team   -remains with surgical drain  -PT/OT following     Acute blood loss anemia  -worsening anemia since admission likely contributing to worsening RVR  -begin PRBC transfusion per Card recs  -trend labs and monitor  -further transfusions as indicated     PAF (paroxysmal atrial fibrillation)  Atrial flutter  Anticoagulant long-term use  -chronic, has been in Aflutter for quite some time  -RVR rate worsened after admission in setting of neurosurgical procedure and chronic hypothyroidism  -Cardiology following with recommendations to transfuse due to anemia contributing to RVR, stop home propafenone (failed drug in setting of several months history of AFlutter), continue home metoprolol (titrations limited due to BP), and give 3 doses of IV digoxin  -will need outpt follow up with EP for consideration of DCCV +/- ablation  -continue tele monitoring  -EKG PRN concerns  -trend labs, address/replete electrolytes as indicated  -monitor      Postoperative hypothyroidism  History of thyroid cancer  History of thyroidectomy   -chronic  -improvement in TSH   -continue home levothyroxine    Essential hypertension, benign  -chronic  -low/normotensive pressures at current  -hold home HCTZ  -continue metoprolol as noted above   -dose/medication adjustment as appropriate   -monitor     Mixed hyperlipidemia  -chronic  -resume home rosuvastatin at discharge       VTE Risk Mitigation (From admission, onward)         Ordered     IP VTE HIGH RISK PATIENT  Once         09/29/23 2314     Place sequential compression device  Until discontinued         09/29/23 2314     heparin (porcine) injection 5,000 Units  Every 12 hours         09/29/23 2314                Critical care time spent on the evaluation and treatment of severe  organ dysfunction, review of pertinent labs and imaging studies, discussions with consulting providers and discussions with patient/family: 35 minutes.    Thank you for your consult. I will follow-up with patient. Please contact us if you have any additional questions.      Josselin Maloney DNP, AG-ACNP, BC  Department of Hospital Medicine  [facility]

## 2023-10-01 NOTE — CONSULTS
[facility] - Intensive Care  Cardiology  Consult Note    Patient Name: Graciela Barton  MRN: [id number]  Admission Date: 9/29/2023  Hospital Length of Stay: 2 days  Code Status: Full Code   Attending Provider: Scooter Salter MD   Consulting Provider: Mark Ortega MD  Primary Care Physician: Rocco Miller MD  Principal Problem:Lumbar stenosis with neurogenic claudication    Patient information was obtained from patient, past medical records, and ER records.     Inpatient consult to [facility]  Consult performed by: Mark Ortega MD  Consult ordered by: Oswaldo Martinez MD        Subjective:     Chief Complaint:  Back pain post op      HPI: Cardiology consulted for A.flutter with RVR. Pt with hx of PAF on propafenone. Apparently she is in a. Flutter since 5/2023. HR in 115s-120s. She is on Lopressor and propafenone as outpatient.  Hgb noted to be low from 12 to 7. She is off anticoagulation.       Past Medical History:   Diagnosis Date    Arthritis     Cancer     Thyroid    Edema     Hyperlipidemia     Hypertension     Thyroid disease        Past Surgical History:   Procedure Laterality Date    ADENOIDECTOMY      COLONOSCOPY  10/18/2018    COLONOSCOPY N/A 10/18/2018    Procedure: COLONOSCOPY;  Surgeon: Yosvany Alejandra MD;  Location: [facility] ENDO;  Service: General;  Laterality: N/A;    EPIDURAL STEROID INJECTION N/A 9/30/2022    Procedure: LUMBAR L3/4 MALKA CONTRAST DIRRECT REFERRAL HAMILTONQUROWENA CARRERA IN CHART;  Surgeon: Nicholas Kumar MD;  Location: [facility] PAIN MGT;  Service: Pain Management;  Laterality: N/A;    HYSTERECTOMY  1992    total hyst     KNEE SURGERY      16 pins and two plates implanted    THORACIC DISCECTOMY Right 5/12/2023    Procedure: DISCECTOMY, SPINE, THORACIC Right T6-7, T7-8 interior discectomy retropleural;  Surgeon: Scooter Salter MD;  Location: [facility] OR;  Service: Neurosurgery;  Laterality: Right;  general  eliquis   type and screen  C-arm  Metrx  EMG bairon notified  cc  SEP  MEP  Regular bed   lateral left down   globus (Jose Enrique) notified cc    THYROIDECTOMY      TONSILLECTOMY         Review of patient's allergies indicates:   Allergen Reactions    Vancomycin Tinitus    Vancomycin analogues Tinitus       No current facility-administered medications on file prior to encounter.     Current Outpatient Medications on File Prior to Encounter   Medication Sig    hydroCHLOROthiazide (HYDRODIURIL) 25 MG tablet Take 1 tablet (25 mg total) by mouth once daily.    multivitamin (THERAGRAN) per tablet Take 1 tablet by mouth once daily.    propafenone (RHTHYMOL) 150 MG Tab Take 1 tablet (150 mg total) by mouth every 8 (eight) hours.    rosuvastatin (CRESTOR) 10 MG tablet Take 1 tablet (10 mg total) by mouth once daily.    ELIQUIS 5 mg Tab Take 1 tablet (5 mg total) by mouth 2 (two) times daily.    meclizine (ANTIVERT) 25 mg tablet Take 1 tablet (25 mg total) by mouth 3 (three) times daily as needed for Dizziness. (Patient not taking: Reported on 9/18/2023)    methocarbamoL (ROBAXIN) 750 MG Tab Take 1 tablet (750 mg total) by mouth 3 (three) times daily as needed (muscle spasms). (Patient not taking: Reported on 9/18/2023)    senna-docusate 8.6-50 mg (PERICOLACE) 8.6-50 mg per tablet Take 2 tablets by mouth nightly as needed for Constipation. (Patient not taking: Reported on 9/18/2023)    [DISCONTINUED] atorvastatin (LIPITOR) 40 MG tablet Take 1 tablet (40 mg total) by mouth once daily.     Family History       Problem Relation (Age of Onset)    Alcohol abuse Mother, Father    Heart disease Mother, Father, Maternal Grandmother, Maternal Grandfather          Tobacco Use    Smoking status: Never    Smokeless tobacco: Never   Substance and Sexual Activity    Alcohol use: No    Drug use: No    Sexual activity: Not Currently     Partners: Male     Review of Systems   Constitutional: Negative for chills and fever.   Cardiovascular:  Negative for chest pain and palpitations.   Respiratory:  Negative  for shortness of breath.      Objective:     Vital Signs (Most Recent):  Temp: 97.6 °F (36.4 °C) (10/01/23 0700)  Pulse: (!) 126 (10/01/23 1233)  Resp: 17 (10/01/23 1233)  BP: (!) 99/50 (10/01/23 1230)  SpO2: 100 % (10/01/23 1233) Vital Signs (24h Range):  Temp:  [97.6 °F (36.4 °C)-99.3 °F (37.4 °C)] 97.6 °F (36.4 °C)  Pulse:  [117-251] 126  Resp:  [8-37] 17  SpO2:  [67 %-100 %] 100 %  BP: ()/(50-76) 99/50     Weight: 76.7 kg (169 lb)  Body mass index is 26.47 kg/m².    SpO2: 100 %         Intake/Output Summary (Last 24 hours) at 10/1/2023 1251  Last data filed at 10/1/2023 1103  Gross per 24 hour   Intake 2320.37 ml   Output 835 ml   Net 1485.37 ml       Lines/Drains/Airways       Drain  Duration                  Closed/Suction Drain 09/29/23 2123 Medial Back Bulb 10 Fr. 1 day              Peripheral Intravenous Line  Duration                  Peripheral IV - Single Lumen 09/29/23 1416 18 G Right Wrist 1 day         Peripheral IV - Single Lumen 09/29/23 2300 18 G Right;Anterior Forearm 1 day         Peripheral IV - Single Lumen 09/30/23 0300 20 G Anterior;Right Forearm 1 day                    Physical Exam  Constitutional:       Appearance: She is well-developed.   HENT:      Head: Normocephalic and atraumatic.   Eyes:      Conjunctiva/sclera: Conjunctivae normal.      Comments: Pale    Neck:      Vascular: No carotid bruit or JVD.   Cardiovascular:      Rate and Rhythm: Regular rhythm. Tachycardia present.      Pulses:           Carotid pulses are 2+ on the right side and 2+ on the left side.       Radial pulses are 2+ on the right side and 2+ on the left side.      Heart sounds: Normal heart sounds. No murmur heard.     No friction rub. No gallop.   Pulmonary:      Effort: Pulmonary effort is normal. No respiratory distress.      Breath sounds: Normal breath sounds. No stridor. No wheezing.   Musculoskeletal:      Cervical back: Neck supple.   Skin:     General: Skin is warm and dry.   Neurological:       "Mental Status: She is alert and oriented to person, place, and time.   Psychiatric:         Behavior: Behavior normal.           Significant Labs: BMP:   Recent Labs   Lab 09/30/23  0321 10/01/23  1216   * 106    140   K 3.9 3.8    105   CO2 23 26   BUN 24* 23   CREATININE 0.8 0.8   CALCIUM 8.0* 8.3*   MG 1.7  --    , CMP   Recent Labs   Lab 09/30/23  0321 10/01/23  1216    140   K 3.9 3.8    105   CO2 23 26   * 106   BUN 24* 23   CREATININE 0.8 0.8   CALCIUM 8.0* 8.3*   ANIONGAP 9 9   , CBC   Recent Labs   Lab 09/29/23  1909 09/29/23  2051 09/30/23  0321 10/01/23  1216   WBC 12.21  --  12.29 13.20*   HGB 9.7*  --  8.5* 7.4*   HCT 30.0*   < > 25.9* 22.8*     --  168 162    < > = values in this interval not displayed.   , Troponin No results for input(s): "TROPONINI" in the last 48 hours., and All pertinent lab results from the last 24 hours have been reviewed.    Significant Imaging: Echocardiogram: 2D echo with color flow doppler: No results found for this or any previous visit. and Transthoracic echo (TTE) complete (Cupid Only):   Results for orders placed or performed during the hospital encounter of 09/29/23   Echo   Result Value Ref Range    BSA 1.9 m2    Hoyt's Biplane MOD Ejection Fraction 64 %    LVOT stroke volume 51.18 cm3    LVIDd 4.07 3.5 - 6.0 cm    LV Systolic Volume 33.51 mL    LV Systolic Volume Index 17.8 mL/m2    LVIDs 2.95 2.1 - 4.0 cm    LV Diastolic Volume 72.74 mL    LV Diastolic Volume Index 38.69 mL/m2    IVS 0.87 0.6 - 1.1 cm    LVOT diameter 2.0 cm    LVOT area 3.1 cm2    FS 28 28 - 44 %    Left Ventricle Relative Wall Thickness 0.36 cm    Posterior Wall 0.74 0.6 - 1.1 cm    LV mass 96.97 g    LV Mass Index 52 g/m2    TDI LATERAL 0.26 m/s    TDI SEPTAL 0.26 m/s    TR Max Mathew 2.71 m/s    LVOT peak mathew 1.01 m/s    Left Ventricular Outflow Tract Mean Velocity 0.76 cm/s    Left Ventricular Outflow Tract Mean Gradient 2.53 mmHg    LA size 3.83 cm "    Left Atrium Major Axis 5.13 cm    RV S' 9.74 cm/s    TAPSE 2.27 cm    RA Major Axis 5.45 cm    RA Width 4.05 cm    AV mean gradient 4 mmHg    AV peak gradient 7 mmHg    Ao peak fer 1.31 m/s    Ao VTI 31.90 cm    LVOT peak VTI 16.30 cm    AV valve area 1.60 cm²    AV Velocity Ratio 0.77     AV index (prosthetic) 0.51     MADY by Velocity Ratio 2.42 cm²    Mr max fer 4.15 m/s    MV mean gradient 2 mmHg    MV peak gradient 5 mmHg    MV valve area by continuity eq 2.35 cm2    MV VTI 21.8 cm    Triscuspid Valve Regurgitation Peak Gradient 29 mmHg    PV PEAK VELOCITY 1.00 m/s    PV peak gradient 4 mmHg    Sinus 2.67 cm    STJ 2.62 cm    Ascending aorta 2.93 cm    IVC diameter 2.15 cm    Mean e' 0.26 m/s    ZLVIDS -0.74     ZLVIDD -2.55     LA WIDTH 4.3 cm    EF 55 %    TV resting pulmonary artery pressure 44 mmHg    RV TB RVSP 18 mmHg    Est. RA pres 15 mmHg    Narrative      Left Ventricle: The left ventricle is normal in size. Normal wall   thickness. Normal wall motion. There is normal systolic function. Ejection   fraction by visual approximation is 55%. Unable to assess diastolic   function due to atrial fibrillation.    Left Atrium: Left atrium is mildly dilated.    Right Ventricle: Normal right ventricular cavity size. Wall thickness   is normal. Right ventricle wall motion  is normal. Systolic function is   normal.    Right Atrium: Right atrium is mildly dilated.    Mitral Valve: There is mild regurgitation.    Tricuspid Valve: There is mild regurgitation.    Pulmonary Artery: The estimated pulmonary artery systolic pressure is   44 mmHg.    IVC/SVC: Elevated venous pressure at 15 mmHg.       Assessment and Plan:   73 year old female with     A.flutter with RVR   Acute blood loss anemia  Post op back surgery   4.   Hx of PAF     - She has been in a.flutter with HR in 115-120 since 5/2023 Asymptomatic. Worsening RVR and Borderline drop in BP is related to Acute blood loss anemia.  Will recommend transfusing PRBCs  to get her close to her baseline     - No plans for rhythm control at this time given she is off anticoagulation  - Will stop Rhythmol. She failed Class 1C. Need to f/u with EP as outpatient for DCCV +/- Ablation     - Continue Lopressor 50 bid. Can't increase due to soft BP. Hopeful BP will improve some with PRBCs so we can up titrate her BB    - At the mean time we will give 3 doses of digoxin.       Active Diagnoses:    Diagnosis Date Noted POA    PRINCIPAL PROBLEM:  Lumbar stenosis with neurogenic claudication [M48.062] 09/29/2023 Yes    Foraminal stenosis of lumbar region [M48.061] 09/29/2023 Yes    Chronic myelopathy [G95.9] 09/29/2023 Yes    Decreased range of motion of right knee [M25.661] 09/29/2023 Yes    Bilateral leg edema [R60.0] 09/29/2023 Yes    Assistance needed for mobility [Z74.1] 09/29/2023 Not Applicable    PAF (paroxysmal atrial fibrillation) [I48.0] 08/10/2022 Yes      Problems Resolved During this Admission:       VTE Risk Mitigation (From admission, onward)           Ordered     IP VTE HIGH RISK PATIENT  Once         09/29/23 2314     Place sequential compression device  Until discontinued         09/29/23 2314     heparin (porcine) injection 5,000 Units  Every 12 hours         09/29/23 2314                    Thank you for your consult. I will follow-up with patient. Please contact us if you have any additional questions.    Mark Ortega MD  Cardiology   [facility] - Intensive [facility]

## 2023-10-01 NOTE — HPI
Ms. Barton is a 73 YOF with PMHx of HTN, HLD, BPPV, PAfib/flutter on OAC, history of thyroid cancer s/p thyroidectomy in 2018 with hypothyroidism, thoracic myelopathy with neurogenic claudication s/p s/p T6-7 T7-8 anterior diskectomy (05/12/2023) and now s/p left L3-4, L4-5 OLIF, L5-S1 ALIF and L3-S1 posterior instrumented fusion (09/29/2023).     Admission testing reviewed noting WBC 12 >>13, hemoglobin 9.7 >> 8.5 >> 7.4, hematocrit 30 >> 25.9 >> 22.8; chemistry stable; lactic 1.0; TSH 7.448 with FT4 0.97; CXR with clearing of pleural fluid and atelectasis in the right lung base; BLE US without evidence of DVT; and TTE with LVEF >55%, LA mild dilation, RA mild dilation, normal RV size and function, mild MR, mild TR, RVSP 44mmHg. From neurosurgery perspective she is progressing well in post operative course however the course has been complicated by AFib/flutter with RVR refractory to home medications, propafenone and metoprolol, and amiodarone gtt.     She is seen today in consult for medical management. She notes that overall pain is well controlled however she is developing cramping sensation in BLEs. She denies palpitations, SOB, ELLIS, CP, abdominal pain, N/V/D, constipation, fever, chills, light headiness, dizziness, seizures, or syncope.     Hospital Medicine Service was consulted for assistance with medical management.

## 2023-10-01 NOTE — PROGRESS NOTES
[facility] - Intensive Care  Neurosurgery  Progress Note    Subjective:     Interval History: Back pain no leg pain.  In AFIB    History of Present Illness:   This is a very pleasant 72 y.o. female, who was active at the beginning of 2022, mowing her lawn then suddenly started to have difficulty walking, severe back pain and right leg pain.  She has developed a paralysis involving the right leg.  Her right leg appears spastic.  She is mainly complaining of right knee pain. She is being mobilized in a wheelchair.  She admits still being able to drive her car.  In June 2022 she had a thoracic and lumbar spine MRI.  She is being investigated for possible multiple sclerosis.  She reports having no loss of sensation to pain or temperature.  She has no voiding difficulty or incontinence.  She reports right more than left hand numbness and dropping things frequently.      Post-Op Info:  Procedure(s) (LRB):  FUSION, SPINE, LUMBAR, TLIF, MINIMALLY INVASIVE (N/A)  FUSION, SPINE, LUMBAR, PLIF (N/A)  LAMINECTOMY, SPINE (N/A)  FORAMINOTOMY, SPINE (N/A)  FACETECTOMY (N/A)   2 Days Post-Op      Medications:  Continuous Infusions:      Scheduled Meds:   bisacodyL  10 mg Rectal Daily    heparin (porcine)  5,000 Units Subcutaneous Q12H    levothyroxine  137 mcg Oral Before breakfast    methocarbamoL  750 mg Oral TID    metoprolol tartrate  50 mg Oral BID    mupirocin   Nasal BID    propafenone  150 mg Oral Q8H     PRN Meds:0.9%  NaCl infusion (for blood administration), acetaminophen, aluminum-magnesium hydroxide-simethicone, HYDROmorphone, metoprolol, ondansetron, oxyCODONE, prochlorperazine, senna-docusate 8.6-50 mg, traMADoL     Review of Systems  Objective:     Weight: 76.7 kg (169 lb)  Body mass index is 26.47 kg/m².  Vital Signs (Most Recent):  Temp: 97.6 °F (36.4 °C) (10/01/23 0700)  Pulse: (!) 123 (10/01/23 1104)  Resp: (!) 23 (10/01/23 1104)  BP: (!) 114/55 (10/01/23 1100)  SpO2: (!) 94 % (10/01/23 1104) Vital Signs (24h  "Range):  Temp:  [97.6 °F (36.4 °C)-99.3 °F (37.4 °C)] 97.6 °F (36.4 °C)  Pulse:  [108-162] 123  Resp:  [8-33] 23  SpO2:  [78 %-100 %] 94 %  BP: ()/() 114/55     Date 10/01/23 0700 - 10/02/23 0659   Shift 9983-9934 0973-0247 1390-4766 24 Hour Total   INTAKE   I.V.(mL/kg) 308.4(4)   308.4(4)   Shift Total(mL/kg) 308.4(4)   308.4(4)   OUTPUT   Urine(mL/kg/hr) 140   140   Shift Total(mL/kg) 140(1.8)   140(1.8)   Weight (kg) 76.7 76.7 76.7 76.7                Vent Mode: SPONT-PS  Oxygen Concentration (%):  [] 100  Resp Rate Total:  [28 br/min] 28 br/min  PEEP/CPAP:  [0.1 cmH20] 0.1 cmH20             Closed/Suction Drain 09/29/23 2123 Medial Back Bulb 10 Fr. (Active)   Site Description Unable to view 09/30/23 1319   Dressing Type Transparent (Tegaderm);Other (Comment) 09/30/23 1319   Dressing Status Clean;Dry;Intact 09/30/23 1319   Dressing Intervention First dressing 09/30/23 1319   Drainage Bloody 09/30/23 1319   Status To bulb suction 09/30/23 1319   Output (mL) 25 mL 09/30/23 1156            Urethral Catheter 09/29/23 Silicone (Active)   Site Assessment Clean;Intact;Dry 09/30/23 1319   Collection Container Urimeter 09/30/23 1319   Securement Method secured to top of thigh w/ adhesive device 09/30/23 1319   Catheter Care Performed yes 09/30/23 1040   Reason for Continuing Urinary Catheterization Critically ill in ICU and requiring hourly monitoring of intake/output 09/30/23 1319   CAUTI Prevention Bundle Securement Device in place with 1" slack;Intact seal between catheter & drainage tubing;Drainage bag/urimeter off the floor;Sheeting clip in use;No dependent loops or kinks;Drainage bag/urimeter not overfilled (<2/3 full);Drainage bag/urimeter below bladder 09/30/23 1319   Output (mL) 75 mL 09/30/23 1150       Neurosurgery Physical Exam    General: well developed, well nourished, no distress  Mental Status: Awake, Alert, Oriented X3.Thought content appropriate  GCS: Motor: 6/Verbal: 5/Eyes: 4 GCS " "Total: 15    Motor Strength:  Strength                                HF KF KE DF PF EHL   Lower: R 5/5 5/5 5/5 5/5 5/5 5/5    L 5/5 5/5 5/5 5/5 5/5 5/5         Incision- CDI  Drain- 150      Significant Labs:  Recent Labs   Lab 09/30/23  0321   *      K 3.9      CO2 23   BUN 24*   CREATININE 0.8   CALCIUM 8.0*   MG 1.7       Recent Labs   Lab 09/29/23  1909 09/29/23 2051 09/30/23  0321   WBC 12.21  --  12.29   HGB 9.7*  --  8.5*   HCT 30.0* 24* 25.9*     --  168       No results for input(s): "LABPT", "INR", "APTT" in the last 48 hours.  Microbiology Results (last 7 days)       ** No results found for the last 168 hours. **              Assessment/Plan:     Active Diagnoses:    Diagnosis Date Noted POA    PRINCIPAL PROBLEM:  Lumbar stenosis with neurogenic claudication [M48.062] 09/29/2023 Yes    Foraminal stenosis of lumbar region [M48.061] 09/29/2023 Yes    Chronic myelopathy [G95.9] 09/29/2023 Yes    Decreased range of motion of right knee [M25.661] 09/29/2023 Yes    Bilateral leg edema [R60.0] 09/29/2023 Yes    Assistance needed for mobility [Z74.1] 09/29/2023 Not Applicable    PAF (paroxysmal atrial fibrillation) [I48.0] 08/10/2022 Yes      Problems Resolved During this Admission:       A/P:  POD #2 Left L3-4, L4-5 OLIF, L5-S1 ALIF and L3-S1 posterior instrumented fusion     --Neurologically stable          -q4h neuro checks  --Imaging: Post op xrays pending.  BL LE US pending  --Drains: Continue  --Pain control: Tylenol 650mg Q 6 hours, Tramadol 100mg Q 6 hours PRN, Oxycodone IR 10mg Q 6 hours PRN, Robaxin 750mg TID, Dilaudid 1mg SQ Q 3 hours PRN   --DVT ppx: TEDs/SCDs/SQH  --Activity: PT/OT, OOB. TLSO brace  --Diet: Cardiac  --Bowel regimen: PRN suppository, senna PRN  --Urinary: [place]  --Atelectasis ppx: Encourage IS hourly  --Daily labs  --Patient in AFIB.  Appreciate critical care recs and cardiology recs   Resume full anticoagulation once surgical drain removed  --Will consult " HM to cofollow on floor     Dispo: transfer to floor    All of the above discussed and reviewed with Dr. Salter.      Taryn Tang PA-C  Neurosurgery  [place] - Intensive Care

## 2023-10-01 NOTE — PLAN OF CARE
Problem: Physical Therapy  Goal: Physical Therapy Goal  Description: Goals to be met by: 2023     Patient will increase functional independence with mobility by performin. Supine to sit with Modified [place]  2. Sit to supine with Modified [place]  3. Sit to stand transfer with Modified [place]  4. Gait  x 100 feet with Minimal Assistance using Rolling Walker.   5. Stand for 10 minutes with Minimal Assistance using Rolling Walker    Outcome: Ongoing, Progressing     Patient presets today with significant challenges POD 2 with bed mobility and transfers. We were not able to complete full sit to stand today due to challenges with knee flexion contractures not allowing for upright posture. Positioned legs in bed with towels under the heels and pillowed in midline to prevent further flexion or adduction tone preventing gait potential as she progresses in therapy. Patient is well educated in the rehab world and is very motivated but will have greater challenges due to the lack of full knee extension.

## 2023-10-01 NOTE — ASSESSMENT & PLAN NOTE
-worsening anemia since admission likely contributing to worsening RVR  -begin PRBC transfusion per Card recs  -trend labs and monitor  -further transfusions as indicated

## 2023-10-01 NOTE — ASSESSMENT & PLAN NOTE
Chronic myelopathy  Foraminal stenosis of lumbar region  Neurogenic claudication   DDD, lumbar  Decreased ROM of R knee  Assistance needed for mobility   -thoracic myelopathy with neurogenic claudication s/p s/p T6-7 T7-8 anterior diskectomy (05/12/2023) and now s/p left L3-4, L4-5 OLIF, L5-S1 ALIF and L3-S1 posterior instrumented fusion (09/29/2023)  -management per primary team   -remains with surgical drain  -PT/OT following

## 2023-10-01 NOTE — PT/OT/SLP EVAL
Physical Therapy Evaluation    Patient Name:  Graciela Barton   MRN:  [id number]    Recommendations:     Discharge Recommendations:  (HIT - rehab)   Discharge Equipment Recommendations:  (to be determined at rehab)   Barriers to discharge: Decreased caregiver support    Assessment:     Graciela Barton is a 73 y.o. female admitted with a medical diagnosis of Lumbar stenosis with neurogenic claudication.  She presents with the following impairments/functional limitations: weakness, impaired endurance, impaired self care skills, impaired functional mobility, gait instability, impaired balance, decreased lower extremity function, pain, decreased ROM, abnormal tone, impaired joint extensibility, impaired muscle length, orthopedic precautions Patient presets today with significant challenges POD 2 with bed mobility and transfers. We were not able to complete full sit to stand today due to challenges with knee flexion contractures not allowing for upright posture. Questions arose to wether these were contractures of abnormal tone stemming from a neuro source. Positioned legs in bed with towels under the heels and pillowed in midline to prevent further flexion or adduction tone preventing gait potential as she progresses in therapy. Patient is well educated in the rehab world and is very motivated but will have greater challenges due to the lack of full knee extension.     Rehab Prognosis: Fair; patient would benefit from acute skilled PT services to address these deficits and reach maximum level of function.    Recent Surgery: Procedure(s) (LRB):  FUSION, SPINE, LUMBAR, TLIF, MINIMALLY INVASIVE (N/A)  FUSION, SPINE, LUMBAR, PLIF (N/A)  LAMINECTOMY, SPINE (N/A)  FORAMINOTOMY, SPINE (N/A)  FACETECTOMY (N/A) 2 Days Post-Op    Plan:     During this hospitalization, patient to be seen daily to address the identified rehab impairments via gait training, therapeutic activities, therapeutic exercises, neuromuscular re-education and  progress toward the following goals:    Plan of Care Expires:  11/01/23    Subjective     Chief Complaint: ready to get to the chair  Patient/Family Comments/goals: get to rehab and get walking again  Pain/Comfort:  Pain Rating 1: 0/10  Location - Side 1: Bilateral  Location 1: knee    Patients cultural, spiritual, Scientologist conflicts given the current situation: no    Living Environment:  Lives alone in 2 level home, all needs are on first level, 1 step to enter  Prior to admission, patients level of function was in need of assist.  Equipment used at home: walker, rolling, wheelchair.  DME owned (not currently used): none.  Upon discharge, patient will have assistance from TBD.    Objective:     Communicated with nsg prior to session.  Patient found right sidelying with peripheral IV, telemetry, pulse ox (continuous), blood pressure cuff, SCD, aden catheter  upon PT entry to room.    General Precautions: Standard, fall  Orthopedic Precautions:spinal precautions   Braces: TLSO  Respiratory Status: Room air    Exams:  Cognition:   Patient is oriented to all.  Pt follows approximately 75% of one step verbal commands.  Pt is [place]  Mood: Pleasant and cooperative.   Safety Awareness: very aware of safety - fearful of falling  Musculoskeletal:  BMI: 26  Posture:  slumped in sitting, unable to stand fully  LE ROM/Strength:   R ROM: limited knee and hip mobility - knee unable to fully extend, hips flexed and tight  L ROM: limited knee and hip mobility - knee unable to fully extend, hips flexed and tight  R Strength:   Knee extension: 4-/5  Dorsiflexion: 4/5   L Strength:   Knee extension: 4-/5  Dorsiflexion: 4/5   Neuromuscular:  Sensation: Intact to light touch bilateral LEs.   Tone/Reflexes: No impairments identified with functional mobility. No formal testing performed.   Balance:   Static sitting: good  Dynamic sitting good-  Static standing: unable to assess  Dynamic standing: unable to assess  Visual-vestibular: No  impairments identified with functional mobility. No formal testing performed.  Integument: incisions covered and 1 bulb drain with normal fluid collecting  Cardiopulmonary:  Vital signs:     Edema: min in LEs     Functional Mobility:  Bed Mobility:  Rolling Left:  moderate assistance  Rolling Right: moderate assistance  Supine to Sit: maximal assistance and of 2 persons  Sit to Supine: maximal assistance and of 2 persons  Transfers:  Sit to Stand:  total assistance and of 2 persons with rolling walker - unable to complete stand      AM-PAC 6 CLICK MOBILITY  Total Score:9       Treatment & Education:   PT educated patient:  PT plan of care/role of PT  Safety with OOB mobility  Use of RW for household and community ambulation.   Energy conservation techniques   Discharge disposition    Pt  verbalized understanding       Patient left supine with call button in reach, bed alarm on, RN notified, and ankles floated with towels and adduction pillows present to prevent adduction .    GOALS:   Multidisciplinary Problems       Physical Therapy Goals          Problem: Physical Therapy    Goal Priority Disciplines Outcome Goal Variances Interventions   Physical Therapy Goal     PT, PT/OT Ongoing, Progressing     Description: Goals to be met by: 2023     Patient will increase functional independence with mobility by performin. Supine to sit with Modified [place]  2. Sit to supine with Modified [place]  3. Sit to stand transfer with Modified [place]  4. Gait  x 100 feet with Minimal Assistance using Rolling Walker.   5. Stand for 10 minutes with Minimal Assistance using Rolling Walker                         History:     Past Medical History:   Diagnosis Date    Arthritis     Cancer     Thyroid    Edema     Hyperlipidemia     Hypertension     Thyroid disease        Past Surgical History:   Procedure Laterality Date    ADENOIDECTOMY      COLONOSCOPY  10/18/2018    COLONOSCOPY N/A 10/18/2018    Procedure:  COLONOSCOPY;  Surgeon: Yosvany Alejandra MD;  Location: [facility] ENDO;  Service: General;  Laterality: N/A;    EPIDURAL STEROID INJECTION N/A 9/30/2022    Procedure: LUMBAR L3/4 MALKA CONTRAST DIRRECT REFERRAL ELIQUIS CLEARNCE IN CHART;  Surgeon: Nicholas Kumar MD;  Location: [facility] PAIN MGT;  Service: Pain Management;  Laterality: N/A;    HYSTERECTOMY  1992    total hyst     KNEE SURGERY      16 pins and two plates implanted    THORACIC DISCECTOMY Right 5/12/2023    Procedure: DISCECTOMY, SPINE, THORACIC Right T6-7, T7-8 interior discectomy retropleural;  Surgeon: Scooter Salter MD;  Location: [facility] OR;  Service: Neurosurgery;  Laterality: Right;  general  eliquis   type and screen  C-arm  Metrx  EMG bairon notified cc  SEP  MEP  Regular bed   lateral left down   globus (Jose Enrique) notified cc    THYROIDECTOMY      TONSILLECTOMY         Time Tracking:     PT Received On: 10/01/23  PT Start Time: 1100     PT Stop Time: 1140  PT Total Time (min): 40 min     Billable Minutes: Evaluation 15 and Therapeutic Activity 25      10/01/2023

## 2023-10-01 NOTE — SUBJECTIVE & OBJECTIVE
Past Medical History:   Diagnosis Date    Arthritis     Cancer     Thyroid    Edema     Hyperlipidemia     Hypertension     Thyroid disease        Past Surgical History:   Procedure Laterality Date    ADENOIDECTOMY      COLONOSCOPY  10/18/2018    COLONOSCOPY N/A 10/18/2018    Procedure: COLONOSCOPY;  Surgeon: Yosvany Alejandra MD;  Location: [facility] ENDO;  Service: General;  Laterality: N/A;    EPIDURAL STEROID INJECTION N/A 9/30/2022    Procedure: LUMBAR L3/4 MALKA CONTRAST DIRRECT REFERRAL HAMILTONQUROWENA BECERRAE IN CHART;  Surgeon: Nicholas Kumar MD;  Location: [facility] PAIN MGT;  Service: Pain Management;  Laterality: N/A;    HYSTERECTOMY  1992    total hyst     KNEE SURGERY      16 pins and two plates implanted    THORACIC DISCECTOMY Right 5/12/2023    Procedure: DISCECTOMY, SPINE, THORACIC Right T6-7, T7-8 interior discectomy retropleural;  Surgeon: Scooter Salter MD;  Location: [facility] OR;  Service: Neurosurgery;  Laterality: Right;  general  eliquis   type and screen  C-arm  Metrx  EMG bairon notified cc  SEP  MEP  Regular bed   lateral left down   globus (Jose Enrique) notified cc    THYROIDECTOMY      TONSILLECTOMY         Review of patient's allergies indicates:   Allergen Reactions    Vancomycin Tinitus    Vancomycin analogues Tinitus       No current facility-administered medications on file prior to encounter.     Current Outpatient Medications on File Prior to Encounter   Medication Sig    hydroCHLOROthiazide (HYDRODIURIL) 25 MG tablet Take 1 tablet (25 mg total) by mouth once daily.    multivitamin (THERAGRAN) per tablet Take 1 tablet by mouth once daily.    propafenone (RHTHYMOL) 150 MG Tab Take 1 tablet (150 mg total) by mouth every 8 (eight) hours.    rosuvastatin (CRESTOR) 10 MG tablet Take 1 tablet (10 mg total) by mouth once daily.    ELIQUIS 5 mg Tab Take 1 tablet (5 mg total) by mouth 2 (two) times daily.    meclizine (ANTIVERT) 25 mg tablet Take 1 tablet (25 mg total) by mouth 3 (three) times daily as needed for  Dizziness. (Patient not taking: Reported on 9/18/2023)    methocarbamoL (ROBAXIN) 750 MG Tab Take 1 tablet (750 mg total) by mouth 3 (three) times daily as needed (muscle spasms). (Patient not taking: Reported on 9/18/2023)    senna-docusate 8.6-50 mg (PERICOLACE) 8.6-50 mg per tablet Take 2 tablets by mouth nightly as needed for Constipation. (Patient not taking: Reported on 9/18/2023)    [DISCONTINUED] atorvastatin (LIPITOR) 40 MG tablet Take 1 tablet (40 mg total) by mouth once daily.     Family History       Problem Relation (Age of Onset)    Alcohol abuse Mother, Father    Heart disease Mother, Father, Maternal Grandmother, Maternal Grandfather          Tobacco Use    Smoking status: Never    Smokeless tobacco: Never   Substance and Sexual Activity    Alcohol use: No    Drug use: No    Sexual activity: Not Currently     Partners: Male     Review of Systems   Constitutional:  Positive for activity change. Negative for appetite change, chills, diaphoresis, fatigue and fever.   HENT:  Negative for congestion and sore throat.    Respiratory:  Negative for cough, chest tightness, shortness of breath and wheezing.    Cardiovascular:  Positive for leg swelling. Negative for chest pain and palpitations.   Gastrointestinal:  Negative for abdominal pain, constipation, diarrhea, nausea and vomiting.   Musculoskeletal:  Positive for arthralgias, back pain and gait problem.   Neurological:  Negative for dizziness, syncope, weakness, light-headedness and headaches.       Objective:     Vital Signs (Most Recent):  Temp: 97.6 °F (36.4 °C) (10/01/23 0700)  Pulse: (!) 128 (10/01/23 1353)  Resp: 14 (10/01/23 1353)  BP: 100/61 (10/01/23 1330)  SpO2: 100 % (10/01/23 1353) Vital Signs (24h Range):  Temp:  [97.6 °F (36.4 °C)-99.3 °F (37.4 °C)] 97.6 °F (36.4 °C)  Pulse:  [118-251] 128  Resp:  [8-37] 14  SpO2:  [67 %-100 %] 100 %  BP: ()/(50-76) 100/61     Weight: 76.7 kg (169 lb)  Body mass index is 26.47 kg/m².     Physical  Exam  Vitals and nursing note reviewed.   Constitutional:       General: She is not in acute distress.     Appearance: Normal appearance. She is well-developed. She is not toxic-appearing.   HENT:      Head: Normocephalic and atraumatic.      Mouth/Throat:      Dentition: Normal dentition.   Eyes:      General: Lids are normal.      Extraocular Movements: Extraocular movements intact.      Conjunctiva/sclera: Conjunctivae normal.   Cardiovascular:      Rate and Rhythm: Tachycardia present. Rhythm irregular.   Pulmonary:      Effort: Pulmonary effort is normal.      Breath sounds: Normal breath sounds.   Abdominal:      General: Bowel sounds are normal.      Palpations: Abdomen is soft.   Musculoskeletal:      Cervical back: Neck supple.      Right lower leg: Edema present.      Left lower leg: Edema present.   Skin:     General: Skin is warm and dry.      Findings: No erythema or rash.   Neurological:      Mental Status: She is alert and oriented to person, place, and time.          Significant Labs: All pertinent labs within the past 24 hours have been reviewed.  CBC:   Recent Labs   Lab 09/29/23  1909 09/29/23  2051 09/30/23  0321 10/01/23  1216   WBC 12.21  --  12.29 13.20*   HGB 9.7*  --  8.5* 7.4*   HCT 30.0* 24* 25.9* 22.8*     --  168 162     CMP:   Recent Labs   Lab 09/30/23  0321 10/01/23  1216    140   K 3.9 3.8    105   CO2 23 26   * 106   BUN 24* 23   CREATININE 0.8 0.8   CALCIUM 8.0* 8.3*   ANIONGAP 9 9     TSH:   Recent Labs   Lab 09/30/23  1148   TSH 7.448*       Significant Imaging: I have reviewed all pertinent imaging results/findings within the past 24 hours.

## 2023-10-01 NOTE — ASSESSMENT & PLAN NOTE
-chronic  -low/normotensive pressures at current  -hold home HCTZ  -continue metoprolol as noted above   -dose/medication adjustment as appropriate   -monitor

## 2023-10-02 LAB
ANION GAP SERPL CALC-SCNC: 9 MMOL/L (ref 8–16)
BASOPHILS # BLD AUTO: 0.03 K/UL (ref 0–0.2)
BASOPHILS NFR BLD: 0.2 % (ref 0–1.9)
BUN SERPL-MCNC: 19 MG/DL (ref 8–23)
CALCIUM SERPL-MCNC: 8.2 MG/DL (ref 8.7–10.5)
CHLORIDE SERPL-SCNC: 104 MMOL/L (ref 95–110)
CO2 SERPL-SCNC: 25 MMOL/L (ref 23–29)
CREAT SERPL-MCNC: 0.7 MG/DL (ref 0.5–1.4)
DIFFERENTIAL METHOD: ABNORMAL
EOSINOPHIL # BLD AUTO: 0.1 K/UL (ref 0–0.5)
EOSINOPHIL NFR BLD: 0.8 % (ref 0–8)
ERYTHROCYTE [DISTWIDTH] IN BLOOD BY AUTOMATED COUNT: 14.5 % (ref 11.5–14.5)
EST. GFR  (NO RACE VARIABLE): >60 ML/MIN/1.73 M^2
GLUCOSE SERPL-MCNC: 99 MG/DL (ref 70–110)
HCT VFR BLD AUTO: 30.7 % (ref 37–48.5)
HGB BLD-MCNC: 10 G/DL (ref 12–16)
IMM GRANULOCYTES # BLD AUTO: 0.1 K/UL (ref 0–0.04)
IMM GRANULOCYTES NFR BLD AUTO: 0.7 % (ref 0–0.5)
LYMPHOCYTES # BLD AUTO: 1.2 K/UL (ref 1–4.8)
LYMPHOCYTES NFR BLD: 8.8 % (ref 18–48)
MAGNESIUM SERPL-MCNC: 2 MG/DL (ref 1.6–2.6)
MCH RBC QN AUTO: 31 PG (ref 27–31)
MCHC RBC AUTO-ENTMCNC: 32.6 G/DL (ref 32–36)
MCV RBC AUTO: 95 FL (ref 82–98)
MONOCYTES # BLD AUTO: 1.6 K/UL (ref 0.3–1)
MONOCYTES NFR BLD: 11.9 % (ref 4–15)
NEUTROPHILS # BLD AUTO: 10.6 K/UL (ref 1.8–7.7)
NEUTROPHILS NFR BLD: 77.6 % (ref 38–73)
NRBC BLD-RTO: 0 /100 WBC
PLATELET # BLD AUTO: 155 K/UL (ref 150–450)
PMV BLD AUTO: 11 FL (ref 9.2–12.9)
POTASSIUM SERPL-SCNC: 4.3 MMOL/L (ref 3.5–5.1)
RBC # BLD AUTO: 3.23 M/UL (ref 4–5.4)
SODIUM SERPL-SCNC: 138 MMOL/L (ref 136–145)
WBC # BLD AUTO: 13.64 K/UL (ref 3.9–12.7)

## 2023-10-02 PROCEDURE — 93010 ELECTROCARDIOGRAM REPORT: CPT | Mod: ,,, | Performed by: INTERNAL MEDICINE

## 2023-10-02 PROCEDURE — 25000003 PHARM REV CODE 250: Performed by: INTERNAL MEDICINE

## 2023-10-02 PROCEDURE — 27000221 HC OXYGEN, UP TO 24 HOURS

## 2023-10-02 PROCEDURE — 94761 N-INVAS EAR/PLS OXIMETRY MLT: CPT

## 2023-10-02 PROCEDURE — 63600175 PHARM REV CODE 636 W HCPCS: Performed by: INTERNAL MEDICINE

## 2023-10-02 PROCEDURE — 99900035 HC TECH TIME PER 15 MIN (STAT)

## 2023-10-02 PROCEDURE — 63600175 PHARM REV CODE 636 W HCPCS: Performed by: STUDENT IN AN ORGANIZED HEALTH CARE EDUCATION/TRAINING PROGRAM

## 2023-10-02 PROCEDURE — 94799 UNLISTED PULMONARY SVC/PX: CPT

## 2023-10-02 PROCEDURE — 51702 INSERT TEMP BLADDER CATH: CPT

## 2023-10-02 PROCEDURE — 11000001 HC ACUTE MED/SURG PRIVATE ROOM

## 2023-10-02 PROCEDURE — 36415 COLL VENOUS BLD VENIPUNCTURE: CPT | Performed by: PHYSICIAN ASSISTANT

## 2023-10-02 PROCEDURE — 63600175 PHARM REV CODE 636 W HCPCS: Performed by: NEUROLOGICAL SURGERY

## 2023-10-02 PROCEDURE — 99232 SBSQ HOSP IP/OBS MODERATE 35: CPT | Mod: FS,,, | Performed by: INTERNAL MEDICINE

## 2023-10-02 PROCEDURE — 97116 GAIT TRAINING THERAPY: CPT

## 2023-10-02 PROCEDURE — 97535 SELF CARE MNGMENT TRAINING: CPT

## 2023-10-02 PROCEDURE — 93005 ELECTROCARDIOGRAM TRACING: CPT

## 2023-10-02 PROCEDURE — 97530 THERAPEUTIC ACTIVITIES: CPT

## 2023-10-02 PROCEDURE — 80048 BASIC METABOLIC PNL TOTAL CA: CPT | Performed by: PHYSICIAN ASSISTANT

## 2023-10-02 PROCEDURE — 85025 COMPLETE CBC W/AUTO DIFF WBC: CPT | Performed by: PHYSICIAN ASSISTANT

## 2023-10-02 PROCEDURE — 97110 THERAPEUTIC EXERCISES: CPT

## 2023-10-02 PROCEDURE — 99232 PR SUBSEQUENT HOSPITAL CARE,LEVL II: ICD-10-PCS | Mod: FS,,, | Performed by: INTERNAL MEDICINE

## 2023-10-02 PROCEDURE — 25000003 PHARM REV CODE 250: Performed by: NURSE PRACTITIONER

## 2023-10-02 PROCEDURE — 25000003 PHARM REV CODE 250: Performed by: NEUROLOGICAL SURGERY

## 2023-10-02 PROCEDURE — 93010 EKG 12-LEAD: ICD-10-PCS | Mod: ,,, | Performed by: INTERNAL MEDICINE

## 2023-10-02 PROCEDURE — 25000003 PHARM REV CODE 250: Performed by: STUDENT IN AN ORGANIZED HEALTH CARE EDUCATION/TRAINING PROGRAM

## 2023-10-02 PROCEDURE — 83735 ASSAY OF MAGNESIUM: CPT | Performed by: NURSE PRACTITIONER

## 2023-10-02 RX ORDER — DILTIAZEM HCL 1 MG/ML
5 INJECTION, SOLUTION INTRAVENOUS CONTINUOUS
Status: DISCONTINUED | OUTPATIENT
Start: 2023-10-02 | End: 2023-10-04

## 2023-10-02 RX ORDER — METOPROLOL TARTRATE 50 MG/1
50 TABLET ORAL 2 TIMES DAILY
Status: DISCONTINUED | OUTPATIENT
Start: 2023-10-02 | End: 2023-10-03

## 2023-10-02 RX ORDER — AMOXICILLIN 250 MG
2 CAPSULE ORAL 2 TIMES DAILY
Status: DISCONTINUED | OUTPATIENT
Start: 2023-10-02 | End: 2023-10-10 | Stop reason: HOSPADM

## 2023-10-02 RX ADMIN — OXYCODONE HYDROCHLORIDE 10 MG: 5 TABLET ORAL at 06:10

## 2023-10-02 RX ADMIN — Medication 5 MG/HR: at 05:10

## 2023-10-02 RX ADMIN — BISACODYL 10 MG: 10 SUPPOSITORY RECTAL at 08:10

## 2023-10-02 RX ADMIN — LEVOTHYROXINE SODIUM 137 MCG: 25 TABLET ORAL at 05:10

## 2023-10-02 RX ADMIN — METOPROLOL TARTRATE 50 MG: 50 TABLET, FILM COATED ORAL at 08:10

## 2023-10-02 RX ADMIN — DIGOXIN 250 MCG: 250 INJECTION, SOLUTION INTRAMUSCULAR; INTRAVENOUS at 05:10

## 2023-10-02 RX ADMIN — DOCUSATE SODIUM AND SENNOSIDES 2 TABLET: 8.6; 5 TABLET, FILM COATED ORAL at 09:10

## 2023-10-02 RX ADMIN — METHOCARBAMOL TABLETS 750 MG: 750 TABLET, COATED ORAL at 08:10

## 2023-10-02 RX ADMIN — HEPARIN SODIUM 5000 UNITS: 5000 INJECTION INTRAVENOUS; SUBCUTANEOUS at 05:10

## 2023-10-02 RX ADMIN — METHOCARBAMOL TABLETS 750 MG: 750 TABLET, COATED ORAL at 02:10

## 2023-10-02 RX ADMIN — HYDROMORPHONE HYDROCHLORIDE 1 MG: 2 INJECTION, SOLUTION INTRAMUSCULAR; INTRAVENOUS; SUBCUTANEOUS at 05:10

## 2023-10-02 RX ADMIN — METHOCARBAMOL TABLETS 750 MG: 750 TABLET, COATED ORAL at 09:10

## 2023-10-02 RX ADMIN — ESMOLOL HYDROCHLORIDE 175 MCG/KG/MIN: 20 INJECTION INTRAVENOUS at 05:10

## 2023-10-02 RX ADMIN — MUPIROCIN: 20 OINTMENT TOPICAL at 08:10

## 2023-10-02 RX ADMIN — ESMOLOL HYDROCHLORIDE 200 MCG/KG/MIN: 20 INJECTION INTRAVENOUS at 08:10

## 2023-10-02 RX ADMIN — MUPIROCIN: 20 OINTMENT TOPICAL at 09:10

## 2023-10-02 RX ADMIN — ESMOLOL HYDROCHLORIDE 175 MCG/KG/MIN: 20 INJECTION INTRAVENOUS at 02:10

## 2023-10-02 RX ADMIN — HYDROMORPHONE HYDROCHLORIDE 1 MG: 2 INJECTION, SOLUTION INTRAMUSCULAR; INTRAVENOUS; SUBCUTANEOUS at 12:10

## 2023-10-02 RX ADMIN — SODIUM CHLORIDE, POTASSIUM CHLORIDE, SODIUM LACTATE AND CALCIUM CHLORIDE 500 ML: 600; 310; 30; 20 INJECTION, SOLUTION INTRAVENOUS at 02:10

## 2023-10-02 NOTE — PROGRESS NOTES
NEUROSURGICAL POST-OPERATIVE PROGRESS NOTE    DATE OF SERVICE:  10/02/2023      ATTENDING PHYSICIAN:  Scooter Salter MD    SUBJECTIVE:    INTERIM HISTORY:    This is a very pleasant 73 y.o. y.o. female, who is status postop day 3 L3-4, L4-5, L5-S1 anterior interbody fusion with posterior instrumentation, L3-L5 laminectomy and L3-S1 posterior fusion.  Doing well.  Did physical therapy yesterday.                OBJECTIVE:    PHYSICAL EXAMINATION:   Vitals:    10/02/23 0838   BP: 117/65   Pulse: (!) 145   Resp:    Temp:        Physical Exam:  Vitals reviewed.    Constitutional: She appears well-developed and well-nourished.     Eyes: Pupils are equal, round, and reactive to light. Conjunctivae and EOM are normal.     Cardiovascular: Normal distal pulses and no edema.     Abdominal: Soft.     Skin: Skin displays no rash on trunk and no rash on extremities. Skin displays no lesions on trunk and no lesions on extremities.     Psych/Behavior: She is alert. She is oriented to person, place, and time. She has a normal mood and affect.     Musculoskeletal:        Neck: Range of motion is limited.       Ortho Exam    Neurologic Exam     Mental Status   Oriented to person, place, and time.     Cranial Nerves     CN III, IV, VI   Pupils are equal, round, and reactive to light.  Extraocular motions are normal.     Right deltoid: 5/5  Left deltoid: 5/5  Right biceps: 5/5  Left biceps: 5/5  Right triceps: 5/5  Left triceps: 5/5  Right wrist flexion: 5/5  Left wrist flexion: 5/5  Right wrist extension: 5/5  Left wrist extension: 5/5  Right interossei: 5/5  Left interossei: 5/5  Right iliopsoas: 4/5  Left iliopsoas: 4/5  Right quadriceps: 2/5  Left quadriceps: 5/5  Right hamstrin/5  Left hamstrin/5  Right anterior tibial: 5/5  Left anterior tibial: 5/5  Right posterior tibial: 5/5  Left posterior tibial: 5/5  Right peroneal: 5/5  Left peroneal: 5/5  Right gastroc: 5/5  Left gastroc: 5/5    Dressing CDI    DIAGNOSTIC  DATA:    Drain 115 cc    Bilateral veins US neg    ASSESMENT:    This is a 73 y.o. female who is s/p day 3 L3-S1 fusion.  Chronic right knee contracture in flexion.  Significant mobility issue at baseline.  Drill fibrillation with rapid rate    Problem List Items Addressed This Visit          Neuro    * (Principal) Lumbar stenosis with neurogenic claudication - Primary    Relevant Orders    Vital signs    Turn cough deep breathe    Oral Care    Neurovascular checks    Intake and output    Place sequential compression device    Chlorohexidine Gluconate Bath    Incentive spirometry    Pulse Oximetry Q4H    PT evaluate and treat    OT evaluate and treat    Transfer patient (Completed)    PT assistance with ambulation    Drain - full suction    Document drain output    TLSO brace    Patient must wear orthosis while sitting or standing    Discontinue Carver Catheter (1 day after surgery)    CBC auto differential (Completed)    Basic metabolic panel (Completed)    X-Ray Lumbar Spine Ap And Lateral    CBC auto differential (Completed)    Basic metabolic panel (Completed)    CBC auto differential    Basic metabolic panel    Transfer patient    Foraminal stenosis of lumbar region    Relevant Orders    Vital signs    Turn cough deep breathe    Oral Care    Neurovascular checks    Intake and output    Place sequential compression device    Chlorohexidine Gluconate Bath    Incentive spirometry    Pulse Oximetry Q4H    PT evaluate and treat    OT evaluate and treat    Transfer patient (Completed)    PT assistance with ambulation    Drain - full suction    Document drain output    TLSO brace    Patient must wear orthosis while sitting or standing    Discontinue Carver Catheter (1 day after surgery)    CBC auto differential (Completed)    Basic metabolic panel (Completed)    X-Ray Lumbar Spine Ap And Lateral    Chronic myelopathy    Relevant Orders    Vital signs    Turn cough deep breathe    Oral Care    Neurovascular checks     Intake and output    Place sequential compression device    Chlorohexidine Gluconate Bath    Incentive spirometry    Pulse Oximetry Q4H    PT evaluate and treat    OT evaluate and treat    Transfer patient (Completed)    PT assistance with ambulation    Drain - full suction    Document drain output    TLSO brace    Patient must wear orthosis while sitting or standing    Discontinue Carver Catheter (1 day after surgery)    CBC auto differential (Completed)    Basic metabolic panel (Completed)    X-Ray Lumbar Spine Ap And Lateral    Magnesium (Completed)       Cardiac/Vascular    PAF (paroxysmal atrial fibrillation)    Overview       Continue DOAC  Continue metoprolol  Rate controlled today           Atrial flutter    Overview     With 2:1 conduction, rate in 90s  EKG reviewed with Dr. Weller and cardioversion discussed.  This may be scheduled after surgery when she has been back on anticoagulation 6-8 weeks.    Metoprolol dosing increased by PCP which has modestly improved her HR.  She has been seen by EP but has been unable to follow up with their service due to her impaired function. Continue Rythmol (prescribed TID but taking 1.5 tabs BID), continue metoprolol 50 BID, continue DOAC.  Likely needs a higher dose of BB but BP is low end normal in clinic today.  Had been bradycardic at last visit with EP and hope to be able to get her for follow up soon         Relevant Orders    Magnesium (Completed)    Vital signs    Turn cough deep breathe    Oral Care    Neurovascular checks    Intake and output    Place sequential compression device    Chlorohexidine Gluconate Bath    Incentive spirometry    Pulse Oximetry Q4H    PT evaluate and treat    OT evaluate and treat    Transfer patient (Completed)    PT assistance with ambulation    Drain - full suction    Document drain output    TLSO brace    Patient must wear orthosis while sitting or standing    Discontinue Carver Catheter (1 day after surgery)    CBC auto differential  (Completed)    Basic metabolic panel (Completed)    X-Ray Lumbar Spine Ap And Lateral    Cardiac Monitoring - Adult    Full code    Magnesium (Completed)       Orthopedic    Decreased range of motion of right knee    Relevant Orders    Vital signs    Turn cough deep breathe    Oral Care    Neurovascular checks    Intake and output    Place sequential compression device    Chlorohexidine Gluconate Bath    Incentive spirometry    Pulse Oximetry Q4H    PT evaluate and treat    OT evaluate and treat    Transfer patient (Completed)    PT assistance with ambulation    Drain - full suction    Document drain output    TLSO brace    Patient must wear orthosis while sitting or standing    Discontinue Carver Catheter (1 day after surgery)    CBC auto differential (Completed)    Basic metabolic panel (Completed)    X-Ray Lumbar Spine Ap And Lateral       Other    Bilateral leg edema    Relevant Orders    Vital signs    Turn cough deep breathe    Oral Care    Neurovascular checks    Intake and output    Place sequential compression device    Chlorohexidine Gluconate Bath    Incentive spirometry    Pulse Oximetry Q4H    PT evaluate and treat    OT evaluate and treat    Transfer patient (Completed)    PT assistance with ambulation    Drain - full suction    Document drain output    TLSO brace    Patient must wear orthosis while sitting or standing    Discontinue Carver Catheter (1 day after surgery)    CBC auto differential (Completed)    Basic metabolic panel (Completed)    X-Ray Lumbar Spine Ap And Lateral    Assistance needed for mobility    Relevant Orders    Vital signs    Turn cough deep breathe    Oral Care    Neurovascular checks    Intake and output    Place sequential compression device    Chlorohexidine Gluconate Bath    Incentive spirometry    Pulse Oximetry Q4H    PT evaluate and treat    OT evaluate and treat    Transfer patient (Completed)    PT assistance with ambulation    Drain - full suction    Document drain  output    TLSO brace    Patient must wear orthosis while sitting or standing    Discontinue Carver Catheter (1 day after surgery)    CBC auto differential (Completed)    Basic metabolic panel (Completed)    X-Ray Lumbar Spine Ap And Lateral     Other Visit Diagnoses       Spinal stenosis        Tachycardia        Relevant Orders    EKG 12-lead (Completed)    Inpatient consult to [facility]iology[facility] (Completed)    A-fib        Relevant Orders    EKG 12-lead    Echo (Completed)    Inpatient consult to Hospital [facility] (Completed)    Paroxysmal atrial fibrillation        Relevant Orders    EKG 12-lead            PLAN:  We will remove drain tomorrow and resume anticoagulation after drain being removed.  Keep in ICU overnight  Appreciating cardiology and ICU recommendations        Scooter Salter MD  Pager: [phone]

## 2023-10-02 NOTE — PLAN OF CARE
Notified Dr Ortega of continued elevated pulse now 160, bp now 120 sys but has been as low as 85, mainly in the 90's low 100's, will start on cardizem drip 5 mgm hr, and inc to 10 mgm if bp does not drop

## 2023-10-02 NOTE — ASSESSMENT & PLAN NOTE
-s/p 2 units PRBC transfusion 10/2 with appropriate response  -monitor for s/s of bleeding   -trend labs and monitor  -further transfusions as indicated

## 2023-10-02 NOTE — SUBJECTIVE & OBJECTIVE
Review of Systems   Constitutional: Negative for chills, decreased appetite, diaphoresis and fever.   Cardiovascular:  Negative for chest pain, claudication, cyanosis, dyspnea on exertion, irregular heartbeat, leg swelling, near-syncope, orthopnea, palpitations, paroxysmal nocturnal dyspnea and syncope.   Respiratory:  Negative for cough, hemoptysis, shortness of breath and wheezing.    Gastrointestinal:  Negative for bloating, abdominal pain, constipation, diarrhea, melena, nausea and vomiting.   Neurological:  Negative for dizziness and weakness.     Objective:     Vital Signs (Most Recent):  Temp: 98.6 °F (37 °C) (10/02/23 0730)  Pulse: (!) 145 (10/02/23 0838)  Resp: 19 (10/02/23 0742)  BP: 117/65 (10/02/23 0838)  SpO2: 99 % (10/02/23 0742) Vital Signs (24h Range):  Temp:  [98.1 °F (36.7 °C)-99.9 °F (37.7 °C)] 98.6 °F (37 °C)  Pulse:  [125-148] 145  Resp:  [4-37] 19  SpO2:  [87 %-100 %] 99 %  BP: ()/(50-77) 117/65     Weight: 76.7 kg (169 lb)  Body mass index is 26.47 kg/m².     SpO2: 99 %         Intake/Output Summary (Last 24 hours) at 10/2/2023 1212  Last data filed at 10/2/2023 1056  Gross per 24 hour   Intake 2055.77 ml   Output 415 ml   Net 1640.77 ml       Lines/Drains/Airways       Drain  Duration                  Closed/Suction Drain 09/29/23 2123 Medial Back Bulb 10 Fr. 2 days              Peripheral Intravenous Line  Duration                  Peripheral IV - Single Lumen 09/29/23 1416 18 G Right Wrist 2 days         Peripheral IV - Single Lumen 09/29/23 2300 18 G Right;Anterior Forearm 2 days         Peripheral IV - Single Lumen 09/30/23 0300 20 G Anterior;Right Forearm 2 days                       Physical Exam  Constitutional:       General: She is not in acute distress.     Appearance: She is well-developed.   Cardiovascular:      Rate and Rhythm: Normal rate and regular rhythm.      Heart sounds: No murmur heard.     No gallop.   Pulmonary:      Effort: Pulmonary effort is normal. No  respiratory distress.      Breath sounds: Normal breath sounds. No wheezing.   Abdominal:      General: Bowel sounds are normal. There is no distension.      Palpations: Abdomen is soft.      Tenderness: There is no abdominal tenderness.   Skin:     General: Skin is warm and dry.   Neurological:      Mental Status: She is alert and oriented to person, place, and time.            Significant Labs: BMP:   Recent Labs   Lab 10/01/23  1216 10/01/23  1248 10/02/23  0425     --  99     --  138   K 3.8  --  4.3     --  104   CO2 26  --  25   BUN 23  --  19   CREATININE 0.8  --  0.7   CALCIUM 8.3*  --  8.2*   MG  --  1.8 2.0   , CBC   Recent Labs   Lab 10/01/23  1216 10/02/23  0425   WBC 13.20* 13.64*   HGB 7.4* 10.0*   HCT 22.8* 30.7*    155       Significant Imaging: Echocardiogram: Transthoracic echo (TTE) complete (Cupid Only):   Results for orders placed or performed during the hospital encounter of 09/29/23   Echo   Result Value Ref Range    BSA 1.9 m2    Hoyt's Biplane MOD Ejection Fraction 64 %    LVOT stroke volume 51.18 cm3    LVIDd 4.07 3.5 - 6.0 cm    LV Systolic Volume 33.51 mL    LV Systolic Volume Index 17.8 mL/m2    LVIDs 2.95 2.1 - 4.0 cm    LV Diastolic Volume 72.74 mL    LV Diastolic Volume Index 38.69 mL/m2    IVS 0.87 0.6 - 1.1 cm    LVOT diameter 2.0 cm    LVOT area 3.1 cm2    FS 28 28 - 44 %    Left Ventricle Relative Wall Thickness 0.36 cm    Posterior Wall 0.74 0.6 - 1.1 cm    LV mass 96.97 g    LV Mass Index 52 g/m2    TDI LATERAL 0.26 m/s    TDI SEPTAL 0.26 m/s    TR Max Mathew 2.71 m/s    LVOT peak mathew 1.01 m/s    Left Ventricular Outflow Tract Mean Velocity 0.76 cm/s    Left Ventricular Outflow Tract Mean Gradient 2.53 mmHg    LA size 3.83 cm    Left Atrium Major Axis 5.13 cm    RV S' 9.74 cm/s    TAPSE 2.27 cm    RA Major Axis 5.45 cm    RA Width 4.05 cm    AV mean gradient 4 mmHg    AV peak gradient 7 mmHg    Ao peak mathew 1.31 m/s    Ao VTI 31.90 cm    LVOT peak VTI  16.30 cm    AV valve area 1.60 cm²    AV Velocity Ratio 0.77     AV index (prosthetic) 0.51     MADY by Velocity Ratio 2.42 cm²    Mr max fer 4.15 m/s    MV mean gradient 2 mmHg    MV peak gradient 5 mmHg    MV valve area by continuity eq 2.35 cm2    MV VTI 21.8 cm    Triscuspid Valve Regurgitation Peak Gradient 29 mmHg    PV PEAK VELOCITY 1.00 m/s    PV peak gradient 4 mmHg    Sinus 2.67 cm    STJ 2.62 cm    Ascending aorta 2.93 cm    IVC diameter 2.15 cm    Mean e' 0.26 m/s    ZLVIDS -0.74     ZLVIDD -2.55     LA WIDTH 4.3 cm    EF 55 %    TV resting pulmonary artery pressure 44 mmHg    RV TB RVSP 18 mmHg    Est. RA pres 15 mmHg    Narrative      Left Ventricle: The left ventricle is normal in size. Normal wall   thickness. Normal wall motion. There is normal systolic function. Ejection   fraction by visual approximation is 55%. Unable to assess diastolic   function due to atrial fibrillation.    Left Atrium: Left atrium is mildly dilated.    Right Ventricle: Normal right ventricular cavity size. Wall thickness   is normal. Right ventricle wall motion  is normal. Systolic function is   normal.    Right Atrium: Right atrium is mildly dilated.    Mitral Valve: There is mild regurgitation.    Tricuspid Valve: There is mild regurgitation.    Pulmonary Artery: The estimated pulmonary artery systolic pressure is   44 mmHg.    IVC/SVC: Elevated venous pressure at 15 mmHg.

## 2023-10-02 NOTE — PT/OT/SLP PROGRESS
Physical Therapy Treatment    Patient Name:  Graciela Barton   MRN:  [id number]    Recommendations:     Discharge Recommendations:  (High intensity therapy)  Discharge Equipment Recommendations: to be determined by next level of care  Barriers to discharge: Decreased caregiver support and requires increased level of assistance    Assessment:     Graciela Barton is a 73 y.o. female admitted with a medical diagnosis of Lumbar stenosis with neurogenic claudication.  She presents with the following impairments/functional limitations: weakness, impaired endurance, impaired sensation, impaired self care skills, impaired functional mobility, gait instability, impaired balance, decreased ROM, decreased lower extremity function, decreased safety awareness, abnormal tone, impaired joint extensibility, impaired muscle length, decreased upper extremity function, decreased coordination, impaired coordination, impaired cardiopulmonary response to activity, edema, impaired skin, orthopedic precautions Patient required max-maxA x 2 for bed mobility, sitting EOB with maxA ~30 min with heavy posterior lean; pt with poor sit balance and low BP, therefore, did not attempt standing; will cont with POC.    Rehab Prognosis: Good; patient would benefit from acute skilled PT services to address these deficits and reach maximum level of function.    Recent Surgery: Procedure(s) (LRB):  FUSION, SPINE, LUMBAR, TLIF, MINIMALLY INVASIVE (N/A)  FUSION, SPINE, LUMBAR, PLIF (N/A)  LAMINECTOMY, SPINE (N/A)  FORAMINOTOMY, SPINE (N/A)  FACETECTOMY (N/A) 3 Days Post-Op    Plan:     During this hospitalization, patient to be seen daily to address the identified rehab impairments via gait training, therapeutic activities, therapeutic exercises, neuromuscular re-education and progress toward the following goals:    Plan of Care Expires:  11/01/23    Subjective     Chief Complaint: pt reports difficulty with moving legs  Patient/Family Comments/goals: return to  PLOF  Pain/Comfort:  Pain Rating 1: 0/10  Location - Side 1: Bilateral  Location - Orientation 1: lower  Location 1: knee  Pain Addressed 1: Pre-medicate for activity, Reposition, Distraction, Cessation of Activity, Nurse notified  Pain Rating Post-Intervention 1: 0/10      Objective:     Communicated with nurse prior to session.  Patient found HOB elevated with peripheral IV, telemetry, blood pressure cuff, SCD, aden catheter, oxygen, pulse ox (continuous) upon PT entry to room.     General Precautions: Standard, fall  Orthopedic Precautions: spinal precautions  Braces: TLSO  Respiratory Status: Nasal cannula, flow 2 L/min     Functional Mobility:  Bed Mobility:     Rolling Left:  maximal assistance and use of side rail  Rolling Right: maximal assistance and use of side rail  Scooting: total assistance, of 2 persons, for scooting to HOB in supine and maxA when sitting EOB (with one person)  Supine to Sit: maximal assistance and of 2 persons  Sit to Supine: maximal assistance and of 2 persons      AM-PAC 6 CLICK MOBILITY  Turning over in bed (including adjusting bedclothes, sheets and blankets)?: 2  Sitting down on and standing up from a chair with arms (e.g., wheelchair, bedside commode, etc.): 1  Moving from lying on back to sitting on the side of the bed?: 2  Moving to and from a bed to a chair (including a wheelchair)?: 1  Need to walk in hospital room?: 1  Climbing 3-5 steps with a railing?: 1  Basic Mobility Total Score: 8       Treatment & Education:  Patient transitioned to EOB as described following re-instruction in spinal precautions and log roll with bed mobility; PT and OT alternated with assist for maintaining sit balance while the other performed extremity exercise- pt completed 10 to 12 reps APs, FAQ, hip fl, knee fl, hip abd/add and prolonged stretch to hams/gastroc 2x 30sec; BP 91/54 upon entrance, prior to sit /61; sitting EOB~96/59, then 94/56 and then 87/53; returned to supine and  repositioned up in bed then head of bed elevated.    Patient left HOB elevated with all lines intact, call button in reach, and nurse present.    GOALS:   Multidisciplinary Problems       Physical Therapy Goals          Problem: Physical Therapy    Goal Priority Disciplines Outcome Goal Variances Interventions   Physical Therapy Goal     PT, PT/OT Ongoing, Progressing     Description: Goals to be met by: 2023     Patient will increase functional independence with mobility by performin. Supine to sit with Modified [place]  2. Sit to supine with Modified [place]  3. Sit to stand transfer with Modified [place]  4. Gait  x 100 feet with Minimal Assistance using Rolling Walker.   5. Stand for 10 minutes with Minimal Assistance using Rolling Walker                         Time Tracking:     PT Received On: 10/02/23  PT Start Time: 0953     PT Stop Time: 1046  PT Total Time (min): 53 min     Billable Minutes: Therapeutic Activity 38 and Therapeutic Exercise 15       PT/PTA: PT     Number of PTA visits since last PT visit: 0     10/02/2023

## 2023-10-02 NOTE — EICU
eICU Intevention     Back in afib RVR throughout the day  Received 2 units PRBC  RN reports of minimal PO intake    HR 140s, /61 on esmolol drip  I gave him an amiodarone bolus then drip on 9/30 with conversion to sinus  Propafenone discontinued by cardiology    Ordered a trial of 500 cc LR bolus qian she reportedly looks dry on bedside assessment with urine appearing concentrated

## 2023-10-02 NOTE — PT/OT/SLP PROGRESS
Occupational Therapy   Treatment    Name: Graciela Barton  MRN: [id number]  Admitting Diagnosis:  Lumbar stenosis with neurogenic claudication  3 Days Post-Op    Recommendations:     Discharge Recommendations:  (high intensity therapy)  Discharge Equipment Recommendations:  to be determined by next level of care  Barriers to discharge:  Decreased caregiver support, Inaccessible home environment    Assessment:     Graciela Barton is a 73 y.o. female with a medical diagnosis of Lumbar stenosis with neurogenic claudication.  She presents with the following performance deficits affecting function: weakness, impaired endurance, impaired sensation, impaired self care skills, impaired functional mobility, gait instability, impaired balance, decreased coordination, decreased lower extremity function, decreased upper extremity function, decreased safety awareness, decreased ROM, impaired coordination, impaired skin, edema, impaired cardiopulmonary response to activity, orthopedic precautions. Pt was agreeable to OT and was noted to make slow progress towards her goals in therapy.  During today's session, pt worked on UE/LE therex, ADLs, and functional mobility.  Pt required max A - max A x2 for func mobility and set up - total A with ADLS. Pt was able to sit EOB with max A for ~30 min (noted with strong posterior lean). Standing was not attempted due to pt's poor sitting balance and low BP (See below).  Pt's goals remain appropriate at this time.  She will continue to benefit from skilled OT services in order to assist her with increasing her safety and level of independence with self care and mobility tasks.       Rehab Prognosis:  Good; patient would benefit from acute skilled OT services to address these deficits and reach maximum level of function.       Plan:     Patient to be seen 4 x/week to address the above listed problems via self-care/home management, therapeutic activities, therapeutic exercises, neuromuscular  re-education  Plan of Care Expires: 11/01/23  Plan of Care Reviewed with: patient    Subjective     Chief Complaint: difficulty moving LEs  Patient/Family Comments/goals: return to PLOF  Pain/Comfort:  Pain Rating 1: 0/10  Pain Rating Post-Intervention 1: 0/10    Objective:     Communicated with: nurse prior to session.  Patient found HOB elevated with peripheral IV, telemetry, blood pressure cuff, SCD, aden catheter, pulse ox (continuous), oxygen upon OT entry to room.    General Precautions: Standard, fall    Orthopedic Precautions:spinal precautions  Braces: TLSO  Respiratory Status: Nasal cannula, flow 2 L/min     Occupational Performance:     Bed Mobility:    Patient completed Rolling/Turning to Left with  maximal assistance and with side rail  Patient completed Rolling/Turning to Right with maximal assistance and with side rail  Patient completed Scooting/Bridging with total assistance and 2 persons for scooting to HOB when supine and max A when sitting EOB (of 1 person)  Patient completed Supine to Sit with maximal assistance and 2 persons  Patient completed Sit to Supine with maximal assistance and 2 persons     Functional Mobility/Transfers:  N/A - not attempted due to difficulty sitting EOB and low BP    Activities of Daily Living:  Feeding:  set up A    Grooming: set up A - to wash face in bed  Lower Body Dressing: total assistance to don/doff socks      [facility] 6 Click ADL: 13    Treatment & Education:  Pt completed ADLs and func mobility activities for tx session as noted above  Pt completed 1 set of 12 reps of B UE AROM exercises in order to work towards increasing her UB strength/endurance to assist with mobility and self care skills.  Pt completed the following exercises: shoulder flex, forward punches, and  elbow flex/ext.  Exercises performed while seated EOB with max A to maintain balance - exercises 1 UE at a time due to opposing UE was used to assist with sitting balance  OT/PT reviewed spinal  precautions with pt - utilized guidelines during bed mobility / log rolls  BPs taken:  supine (before sitting EOB)  91/54,100/61, sitting EOB - 96/59, 94/56, 87/53  Pt educated on role of OT and POC      Patient left HOB elevated with all lines intact, call button in reach, and nurse present    GOALS:   Multidisciplinary Problems       Occupational Therapy Goals          Problem: Occupational Therapy    Goal Priority Disciplines Outcome Interventions   Occupational Therapy Goal     OT, PT/OT Ongoing, Progressing    Description: Goals to be met by: 11/01/2023      Patient will increase functional independence with ADLs by performing:    UE Dressing with Minimal Assistance.  LE Dressing with Maximum Assistance.  Grooming while seated with Stand-by Assistance.  Toileting from bedside commode with Minimal Assistance for hygiene and clothing management.   Toilet transfer to bedside commode with Moderate Assistance.  Increased functional strength to WFL for self care.  Upper extremity exercise program x10 reps per handout, with assistance as needed.                           Time Tracking:     OT Date of Treatment: 10/02/23  OT Start Time: 0953  OT Stop Time: 1046  OT Total Time (min): 53 min (Cotx with PT)    Billable Minutes:Self Care/Home Management 15  Therapeutic Activity 23  Therapeutic Exercise 15    OT/LUPIS: OT          10/2/2023

## 2023-10-02 NOTE — PT/OT/SLP EVAL
Occupational Therapy   Evaluation    Name: Graciela Barton  MRN: [id number]  Admitting Diagnosis: Lumbar stenosis with neurogenic claudication  Recent Surgery: Procedure(s) (LRB):  FUSION, SPINE, LUMBAR, TLIF, MINIMALLY INVASIVE (N/A)  FUSION, SPINE, LUMBAR, PLIF (N/A)  LAMINECTOMY, SPINE (N/A)  FORAMINOTOMY, SPINE (N/A)  FACETECTOMY (N/A) 3 Days Post-Op    Recommendations:     Discharge Recommendations:  (high intensity therapy)  Discharge Equipment Recommendations:  to be determined by next level of care  Barriers to discharge:  Inaccessible home environment, Decreased caregiver support, Other (Comment) (Pt requires significantly increased level of assistance)    Assessment:     Graciela Barton is a 73 y.o. female with a medical diagnosis of Lumbar stenosis with neurogenic claudication.  She presents with poor motor planning and coordination deficits proximal BLE>distal and no apparent deficits noted BUE. Pt with inability to come to full upright stance, with limited AROM R knee flexion.. Performance deficits affecting function: weakness, impaired endurance, impaired sensation, impaired self care skills, impaired functional mobility, gait instability, impaired balance, decreased coordination, decreased upper extremity function, decreased lower extremity function, pain, abnormal tone, decreased ROM, impaired coordination, edema, impaired cardiopulmonary response to activity, impaired joint extensibility, impaired muscle length, orthopedic precautions, impaired skin.      Rehab Prognosis: Good; patient would benefit from acute skilled OT services to address these deficits and reach maximum level of function.       Despite patient's co-morbidities, patient was living in community setting functioning at an Independent to Modified Independent level for ADLs and functional mobility prior to this event.  There is an expectation of returning to prior level of function to maintain independence thus avoiding readmission.  Patient's  clinical condition meets full criteria for high intensity therapeutic intervention; including the ability to actively participate in 3 hours of therapy.  A lower level of care cannot provide the interdisciplinary treatment approach needed.      Plan:     Patient to be seen 4 x/week to address the above listed problems via self-care/home management, therapeutic activities, therapeutic exercises, neuromuscular re-education  Plan of Care Expires: 11/01/23  Plan of Care Reviewed with: patient    Subjective     Chief Complaint: Pt reports R knee dorian horse, repeatedly reported fears of falling.  Patient/Family Comments/goals: To return to PLOF, to get stronger    Occupational Profile:  Living Environment: Pt lives alone in 2SH but reports that she does not go upstairs as bed/bath/kitchen are down stairs, 1-2 CHRIS, WIS with GB  Previous level of function: Mod I to PRN assist for LE dressing  Roles and Routines: Caretaker to self and home. Light housework and meal prep,  drives, pt states she has   Assistance to get groceries. 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  Patient found HOB elevated with bed alarm, blood pressure cuff, BONY drain, peripheral IV, pulse ox (continuous), SCD, telemetry, TLSO upon OT entry to room.    General Precautions: Standard, aspiration, fall, hearing impaired  Orthopedic Precautions: spinal precautions  Braces:  TLSO  Respiratory Status: Nasal cannula  Occupational Performance:    Bed Mobility:    Patient completed Rolling/Turning to Left with  maximal assistance and total assistance  Patient completed Rolling/Turning to Right with maximal assistance and total assistance  Patient completed Scooting/Bridging with total assistance and 2 persons  Patient completed Supine to Sit with maximal assistance and total assistance  Patient completed Sit to Supine with dependent and 2 persons    Functional Mobility/Transfers:  Patient completed Sit <> Stand Transfer with total assistance  with  hand-held assist and rolling walker   Functional Mobility: Pt with fair- to poor+ static and  dynamic seated and poor static standing balance, unsafe for sidesteps to HOB or for short steps forwards      Activities of Daily Living:  Upper Body Dressing: maximal assistance to don gown as robe seated EOB  Lower Body Dressing: dependence to don/doff B socks seated EOB    Cognitive/Visual Perceptual:  Cognitive/Psychosocial Skills:     -       Oriented to: Person, Place, Time and Situation   -       Follows Commands/attention:Follows multistep  commands  -       Communication: clear/fluent  -       Memory: No Deficits noted  -       Safety awareness/insight to disability: intact   -       Mood/Affect/Coping skills/emotional control: Appropriate to situation, heightened FOF at times, some anxiety    Physical Exam:  Postural examination/scapula alignment:    -       Rounded shoulders, forward head, posterior pelvic tilt  Skin integrity: Surgical incision mid/low back, BONY drain site. Wounds appear c/d/i  Sensation:    -       Impaired per pt report  Motor Planning:    -       Impiared  Dominant hand:    -       R handed  Upper Extremity Range of Motion: BUE WFL  except B shoulder flexion     Upper Extremity Strength:  BUE grossly 3 to 4-/5   Strength:  BR kumar 4/5, L hand 3+/5  Fine Motor Coordination:    -       Intact  Gross motor coordination:    WFL     [facility] 6 Click ADL:  AMPA Total Score: 12    Treatment & Education:  Pt educated on role of OT and POC.   Pt performing skills as listed above.   Pt attempted x3 sit<>stand with PT/OT; limited tolerance for static stance and assistance provided to properly adjust pt's RW to allow for pt to fully press through BUE to assist with offloading and weight shifting to ambulate.   Pt provided with pillows at B hip adductors as pt states that BLE can be very tight and pt demonstrating increased tone while in seated, especially with volitional attempts to return to relax state. Pt demonstrated tolerance to near-full L knee extension while supported in supine with towel roll under B ankles.     Patient left HOB elevated with all lines intact, call button in reach, nsg notified, and nsg present    GOALS:   Multidisciplinary Problems       Occupational Therapy Goals          Problem: Occupational Therapy    Goal Priority Disciplines Outcome Interventions   Occupational Therapy Goal     OT, PT/OT Ongoing, Progressing    Description: Goals to be met by: 11/01/2023      Patient will increase functional independence with ADLs by performing:    UE Dressing with Minimal Assistance.  LE Dressing with Maximum Assistance.  Grooming while seated with Stand-by Assistance.  Toileting from bedside commode with Minimal Assistance for hygiene and clothing management.   Toilet transfer to bedside commode with Moderate Assistance.  Increased functional strength to [facility] for self care.  Upper extremity exercise program x10 reps per handout, with assistance as needed.                           History:     Past Medical History:   Diagnosis Date    Arthritis     Cancer     Thyroid    Edema     Hyperlipidemia     Hypertension     Thyroid disease          Past Surgical History:   Procedure Laterality Date    ADENOIDECTOMY      COLONOSCOPY  10/18/2018    COLONOSCOPY N/A 10/18/2018    Procedure: COLONOSCOPY;  Surgeon: Yosvany Alejandra MD;   Location: [facility] ENDO;  Service: General;  Laterality: N/A;    EPIDURAL STEROID INJECTION N/A 9/30/2022    Procedure: LUMBAR L3/4 MALKA CONTRAST DIRRECT REFERRAL MONICA CARRERA IN CHART;  Surgeon: Nicholas Kumar MD;  Location: [facility] PAIN MGT;  Service: Pain Management;  Laterality: N/A;    HYSTERECTOMY  1992    total hyst     KNEE SURGERY      16 pins and two plates implanted    THORACIC DISCECTOMY Right 5/12/2023    Procedure: DISCECTOMY, SPINE, THORACIC Right T6-7, T7-8 interior discectomy retropleural;  Surgeon: Scooter Salter MD;  Location: [facility] OR;  Service: Neurosurgery;  Laterality: Right;  general  eliquis   type and screen  C-arm  Metrx  EMG bairon notified cc  SEP  MEP  Regular bed   lateral left down   globus (Jose Enrique) notified cc    THYROIDECTOMY      TONSILLECTOMY         Time Tracking:     OT Date of Treatment: 10/01/23  OT Start Time: 1100  OT Stop Time: 1138  OT Total Time (min): 38 min    Billable Minutes:Evaluation 10  Self Care/Home Management 8  Therapeutic Activity 10  Neuromuscular Re-education 10    10/2/2023

## 2023-10-02 NOTE — ASSESSMENT & PLAN NOTE
History of thyroid cancer  History of thyroidectomy   -chronic  -improvement in TSH   -continue home levothyroxine

## 2023-10-02 NOTE — PROGRESS NOTES
[facility] Medicine  Progress Note    Patient Name: Graciela Barton  MRN: [id number]  Patient Class: IP- Inpatient   Admission Date: 9/29/2023  Length of Stay: 3 days  Attending Physician: Scooter Salter MD  Primary Care Provider: Rocco Miller MD    Subjective:     Principal Problem:Lumbar stenosis with neurogenic claudication    HPI:  Ms. Barton is a 73 YOF with PMHx of HTN, HLD, BPPV, PAfib/flutter on OAC, history of thyroid cancer s/p thyroidectomy in 2018 with hypothyroidism, thoracic myelopathy with neurogenic claudication s/p s/p T6-7 T7-8 anterior diskectomy (05/12/2023) and now s/p left L3-4, L4-5 OLIF, L5-S1 ALIF and L3-S1 posterior instrumented fusion (09/29/2023).     Admission testing reviewed noting WBC 12 >>13, hemoglobin 9.7 >> 8.5 >> 7.4, hematocrit 30 >> 25.9 >> 22.8; chemistry stable; lactic 1.0; TSH 7.448 with FT4 0.97; CXR with clearing of pleural fluid and atelectasis in the right lung base; BLE US without evidence of DVT; and TTE with LVEF >55%, LA mild dilation, RA mild dilation, normal RV size and function, mild MR, mild TR, RVSP 44mmHg. From neurosurgery perspective she is progressing well in post operative course however the course has been complicated by AFib/flutter with RVR refractory to home medications, propafenone and metoprolol, and amiodarone gtt.     She is seen today in consult for medical management. She notes that overall pain is well controlled however she is developing cramping sensation in BLEs. She denies palpitations, SOB, ELLIS, CP, abdominal pain, N/V/D, constipation, fever, chills, light headiness, dizziness, seizures, or syncope.     Hospital Medicine Service was consulted for assistance with medical management.       Overview/Hospital Course:  Ms. Barton was admitted 09/29 by Neurosurgery and is now s/p left L3-4, L4-5 OLIF, L5-S1 ALIF and L3-S1 posterior instrumented fusion. From neurosurgery perspective she is progressing well in post operative  course. However the course has been complicated by AFib/flutter with RVR that was refractory to home medications, propafenone and metoprolol; and amiodarone gtt. Cardiology following with recommendations to transfuse due to anemia (two units PRBCs transfused 10/01), stop home propafenone (failed drug in setting of several months history of AFlutter), continue home metoprolol (titrations limited due to BP), and was give 3 doses of IV digoxin (completed). However without improvement and HR elevating to 140's she was initiated on esmolol gtt evening of 10/01.      Other concerns currently to monitor include monitoring for s/s of urinary retention and bowel regimen titrated 10/2 as last BM was day of admission per patient.     Disposition plans: to rehab when medically ready for intensive therapy.       Interval History: Resting in bed, overall reports feeling well. She denies chest pain, palpitations, or shortness of breath. She reports last BM 09/29, and asks for prunes to facilitate BM. She understands need for ongoing inpatient care due to elevated HR, however she does endorse frustration with not being able to participate in therapy due to elevated HR.     Review of Systems   Constitutional:  Positive for activity change. Negative for appetite change, chills, diaphoresis, fatigue and fever.   HENT:  Negative for congestion and sore throat.    Respiratory:  Negative for cough, chest tightness, shortness of breath and wheezing.    Cardiovascular:  Positive for leg swelling. Negative for chest pain and palpitations.   Gastrointestinal:  Negative for abdominal pain, constipation, diarrhea, nausea and vomiting.   Musculoskeletal:  Positive for arthralgias, back pain and gait problem.   Neurological:  Negative for dizziness, syncope, weakness, light-headedness and headaches.     Objective:     Vital Signs (Most Recent):  Temp: 98.6 °F (37 °C) (10/02/23 0730)  Pulse: (!) 145 (10/02/23 0838)  Resp: 19 (10/02/23 0742)  BP:  117/65 (10/02/23 0838)  SpO2: 99 % (10/02/23 0742) Vital Signs (24h Range):  Temp:  [98.1 °F (36.7 °C)-99.9 °F (37.7 °C)] 98.6 °F (37 °C)  Pulse:  [123-251] 145  Resp:  [4-37] 19  SpO2:  [67 %-100 %] 99 %  BP: ()/(50-77) 117/65     Weight: 76.7 kg (169 lb)  Body mass index is 26.47 kg/m².    Intake/Output Summary (Last 24 hours) at 10/2/2023 1109  Last data filed at 10/2/2023 1056  Gross per 24 hour   Intake 2055.77 ml   Output 415 ml   Net 1640.77 ml         Physical Exam  Vitals and nursing note reviewed.   Constitutional:       General: She is not in acute distress.     Appearance: Normal appearance. She is well-developed. She is not toxic-appearing.   HENT:      Head: Normocephalic and atraumatic.      Mouth/Throat:      Dentition: Normal dentition.   Eyes:      General: Lids are normal.      Extraocular Movements: Extraocular movements intact.      Conjunctiva/sclera: Conjunctivae normal.   Cardiovascular:      Rate and Rhythm: Tachycardia present. Rhythm irregular.   Pulmonary:      Effort: Pulmonary effort is normal.      Breath sounds: Normal breath sounds.   Abdominal:      General: Bowel sounds are normal.      Palpations: Abdomen is soft.   Musculoskeletal:      Cervical back: Neck supple.      Right lower leg: Edema present.      Left lower leg: Edema present.   Skin:     General: Skin is warm and dry.      Findings: No erythema or rash.   Neurological:      Mental Status: She is alert and oriented to person, place, and time.         Significant Labs: All pertinent labs within the past 24 hours have been reviewed.  CBC:   Recent Labs   Lab 10/01/23  1216 10/02/23  0425   WBC 13.20* 13.64*   HGB 7.4* 10.0*   HCT 22.8* 30.7*    155     CMP:   Recent Labs   Lab 10/01/23  1216 10/02/23  0425    138   K 3.8 4.3    104   CO2 26 25    99   BUN 23 19   CREATININE 0.8 0.7   CALCIUM 8.3* 8.2*   ANIONGAP 9 9       Significant Imaging: I have reviewed all pertinent imaging  results/findings within the past 24 hours.      Assessment/Plan:      * Lumbar stenosis with neurogenic claudication  Chronic myelopathy  Foraminal stenosis of lumbar region  Neurogenic claudication   DDD, lumbar  Decreased ROM of R knee  Assistance needed for mobility   -thoracic myelopathy with neurogenic claudication s/p s/p T6-7 T7-8 anterior diskectomy (05/12/2023) and now s/p left L3-4, L4-5 OLIF, L5-S1 ALIF and L3-S1 posterior instrumented fusion (09/29/2023)  -management per primary team   -remains with surgical drain  -PT/OT following     Acute blood loss anemia  -s/p 2 units PRBC transfusion 10/2 with appropriate response  -monitor for s/s of bleeding   -trend labs and monitor  -further transfusions as indicated     PAF (paroxysmal atrial fibrillation)  Atrial flutter  Anticoagulant long-term use  -chronic, has been in Aflutter for quite some time  -RVR rate worsened after admission in setting of neurosurgical procedure, anemia, and chronic hypothyroidism  -Cardiology following   -home propafenone discontinued per recs (failed drug in setting of several months history of AFlutter)  -continue home metoprolol (titrations limited due to BP)  -s/p IV dig x 3 doses   -initiated on esmolol gtt 10/01 due to no improvement/elevation in HR overnight   -will need outpt follow up with EP for consideration of DCCV +/- ablation  -continue tele monitoring  -EKG PRN concerns  -trend labs, address/replete electrolytes as indicated  -monitor      Postoperative hypothyroidism  History of thyroid cancer  History of thyroidectomy   -chronic  -improvement in TSH   -continue home levothyroxine    Essential hypertension, benign  -chronic  -low/normotensive pressures at current  -hold home HCTZ  -continue metoprolol as noted above   -dose/medication adjustment as appropriate   -monitor     Mixed hyperlipidemia  -chronic  -resume home rosuvastatin at discharge       VTE Risk Mitigation (From admission, onward)         Ordered      IP VTE HIGH RISK PATIENT  Once         09/29/23 2314     Place sequential compression device  Until discontinued         09/29/23 2314     heparin (porcine) injection 5,000 Units  Every 12 hours         09/29/23 2314              Discharge Planning   ASYA:      Code Status: Full Code   Is the patient medically ready for discharge?:     Reason for patient still in hospital (select all that apply): Patient trending condition and Treatment         Critical care time spent on the evaluation and treatment of severe organ dysfunction, review of pertinent labs and imaging studies, discussions with consulting providers and discussions with patient/family: 20   minutes.        Josselin Maloney DNP, AG-ACNP, BC  Department of Hospital Medicine  [facility]

## 2023-10-02 NOTE — PROGRESS NOTES
[facility] Pulmonary/Critical Care Fellow Progress Note:    Primary Attending:  Scooter Salter MD   Consultant Attending: Dr. Grace MD   Consultant Fellow: Dr. Abram Martinez MD     Admit Date: 2023   Hospital LOS: 3     Brief Summary of Hospitalization:   73 y.o. F with a PMHx of thyroid cancer status post thyroidectomy in 2018, HDL, Hypothyroidism, A-fib on Apixiban, s/p T6-7 T7-8 diskectomy 3 months prior who was admitted for L3-4 and L4-5 laminectomy due to lumbar stenosis with neurogenic claudication.  At baseline patient has limited mobility, but is able to walk with a walker.  After procedure patient noted to have softer BP's however maps noted to be greater than 65.  Patient noted to be tachycardic.  Initiated of amiodarone gtt. Transfused 1 unit of PRBCs due to blood loss during procedure and given 500 cc of LR.  Patient remained intubated post procedure and admitted to ICU for closer monitoring.      Interval Hx:  Remains in Atrial flutter despite digoxin, esmolol drip and lopressor 50 bid. Otherwise denies c/o chest pain, dyspnea, palpitations. Remains extubated and is satting at 99% on 2L NC.    Objective:  Last 24 Hour Vital Signs:  BP  Min: 90/54  Max: 133/64  Temp  Av.8 °F (37.1 °C)  Min: 98.1 °F (36.7 °C)  Max: 99.9 °F (37.7 °C)  Pulse  Av.2  Min: 127  Max: 152  Resp  Av.4  Min: 4  Max: 31  SpO2  Av.1 %  Min: 87 %  Max: 100 %  Body mass index is 26.47 kg/m².  I & O (Last 24H):  Intake/Output Summary (Last 24 hours) at 10/2/2023 1316  Last data filed at 10/2/2023 1056  Gross per 24 hour   Intake 2055.77 ml   Output 415 ml   Net 1640.77 ml       Physical Exam:  GEN: non-toxic appearing and appears stated age  HEENT: MMM, no scleral icterus, EOMI  NECK: Supple, midline trachea, no raised JVP or a-waves notable  CV: Irregularly irregular, tachycardic, no MRG, pulses equal and symmetric 2+ at radial  PULM: CTAB  ABDOMEN: Soft, non-tender, non-distended, no rebound or  "guarding  SKIN: Warm, dry, intact, no rashes  MSK: No deformity, no clubbing, cyanosis, or lower extremity edema  NEURO: RASS 0, CAM -, awake and following commands, at neurologic baseline  LINES: Intact, no extravasation or induration, no erythema to PIV or support devices    I have reviewed all labs / images / cultures  Pertinent labs are as follows:   No results for input(s): "PH", "PCO2", "PO2", "HCO3", "POCSATURATED", "BE" in the last 24 hours.  Recent Labs   Lab 10/02/23  0425   WBC 13.64*   RBC 3.23*   HGB 10.0*   HCT 30.7*      MCV 95   MCH 31.0   MCHC 32.6     Recent Labs   Lab 10/02/23  0425      K 4.3      CO2 25   BUN 19   CREATININE 0.7   CALCIUM 8.2*   MG 2.0       Meds:   bisacodyL  10 mg Rectal Daily    heparin (porcine)  5,000 Units Subcutaneous Q12H    levothyroxine  137 mcg Oral Before breakfast    methocarbamoL  750 mg Oral TID    metoprolol tartrate  50 mg Oral BID    mupirocin   Nasal BID    senna-docusate 8.6-50 mg  2 tablet Oral BID       Assessment & Plan: Neuro/Psych    NEURO:  #S/P L3-4 and L4-5 laminectomy for management of lumbar stenosis  Pain management per neurosurgery  -Hydromorphone 1 mg PRN q3H, Tramadol 100 mg q6H, Oxycodone 10 mg q6H PRN and acetaminophen 650mg q6H PRN  - Robaxin 750 mg TID  - Lumbar drain in place, AC to resume after drain is removed on 10/2     CV     #Atrial Flutter   - Likely due to stress vs some component of hypothyroid disease. No electrolyte abnormalities on labs.   - On Eliquis 5 mg BID, Lopressor 50 mg BID and Propafenone 150 mg at home.  - TTE repeated 9/30: LVEF >55%, LA mild dilation, RA mild dilation, normal RV size and function, mild MR, mild TR, RVSP 44mmHg.  - Briefly on amiodarone, transitioned to home meds: lopressor 50 BID and propafenone. Propafenone discontinued on 10/1. Digoxin did not help with the a-flutter, Esmolol drip started on 10/1 pm, still with a-flutter.   - Will ultimately benefit from DCCV followed by AAD, per " cardiology recs.   Eliquis to be resumed on 10/2 once lumbar drain removed.        Pulm  S/p extubation. LUCIEN     #Concern for DVT  Left leg swelling: DVT US negative 9/30     FEN/GI  F: Net even  E: Wnl. Replete as necessary. Keep Mg 2, K 4  Diet: Tolerating enteral    Zofran prn for n/v      RENAL  Intake/Output Summary (Last 24 hours) at 10/2/2023 1316  Last data filed at 10/2/2023 1056  Gross per 24 hour   Intake 2055.77 ml   Output 415 ml   Net 1640.77 ml     Strict I&Os  Avoid renal toxic meds, gadolinium contrast  BUN/Cr: Good urine output, cr. Ok.  Continue to monitor renal status and urine output     Heme  #Blood loss   S/p 3 unit PRBC   H/H 10/30.7; stable  WBC 13.6  DVT prophylaxis: Heparin (prophylactic). On eliquis at home, to resume on removal of lumbar drain.     Endo  #Hypothyroidism secondary to thyroidectomy for thyroid cancer  On synthroid 137 mcg at home  TSH 34  and Free T4 0.88 one month ago     Plan  Resume home medication  Repeat TSH improved from 34 to 7. FT4 normal.     #Glucose  Maintain glucose 140-180  BG currently at goal     ID  No known issues at this time.      DVT PPx: Heparin   Diet: NPO  GI PPX: None  Deconditioning: PT/OT  Code Status: Full     Feeding: NPO  Analgesia: dilaudid  Sedation: None   Thromboprophylaxis: Heparin  Head up position: Above 30 degrees  Ulcer prophylaxis: None  Glycemic control: None. At goal  SBT: n/r  Bowel Care: Bisacodyl 10 mg and senna-docusate 8.5-50 mg PRN  Indwelling catheter: PIV, Urethral catheter  De-escalation of Antibiotics: None     Dispo  - Management of A-flutter  - Follow up cardiology recs  - Drain management per neurosurgery    Patient seen and examined with Dr. Edwards    We will continue to follow with you, thank you for the opportunity to be involved in Ms. Barton's care.    Venkata Santo DO  [facility], PGY-1    Pt seen and examined with Pulmonary/Critical Care team and this note was reviewed and validated with the  following additional comments: A-flutter at 150/min 2:1 A-V block. Amiodarone loaded.  Planning DC cardioversion if she does not convert.  VS stable.  Transfused prn.  Pain controlled.  Slowly advancing rehab.  Treating hypothyroidism.  Avoid volume overload.    Critical Care time was spent validating the history and physical exam, reviewing the lab and imaging results, and discussing the care of the patient with the bedside nurse and the patient and/or surrogates. This critical care time did not overlap with that of any other provider or involve time for any procedures.  This patient has a high probability of sudden clinically significant deterioration which requires the highest level of physician preparedness to intervene urgently. I managed/supervised life or organ supporting interventions that required frequent physician assessments. I devoted my full attention in the ICU to the direct care of this patient for this period of time. Organ systems which are failing and require intensive, critical care support are: Cardiovascular, endocrine.  Critical Care time: 30 minutes    Jean Lim MD  Phone [phone]

## 2023-10-02 NOTE — PLAN OF CARE
Awake and alert, states she did not get much sleep last night, no co of pain, pulse 140 at flutter, denies chest pain, or sob, lungs clear, diminished post bAses, encouraged deep breathing, using the inc spir, denies pain, no co of numbness tingling, legs go together, stiff tight , cAn not lift or bend,  wiggles toes, rt foot colder than left, left warm, pulses palpated,  week ,hands and fingers cool,  generalized edema, but meeta hips, legs, feet   Inc the esmolol to 200mgm, heart rate 140's

## 2023-10-02 NOTE — ASSESSMENT & PLAN NOTE
- SBP 90s-110s overnight; this AM SBP 120s  - on Metoprolol and HCTZ as an outpatient  - currently on Metoprolol only for atrial flutter management  - BP goal less than 130/80  - BP well controlled

## 2023-10-02 NOTE — PLAN OF CARE
Talked with Dr Martinez , critical care , about continued high pulse, , now inc to the 150's occ 160

## 2023-10-02 NOTE — PLAN OF CARE
"CM met with pt -   dx:  Lumbar stenosis - neurogenic claudication;  s/p lumbar spine fusion 9/29     Pt lives alone --    Jennifer Green  [phone]      Per therapy recc - High Level   Per Pt - "[facility]"      Referral sent per CP;  Pepper with [facility] notified.   Pt has a bsc, WC and stand up walker        10/02/23 9303   Discharge Assessment   Assessment Type Discharge Planning Assessment   Confirmed/corrected address, phone number and insurance Yes   Confirmed Demographics Correct on Facesheet   Source of Information patient;health record   Communicated ASYA with patient/caregiver Yes   Reason For Admission s/p Lumbar spine fusion 9/29   People in Home alone   Do you expect to return to your current living situation? No   Do you have help at home or someone to help you manage your care at home? No   Prior to hospitilization cognitive status: Alert/Oriented   Current cognitive status: Alert/Oriented   Walking or Climbing Stairs ambulation difficulty, requires equipment   Home Accessibility wheelchair accessible   Equipment Currently Used at Home bedside commode;wheelchair   Patient currently being followed by outpatient case management? No   Are you on dialysis? No   Do you take coumadin? No   DME Needed Upon Discharge    (tbd)   Discharge Plan discussed with: Patient   Transition of Care Barriers Other (see comments)  (limited social network)       "

## 2023-10-02 NOTE — NURSING
Dr. Landis at bedside. Pt. HR sustaining 140s despite recent dose of metoprolol given. Orders to give another dose of IV metoprolol, give 50 mg dose of scheduled metoprolol now, and start esmolol drip. Will initiate new orders.

## 2023-10-02 NOTE — HOSPITAL COURSE
Ms. Barton was admitted 09/29 by Neurosurgery and is now s/p left L3-4, L4-5 OLIF, L5-S1 ALIF and L3-S1 posterior instrumented fusion. From neurosurgery perspective she is progressing well in post operative course. However the course has been complicated by AFib/flutter with RVR that was refractory to home medications, propafenone and metoprolol; and amiodarone gtt. Cardiology following with recommendations to transfuse due to anemia (two units PRBCs transfused 10/01), stop home propafenone (failed drug in setting of several months history of AFlutter), continue home metoprolol (titrations limited due to BP), and was give 3 doses of IV digoxin (completed). However without improvement and HR elevating to 140's she was initiated on esmolol gtt evening of 10/01.      Other concerns currently to monitor include monitoring for s/s of urinary retention and bowel regimen titrated 10/2 as last BM was day of admission per patient.   10/4:  Cardiology planning cardioversion on today.  Cardizem drip.  She was started on full-dose on 10/03/2023.  Disposition plans: to rehab when medically ready for intensive therapy.   10/5: Cardioversion yesterday now noted to be in NSR.  Still receiving full-dose Lovenox with plans to transition to oral on tomorrow per Cardiology.  Doing well on metoprolol.  Carver catheter still in place secondary to urinary retention.  10/6: DC held on today, converted back to Afib with RVR. She was transferred to [facility] and started on Amino gtt.   10/7: patient in Afib with a RVR rate since last night per chart review. Cardiology has assessed and following noted per cardiology's plan: - on Amiodarone IV load and metoprolol (uptritrate metoprolol to obtain goal HR < 120 bpm), on AC for CVA ppx.  10/8:- on Amiodarone IV load and metoprolol (uptritrate metoprolol prn to obtain goal HR < 120 bpm), on AC for CVA ppx  - continue amio gtt for 10/8/2023 and in AM transition to PO amio taper load (400 mg BID x14 days,  400 mg daily x14 days, then 200 mg daily)  - arrange for EP follow up as an outpatient   10/10- Patient seen and examined this AM, feeling well, vital signs and heart stable. Ok to discharge for Hospital Medicine standpoint

## 2023-10-02 NOTE — EICU
Intervention Initiated From:  Bedside    Ariana intervened regarding:  Other    Nurse Notified:  Yes    Doctor Notified:  Yes    Comments:Call from bedside nurse. Patient aden catheter removed earlier today. Patient has not voided. Bladder scan 360 or more. Requesting order for in and out cath. Dr. Buckley messaged

## 2023-10-02 NOTE — PLAN OF CARE
Cardiology here, feels she will need cardioversion, but first needs drain out and started on bld thinner,

## 2023-10-02 NOTE — ASSESSMENT & PLAN NOTE
- H&H 7&22 yesterday; s/p PRBC with improvement in H&H today to 10.0&30.7  - on DVT prophylaxis only for now; on Eliquis as an outpatient but on hold; will need full dose AC with no interruption for 4-6 weeks after cardioversion; uncertain of etiology of anemia; will await clearance for AC by NS

## 2023-10-02 NOTE — ASSESSMENT & PLAN NOTE
Atrial flutter  Anticoagulant long-term use  -chronic, has been in Aflutter for quite some time  -RVR rate worsened after admission in setting of neurosurgical procedure, anemia, and chronic hypothyroidism  -Cardiology following   -home propafenone discontinued per recs (failed drug in setting of several months history of AFlutter)  -continue home metoprolol (titrations limited due to BP)  -s/p IV dig x 3 doses   -initiated on esmolol gtt 10/01 due to no improvement/elevation in HR overnight   -will need outpt follow up with EP for consideration of DCCV +/- ablation  -continue tele monitoring  -EKG PRN concerns  -trend labs, address/replete electrolytes as indicated  -monitor

## 2023-10-02 NOTE — PLAN OF CARE
Dr Martinez, pulm, critical care wants the esmolol weaned to off, to start on po metoprolol,    Given the metoprolol and starting to wean the esmolol

## 2023-10-02 NOTE — SUBJECTIVE & OBJECTIVE
Interval History: Resting in bed, overall reports feeling well. She denies chest pain, palpitations, or shortness of breath. She reports last BM 09/29, and asks for prunes to facilitate BM. She understands need for ongoing inpatient care due to elevated HR, however she does endorse frustration with not being able to participate in therapy due to elevated HR.     Review of Systems   Constitutional:  Positive for activity change. Negative for appetite change, chills, diaphoresis, fatigue and fever.   HENT:  Negative for congestion and sore throat.    Respiratory:  Negative for cough, chest tightness, shortness of breath and wheezing.    Cardiovascular:  Positive for leg swelling. Negative for chest pain and palpitations.   Gastrointestinal:  Negative for abdominal pain, constipation, diarrhea, nausea and vomiting.   Musculoskeletal:  Positive for arthralgias, back pain and gait problem.   Neurological:  Negative for dizziness, syncope, weakness, light-headedness and headaches.     Objective:     Vital Signs (Most Recent):  Temp: 98.6 °F (37 °C) (10/02/23 0730)  Pulse: (!) 145 (10/02/23 0838)  Resp: 19 (10/02/23 0742)  BP: 117/65 (10/02/23 0838)  SpO2: 99 % (10/02/23 0742) Vital Signs (24h Range):  Temp:  [98.1 °F (36.7 °C)-99.9 °F (37.7 °C)] 98.6 °F (37 °C)  Pulse:  [123-251] 145  Resp:  [4-37] 19  SpO2:  [67 %-100 %] 99 %  BP: ()/(50-77) 117/65     Weight: 76.7 kg (169 lb)  Body mass index is 26.47 kg/m².    Intake/Output Summary (Last 24 hours) at 10/2/2023 1109  Last data filed at 10/2/2023 1056  Gross per 24 hour   Intake 2055.77 ml   Output 415 ml   Net 1640.77 ml         Physical Exam  Vitals and nursing note reviewed.   Constitutional:       General: She is not in acute distress.     Appearance: Normal appearance. She is well-developed. She is not toxic-appearing.   HENT:      Head: Normocephalic and atraumatic.      Mouth/Throat:      Dentition: Normal dentition.   Eyes:      General: Lids are normal.       Extraocular Movements: Extraocular movements intact.      Conjunctiva/sclera: Conjunctivae normal.   Cardiovascular:      Rate and Rhythm: Tachycardia present. Rhythm irregular.   Pulmonary:      Effort: Pulmonary effort is normal.      Breath sounds: Normal breath sounds.   Abdominal:      General: Bowel sounds are normal.      Palpations: Abdomen is soft.   Musculoskeletal:      Cervical back: Neck supple.      Right lower leg: Edema present.      Left lower leg: Edema present.   Skin:     General: Skin is warm and dry.      Findings: No erythema or rash.   Neurological:      Mental Status: She is alert and oriented to person, place, and time.         Significant Labs: All pertinent labs within the past 24 hours have been reviewed.  CBC:   Recent Labs   Lab 10/01/23  1216 10/02/23  0425   WBC 13.20* 13.64*   HGB 7.4* 10.0*   HCT 22.8* 30.7*    155     CMP:   Recent Labs   Lab 10/01/23  1216 10/02/23  0425    138   K 3.8 4.3    104   CO2 26 25    99   BUN 23 19   CREATININE 0.8 0.7   CALCIUM 8.3* 8.2*   ANIONGAP 9 9       Significant Imaging: I have reviewed all pertinent imaging results/findings within the past 24 hours.

## 2023-10-02 NOTE — PLAN OF CARE
Pt/ot here, got her to dangle on side of bed, brace on, heart rate the same 144, bp down slightly 90 sys, pt without dizziness, but she can not keep her upper torso up, needs max assist    Assisted back to bed, settled, refused to turn , explained the importance of moving and turning

## 2023-10-02 NOTE — PLAN OF CARE
Pt has not urinated today, still with no urge to go, bladder scan repeated 400cc noted, earlier scan 280cc, notified NP Josselin, will insert aden for retention,     1440 #16 aden inserted, with assist from 4 nurses, pt can not spread or bend her legs, turned on side, and placed from behind, 350cc bAck, Adali concentrated urine

## 2023-10-02 NOTE — PROGRESS NOTES
[facility] - Intensive Care  Cardiology  Progress Note    Patient Name: Graciela Barton  MRN: [id number]  Admission Date: 9/29/2023  Hospital Length of Stay: 3 days  Code Status: Full Code   Attending Physician: Scooter Salter MD   Primary Care Physician: Rocco Miller MD  Expected Discharge Date:   Principal Problem:Lumbar stenosis with neurogenic claudication    Subjective:     Hospital Course:   10/2/2023 Seen by Dr. Ortega over the weekend for consult of atrial flutter with RVR. Hx of PAF on propafenone. In Aflutter since 5/2023- HR in 115s-120s and on Lopressor and propafenone as outpatient.  Hgb 7 down from 12. And off anticoagulation. Given Digoxin .25mg IV every 6 hours x 3 overnight with continued HR in 140s. Started on Esmolol overnight due to elevated BP and currently on Esmolol drip along with oral Metoprolol. SBP 120s and patient asymptomatic. BONY drains remain in place from recent neurosurgery. Echo with EF 55%. Neurosurgery following as well           Review of Systems   Constitutional: Negative for chills, decreased appetite, diaphoresis and fever.   Cardiovascular:  Negative for chest pain, claudication, cyanosis, dyspnea on exertion, irregular heartbeat, leg swelling, near-syncope, orthopnea, palpitations, paroxysmal nocturnal dyspnea and syncope.   Respiratory:  Negative for cough, hemoptysis, shortness of breath and wheezing.    Gastrointestinal:  Negative for bloating, abdominal pain, constipation, diarrhea, melena, nausea and vomiting.   Neurological:  Negative for dizziness and weakness.     Objective:     Vital Signs (Most Recent):  Temp: 98.6 °F (37 °C) (10/02/23 0730)  Pulse: (!) 145 (10/02/23 0838)  Resp: 19 (10/02/23 0742)  BP: 117/65 (10/02/23 0838)  SpO2: 99 % (10/02/23 0742) Vital Signs (24h Range):  Temp:  [98.1 °F (36.7 °C)-99.9 °F (37.7 °C)] 98.6 °F (37 °C)  Pulse:  [125-148] 145  Resp:  [4-37] 19  SpO2:  [87 %-100 %] 99 %  BP: ()/(50-77) 117/65     Weight: 76.7 kg (169  lb)  Body mass index is 26.47 kg/m².     SpO2: 99 %         Intake/Output Summary (Last 24 hours) at 10/2/2023 1212  Last data filed at 10/2/2023 1056  Gross per 24 hour   Intake 2055.77 ml   Output 415 ml   Net 1640.77 ml       Lines/Drains/Airways       Drain  Duration                  Closed/Suction Drain 09/29/23 2123 Medial Back Bulb 10 Fr. 2 days              Peripheral Intravenous Line  Duration                  Peripheral IV - Single Lumen 09/29/23 1416 18 G Right Wrist 2 days         Peripheral IV - Single Lumen 09/29/23 2300 18 G Right;Anterior Forearm 2 days         Peripheral IV - Single Lumen 09/30/23 0300 20 G Anterior;Right Forearm 2 days                       Physical Exam  Constitutional:       General: She is not in acute distress.     Appearance: She is well-developed.   Cardiovascular:      Rate and Rhythm: Normal rate and regular rhythm.      Heart sounds: No murmur heard.     No gallop.   Pulmonary:      Effort: Pulmonary effort is normal. No respiratory distress.      Breath sounds: Normal breath sounds. No wheezing.   Abdominal:      General: Bowel sounds are normal. There is no distension.      Palpations: Abdomen is soft.      Tenderness: There is no abdominal tenderness.   Skin:     General: Skin is warm and dry.   Neurological:      Mental Status: She is alert and oriented to person, place, and time.            Significant Labs: BMP:   Recent Labs   Lab 10/01/23  1216 10/01/23  1248 10/02/23  0425     --  99     --  138   K 3.8  --  4.3     --  104   CO2 26  --  25   BUN 23  --  19   CREATININE 0.8  --  0.7   CALCIUM 8.3*  --  8.2*   MG  --  1.8 2.0   , CBC   Recent Labs   Lab 10/01/23  1216 10/02/23  0425   WBC 13.20* 13.64*   HGB 7.4* 10.0*   HCT 22.8* 30.7*    155       Significant Imaging: Echocardiogram: Transthoracic echo (TTE) complete ([facility] Only):   Results for orders placed or performed during the hospital encounter of 09/29/23   Echo   Result Value Ref  Range    BSA 1.9 m2    Hoyt's Biplane MOD Ejection Fraction 64 %    LVOT stroke volume 51.18 cm3    LVIDd 4.07 3.5 - 6.0 cm    LV Systolic Volume 33.51 mL    LV Systolic Volume Index 17.8 mL/m2    LVIDs 2.95 2.1 - 4.0 cm    LV Diastolic Volume 72.74 mL    LV Diastolic Volume Index 38.69 mL/m2    IVS 0.87 0.6 - 1.1 cm    LVOT diameter 2.0 cm    LVOT area 3.1 cm2    FS 28 28 - 44 %    Left Ventricle Relative Wall Thickness 0.36 cm    Posterior Wall 0.74 0.6 - 1.1 cm    LV mass 96.97 g    LV Mass Index 52 g/m2    TDI LATERAL 0.26 m/s    TDI SEPTAL 0.26 m/s    TR Max Mathew 2.71 m/s    LVOT peak mathew 1.01 m/s    Left Ventricular Outflow Tract Mean Velocity 0.76 cm/s    Left Ventricular Outflow Tract Mean Gradient 2.53 mmHg    LA size 3.83 cm    Left Atrium Major Axis 5.13 cm    RV S' 9.74 cm/s    TAPSE 2.27 cm    RA Major Axis 5.45 cm    RA Width 4.05 cm    AV mean gradient 4 mmHg    AV peak gradient 7 mmHg    Ao peak mathew 1.31 m/s    Ao VTI 31.90 cm    LVOT peak VTI 16.30 cm    AV valve area 1.60 cm²    AV Velocity Ratio 0.77     AV index (prosthetic) 0.51     MADY by Velocity Ratio 2.42 cm²    Mr max mathew 4.15 m/s    MV mean gradient 2 mmHg    MV peak gradient 5 mmHg    MV valve area by continuity eq 2.35 cm2    MV VTI 21.8 cm    Triscuspid Valve Regurgitation Peak Gradient 29 mmHg    PV PEAK VELOCITY 1.00 m/s    PV peak gradient 4 mmHg    Sinus 2.67 cm    STJ 2.62 cm    Ascending aorta 2.93 cm    IVC diameter 2.15 cm    Mean e' 0.26 m/s    ZLVIDS -0.74     ZLVIDD -2.55     LA WIDTH 4.3 cm    EF 55 %    TV resting pulmonary artery pressure 44 mmHg    RV TB RVSP 18 mmHg    Est. RA pres 15 mmHg    Narrative      Left Ventricle: The left ventricle is normal in size. Normal wall   thickness. Normal wall motion. There is normal systolic function. Ejection   fraction by visual approximation is 55%. Unable to assess diastolic   function due to atrial fibrillation.    Left Atrium: Left atrium is mildly dilated.    Right  Ventricle: Normal right ventricular cavity size. Wall thickness   is normal. Right ventricle wall motion  is normal. Systolic function is   normal.    Right Atrium: Right atrium is mildly dilated.    Mitral Valve: There is mild regurgitation.    Tricuspid Valve: There is mild regurgitation.    Pulmonary Artery: The estimated pulmonary artery systolic pressure is   44 mmHg.    IVC/SVC: Elevated venous pressure at 15 mmHg.       Assessment and Plan:     Brief HPI: Seen on AM rounds with Dr Ortega while resting in bed. Denies any complaints. Reports weakness and pain when working with PT and getting out of bed. Discussed POC as detailed below-verbalized understanding and agrees with POC     Acute blood loss anemia  - H&H 7&22 yesterday; s/p PRBC with improvement in H&H today to 10.0&30.7  - on DVT prophylaxis only for now; on Eliquis as an outpatient but on hold; will need full dose AC with no interruption for 4-6 weeks after cardioversion; uncertain of etiology of anemia; will await clearance for AC by NS     Atrial flutter  - history of atrial flutter and treated with Metoprolol and Propafenone as an outpatient  - currently atrial flutter with HR 140s; SBP stable; patient asymptomatic  - on oral Metoprolol 50mg BID along with Esmolol drip; would advise against use of both Metoprolol and Esmolol given risk of bradycardia once she converts  - will continue Metoprolol only for now and can up titrate to Q6hrs dosing if BP tolerates; ultimately needs cardioversion; will defer until able to take full dose anticoagulation once drain removed from recent neurosurgery  - will monitor closely     Essential hypertension, benign  - SBP 90s-110s overnight; this AM SBP 120s  - on Metoprolol and HCTZ as an outpatient  - currently on Metoprolol only for atrial flutter management  - BP goal less than 130/80  - BP well controlled         VTE Risk Mitigation (From admission, onward)         Ordered     IP VTE HIGH RISK PATIENT   Once         09/29/23 2314     Place sequential compression device  Until discontinued         09/29/23 2314     heparin (porcine) injection 5,000 Units  Every 12 hours         09/29/23 2314                TARA Pendleton, ANP  Cardiology  [facility] - Intensive Care

## 2023-10-02 NOTE — PLAN OF CARE
HR remains elevated in the 140s. Esmolol drip @ 175. Stratight cath performed once due to urine retention. 500 ml LR bolus given per EICU orders. Safety, hygiene, and comfort needs addressed.     Problem: Adult Inpatient Plan of Care  Goal: Plan of Care Review  Outcome: Ongoing, Progressing  Goal: Patient-Specific Goal (Individualized)  Outcome: Ongoing, Progressing  Goal: Absence of Hospital-Acquired Illness or Injury  Outcome: Ongoing, Progressing  Goal: Optimal Comfort and Wellbeing  Outcome: Ongoing, Progressing

## 2023-10-02 NOTE — ASSESSMENT & PLAN NOTE
- history of atrial flutter and treated with Metoprolol and Propafenone as an outpatient  - currently atrial flutter with HR 140s; SBP stable; patient asymptomatic  - on oral Metoprolol 50mg BID along with Esmolol drip; would advise against use of both Metoprolol and Esmolol given risk of bradycardia once she converts  - will continue Metoprolol only for now and can up titrate to Q6hrs dosing if BP tolerates; ultimately needs cardioversion; will defer until able to take full dose anticoagulation once drain removed from recent neurosurgery  - will monitor closely

## 2023-10-02 NOTE — HOSPITAL COURSE
10/2/2023 Seen by Dr. Ortega over the weekend for consult of atrial flutter with RVR. Hx of PAF on propafenone. In Aflutter since 5/2023- HR in 115s-120s and on Lopressor and propafenone as outpatient.  Hgb 7 down from 12. And off anticoagulation. Given Digoxin .25mg IV every 6 hours x 3 overnight with continued HR in 140s. Started on Esmolol overnight due to elevated BP and currently on Esmolol drip along with oral Metoprolol. SBP 120s and patient asymptomatic. BONY drains remain in place from recent neurosurgery. Echo with EF 55%. Neurosurgery following as well   10/3/2023 Remains in atrial flutter with HR up to 160s-170s this AM. Started on IV Cardizem overnight with continued Metoprolol BID. SBP 100s-110s overnight. Asymptomatic from cardiac standpoint. H&H 10.3&31.4  BMP WNL. BONY drain remains in place from NS last week   10/4/2023 NPO for CARLYLE/DCCV  10/5/2023 Successful cardioversion yesterday with restoration to NSR. Remains in NSR with occasional runs of afib overnight short lived H&H 9.3/28.0 BMP WNL. SBP 120s-150s. No complaints this AM

## 2023-10-03 LAB
ANION GAP SERPL CALC-SCNC: 8 MMOL/L (ref 8–16)
BASOPHILS # BLD AUTO: 0.03 K/UL (ref 0–0.2)
BASOPHILS NFR BLD: 0.2 % (ref 0–1.9)
BUN SERPL-MCNC: 17 MG/DL (ref 8–23)
CALCIUM SERPL-MCNC: 8.3 MG/DL (ref 8.7–10.5)
CHLORIDE SERPL-SCNC: 106 MMOL/L (ref 95–110)
CO2 SERPL-SCNC: 24 MMOL/L (ref 23–29)
CREAT SERPL-MCNC: 0.6 MG/DL (ref 0.5–1.4)
DIFFERENTIAL METHOD: ABNORMAL
EOSINOPHIL # BLD AUTO: 0.2 K/UL (ref 0–0.5)
EOSINOPHIL NFR BLD: 1.7 % (ref 0–8)
ERYTHROCYTE [DISTWIDTH] IN BLOOD BY AUTOMATED COUNT: 14.3 % (ref 11.5–14.5)
EST. GFR  (NO RACE VARIABLE): >60 ML/MIN/1.73 M^2
GLUCOSE SERPL-MCNC: 104 MG/DL (ref 70–110)
HCT VFR BLD AUTO: 31.4 % (ref 37–48.5)
HGB BLD-MCNC: 10.3 G/DL (ref 12–16)
IMM GRANULOCYTES # BLD AUTO: 0.06 K/UL (ref 0–0.04)
IMM GRANULOCYTES NFR BLD AUTO: 0.5 % (ref 0–0.5)
LYMPHOCYTES # BLD AUTO: 1.3 K/UL (ref 1–4.8)
LYMPHOCYTES NFR BLD: 10.5 % (ref 18–48)
MAGNESIUM SERPL-MCNC: 2 MG/DL (ref 1.6–2.6)
MCH RBC QN AUTO: 30.6 PG (ref 27–31)
MCHC RBC AUTO-ENTMCNC: 32.8 G/DL (ref 32–36)
MCV RBC AUTO: 93 FL (ref 82–98)
MONOCYTES # BLD AUTO: 1.5 K/UL (ref 0.3–1)
MONOCYTES NFR BLD: 11.8 % (ref 4–15)
NEUTROPHILS # BLD AUTO: 9.6 K/UL (ref 1.8–7.7)
NEUTROPHILS NFR BLD: 75.3 % (ref 38–73)
NRBC BLD-RTO: 0 /100 WBC
PLATELET # BLD AUTO: 192 K/UL (ref 150–450)
PMV BLD AUTO: 11.1 FL (ref 9.2–12.9)
POTASSIUM SERPL-SCNC: 4.2 MMOL/L (ref 3.5–5.1)
RBC # BLD AUTO: 3.37 M/UL (ref 4–5.4)
SODIUM SERPL-SCNC: 138 MMOL/L (ref 136–145)
WBC # BLD AUTO: 12.71 K/UL (ref 3.9–12.7)

## 2023-10-03 PROCEDURE — 63600175 PHARM REV CODE 636 W HCPCS: Performed by: NEUROLOGICAL SURGERY

## 2023-10-03 PROCEDURE — 94799 UNLISTED PULMONARY SVC/PX: CPT

## 2023-10-03 PROCEDURE — 93010 ELECTROCARDIOGRAM REPORT: CPT | Mod: ,,, | Performed by: INTERNAL MEDICINE

## 2023-10-03 PROCEDURE — 80048 BASIC METABOLIC PNL TOTAL CA: CPT | Performed by: PHYSICIAN ASSISTANT

## 2023-10-03 PROCEDURE — 11000001 HC ACUTE MED/SURG PRIVATE ROOM

## 2023-10-03 PROCEDURE — 93010 EKG 12-LEAD: ICD-10-PCS | Mod: ,,, | Performed by: INTERNAL MEDICINE

## 2023-10-03 PROCEDURE — 99900035 HC TECH TIME PER 15 MIN (STAT)

## 2023-10-03 PROCEDURE — 27000221 HC OXYGEN, UP TO 24 HOURS

## 2023-10-03 PROCEDURE — 36415 COLL VENOUS BLD VENIPUNCTURE: CPT | Performed by: PHYSICIAN ASSISTANT

## 2023-10-03 PROCEDURE — 97530 THERAPEUTIC ACTIVITIES: CPT

## 2023-10-03 PROCEDURE — 25000003 PHARM REV CODE 250: Performed by: NURSE PRACTITIONER

## 2023-10-03 PROCEDURE — 25000003 PHARM REV CODE 250: Performed by: INTERNAL MEDICINE

## 2023-10-03 PROCEDURE — 99233 PR SUBSEQUENT HOSPITAL CARE,LEVL III: ICD-10-PCS | Mod: ,,, | Performed by: NURSE PRACTITIONER

## 2023-10-03 PROCEDURE — 63600175 PHARM REV CODE 636 W HCPCS: Performed by: NURSE PRACTITIONER

## 2023-10-03 PROCEDURE — 99233 SBSQ HOSP IP/OBS HIGH 50: CPT | Mod: ,,, | Performed by: NURSE PRACTITIONER

## 2023-10-03 PROCEDURE — 93005 ELECTROCARDIOGRAM TRACING: CPT

## 2023-10-03 PROCEDURE — 25000003 PHARM REV CODE 250: Performed by: NEUROLOGICAL SURGERY

## 2023-10-03 PROCEDURE — 85025 COMPLETE CBC W/AUTO DIFF WBC: CPT | Performed by: PHYSICIAN ASSISTANT

## 2023-10-03 PROCEDURE — 94761 N-INVAS EAR/PLS OXIMETRY MLT: CPT

## 2023-10-03 PROCEDURE — 83735 ASSAY OF MAGNESIUM: CPT | Performed by: NURSE PRACTITIONER

## 2023-10-03 RX ORDER — METOPROLOL TARTRATE 25 MG/1
25 TABLET, FILM COATED ORAL EVERY 6 HOURS
Status: DISCONTINUED | OUTPATIENT
Start: 2023-10-03 | End: 2023-10-04

## 2023-10-03 RX ORDER — ENOXAPARIN SODIUM 100 MG/ML
1 INJECTION SUBCUTANEOUS EVERY 12 HOURS
Status: DISCONTINUED | OUTPATIENT
Start: 2023-10-03 | End: 2023-10-06

## 2023-10-03 RX ORDER — METOPROLOL TARTRATE 50 MG/1
50 TABLET ORAL 2 TIMES DAILY
Status: DISCONTINUED | OUTPATIENT
Start: 2023-10-04 | End: 2023-10-03

## 2023-10-03 RX ADMIN — Medication 15 MG/HR: at 02:10

## 2023-10-03 RX ADMIN — METOPROLOL TARTRATE 25 MG: 25 TABLET, FILM COATED ORAL at 06:10

## 2023-10-03 RX ADMIN — HYDROMORPHONE HYDROCHLORIDE 1 MG: 2 INJECTION, SOLUTION INTRAMUSCULAR; INTRAVENOUS; SUBCUTANEOUS at 11:10

## 2023-10-03 RX ADMIN — METHOCARBAMOL TABLETS 750 MG: 750 TABLET, COATED ORAL at 09:10

## 2023-10-03 RX ADMIN — METOPROLOL TARTRATE 25 MG: 25 TABLET, FILM COATED ORAL at 08:10

## 2023-10-03 RX ADMIN — HYDROMORPHONE HYDROCHLORIDE 1 MG: 2 INJECTION, SOLUTION INTRAMUSCULAR; INTRAVENOUS; SUBCUTANEOUS at 12:10

## 2023-10-03 RX ADMIN — METOPROLOL TARTRATE 25 MG: 25 TABLET, FILM COATED ORAL at 11:10

## 2023-10-03 RX ADMIN — MUPIROCIN: 20 OINTMENT TOPICAL at 08:10

## 2023-10-03 RX ADMIN — METHOCARBAMOL TABLETS 750 MG: 750 TABLET, COATED ORAL at 08:10

## 2023-10-03 RX ADMIN — Medication 15 MG/HR: at 09:10

## 2023-10-03 RX ADMIN — ENOXAPARIN SODIUM 80 MG: 80 INJECTION SUBCUTANEOUS at 09:10

## 2023-10-03 RX ADMIN — HYDROMORPHONE HYDROCHLORIDE 1 MG: 2 INJECTION, SOLUTION INTRAMUSCULAR; INTRAVENOUS; SUBCUTANEOUS at 06:10

## 2023-10-03 RX ADMIN — MUPIROCIN: 20 OINTMENT TOPICAL at 09:10

## 2023-10-03 RX ADMIN — HEPARIN SODIUM 5000 UNITS: 5000 INJECTION INTRAVENOUS; SUBCUTANEOUS at 05:10

## 2023-10-03 RX ADMIN — LEVOTHYROXINE SODIUM 137 MCG: 25 TABLET ORAL at 05:10

## 2023-10-03 RX ADMIN — METHOCARBAMOL TABLETS 750 MG: 750 TABLET, COATED ORAL at 03:10

## 2023-10-03 RX ADMIN — HYDROMORPHONE HYDROCHLORIDE 1 MG: 2 INJECTION, SOLUTION INTRAMUSCULAR; INTRAVENOUS; SUBCUTANEOUS at 05:10

## 2023-10-03 NOTE — SUBJECTIVE & OBJECTIVE
Interval History:  Seen at the bedside while in ICU.  Patient resting in bed.  She does reports some abdominal pain.  Patient reports passing gas as well as a BM on yesterday.  Heart rate is still tachy irregular.  Noted with an AFib RVR with a heart rate up to the 150s.  Cardiology following.  Will continue beta-blocker as well as diltiazem.  200 mL Urine output over 8 hours.  Will order bladder scan and monitor for now.  BUN creatinine stable.  Will be cautious with fluids at this time as patient is fluid positive.    Review of Systems   Constitutional:  Positive for activity change. Negative for appetite change, chills, diaphoresis, fatigue and fever.   HENT:  Negative for congestion and sore throat.    Respiratory:  Negative for cough, chest tightness, shortness of breath and wheezing.    Cardiovascular:  Positive for leg swelling. Negative for chest pain and palpitations.   Gastrointestinal:  Positive for abdominal pain. Negative for abdominal distention, constipation, diarrhea, nausea and vomiting.   Musculoskeletal:  Positive for arthralgias, back pain and gait problem.   Skin:  Positive for wound.   Neurological:  Negative for dizziness, syncope, weakness, light-headedness and headaches.     Objective:     Vital Signs (Most Recent):  Temp: 98.9 °F (37.2 °C) (10/03/23 1200)  Pulse: 100 (10/03/23 1330)  Resp: 17 (10/03/23 1330)  BP: 116/83 (10/03/23 1315)  SpO2: (!) 93 % (10/03/23 1330) Vital Signs (24h Range):  Temp:  [98.2 °F (36.8 °C)-98.9 °F (37.2 °C)] 98.9 °F (37.2 °C)  Pulse:  [] 100  Resp:  [7-29] 17  SpO2:  [79 %-99 %] 93 %  BP: ()/() 116/83     Weight: 76.7 kg (169 lb)  Body mass index is 26.47 kg/m².    Intake/Output Summary (Last 24 hours) at 10/3/2023 1511  Last data filed at 10/3/2023 1051  Gross per 24 hour   Intake 468.26 ml   Output 1200 ml   Net -731.74 ml           Physical Exam  Vitals and nursing note reviewed.   Constitutional:       General: She is not in acute  distress.     Appearance: Normal appearance. She is well-developed. She is not toxic-appearing.   HENT:      Head: Normocephalic and atraumatic.      Mouth/Throat:      Dentition: Normal dentition.   Eyes:      General: Lids are normal.      Extraocular Movements: Extraocular movements intact.      Conjunctiva/sclera: Conjunctivae normal.   Cardiovascular:      Rate and Rhythm: Tachycardia present. Rhythm irregular.   Pulmonary:      Effort: Pulmonary effort is normal.      Breath sounds: Normal breath sounds.   Abdominal:      General: Bowel sounds are normal.      Palpations: Abdomen is soft.   Musculoskeletal:         General: Swelling present.      Cervical back: Neck supple.      Right lower leg: Edema present.      Left lower leg: Edema present.   Skin:     General: Skin is warm and dry.      Findings: No erythema or rash.   Neurological:      Mental Status: She is alert and oriented to person, place, and time.         Significant Labs: All pertinent labs within the past 24 hours have been reviewed.  CBC:   Recent Labs   Lab 10/02/23  0425 10/03/23  0435   WBC 13.64* 12.71*   HGB 10.0* 10.3*   HCT 30.7* 31.4*    192       CMP:   Recent Labs   Lab 10/02/23  0425 10/03/23  0435    138   K 4.3 4.2    106   CO2 25 24   GLU 99 104   BUN 19 17   CREATININE 0.7 0.6   CALCIUM 8.2* 8.3*   ANIONGAP 9 8         Significant Imaging: I have reviewed all pertinent imaging results/findings within the past 24 hours.

## 2023-10-03 NOTE — NURSING
Patient HR started to sustain in the 170s and symptomatic with SBP in the 90s. Call placed to Dr. Ortega who stated to titrate cardizem drip to 15. Drip titrated accordingly. HR now fluctuating from 150s to 170s. Dr. Ortega stated it was okay for now HR to be elevated and stated to let the cardizem kick in. Patient remains AAOx4. Denies chest pain.

## 2023-10-03 NOTE — PLAN OF CARE
"  Problem: Adult Inpatient Plan of Care  Goal: Plan of Care Review  Outcome: Ongoing, Progressing     Care Plan    POC reviewed with Graciela Barton and family. Questions and concerns addressed. See below and flowsheets for full assessment and VS info.   ICU length of stay: 3d 20h   Primary Diagnosis: Lumbar stenosis with neurogenic claudication     HR remains increased. BP stable. Dilt gtt remains @ 15 mg/hr. Plan for cardiovert tomorrow. Restarting anticoag therapy this evening.   Npo @ midnight.   UOP 250ml total. NP aware.   Pain controlled with PRNs.   Drain to lumbar removed by PA this morning, tolerated well.     BP (!) 118/56   Pulse (!) 156   Temp 98 °F (36.7 °C) (Oral)   Resp (!) 24   Ht 5' 7" (1.702 m)   Wt 76.7 kg (169 lb)   SpO2 (!) 94%   Breastfeeding No   BMI 26.47 kg/m²      Neuro:  [place] Coma Scale  Best Eye Response: 4-->(E4) spontaneous  Best Motor Response: 6-->(M6) obeys commands  Best Verbal Response: 5-->(V5) oriented  Olman Coma Scale Score: 15  Assessment Qualifiers: patient not sedated/intubated, no eye obstruction present  Pupil PERRLA: yes    CV:  Rhythm: atrial rhythm, aflutter ?afib?  Pulse Readings from Last 1 Encounters:   10/03/23 (!) 156      BP Readings from Last 1 Encounters:   10/03/23 (!) 118/56     Anticoagulants   Medication Route Frequency    enoxaparin injection 80 mg Subcutaneous Q12H (treatment, non-standard time)      Resp:  Oxygen Documentation:  Flow (L/min): 1  Device (Oxygen Therapy): room air    GI/:  Last Bowel Movement: 10/03/23  Voiding Characteristics: urethral catheter (bladder)       Urethral Catheter 10/02/23 1445 Non-latex;Straight-tip-Reason for Continuing Urinary Catheterization: Urinary retention  I/O this shift:  In: 411.7 [P.O.:240; I.V.:171.7]  Out: 285 [Urine:285]     Labs/Accuchecks:  Recent Labs   Lab 10/03/23  0435   WBC 12.71*   RBC 3.37*   HGB 10.3*   HCT 31.4*         Recent Labs   Lab 10/03/23  0435      K 4.2   CO2 24 " "     BUN 17   CREATININE 0.6    No results for input(s): "PROTIME", "INR", "APTT", "HEPANTIXA" in the last 168 hours. No results for input(s): "CPK", "CPKMB", "TROPONINI", "MB" in the last 168 hours.  Electrolytes: N/A - electrolytes WDL  Accuchecks: none  No results for input(s): "POCTGLUCOSE" in the last 24 hours.    Gtts/LDAs:   dilTIAZem 15 mg/hr (10/03/23 1822)     Skin/Wounds  Wounds: Yes , WDL   "

## 2023-10-03 NOTE — NURSING
Cardiology team notified of HR sustained 170s, currently 182 during PT session. /67. NP ordered for PT to stop and for pt to remain in bed for day, let NP know if HR does not dec to 160 after rest .

## 2023-10-03 NOTE — PLAN OF CARE
Problem: Occupational Therapy  Goal: Occupational Therapy Goal  Description: Goals to be met by: 11/01/2023      Patient will increase functional independence with ADLs by performing:    UE Dressing with Minimal Assistance.  LE Dressing with Maximum Assistance.  Grooming while seated with Stand-by Assistance.  Toileting from bedside commode with Minimal Assistance for hygiene and clothing management.   Toilet transfer to bedside commode with Moderate Assistance.  Increased functional strength to WFL for self care.  Upper extremity exercise program x10 reps per handout, with assistance as needed.      Outcome: Ongoing, Progressing   Pt found in SF & agreeable to OT/PT co-tx this date.  Pt perf the following:  -logrolling R via bedrail use w/ Max A-->sitting EOB w/ Max A x 2  -don TLSO w/ Max A  -tolerated sitting EOB for majority of tx session w/ inconsistent SB-Min A to maint & using BUEs of bed rails  -returned to R sidelying w/ Mod A for BLEs  Edu/tx re: spinal precautions, general safety techs & HEP. Pt verbalized understanding.    Pt demo'ed increased sitting balance skills this date & cont w/ good potential to achieve established goals w/cont OT per POC.

## 2023-10-03 NOTE — NURSING
Yenni, NP notified of minimal urine output of 200 ml over 8 hours. Orders given to bladder scan and just monitor at this time. Bladder scan revealed bladder empty, aden patent.

## 2023-10-03 NOTE — PLAN OF CARE
Heart rate was elevated all day, max 160 at flutter, was esmolol then discontinued, started on metoprolol 50 mgm po, feels that she needs cardioversion, once drain out and started on bld thinner, finally started on cardizem 5mgm hr, and inc to max 10mgm hr, once at 10mgm she dropped her rate to 112 at fib, will monitor, safety maintained, side rails up x4 call bell easy reach, bed low position, bed alarm on, inc activity, was able to sit on side of bed with brace but could not support upper torso alone, legs remain very stiff, and hardly can move

## 2023-10-03 NOTE — EICU
Intervention Initiated From:  Bedside    Ariana intervened regarding:  Other    Nurse Notified:  Yes    Doctor Notified:  Yes    Comments: Call from bedside nurse requesting Type and Screen be ordered as previous sample expiring. Dr Buckley messaged

## 2023-10-03 NOTE — PROGRESS NOTES
[facility] - [facility] Care  Neurosurgery  Progress Note    Subjective:     Interval History: Back pain no leg pain.     History of Present Illness:   This is a very pleasant 72 y.o. female, who was active at the beginning of 2022, mowing her lawn then suddenly started to have difficulty walking, severe back pain and right leg pain.  She has developed a paralysis involving the right leg.  Her right leg appears spastic.  She is mainly complaining of right knee pain. She is being mobilized in a wheelchair.  She admits still being able to drive her car.  In June 2022 she had a thoracic and lumbar spine MRI.  She is being investigated for possible multiple sclerosis.  She reports having no loss of sensation to pain or temperature.  She has no voiding difficulty or incontinence.  She reports right more than left hand numbness and dropping things frequently.      Post-Op Info:  Procedure(s) (LRB):  FUSION, SPINE, LUMBAR, TLIF, MINIMALLY INVASIVE (N/A)  FUSION, SPINE, LUMBAR, PLIF (N/A)  LAMINECTOMY, SPINE (N/A)  FORAMINOTOMY, SPINE (N/A)  FACETECTOMY (N/A)   4 Days Post-Op      Medications:  Continuous Infusions:   dilTIAZem 15 mg/hr (10/03/23 0725)       Scheduled Meds:   bisacodyL  10 mg Rectal Daily    heparin (porcine)  5,000 Units Subcutaneous Q12H    levothyroxine  137 mcg Oral Before breakfast    methocarbamoL  750 mg Oral TID    metoprolol tartrate  25 mg Oral Q6H    mupirocin   Nasal BID    senna-docusate 8.6-50 mg  2 tablet Oral BID     PRN Meds:acetaminophen, aluminum-magnesium hydroxide-simethicone, HYDROmorphone, metoprolol, ondansetron, oxyCODONE, prochlorperazine, traMADoL     Review of Systems  Objective:     Weight: 76.7 kg (169 lb)  Body mass index is 26.47 kg/m².  Vital Signs (Most Recent):  Temp: 98.5 °F (36.9 °C) (10/03/23 0715)  Pulse: (!) 184 (10/03/23 0945)  Resp: (!) 29 (10/03/23 0945)  BP: 115/66 (10/03/23 0945)  SpO2: 97 % (10/03/23 0930) Vital Signs (24h Range):  Temp:  [98.2 °F (36.8 °C)-98.6 °F  "(37 °C)] 98.5 °F (36.9 °C)  Pulse:  [] 184  Resp:  [3-31] 29  SpO2:  [73 %-100 %] 97 %  BP: ()/() 115/66     Date 10/03/23 0700 - 10/04/23 0659   Shift 1187-9639 7092-5423 2076-9676 24 Hour Total   INTAKE   I.V.(mL/kg) 21.3(0.3)   21.3(0.3)   Shift Total(mL/kg) 21.3(0.3)   21.3(0.3)   OUTPUT   Shift Total(mL/kg)       Weight (kg) 76.7 76.7 76.7 76.7                              Closed/Suction Drain 09/29/23 2123 Medial Back Bulb 10 Fr. (Active)   Site Description Unable to view 09/30/23 1319   Dressing Type Transparent (Tegaderm);Other (Comment) 09/30/23 1319   Dressing Status Clean;Dry;Intact 09/30/23 1319   Dressing Intervention First dressing 09/30/23 1319   Drainage Bloody 09/30/23 1319   Status To bulb suction 09/30/23 1319   Output (mL) 25 mL 09/30/23 1156            Urethral Catheter 09/29/23 Silicone (Active)   Site Assessment Clean;Intact;Dry 09/30/23 1319   Collection Container Urimeter 09/30/23 1319   Securement Method secured to top of thigh w/ adhesive device 09/30/23 1319   Catheter Care Performed yes 09/30/23 1040   Reason for Continuing Urinary Catheterization Critically ill in ICU and requiring hourly monitoring of intake/output 09/30/23 1319   CAUTI Prevention Bundle Securement Device in place with 1" slack;Intact seal between catheter & drainage tubing;Drainage bag/urimeter off the floor;Sheeting clip in use;No dependent loops or kinks;Drainage bag/urimeter not overfilled (<2/3 full);Drainage bag/urimeter below bladder 09/30/23 1319   Output (mL) 75 mL 09/30/23 1150       Neurosurgery Physical Exam    General: well developed, well nourished, no distress  Mental Status: Awake, Alert, Oriented X3.Thought content appropriate  GCS: Motor: 6/Verbal: 5/Eyes: 4 GCS Total: 15    Motor Strength:  Strength                                HF KF KE DF PF EHL   Lower: R 5/5 5/5 5/5 5/5 5/5 5/5    L 5/5 5/5 5/5 5/5 5/5 5/5         Incision- CDI  Drain- 60      Significant Labs:  Recent Labs " "  Lab 10/01/23  1216 10/01/23  1248 10/02/23  0425 10/03/23  0435     --  99 104     --  138 138   K 3.8  --  4.3 4.2     --  104 106   CO2 26  --  25 24   BUN 23  --  19 17   CREATININE 0.8  --  0.7 0.6   CALCIUM 8.3*  --  8.2* 8.3*   MG  --  1.8 2.0 2.0       Recent Labs   Lab 10/01/23  1216 10/02/23  0425 10/03/23  0435   WBC 13.20* 13.64* 12.71*   HGB 7.4* 10.0* 10.3*   HCT 22.8* 30.7* 31.4*    155 192       No results for input(s): "LABPT", "INR", "APTT" in the last 48 hours.  Microbiology Results (last 7 days)       ** No results found for the last 168 hours. **              Assessment/Plan:     Active Diagnoses:    Diagnosis Date Noted POA    PRINCIPAL PROBLEM:  Lumbar stenosis with neurogenic claudication [M48.062] 09/29/2023 Yes    Acute blood loss anemia [D62] 10/01/2023 Yes    Foraminal stenosis of lumbar region [M48.061] 09/29/2023 Yes    Chronic myelopathy [G95.9] 09/29/2023 Yes    Decreased range of motion of right knee [M25.661] 09/29/2023 Yes    Assistance needed for mobility [Z74.1] 09/29/2023 Not Applicable    Atrial flutter [I48.92] 09/15/2023 Yes    Anticoagulant long-term use [Z79.01] 02/23/2023 Not Applicable    PAF (paroxysmal atrial fibrillation) [I48.0] 08/10/2022 Yes    DDD (degenerative disc disease), lumbar [M51.36] 08/09/2022 Yes    Postoperative hypothyroidism [E89.0] 02/19/2018 Yes    History of thyroid cancer [Z85.850] 02/19/2018 Not Applicable    Essential hypertension, benign [I10] 02/19/2018 Yes    Mixed hyperlipidemia [E78.2] 02/19/2018 Yes      Problems Resolved During this Admission:       A/P:  POD #4 Left L3-4, L4-5 OLIF, L5-S1 ALIF and L3-S1 posterior instrumented fusion     --Neurologically stable          -q4h neuro checks  --Imaging: Post op xrays pending.  [facility]ICA VIRK US pending  --Drains: Dced  --Pain control: Tylenol 650mg Q 6 hours, Tramadol 100mg Q 6 hours PRN, Oxycodone IR 10mg Q 6 hours PRN, Robaxin 750mg TID, Dilaudid 1mg SQ Q 3 hours PRN "   --DVT ppx: TEDs/SCDs/SQH  --Activity: PT/OT, OOB. TLSO brace  --Diet: Cardiac  --Bowel regimen: PRN suppository, senna PRN  --Urinary: [place]  --Atelectasis ppx: Encourage IS hourly  --Daily labs  --Appreciate critical care recs and cardiology recs   Drain removed today at 9am.  Ok to resume full anticoagulation at 9pm tonight       All of the above discussed and reviewed with Dr. Salter.      MALCOLM BagleyC  Neurosurgery  [facility] - Intensive Care

## 2023-10-03 NOTE — PT/OT/SLP PROGRESS
Physical Therapy Treatment    Patient Name:  Graciela Barton   MRN:  [id number]    Recommendations:     Discharge Recommendations:  (high intensity therapy)  Discharge Equipment Recommendations: to be determined by next level of care  Barriers to discharge: Inaccessible home and Decreased caregiver support    Assessment:     Graciela Barton is a 73 y.o. female admitted with a medical diagnosis of Lumbar stenosis with neurogenic claudication.  She presents with the following impairments/functional limitations: weakness, impaired endurance, impaired self care skills, impaired functional mobility, gait instability, impaired balance, abnormal tone, pain, decreased ROM, orthopedic precautions, impaired joint extensibility, impaired muscle length, impaired cardiopulmonary response to activity, decreased lower extremity function, impaired skin, decreased coordination, edema, impaired coordination Patient with improved sitting balance today; able to straighten LLE >RLE but improved overall; will cont to progress to standing and mobility as tolerated..    Rehab Prognosis: Good; patient would benefit from acute skilled PT services to address these deficits and reach maximum level of function.    Recent Surgery: Procedure(s) (LRB):  FUSION, SPINE, LUMBAR, TLIF, MINIMALLY INVASIVE (N/A)  FUSION, SPINE, LUMBAR, PLIF (N/A)  LAMINECTOMY, SPINE (N/A)  FORAMINOTOMY, SPINE (N/A)  FACETECTOMY (N/A) 4 Days Post-Op    Plan:     During this hospitalization, patient to be seen daily to address the identified rehab impairments via gait training, therapeutic activities, therapeutic exercises, neuromuscular re-education and progress toward the following goals:    Plan of Care Expires:  11/01/23    Subjective     Chief Complaint: low back pain  Patient/Family Comments/goals: return home  Pain/Comfort:  Pain Rating 1:  (did not rate)  Location - Side 1: Bilateral  Location - Orientation 1: lower  Location 1: back  Pain Addressed 1: Pre-medicate for  activity, Reposition, Distraction  Pain Rating Post-Intervention 1:  (did not rate)      Objective:     Communicated with nurse prior to session.  Patient found HOB elevated with bed alarm, blood pressure cuff, telemetry, SCD, peripheral IV, oxygen, aden catheter upon PT entry to room.     General Precautions: Standard, fall  Orthopedic Precautions: spinal precautions  Braces: TLSO  Respiratory Status: Nasal cannula, flow 2 L/min     Functional Mobility:  Bed Mobility:     Rolling Right: maximal assistance and use of side rail  Supine to Sit: maximal assistance and of 2 persons  Sit to Supine: maximal assistance and with leg lift      AM-PAC 6 CLICK MOBILITY  Turning over in bed (including adjusting bedclothes, sheets and blankets)?: 2  Sitting down on and standing up from a chair with arms (e.g., wheelchair, bedside commode, etc.): 1  Moving from lying on back to sitting on the side of the bed?: 2  Moving to and from a bed to a chair (including a wheelchair)?: 1  Need to walk in hospital room?: 1  Climbing 3-5 steps with a railing?: 1  Basic Mobility Total Score: 8       Treatment & Education:  Tx Ok'd by nurse; pt agreeable to PT/OT cotx at this time; pt performed logrolling R with use of side rail with max A and to sitting at EOB with maxA x 2 and increased time to complete needing max A with LEs off the bed; donned TLSO with maxA; pt tolerated sitting for larger portion of session with SBA-Bushra to maintain balance; using BUEs on bed rails to self assist and VCs to pt to lean forward at hips, preventing posterior lean; slow range AAROM/prolonged stretch BLEs; returned to R sidelying with mod/maxA for LEs to place back onto bed and rolled to supine with Bushra; repositioned pt toward HOB with use of trendelenburg via drawsheet with maxA x 2; HR returned to 150-160s once pt resting in reclined position.     Patient left HOB elevated with all lines intact, call button in reach, and nurse notified..    GOALS:    Multidisciplinary Problems       Physical Therapy Goals          Problem: Physical Therapy    Goal Priority Disciplines Outcome Goal Variances Interventions   Physical Therapy Goal     PT, PT/OT Ongoing, Progressing     Description: Goals to be met by: 2023     Patient will increase functional independence with mobility by performin. Supine to sit with Modified [place]  2. Sit to supine with Modified [place]  3. Sit to stand transfer with Modified [place]  4. Gait  x 100 feet with Minimal Assistance using Rolling Walker.   5. Stand for 10 minutes with Minimal Assistance using Rolling Walker                         Time Tracking:     PT Received On: 10/03/23  PT Start Time: 930     PT Stop Time: 1010  PT Total Time (min): 40 min cotx with OT    Billable Minutes: Therapeutic Activity 40       PT/PTA: PT     Number of PTA visits since last PT visit: 0     10/03/2023

## 2023-10-03 NOTE — PROGRESS NOTES
[facility] - [facility] Care  Cardiology  Progress Note    Patient Name: Graciela Barton  MRN: [id number]  Admission Date: 9/29/2023  Hospital Length of Stay: 4 days  Code Status: Full Code   Attending Physician: Scooter Salter MD   Primary Care Physician: Rocco Miller MD  Expected Discharge Date:   Principal Problem:Lumbar stenosis with neurogenic claudication    Subjective:     Hospital Course:   10/2/2023 Seen by Dr. Ortega over the weekend for consult of atrial flutter with RVR. Hx of PAF on propafenone. In Aflutter since 5/2023- HR in 115s-120s and on Lopressor and propafenone as outpatient.  Hgb 7 down from 12. And off anticoagulation. Given Digoxin .25mg IV every 6 hours x 3 overnight with continued HR in 140s. Started on Esmolol overnight due to elevated BP and currently on Esmolol drip along with oral Metoprolol. SBP 120s and patient asymptomatic. BONY drains remain in place from recent neurosurgery. Echo with EF 55%. Neurosurgery following as well   10/3/2023 Remains in atrial flutter with HR up to 160s-170s this AM. Started on IV Cardizem overnight with continued Metoprolol BID. SBP 100s-110s overnight. Asymptomatic from cardiac standpoint. H&H 10.3&31.4  BMP WNL. BONY drain remains in place from NS last week           Review of Systems   Constitutional: Negative for chills, decreased appetite, diaphoresis and fever.   Cardiovascular:  Negative for chest pain, claudication, cyanosis, dyspnea on exertion, irregular heartbeat, leg swelling, near-syncope, orthopnea, palpitations, paroxysmal nocturnal dyspnea and syncope.   Respiratory:  Negative for cough, hemoptysis, shortness of breath and wheezing.    Gastrointestinal:  Negative for bloating, abdominal pain, constipation, diarrhea, melena, nausea and vomiting.   Neurological:  Negative for dizziness and weakness.     Objective:     Vital Signs (Most Recent):  Temp: 98.5 °F (36.9 °C) (10/03/23 0715)  Pulse: (!) 166 (10/03/23 0917)  Resp: 16 (10/03/23 0917)  BP:  (!) 128/58 (10/03/23 0915)  SpO2: 98 % (10/03/23 0917) Vital Signs (24h Range):  Temp:  [98.2 °F (36.8 °C)-98.6 °F (37 °C)] 98.5 °F (36.9 °C)  Pulse:  [] 166  Resp:  [3-31] 16  SpO2:  [73 %-100 %] 98 %  BP: ()/() 128/58     Weight: 76.7 kg (169 lb)  Body mass index is 26.47 kg/m².     SpO2: 98 %         Intake/Output Summary (Last 24 hours) at 10/3/2023 0942  Last data filed at 10/3/2023 0725  Gross per 24 hour   Intake 695.99 ml   Output 1100 ml   Net -404.01 ml       Lines/Drains/Airways       Drain  Duration                  Urethral Catheter 10/02/23 1445 Non-latex;Straight-tip <1 day              Peripheral Intravenous Line  Duration                  Peripheral IV - Single Lumen 09/29/23 1416 18 G Right Wrist 3 days         Peripheral IV - Single Lumen 09/29/23 2300 18 G Right;Anterior Forearm 3 days         Peripheral IV - Single Lumen 09/30/23 0300 20 G Anterior;Right Forearm 3 days                       Physical Exam  Constitutional:       General: She is not in acute distress.     Appearance: She is well-developed.   Cardiovascular:      Rate and Rhythm: Tachycardia present. Rhythm regularly irregular.      Heart sounds: No murmur heard.     No gallop.   Pulmonary:      Effort: Pulmonary effort is normal. No respiratory distress.      Breath sounds: Normal breath sounds. No wheezing.   Abdominal:      General: Bowel sounds are normal. There is no distension.      Palpations: Abdomen is soft.      Tenderness: There is no abdominal tenderness.   Skin:     General: Skin is warm and dry.   Neurological:      Mental Status: She is alert and oriented to person, place, and time.            Significant Labs: BMP:   Recent Labs   Lab 10/01/23  1216 10/01/23  1248 10/02/23  0425 10/03/23  0435     --  99 104     --  138 138   K 3.8  --  4.3 4.2     --  104 106   CO2 26  --  25 24   BUN 23  --  19 17   CREATININE 0.8  --  0.7 0.6   CALCIUM 8.3*  --  8.2* 8.3*   MG  --  1.8 2.0 2.0     and CBC   Recent Labs   Lab 10/01/23  1216 10/02/23  0425 10/03/23  0435   WBC 13.20* 13.64* 12.71*   HGB 7.4* 10.0* 10.3*   HCT 22.8* 30.7* 31.4*    155 192       Significant Imaging: Echocardiogram: Transthoracic echo (TTE) complete ([facility] Only):   Results for orders placed or performed during the hospital encounter of 09/29/23   Echo   Result Value Ref Range    BSA 1.9 m2    Hoyt's Biplane MOD Ejection Fraction 64 %    LVOT stroke volume 51.18 cm3    LVIDd 4.07 3.5 - 6.0 cm    LV Systolic Volume 33.51 mL    LV Systolic Volume Index 17.8 mL/m2    LVIDs 2.95 2.1 - 4.0 cm    LV Diastolic Volume 72.74 mL    LV Diastolic Volume Index 38.69 mL/m2    IVS 0.87 0.6 - 1.1 cm    LVOT diameter 2.0 cm    LVOT area 3.1 cm2    FS 28 28 - 44 %    Left Ventricle Relative Wall Thickness 0.36 cm    Posterior Wall 0.74 0.6 - 1.1 cm    LV mass 96.97 g    LV Mass Index 52 g/m2    TDI LATERAL 0.26 m/s    TDI SEPTAL 0.26 m/s    TR Max Mathew 2.71 m/s    LVOT peak mathew 1.01 m/s    Left Ventricular Outflow Tract Mean Velocity 0.76 cm/s    Left Ventricular Outflow Tract Mean Gradient 2.53 mmHg    LA size 3.83 cm    Left Atrium Major Axis 5.13 cm    RV S' 9.74 cm/s    TAPSE 2.27 cm    RA Major Axis 5.45 cm    RA Width 4.05 cm    AV mean gradient 4 mmHg    AV peak gradient 7 mmHg    Ao peak mathew 1.31 m/s    Ao VTI 31.90 cm    LVOT peak VTI 16.30 cm    AV valve area 1.60 cm²    AV Velocity Ratio 0.77     AV index (prosthetic) 0.51     MADY by Velocity Ratio 2.42 cm²    Mr max mathew 4.15 m/s    MV mean gradient 2 mmHg    MV peak gradient 5 mmHg    MV valve area by continuity eq 2.35 cm2    MV VTI 21.8 cm    Triscuspid Valve Regurgitation Peak Gradient 29 mmHg    PV PEAK VELOCITY 1.00 m/s    PV peak gradient 4 mmHg    Sinus 2.67 cm    STJ 2.62 cm    Ascending aorta 2.93 cm    IVC diameter 2.15 cm    Mean e' 0.26 m/s    ZLVIDS -0.74     ZLVIDD -2.55     LA WIDTH 4.3 cm    EF 55 %    TV resting pulmonary artery pressure 44 mmHg    RV TB RVSP  18 mmHg    Est. RA pres 15 mmHg    Narrative      Left Ventricle: The left ventricle is normal in size. Normal wall   thickness. Normal wall motion. There is normal systolic function. Ejection   fraction by visual approximation is 55%. Unable to assess diastolic   function due to atrial fibrillation.    Left Atrium: Left atrium is mildly dilated.    Right Ventricle: Normal right ventricular cavity size. Wall thickness   is normal. Right ventricle wall motion  is normal. Systolic function is   normal.    Right Atrium: Right atrium is mildly dilated.    Mitral Valve: There is mild regurgitation.    Tricuspid Valve: There is mild regurgitation.    Pulmonary Artery: The estimated pulmonary artery systolic pressure is   44 mmHg.    IVC/SVC: Elevated venous pressure at 15 mmHg.       Assessment and Plan:     Brief HPI: Seen this morning on AM rounds with Dr. Ortega while resting in bed. Denies any complaints currently. Discussed POC as detailed below-verbalized understanding and agrees with POC     Acute blood loss anemia  - H&H 7&22 over the weekend with improvement with PRBC; H&H 10.3&31.4 this AM   - on DVT prophylaxis only for now; on Eliquis as an outpatient but on hold given recent Neurosurgery; discussed with primary team and anemia felt to be related to recent surgical intervention     Atrial flutter  - history of atrial flutter and treated with Metoprolol and Propafenone as an outpatient  - atrial flutter with RVR since admission; remains RVR despite Metoprolol and IV Digoxin over the weekend; started on IV Cardizem drip overnight and at 15mg/hr; on Metoprolol 50mg po BID and will change to 25mg po Q6hrs to attempt to again better rate control   - needs cardioversion but unable to proceed until able to place on full anticoagulation; anticipate approval from Neurosurgery once drain removed  - will monitor closely     PAF (paroxysmal atrial fibrillation)  - as detailed under atrial flutter     Essential  hypertension, benign  - SBP 100s-110s overnight  - on Metoprolol and HCTZ as an outpatient  - currently on Metoprolol and IV Cardizem for  atrial flutter management  - BP goal less than 130/80  - BP well controlled         VTE Risk Mitigation (From admission, onward)         Ordered     IP VTE HIGH RISK PATIENT  Once         09/29/23 2314     Place sequential compression device  Until discontinued         09/29/23 2314     heparin (porcine) injection 5,000 Units  Every 12 hours         09/29/23 2314                TARA Pendleton, ANP  Cardiology  [facility] - Intensive Care

## 2023-10-03 NOTE — ASSESSMENT & PLAN NOTE
- history of atrial flutter and treated with Metoprolol and Propafenone as an outpatient  - atrial flutter with RVR since admission; remains RVR despite Metoprolol and IV Digoxin over the weekend; started on IV Cardizem drip overnight and at 15mg/hr; on Metoprolol 50mg po BID and will change to 25mg po Q6hrs to attempt to again better rate control   - needs cardioversion but unable to proceed until able to place on full anticoagulation; anticipate approval from Neurosurgery once drain removed  - will monitor closely

## 2023-10-03 NOTE — PLAN OF CARE
Problem: Physical Therapy  Goal: Physical Therapy Goal  Description: Goals to be met by: 2023     Patient will increase functional independence with mobility by performin. Supine to sit with Modified [place]  2. Sit to supine with Modified [place]  3. Sit to stand transfer with Modified [place]  4. Gait  x 100 feet with Minimal Assistance using Rolling Walker.   5. Stand for 10 minutes with Minimal Assistance using Rolling Walker    Outcome: Ongoing, Progressing   Patient with improved sitting balance today; able to straighten LLE >RLE but improved overall; will cont to progress to standing and mobility as tolerated.

## 2023-10-03 NOTE — PLAN OF CARE
Cardizem started, 5 mgm hr, heart rate 162 at flutter, no co of chest pain or sob, will monitor    Passed small amt of liquid brown stool, back dressing soiled,   Dressing changed, cleaned with normal saline, stable intact, no bleeding

## 2023-10-03 NOTE — PT/OT/SLP PROGRESS
Occupational Therapy   Treatment    Name: Graciela Barton  MRN: [id number]  Admitting Diagnosis:  Lumbar stenosis with neurogenic claudication  4 Days Post-Op    Recommendations:     Discharge Recommendations: other (see comments) (high intensity therapy)  Discharge Equipment Recommendations:  other (see comments) (defer)  Barriers to discharge:  Inaccessible home environment, Decreased caregiver support    Assessment:    Pt demo'ed increased sitting balance skills this date & cont w/ good potential to achieve established goals w/cont OT per POC.    Graciela Barton is a 73 y.o. female with a medical diagnosis of Lumbar stenosis with neurogenic claudication.  She presents with . Performance deficits affecting function are weakness, impaired endurance, impaired self care skills, impaired balance, gait instability, impaired functional mobility, decreased coordination, decreased lower extremity function, decreased ROM, abnormal tone, pain, impaired skin, edema, orthopedic precautions, impaired joint extensibility, impaired cardiopulmonary response to activity.     Rehab Prognosis:  Good; patient would benefit from acute skilled OT services to address these deficits and reach maximum level of function.       Plan:     Patient to be seen 5 x/week to address the above listed problems via self-care/home management, therapeutic activities, therapeutic exercises  Plan of Care Expires: 11/01/23  Plan of Care Reviewed with: patient    Subjective     Chief Complaint: low back pain  Patient/Family Comments/goals: return home  Pain/Comfort:  Pain Rating 1: other (see comments) (unrated)  Location - Orientation 1: lower  Location 1: back  Pain Addressed 1: Pre-medicate for activity, Reposition, Distraction  Pain Rating Post-Intervention 1: other (see comments) (unrated)    Objective:     Communicated with: nsg prior to session.  Patient found HOB elevated with bed alarm, blood pressure cuff, telemetry, SCD, peripheral IV, oxygen, aden  catheter upon OT entry to room.    General Precautions: Standard, fall    Orthopedic Precautions:spinal precautions  Braces: TLSO  Respiratory Status: Nasal cannula, flow 2 L/min     Occupational Performance:     Bed Mobility:    Patient completed Rolling/Turning to Right with maximal assistance and with side rail  Patient completed Supine to Sit with maximal assistance and 2 persons  Patient completed Sit to Supine with minimum assistance and with leg lift     Functional Mobility/Transfers:    Functional Mobility:     Activities of Daily Living:  Lower Body Dressing: maximal assistance don socks      AMPAC 6 Click ADL: 14    Treatment & Education:   Pt found in SF & agreeable to OT/PT co-tx this date.  Pt perf the following:  -logrolling R via bedrail use w/ Max A-->sitting EOB w/ Max A x 2  -don TLSO w/ Max A  -tolerated sitting EOB for majority of tx session w/ inconsistent SB-Min A to maint & using BUEs of bed rails  -returned to R sidelying w/ Mod A for BLEs  Edu/tx re: spinal precautions, general safety techs & HEP. Pt verbalized understanding.      Patient left HOB elevated with all lines intact, call button in reach, and nsg notified    GOALS:   Multidisciplinary Problems       Occupational Therapy Goals          Problem: Occupational Therapy    Goal Priority Disciplines Outcome Interventions   Occupational Therapy Goal     OT, PT/OT Ongoing, Progressing    Description: Goals to be met by: 11/01/2023      Patient will increase functional independence with ADLs by performing:    UE Dressing with Minimal Assistance.  LE Dressing with Maximum Assistance.  Grooming while seated with Stand-by Assistance.  Toileting from bedside commode with Minimal Assistance for hygiene and clothing management.   Toilet transfer to bedside commode with Moderate Assistance.  Increased functional strength to WFL for self care.  Upper extremity exercise program x10 reps per handout, with assistance as needed.                            Time Tracking:     OT Date of Treatment: 10/03/23  OT Start Time: 0930  OT Stop Time: 1010  OT Total Time (min): 40 min    Billable Minutes:Therapeutic Activity 40  Total Time 40    OT/LUPIS: OT          10/3/2023

## 2023-10-03 NOTE — ASSESSMENT & PLAN NOTE
Atrial flutter  Anticoagulant long-term use    -chronic, has been in Aflutter for quite some time  -RVR rate worsened after admission in setting of neurosurgical procedure, anemia, and chronic hypothyroidism  -Cardiology following   -home propafenone discontinued per recs (failed drug in setting of several months history of AFlutter)  -continue home metoprolol (titrations limited due to BP)  -s/p IV dig x 3 doses   -initiated on esmolol gtt 10/01 due to no improvement/elevation in HR overnight   -will need outpt follow up with EP for consideration of DCCV +/- ablation  -continue tele monitoring  -EKG PRN concerns  -trend labs, address/replete electrolytes as indicated  -monitor    -started on diltiazem drip overnight.  Will continue metoprolol 25 mg po q.6  -cardiology considering  DCCV +/- ablation

## 2023-10-03 NOTE — PROGRESS NOTES
[facility] Medicine  Progress Note    Patient Name: Graciela Barton  MRN: [id number]  Patient Class: IP- Inpatient   Admission Date: 9/29/2023  Length of Stay: 4 days  Attending Physician: Scooter Salter MD  Primary Care Provider: Rocco Miller MD        Subjective:     Principal Problem:Lumbar stenosis with neurogenic claudication        HPI:  Ms. Barton is a 73 YOF with PMHx of HTN, HLD, BPPV, PAfib/flutter on OAC, history of thyroid cancer s/p thyroidectomy in 2018 with hypothyroidism, thoracic myelopathy with neurogenic claudication s/p s/p T6-7 T7-8 anterior diskectomy (05/12/2023) and now s/p left L3-4, L4-5 OLIF, L5-S1 ALIF and L3-S1 posterior instrumented fusion (09/29/2023).     Admission testing reviewed noting WBC 12 >>13, hemoglobin 9.7 >> 8.5 >> 7.4, hematocrit 30 >> 25.9 >> 22.8; chemistry stable; lactic 1.0; TSH 7.448 with FT4 0.97; CXR with clearing of pleural fluid and atelectasis in the right lung base; BLE US without evidence of DVT; and TTE with LVEF >55%, LA mild dilation, RA mild dilation, normal RV size and function, mild MR, mild TR, RVSP 44mmHg. From neurosurgery perspective she is progressing well in post operative course however the course has been complicated by AFib/flutter with RVR refractory to home medications, propafenone and metoprolol, and amiodarone gtt.     She is seen today in consult for medical management. She notes that overall pain is well controlled however she is developing cramping sensation in BLEs. She denies palpitations, SOB, ELLIS, CP, abdominal pain, N/V/D, constipation, fever, chills, light headiness, dizziness, seizures, or syncope.     Hospital Medicine Service was consulted for assistance with medical management.         Overview/Hospital Course:  Ms. Barton was admitted 09/29 by Neurosurgery and is now s/p left L3-4, L4-5 OLIF, L5-S1 ALIF and L3-S1 posterior instrumented fusion. From neurosurgery perspective she is progressing well in post  operative course. However the course has been complicated by AFib/flutter with RVR that was refractory to home medications, propafenone and metoprolol; and amiodarone gtt. Cardiology following with recommendations to transfuse due to anemia (two units PRBCs transfused 10/01), stop home propafenone (failed drug in setting of several months history of AFlutter), continue home metoprolol (titrations limited due to BP), and was give 3 doses of IV digoxin (completed). However without improvement and HR elevating to 140's she was initiated on esmolol gtt evening of 10/01.      Other concerns currently to monitor include monitoring for s/s of urinary retention and bowel regimen titrated 10/2 as last BM was day of admission per patient.     Disposition plans: to rehab when medically ready for intensive therapy.       Interval History:  Seen at the bedside while in ICU.  Patient resting in bed.  She does reports some abdominal pain.  Patient reports passing gas as well as a BM on yesterday.  Heart rate is still tachy irregular.  Noted with an AFib RVR with a heart rate up to the 150s.  Cardiology following.  Will continue beta-blocker as well as diltiazem.  200 mL Urine output over 8 hours.  Will order bladder scan and monitor for now.  BUN creatinine stable.  Will be cautious with fluids at this time as patient is fluid positive.    Review of Systems   Constitutional:  Positive for activity change. Negative for appetite change, chills, diaphoresis, fatigue and fever.   HENT:  Negative for congestion and sore throat.    Respiratory:  Negative for cough, chest tightness, shortness of breath and wheezing.    Cardiovascular:  Positive for leg swelling. Negative for chest pain and palpitations.   Gastrointestinal:  Positive for abdominal pain. Negative for abdominal distention, constipation, diarrhea, nausea and vomiting.   Musculoskeletal:  Positive for arthralgias, back pain and gait problem.   Skin:  Positive for wound.    Neurological:  Negative for dizziness, syncope, weakness, light-headedness and headaches.     Objective:     Vital Signs (Most Recent):  Temp: 98.9 °F (37.2 °C) (10/03/23 1200)  Pulse: 100 (10/03/23 1330)  Resp: 17 (10/03/23 1330)  BP: 116/83 (10/03/23 1315)  SpO2: (!) 93 % (10/03/23 1330) Vital Signs (24h Range):  Temp:  [98.2 °F (36.8 °C)-98.9 °F (37.2 °C)] 98.9 °F (37.2 °C)  Pulse:  [] 100  Resp:  [7-29] 17  SpO2:  [79 %-99 %] 93 %  BP: ()/() 116/83     Weight: 76.7 kg (169 lb)  Body mass index is 26.47 kg/m².    Intake/Output Summary (Last 24 hours) at 10/3/2023 1511  Last data filed at 10/3/2023 1051  Gross per 24 hour   Intake 468.26 ml   Output 1200 ml   Net -731.74 ml           Physical Exam  Vitals and nursing note reviewed.   Constitutional:       General: She is not in acute distress.     Appearance: Normal appearance. She is well-developed. She is not toxic-appearing.   HENT:      Head: Normocephalic and atraumatic.      Mouth/Throat:      Dentition: Normal dentition.   Eyes:      General: Lids are normal.      Extraocular Movements: Extraocular movements intact.      Conjunctiva/sclera: Conjunctivae normal.   Cardiovascular:      Rate and Rhythm: Tachycardia present. Rhythm irregular.   Pulmonary:      Effort: Pulmonary effort is normal.      Breath sounds: Normal breath sounds.   Abdominal:      General: Bowel sounds are normal.      Palpations: Abdomen is soft.   Musculoskeletal:         General: Swelling present.      Cervical back: Neck supple.      Right lower leg: Edema present.      Left lower leg: Edema present.   Skin:     General: Skin is warm and dry.      Findings: No erythema or rash.   Neurological:      Mental Status: She is alert and oriented to person, place, and time.         Significant Labs: All pertinent labs within the past 24 hours have been reviewed.  CBC:   Recent Labs   Lab 10/02/23  0425 10/03/23  0435   WBC 13.64* 12.71*   HGB 10.0* 10.3*   HCT 30.7* 31.4*     192       CMP:   Recent Labs   Lab 10/02/23  0425 10/03/23  0435    138   K 4.3 4.2    106   CO2 25 24   GLU 99 104   BUN 19 17   CREATININE 0.7 0.6   CALCIUM 8.2* 8.3*   ANIONGAP 9 8         Significant Imaging: I have reviewed all pertinent imaging results/findings within the past 24 hours.      Assessment/Plan:      * Lumbar stenosis with neurogenic claudication  Chronic myelopathy  Foraminal stenosis of lumbar region  Neurogenic claudication   DDD, lumbar  Decreased ROM of R knee  Assistance needed for mobility     -thoracic myelopathy with neurogenic claudication s/p s/p T6-7 T7-8 anterior diskectomy (05/12/2023) and now s/p left L3-4, L4-5 OLIF, L5-S1 ALIF and L3-S1 posterior instrumented fusion (09/29/2023)  -management per primary team   -remains with surgical drain  -PT/OT following     Acute blood loss anemia  -s/p 2 units PRBC transfusion 10/2 with appropriate response  -monitor for s/s of bleeding   -trend labs and monitor  -further transfusions as indicated     PAF (paroxysmal atrial fibrillation)  Atrial flutter  Anticoagulant long-term use    -chronic, has been in Aflutter for quite some time  -RVR rate worsened after admission in setting of neurosurgical procedure, anemia, and chronic hypothyroidism  -Cardiology following   -home propafenone discontinued per recs (failed drug in setting of several months history of AFlutter)  -continue home metoprolol (titrations limited due to BP)  -s/p IV dig x 3 doses   -initiated on esmolol gtt 10/01 due to no improvement/elevation in HR overnight   -will need outpt follow up with EP for consideration of DCCV +/- ablation  -continue tele monitoring  -EKG PRN concerns  -trend labs, address/replete electrolytes as indicated  -monitor    -started on diltiazem drip overnight.  Will continue metoprolol 25 mg po q.6  -cardiology considering  DCCV +/- ablation    Mixed hyperlipidemia  -chronic  -resume home rosuvastatin at discharge     Essential  hypertension, benign  -chronic  -low/normotensive pressures at current  -hold home HCTZ  -continue metoprolol as noted above   -dose/medication adjustment as appropriate   -monitor     Postoperative hypothyroidism  History of thyroid cancer  History of thyroidectomy   -chronic  -improvement in TSH   -continue home levothyroxine      VTE Risk Mitigation (From admission, onward)         Ordered     enoxaparin injection 80 mg  Every 12 hours         10/03/23 1017     IP VTE HIGH RISK PATIENT  Once         09/29/23 2314     Place sequential compression device  Until discontinued         09/29/23 2314                Discharge Planning   ASYA:      Code Status: Full Code   Is the patient medically ready for discharge?:     Reason for patient still in hospital (select all that apply): Patient trending condition, Laboratory test, Treatment, Imaging, Consult recommendations and PT / OT recommendations               Critical care time spent on the evaluation and treatment of severe organ dysfunction, review of pertinent labs and imaging studies, discussions with consulting providers and discussions with patient/family: 45 minutes.      Yenni Swift NP  Department of Hospital Medicine   [place] - Intensive Care

## 2023-10-03 NOTE — ASSESSMENT & PLAN NOTE
- H&H 7&22 over the weekend with improvement with PRBC; H&H 10.3&31.4 this AM   - on DVT prophylaxis only for now; on Eliquis as an outpatient but on hold given recent Neurosurgery; discussed with primary team and anemia felt to be related to recent surgical intervention

## 2023-10-03 NOTE — NURSING
Spoke with Dr. Sewellf. Updated him on patient condition. Since cardizem drip was recently titrated to 10 per dayshift RN, patient's HR has started to fluctuate from 90s-150s and is no longer sustaining at a high rate. He stated to keep the cardizem drip at 10 and not to titrate for now. Also ordered not to give PM dose of oral metoprolol. Will continue with POC.

## 2023-10-03 NOTE — SUBJECTIVE & OBJECTIVE
Review of Systems   Constitutional: Negative for chills, decreased appetite, diaphoresis and fever.   Cardiovascular:  Negative for chest pain, claudication, cyanosis, dyspnea on exertion, irregular heartbeat, leg swelling, near-syncope, orthopnea, palpitations, paroxysmal nocturnal dyspnea and syncope.   Respiratory:  Negative for cough, hemoptysis, shortness of breath and wheezing.    Gastrointestinal:  Negative for bloating, abdominal pain, constipation, diarrhea, melena, nausea and vomiting.   Neurological:  Negative for dizziness and weakness.     Objective:     Vital Signs (Most Recent):  Temp: 98.5 °F (36.9 °C) (10/03/23 0715)  Pulse: (!) 166 (10/03/23 0917)  Resp: 16 (10/03/23 0917)  BP: (!) 128/58 (10/03/23 0915)  SpO2: 98 % (10/03/23 0917) Vital Signs (24h Range):  Temp:  [98.2 °F (36.8 °C)-98.6 °F (37 °C)] 98.5 °F (36.9 °C)  Pulse:  [] 166  Resp:  [3-31] 16  SpO2:  [73 %-100 %] 98 %  BP: ()/() 128/58     Weight: 76.7 kg (169 lb)  Body mass index is 26.47 kg/m².     SpO2: 98 %         Intake/Output Summary (Last 24 hours) at 10/3/2023 0942  Last data filed at 10/3/2023 0725  Gross per 24 hour   Intake 695.99 ml   Output 1100 ml   Net -404.01 ml       Lines/Drains/Airways       Drain  Duration                  Urethral Catheter 10/02/23 1445 Non-latex;Straight-tip <1 day              Peripheral Intravenous Line  Duration                  Peripheral IV - Single Lumen 09/29/23 1416 18 G Right Wrist 3 days         Peripheral IV - Single Lumen 09/29/23 2300 18 G Right;Anterior Forearm 3 days         Peripheral IV - Single Lumen 09/30/23 0300 20 G Anterior;Right Forearm 3 days                       Physical Exam  Constitutional:       General: She is not in acute distress.     Appearance: She is well-developed.   Cardiovascular:      Rate and Rhythm: Tachycardia present. Rhythm regularly irregular.      Heart sounds: No murmur heard.     No gallop.   Pulmonary:      Effort: Pulmonary effort  is normal. No respiratory distress.      Breath sounds: Normal breath sounds. No wheezing.   Abdominal:      General: Bowel sounds are normal. There is no distension.      Palpations: Abdomen is soft.      Tenderness: There is no abdominal tenderness.   Skin:     General: Skin is warm and dry.   Neurological:      Mental Status: She is alert and oriented to person, place, and time.            Significant Labs: BMP:   Recent Labs   Lab 10/01/23  1216 10/01/23  1248 10/02/23  0425 10/03/23  0435     --  99 104     --  138 138   K 3.8  --  4.3 4.2     --  104 106   CO2 26  --  25 24   BUN 23  --  19 17   CREATININE 0.8  --  0.7 0.6   CALCIUM 8.3*  --  8.2* 8.3*   MG  --  1.8 2.0 2.0    and CBC   Recent Labs   Lab 10/01/23  1216 10/02/23  0425 10/03/23  0435   WBC 13.20* 13.64* 12.71*   HGB 7.4* 10.0* 10.3*   HCT 22.8* 30.7* 31.4*    155 192       Significant Imaging: Echocardiogram: Transthoracic echo (TTE) complete (Cupid Only):   Results for orders placed or performed during the hospital encounter of 09/29/23   Echo   Result Value Ref Range    BSA 1.9 m2    Hoyt's Biplane MOD Ejection Fraction 64 %    LVOT stroke volume 51.18 cm3    LVIDd 4.07 3.5 - 6.0 cm    LV Systolic Volume 33.51 mL    LV Systolic Volume Index 17.8 mL/m2    LVIDs 2.95 2.1 - 4.0 cm    LV Diastolic Volume 72.74 mL    LV Diastolic Volume Index 38.69 mL/m2    IVS 0.87 0.6 - 1.1 cm    LVOT diameter 2.0 cm    LVOT area 3.1 cm2    FS 28 28 - 44 %    Left Ventricle Relative Wall Thickness 0.36 cm    Posterior Wall 0.74 0.6 - 1.1 cm    LV mass 96.97 g    LV Mass Index 52 g/m2    TDI LATERAL 0.26 m/s    TDI SEPTAL 0.26 m/s    TR Max Mathew 2.71 m/s    LVOT peak mathew 1.01 m/s    Left Ventricular Outflow Tract Mean Velocity 0.76 cm/s    Left Ventricular Outflow Tract Mean Gradient 2.53 mmHg    LA size 3.83 cm    Left Atrium Major Axis 5.13 cm    RV S' 9.74 cm/s    TAPSE 2.27 cm    RA Major Axis 5.45 cm    RA Width 4.05 cm    AV mean  gradient 4 mmHg    AV peak gradient 7 mmHg    Ao peak fer 1.31 m/s    Ao VTI 31.90 cm    LVOT peak VTI 16.30 cm    AV valve area 1.60 cm²    AV Velocity Ratio 0.77     AV index (prosthetic) 0.51     MADY by Velocity Ratio 2.42 cm²    Mr max fer 4.15 m/s    MV mean gradient 2 mmHg    MV peak gradient 5 mmHg    MV valve area by continuity eq 2.35 cm2    MV VTI 21.8 cm    Triscuspid Valve Regurgitation Peak Gradient 29 mmHg    PV PEAK VELOCITY 1.00 m/s    PV peak gradient 4 mmHg    Sinus 2.67 cm    STJ 2.62 cm    Ascending aorta 2.93 cm    IVC diameter 2.15 cm    Mean e' 0.26 m/s    ZLVIDS -0.74     ZLVIDD -2.55     LA WIDTH 4.3 cm    EF 55 %    TV resting pulmonary artery pressure 44 mmHg    RV TB RVSP 18 mmHg    Est. RA pres 15 mmHg    Narrative      Left Ventricle: The left ventricle is normal in size. Normal wall   thickness. Normal wall motion. There is normal systolic function. Ejection   fraction by visual approximation is 55%. Unable to assess diastolic   function due to atrial fibrillation.    Left Atrium: Left atrium is mildly dilated.    Right Ventricle: Normal right ventricular cavity size. Wall thickness   is normal. Right ventricle wall motion  is normal. Systolic function is   normal.    Right Atrium: Right atrium is mildly dilated.    Mitral Valve: There is mild regurgitation.    Tricuspid Valve: There is mild regurgitation.    Pulmonary Artery: The estimated pulmonary artery systolic pressure is   44 mmHg.    IVC/SVC: Elevated venous pressure at 15 mmHg.

## 2023-10-03 NOTE — PLAN OF CARE
"Chart reviewed / case discussed in am MDR   Remains in ICU    Pt being followed by Pepper with [facility] - referral sent per Nelson     dx:  Lumbar stenosis - neurogenic claudication;  s/p lumbar spine fusion 9/29   Per Neurosurg. note:  " Drain removed today at 9am.  Ok to resume full anticoagulation at 9pm tonight"       Pt lives alone --    Jennifer Green  [phone]     Per therapy recc - High Level   Per Pt - "I want to go to [facility]"         10/03/23 4058   Post-Acute Status   Post-Acute Authorization Placement   Post-Acute Placement Status Referrals Sent   Discharge Delays   (pending medical stability)   Discharge Plan   Discharge Plan A Rehab       "

## 2023-10-03 NOTE — ASSESSMENT & PLAN NOTE
- SBP 100s-110s overnight  - on Metoprolol and HCTZ as an outpatient  - currently on Metoprolol and IV Cardizem for  atrial flutter management  - BP goal less than 130/80  - BP well controlled

## 2023-10-03 NOTE — PLAN OF CARE
Patient remained on 15 of cardizem throughout the night. Tolerating well. HR is not sustaining anymore, but continues fluctuate. Mostly staying under 120. BP tolerating. No other acute events. Safety, hygiene, and comfort needs addressed.       Problem: Adult Inpatient Plan of Care  Goal: Plan of Care Review  Outcome: Ongoing, Progressing  Goal: Patient-Specific Goal (Individualized)  Outcome: Ongoing, Progressing  Goal: Absence of Hospital-Acquired Illness or Injury  Outcome: Ongoing, Progressing  Goal: Optimal Comfort and Wellbeing  Outcome: Ongoing, Progressing     Problem: Infection  Goal: Absence of Infection Signs and Symptoms  Outcome: Ongoing, Progressing     Problem: Fall Injury Risk  Goal: Absence of Fall and Fall-Related Injury  Outcome: Ongoing, Progressing     Problem: Skin Injury Risk Increased  Goal: Skin Health and Integrity  Outcome: Ongoing, Progressing

## 2023-10-04 ENCOUNTER — ANESTHESIA (OUTPATIENT)
Dept: SURGERY | Facility: HOSPITAL | Age: 73
DRG: 454 | End: 2023-10-04
Payer: MEDICARE

## 2023-10-04 ENCOUNTER — ANESTHESIA EVENT (OUTPATIENT)
Dept: SURGERY | Facility: HOSPITAL | Age: 73
DRG: 454 | End: 2023-10-04
Payer: MEDICARE

## 2023-10-04 LAB
BSA FOR ECHO PROCEDURE: 1.9 M2
EJECTION FRACTION: 55 %
RA PRESSURE ESTIMATED: 3 MMHG

## 2023-10-04 PROCEDURE — D9220A PRA ANESTHESIA: Mod: CRNA,,, | Performed by: NURSE ANESTHETIST, CERTIFIED REGISTERED

## 2023-10-04 PROCEDURE — 25000003 PHARM REV CODE 250: Performed by: NURSE ANESTHETIST, CERTIFIED REGISTERED

## 2023-10-04 PROCEDURE — 27000221 HC OXYGEN, UP TO 24 HOURS

## 2023-10-04 PROCEDURE — 25000003 PHARM REV CODE 250: Performed by: INTERNAL MEDICINE

## 2023-10-04 PROCEDURE — 63600175 PHARM REV CODE 636 W HCPCS: Performed by: NEUROLOGICAL SURGERY

## 2023-10-04 PROCEDURE — 11000001 HC ACUTE MED/SURG PRIVATE ROOM

## 2023-10-04 PROCEDURE — D9220A PRA ANESTHESIA: ICD-10-PCS | Mod: ANES,,, | Performed by: UROLOGY

## 2023-10-04 PROCEDURE — 37000009 HC ANESTHESIA EA ADD 15 MINS: Performed by: INTERNAL MEDICINE

## 2023-10-04 PROCEDURE — 63600175 PHARM REV CODE 636 W HCPCS: Performed by: NURSE ANESTHETIST, CERTIFIED REGISTERED

## 2023-10-04 PROCEDURE — 25000003 PHARM REV CODE 250: Performed by: NURSE PRACTITIONER

## 2023-10-04 PROCEDURE — 25000003 PHARM REV CODE 250: Performed by: STUDENT IN AN ORGANIZED HEALTH CARE EDUCATION/TRAINING PROGRAM

## 2023-10-04 PROCEDURE — D9220A PRA ANESTHESIA: Mod: ANES,,, | Performed by: UROLOGY

## 2023-10-04 PROCEDURE — 94799 UNLISTED PULMONARY SVC/PX: CPT

## 2023-10-04 PROCEDURE — D9220A PRA ANESTHESIA: ICD-10-PCS | Mod: CRNA,,, | Performed by: NURSE ANESTHETIST, CERTIFIED REGISTERED

## 2023-10-04 PROCEDURE — 99900035 HC TECH TIME PER 15 MIN (STAT)

## 2023-10-04 PROCEDURE — 63600175 PHARM REV CODE 636 W HCPCS: Performed by: NURSE PRACTITIONER

## 2023-10-04 PROCEDURE — 37000008 HC ANESTHESIA 1ST 15 MINUTES: Performed by: INTERNAL MEDICINE

## 2023-10-04 PROCEDURE — 25000003 PHARM REV CODE 250: Performed by: NEUROLOGICAL SURGERY

## 2023-10-04 PROCEDURE — 94761 N-INVAS EAR/PLS OXIMETRY MLT: CPT

## 2023-10-04 RX ORDER — PROPOFOL 10 MG/ML
VIAL (ML) INTRAVENOUS
Status: DISCONTINUED | OUTPATIENT
Start: 2023-10-04 | End: 2023-10-04

## 2023-10-04 RX ORDER — METHOCARBAMOL 500 MG/1
500 TABLET, FILM COATED ORAL ONCE
Status: COMPLETED | OUTPATIENT
Start: 2023-10-04 | End: 2023-10-04

## 2023-10-04 RX ORDER — DEXMEDETOMIDINE HYDROCHLORIDE 100 UG/ML
INJECTION, SOLUTION INTRAVENOUS
Status: DISCONTINUED | OUTPATIENT
Start: 2023-10-04 | End: 2023-10-04

## 2023-10-04 RX ORDER — LIDOCAINE HYDROCHLORIDE 20 MG/ML
INJECTION, SOLUTION EPIDURAL; INFILTRATION; INTRACAUDAL; PERINEURAL
Status: DISCONTINUED | OUTPATIENT
Start: 2023-10-04 | End: 2023-10-04

## 2023-10-04 RX ORDER — PHENYLEPHRINE HYDROCHLORIDE 10 MG/ML
INJECTION INTRAVENOUS
Status: DISCONTINUED | OUTPATIENT
Start: 2023-10-04 | End: 2023-10-04

## 2023-10-04 RX ORDER — AMIODARONE HYDROCHLORIDE 200 MG/1
400 TABLET ORAL 2 TIMES DAILY
Status: DISCONTINUED | OUTPATIENT
Start: 2023-10-04 | End: 2023-10-06

## 2023-10-04 RX ADMIN — TOPICAL ANESTHETIC 5 EACH: 200 SPRAY DENTAL; PERIODONTAL at 01:10

## 2023-10-04 RX ADMIN — METHOCARBAMOL TABLETS 750 MG: 750 TABLET, COATED ORAL at 04:10

## 2023-10-04 RX ADMIN — PHENYLEPHRINE HYDROCHLORIDE 200 MCG: 10 INJECTION INTRAVENOUS at 01:10

## 2023-10-04 RX ADMIN — AMIODARONE HYDROCHLORIDE 400 MG: 200 TABLET ORAL at 08:10

## 2023-10-04 RX ADMIN — HYDROMORPHONE HYDROCHLORIDE 1 MG: 2 INJECTION, SOLUTION INTRAMUSCULAR; INTRAVENOUS; SUBCUTANEOUS at 11:10

## 2023-10-04 RX ADMIN — SODIUM CHLORIDE: 0.9 INJECTION, SOLUTION INTRAVENOUS at 01:10

## 2023-10-04 RX ADMIN — MUPIROCIN: 20 OINTMENT TOPICAL at 08:10

## 2023-10-04 RX ADMIN — METHOCARBAMOL TABLETS 750 MG: 750 TABLET, COATED ORAL at 08:10

## 2023-10-04 RX ADMIN — AMIODARONE HYDROCHLORIDE 400 MG: 200 TABLET ORAL at 02:10

## 2023-10-04 RX ADMIN — PROPOFOL 50 MG: 10 INJECTION, EMULSION INTRAVENOUS at 01:10

## 2023-10-04 RX ADMIN — ENOXAPARIN SODIUM 80 MG: 80 INJECTION SUBCUTANEOUS at 08:10

## 2023-10-04 RX ADMIN — LIDOCAINE HYDROCHLORIDE 100 MG: 20 INJECTION, SOLUTION EPIDURAL; INFILTRATION; INTRACAUDAL; PERINEURAL at 01:10

## 2023-10-04 RX ADMIN — DOCUSATE SODIUM AND SENNOSIDES 2 TABLET: 8.6; 5 TABLET, FILM COATED ORAL at 08:10

## 2023-10-04 RX ADMIN — HYDROMORPHONE HYDROCHLORIDE 1 MG: 2 INJECTION, SOLUTION INTRAMUSCULAR; INTRAVENOUS; SUBCUTANEOUS at 04:10

## 2023-10-04 RX ADMIN — METOPROLOL TARTRATE 25 MG: 25 TABLET, FILM COATED ORAL at 05:10

## 2023-10-04 RX ADMIN — METHOCARBAMOL TABLETS 500 MG: 500 TABLET, COATED ORAL at 12:10

## 2023-10-04 RX ADMIN — BISACODYL 10 MG: 10 SUPPOSITORY RECTAL at 08:10

## 2023-10-04 RX ADMIN — HYDROMORPHONE HYDROCHLORIDE 1 MG: 2 INJECTION, SOLUTION INTRAMUSCULAR; INTRAVENOUS; SUBCUTANEOUS at 10:10

## 2023-10-04 RX ADMIN — DEXMEDETOMIDINE HCL 12 MCG: 100 INJECTION INTRAVENOUS at 01:10

## 2023-10-04 RX ADMIN — Medication 15 MG/HR: at 05:10

## 2023-10-04 RX ADMIN — LEVOTHYROXINE SODIUM 137 MCG: 25 TABLET ORAL at 05:10

## 2023-10-04 RX ADMIN — ACETAMINOPHEN 650 MG: 650 SOLUTION ORAL at 05:10

## 2023-10-04 NOTE — ANESTHESIA POSTPROCEDURE EVALUATION
Anesthesia Post Evaluation    Patient: Graciela Barton    Procedure(s) Performed: Procedure(s) (LRB):  Transesophageal echo (CARLYLE) intra-procedure log documentation (N/A)    Final Anesthesia Type: MAC      Patient location during evaluation: ICU  Patient participation: Yes- Able to Participate  Level of consciousness: awake and alert and oriented  Post-procedure vital signs: reviewed and stable  Pain management: adequate  Airway patency: patent    PONV status at discharge: No PONV  Anesthetic complications: no      Cardiovascular status: hemodynamically stable  Respiratory status: unassisted  Hydration status: euvolemic  Follow-up not needed.          Vitals Value Taken Time   /58 10/04/23 1355   Temp 36.9 °C (98.4 °F) 10/04/23 1103   Pulse 77 10/04/23 1356   Resp 17 10/04/23 1356   SpO2 96 % 10/04/23 1356   Vitals shown include unvalidated device data.      No case tracking events are documented in the log.      Pain/Tyler Score: Pain Rating Prior to Med Admin: 10 (10/4/2023 11:34 AM)  Pain Rating Post Med Admin: 1 (appears to be sleeping) (10/4/2023 12:04 PM)

## 2023-10-04 NOTE — BRIEF OP NOTE
CARLYLE/DCCV    Sedation: Monitored anesthesia staff    Indication: Atrial fibrillation      CARLYLE/ Performed without any complication.  No left atrial appendage thrombus identified.     DCCV :Anterio-posterior PAD position.    Shock delivered was 200J    Result: Successful restoration of sinus rhythm.     Complication:   None       A/p   Successful DCCV for a.fib/flutter    Start Amiodarone 400 mg bid for 14 days and 200 mg daily afterward  F/U with EP in 4 weeks for other AAD options versus ablation. Failed class IC in the past   Full dose anticoagulation without interruption for 4 weeks.

## 2023-10-04 NOTE — SUBJECTIVE & OBJECTIVE
Interval History:  Seen at the bedside while in ICU.  Patient resting in bed.  She states her abdominal pain is much improved still passing gas.  Still tachy irregular.  Cardizem drip per Cardiology.  Planning Cardioversion today.    Review of Systems   Constitutional:  Positive for activity change. Negative for appetite change, chills, diaphoresis, fatigue and fever.   HENT:  Negative for congestion and sore throat.    Respiratory:  Negative for cough, chest tightness, shortness of breath and wheezing.    Cardiovascular:  Positive for leg swelling. Negative for chest pain and palpitations.   Gastrointestinal:  Negative for abdominal distention, abdominal pain, constipation, diarrhea, nausea and vomiting.   Musculoskeletal:  Positive for arthralgias, back pain and gait problem.   Skin:  Positive for wound.   Neurological:  Negative for dizziness, syncope, weakness, light-headedness and headaches.     Objective:     Vital Signs (Most Recent):  Temp: 98.4 °F (36.9 °C) (10/04/23 1103)  Pulse: (!) 161 (10/04/23 1145)  Resp: 19 (10/04/23 1145)  BP: (!) 149/69 (10/04/23 1145)  SpO2: 97 % (10/04/23 1145) Vital Signs (24h Range):  Temp:  [98 °F (36.7 °C)-98.9 °F (37.2 °C)] 98.4 °F (36.9 °C)  Pulse:  [] 161  Resp:  [9-27] 19  SpO2:  [88 %-98 %] 97 %  BP: ()/() 149/69     Weight: 76.7 kg (169 lb)  Body mass index is 26.47 kg/m².    Intake/Output Summary (Last 24 hours) at 10/4/2023 1154  Last data filed at 10/4/2023 1103  Gross per 24 hour   Intake 952.82 ml   Output 660 ml   Net 292.82 ml           Physical Exam  Vitals and nursing note reviewed.   Constitutional:       General: She is not in acute distress.     Appearance: Normal appearance. She is well-developed. She is not toxic-appearing.   HENT:      Head: Normocephalic and atraumatic.      Mouth/Throat:      Dentition: Normal dentition.   Eyes:      General: Lids are normal.      Extraocular Movements: Extraocular movements intact.       Conjunctiva/sclera: Conjunctivae normal.   Cardiovascular:      Rate and Rhythm: Tachycardia present. Rhythm irregular.   Pulmonary:      Effort: Pulmonary effort is normal.      Breath sounds: Normal breath sounds.   Abdominal:      General: Bowel sounds are normal.      Palpations: Abdomen is soft.   Musculoskeletal:         General: Swelling present.      Cervical back: Neck supple.      Right lower leg: Edema present.      Left lower leg: Edema present.   Skin:     General: Skin is warm and dry.      Findings: No erythema or rash.   Neurological:      Mental Status: She is alert and oriented to person, place, and time.         Significant Labs: All pertinent labs within the past 24 hours have been reviewed.  CBC:   Recent Labs   Lab 10/03/23  0435   WBC 12.71*   HGB 10.3*   HCT 31.4*          CMP:   Recent Labs   Lab 10/03/23  0435      K 4.2      CO2 24      BUN 17   CREATININE 0.6   CALCIUM 8.3*   ANIONGAP 8         Significant Imaging: I have reviewed all pertinent imaging results/findings within the past 24 hours.

## 2023-10-04 NOTE — PLAN OF CARE
A&Ox4, NSR 70's -80's post cardioversion. Afebrile. B/P WNL. O2 sats > 96 with intermittent oxygen @1L when sleeping. Continues with stiffness and muscle cramping to legs treated with prn and scheduled meds. Carver to bedside with marginal output. Dressing to abdomen intact. Staples to left lateral back clean dry and well approximated. Tylenol 650mg po x1 for complaints of headache. Plan of care discussed with patient. Optimum comfort and safety provided to patient. Continue to monitor.         Problem: Adult Inpatient Plan of Care  Goal: Plan of Care Review  Outcome: Ongoing, Progressing  Goal: Optimal Comfort and Wellbeing  Outcome: Ongoing, Progressing     Problem: Fall Injury Risk  Goal: Absence of Fall and Fall-Related Injury  Outcome: Ongoing, Progressing

## 2023-10-04 NOTE — ASSESSMENT & PLAN NOTE
Atrial flutter  Anticoagulant long-term use    -chronic, has been in Aflutter for quite some time  -RVR rate worsened after admission in setting of neurosurgical procedure, anemia, and chronic hypothyroidism  -Cardiology following   -home propafenone discontinued per recs (failed drug in setting of several months history of AFlutter)  -continue home metoprolol (titrations limited due to BP)  -s/p IV dig x 3 doses   -initiated on esmolol gtt 10/01 due to no improvement/elevation in HR overnight   -will need outpt follow up with EP for consideration of DCCV +/- ablation  -continue tele monitoring  -EKG PRN concerns  -trend labs, address/replete electrolytes as indicated  -monitor    -started on diltiazem drip which is on hold at this time.  Will continue metoprolol 25 mg po q.6  -cardiology planning DCCV +/- ablation

## 2023-10-04 NOTE — EICU
eICU Note :    Called by the [facility] eRN:    Problem:  On Robaxin 750, last received at 2100 , still having pain       Pertinent History and labs reviewed : 74 y/o     Lumbar stenosis with neurogenic claudication    Postoperative hypothyroidism    History of thyroid cancer    Essential hypertension, benign    Mixed hyperlipidemia    DDD (degenerative disc disease), lumbar    PAF (paroxysmal atrial fibrillation)    Anticoagulant long-term use    Atrial flutter    Foraminal stenosis of lumbar region    Chronic myelopathy    Decreased range of motion of right knee    Assistance needed for mobility    Acute blood loss anemia     Treatment /Intervention given: Give another dose of Robaxin 500 x1    Xenia Castañeda M.D  eICU Physician

## 2023-10-04 NOTE — PLAN OF CARE
No acute events throughout the night. VSS. Cardizem drip remains at 15. Tolerating well. Kept NPO since midnight for cardioversion today. Pain managed throughout the night with PRNs. Safety, hygiene, and comfort needs addressed.     Problem: Adult Inpatient Plan of Care  Goal: Plan of Care Review  Outcome: Ongoing, Progressing

## 2023-10-04 NOTE — PLAN OF CARE
The pt remains in the ICU,not ready for d/c yet. The sw communicated with Pepper at [facility] via Secure Chat. She met with the pt yesterday and is following the pt for medical readiness. The pt's getting a cardioversion today per MDR rounds.        10/04/23 1001   Post-Acute Status   Post-Acute Authorization Placement   Post-Acute Placement Status Referrals Sent   Discharge Delays (!) Procedure Scheduling (IR, OR, Labs, Echo, Cath, Echo, EEG)   Discharge Plan   Discharge Plan A Rehab   Discharge Plan B Other  (TBD)

## 2023-10-04 NOTE — PROGRESS NOTES
[facility] - Intensive Care  Neurosurgery  Progress Note    Subjective:     Interval History: Back pain no leg pain.  Going for cardioversion today.  Complaining of dorian horses in her legs today. Has not gotten out of bed yet.  Sat up on side of bed.    History of Present Illness:   This is a very pleasant 72 y.o. female, who was active at the beginning of 2022, mowing her lawn then suddenly started to have difficulty walking, severe back pain and right leg pain.  She has developed a paralysis involving the right leg.  Her right leg appears spastic.  She is mainly complaining of right knee pain. She is being mobilized in a wheelchair.  She admits still being able to drive her car.  In June 2022 she had a thoracic and lumbar spine MRI.  She is being investigated for possible multiple sclerosis.  She reports having no loss of sensation to pain or temperature.  She has no voiding difficulty or incontinence.  She reports right more than left hand numbness and dropping things frequently.      Post-Op Info:  Procedure(s) (LRB):  FUSION, SPINE, LUMBAR, TLIF, MINIMALLY INVASIVE (N/A)  FUSION, SPINE, LUMBAR, PLIF (N/A)  LAMINECTOMY, SPINE (N/A)  FORAMINOTOMY, SPINE (N/A)  FACETECTOMY (N/A)   5 Days Post-Op      Medications:  Continuous Infusions:   dilTIAZem 15 mg/hr (10/04/23 0919)       Scheduled Meds:   bisacodyL  10 mg Rectal Daily    enoxparin  1 mg/kg (Dosing Weight) Subcutaneous Q12H (treatment, non-standard time)    levothyroxine  137 mcg Oral Before breakfast    methocarbamoL  750 mg Oral TID    mupirocin   Nasal BID    senna-docusate 8.6-50 mg  2 tablet Oral BID     PRN Meds:acetaminophen, aluminum-magnesium hydroxide-simethicone, HYDROmorphone, metoprolol, ondansetron, oxyCODONE, prochlorperazine, traMADoL     Review of Systems  Objective:     Weight: 76.7 kg (169 lb)  Body mass index is 26.47 kg/m².  Vital Signs (Most Recent):  Temp: 98.4 °F (36.9 °C) (10/04/23 0715)  Pulse: (!) 152 (10/04/23 0902)  Resp: (!) 25  "(10/04/23 0902)  BP: 136/82 (10/04/23 0902)  SpO2: 97 % (10/04/23 0902) Vital Signs (24h Range):  Temp:  [98 °F (36.7 °C)-98.9 °F (37.2 °C)] 98.4 °F (36.9 °C)  Pulse:  [] 152  Resp:  [9-29] 25  SpO2:  [88 %-98 %] 97 %  BP: ()/() 136/82     Date 10/04/23 0700 - 10/05/23 0659   Shift 9852-9593 5457-3154 8152-5776 24 Hour Total   INTAKE   I.V.(mL/kg) 64.6(0.8)   64.6(0.8)   Shift Total(mL/kg) 64.6(0.8)   64.6(0.8)   OUTPUT   Urine(mL/kg/hr) 75   75   Shift Total(mL/kg) 75(1)   75(1)   Weight (kg) 76.7 76.7 76.7 76.7                              Closed/Suction Drain 09/29/23 2123 Medial Back Bulb 10 Fr. (Active)   Site Description Unable to view 09/30/23 1319   Dressing Type Transparent (Tegaderm);Other (Comment) 09/30/23 1319   Dressing Status Clean;Dry;Intact 09/30/23 1319   Dressing Intervention First dressing 09/30/23 1319   Drainage Bloody 09/30/23 1319   Status To bulb suction 09/30/23 1319   Output (mL) 25 mL 09/30/23 1156            Urethral Catheter 09/29/23 Silicone (Active)   Site Assessment Clean;Intact;Dry 09/30/23 1319   Collection Container Urimeter 09/30/23 1319   Securement Method secured to top of thigh w/ adhesive device 09/30/23 1319   Catheter Care Performed yes 09/30/23 1040   Reason for Continuing Urinary Catheterization Critically ill in ICU and requiring hourly monitoring of intake/output 09/30/23 1319   CAUTI Prevention Bundle Securement Device in place with 1" slack;Intact seal between catheter & drainage tubing;Drainage bag/urimeter off the floor;Sheeting clip in use;No dependent loops or kinks;Drainage bag/urimeter not overfilled (<2/3 full);Drainage bag/urimeter below bladder 09/30/23 1319   Output (mL) 75 mL 09/30/23 1150       Neurosurgery Physical Exam    General: well developed, well nourished, no distress  Mental Status: Awake, Alert, Oriented X3.Thought content appropriate  GCS: Motor: 6/Verbal: 5/Eyes: 4 GCS Total: 15    Motor Strength:  Strength                     " "           HF KF KE DF PF EHL   Lower: R 5/5 5/5 5/5 5/5 5/5 5/5    L 5/5 5/5 5/5 5/5 5/5 5/5       Legs very stiff  Incision- CDI        Significant Labs:  Recent Labs   Lab 10/03/23  0435         K 4.2      CO2 24   BUN 17   CREATININE 0.6   CALCIUM 8.3*   MG 2.0       Recent Labs   Lab 10/03/23  0435   WBC 12.71*   HGB 10.3*   HCT 31.4*          No results for input(s): "LABPT", "INR", "APTT" in the last 48 hours.  Microbiology Results (last 7 days)       ** No results found for the last 168 hours. **              Assessment/Plan:     Active Diagnoses:    Diagnosis Date Noted POA    PRINCIPAL PROBLEM:  Lumbar stenosis with neurogenic claudication [M48.062] 09/29/2023 Yes    Acute blood loss anemia [D62] 10/01/2023 Yes    Foraminal stenosis of lumbar region [M48.061] 09/29/2023 Yes    Chronic myelopathy [G95.9] 09/29/2023 Yes    Decreased range of motion of right knee [M25.661] 09/29/2023 Yes    Assistance needed for mobility [Z74.1] 09/29/2023 Not Applicable    Atrial flutter [I48.92] 09/15/2023 Yes    Anticoagulant long-term use [Z79.01] 02/23/2023 Not Applicable    PAF (paroxysmal atrial fibrillation) [I48.0] 08/10/2022 Yes    DDD (degenerative disc disease), lumbar [M51.36] 08/09/2022 Yes    Postoperative hypothyroidism [E89.0] 02/19/2018 Yes    History of thyroid cancer [Z85.850] 02/19/2018 Not Applicable    Essential hypertension, benign [I10] 02/19/2018 Yes    Mixed hyperlipidemia [E78.2] 02/19/2018 Yes      Problems Resolved During this Admission:       A/P:  POD #5 Left L3-4, L4-5 OLIF, L5-S1 ALIF and L3-S1 posterior instrumented fusion     --Neurologically stable          -q4h neuro checks  --Imaging: Post op xrays pending.    --Drains: Dced  --Pain control: Tylenol 650mg Q 6 hours, Tramadol 100mg Q 6 hours PRN, Oxycodone IR 10mg Q 6 hours PRN, Robaxin 750mg TID, Dilaudid 1mg SQ Q 3 hours PRN   --DVT ppx: TEDs/SCDs/SQH  --Activity: PT/OT, OOB. TLSO brace  --Diet: " Cardiac  --Bowel regimen: PRN suppository, senna PRN  --Urinary: Carver  --Atelectasis ppx: Encourage IS hourly  --Daily labs  --Appreciate critical care recs and cardiology recs- Cardioversion today          All of the above discussed and reviewed with Dr. Salter.      MALCOLM BagleyC  Neurosurgery  [facility] - Intensive Care

## 2023-10-04 NOTE — TRANSFER OF CARE
"Anesthesia Transfer of Care Note    Patient: Graciela Barton    Procedure(s) Performed: Procedure(s) (LRB):  Transesophageal echo (CARLYLE) intra-procedure log documentation (N/A)    Patient location: ICU    Anesthesia Type: general    Transport from OR: Transported from OR on 2-3 L/min O2 by NC with adequate spontaneous ventilation    Post pain: adequate analgesia    Post assessment: no apparent anesthetic complications and tolerated procedure well    Post vital signs: stable    Level of consciousness: awake    Nausea/Vomiting: no nausea/vomiting    Complications: none    Transfer of care protocol was followed      Last vitals:   Visit Vitals  /60   Pulse (!) 161   Temp 36.9 °C (98.4 °F) (Oral)   Resp (!) 24   Ht 5' 7" (1.702 m)   Wt 76.7 kg (169 lb)   SpO2 96%   Breastfeeding No   BMI 26.47 kg/m²     " show

## 2023-10-04 NOTE — PROGRESS NOTES
[facility] Medicine  Progress Note    Patient Name: Graciela Barton  MRN: [id number]  Patient Class: IP- Inpatient   Admission Date: 9/29/2023  Length of Stay: 5 days  Attending Physician: Scooter Salter MD  Primary Care Provider: Rocco Miller MD        Subjective:     Principal Problem:Lumbar stenosis with neurogenic claudication        HPI:  Ms. Barton is a 73 YOF with PMHx of HTN, HLD, BPPV, PAfib/flutter on OAC, history of thyroid cancer s/p thyroidectomy in 2018 with hypothyroidism, thoracic myelopathy with neurogenic claudication s/p s/p T6-7 T7-8 anterior diskectomy (05/12/2023) and now s/p left L3-4, L4-5 OLIF, L5-S1 ALIF and L3-S1 posterior instrumented fusion (09/29/2023).     Admission testing reviewed noting WBC 12 >>13, hemoglobin 9.7 >> 8.5 >> 7.4, hematocrit 30 >> 25.9 >> 22.8; chemistry stable; lactic 1.0; TSH 7.448 with FT4 0.97; CXR with clearing of pleural fluid and atelectasis in the right lung base; BLE US without evidence of DVT; and TTE with LVEF >55%, LA mild dilation, RA mild dilation, normal RV size and function, mild MR, mild TR, RVSP 44mmHg. From neurosurgery perspective she is progressing well in post operative course however the course has been complicated by AFib/flutter with RVR refractory to home medications, propafenone and metoprolol, and amiodarone gtt.     She is seen today in consult for medical management. She notes that overall pain is well controlled however she is developing cramping sensation in BLEs. She denies palpitations, SOB, ELLIS, CP, abdominal pain, N/V/D, constipation, fever, chills, light headiness, dizziness, seizures, or syncope.     Hospital Medicine Service was consulted for assistance with medical management.         Overview/Hospital Course:  Ms. Barton was admitted 09/29 by Neurosurgery and is now s/p left L3-4, L4-5 OLIF, L5-S1 ALIF and L3-S1 posterior instrumented fusion. From neurosurgery perspective she is progressing well in post  operative course. However the course has been complicated by AFib/flutter with RVR that was refractory to home medications, propafenone and metoprolol; and amiodarone gtt. Cardiology following with recommendations to transfuse due to anemia (two units PRBCs transfused 10/01), stop home propafenone (failed drug in setting of several months history of AFlutter), continue home metoprolol (titrations limited due to BP), and was give 3 doses of IV digoxin (completed). However without improvement and HR elevating to 140's she was initiated on esmolol gtt evening of 10/01.      Other concerns currently to monitor include monitoring for s/s of urinary retention and bowel regimen titrated 10/2 as last BM was day of admission per patient.   10/4:  Cardiology planning cardioversion on today.  Cardizem drip.  She was started on full-dose on 10/03/2023.  Disposition plans: to rehab when medically ready for intensive therapy.       Interval History:  Seen at the bedside while in ICU.  Patient resting in bed.  She states her abdominal pain is much improved still passing gas.  Still tachy irregular.  Cardizem drip per Cardiology.  Planning Cardioversion today.    Review of Systems   Constitutional:  Positive for activity change. Negative for appetite change, chills, diaphoresis, fatigue and fever.   HENT:  Negative for congestion and sore throat.    Respiratory:  Negative for cough, chest tightness, shortness of breath and wheezing.    Cardiovascular:  Positive for leg swelling. Negative for chest pain and palpitations.   Gastrointestinal:  Negative for abdominal distention, abdominal pain, constipation, diarrhea, nausea and vomiting.   Musculoskeletal:  Positive for arthralgias, back pain and gait problem.   Skin:  Positive for wound.   Neurological:  Negative for dizziness, syncope, weakness, light-headedness and headaches.     Objective:     Vital Signs (Most Recent):  Temp: 98.4 °F (36.9 °C) (10/04/23 1103)  Pulse: (!) 161  (10/04/23 1145)  Resp: 19 (10/04/23 1145)  BP: (!) 149/69 (10/04/23 1145)  SpO2: 97 % (10/04/23 1145) Vital Signs (24h Range):  Temp:  [98 °F (36.7 °C)-98.9 °F (37.2 °C)] 98.4 °F (36.9 °C)  Pulse:  [] 161  Resp:  [9-27] 19  SpO2:  [88 %-98 %] 97 %  BP: ()/() 149/69     Weight: 76.7 kg (169 lb)  Body mass index is 26.47 kg/m².    Intake/Output Summary (Last 24 hours) at 10/4/2023 1154  Last data filed at 10/4/2023 1103  Gross per 24 hour   Intake 952.82 ml   Output 660 ml   Net 292.82 ml           Physical Exam  Vitals and nursing note reviewed.   Constitutional:       General: She is not in acute distress.     Appearance: Normal appearance. She is well-developed. She is not toxic-appearing.   HENT:      Head: Normocephalic and atraumatic.      Mouth/Throat:      Dentition: Normal dentition.   Eyes:      General: Lids are normal.      Extraocular Movements: Extraocular movements intact.      Conjunctiva/sclera: Conjunctivae normal.   Cardiovascular:      Rate and Rhythm: Tachycardia present. Rhythm irregular.   Pulmonary:      Effort: Pulmonary effort is normal.      Breath sounds: Normal breath sounds.   Abdominal:      General: Bowel sounds are normal.      Palpations: Abdomen is soft.   Musculoskeletal:         General: Swelling present.      Cervical back: Neck supple.      Right lower leg: Edema present.      Left lower leg: Edema present.   Skin:     General: Skin is warm and dry.      Findings: No erythema or rash.   Neurological:      Mental Status: She is alert and oriented to person, place, and time.         Significant Labs: All pertinent labs within the past 24 hours have been reviewed.  CBC:   Recent Labs   Lab 10/03/23  0435   WBC 12.71*   HGB 10.3*   HCT 31.4*          CMP:   Recent Labs   Lab 10/03/23  0435      K 4.2      CO2 24      BUN 17   CREATININE 0.6   CALCIUM 8.3*   ANIONGAP 8         Significant Imaging: I have reviewed all pertinent imaging  results/findings within the past 24 hours.      Assessment/Plan:      * Lumbar stenosis with neurogenic claudication  Chronic myelopathy  Foraminal stenosis of lumbar region  Neurogenic claudication   DDD, lumbar  Decreased ROM of R knee  Assistance needed for mobility     -thoracic myelopathy with neurogenic claudication s/p s/p T6-7 T7-8 anterior diskectomy (05/12/2023) and now s/p left L3-4, L4-5 OLIF, L5-S1 ALIF and L3-S1 posterior instrumented fusion (09/29/2023)  -management per primary team   -remains with surgical drain  -PT/OT following     Acute blood loss anemia  -s/p 2 units PRBC transfusion 10/2 with appropriate response  -monitor for s/s of bleeding   -trend labs and monitor  -further transfusions as indicated     PAF (paroxysmal atrial fibrillation)  Atrial flutter  Anticoagulant long-term use    -chronic, has been in Aflutter for quite some time  -RVR rate worsened after admission in setting of neurosurgical procedure, anemia, and chronic hypothyroidism  -Cardiology following   -home propafenone discontinued per recs (failed drug in setting of several months history of AFlutter)  -continue home metoprolol (titrations limited due to BP)  -s/p IV dig x 3 doses   -initiated on esmolol gtt 10/01 due to no improvement/elevation in HR overnight   -will need outpt follow up with EP for consideration of DCCV +/- ablation  -continue tele monitoring  -EKG PRN concerns  -trend labs, address/replete electrolytes as indicated  -monitor    -started on diltiazem drip which is on hold at this time.  Will continue metoprolol 25 mg po q.6  -cardiology planning DCCV +/- ablation    Mixed hyperlipidemia  -chronic  -resume home rosuvastatin at discharge     Essential hypertension, benign  -chronic  -low/normotensive pressures at current  -hold home HCTZ  -continue metoprolol as noted above   -dose/medication adjustment as appropriate   -monitor     Postoperative hypothyroidism  History of thyroid cancer  History of  thyroidectomy   -chronic  -improvement in TSH   -continue home levothyroxine      VTE Risk Mitigation (From admission, onward)           Ordered     enoxaparin injection 80 mg  Every 12 hours         10/03/23 1017     IP VTE HIGH RISK PATIENT  Once         09/29/23 2314     Place sequential compression device  Until discontinued         09/29/23 2314                    Discharge Planning   ASYA:      Code Status: Full Code   Is the patient medically ready for discharge?:     Reason for patient still in hospital (select all that apply): Laboratory test, Treatment, Imaging, Consult recommendations and PT / OT recommendations  Discharge Plan A: Rehab   Discharge Delays: (!) Procedure Scheduling (IR, OR, Labs, Echo, Cath, Echo, EEG)        Critical care time spent on the evaluation and treatment of severe organ dysfunction, review of pertinent labs and imaging studies, discussions with consulting providers and discussions with patient/family: 45 minutes.      Yenni Swift NP  Department of Hospital Medicine   [place] - Intensive Care

## 2023-10-04 NOTE — EICU
Intervention Initiated From:  Bedside    Ariana intervened regarding:  Time-Out    Nurse Notified:  Yes    Doctor Notified:  Yes    Comments: Called into patients room remotely for a time out for a CARLYLE/Cardioversion.  CRNA administered anesthesia starting at 1328  A Fib CARLYLE performed.  At 1336 pt was cardioverted with 200j , Hr slowly went down to 59 and settled in the 60-70's Sinus rhythm.  Tolerated the procedure well

## 2023-10-04 NOTE — ANESTHESIA PREPROCEDURE EVALUATION
10/04/2023  Graciela Barton is a 73 y.o., female with known hx of Afib on Eliquis    S/p Spinal Fusion on 9/29 requiring blood transfusion  Developed acute atrial fibrillation in postoperative period  Now HR 140s-150s  For CARLYLE/ DCCV     Patient Active Problem List   Diagnosis    Postoperative hypothyroidism    History of thyroid cancer    Essential hypertension, benign    Mixed hyperlipidemia    Benign paroxysmal positional vertigo    History of colon polyps    Bilateral post-traumatic osteoarthritis of knee    Paroxysmal tachycardia    DDD (degenerative disc disease), lumbar    Lumbar radiculopathy    PAF (paroxysmal atrial fibrillation)    Multiple sclerosis    Anticoagulant long-term use    Thoracic disc disease with myelopathy    Spinal cord compression    Aortic atherosclerosis    Preprocedural cardiovascular examination    Thoracic myelopathy    Atelectasis    Atrial flutter    Lumbar stenosis with neurogenic claudication    Foraminal stenosis of lumbar region    Chronic myelopathy    Decreased range of motion of right knee    Bilateral leg edema    Assistance needed for mobility    Acute blood loss anemia       Past Medical History:   Diagnosis Date    Arthritis     Cancer     Thyroid    Edema     Hyperlipidemia     Hypertension     Thyroid disease        ECHO: Results for orders placed during the hospital encounter of 09/29/23    Echo    Interpretation Summary    Left Ventricle: The left ventricle is normal in size. Normal wall thickness. Normal wall motion. There is normal systolic function. Ejection fraction by visual approximation is 55%. Unable to assess diastolic function due to atrial fibrillation.    Left Atrium: Left atrium is mildly dilated.    Right Ventricle: Normal right ventricular cavity size. Wall thickness is normal. Right ventricle wall motion  is  normal. Systolic function is normal.    Right Atrium: Right atrium is mildly dilated.    Mitral Valve: There is mild regurgitation.    Tricuspid Valve: There is mild regurgitation.    Pulmonary Artery: The estimated pulmonary artery systolic pressure is 44 mmHg.    IVC/SVC: Elevated venous pressure at 15 mmHg.      Body mass index is 26.47 kg/m².    Tobacco Use: Low Risk  (10/3/2023)    Patient History     Smoking Tobacco Use: Never     Smokeless Tobacco Use: Never     Passive Exposure: Not on file       Social History     Substance and Sexual Activity   Drug Use No        Alcohol Use: Not on file       Review of patient's allergies indicates:   Allergen Reactions    Vancomycin Tinitus    Vancomycin analogues Tinitus         Airway:  No value filed.         Pre-op Assessment    I have reviewed the Patient Summary Reports.     I have reviewed the Nursing Notes. I have reviewed the NPO Status.   I have reviewed the Medications.     Review of Systems  Anesthesia Hx:  No problems with previous Anesthesia  History of prior surgery of interest to airway management or planning: Denies Family Hx of Anesthesia complications.   Denies Personal Hx of Anesthesia complications.   Cardiovascular:   Hypertension Dysrhythmias hyperlipidemia  Hypertension  Disorder of Cardiac Rhythm, Atrial Fibrillation, Acute Atrial  Fibrillation, Chronic Atrial Fibrillation, Paroxysmal Atrial Fibrillation    Musculoskeletal:   Arthritis   Spine Disorders: lumbar Disc disease and Degenerative disease    Endocrine:   Hypothyroidism           Anesthesia Plan  Type of Anesthesia, risks & benefits discussed:    Anesthesia Type: MAC  Intra-op Monitoring Plan: Standard ASA Monitors  Post Op Pain Control Plan: multimodal analgesia and IV/PO Opioids PRN  Induction:  IV  Informed Consent: Informed consent signed with the Patient and all parties understand the risks and agree with anesthesia plan.  All questions answered. Patient consented to blood  products? No  ASA Score: 4    Ready For Surgery From Anesthesia Perspective.     .

## 2023-10-05 LAB
ANION GAP SERPL CALC-SCNC: 9 MMOL/L (ref 8–16)
BASOPHILS # BLD AUTO: 0.03 K/UL (ref 0–0.2)
BASOPHILS NFR BLD: 0.3 % (ref 0–1.9)
BUN SERPL-MCNC: 14 MG/DL (ref 8–23)
CALCIUM SERPL-MCNC: 8.2 MG/DL (ref 8.7–10.5)
CHLORIDE SERPL-SCNC: 101 MMOL/L (ref 95–110)
CO2 SERPL-SCNC: 26 MMOL/L (ref 23–29)
CREAT SERPL-MCNC: 0.6 MG/DL (ref 0.5–1.4)
DIFFERENTIAL METHOD: ABNORMAL
EOSINOPHIL # BLD AUTO: 0.1 K/UL (ref 0–0.5)
EOSINOPHIL NFR BLD: 1.2 % (ref 0–8)
ERYTHROCYTE [DISTWIDTH] IN BLOOD BY AUTOMATED COUNT: 14.3 % (ref 11.5–14.5)
EST. GFR  (NO RACE VARIABLE): >60 ML/MIN/1.73 M^2
GLUCOSE SERPL-MCNC: 95 MG/DL (ref 70–110)
HCT VFR BLD AUTO: 28 % (ref 37–48.5)
HGB BLD-MCNC: 9.3 G/DL (ref 12–16)
IMM GRANULOCYTES # BLD AUTO: 0.1 K/UL (ref 0–0.04)
IMM GRANULOCYTES NFR BLD AUTO: 0.9 % (ref 0–0.5)
LYMPHOCYTES # BLD AUTO: 1.3 K/UL (ref 1–4.8)
LYMPHOCYTES NFR BLD: 11.9 % (ref 18–48)
MCH RBC QN AUTO: 31.5 PG (ref 27–31)
MCHC RBC AUTO-ENTMCNC: 33.2 G/DL (ref 32–36)
MCV RBC AUTO: 95 FL (ref 82–98)
MONOCYTES # BLD AUTO: 1.3 K/UL (ref 0.3–1)
MONOCYTES NFR BLD: 11.9 % (ref 4–15)
NEUTROPHILS # BLD AUTO: 7.9 K/UL (ref 1.8–7.7)
NEUTROPHILS NFR BLD: 73.8 % (ref 38–73)
NRBC BLD-RTO: 0 /100 WBC
PLATELET # BLD AUTO: 234 K/UL (ref 150–450)
PMV BLD AUTO: 9.9 FL (ref 9.2–12.9)
POTASSIUM SERPL-SCNC: 4.1 MMOL/L (ref 3.5–5.1)
RBC # BLD AUTO: 2.95 M/UL (ref 4–5.4)
SODIUM SERPL-SCNC: 136 MMOL/L (ref 136–145)
WBC # BLD AUTO: 10.65 K/UL (ref 3.9–12.7)

## 2023-10-05 PROCEDURE — 63600175 PHARM REV CODE 636 W HCPCS: Performed by: NURSE PRACTITIONER

## 2023-10-05 PROCEDURE — 97535 SELF CARE MNGMENT TRAINING: CPT

## 2023-10-05 PROCEDURE — 99232 SBSQ HOSP IP/OBS MODERATE 35: CPT | Mod: ,,, | Performed by: NURSE PRACTITIONER

## 2023-10-05 PROCEDURE — 94761 N-INVAS EAR/PLS OXIMETRY MLT: CPT

## 2023-10-05 PROCEDURE — 25000003 PHARM REV CODE 250: Performed by: NURSE PRACTITIONER

## 2023-10-05 PROCEDURE — 80048 BASIC METABOLIC PNL TOTAL CA: CPT | Performed by: NURSE PRACTITIONER

## 2023-10-05 PROCEDURE — 11000001 HC ACUTE MED/SURG PRIVATE ROOM

## 2023-10-05 PROCEDURE — 63600175 PHARM REV CODE 636 W HCPCS: Performed by: NEUROLOGICAL SURGERY

## 2023-10-05 PROCEDURE — 25000003 PHARM REV CODE 250: Performed by: INTERNAL MEDICINE

## 2023-10-05 PROCEDURE — 25000003 PHARM REV CODE 250: Performed by: NEUROLOGICAL SURGERY

## 2023-10-05 PROCEDURE — 99900035 HC TECH TIME PER 15 MIN (STAT)

## 2023-10-05 PROCEDURE — 97110 THERAPEUTIC EXERCISES: CPT

## 2023-10-05 PROCEDURE — 94799 UNLISTED PULMONARY SVC/PX: CPT

## 2023-10-05 PROCEDURE — 36415 COLL VENOUS BLD VENIPUNCTURE: CPT | Performed by: NURSE PRACTITIONER

## 2023-10-05 PROCEDURE — 97530 THERAPEUTIC ACTIVITIES: CPT

## 2023-10-05 PROCEDURE — 99232 PR SUBSEQUENT HOSPITAL CARE,LEVL II: ICD-10-PCS | Mod: ,,, | Performed by: NURSE PRACTITIONER

## 2023-10-05 PROCEDURE — 85025 COMPLETE CBC W/AUTO DIFF WBC: CPT | Performed by: NURSE PRACTITIONER

## 2023-10-05 RX ORDER — METOPROLOL SUCCINATE 25 MG/1
25 TABLET, EXTENDED RELEASE ORAL DAILY
Status: DISCONTINUED | OUTPATIENT
Start: 2023-10-05 | End: 2023-10-07

## 2023-10-05 RX ADMIN — DOCUSATE SODIUM AND SENNOSIDES 2 TABLET: 8.6; 5 TABLET, FILM COATED ORAL at 08:10

## 2023-10-05 RX ADMIN — METHOCARBAMOL TABLETS 750 MG: 750 TABLET, COATED ORAL at 02:10

## 2023-10-05 RX ADMIN — ENOXAPARIN SODIUM 80 MG: 80 INJECTION SUBCUTANEOUS at 09:10

## 2023-10-05 RX ADMIN — HYDROMORPHONE HYDROCHLORIDE 1 MG: 2 INJECTION, SOLUTION INTRAMUSCULAR; INTRAVENOUS; SUBCUTANEOUS at 03:10

## 2023-10-05 RX ADMIN — AMIODARONE HYDROCHLORIDE 400 MG: 200 TABLET ORAL at 09:10

## 2023-10-05 RX ADMIN — ENOXAPARIN SODIUM 80 MG: 80 INJECTION SUBCUTANEOUS at 08:10

## 2023-10-05 RX ADMIN — METHOCARBAMOL TABLETS 750 MG: 750 TABLET, COATED ORAL at 08:10

## 2023-10-05 RX ADMIN — TRAMADOL HYDROCHLORIDE 100 MG: 50 TABLET, COATED ORAL at 02:10

## 2023-10-05 RX ADMIN — OXYCODONE HYDROCHLORIDE 10 MG: 5 TABLET ORAL at 09:10

## 2023-10-05 RX ADMIN — AMIODARONE HYDROCHLORIDE 400 MG: 200 TABLET ORAL at 08:10

## 2023-10-05 RX ADMIN — METHOCARBAMOL TABLETS 750 MG: 750 TABLET, COATED ORAL at 09:10

## 2023-10-05 RX ADMIN — LEVOTHYROXINE SODIUM 137 MCG: 25 TABLET ORAL at 05:10

## 2023-10-05 RX ADMIN — METOPROLOL SUCCINATE 25 MG: 25 TABLET, EXTENDED RELEASE ORAL at 09:10

## 2023-10-05 NOTE — PROGRESS NOTES
[facility] - Intensive Care  Neurosurgery  Progress Note    Subjective:     Interval History: Back pain no leg pain.  Still with leg spasms.  Hasn't gotten up yet.  Cardioversion went well yesterday.    History of Present Illness:   This is a very pleasant 72 y.o. female, who was active at the beginning of 2022, mowing her lawn then suddenly started to have difficulty walking, severe back pain and right leg pain.  She has developed a paralysis involving the right leg.  Her right leg appears spastic.  She is mainly complaining of right knee pain. She is being mobilized in a wheelchair.  She admits still being able to drive her car.  In June 2022 she had a thoracic and lumbar spine MRI.  She is being investigated for possible multiple sclerosis.  She reports having no loss of sensation to pain or temperature.  She has no voiding difficulty or incontinence.  She reports right more than left hand numbness and dropping things frequently.      Post-Op Info:  Procedure(s) (LRB):  Transesophageal echo (CARLYLE) intra-procedure log documentation (N/A)   1 Day Post-Op      Medications:  Continuous Infusions:        Scheduled Meds:   amiodarone  400 mg Oral BID    bisacodyL  10 mg Rectal Daily    enoxparin  1 mg/kg (Dosing Weight) Subcutaneous Q12H (treatment, non-standard time)    levothyroxine  137 mcg Oral Before breakfast    methocarbamoL  750 mg Oral TID    metoprolol succinate  25 mg Oral Daily    senna-docusate 8.6-50 mg  2 tablet Oral BID     PRN Meds:acetaminophen, aluminum-magnesium hydroxide-simethicone, metoprolol, ondansetron, oxyCODONE, prochlorperazine, traMADoL     Review of Systems  Objective:     Weight: 86.6 kg (190 lb 14.7 oz)  Body mass index is 29.9 kg/m².  Vital Signs (Most Recent):  Temp: 98.1 °F (36.7 °C) (10/05/23 0751)  Pulse: 82 (10/05/23 0919)  Resp: 20 (10/05/23 0919)  BP: (!) 159/72 (10/05/23 0919)  SpO2: 98 % (10/05/23 0751) Vital Signs (24h Range):  Temp:  [98 °F (36.7 °C)-99.1 °F (37.3 °C)] 98.1 °F  "(36.7 °C)  Pulse:  [] 82  Resp:  [11-27] 20  SpO2:  [93 %-100 %] 98 %  BP: ()/() 159/72     Date 10/05/23 0700 - 10/06/23 0659   Shift 5251-8333 8948-5221 2448-3715 24 Hour Total   INTAKE   P.O. 240   240   Shift Total(mL/kg) 240(2.8)   240(2.8)   OUTPUT   Shift Total(mL/kg)       Weight (kg) 86.6 86.6 86.6 86.6                              Closed/Suction Drain 09/29/23 2123 Medial Back Bulb 10 Fr. (Active)   Site Description Unable to view 09/30/23 1319   Dressing Type Transparent (Tegaderm);Other (Comment) 09/30/23 1319   Dressing Status Clean;Dry;Intact 09/30/23 1319   Dressing Intervention First dressing 09/30/23 1319   Drainage Bloody 09/30/23 1319   Status To bulb suction 09/30/23 1319   Output (mL) 25 mL 09/30/23 1156            Urethral Catheter 09/29/23 Silicone (Active)   Site Assessment Clean;Intact;Dry 09/30/23 1319   Collection Container Urimeter 09/30/23 1319   Securement Method secured to top of thigh w/ adhesive device 09/30/23 1319   Catheter Care Performed yes 09/30/23 1040   Reason for Continuing Urinary Catheterization Critically ill in ICU and requiring hourly monitoring of intake/output 09/30/23 1319   CAUTI Prevention Bundle Securement Device in place with 1" slack;Intact seal between catheter & drainage tubing;Drainage bag/urimeter off the floor;Sheeting clip in use;No dependent loops or kinks;Drainage bag/urimeter not overfilled (<2/3 full);Drainage bag/urimeter below bladder 09/30/23 1319   Output (mL) 75 mL 09/30/23 1150       Neurosurgery Physical Exam    General: well developed, well nourished, no distress  Mental Status: Awake, Alert, Oriented X3.Thought content appropriate  GCS: Motor: 6/Verbal: 5/Eyes: 4 GCS Total: 15    Motor Strength:  Strength                                HF KF KE DF PF EHL   Lower: R 5/5 5/5 5/5 5/5 5/5 5/5    L 5/5 5/5 5/5 5/5 5/5 5/5       Legs very stiff  Incision- CDI        Significant Labs:  Recent Labs   Lab 10/05/23  0740   GLU 95   NA " "136   K 4.1      CO2 26   BUN 14   CREATININE 0.6   CALCIUM 8.2*       Recent Labs   Lab 10/05/23  0740   WBC 10.65   HGB 9.3*   HCT 28.0*          No results for input(s): "LABPT", "INR", "APTT" in the last 48 hours.  Microbiology Results (last 7 days)       ** No results found for the last 168 hours. **              Assessment/Plan:     Active Diagnoses:    Diagnosis Date Noted POA    PRINCIPAL PROBLEM:  Lumbar stenosis with neurogenic claudication [M48.062] 09/29/2023 Yes    Acute blood loss anemia [D62] 10/01/2023 Yes    Foraminal stenosis of lumbar region [M48.061] 09/29/2023 Yes    Chronic myelopathy [G95.9] 09/29/2023 Yes    Decreased range of motion of right knee [M25.661] 09/29/2023 Yes    Assistance needed for mobility [Z74.1] 09/29/2023 Not Applicable    Atrial flutter [I48.92] 09/15/2023 Yes    Anticoagulant long-term use [Z79.01] 02/23/2023 Not Applicable    PAF (paroxysmal atrial fibrillation) [I48.0] 08/10/2022 Yes    DDD (degenerative disc disease), lumbar [M51.36] 08/09/2022 Yes    Postoperative hypothyroidism [E89.0] 02/19/2018 Yes    History of thyroid cancer [Z85.850] 02/19/2018 Not Applicable    Essential hypertension, benign [I10] 02/19/2018 Yes    Mixed hyperlipidemia [E78.2] 02/19/2018 Yes      Problems Resolved During this Admission:       A/P:  POD #6 Left L3-4, L4-5 OLIF, L5-S1 ALIF and L3-S1 posterior instrumented fusion     --Neurologically stable          -q4h neuro checks  --Imaging: Post op xrays pending.    --Drains: Dced  --Pain control: Tylenol 650mg Q 6 hours, Tramadol 100mg Q 6 hours PRN, Oxycodone IR 10mg Q 6 hours PRN, Robaxin 750mg TID, Dilaudid 1mg SQ Q 3 hours PRN DCed  --DVT ppx: TEDs/SCDs/SQH  --Activity: PT/OT, OOB. TLSO brace  --Diet: Cardiac  --Bowel regimen: PRN suppository, senna PRN  --Urinary: Carver  --Atelectasis ppx: Encourage IS hourly  --Dc to rehab tomorrow if cleared by cardiology          All of the above discussed and reviewed with Dr." Gaetano.      Taryn Tang PA-C  Neurosurgery  [facility] - Intensive Care

## 2023-10-05 NOTE — PLAN OF CARE
Patient seen for physical therapy session today, co-treatment with occupational therapy.  Patient is making progress toward PT goals.  Continue to recommend high intensity therapy placement for continued rehabilitation.  Patient was living in community setting functioning at Mod I level for ADLs, and mobility prior to this event.  There is an expectation of returning to prior level of function to maintain independence thus avoiding readmission.  Patient's clinical condition meets full Inpatient Rehab (IPR) criteria; including the ability to actively participate in 3 hours of therapy.  A lower level of care(SNF) cannot provide the interdisciplinary treatment approach needed.     Problem: Physical Therapy  Goal: Physical Therapy Goal  Description: Goals to be met by: 2023     Patient will increase functional independence with mobility by performin. Supine to sit with Modified [place]  2. Sit to supine with Modified [place]  3. Sit to stand transfer with Modified [place]  4. Gait  x 100 feet with Minimal Assistance using Rolling Walker.   5. Stand for 10 minutes with Minimal Assistance using Rolling Walker    Outcome: Ongoing, Progressing

## 2023-10-05 NOTE — PLAN OF CARE
Pt AAOx4. PRN dilaudid given for complaints of pain. No c/o N/V. Medications administered per MAR. On 1L NC. Cardiac monitoring maintained, NSR with rate 77. Carver to gravity, draining concentrated yellow urine. L abd and back incisions LUPIS with staples intact. Bed alarm set, side rails raised, and call light in reach.

## 2023-10-05 NOTE — ASSESSMENT & PLAN NOTE
- history of atrial flutter and treated with Metoprolol and Propafenone as an outpatient  - atrial flutter with RVR with rate difficult to control (Metoprolol Q6hrs, Cardizem gtt and Digoxin)  - successful cardioversion yesterday with restoration to NSR; monitored on telemetry with occasional runs of afib non sustained  - on Amiodarone 400mg po BID and will continue for now; will add Metoprolol 25mg po BID to regimen as well; continue full dose Lovenox as well with plans for transition to DOAC closer to discharge   - will arrange for EP follow up as an outpatient

## 2023-10-05 NOTE — SUBJECTIVE & OBJECTIVE
Interval History:  Cardioversion on yesterday.  Patient is now in sinus rhythm and doing well.  Working with physical therapy and planning DC to rehab soon.  We will keep Carver catheter in place for retention.    Review of Systems   Constitutional:  Positive for activity change. Negative for appetite change, chills, diaphoresis, fatigue and fever.   HENT:  Negative for congestion and sore throat.    Respiratory:  Negative for cough, chest tightness, shortness of breath and wheezing.    Cardiovascular:  Positive for leg swelling. Negative for chest pain and palpitations.   Gastrointestinal:  Negative for abdominal distention, abdominal pain, constipation, diarrhea, nausea and vomiting.   Musculoskeletal:  Positive for arthralgias, back pain and gait problem.   Skin:  Positive for wound.   Neurological:  Negative for dizziness, syncope, weakness, light-headedness and headaches.     Objective:     Vital Signs (Most Recent):  Temp: 98.3 °F (36.8 °C) (10/05/23 1557)  Pulse: 71 (10/05/23 1605)  Resp: 18 (10/05/23 1557)  BP: (!) 164/72 (10/05/23 1557)  SpO2: 96 % (10/05/23 1557) Vital Signs (24h Range):  Temp:  [98.1 °F (36.7 °C)-99.1 °F (37.3 °C)] 98.3 °F (36.8 °C)  Pulse:  [] 71  Resp:  [12-27] 18  SpO2:  [93 %-100 %] 96 %  BP: (117-168)/() 164/72     Weight: 86.6 kg (190 lb 14.7 oz)  Body mass index is 29.9 kg/m².    Intake/Output Summary (Last 24 hours) at 10/5/2023 1649  Last data filed at 10/5/2023 0754  Gross per 24 hour   Intake 240 ml   Output 355 ml   Net -115 ml           Physical Exam  Vitals and nursing note reviewed.   Constitutional:       General: She is not in acute distress.     Appearance: Normal appearance. She is well-developed. She is not toxic-appearing.   HENT:      Head: Normocephalic and atraumatic.      Mouth/Throat:      Dentition: Normal dentition.   Eyes:      General: Lids are normal.      Extraocular Movements: Extraocular movements intact.      Conjunctiva/sclera: Conjunctivae  normal.   Cardiovascular:      Rate and Rhythm: Tachycardia present. Rhythm irregular.   Pulmonary:      Effort: Pulmonary effort is normal.      Breath sounds: Normal breath sounds.   Abdominal:      General: Bowel sounds are normal.      Palpations: Abdomen is soft.   Musculoskeletal:         General: Swelling present.      Cervical back: Neck supple.      Right lower leg: Edema present.      Left lower leg: Edema present.   Skin:     General: Skin is warm and dry.      Findings: No erythema or rash.   Neurological:      Mental Status: She is alert and oriented to person, place, and time.         Significant Labs: All pertinent labs within the past 24 hours have been reviewed.  CBC:   Recent Labs   Lab 10/05/23  0740   WBC 10.65   HGB 9.3*   HCT 28.0*          CMP:   Recent Labs   Lab 10/05/23  0740      K 4.1      CO2 26   GLU 95   BUN 14   CREATININE 0.6   CALCIUM 8.2*   ANIONGAP 9         Significant Imaging: I have reviewed all pertinent imaging results/findings within the past 24 hours.

## 2023-10-05 NOTE — PT/OT/SLP PROGRESS
Occupational Therapy   Treatment    Name: Graciela Barton  MRN: [id number]  Admitting Diagnosis:  Lumbar stenosis with neurogenic claudication  1 Day Post-Op    Recommendations:     Discharge Recommendations:  (high intensity)  Discharge Equipment Recommendations:  to be determined by next level of care  Barriers to discharge:  Decreased caregiver support, Inaccessible home environment    Assessment:     Graciela Barton is a 73 y.o. female with a medical diagnosis of Lumbar stenosis with neurogenic claudication.  She presents with The primary encounter diagnosis was Lumbar stenosis with neurogenic claudication. Diagnoses of Spinal stenosis, Assistance needed for mobility, Bilateral leg edema, Decreased range of motion of right knee, Chronic myelopathy, Foraminal stenosis of lumbar region, Atrial flutter, unspecified type, Tachycardia, A-fib, PAF (paroxysmal atrial fibrillation), Atypical atrial flutter, Paroxysmal atrial fibrillation, and Atrial flutter were also pertinent to this visit.  . Performance deficits affecting function are weakness, impaired self care skills, impaired functional mobility, gait instability, impaired balance, pain, edema, decreased lower extremity function, decreased coordination, impaired coordination, orthopedic precautions, decreased ROM, abnormal tone, impaired joint extensibility.     Pt with increased tone/stiffness in BLEs limiting functional mobility and performance of ADLs. Pt educated on stretching passively while supine; set up pt in adduction; hip abduction pillow obtained as well for stretching. Multiple standing trials performed this date; pt able to recall spinal precautions, but requires total a to don/doff TLSo brace. Cont OT POC     Rehab Prognosis:  Good; patient would benefit from acute skilled OT services to address these deficits and reach maximum level of function.       Plan:     Patient to be seen 5 x/week to address the above listed problems via self-care/home management,  "therapeutic activities, therapeutic exercises  Plan of Care Expires: 11/01/23  Plan of Care Reviewed with: patient    Subjective     Chief Complaint: pain; stiffness; inability to stand/steps   Patient/Family Comments/goals: " I want to be able to get out of the car and walk into my neighbor's house"   Pain/Comfort:  Pain Rating 1: 8/10  Location - Side 1: Left  Location - Orientation 1: generalized  Location 1: leg  Pain Addressed 1: Pre-medicate for activity, Reposition, Distraction  Pain Rating Post-Intervention 1:  (did not rate)    Objective:     Communicated with: nsg prior to session.  Patient found supine with   upon OT entry to room.    General Precautions: Standard, fall    Orthopedic Precautions:spinal precautions  Braces: TLSO  Respiratory Status: Nasal cannula, flow 1 L/min     Occupational Performance:     Bed Mobility:    Patient completed Rolling/Turning to Left with  maximal assistance  Patient completed Scooting/Bridging with maximal assistance and 2 persons  Patient completed Supine to Sit with maximal assistance and 2 persons  Patient completed Sit to Supine with maximal assistance and 2 persons     Functional Mobility/Transfers:  Patient completed Sit <> Stand Transfer with maximal assistance and of 2 persons  with  rolling walker   Functional Mobility: unable to perform     Activities of Daily Living:  Upper Body Dressing: maximal assistance and total assistance don TLSO brace   Lower Body Dressing: dependence    Toileting: total assistance and dependence catheter; unable to manage bowel movements as well       AMPAC 6 Click ADL: 13    Treatment & Education:  Pt found supine   Able to recall spinal precautions   Bed mobility as above; initially with posterior leaning while seated and assist required to maintain midline 2/2 tone/stiffness; pt fearful of falling; BLEs blocked throughout session   Max/total A to don TLSO brace while seated EOB   Total A to don socks   Increased assist required " for seated scooting   2 squat stands performed from EOB with RW; pt with limited clearance and/or upright posture from EOB/in stance; pt requires total/max A to properly position BLEs prior to stand 2/2 increased tone; in stance, pt's demo's increased adductor tone causing B knees/LEs to come together and inability to extend knees   Pillow placed between BLEs while seated EOB and pt's LEs increased in distance prior to 3rd and 4th stand; also, utilized bed rail on L to push for increased leverage; remains max A of 2, however, pt able to decrease difficulty and come to more upright stand   Attempted lateral seated scooting, however, pt unable to perform   Pt performed BLE stretching while seated with assist at trunk to prevent posterior leaning   Pt returned to supine  Positioned pt in passive abduction stretch to BLEs with pillows; educated pt on impt of stretching and reason for stretching/ ROM deficits causing mobility difficulties   Obtained hip abduction pillow from surgery; educated pt on use     Patient left supine with all lines intact, call button in reach, bed alarm on, and nsg notified    GOALS:   Multidisciplinary Problems       Occupational Therapy Goals          Problem: Occupational Therapy    Goal Priority Disciplines Outcome Interventions   Occupational Therapy Goal     OT, PT/OT Ongoing, Progressing    Description: Goals to be met by: 11/01/2023      Patient will increase functional independence with ADLs by performing:    UE Dressing with Minimal Assistance.  LE Dressing with Maximum Assistance.  Grooming while seated with Stand-by Assistance.  Toileting from bedside commode with Minimal Assistance for hygiene and clothing management.   Toilet transfer to bedside commode with Moderate Assistance.  Increased functional strength to WFL for self care.  Upper extremity exercise program x10 reps per handout, with assistance as needed.                           Time Tracking:     OT Date of Treatment:  10/05/23  OT Start Time: 1049  OT Stop Time: 1142  OT Total Time (min): 53 min    Billable Minutes:Self Care/Home Management 10  Therapeutic Activity 43    OT/LUPIS: OT     Number of LUPIS visits since last OT visit: 0    10/5/2023

## 2023-10-05 NOTE — ASSESSMENT & PLAN NOTE
- SBP 120s-150s overnight  - on Metoprolol and HCTZ as an outpatient  - continue Metoprolol  - BP goal less than 130/80  - BP well controlled

## 2023-10-05 NOTE — PT/OT/SLP PROGRESS
Physical Therapy Treatment    Patient Name:  Graciela Barton   MRN:  [id number]    Recommendations:     Discharge Recommendations: other (see comments) (high intensity therapy)  Discharge Equipment Recommendations: to be determined by next level of care  Barriers to discharge: Decreased caregiver support    Assessment:     Graciela Barton is a 73 y.o. female admitted with a medical diagnosis of Lumbar stenosis with neurogenic claudication.  She presents with the following impairments/functional limitations: weakness, impaired functional mobility, impaired cardiopulmonary response to activity, impaired endurance, gait instability, pain, impaired joint extensibility, impaired sensation, impaired balance, decreased upper extremity function, abnormal tone, impaired skin, impaired muscle length, impaired self care skills, decreased lower extremity function, decreased ROM, edema, orthopedic precautions     Patient seen for physical therapy session today, co-treatment with occupational therapy.  Patient is making progress toward PT goals.  Continue to recommend high intensity therapy placement for continued rehabilitation.  Patient was living in community setting functioning at Mod I level for ADLs, and mobility prior to this event.  There is an expectation of returning to prior level of function to maintain independence thus avoiding readmission.  Patient's clinical condition meets full Inpatient Rehab (IPR) criteria; including the ability to actively participate in 3 hours of therapy.  A lower level of care(SNF) cannot provide the interdisciplinary treatment approach needed.     Rehab Prognosis: Fair; patient would benefit from acute skilled PT services to address these deficits and reach maximum level of function.    Recent Surgery: Procedure(s) (LRB):  Transesophageal echo (CARLYLE) intra-procedure log documentation (N/A) 1 Day Post-Op    Plan:     During this hospitalization, patient to be seen daily to address the identified  "rehab impairments via gait training, therapeutic activities, therapeutic exercises, neuromuscular re-education and progress toward the following goals:    Plan of Care Expires:  11/01/23    Subjective     Chief Complaint: "I really want to work hard and to be able to walk"  Patient/Family Comments/goals: To be able to walk  Pain/Comfort:  Pain Rating 1: 8/10  Location - Side 1: Left  Location - Orientation 1: generalized  Location 1: leg  Pain Addressed 1: Pre-medicate for activity, Reposition, Distraction  Pain Rating Post-Intervention 1:  (does not rate)      Objective:     Communicated with Nurse Vega prior to session.  Patient found supine with bed alarm, telemetry, SCD, peripheral IV, oxygen, aden catheter upon PT entry to room.     General Precautions: Standard, fall  Orthopedic Precautions: spinal precautions  Braces: TLSO  Respiratory Status: Nasal cannula, flow 1 L/min     Functional Mobility:  Bed Mobility:     Rolling Left:  maximal assistance  Scooting: maximal assistance  Supine to Sit: maximal assistance  Sit to Supine: maximal assistance  Transfers:     Sit to Stand:  maximal assistance with rolling walker x 4 trials, requires assist for placement of B LEs and to block LEs, pillow applied between legs to decrease adduction with standing.  Patient with max flexion in R knee and hip, poor ability to extend R LE for standing.  Max VC's for upright posture and to increase B LE knee and hip extension.    Balance: Sitting EOB:  Min a   Standing:  requires RW and max assist      AM-PAC 6 CLICK MOBILITY  Turning over in bed (including adjusting bedclothes, sheets and blankets)?: 2  Sitting down on and standing up from a chair with arms (e.g., wheelchair, bedside commode, etc.): 2  Moving from lying on back to sitting on the side of the bed?: 2  Moving to and from a bed to a chair (including a wheelchair)?: 1  Need to walk in hospital room?: 1  Climbing 3-5 steps with a railing?: 1  Basic Mobility Total " Score: 9       Treatment & Education:  Patient recalls 2/3 of spinal precautions.  Patient presents in bed with max adduction and R knee flexion.  Patient transitions to EOB with max assist, min assist for EOB sitting.  Patient with increased tone/stiffness throughout B LEs with hip adduction, and knee and hip flexion. Patient requires max assist to assist with positioning of B LE's prior to standing, pillow placed in between B LE's to promote increasing abduction with standing.  After standing trials, patient returns back to supine, pillows placed in between B LE's to promote adductor stretch and R LE knee extension.  Patient encouraged to stay in stretched position for as long as she can tolerate.      Patient left HOB elevated with all lines intact, call button in reach, bed alarm on, and nurse notified..    GOALS:   Multidisciplinary Problems       Physical Therapy Goals          Problem: Physical Therapy    Goal Priority Disciplines Outcome Goal Variances Interventions   Physical Therapy Goal     PT, PT/OT Ongoing, Progressing     Description: Goals to be met by: 2023     Patient will increase functional independence with mobility by performin. Supine to sit with Modified Davis  2. Sit to supine with Modified Davis  3. Sit to stand transfer with Modified Davis  4. Gait  x 100 feet with Minimal Assistance using Rolling Walker.   5. Stand for 10 minutes with Minimal Assistance using Rolling Walker                         Time Tracking:     PT Received On: 10/05/23  PT Start Time: 1049     PT Stop Time: 1131  PT Total Time (min): 42 min     Billable Minutes: Therapeutic Activity 30 and Therapeutic Exercise 12    Treatment Type: Treatment  PT/PTA: PT     Number of PTA visits since last PT visit: 0     10/05/2023

## 2023-10-05 NOTE — PROGRESS NOTES
[facility] - Med Surg  Cardiology  Progress Note    Patient Name: Graciela Barton  MRN: [id number]  Admission Date: 9/29/2023  Hospital Length of Stay: 6 days  Code Status: Full Code   Attending Physician: Scooter Salter MD   Primary Care Physician: Rocco Miller MD  Expected Discharge Date:   Principal Problem:Lumbar stenosis with neurogenic claudication    Subjective:     Hospital Course:   10/2/2023 Seen by Dr. Ortega over the weekend for consult of atrial flutter with RVR. Hx of PAF on propafenone. In Aflutter since 5/2023- HR in 115s-120s and on Lopressor and propafenone as outpatient.  Hgb 7 down from 12. And off anticoagulation. Given Digoxin .25mg IV every 6 hours x 3 overnight with continued HR in 140s. Started on Esmolol overnight due to elevated BP and currently on Esmolol drip along with oral Metoprolol. SBP 120s and patient asymptomatic. BONY drains remain in place from recent neurosurgery. Echo with EF 55%. Neurosurgery following as well   10/3/2023 Remains in atrial flutter with HR up to 160s-170s this AM. Started on IV Cardizem overnight with continued Metoprolol BID. SBP 100s-110s overnight. Asymptomatic from cardiac standpoint. H&H 10.3&31.4  BMP WNL. BONY drain remains in place from NS last week   10/4/2023 NPO for CARLYLE/DCCV  10/5/2023 Successful cardioversion yesterday with restoration to NSR. Remains in NSR with occasional runs of afib overnight short lived H&H 9.3/28.0 BMP WNL. SBP 120s-150s. No complaints this AM           Review of Systems   Constitutional: Negative for chills, decreased appetite, diaphoresis and fever.   Cardiovascular:  Negative for chest pain, claudication, cyanosis, dyspnea on exertion, irregular heartbeat, leg swelling, near-syncope, orthopnea, palpitations, paroxysmal nocturnal dyspnea and syncope.   Respiratory:  Negative for cough, hemoptysis, shortness of breath and wheezing.    Gastrointestinal:  Negative for bloating, abdominal pain, constipation, diarrhea, melena,  nausea and vomiting.   Neurological:  Negative for dizziness and weakness.     Objective:     Vital Signs (Most Recent):  Temp: 98.1 °F (36.7 °C) (10/05/23 0751)  Pulse: 82 (10/05/23 0919)  Resp: 20 (10/05/23 0919)  BP: (!) 159/72 (10/05/23 0919)  SpO2: 98 % (10/05/23 0751) Vital Signs (24h Range):  Temp:  [98 °F (36.7 °C)-99.1 °F (37.3 °C)] 98.1 °F (36.7 °C)  Pulse:  [] 82  Resp:  [9-27] 20  SpO2:  [90 %-100 %] 98 %  BP: ()/() 159/72     Weight: 86.6 kg (190 lb 14.7 oz)  Body mass index is 29.9 kg/m².     SpO2: 98 %         Intake/Output Summary (Last 24 hours) at 10/5/2023 1016  Last data filed at 10/5/2023 0754  Gross per 24 hour   Intake 589.09 ml   Output 530 ml   Net 59.09 ml       Lines/Drains/Airways       Drain  Duration                  Urethral Catheter 10/02/23 1445 Non-latex;Straight-tip 2 days              Peripheral Intravenous Line  Duration                  Peripheral IV - Single Lumen 09/29/23 1416 18 G Right Wrist 5 days         Peripheral IV - Single Lumen 10/03/23 1618 20 G Anterior;Left Forearm 1 day                       Physical Exam  Constitutional:       General: She is not in acute distress.     Appearance: She is well-developed.   Cardiovascular:      Rate and Rhythm: Normal rate and regular rhythm.      Heart sounds: No murmur heard.     No gallop.   Pulmonary:      Effort: Pulmonary effort is normal. No respiratory distress.      Breath sounds: Normal breath sounds. No wheezing.   Abdominal:      General: Bowel sounds are normal. There is no distension.      Palpations: Abdomen is soft.      Tenderness: There is no abdominal tenderness.   Skin:     General: Skin is warm and dry.   Neurological:      Mental Status: She is alert and oriented to person, place, and time.            Significant Labs: BMP:   Recent Labs   Lab 10/05/23  0740   GLU 95      K 4.1      CO2 26   BUN 14   CREATININE 0.6   CALCIUM 8.2*    and CBC   Recent Labs   Lab 10/05/23  0740   WBC  10.65   HGB 9.3*   HCT 28.0*          Significant Imaging: Echocardiogram: Transthoracic echo (TTE) complete (Cupid Only):   Results for orders placed or performed during the hospital encounter of 09/29/23   Echo   Result Value Ref Range    BSA 1.9 m2    Hoyt's Biplane MOD Ejection Fraction 64 %    LVOT stroke volume 51.18 cm3    LVIDd 4.07 3.5 - 6.0 cm    LV Systolic Volume 33.51 mL    LV Systolic Volume Index 17.8 mL/m2    LVIDs 2.95 2.1 - 4.0 cm    LV Diastolic Volume 72.74 mL    LV Diastolic Volume Index 38.69 mL/m2    IVS 0.87 0.6 - 1.1 cm    LVOT diameter 2.0 cm    LVOT area 3.1 cm2    FS 28 28 - 44 %    Left Ventricle Relative Wall Thickness 0.36 cm    Posterior Wall 0.74 0.6 - 1.1 cm    LV mass 96.97 g    LV Mass Index 52 g/m2    TDI LATERAL 0.26 m/s    TDI SEPTAL 0.26 m/s    TR Max Mathew 2.71 m/s    LVOT peak mahtew 1.01 m/s    Left Ventricular Outflow Tract Mean Velocity 0.76 cm/s    Left Ventricular Outflow Tract Mean Gradient 2.53 mmHg    LA size 3.83 cm    Left Atrium Major Axis 5.13 cm    RV S' 9.74 cm/s    TAPSE 2.27 cm    RA Major Axis 5.45 cm    RA Width 4.05 cm    AV mean gradient 4 mmHg    AV peak gradient 7 mmHg    Ao peak mathew 1.31 m/s    Ao VTI 31.90 cm    LVOT peak VTI 16.30 cm    AV valve area 1.60 cm²    AV Velocity Ratio 0.77     AV index (prosthetic) 0.51     MADY by Velocity Ratio 2.42 cm²    Mr max mathew 4.15 m/s    MV mean gradient 2 mmHg    MV peak gradient 5 mmHg    MV valve area by continuity eq 2.35 cm2    MV VTI 21.8 cm    Triscuspid Valve Regurgitation Peak Gradient 29 mmHg    PV PEAK VELOCITY 1.00 m/s    PV peak gradient 4 mmHg    Sinus 2.67 cm    STJ 2.62 cm    Ascending aorta 2.93 cm    IVC diameter 2.15 cm    Mean e' 0.26 m/s    ZLVIDS -0.74     ZLVIDD -2.55     LA WIDTH 4.3 cm    EF 55 %    TV resting pulmonary artery pressure 44 mmHg    RV TB RVSP 18 mmHg    Est. RA pres 15 mmHg    Narrative      Left Ventricle: The left ventricle is normal in size. Normal wall    thickness. Normal wall motion. There is normal systolic function. Ejection   fraction by visual approximation is 55%. Unable to assess diastolic   function due to atrial fibrillation.    Left Atrium: Left atrium is mildly dilated.    Right Ventricle: Normal right ventricular cavity size. Wall thickness   is normal. Right ventricle wall motion  is normal. Systolic function is   normal.    Right Atrium: Right atrium is mildly dilated.    Mitral Valve: There is mild regurgitation.    Tricuspid Valve: There is mild regurgitation.    Pulmonary Artery: The estimated pulmonary artery systolic pressure is   44 mmHg.    IVC/SVC: Elevated venous pressure at 15 mmHg.       Assessment and Plan:     Brief HPI: Seen on AM NP rounds while resting in bed. Denies any complaints. Discussed POC as detailed below-verbalized understanding and agrees with POC     Acute blood loss anemia  - H&H 7&22 initially with improvement post PRBC transfusion to 10.3/31.4 back down slightly to 9.3/28.0 this AM  - will continue to monitor with full dose SQ Lovenox   - will plan to transition back to Eliquis closer to discharge as long as H&H permits     Atrial flutter  - history of atrial flutter and treated with Metoprolol and Propafenone as an outpatient  - atrial flutter with RVR with rate difficult to control (Metoprolol Q6hrs, Cardizem gtt and Digoxin)  - successful cardioversion yesterday with restoration to NSR; monitored on telemetry with occasional runs of afib non sustained  - on Amiodarone 400mg po BID and will continue for now; will add Metoprolol 25mg po BID to regimen as well; continue full dose Lovenox as well with plans for transition to DOAC closer to discharge   - will arrange for EP follow up as an outpatient     PAF (paroxysmal atrial fibrillation)  - as detailed under atrial flutter     Essential hypertension, benign  - SBP 120s-150s overnight  - on Metoprolol and HCTZ as an outpatient  - continue Metoprolol  - BP goal  less than 130/80  - BP well controlled         VTE Risk Mitigation (From admission, onward)         Ordered     enoxaparin injection 80 mg  Every 12 hours         10/03/23 1017     IP VTE HIGH RISK PATIENT  Once         09/29/23 2314     Place sequential compression device  Until discontinued         09/29/23 6513                TARA Pendleton, ANP  Cardiology  [facility]

## 2023-10-05 NOTE — ASSESSMENT & PLAN NOTE
Chronic myelopathy  Foraminal stenosis of lumbar region  Neurogenic claudication   DDD, lumbar  Decreased ROM of R knee  Assistance needed for mobility     -thoracic myelopathy with neurogenic claudication s/p s/p T6-7 T7-8 anterior diskectomy (05/12/2023) and now s/p left L3-4, L4-5 OLIF, L5-S1 ALIF and L3-S1 posterior instrumented fusion (09/29/2023)  -management per primary team   -remains with surgical drain  -PT/OT following   -rehab placement

## 2023-10-05 NOTE — PROGRESS NOTES
[facility] Medicine  Progress Note    Patient Name: Graciela Barton  MRN: [id number]  Patient Class: IP- Inpatient   Admission Date: 9/29/2023  Length of Stay: 6 days  Attending Physician: Scooter Salter MD  Primary Care Provider: Rocco Miller MD        Subjective:     Principal Problem:Lumbar stenosis with neurogenic claudication        HPI:  Ms. Barton is a 73 YOF with PMHx of HTN, HLD, BPPV, PAfib/flutter on OAC, history of thyroid cancer s/p thyroidectomy in 2018 with hypothyroidism, thoracic myelopathy with neurogenic claudication s/p s/p T6-7 T7-8 anterior diskectomy (05/12/2023) and now s/p left L3-4, L4-5 OLIF, L5-S1 ALIF and L3-S1 posterior instrumented fusion (09/29/2023).     Admission testing reviewed noting WBC 12 >>13, hemoglobin 9.7 >> 8.5 >> 7.4, hematocrit 30 >> 25.9 >> 22.8; chemistry stable; lactic 1.0; TSH 7.448 with FT4 0.97; CXR with clearing of pleural fluid and atelectasis in the right lung base; BLE US without evidence of DVT; and TTE with LVEF >55%, LA mild dilation, RA mild dilation, normal RV size and function, mild MR, mild TR, RVSP 44mmHg. From neurosurgery perspective she is progressing well in post operative course however the course has been complicated by AFib/flutter with RVR refractory to home medications, propafenone and metoprolol, and amiodarone gtt.     She is seen today in consult for medical management. She notes that overall pain is well controlled however she is developing cramping sensation in BLEs. She denies palpitations, SOB, ELLIS, CP, abdominal pain, N/V/D, constipation, fever, chills, light headiness, dizziness, seizures, or syncope.     Hospital Medicine Service was consulted for assistance with medical management.         Overview/Hospital Course:  Ms. Barton was admitted 09/29 by Neurosurgery and is now s/p left L3-4, L4-5 OLIF, L5-S1 ALIF and L3-S1 posterior instrumented fusion. From neurosurgery perspective she is progressing well in post  operative course. However the course has been complicated by AFib/flutter with RVR that was refractory to home medications, propafenone and metoprolol; and amiodarone gtt. Cardiology following with recommendations to transfuse due to anemia (two units PRBCs transfused 10/01), stop home propafenone (failed drug in setting of several months history of AFlutter), continue home metoprolol (titrations limited due to BP), and was give 3 doses of IV digoxin (completed). However without improvement and HR elevating to 140's she was initiated on esmolol gtt evening of 10/01.      Other concerns currently to monitor include monitoring for s/s of urinary retention and bowel regimen titrated 10/2 as last BM was day of admission per patient.   10/4:  Cardiology planning cardioversion on today.  Cardizem drip.  She was started on full-dose on 10/03/2023.  Disposition plans: to rehab when medically ready for intensive therapy.   10/5: Cardioversion yesterday now noted to be in NSR.  Still receiving full-dose Lovenox with plans to transition to oral on tomorrow per Cardiology.  Doing well on metoprolol.  Carver catheter still in place secondary to urinary retention.      Interval History:  Cardioversion on yesterday.  Patient is now in sinus rhythm and doing well.  Working with physical therapy and planning DC to rehab soon.  We will keep Carver catheter in place for retention.    Review of Systems   Constitutional:  Positive for activity change. Negative for appetite change, chills, diaphoresis, fatigue and fever.   HENT:  Negative for congestion and sore throat.    Respiratory:  Negative for cough, chest tightness, shortness of breath and wheezing.    Cardiovascular:  Positive for leg swelling. Negative for chest pain and palpitations.   Gastrointestinal:  Negative for abdominal distention, abdominal pain, constipation, diarrhea, nausea and vomiting.   Musculoskeletal:  Positive for arthralgias, back pain and gait problem.   Skin:   Positive for wound.   Neurological:  Negative for dizziness, syncope, weakness, light-headedness and headaches.     Objective:     Vital Signs (Most Recent):  Temp: 98.3 °F (36.8 °C) (10/05/23 1557)  Pulse: 71 (10/05/23 1605)  Resp: 18 (10/05/23 1557)  BP: (!) 164/72 (10/05/23 1557)  SpO2: 96 % (10/05/23 1557) Vital Signs (24h Range):  Temp:  [98.1 °F (36.7 °C)-99.1 °F (37.3 °C)] 98.3 °F (36.8 °C)  Pulse:  [] 71  Resp:  [12-27] 18  SpO2:  [93 %-100 %] 96 %  BP: (117-168)/() 164/72     Weight: 86.6 kg (190 lb 14.7 oz)  Body mass index is 29.9 kg/m².    Intake/Output Summary (Last 24 hours) at 10/5/2023 1649  Last data filed at 10/5/2023 0754  Gross per 24 hour   Intake 240 ml   Output 355 ml   Net -115 ml           Physical Exam  Vitals and nursing note reviewed.   Constitutional:       General: She is not in acute distress.     Appearance: Normal appearance. She is well-developed. She is not toxic-appearing.   HENT:      Head: Normocephalic and atraumatic.      Mouth/Throat:      Dentition: Normal dentition.   Eyes:      General: Lids are normal.      Extraocular Movements: Extraocular movements intact.      Conjunctiva/sclera: Conjunctivae normal.   Cardiovascular:      Rate and Rhythm: Tachycardia present. Rhythm irregular.   Pulmonary:      Effort: Pulmonary effort is normal.      Breath sounds: Normal breath sounds.   Abdominal:      General: Bowel sounds are normal.      Palpations: Abdomen is soft.   Musculoskeletal:         General: Swelling present.      Cervical back: Neck supple.      Right lower leg: Edema present.      Left lower leg: Edema present.   Skin:     General: Skin is warm and dry.      Findings: No erythema or rash.   Neurological:      Mental Status: She is alert and oriented to person, place, and time.         Significant Labs: All pertinent labs within the past 24 hours have been reviewed.  CBC:   Recent Labs   Lab 10/05/23  0740   WBC 10.65   HGB 9.3*   HCT 28.0*           CMP:   Recent Labs   Lab 10/05/23  0740      K 4.1      CO2 26   GLU 95   BUN 14   CREATININE 0.6   CALCIUM 8.2*   ANIONGAP 9         Significant Imaging: I have reviewed all pertinent imaging results/findings within the past 24 hours.      Assessment/Plan:      * Lumbar stenosis with neurogenic claudication  Chronic myelopathy  Foraminal stenosis of lumbar region  Neurogenic claudication   DDD, lumbar  Decreased ROM of R knee  Assistance needed for mobility     -thoracic myelopathy with neurogenic claudication s/p s/p T6-7 T7-8 anterior diskectomy (05/12/2023) and now s/p left L3-4, L4-5 OLIF, L5-S1 ALIF and L3-S1 posterior instrumented fusion (09/29/2023)  -management per primary team   -remains with surgical drain  -PT/OT following   -rehab placement    Acute blood loss anemia  -s/p 2 units PRBC transfusion 10/2 with appropriate response  -monitor for s/s of bleeding   -trend labs and monitor  -further transfusions as indicated     PAF (paroxysmal atrial fibrillation)  Atrial flutter  Anticoagulant long-term use    -chronic, has been in Aflutter for quite some time  -RVR rate worsened after admission in setting of neurosurgical procedure, anemia, and chronic hypothyroidism  -Cardiology following   -home propafenone discontinued per recs (failed drug in setting of several months history of AFlutter)  -continue home metoprolol (titrations limited due to BP)  -s/p IV dig x 3 doses   -initiated on esmolol gtt 10/01 due to no improvement/elevation in HR overnight   -will need outpt follow up with EP for consideration of DCCV +/- ablation  -continue tele monitoring  -EKG PRN concerns  -trend labs, address/replete electrolytes as indicated  -monitor    -started on diltiazem drip which is on hold at this time.  Will continue metoprolol 25 mg po q.6  -status post DCCV +/- ablation now in sinus rhythm  -continuing full-dose Lovenox and will transition per Cardiology to oral tomorrow    Mixed  hyperlipidemia  -chronic  -resume home rosuvastatin at discharge     Essential hypertension, benign  -chronic  -low/normotensive pressures at current  -hold home HCTZ  -continue metoprolol as noted above   -dose/medication adjustment as appropriate   -monitor     Postoperative hypothyroidism  History of thyroid cancer  History of thyroidectomy   -chronic  -improvement in TSH   -continue home levothyroxine      VTE Risk Mitigation (From admission, onward)         Ordered     enoxaparin injection 80 mg  Every 12 hours         10/03/23 1017     IP VTE HIGH RISK PATIENT  Once         09/29/23 2314     Place sequential compression device  Until discontinued         09/29/23 2314                Discharge Planning   ASYA:      Code Status: Full Code   Is the patient medically ready for discharge?:     Reason for patient still in hospital (select all that apply): Laboratory test, Treatment, Imaging and Consult recommendations  Discharge Plan A: Rehab   Discharge Delays: (!) Procedure Scheduling (IR, OR, Labs, Echo, Cath, Echo, EEG)              Yenni Swift NP  Department of Hospital Medicine   [facility]

## 2023-10-05 NOTE — SUBJECTIVE & OBJECTIVE
Review of Systems   Constitutional: Negative for chills, decreased appetite, diaphoresis and fever.   Cardiovascular:  Negative for chest pain, claudication, cyanosis, dyspnea on exertion, irregular heartbeat, leg swelling, near-syncope, orthopnea, palpitations, paroxysmal nocturnal dyspnea and syncope.   Respiratory:  Negative for cough, hemoptysis, shortness of breath and wheezing.    Gastrointestinal:  Negative for bloating, abdominal pain, constipation, diarrhea, melena, nausea and vomiting.   Neurological:  Negative for dizziness and weakness.     Objective:     Vital Signs (Most Recent):  Temp: 98.1 °F (36.7 °C) (10/05/23 0751)  Pulse: 82 (10/05/23 0919)  Resp: 20 (10/05/23 0919)  BP: (!) 159/72 (10/05/23 0919)  SpO2: 98 % (10/05/23 0751) Vital Signs (24h Range):  Temp:  [98 °F (36.7 °C)-99.1 °F (37.3 °C)] 98.1 °F (36.7 °C)  Pulse:  [] 82  Resp:  [9-27] 20  SpO2:  [90 %-100 %] 98 %  BP: ()/() 159/72     Weight: 86.6 kg (190 lb 14.7 oz)  Body mass index is 29.9 kg/m².     SpO2: 98 %         Intake/Output Summary (Last 24 hours) at 10/5/2023 1016  Last data filed at 10/5/2023 0754  Gross per 24 hour   Intake 589.09 ml   Output 530 ml   Net 59.09 ml       Lines/Drains/Airways       Drain  Duration                  Urethral Catheter 10/02/23 1445 Non-latex;Straight-tip 2 days              Peripheral Intravenous Line  Duration                  Peripheral IV - Single Lumen 09/29/23 1416 18 G Right Wrist 5 days         Peripheral IV - Single Lumen 10/03/23 1618 20 G Anterior;Left Forearm 1 day                       Physical Exam  Constitutional:       General: She is not in acute distress.     Appearance: She is well-developed.   Cardiovascular:      Rate and Rhythm: Normal rate and regular rhythm.      Heart sounds: No murmur heard.     No gallop.   Pulmonary:      Effort: Pulmonary effort is normal. No respiratory distress.      Breath sounds: Normal breath sounds. No wheezing.   Abdominal:       General: Bowel sounds are normal. There is no distension.      Palpations: Abdomen is soft.      Tenderness: There is no abdominal tenderness.   Skin:     General: Skin is warm and dry.   Neurological:      Mental Status: She is alert and oriented to person, place, and time.            Significant Labs: BMP:   Recent Labs   Lab 10/05/23  0740   GLU 95      K 4.1      CO2 26   BUN 14   CREATININE 0.6   CALCIUM 8.2*    and CBC   Recent Labs   Lab 10/05/23  0740   WBC 10.65   HGB 9.3*   HCT 28.0*          Significant Imaging: Echocardiogram: Transthoracic echo (TTE) complete ([facility] Only):   Results for orders placed or performed during the hospital encounter of 09/29/23   Echo   Result Value Ref Range    BSA 1.9 m2    Hoyt's Biplane MOD Ejection Fraction 64 %    LVOT stroke volume 51.18 cm3    LVIDd 4.07 3.5 - 6.0 cm    LV Systolic Volume 33.51 mL    LV Systolic Volume Index 17.8 mL/m2    LVIDs 2.95 2.1 - 4.0 cm    LV Diastolic Volume 72.74 mL    LV Diastolic Volume Index 38.69 mL/m2    IVS 0.87 0.6 - 1.1 cm    LVOT diameter 2.0 cm    LVOT area 3.1 cm2    FS 28 28 - 44 %    Left Ventricle Relative Wall Thickness 0.36 cm    Posterior Wall 0.74 0.6 - 1.1 cm    LV mass 96.97 g    LV Mass Index 52 g/m2    TDI LATERAL 0.26 m/s    TDI SEPTAL 0.26 m/s    TR Max Mathew 2.71 m/s    LVOT peak mathew 1.01 m/s    Left Ventricular Outflow Tract Mean Velocity 0.76 cm/s    Left Ventricular Outflow Tract Mean Gradient 2.53 mmHg    LA size 3.83 cm    Left Atrium Major Axis 5.13 cm    RV S' 9.74 cm/s    TAPSE 2.27 cm    RA Major Axis 5.45 cm    RA Width 4.05 cm    AV mean gradient 4 mmHg    AV peak gradient 7 mmHg    Ao peak mathew 1.31 m/s    Ao VTI 31.90 cm    LVOT peak VTI 16.30 cm    AV valve area 1.60 cm²    AV Velocity Ratio 0.77     AV index (prosthetic) 0.51     MADY by Velocity Ratio 2.42 cm²    Mr max mathew 4.15 m/s    MV mean gradient 2 mmHg    MV peak gradient 5 mmHg    MV valve area by continuity eq 2.35 cm2     MV VTI 21.8 cm    Triscuspid Valve Regurgitation Peak Gradient 29 mmHg    PV PEAK VELOCITY 1.00 m/s    PV peak gradient 4 mmHg    Sinus 2.67 cm    STJ 2.62 cm    Ascending aorta 2.93 cm    IVC diameter 2.15 cm    Mean e' 0.26 m/s    ZLVIDS -0.74     ZLVIDD -2.55     LA WIDTH 4.3 cm    EF 55 %    TV resting pulmonary artery pressure 44 mmHg    RV TB RVSP 18 mmHg    Est. RA pres 15 mmHg    Narrative      Left Ventricle: The left ventricle is normal in size. Normal wall   thickness. Normal wall motion. There is normal systolic function. Ejection   fraction by visual approximation is 55%. Unable to assess diastolic   function due to atrial fibrillation.    Left Atrium: Left atrium is mildly dilated.    Right Ventricle: Normal right ventricular cavity size. Wall thickness   is normal. Right ventricle wall motion  is normal. Systolic function is   normal.    Right Atrium: Right atrium is mildly dilated.    Mitral Valve: There is mild regurgitation.    Tricuspid Valve: There is mild regurgitation.    Pulmonary Artery: The estimated pulmonary artery systolic pressure is   44 mmHg.    IVC/SVC: Elevated venous pressure at 15 mmHg.

## 2023-10-05 NOTE — ASSESSMENT & PLAN NOTE
- H&H 7&22 initially with improvement post PRBC transfusion to 10.3/31.4 back down slightly to 9.3/28.0 this AM  - will continue to monitor with full dose SQ Lovenox   - will plan to transition back to [facility] closer to discharge as long as H&H permits

## 2023-10-05 NOTE — ASSESSMENT & PLAN NOTE
Atrial flutter  Anticoagulant long-term use    -chronic, has been in Aflutter for quite some time  -RVR rate worsened after admission in setting of neurosurgical procedure, anemia, and chronic hypothyroidism  -Cardiology following   -home propafenone discontinued per recs (failed drug in setting of several months history of AFlutter)  -continue home metoprolol (titrations limited due to BP)  -s/p IV dig x 3 doses   -initiated on esmolol gtt 10/01 due to no improvement/elevation in HR overnight   -will need outpt follow up with EP for consideration of DCCV +/- ablation  -continue tele monitoring  -EKG PRN concerns  -trend labs, address/replete electrolytes as indicated  -monitor    -started on diltiazem drip which is on hold at this time.  Will continue metoprolol 25 mg po q.6  -status post DCCV +/- ablation now in sinus rhythm  -continuing full-dose Lovenox and will transition per Cardiology to oral tomorrow

## 2023-10-05 NOTE — PLAN OF CARE
Per conversation with Pepper at [facility] their MD said they can admit tomorrow. He would like to see how she does with therapy today, and wants Cardiology clearance since some fib over night. Pt's pain will need to be controlled with PO pain meds for 24 hours.  Hoping they can take tomorrow.     Toy Ward, MSW  [phone]    Future Appointments   Date Time Provider Department Center   10/13/2023  1:00 PM [facility] XR-A LIMIT 350 LBS [facility] X[facility]   10/13/2023  1:15 PM [facility] XR-A LIMIT 350 LBS [facility]   10/13/2023  2:30 PM Taryn Tang, DAIN [facility]   11/10/2023  9:45 AM [facility] XR-A LIMIT 350 LBS [facility]   11/10/2023 10:00 AM [facility] XR-A LIMIT 350 LBS [facility]   11/10/2023 11:00 AM Taryn Tang PA-C [facility]   12/13/2023  2:00 PM Silvia Giordano, NP [facility]   1/3/2024  7:30 AM [facility] XR-A LIMIT 350 LBS [facility]   1/3/2024  7:45 AM [facility] XR-B LIMIT 500 LBS [facility]   1/3/2024  8:30 AM Scooter Salter MD [facility] NEUROSU [facility]   1/3/2024  2:30 PM Екатерина Tamez, DNP [facility] CARDIO [facility]        10/05/23 1405   Post-Acute Status   Post-Acute Authorization Placement   Discharge Plan   Discharge Plan A Rehab

## 2023-10-05 NOTE — NURSING
2215: Pt transferred to rm [id number], no distress noted, all belongings and pt chart transported with pt.

## 2023-10-06 LAB
BASOPHILS # BLD AUTO: 0.02 K/UL (ref 0–0.2)
BASOPHILS NFR BLD: 0.2 % (ref 0–1.9)
DIFFERENTIAL METHOD: ABNORMAL
EOSINOPHIL # BLD AUTO: 0.3 K/UL (ref 0–0.5)
EOSINOPHIL NFR BLD: 2.7 % (ref 0–8)
ERYTHROCYTE [DISTWIDTH] IN BLOOD BY AUTOMATED COUNT: 14.1 % (ref 11.5–14.5)
HCT VFR BLD AUTO: 27.4 % (ref 37–48.5)
HGB BLD-MCNC: 9 G/DL (ref 12–16)
IMM GRANULOCYTES # BLD AUTO: 0.1 K/UL (ref 0–0.04)
IMM GRANULOCYTES NFR BLD AUTO: 1 % (ref 0–0.5)
LYMPHOCYTES # BLD AUTO: 1.1 K/UL (ref 1–4.8)
LYMPHOCYTES NFR BLD: 10.9 % (ref 18–48)
MCH RBC QN AUTO: 31.1 PG (ref 27–31)
MCHC RBC AUTO-ENTMCNC: 32.8 G/DL (ref 32–36)
MCV RBC AUTO: 95 FL (ref 82–98)
MONOCYTES # BLD AUTO: 1.1 K/UL (ref 0.3–1)
MONOCYTES NFR BLD: 10.9 % (ref 4–15)
NEUTROPHILS # BLD AUTO: 7.2 K/UL (ref 1.8–7.7)
NEUTROPHILS NFR BLD: 74.3 % (ref 38–73)
NRBC BLD-RTO: 0 /100 WBC
PLATELET # BLD AUTO: 260 K/UL (ref 150–450)
PMV BLD AUTO: 9.7 FL (ref 9.2–12.9)
RBC # BLD AUTO: 2.89 M/UL (ref 4–5.4)
WBC # BLD AUTO: 9.71 K/UL (ref 3.9–12.7)

## 2023-10-06 PROCEDURE — 25000003 PHARM REV CODE 250

## 2023-10-06 PROCEDURE — 85025 COMPLETE CBC W/AUTO DIFF WBC: CPT | Performed by: NURSE PRACTITIONER

## 2023-10-06 PROCEDURE — 63600175 PHARM REV CODE 636 W HCPCS

## 2023-10-06 PROCEDURE — 93005 ELECTROCARDIOGRAM TRACING: CPT

## 2023-10-06 PROCEDURE — 25000003 PHARM REV CODE 250: Performed by: NEUROLOGICAL SURGERY

## 2023-10-06 PROCEDURE — 11000001 HC ACUTE MED/SURG PRIVATE ROOM

## 2023-10-06 PROCEDURE — 25000003 PHARM REV CODE 250: Performed by: NURSE PRACTITIONER

## 2023-10-06 PROCEDURE — 63600175 PHARM REV CODE 636 W HCPCS: Performed by: NURSE PRACTITIONER

## 2023-10-06 PROCEDURE — 27000221 HC OXYGEN, UP TO 24 HOURS

## 2023-10-06 PROCEDURE — 94761 N-INVAS EAR/PLS OXIMETRY MLT: CPT

## 2023-10-06 PROCEDURE — 36415 COLL VENOUS BLD VENIPUNCTURE: CPT | Performed by: NURSE PRACTITIONER

## 2023-10-06 PROCEDURE — 63600175 PHARM REV CODE 636 W HCPCS: Performed by: NEUROLOGICAL SURGERY

## 2023-10-06 PROCEDURE — 93010 EKG 12-LEAD: ICD-10-PCS | Mod: ,,, | Performed by: INTERNAL MEDICINE

## 2023-10-06 PROCEDURE — 25000003 PHARM REV CODE 250: Performed by: INTERNAL MEDICINE

## 2023-10-06 PROCEDURE — 94799 UNLISTED PULMONARY SVC/PX: CPT

## 2023-10-06 PROCEDURE — 93010 ELECTROCARDIOGRAM REPORT: CPT | Mod: ,,, | Performed by: INTERNAL MEDICINE

## 2023-10-06 PROCEDURE — 99900035 HC TECH TIME PER 15 MIN (STAT)

## 2023-10-06 RX ORDER — OXYCODONE HYDROCHLORIDE 10 MG/1
10 TABLET ORAL EVERY 6 HOURS PRN
Refills: 0
Start: 2023-10-06 | End: 2024-01-31

## 2023-10-06 RX ORDER — AMIODARONE HYDROCHLORIDE 200 MG/1
400 TABLET ORAL 2 TIMES DAILY
Status: DISCONTINUED | OUTPATIENT
Start: 2023-10-06 | End: 2023-10-06

## 2023-10-06 RX ORDER — AMIODARONE HYDROCHLORIDE 200 MG/1
400 TABLET ORAL DAILY
Status: DISCONTINUED | OUTPATIENT
Start: 2023-10-13 | End: 2023-10-06

## 2023-10-06 RX ORDER — BISACODYL 10 MG
10 SUPPOSITORY, RECTAL RECTAL DAILY PRN
Refills: 0
Start: 2023-10-06 | End: 2024-01-31

## 2023-10-06 RX ORDER — METHOCARBAMOL 750 MG/1
750 TABLET, FILM COATED ORAL 3 TIMES DAILY
Qty: 30 TABLET | Refills: 0
Start: 2023-10-06 | End: 2023-10-07 | Stop reason: HOSPADM

## 2023-10-06 RX ORDER — ONDANSETRON 8 MG/1
8 TABLET, ORALLY DISINTEGRATING ORAL EVERY 6 HOURS PRN
Start: 2023-10-06 | End: 2024-02-07

## 2023-10-06 RX ORDER — METOPROLOL SUCCINATE 25 MG/1
25 TABLET, EXTENDED RELEASE ORAL DAILY
Qty: 30 TABLET | Refills: 11 | Status: SHIPPED | OUTPATIENT
Start: 2023-10-07 | End: 2023-10-10 | Stop reason: HOSPADM

## 2023-10-06 RX ORDER — AMIODARONE HYDROCHLORIDE 400 MG/1
400 TABLET ORAL DAILY
Qty: 30 TABLET | Refills: 11 | Status: SHIPPED | OUTPATIENT
Start: 2023-10-13 | End: 2023-10-10 | Stop reason: HOSPADM

## 2023-10-06 RX ORDER — TRAMADOL HYDROCHLORIDE 100 MG/1
100 TABLET, COATED ORAL EVERY 6 HOURS PRN
Start: 2023-10-06 | End: 2024-02-07

## 2023-10-06 RX ORDER — AMIODARONE HYDROCHLORIDE 400 MG/1
400 TABLET ORAL 2 TIMES DAILY
Qty: 60 TABLET | Refills: 11 | Status: SHIPPED | OUTPATIENT
Start: 2023-10-06 | End: 2023-10-10 | Stop reason: HOSPADM

## 2023-10-06 RX ORDER — ACETAMINOPHEN 650 MG/20.3ML
650 LIQUID ORAL EVERY 6 HOURS PRN
Start: 2023-10-06

## 2023-10-06 RX ORDER — AMOXICILLIN 250 MG
2 CAPSULE ORAL 2 TIMES DAILY
Start: 2023-10-06

## 2023-10-06 RX ORDER — MAG HYDROX/ALUMINUM HYD/SIMETH 200-200-20
30 SUSPENSION, ORAL (FINAL DOSE FORM) ORAL EVERY 4 HOURS PRN
Start: 2023-10-06 | End: 2024-10-05

## 2023-10-06 RX ADMIN — METOPROLOL SUCCINATE 25 MG: 25 TABLET, EXTENDED RELEASE ORAL at 08:10

## 2023-10-06 RX ADMIN — AMIODARONE HYDROCHLORIDE 0.5 MG/MIN: 1.8 INJECTION, SOLUTION INTRAVENOUS at 08:10

## 2023-10-06 RX ADMIN — METHOCARBAMOL TABLETS 750 MG: 750 TABLET, COATED ORAL at 02:10

## 2023-10-06 RX ADMIN — OXYCODONE HYDROCHLORIDE 10 MG: 5 TABLET ORAL at 10:10

## 2023-10-06 RX ADMIN — METOPROLOL TARTRATE 5 MG: 5 INJECTION, SOLUTION INTRAVENOUS at 01:10

## 2023-10-06 RX ADMIN — AMIODARONE HYDROCHLORIDE 150 MG: 1.5 INJECTION, SOLUTION INTRAVENOUS at 02:10

## 2023-10-06 RX ADMIN — OXYCODONE HYDROCHLORIDE 10 MG: 5 TABLET ORAL at 01:10

## 2023-10-06 RX ADMIN — TRAMADOL HYDROCHLORIDE 100 MG: 50 TABLET, COATED ORAL at 05:10

## 2023-10-06 RX ADMIN — AMIODARONE HYDROCHLORIDE 400 MG: 200 TABLET ORAL at 08:10

## 2023-10-06 RX ADMIN — AMIODARONE HYDROCHLORIDE 1 MG/MIN: 1.8 INJECTION, SOLUTION INTRAVENOUS at 02:10

## 2023-10-06 RX ADMIN — DOCUSATE SODIUM AND SENNOSIDES 2 TABLET: 8.6; 5 TABLET, FILM COATED ORAL at 08:10

## 2023-10-06 RX ADMIN — LEVOTHYROXINE SODIUM 137 MCG: 25 TABLET ORAL at 05:10

## 2023-10-06 RX ADMIN — APIXABAN 5 MG: 5 TABLET, FILM COATED ORAL at 08:10

## 2023-10-06 RX ADMIN — OXYCODONE HYDROCHLORIDE 10 MG: 5 TABLET ORAL at 08:10

## 2023-10-06 RX ADMIN — ENOXAPARIN SODIUM 80 MG: 80 INJECTION SUBCUTANEOUS at 08:10

## 2023-10-06 RX ADMIN — METHOCARBAMOL TABLETS 750 MG: 750 TABLET, COATED ORAL at 08:10

## 2023-10-06 NOTE — CARE UPDATE
Patient seen.  atrial fibrillation with SBP 140s. Patient asymptomatic. Given IV Metoprolol with no major response in HR. Updated Dr. Ortega and will load with IV Amiodarone and place on gtt. If RVR persists then will need to consider repeat cardioversion early next week. Will continue BB as well along with Eliquis

## 2023-10-06 NOTE — DISCHARGE INSTRUCTIONS
Please follow ONLY the instructions that are checked below.    Activity Restrictions:  [x]  Return to work will be determined on an individual basis.  [x]  No lifting greater than 10 pounds.  [x]  Avoid bending and twisting the area of your surgery more than 45 degrees from neutral position in any direction.  [x]  No driving or operating machinery:  [x]  until cleared by your surgeon.  [x]  while taking narcotic pain medications or muscle relaxants.  [x]  No cervical collar, soft collar, or lumbar brace required.  []  Wear your brace at all times. You may be given an extra brace or soft collar to wear when showering.  []  Wear your brace at all times except when flat in bed.  []  Wear brace for comfort only.  [x]  Increase ambulation over the next 2 weeks so that you are walking 2 miles per day at 2 weeks post-operatively.  [x]  Walk on paved surfaces only. It is okay to walk up and down stairs while holding onto a side rail.  [x]  No sexual activity for 2-3 weeks.    Discharge Medication/Follow-up:  [x]  Please refer to discharge medication reconciliation form.  [x]  Do not take ANY non-steroidal anti-inflammatory drugs (NSAIDS), including the following: ibuprofen, naprosyn, Aleve, Advil, Indocin, Mobic  []  4 weeks  []  8 weeks  [x]  6 months  []  Prescriptions for appropriate medication will be given upon discharge.   []  Pain control:             []  Muscle relaxer:            [x]  Take docusate (Colace 100 mg): take one capsule a day as needed for constipation. You can get this over the counter.  [x]  Follow-up appointment:  [x]  10-14 days post-op for wound check by physician assistant/nurse  [x]  4-6 weeks with MD:  [x]  with x-rays  []  without x-rays  []  An appointment will be mailed to you.    Wound Care:  []  Remove dressing or bandaid in    days.  [x]  No bandage required. Keep your incision open to the air.  [x]  You may shower on the 2nd day after your surgery. Have the force of water hit you opposite  from the incision. Pat the incision dry after your shower; do not scrub the incision.  [x]  You cannot take a bath until 8 weeks after surgery.  []  Apply bacitracin to incision twice a day for    more days.    Call your doctor or go to the Emergency Room for any signs of infection, including: increased redness, drainage, pain, or fever (temperature ?101.5 for 24 hours). Call your doctor or go to the Emergency Room if there are any localized neurological changes; problems with speech, vision, numbness, tingling, weakness, or severe headache; or for other concerns.    Special Instructions:  [x]  No use of tobacco products.  [x]  Diet: Please eat a cardiac diet as tolerated.  []  Other diet:              Specific physician instructions:           Physicians need 3 days' notice for pain medicine to be refilled. Pain medicine will only be refilled between 8 AM and 5 PM, Monday through Friday, due to Food and Drug Administration regulation of documentation.        Form No. 03477 (Revised 10/31/2013)

## 2023-10-06 NOTE — PLAN OF CARE
[facility] is reviewing pt's orders and will reach back out to sw. Cm will continue to follow pt through transitions of care and assist with any discharge needs.    1:46 Pt signed pt choice form and sw charted. SW had set up transportation. Pt HR was elevated d/c is being held. SW messaged [facility] with [facility] to have transport canceled.     Toy Ward, MSW  [phone]    Future Appointments   Date Time Provider Department Center   10/13/2023  1:15 PM [facility] XR-A LIMIT 350 LBS [facility]   10/13/2023  2:30 PM Taryn Tang PA-C [facility]   11/10/2023 10:00 AM [facility] XR-A LIMIT 350 LBS [facility]   11/10/2023 11:00 AM Taryn Tang PA-C [facility]   12/13/2023  2:00 PM Silvia Giordano, NP [facility]   1/3/2024  7:30 AM [facility] XR-A LIMIT 350 LBS [facility]   1/3/2024  8:30 AM Scooter Salter MD [facility]   1/3/2024  2:30 PM Екатерина Tamez, DNP [facility]        10/06/23 1010   Final Note   Assessment Type Final Discharge Note   Anticipated Discharge Disposition Rehab   What phone number can be called within the next 1-3 days to see how you are doing after discharge? [phone]   Post-Acute Status   Discharge Delays None known at this time

## 2023-10-06 NOTE — NURSING
1305-  HR noted to be sustaining 130s on monitor. Pt asymptomatic. Team notified. EKG ordered.     Notified Edie, ICU proactive nurse. At bedside. Spoke with Cardiology. Metoprolol admin. Keara Monsalve at bedside. Pt transferring to 4th floor.

## 2023-10-06 NOTE — PROGRESS NOTES
[facility] - Intensive Care  Neurosurgery  Progress Note    Subjective:     Interval History: Back pain no leg pain.  Still with leg spasms.  Got up yesterday with PT and stood up on side of bed multiple times.    History of Present Illness:   This is a very pleasant 72 y.o. female, who was active at the beginning of 2022, mowing her lawn then suddenly started to have difficulty walking, severe back pain and right leg pain.  She has developed a paralysis involving the right leg.  Her right leg appears spastic.  She is mainly complaining of right knee pain. She is being mobilized in a wheelchair.  She admits still being able to drive her car.  In June 2022 she had a thoracic and lumbar spine MRI.  She is being investigated for possible multiple sclerosis.  She reports having no loss of sensation to pain or temperature.  She has no voiding difficulty or incontinence.  She reports right more than left hand numbness and dropping things frequently.      Post-Op Info:  Procedure(s) (LRB):  Transesophageal echo (CARLYLE) intra-procedure log documentation (N/A)   2 Days Post-Op      Medications:  Continuous Infusions:        Scheduled Meds:   amiodarone  400 mg Oral BID    [START ON 10/13/2023] amiodarone  400 mg Oral Daily    apixaban  5 mg Oral BID    bisacodyL  10 mg Rectal Daily    levothyroxine  137 mcg Oral Before breakfast    methocarbamoL  750 mg Oral TID    metoprolol succinate  25 mg Oral Daily    senna-docusate 8.6-50 mg  2 tablet Oral BID     PRN Meds:acetaminophen, aluminum-magnesium hydroxide-simethicone, metoprolol, ondansetron, oxyCODONE, prochlorperazine, traMADoL     Review of Systems  Objective:     Weight: 88.3 kg (194 lb 10.7 oz)  Body mass index is 30.49 kg/m².  Vital Signs (Most Recent):  Temp: 98 °F (36.7 °C) (10/06/23 0741)  Pulse: 70 (10/06/23 0741)  Resp: 16 (10/06/23 0741)  BP: (!) 159/69 (10/06/23 0741)  SpO2: 96 % (10/06/23 0741) Vital Signs (24h Range):  Temp:  [98 °F (36.7 °C)-98.3 °F (36.8 °C)] 98  "°F (36.7 °C)  Pulse:  [6-83] 70  Resp:  [16-22] 16  SpO2:  [96 %-98 %] 96 %  BP: (140-168)/(66-85) 159/69                                Closed/Suction Drain 09/29/23 2123 Medial Back Bulb 10 Fr. (Active)   Site Description Unable to view 09/30/23 1319   Dressing Type Transparent (Tegaderm);Other (Comment) 09/30/23 1319   Dressing Status Clean;Dry;Intact 09/30/23 1319   Dressing Intervention First dressing 09/30/23 1319   Drainage Bloody 09/30/23 1319   Status To bulb suction 09/30/23 1319   Output (mL) 25 mL 09/30/23 1156            Urethral Catheter 09/29/23 Silicone (Active)   Site Assessment Clean;Intact;Dry 09/30/23 1319   Collection Container Urimeter 09/30/23 1319   Securement Method secured to top of thigh w/ adhesive device 09/30/23 1319   Catheter Care Performed yes 09/30/23 1040   Reason for Continuing Urinary Catheterization Critically ill in ICU and requiring hourly monitoring of intake/output 09/30/23 1319   CAUTI Prevention Bundle Securement Device in place with 1" slack;Intact seal between catheter & drainage tubing;Drainage bag/urimeter off the floor;Sheeting clip in use;No dependent loops or kinks;Drainage bag/urimeter not overfilled (<2/3 full);Drainage bag/urimeter below bladder 09/30/23 1319   Output (mL) 75 mL 09/30/23 1150       Neurosurgery Physical Exam    General: well developed, well nourished, no distress  Mental Status: Awake, Alert, Oriented X3.Thought content appropriate  GCS: Motor: 6/Verbal: 5/Eyes: 4 GCS Total: 15    Motor Strength:  Strength                                HF KF KE DF PF EHL   Lower: R 5/5 5/5 5/5 5/5 5/5 5/5    L 5/5 5/5 5/5 5/5 5/5 5/5       Legs very stiff  Incision- CDI        Significant Labs:  Recent Labs   Lab 10/05/23  0740   GLU 95      K 4.1      CO2 26   BUN 14   CREATININE 0.6   CALCIUM 8.2*       Recent Labs   Lab 10/05/23  0740 10/06/23  0724   WBC 10.65 9.71   HGB 9.3* 9.0*   HCT 28.0* 27.4*    260       No results for input(s): " ""LABPT", "INR", "APTT" in the last 48 hours.  Microbiology Results (last 7 days)       ** No results found for the last 168 hours. **              Assessment/Plan:     Active Diagnoses:    Diagnosis Date Noted POA    PRINCIPAL PROBLEM:  Lumbar stenosis with neurogenic claudication [M48.062] 09/29/2023 Yes    Acute blood loss anemia [D62] 10/01/2023 Yes    Foraminal stenosis of lumbar region [M48.061] 09/29/2023 Yes    Chronic myelopathy [G95.9] 09/29/2023 Yes    Decreased range of motion of right knee [M25.661] 09/29/2023 Yes    Assistance needed for mobility [Z74.1] 09/29/2023 Not Applicable    Atrial flutter [I48.92] 09/15/2023 Yes    Anticoagulant long-term use [Z79.01] 02/23/2023 Not Applicable    PAF (paroxysmal atrial fibrillation) [I48.0] 08/10/2022 Yes    DDD (degenerative disc disease), lumbar [M51.36] 08/09/2022 Yes    Postoperative hypothyroidism [E89.0] 02/19/2018 Yes    History of thyroid cancer [Z85.850] 02/19/2018 Not Applicable    Essential hypertension, benign [I10] 02/19/2018 Yes    Mixed hyperlipidemia [E78.2] 02/19/2018 Yes      Problems Resolved During this Admission:       A/P:  POD #7 Left L3-4, L4-5 OLIF, L5-S1 ALIF and L3-S1 posterior instrumented fusion     --Neurologically stable          -q4h neuro checks  --Imaging: Post op xrays pending.    --Drains: Dced  --Pain control: Tylenol 650mg Q 6 hours, Tramadol 100mg Q 6 hours PRN, Oxycodone IR 10mg Q 6 hours PRN, Robaxin 750mg TID,  --DVT ppx: TEDs/SCDs/SQH  --Activity: PT/OT, OOB. TLSO brace.  Needs to be turned every 2 hours so back incision is not pressed on.  --Diet: Cardiac  --Bowel regimen: PRN suppository, senna PRN  --Urinary: Carver  --Atelectasis ppx: Encourage IS hourly  --Dc to rehab today          All of the above discussed and reviewed with Dr. Salter.      Taryn Tang PA-C  Neurosurgery  [facility] - Intensive Care    "

## 2023-10-06 NOTE — CARE UPDATE
Patient remains in NSR. Will continue Amiodarone 400mg po BID for another 7 days and then will decrease to 400mg po daily along Toprol XL 25mg. Will transition to Eliquis 5mg po BID this AM. Suitable for discharge from cardiac standpoint and recommend follow up with Cardiology as an outpatient

## 2023-10-06 NOTE — PROGRESS NOTES
[facility] Medicine  Progress Note    Patient Name: Graciela Barton  MRN: [id number]  Patient Class: IP- Inpatient   Admission Date: 9/29/2023  Length of Stay: 7 days  Attending Physician: Scooter Salter MD  Primary Care Provider: Rocco Miller MD        Subjective:     Principal Problem:Lumbar stenosis with neurogenic claudication        HPI:  Ms. Barton is a 73 YOF with PMHx of HTN, HLD, BPPV, PAfib/flutter on OAC, history of thyroid cancer s/p thyroidectomy in 2018 with hypothyroidism, thoracic myelopathy with neurogenic claudication s/p s/p T6-7 T7-8 anterior diskectomy (05/12/2023) and now s/p left L3-4, L4-5 OLIF, L5-S1 ALIF and L3-S1 posterior instrumented fusion (09/29/2023).     Admission testing reviewed noting WBC 12 >>13, hemoglobin 9.7 >> 8.5 >> 7.4, hematocrit 30 >> 25.9 >> 22.8; chemistry stable; lactic 1.0; TSH 7.448 with FT4 0.97; CXR with clearing of pleural fluid and atelectasis in the right lung base; BLE US without evidence of DVT; and TTE with LVEF >55%, LA mild dilation, RA mild dilation, normal RV size and function, mild MR, mild TR, RVSP 44mmHg. From neurosurgery perspective she is progressing well in post operative course however the course has been complicated by AFib/flutter with RVR refractory to home medications, propafenone and metoprolol, and amiodarone gtt.     She is seen today in consult for medical management. She notes that overall pain is well controlled however she is developing cramping sensation in BLEs. She denies palpitations, SOB, ELLIS, CP, abdominal pain, N/V/D, constipation, fever, chills, light headiness, dizziness, seizures, or syncope.     Hospital Medicine Service was consulted for assistance with medical management.         Overview/Hospital Course:  Ms. Barton was admitted 09/29 by Neurosurgery and is now s/p left L3-4, L4-5 OLIF, L5-S1 ALIF and L3-S1 posterior instrumented fusion. From neurosurgery perspective she is progressing well in post  operative course. However the course has been complicated by AFib/flutter with RVR that was refractory to home medications, propafenone and metoprolol; and amiodarone gtt. Cardiology following with recommendations to transfuse due to anemia (two units PRBCs transfused 10/01), stop home propafenone (failed drug in setting of several months history of AFlutter), continue home metoprolol (titrations limited due to BP), and was give 3 doses of IV digoxin (completed). However without improvement and HR elevating to 140's she was initiated on esmolol gtt evening of 10/01.      Other concerns currently to monitor include monitoring for s/s of urinary retention and bowel regimen titrated 10/2 as last BM was day of admission per patient.   10/4:  Cardiology planning cardioversion on today.  Cardizem drip.  She was started on full-dose on 10/03/2023.  Disposition plans: to rehab when medically ready for intensive therapy.   10/5: Cardioversion yesterday now noted to be in NSR.  Still receiving full-dose Lovenox with plans to transition to oral on tomorrow per Cardiology.  Doing well on metoprolol.  Carver catheter still in place secondary to urinary retention.  10/6: DC held on today, converted back to Afib with RVR. She was transferred to [facility] and started on Amino gtt.       Interval History:  Noted to be in Afib with RVR---started on Amino gtt per cardiology. DC held.     Review of Systems   Constitutional:  Positive for activity change. Negative for appetite change, chills, diaphoresis, fatigue and fever.   HENT:  Negative for congestion and sore throat.    Respiratory:  Negative for cough, chest tightness, shortness of breath and wheezing.    Cardiovascular:  Positive for leg swelling. Negative for chest pain and palpitations.   Gastrointestinal:  Negative for abdominal distention, abdominal pain, constipation, diarrhea, nausea and vomiting.   Musculoskeletal:  Positive for arthralgias, back pain and gait problem.   Skin:   Positive for wound.   Neurological:  Negative for dizziness, syncope, weakness, light-headedness and headaches.     Objective:     Vital Signs (Most Recent):  Temp: 97.9 °F (36.6 °C) (10/06/23 1221)  Pulse: (!) 117 (10/06/23 1712)  Resp: 14 (10/06/23 1430)  BP: (!) 158/87 (10/06/23 1712)  SpO2: 98 % (10/06/23 1528) Vital Signs (24h Range):  Temp:  [97.9 °F (36.6 °C)-98.3 °F (36.8 °C)] 97.9 °F (36.6 °C)  Pulse:  [6-153] 117  Resp:  [12-18] 14  SpO2:  [96 %-98 %] 98 %  BP: (129-179)/(62-89) 158/87     Weight: 88.3 kg (194 lb 10.7 oz)  Body mass index is 30.49 kg/m².    Intake/Output Summary (Last 24 hours) at 10/6/2023 1743  Last data filed at 10/6/2023 1230  Gross per 24 hour   Intake 420 ml   Output 1300 ml   Net -880 ml           Physical Exam  Vitals and nursing note reviewed.   Constitutional:       General: She is not in acute distress.     Appearance: Normal appearance. She is well-developed. She is not toxic-appearing.   HENT:      Head: Normocephalic and atraumatic.      Mouth/Throat:      Dentition: Normal dentition.   Eyes:      General: Lids are normal.      Extraocular Movements: Extraocular movements intact.      Conjunctiva/sclera: Conjunctivae normal.   Cardiovascular:      Rate and Rhythm: Tachycardia present. Rhythm irregular.   Pulmonary:      Effort: Pulmonary effort is normal.      Breath sounds: Normal breath sounds.   Abdominal:      General: Bowel sounds are normal.      Palpations: Abdomen is soft.   Musculoskeletal:         General: Swelling present.      Cervical back: Neck supple.      Right lower leg: Edema present.      Left lower leg: Edema present.   Skin:     General: Skin is warm and dry.      Findings: No erythema or rash.   Neurological:      Mental Status: She is alert and oriented to person, place, and time.         Significant Labs: All pertinent labs within the past 24 hours have been reviewed.  CBC:   Recent Labs   Lab 10/05/23  0740 10/06/23  0724   WBC 10.65 9.71   HGB 9.3*  9.0*   HCT 28.0* 27.4*    260       CMP:   Recent Labs   Lab 10/05/23  0740      K 4.1      CO2 26   GLU 95   BUN 14   CREATININE 0.6   CALCIUM 8.2*   ANIONGAP 9         Significant Imaging: I have reviewed all pertinent imaging results/findings within the past 24 hours.      Assessment/Plan:      * Lumbar stenosis with neurogenic claudication  Chronic myelopathy  Foraminal stenosis of lumbar region  Neurogenic claudication   DDD, lumbar  Decreased ROM of R knee  Assistance needed for mobility     -thoracic myelopathy with neurogenic claudication s/p s/p T6-7 T7-8 anterior diskectomy (05/12/2023) and now s/p left L3-4, L4-5 OLIF, L5-S1 ALIF and L3-S1 posterior instrumented fusion (09/29/2023)  -management per primary team   -remains with surgical drain  -PT/OT following   -rehab placement    Acute blood loss anemia  -s/p 2 units PRBC transfusion 10/2 with appropriate response  -monitor for s/s of bleeding   -trend labs and monitor  -further transfusions as indicated     PAF (paroxysmal atrial fibrillation)  Atrial flutter  Anticoagulant long-term use    -chronic, has been in Aflutter for quite some time  -RVR rate worsened after admission in setting of neurosurgical procedure, anemia, and chronic hypothyroidism  -Cardiology following   -home propafenone discontinued per recs (failed drug in setting of several months history of AFlutter)  -continue home metoprolol (titrations limited due to BP)  -s/p IV dig x 3 doses   -initiated on esmolol gtt 10/01 due to no improvement/elevation in HR overnight   -will need outpt follow up with EP for consideration of DCCV +/- ablation  -continue tele monitoring  -EKG PRN concerns  -trend labs, address/replete electrolytes as indicated  -monitor    -started on diltiazem drip which is on hold at this time.  Will continue metoprolol 25 mg po q.6  -status post DCCV +/- ablation now in sinus rhythm  -continuing full-dose Lovenox and will transition per Cardiology to  oral tomorrow  -10/6 converted back into Afib with RVR today. Amino gtt initiated per cardiology.     Mixed hyperlipidemia  -chronic  -resume home rosuvastatin at discharge     Essential hypertension, benign  -chronic  -low/normotensive pressures at current  -hold home HCTZ  -continue metoprolol as noted above   -dose/medication adjustment as appropriate   -monitor     Postoperative hypothyroidism  History of thyroid cancer  History of thyroidectomy   -chronic  -improvement in TSH   -continue home levothyroxine      VTE Risk Mitigation (From admission, onward)         Ordered     apixaban tablet 5 mg  2 times daily         10/06/23 0846     IP VTE HIGH RISK PATIENT  Once         09/29/23 2314     Place sequential compression device  Until discontinued         09/29/23 2314                Discharge Planning   ASYA: 10/6/2023     Code Status: Full Code   Is the patient medically ready for discharge?:     Reason for patient still in hospital (select all that apply): Patient trending condition, Laboratory test, Treatment, Imaging and Consult recommendations  Discharge Plan A: Rehab   Discharge Delays: None known at this time              Yenni Swift NP  Department of Hospital Medicine   [place] - [facility]

## 2023-10-06 NOTE — ASSESSMENT & PLAN NOTE
Atrial flutter  Anticoagulant long-term use    -chronic, has been in Aflutter for quite some time  -RVR rate worsened after admission in setting of neurosurgical procedure, anemia, and chronic hypothyroidism  -Cardiology following   -home propafenone discontinued per recs (failed drug in setting of several months history of AFlutter)  -continue home metoprolol (titrations limited due to BP)  -s/p IV dig x 3 doses   -initiated on esmolol gtt 10/01 due to no improvement/elevation in HR overnight   -will need outpt follow up with EP for consideration of DCCV +/- ablation  -continue tele monitoring  -EKG PRN concerns  -trend labs, address/replete electrolytes as indicated  -monitor    -started on diltiazem drip which is on hold at this time.  Will continue metoprolol 25 mg po q.6  -status post DCCV +/- ablation now in sinus rhythm  -continuing full-dose Lovenox and will transition per Cardiology to oral tomorrow  -10/6 converted back into Afib with RVR today. Amino gtt initiated per cardiology.

## 2023-10-06 NOTE — PT/OT/SLP PROGRESS
Physical Therapy      Patient Name:  Graciela Barton   MRN:  [id number]    Patient not seen today secondary to Other (Comment) (pt currently with sustained HR in 140's for >15min). Will follow-up as appropriate.  10/6/2023

## 2023-10-06 NOTE — SUBJECTIVE & OBJECTIVE
Interval History:  Noted to be in Afib with RVR---started on Amino gtt per cardiology. DC held.     Review of Systems   Constitutional:  Positive for activity change. Negative for appetite change, chills, diaphoresis, fatigue and fever.   HENT:  Negative for congestion and sore throat.    Respiratory:  Negative for cough, chest tightness, shortness of breath and wheezing.    Cardiovascular:  Positive for leg swelling. Negative for chest pain and palpitations.   Gastrointestinal:  Negative for abdominal distention, abdominal pain, constipation, diarrhea, nausea and vomiting.   Musculoskeletal:  Positive for arthralgias, back pain and gait problem.   Skin:  Positive for wound.   Neurological:  Negative for dizziness, syncope, weakness, light-headedness and headaches.     Objective:     Vital Signs (Most Recent):  Temp: 97.9 °F (36.6 °C) (10/06/23 1221)  Pulse: (!) 117 (10/06/23 1712)  Resp: 14 (10/06/23 1430)  BP: (!) 158/87 (10/06/23 1712)  SpO2: 98 % (10/06/23 1528) Vital Signs (24h Range):  Temp:  [97.9 °F (36.6 °C)-98.3 °F (36.8 °C)] 97.9 °F (36.6 °C)  Pulse:  [6-153] 117  Resp:  [12-18] 14  SpO2:  [96 %-98 %] 98 %  BP: (129-179)/(62-89) 158/87     Weight: 88.3 kg (194 lb 10.7 oz)  Body mass index is 30.49 kg/m².    Intake/Output Summary (Last 24 hours) at 10/6/2023 1743  Last data filed at 10/6/2023 1230  Gross per 24 hour   Intake 420 ml   Output 1300 ml   Net -880 ml           Physical Exam  Vitals and nursing note reviewed.   Constitutional:       General: She is not in acute distress.     Appearance: Normal appearance. She is well-developed. She is not toxic-appearing.   HENT:      Head: Normocephalic and atraumatic.      Mouth/Throat:      Dentition: Normal dentition.   Eyes:      General: Lids are normal.      Extraocular Movements: Extraocular movements intact.      Conjunctiva/sclera: Conjunctivae normal.   Cardiovascular:      Rate and Rhythm: Tachycardia present. Rhythm irregular.   Pulmonary:       Effort: Pulmonary effort is normal.      Breath sounds: Normal breath sounds.   Abdominal:      General: Bowel sounds are normal.      Palpations: Abdomen is soft.   Musculoskeletal:         General: Swelling present.      Cervical back: Neck supple.      Right lower leg: Edema present.      Left lower leg: Edema present.   Skin:     General: Skin is warm and dry.      Findings: No erythema or rash.   Neurological:      Mental Status: She is alert and oriented to person, place, and time.         Significant Labs: All pertinent labs within the past 24 hours have been reviewed.  CBC:   Recent Labs   Lab 10/05/23  0740 10/06/23  0724   WBC 10.65 9.71   HGB 9.3* 9.0*   HCT 28.0* 27.4*    260       CMP:   Recent Labs   Lab 10/05/23  0740      K 4.1      CO2 26   GLU 95   BUN 14   CREATININE 0.6   CALCIUM 8.2*   ANIONGAP 9         Significant Imaging: I have reviewed all pertinent imaging results/findings within the past 24 hours.

## 2023-10-06 NOTE — PLAN OF CARE
[facility] Surg  Facility Transfer Orders        Admit to: [facility]    Diagnoses:   Active Hospital Problems    Diagnosis  POA    *Lumbar stenosis with neurogenic claudication [M48.062]  Yes    Acute blood loss anemia [D62]  Yes    Foraminal stenosis of lumbar region [M48.061]  Yes    Chronic myelopathy [G95.9]  Yes    Decreased range of motion of right knee [M25.661]  Yes    Assistance needed for mobility [Z74.1]  Not Applicable    Atrial flutter [I48.92]  Yes     With 2:1 conduction, rate in 90s  EKG reviewed with Dr. Weller and cardioversion discussed.  This may be scheduled after surgery when she has been back on anticoagulation 6-8 weeks.    Metoprolol dosing increased by PCP which has modestly improved her HR.  She has been seen by EP but has been unable to follow up with their service due to her impaired function. Continue Rythmol (prescribed TID but taking 1.5 tabs BID), continue metoprolol 50 BID, continue DOAC.  Likely needs a higher dose of BB but BP is low end normal in clinic today.  Had been bradycardic at last visit with EP and hope to be able to get her for follow up soon      Anticoagulant long-term use [Z79.01]  Not Applicable    PAF (paroxysmal atrial fibrillation) [I48.0]  Yes       Continue DOAC  Continue metoprolol  Rate controlled today        DDD (degenerative disc disease), lumbar [M51.36]  Yes    Postoperative hypothyroidism [E89.0]  Yes    History of thyroid cancer [Z85.850]  Not Applicable    Essential hypertension, benign [I10]  Yes     Well controlled- slightly high but was waiting for a while as she did not have a way to get from lobby up to clinic  Have some SHANE, agree with resuming HCTZ 12.5 mg   Continue as now      Mixed hyperlipidemia [E78.2]  Yes      Resolved Hospital Problems   No resolved problems to display.     Allergies:   Review of patient's allergies indicates:   Allergen Reactions    Vancomycin Tinitus    Vancomycin analogues Tinitus       Code Status:  Full    Vitals: Routine       Diet: cardiac diet    Activity: Activity as tolerated    Nursing Precautions:   Pressure ulcer prevention  Turn patient every 2 hours and keep pressure off her back incision  Up in chair with meals  Aden to gravity  Aden catheter care every 12 hours  Needs aden removed on 10/11/23 for voiding trial  Oral care  Teds/Scds  Foot boots  TLSO brace when OOB  Remove staples on 10/13/23 and monitor wound  Ok to get wound wet in shower with soap and water.  Leave open to air.      Bed/Surface: Pressure Redistribution    Consults:   PT/OT evaluate and treat    Oxygen: 1 liters/min via nasal cannula with humidification      Pending Diagnostic Studies:       Procedure Component Value Units Date/Time    EKG 12-lead [[id number]] Collected: 10/02/23 0152    Order Status: Sent Lab Status: In process Updated: 10/05/23 1618    Narrative:      Test Reason : I48.0,    Vent. Rate : 145 BPM     Atrial Rate : 290 BPM     P-R Int : 000 ms          QRS Dur : 064 ms      QT Int : 264 ms       P-R-T Axes : -61 079 245 degrees     QTc Int : 410 ms    Atrial flutter with 2:1 A-V conduction  ST and T wave abnormality, consider inferior ischemia  Abnormal ECG  When compared with ECG of 30-SEP-2023 02:39,  ST no longer elevated in Inferior leads  ST now depressed in Anterior leads    Referred By: CHRISTINA JOHNSTON           Confirmed By:     X-Ray Lumbar Spine Ap And Lateral [[id number]]     Order Status: Sent Lab Status: No result                 IV Access: Peripheral     Medications: Discontinue all previous medication orders, if any. See new list below.  Current Discharge Medication List        START taking these medications    Details   acetaminophen (TYLENOL) 650 mg/20.3 mL Soln Take 20.3 mLs (650 mg total) by mouth every 6 (six) hours as needed for Pain.      aluminum-magnesium hydroxide-simethicone (MAALOX) 200-200-20 mg/5 mL Susp Take 30 mLs by mouth every 4 (four) hours as needed (indigestion).      !!  amiodarone (PACERONE) 400 MG tablet Take 1 tablet (400 mg total) by mouth 2 (two) times daily for 7 days starting 10/6/23 at 2100  Qty: 60 tablet, Refills: 11      !! amiodarone (PACERONE) 400 MG tablet Take 1 tablet (400 mg total) by mouth once daily starting 10/13/23 at 0900  Qty: 30 tablet, Refills: 11      bisacodyL (DULCOLAX) 10 mg Supp Place 1 suppository (10 mg total) rectally daily as needed (constipation).  Refills: 0      metoprolol succinate (TOPROL-XL) 25 MG 24 hr tablet Take 1 tablet (25 mg total) by mouth once daily.  Qty: 30 tablet, Refills: 11    Comments: .      ondansetron (ZOFRAN-ODT) 8 MG TbDL Take 1 tablet (8 mg total) by mouth every 6 (six) hours as needed (nausea/vomiting).      oxyCODONE (ROXICODONE) 10 mg Tab immediate release tablet Take 1 tablet (10 mg total) by mouth every 6 (six) hours as needed for Pain (severe pain).  Refills: 0    Comments: Quantity prescribed more than 7 day supply? Yes, quantity medically necessary      traMADoL 100 mg Tab Take 100 mg by mouth every 6 (six) hours as needed for Pain (moderate pain).    Comments: Quantity prescribed more than 7 day supply? Yes, quantity medically necessary       !! - Potential duplicate medications found. Please discuss with provider.        CONTINUE these medications which have CHANGED    Details   methocarbamoL (ROBAXIN) 750 MG Tab Take 1 tablet (750 mg total) by mouth 3 (three) times daily. for 10 days  Qty: 30 tablet, Refills: 0      senna-docusate 8.6-50 mg (PERICOLACE) 8.6-50 mg per tablet Take 2 tablets by mouth 2 (two) times daily.           CONTINUE these medications which have NOT CHANGED    Details   hydroCHLOROthiazide (HYDRODIURIL) 25 MG tablet Take 1 tablet (25 mg total) by mouth once daily.  Qty: 30 tablet, Refills: 11    Comments: .  Associated Diagnoses: Fluid retention in legs      levothyroxine (SYNTHROID) 137 MCG Tab tablet Take 1 tablet (137 mcg total) by mouth before breakfast.  Qty: 90 tablet, Refills: 0     Associated Diagnoses: Postoperative hypothyroidism      multivitamin (THERAGRAN) per tablet Take 1 tablet by mouth once daily.      rosuvastatin (CRESTOR) 10 MG tablet Take 1 tablet (10 mg total) by mouth once daily.  Qty: 90 tablet, Refills: 0    Associated Diagnoses: Mixed hyperlipidemia      ELIQUIS 5 mg Tab Take 1 tablet (5 mg total) by mouth 2 (two) times daily.  Qty: 60 tablet, Refills: 11           STOP taking these medications       Ca comb no.1/vit D3/B6/FA/B12 (VITAMIN D3, CALCIUM CIT-PHOS, ORAL) Comments:   Reason for Stopping:         collagen/biotin/ascorbic acid (COLLAGEN 1500 PLUS C ORAL) Comments:   Reason for Stopping:         metoprolol tartrate (LOPRESSOR) 50 MG tablet Comments:   Reason for Stopping:         propafenone (RHTHYMOL) 150 MG Tab Comments:   Reason for Stopping:         vit A/vit C/vit E/zinc/copper (ICAPS AREDS ORAL) Comments:   Reason for Stopping:         meclizine (ANTIVERT) 25 mg tablet Comments:   Reason for Stopping:             Follow up:       Immunizations Administered as of 10/6/2023       Name Date Dose VIS Date Route Exp Date    COVID-19, MRNA, LN-S, PF (Pfizer) (Purple Cap) 3/16/2021 11:09 AM 0.3 mL 12/12/2020 Intramuscular 6/30/2021    Site: Left deltoid     Given By: Jennifer Finn     : [employer]     Lot: [id number]     COVID-19, MRNA, LN-S, PF (Pfizer) (Purple Cap) 2/23/2021  1:43 PM 0.3 mL 12/12/2020 Intramuscular 6/30/2021    Site: Left deltoid     Given By: Jennifer Finn     : [employer]     Lot: [id number]             This patient has had both covid vaccinations    Some patients may experience side effects after vaccination.  These may include fever, headache, muscle or joint aches.  Most symptoms resolve with 24-48 hours and do not require urgent medical evaluation unless they persist for more than 72 hours or symptoms are concerning for an unrelated medical condition.          Taryn Tang PA-C

## 2023-10-06 NOTE — CARE UPDATE
Called by RN that back in afib with RVR with HR up to 140s with stable BP and asymptomatic. Currently on Oral Metoprolol along with oral Amidoarone. EKG done and reviewed. Will give IV Metoprolol 5mg now and attempt to rate control

## 2023-10-06 NOTE — NURSING
Pt still sustainng 135-140s on monitor. SBP 140s. Talked to Cardiology. Ok to give 5 mg metoprolol IVP.

## 2023-10-06 NOTE — PLAN OF CARE
Pt AAOx4. PRN oxycodone and PRN tramadol given for pain. No c/o N/V. Medications administered per MAR. On 1L NC. Cardiac monitoring maintained. Carver to gravity, draining concentrated yellow urine. L abd and back incisions LUPIS with staples intact. Bed alarm set, side rails raised, and call light in reach.

## 2023-10-06 NOTE — NURSING
RAPID RESPONSE NURSE PROACTIVE ROUNDING NOTE       Time of Visit: 1310    SITUATION    Notified by bedside RN via phone call  Reason for alert: Afib RVR on EKG    Diagnosis: Lumbar stenosis with neurogenic claudication   has a past medical history of Arthritis, Cancer, Edema, Hyperlipidemia, Hypertension, and Thyroid disease.    Last Vitals:  Temp: 97.9 °F (36.6 °C) (10/06 1221)  Pulse: 153 (10/06 1300)  Resp: 18 (10/06 1300)  BP: 143/65 (10/06 1300)  SpO2: 98 % (10/06 1300)    24 Hour Vitals Range:  Temp:  [97.9 °F (36.6 °C)-98.3 °F (36.8 °C)]   Pulse:  [6-153]   Resp:  [16-22]   BP: (140-179)/(65-89)   SpO2:  [96 %-98 %]     ASSESSMENT/INTERVENTIONS    AAOx4, denies any chest pain/SOB/dizziness. BP stable, on 2L NC SpO2>92%.  Labs stable. -140's on telemetry, afib rvr confirmed with EKG. Currently on PO Lopressor and Amio, cardioverted 10/5 with successful conversion. Was about to d/c to rehab.    RECOMMENDATIONS  PRN Lopressor IVP -- monitoring telemetry, VS  's afib after Lopressor, orders placed for amio bolus + gtt and transfer to telemetry    Will continue to monitor patient via chart checks    Discussed plan of care with bedside RNSilvia    PROVIDER ESCALATION    Physician escalation: Yes    Orders received and case discussed with  JONELLE Monsalve.    Disposition:Remain in room 503    FOLLOW UP    Call back the Rapid Response NurseEdie at [phone] for additional questions or concerns.

## 2023-10-06 NOTE — PT/OT/SLP PROGRESS
Occupational Therapy  Visit Attempt     Patient Name:  Graciela Barton   MRN:  [id number]    Patient not seen today secondary to Other (Comment) (pt currently with sustained HR in 140's for >15min). Will follow-up as available.    10/6/2023

## 2023-10-07 PROBLEM — I48.4 ATYPICAL ATRIAL FLUTTER: Status: ACTIVE | Noted: 2023-09-15

## 2023-10-07 PROBLEM — I48.3 TYPICAL ATRIAL FLUTTER: Status: ACTIVE | Noted: 2023-09-15

## 2023-10-07 LAB
ANION GAP SERPL CALC-SCNC: 8 MMOL/L (ref 8–16)
BASOPHILS # BLD AUTO: 0.03 K/UL (ref 0–0.2)
BASOPHILS NFR BLD: 0.3 % (ref 0–1.9)
BNP SERPL-MCNC: 512 PG/ML (ref 0–99)
BUN SERPL-MCNC: 7 MG/DL (ref 8–23)
CALCIUM SERPL-MCNC: 8.3 MG/DL (ref 8.7–10.5)
CHLORIDE SERPL-SCNC: 102 MMOL/L (ref 95–110)
CO2 SERPL-SCNC: 31 MMOL/L (ref 23–29)
CREAT SERPL-MCNC: 0.6 MG/DL (ref 0.5–1.4)
DIFFERENTIAL METHOD: ABNORMAL
EOSINOPHIL # BLD AUTO: 0.1 K/UL (ref 0–0.5)
EOSINOPHIL NFR BLD: 1 % (ref 0–8)
ERYTHROCYTE [DISTWIDTH] IN BLOOD BY AUTOMATED COUNT: 13.9 % (ref 11.5–14.5)
EST. GFR  (NO RACE VARIABLE): >60 ML/MIN/1.73 M^2
GLUCOSE SERPL-MCNC: 124 MG/DL (ref 70–110)
HCT VFR BLD AUTO: 31 % (ref 37–48.5)
HGB BLD-MCNC: 10.4 G/DL (ref 12–16)
IMM GRANULOCYTES # BLD AUTO: 0.14 K/UL (ref 0–0.04)
IMM GRANULOCYTES NFR BLD AUTO: 1.3 % (ref 0–0.5)
LYMPHOCYTES # BLD AUTO: 0.8 K/UL (ref 1–4.8)
LYMPHOCYTES NFR BLD: 7 % (ref 18–48)
MAGNESIUM SERPL-MCNC: 1.8 MG/DL (ref 1.6–2.6)
MCH RBC QN AUTO: 31.1 PG (ref 27–31)
MCHC RBC AUTO-ENTMCNC: 33.5 G/DL (ref 32–36)
MCV RBC AUTO: 93 FL (ref 82–98)
MONOCYTES # BLD AUTO: 1.1 K/UL (ref 0.3–1)
MONOCYTES NFR BLD: 10.2 % (ref 4–15)
NEUTROPHILS # BLD AUTO: 8.5 K/UL (ref 1.8–7.7)
NEUTROPHILS NFR BLD: 80.2 % (ref 38–73)
NRBC BLD-RTO: 0 /100 WBC
PHOSPHATE SERPL-MCNC: 3.1 MG/DL (ref 2.7–4.5)
PLATELET # BLD AUTO: 332 K/UL (ref 150–450)
PMV BLD AUTO: 9.2 FL (ref 9.2–12.9)
POTASSIUM SERPL-SCNC: 3.7 MMOL/L (ref 3.5–5.1)
RBC # BLD AUTO: 3.34 M/UL (ref 4–5.4)
SODIUM SERPL-SCNC: 141 MMOL/L (ref 136–145)
TROPONIN I SERPL DL<=0.01 NG/ML-MCNC: 0.01 NG/ML (ref 0–0.03)
WBC # BLD AUTO: 10.66 K/UL (ref 3.9–12.7)

## 2023-10-07 PROCEDURE — 97535 SELF CARE MNGMENT TRAINING: CPT

## 2023-10-07 PROCEDURE — 80048 BASIC METABOLIC PNL TOTAL CA: CPT | Performed by: NURSE PRACTITIONER

## 2023-10-07 PROCEDURE — 84100 ASSAY OF PHOSPHORUS: CPT | Performed by: NURSE PRACTITIONER

## 2023-10-07 PROCEDURE — 36415 COLL VENOUS BLD VENIPUNCTURE: CPT | Performed by: NURSE PRACTITIONER

## 2023-10-07 PROCEDURE — 84484 ASSAY OF TROPONIN QUANT: CPT | Performed by: NURSE PRACTITIONER

## 2023-10-07 PROCEDURE — 25000003 PHARM REV CODE 250: Performed by: NEUROLOGICAL SURGERY

## 2023-10-07 PROCEDURE — 97530 THERAPEUTIC ACTIVITIES: CPT | Mod: CQ

## 2023-10-07 PROCEDURE — 99233 PR SUBSEQUENT HOSPITAL CARE,LEVL III: ICD-10-PCS | Mod: ,,, | Performed by: INTERNAL MEDICINE

## 2023-10-07 PROCEDURE — 93005 ELECTROCARDIOGRAM TRACING: CPT

## 2023-10-07 PROCEDURE — 93010 EKG 12-LEAD: ICD-10-PCS | Mod: 76,,, | Performed by: INTERNAL MEDICINE

## 2023-10-07 PROCEDURE — 94761 N-INVAS EAR/PLS OXIMETRY MLT: CPT

## 2023-10-07 PROCEDURE — 83880 ASSAY OF NATRIURETIC PEPTIDE: CPT | Performed by: NURSE PRACTITIONER

## 2023-10-07 PROCEDURE — 63600175 PHARM REV CODE 636 W HCPCS: Performed by: NEUROLOGICAL SURGERY

## 2023-10-07 PROCEDURE — 83735 ASSAY OF MAGNESIUM: CPT | Performed by: NURSE PRACTITIONER

## 2023-10-07 PROCEDURE — 25000003 PHARM REV CODE 250: Performed by: NURSE PRACTITIONER

## 2023-10-07 PROCEDURE — 99900035 HC TECH TIME PER 15 MIN (STAT)

## 2023-10-07 PROCEDURE — 93010 ELECTROCARDIOGRAM REPORT: CPT | Mod: 76,,, | Performed by: INTERNAL MEDICINE

## 2023-10-07 PROCEDURE — 25000003 PHARM REV CODE 250: Performed by: PHYSICIAN ASSISTANT

## 2023-10-07 PROCEDURE — 27000221 HC OXYGEN, UP TO 24 HOURS

## 2023-10-07 PROCEDURE — 97530 THERAPEUTIC ACTIVITIES: CPT

## 2023-10-07 PROCEDURE — 25000003 PHARM REV CODE 250

## 2023-10-07 PROCEDURE — 20000000 HC ICU ROOM

## 2023-10-07 PROCEDURE — 25000003 PHARM REV CODE 250: Performed by: STUDENT IN AN ORGANIZED HEALTH CARE EDUCATION/TRAINING PROGRAM

## 2023-10-07 PROCEDURE — 85025 COMPLETE CBC W/AUTO DIFF WBC: CPT | Performed by: NURSE PRACTITIONER

## 2023-10-07 PROCEDURE — 25000003 PHARM REV CODE 250: Performed by: FAMILY MEDICINE

## 2023-10-07 PROCEDURE — 94799 UNLISTED PULMONARY SVC/PX: CPT

## 2023-10-07 PROCEDURE — 93010 ELECTROCARDIOGRAM REPORT: CPT | Mod: ,,, | Performed by: INTERNAL MEDICINE

## 2023-10-07 PROCEDURE — 63600175 PHARM REV CODE 636 W HCPCS: Performed by: NURSE PRACTITIONER

## 2023-10-07 PROCEDURE — 99233 SBSQ HOSP IP/OBS HIGH 50: CPT | Mod: ,,, | Performed by: INTERNAL MEDICINE

## 2023-10-07 RX ORDER — METOPROLOL TARTRATE 1 MG/ML
5 INJECTION, SOLUTION INTRAVENOUS ONCE
Status: DISCONTINUED | OUTPATIENT
Start: 2023-10-07 | End: 2023-10-07

## 2023-10-07 RX ORDER — METOPROLOL TARTRATE 50 MG/1
50 TABLET ORAL DAILY
Status: DISCONTINUED | OUTPATIENT
Start: 2023-10-08 | End: 2023-10-07

## 2023-10-07 RX ORDER — BACLOFEN 5 MG/1
5 TABLET ORAL 3 TIMES DAILY
Qty: 1 TABLET | Refills: 0 | Status: SHIPPED | OUTPATIENT
Start: 2023-10-07 | End: 2024-02-26

## 2023-10-07 RX ORDER — METOPROLOL SUCCINATE 50 MG/1
50 TABLET, EXTENDED RELEASE ORAL DAILY
Status: DISCONTINUED | OUTPATIENT
Start: 2023-10-08 | End: 2023-10-07

## 2023-10-07 RX ORDER — BACLOFEN 5 MG/1
5 TABLET ORAL 3 TIMES DAILY
Status: DISCONTINUED | OUTPATIENT
Start: 2023-10-07 | End: 2023-10-10 | Stop reason: HOSPADM

## 2023-10-07 RX ORDER — METOPROLOL SUCCINATE 50 MG/1
50 TABLET, EXTENDED RELEASE ORAL DAILY
Status: DISCONTINUED | OUTPATIENT
Start: 2023-10-07 | End: 2023-10-07

## 2023-10-07 RX ORDER — METOPROLOL TARTRATE 50 MG/1
50 TABLET ORAL 2 TIMES DAILY
Status: DISCONTINUED | OUTPATIENT
Start: 2023-10-08 | End: 2023-10-08

## 2023-10-07 RX ORDER — METOPROLOL TARTRATE 25 MG/1
25 TABLET, FILM COATED ORAL ONCE
Status: COMPLETED | OUTPATIENT
Start: 2023-10-07 | End: 2023-10-07

## 2023-10-07 RX ORDER — METOPROLOL TARTRATE 25 MG/1
25 TABLET, FILM COATED ORAL 2 TIMES DAILY
Status: DISCONTINUED | OUTPATIENT
Start: 2023-10-07 | End: 2023-10-07

## 2023-10-07 RX ORDER — METOPROLOL TARTRATE 1 MG/ML
5 INJECTION, SOLUTION INTRAVENOUS EVERY 5 MIN PRN
Status: DISCONTINUED | OUTPATIENT
Start: 2023-10-07 | End: 2023-10-07

## 2023-10-07 RX ORDER — METOPROLOL TARTRATE 1 MG/ML
5 INJECTION, SOLUTION INTRAVENOUS EVERY 4 HOURS PRN
Status: DISCONTINUED | OUTPATIENT
Start: 2023-10-07 | End: 2023-10-10 | Stop reason: HOSPADM

## 2023-10-07 RX ADMIN — LEVOTHYROXINE SODIUM 137 MCG: 25 TABLET ORAL at 06:10

## 2023-10-07 RX ADMIN — APIXABAN 5 MG: 5 TABLET, FILM COATED ORAL at 08:10

## 2023-10-07 RX ADMIN — OXYCODONE HYDROCHLORIDE 10 MG: 5 TABLET ORAL at 02:10

## 2023-10-07 RX ADMIN — AMIODARONE HYDROCHLORIDE 1 MG/MIN: 1.8 INJECTION, SOLUTION INTRAVENOUS at 06:10

## 2023-10-07 RX ADMIN — METOPROLOL SUCCINATE 25 MG: 25 TABLET, EXTENDED RELEASE ORAL at 08:10

## 2023-10-07 RX ADMIN — METOPROLOL TARTRATE 25 MG: 25 TABLET, FILM COATED ORAL at 03:10

## 2023-10-07 RX ADMIN — DOCUSATE SODIUM AND SENNOSIDES 2 TABLET: 8.6; 5 TABLET, FILM COATED ORAL at 09:10

## 2023-10-07 RX ADMIN — METOPROLOL TARTRATE 5 MG: 5 INJECTION, SOLUTION INTRAVENOUS at 06:10

## 2023-10-07 RX ADMIN — BACLOFEN 5 MG: 5 TABLET ORAL at 02:10

## 2023-10-07 RX ADMIN — METOPROLOL TARTRATE 5 MG: 5 INJECTION, SOLUTION INTRAVENOUS at 12:10

## 2023-10-07 RX ADMIN — METOPROLOL SUCCINATE 50 MG: 50 TABLET, EXTENDED RELEASE ORAL at 10:10

## 2023-10-07 RX ADMIN — BACLOFEN 5 MG: 5 TABLET ORAL at 09:10

## 2023-10-07 RX ADMIN — APIXABAN 5 MG: 5 TABLET, FILM COATED ORAL at 09:10

## 2023-10-07 RX ADMIN — OXYCODONE HYDROCHLORIDE 10 MG: 5 TABLET ORAL at 01:10

## 2023-10-07 RX ADMIN — METOPROLOL TARTRATE 5 MG: 1 INJECTION, SOLUTION INTRAVENOUS at 02:10

## 2023-10-07 RX ADMIN — BACLOFEN 5 MG: 5 TABLET ORAL at 08:10

## 2023-10-07 RX ADMIN — DOCUSATE SODIUM AND SENNOSIDES 2 TABLET: 8.6; 5 TABLET, FILM COATED ORAL at 08:10

## 2023-10-07 RX ADMIN — AMIODARONE HYDROCHLORIDE 0.5 MG/MIN: 1.8 INJECTION, SOLUTION INTRAVENOUS at 06:10

## 2023-10-07 NOTE — ASSESSMENT & PLAN NOTE
- history of atrial flutter and treated with Metoprolol and Propafenone as an outpatient  - atrial flutter with RVR with rate difficult to control (Metoprolol Q6hrs, Cardizem gtt and Digoxin)  - successful cardioversion 10/3/2023 with restoration to NSR; however reverted back to A. Fib/AFL w/ RVR  - on Amiodarone IV load and metoprolol (uptritrate metoprolol to obtain goal HR < 120 bpm), on AC for CVA ppx  - plan for possible repeat DCCV as CARLYLE was negative for GAEL thrombus next week following continued amio gtt load over weekend  - will arrange for EP follow up as an outpatient

## 2023-10-07 NOTE — PROGRESS NOTES
[facility] - Telemetry  Cardiology  Progress Note    Patient Name: Graciela Barton  MRN: [id number]  Admission Date: 9/29/2023  Hospital Length of Stay: 8 days  Code Status: Full Code   Attending Physician: Scooter Salter MD   Primary Care Physician: Rocco Miller MD  Expected Discharge Date: 10/6/2023  Principal Problem:Lumbar stenosis with neurogenic claudication    Subjective:     Hospital Course:   10/2/2023 Seen by Dr. Ortega over the weekend for consult of atrial flutter with RVR. Hx of PAF on propafenone. In Aflutter since 5/2023- HR in 115s-120s and on Lopressor and propafenone as outpatient.  Hgb 7 down from 12. And off anticoagulation. Given Digoxin .25mg IV every 6 hours x 3 overnight with continued HR in 140s. Started on Esmolol overnight due to elevated BP and currently on Esmolol drip along with oral Metoprolol. SBP 120s and patient asymptomatic. BONY drains remain in place from recent neurosurgery. Echo with EF 55%. Neurosurgery following as well   10/3/2023 Remains in atrial flutter with HR up to 160s-170s this AM. Started on IV Cardizem overnight with continued Metoprolol BID. SBP 100s-110s overnight. Asymptomatic from cardiac standpoint. H&H 10.3&31.4  BMP WNL. BONY drain remains in place from NS last week   10/4/2023 NPO for CARLYLE/DCCV  10/5/2023 Successful cardioversion yesterday with restoration to NSR. Remains in NSR with occasional runs of afib overnight short lived H&H 9.3/28.0 BMP WNL. SBP 120s-150s. No complaints this AM       Interval History: Pt noted to have A. Fib w/ RVR and was started on amiodarone and continued on metoprolol.  Pt is asymptomatic and hemodynamically stable.  No further reports overnight.      Review of Systems   Constitutional: Negative for diaphoresis and fever.   HENT:  Negative for congestion and hearing loss.    Eyes:  Negative for blurred vision and pain.   Cardiovascular:  Negative for chest pain, claudication, dyspnea on exertion, leg swelling, near-syncope,  palpitations and syncope.   Respiratory:  Negative for shortness of breath and sleep disturbances due to breathing.    Hematologic/Lymphatic: Negative for bleeding problem. Does not bruise/bleed easily.   Skin:  Negative for color change and poor wound healing.   Gastrointestinal:  Negative for abdominal pain and nausea.   Genitourinary:  Negative for bladder incontinence and flank pain.   Neurological:  Negative for focal weakness and light-headedness.     Objective:     Vital Signs (Most Recent):  Temp: 98 °F (36.7 °C) (10/07/23 0828)  Pulse: (!) 122 (10/07/23 0828)  Resp: 18 (10/07/23 0828)  BP: (!) 146/87 (10/07/23 0828)  SpO2: 96 % (10/07/23 0844) Vital Signs (24h Range):  Temp:  [97.9 °F (36.6 °C)-98.8 °F (37.1 °C)] 98 °F (36.7 °C)  Pulse:  [] 122  Resp:  [12-18] 18  SpO2:  [95 %-98 %] 96 %  BP: (129-185)/(60-97) 146/87     Weight: 88.3 kg (194 lb 10.7 oz)  Body mass index is 30.49 kg/m².     SpO2: 96 %         Intake/Output Summary (Last 24 hours) at 10/7/2023 0951  Last data filed at 10/7/2023 0654  Gross per 24 hour   Intake 240 ml   Output 2365 ml   Net -2125 ml       Lines/Drains/Airways       Drain  Duration                  Urethral Catheter 10/02/23 1445 Non-latex;Straight-tip 4 days              Peripheral Intravenous Line  Duration                  Peripheral IV - Single Lumen 10/06/23 20 G Anterior;Left Forearm 1 day                       Physical Exam  Constitutional:       Appearance: She is well-developed. She is not diaphoretic.   HENT:      Head: Normocephalic and atraumatic.   Eyes:      General: No scleral icterus.     Pupils: Pupils are equal, round, and reactive to light.   Neck:      Vascular: No JVD.   Cardiovascular:      Rate and Rhythm: Tachycardia present. Rhythm irregular.      Pulses: Intact distal pulses.      Heart sounds: S1 normal and S2 normal. No murmur heard.     No friction rub. No gallop.   Pulmonary:      Effort: Pulmonary effort is normal. No respiratory distress.  "     Breath sounds: Normal breath sounds. No wheezing or rales.   Chest:      Chest wall: No tenderness.   Abdominal:      General: Bowel sounds are normal. There is no distension.      Palpations: Abdomen is soft. There is no mass.      Tenderness: There is no abdominal tenderness. There is no rebound.   Musculoskeletal:         General: No tenderness. Normal range of motion.      Cervical back: Normal range of motion and neck supple.      Right lower leg: No edema.      Left lower leg: No edema.   Skin:     General: Skin is warm and dry.      Coloration: Skin is not pale.   Neurological:      Mental Status: She is alert and oriented to person, place, and time.      Coordination: Coordination normal.      Deep Tendon Reflexes: Reflexes normal.   Psychiatric:         Behavior: Behavior normal.         Judgment: Judgment normal.            Significant Labs: BMP: No results for input(s): "GLU", "NA", "K", "CL", "CO2", "BUN", "CREATININE", "CALCIUM", "MG" in the last 48 hours., CMP No results for input(s): "NA", "K", "CL", "CO2", "GLU", "BUN", "CREATININE", "CALCIUM", "PROT", "ALBUMIN", "BILITOT", "ALKPHOS", "AST", "ALT", "ANIONGAP", "ESTGFRAFRICA", "EGFRNONAA" in the last 48 hours., CBC   Recent Labs   Lab 10/06/23  0724   WBC 9.71   HGB 9.0*   HCT 27.4*      , INR No results for input(s): "INR", "PROTIME" in the last 48 hours., Lipid Panel No results for input(s): "CHOL", "HDL", "LDLCALC", "TRIG", "CHOLHDL" in the last 48 hours., Troponin No results for input(s): "TROPONINI" in the last 48 hours., and All pertinent lab results from the last 24 hours have been reviewed.    Significant Imaging: Echocardiogram: 2D echo with color flow doppler: No results found for this or any previous visit. and Transthoracic echo (TTE) complete (Cupid Only):   Results for orders placed or performed during the hospital encounter of 09/29/23   Echo   Result Value Ref Range    BSA 1.9 m2    Hoyt's Biplane MOD Ejection Fraction " 64 %    LVOT stroke volume 51.18 cm3    LVIDd 4.07 3.5 - 6.0 cm    LV Systolic Volume 33.51 mL    LV Systolic Volume Index 17.8 mL/m2    LVIDs 2.95 2.1 - 4.0 cm    LV Diastolic Volume 72.74 mL    LV Diastolic Volume Index 38.69 mL/m2    IVS 0.87 0.6 - 1.1 cm    LVOT diameter 2.0 cm    LVOT area 3.1 cm2    FS 28 28 - 44 %    Left Ventricle Relative Wall Thickness 0.36 cm    Posterior Wall 0.74 0.6 - 1.1 cm    LV mass 96.97 g    LV Mass Index 52 g/m2    TDI LATERAL 0.26 m/s    TDI SEPTAL 0.26 m/s    TR Max Mathew 2.71 m/s    LVOT peak mathew 1.01 m/s    Left Ventricular Outflow Tract Mean Velocity 0.76 cm/s    Left Ventricular Outflow Tract Mean Gradient 2.53 mmHg    LA size 3.83 cm    Left Atrium Major Axis 5.13 cm    RV S' 9.74 cm/s    TAPSE 2.27 cm    RA Major Axis 5.45 cm    RA Width 4.05 cm    AV mean gradient 4 mmHg    AV peak gradient 7 mmHg    Ao peak mathew 1.31 m/s    Ao VTI 31.90 cm    LVOT peak VTI 16.30 cm    AV valve area 1.60 cm²    AV Velocity Ratio 0.77     AV index (prosthetic) 0.51     MADY by Velocity Ratio 2.42 cm²    Mr max mathew 4.15 m/s    MV mean gradient 2 mmHg    MV peak gradient 5 mmHg    MV valve area by continuity eq 2.35 cm2    MV VTI 21.8 cm    Triscuspid Valve Regurgitation Peak Gradient 29 mmHg    PV PEAK VELOCITY 1.00 m/s    PV peak gradient 4 mmHg    Sinus 2.67 cm    STJ 2.62 cm    Ascending aorta 2.93 cm    IVC diameter 2.15 cm    Mean e' 0.26 m/s    ZLVIDS -0.74     ZLVIDD -2.55     LA WIDTH 4.3 cm    EF 55 %    TV resting pulmonary artery pressure 44 mmHg    RV TB RVSP 18 mmHg    Est. RA pres 15 mmHg    Narrative      Left Ventricle: The left ventricle is normal in size. Normal wall   thickness. Normal wall motion. There is normal systolic function. Ejection   fraction by visual approximation is 55%. Unable to assess diastolic   function due to atrial fibrillation.    Left Atrium: Left atrium is mildly dilated.    Right Ventricle: Normal right ventricular cavity size. Wall thickness   is  normal. Right ventricle wall motion  is normal. Systolic function is   normal.    Right Atrium: Right atrium is mildly dilated.    Mitral Valve: There is mild regurgitation.    Tricuspid Valve: There is mild regurgitation.    Pulmonary Artery: The estimated pulmonary artery systolic pressure is   44 mmHg.    IVC/SVC: Elevated venous pressure at 15 mmHg.      and EKG: reviewed.      Assessment and Plan:       Acute blood loss anemia  - H&H 7&22 initially with improvement post PRBC transfusion to 10.3/31.4 back down slightly to 9.3/28.0 this AM  - will continue to monitor with full dose SQ Lovenox   - will plan to transition back to [facility] closer to discharge as long as H&H permits     Typical atrial flutter  - history of atrial flutter and treated with Metoprolol and Propafenone as an outpatient  - atrial flutter with RVR with rate difficult to control (Metoprolol Q6hrs, Cardizem gtt and Digoxin)  - successful cardioversion 10/3/2023 with restoration to NSR; however reverted back to A. Fib/AFL w/ RVR  - on Amiodarone IV load and metoprolol (uptritrate metoprolol to obtain goal HR < 120 bpm), on AC for CVA ppx  - plan for possible repeat DCCV as CARLYLE was negative for GAEL thrombus next week following continued amio gtt load over weekend  - will arrange for EP follow up as an outpatient     PAF (paroxysmal atrial fibrillation)  - as detailed under atrial flutter     Mixed hyperlipidemia  Continue medical therapy.  Risk factor and lifestyle modifications.      Essential hypertension, benign  - SBP 120s-150s overnight  - on Metoprolol and HCTZ as an outpatient  - continue Metoprolol  - BP goal less than 130/80  - BP well controlled         VTE Risk Mitigation (From admission, onward)         Ordered     apixaban tablet 5 mg  2 times daily         10/06/23 0846     IP VTE HIGH RISK PATIENT  Once         09/29/23 2314     Place sequential compression device  Until discontinued         09/29/23 2317                 Manuel Del Rosario III, MD  Cardiology  [facility] - Telemetry

## 2023-10-07 NOTE — NURSING
NURSE PROACTIVE ROUNDING NOTE       Time of Chart Review: 730    Admit Date: 2023  LOS: 8  Code Status: Full Code   Date of Visit: 10/07/2023  : 1950  Age: 73 y.o.  Sex: female  Race: White  Bed: K426/K426 A  MRN: [id number]  Was the patient discharged from an ICU this admission? Yes   Was the patient discharged from a PACU within last 24 hours? No   Did the patient receive conscious sedation/general anesthesia in last 24 hours? No   Was the patient in the ED within the past 24 hours? No   Was the patient on NIPPV within the past 24 hours? No   Attending Physician: Scooter Salter MD  Primary Service: Neurosurgery   Time spent in chart / discussing POC with RN/Pharmacist/MD: 15 -30 min    SITUATION    Notified by previous RRN during handoff  Reason for alert: A-Fib with RVR    Diagnosis: Lumbar stenosis with neurogenic claudication   has a past medical history of Arthritis, Cancer, Edema, Hyperlipidemia, Hypertension, and Thyroid disease.    Last Vitals:  Temp: 98.4 °F (36.9 °C) (10/07 0408)  Pulse: 127 (10/07 0650)  Resp: 18 (10/07 0650)  BP: 130/91 (10/07 0650)  SpO2: 98 % (10/07 0650)    24 Hour Vitals Range:  Temp:  [97.9 °F (36.6 °C)-98.8 °F (37.1 °C)]   Pulse:  []   Resp:  [12-18]   BP: (129-185)/(60-97)   SpO2:  [95 %-98 %]     Clinical Issues: Dysrhythmia    ASSESSMENT/INTERVENTIONS  - Chart reviewed including vital signs, labs, orders, and progress notes  - Patient received two doses of Lopressor 5 mg IVP overnight for HR > 130  - Toprol XL 25 mg given at 0821.  Additional 50 mg PO Toprol XL given at 1018 after clarifying dose with JONELLE Swift & Dr. Del Rosario (Cardiology).  - Hold Metroprolol 5 mg IVP for now per Dr. Del Rosario.  Patient's -140's, /87 at 0830   - Amiodarone drip infusing at 0.5 mg/min    RECOMMENDATIONS  - Maintain HR < 120 per Cardiology's note    Discussed plan of care with bedside RN, Dayday & PharmacistFly    PROVIDER ESCALATION    Physician  escalation: Yes    Orders received and case discussed with Dr. Del Rosario & JONELLE Swift at ~10 AM.    Disposition:Remain in room 426    FOLLOW UP    Call back the Rapid Response Nurse[facility]igist at [phone] for additional questions or concerns.

## 2023-10-07 NOTE — PT/OT/SLP PROGRESS
Occupational Therapy   Treatment    Name: Graciela Barton  MRN: [id number]  Admitting Diagnosis:  Lumbar stenosis with neurogenic claudication  3 Days Post-Op    Recommendations:     Discharge Recommendations:  (high intensity therapy)  Discharge Equipment Recommendations:   (TBD)  Barriers to discharge:  Decreased caregiver support    Assessment:     Graciela Barton is a 73 y.o. female with a medical diagnosis of Lumbar stenosis with neurogenic claudication.  She presents with The primary encounter diagnosis was Lumbar stenosis with neurogenic claudication. Diagnoses of Spinal stenosis, Assistance needed for mobility, Bilateral leg edema, Decreased range of motion of right knee, Chronic myelopathy, Foraminal stenosis of lumbar region, Atrial flutter, unspecified type, Tachycardia, A-fib, PAF (paroxysmal atrial fibrillation), Atypical atrial flutter, Paroxysmal atrial fibrillation, and Atrial flutter were also pertinent to this visit.  . Performance deficits affecting function are weakness, abnormal tone, impaired joint extensibility, impaired endurance, decreased ROM, impaired muscle length, impaired sensation, decreased coordination, impaired coordination, orthopedic precautions, impaired self care skills, decreased lower extremity function, impaired functional mobility, gait instability, impaired balance, pain, impaired cardiopulmonary response to activity, impaired skin.     Pt seen for limited ax this date 2/2 elevated HR; note in chart states HR goal <120, however, elevated at rest and with EOB axs and nsg not aware of stipulation. Pt remains with increased tone in BLEs, LLE > RLE limited ability to stand and/or separate LEs; LE tone also limited sitting balance. Will progress pt as able.  during axs     Rehab Prognosis:  Good; patient would benefit from acute skilled OT services to address these deficits and reach maximum level of function.       Plan:     Patient to be seen 5 x/week to address the above listed  problems via self-care/home management, therapeutic activities, therapeutic exercises, neuromuscular re-education  Plan of Care Expires: 11/01/23  Plan of Care Reviewed with: patient    Subjective     Chief Complaint: pain   Patient/Family Comments/goals: return to PLOF   Pain/Comfort:  Location 1:  (spine and BLEs)  Pain Addressed 1: Reposition, Distraction, Cessation of Activity, Nurse notified, Pre-medicate for activity  Pain Rating Post-Intervention 1:  (did not rate)    Objective:     Communicated with: nsg prior to session.  Patient found seated EOB with PTA with   upon OT entry to room.    General Precautions: Standard, fall    Orthopedic Precautions:spinal precautions  Braces: TLSO  Respiratory Status: Room air     Occupational Performance:     Bed Mobility:    Patient completed Rolling/Turning to Left with  maximal assistance and total assistance  Patient completed Rolling/Turning to Right with maximal assistance and total assistance  Patient completed Scooting/Bridging with total assistance and 2 persons  Patient completed Supine to Sit with maximal assistance  Patient completed Sit to Supine with maximal assistance, total assistance, and 2 persons     Functional Mobility/Transfers:  Squat stand total A of 2   Functional Mobility: unable to perform     Activities of Daily Living:  Upper Body Dressing: maximal assistance and total assistance don TLSO  Lower Body Dressing: dependence    Toileting: maximal assistance and total assistance        AMPAC 6 Click ADL: 13    Treatment & Education:  Pt found seated EOB with PTA; nsg unaware of HR precautions set forth by cardiology (<120) and elevated HR noted at rest and increased with ax (146)   Pt requires total/max A of 2 for all axs 2/2 increased tone in BLEs  Sheets attempting to be changed by nsg while pt EOB- educated on HR restrictions   Since seated, TLSO brace donned and pt performed axs in order to move closer to HOB and in position to return to supine;  impaired sitting balance while EOB 2/2 increased tone in LEs causing pt to fall posteriorly and/or slide forward on EOB; BLEs blocking required to prevent sliding off EOB   Pt unable to increase clearance  buttocks from EOB despite assist of 2  Pt returned to supine and repositioned in L side lying to leave incision open to air as per neurology notes; wedge placed as well as pillow bolsters between ankles and knees; pillows between BLEs doubled in order to perform passive stretching to adductors as well as prevent skin breakdown    follow repositioning and end of session     Patient left left sidelying with all lines intact, call button in reach, bed alarm on, and nsg notified    GOALS:   Multidisciplinary Problems       Occupational Therapy Goals          Problem: Occupational Therapy    Goal Priority Disciplines Outcome Interventions   Occupational Therapy Goal     OT, PT/OT Ongoing, Progressing    Description: Goals to be met by: 11/01/2023      Patient will increase functional independence with ADLs by performing:    UE Dressing with Minimal Assistance.  LE Dressing with Maximum Assistance.  Grooming while seated with Stand-by Assistance.  Toileting from bedside commode with Minimal Assistance for hygiene and clothing management.   Toilet transfer to bedside commode with Moderate Assistance.  Increased functional strength to WFL for self care.  Upper extremity exercise program x10 reps per handout, with assistance as needed.                           Time Tracking:     OT Date of Treatment: 10/07/23  OT Start Time: 1245  OT Stop Time: 1317  OT Total Time (min): 32 min    Billable Minutes:Self Care/Home Management 16  Therapeutic Activity 16    OT/LUPIS: OT     Number of LUPIS visits since last OT visit: 0    10/7/2023

## 2023-10-07 NOTE — PLAN OF CARE
Problem: Adult Inpatient Plan of Care  Goal: Plan of Care Review  Outcome: Ongoing, Progressing  Goal: Patient-Specific Goal (Individualized)  Outcome: Ongoing, Progressing  Goal: Absence of Hospital-Acquired Illness or Injury  Outcome: Ongoing, Progressing  Goal: Optimal Comfort and Wellbeing  Outcome: Ongoing, Progressing     Problem: Infection  Goal: Absence of Infection Signs and Symptoms  Outcome: Ongoing, Progressing     Problem: Fall Injury Risk  Goal: Absence of Fall and Fall-Related Injury  Outcome: Ongoing, Progressing     Problem: Skin Injury Risk Increased  Goal: Skin Health and Integrity  Outcome: Ongoing, Progressing     Problem: Bleeding (Spinal Surgery)  Goal: Absence of Bleeding  Outcome: Ongoing, Progressing     Problem: Bowel Motility Impaired (Spinal Surgery)  Goal: Effective Bowel Elimination  Outcome: Ongoing, Progressing     Problem: Fluid and Electrolyte Imbalance (Spinal Surgery)  Goal: Fluid and Electrolyte Balance  Outcome: Ongoing, Progressing     Problem: Functional Ability Impaired (Spinal Surgery)  Goal: Optimal Functional Ability  Outcome: Ongoing, Progressing     Problem: Infection (Spinal Surgery)  Goal: Absence of Infection Signs and Symptoms  Outcome: Ongoing, Progressing     Problem: Neurologic Impairment (Spinal Surgery)  Goal: Optimal Neurologic Function  Outcome: Ongoing, Progressing     Problem: Ongoing Anesthesia Effects (Spinal Surgery)  Goal: Anesthesia/Sedation Recovery  Outcome: Ongoing, Progressing     Problem: Pain (Spinal Surgery)  Goal: Acceptable Pain Control  Outcome: Ongoing, Progressing     Problem: Postoperative Nausea and Vomiting (Spinal Surgery)  Goal: Nausea and Vomiting Relief  Outcome: Ongoing, Progressing     Problem: Postoperative Urinary Retention (Spinal Surgery)  Goal: Effective Urinary Elimination  Outcome: Ongoing, Progressing     Problem: Respiratory Compromise (Spinal Surgery)  Goal: Effective Oxygenation and Ventilation  Outcome: Ongoing,  Progressing     Problem: Dysrhythmia  Goal: Normalized Cardiac Rhythm  Outcome: Ongoing, Progressing     Problem: Hypertension Comorbidity  Goal: Blood Pressure in Desired Range  Outcome: Ongoing, Progressing

## 2023-10-07 NOTE — NURSING
Pt transferred from Tele to room 562 r/t HR sustaining afib 's with Amiodarone drip initiated on the floor. Arrived via bed accompanied by nurse x2. Connected to monitor, vs obtained. Pt denies CP,SOB. No distress noted. Aaox4. Clear speech. Hard of hearing. Bed at lowest position, wheels locked, bed alarm on, call light within reach.   1815- Amiodarone drip dose changed to 1mg/min.

## 2023-10-07 NOTE — PROGRESS NOTES
NEUROSURGICAL POST-OPERATIVE PROGRESS NOTE    DATE OF SERVICE:  10/07/2023      ATTENDING PHYSICIAN:  Scooter Salter MD    SUBJECTIVE:    INTERIM HISTORY:    This is a very pleasant 73 y.o. y.o. female, who is status postop day 8 L3-S1 fusion.  She was about to be discharged to rehab yesterday but she was found to have atrial fibrillation with rapid ventricular response again.  Patient reports doing fine this morning.  She denies having any chest pain or shortness of breath.  Pain is well-controlled.  She complains mainly of spasms in her legs.  She was able to get up with physical therapy.  She is making slight progress every day.  Carver catheter is in place.  Now on amiodarone IV and apixaban.              OBJECTIVE:    PHYSICAL EXAMINATION:   Vitals:    10/07/23 0650   BP: (!) 130/91   Pulse: (!) 127   Resp: 18   Temp:        Neurosurgery Physical Exam    Back Exam     Muscle Strength   Right Quadriceps:  4/5   Left Quadriceps:  4/5             Neurologic Exam     Motor Exam     Strength   Right deltoid: 5/5  Left deltoid: 5/5  Right biceps: 5/5  Left biceps: 5/5  Right triceps: 5/5  Left triceps: 5/5  Right interossei: 4/5  Left interossei: 4/5  Right iliopsoas: 4/5  Left iliopsoas: 4/5  Right quadriceps: 4/5  Left quadriceps: 4/5  Right anterior tibial: 4/5  Left anterior tibial: 4/5  Right gastroc: 4/5  Left gastroc: 4/5      Incision CDI    DIAGNOSTIC DATA:    CT lumbar spine reviewed    ASSESMENT:    This is a 73 y.o. female who is s/p the 8 L3-S1 fusion.  Discharge delayed by atrial fibrillation with rapid ventricular response.    Problem List Items Addressed This Visit          Neuro    * (Principal) Lumbar stenosis with neurogenic claudication - Primary    Relevant Orders    Vital signs    Turn cough deep breathe    Oral Care    Intake and output    Place sequential compression device    Chlorohexidine Gluconate Bath    Incentive spirometry    Pulse Oximetry Q4H    PT evaluate and treat    OT evaluate  and treat    Transfer patient (Completed)    PT assistance with ambulation    Drain - full suction (Completed)    Document drain output    TLSO brace    Patient must wear orthosis while sitting or standing    CBC auto differential (Completed)    Basic metabolic panel (Completed)    CBC auto differential (Completed)    Basic metabolic panel (Completed)    CBC auto differential (Completed)    Basic metabolic panel (Completed)    Transfer patient (Completed)    Turn cough deep breathe    X-Ray Lumbar Spine Ap And Lateral    CT Lumbar Spine Without Contrast (Completed)    Foraminal stenosis of lumbar region    Relevant Orders    Vital signs    Turn cough deep breathe    Oral Care    Intake and output    Place sequential compression device    Chlorohexidine Gluconate Bath    Incentive spirometry    Pulse Oximetry Q4H    PT evaluate and treat    OT evaluate and treat    Transfer patient (Completed)    PT assistance with ambulation    Drain - full suction (Completed)    Document drain output    TLSO brace    Patient must wear orthosis while sitting or standing    CBC auto differential (Completed)    Basic metabolic panel (Completed)    Turn cough deep breathe    Chronic myelopathy    Relevant Orders    Vital signs    Turn cough deep breathe    Oral Care    Intake and output    Place sequential compression device    Chlorohexidine Gluconate Bath    Incentive spirometry    Pulse Oximetry Q4H    PT evaluate and treat    OT evaluate and treat    Transfer patient (Completed)    PT assistance with ambulation    Drain - full suction (Completed)    Document drain output    TLSO brace    Patient must wear orthosis while sitting or standing    CBC auto differential (Completed)    Basic metabolic panel (Completed)    Magnesium (Completed)    Turn cough deep breathe       Cardiac/Vascular    PAF (paroxysmal atrial fibrillation)    Overview       Continue DOAC  Continue metoprolol  Rate controlled today           Atrial flutter     Overview     With 2:1 conduction, rate in 90s  EKG reviewed with Dr. Weller and cardioversion discussed.  This may be scheduled after surgery when she has been back on anticoagulation 6-8 weeks.    Metoprolol dosing increased by PCP which has modestly improved her HR.  She has been seen by EP but has been unable to follow up with their service due to her impaired function. Continue Rythmol (prescribed TID but taking 1.5 tabs BID), continue metoprolol 50 BID, continue DOAC.  Likely needs a higher dose of BB but BP is low end normal in clinic today.  Had been bradycardic at last visit with EP and hope to be able to get her for follow up soon         Relevant Orders    EKG 12-lead (Completed)    Case Request Operating Room: Transesophageal echo (CARLYLE) intra-procedure log documentation (Completed)    Cardioversion (DCCV)    Magnesium (Completed)    Transesophageal echo (CARLYLE) with possible cardioversion (Completed)    Vital signs    Turn cough deep breathe    Oral Care    Intake and output    Place sequential compression device    Chlorohexidine Gluconate Bath    Incentive spirometry    Pulse Oximetry Q4H    PT evaluate and treat    OT evaluate and treat    Transfer patient (Completed)    PT assistance with ambulation    Drain - full suction (Completed)    Document drain output    TLSO brace    Patient must wear orthosis while sitting or standing    CBC auto differential (Completed)    Basic metabolic panel (Completed)    Cardiac Monitoring - Adult    Full code    Magnesium (Completed)    Turn cough deep breathe       Orthopedic    Decreased range of motion of right knee    Relevant Orders    Vital signs    Turn cough deep breathe    Oral Care    Intake and output    Place sequential compression device    Chlorohexidine Gluconate Bath    Incentive spirometry    Pulse Oximetry Q4H    PT evaluate and treat    OT evaluate and treat    Transfer patient (Completed)    PT assistance with ambulation    Drain - full suction  (Completed)    Document drain output    TLSO brace    Patient must wear orthosis while sitting or standing    CBC auto differential (Completed)    Basic metabolic panel (Completed)    Turn cough deep breathe       Other    Bilateral leg edema    Relevant Orders    Vital signs    Turn cough deep breathe    Oral Care    Intake and output    Place sequential compression device    Chlorohexidine Gluconate Bath    Incentive spirometry    Pulse Oximetry Q4H    PT evaluate and treat    OT evaluate and treat    Transfer patient (Completed)    PT assistance with ambulation    Drain - full suction (Completed)    Document drain output    TLSO brace    Patient must wear orthosis while sitting or standing    CBC auto differential (Completed)    Basic metabolic panel (Completed)    Turn cough deep breathe    Assistance needed for mobility    Relevant Orders    Vital signs    Turn cough deep breathe    Oral Care    Intake and output    Place sequential compression device    Chlorohexidine Gluconate Bath    Incentive spirometry    Pulse Oximetry Q4H    PT evaluate and treat    OT evaluate and treat    Transfer patient (Completed)    PT assistance with ambulation    Drain - full suction (Completed)    Document drain output    TLSO brace    Patient must wear orthosis while sitting or standing    CBC auto differential (Completed)    Basic metabolic panel (Completed)    Turn cough deep breathe     Other Visit Diagnoses       Spinal stenosis        Tachycardia        Relevant Orders    EKG 12-lead (Completed)    Inpatient consult to Cardiology[facility] (Completed)    EKG 12-lead (Completed)    A-fib        Relevant Orders    EKG 12-lead    Echo (Completed)    Inpatient consult to Hospital Medicine[facility] (Completed)    Paroxysmal atrial fibrillation        Relevant Orders    EKG 12-lead            PLAN:  We will start baclofen 10 mg 3 times daily for spasticity.  Appreciate cardiology and hospital medicine management        Scooter LU  Gaetano ABDI  Pager: [phone]

## 2023-10-07 NOTE — ASSESSMENT & PLAN NOTE
- H&H 7&22 initially with improvement post PRBC transfusion to 10.3/31.4 back down slightly to 9.3/28.0 this AM  - will continue to monitor with full dose SQ Lovenox   - will plan to transition back to [facility] closer to discharge as long as H&H permits

## 2023-10-07 NOTE — SUBJECTIVE & OBJECTIVE
Interval History:  Noted to be in Afib with RVR---started on Amino gtt per cardiology. Per cardiology, ok to up-titrate metoprolol.      Review of Systems   Constitutional:  Positive for activity change. Negative for appetite change, chills, diaphoresis, fatigue and fever.   HENT:  Negative for congestion and sore throat.    Respiratory:  Negative for cough, chest tightness, shortness of breath and wheezing.    Cardiovascular:  Positive for leg swelling. Negative for chest pain and palpitations.   Gastrointestinal:  Negative for abdominal distention, abdominal pain, constipation, diarrhea, nausea and vomiting.   Musculoskeletal:  Positive for arthralgias, back pain and gait problem.   Skin:  Positive for wound.   Neurological:  Negative for dizziness, syncope, weakness, light-headedness and headaches.     Objective:     Vital Signs (Most Recent):  Temp: 98.3 °F (36.8 °C) (10/07/23 1403)  Pulse: (!) 134 (10/07/23 1403)  Resp: 19 (10/07/23 1403)  BP: 126/73 (10/07/23 1403)  SpO2: (!) 94 % (10/07/23 1432) Vital Signs (24h Range):  Temp:  [98 °F (36.7 °C)-98.8 °F (37.1 °C)] 98.3 °F (36.8 °C)  Pulse:  [111-141] 134  Resp:  [16-20] 19  SpO2:  [93 %-98 %] 94 %  BP: (126-185)/(60-97) 126/73     Weight: 88.3 kg (194 lb 10.7 oz)  Body mass index is 30.49 kg/m².    Intake/Output Summary (Last 24 hours) at 10/7/2023 1505  Last data filed at 10/7/2023 1504  Gross per 24 hour   Intake --   Output 3400 ml   Net -3400 ml           Physical Exam  Vitals and nursing note reviewed.   Constitutional:       General: She is not in acute distress.     Appearance: Normal appearance. She is well-developed. She is not toxic-appearing.   HENT:      Head: Normocephalic and atraumatic.      Mouth/Throat:      Dentition: Normal dentition.   Eyes:      General: Lids are normal.      Extraocular Movements: Extraocular movements intact.      Conjunctiva/sclera: Conjunctivae normal.   Cardiovascular:      Rate and Rhythm: Tachycardia present. Rhythm  irregular.   Pulmonary:      Effort: Pulmonary effort is normal.      Breath sounds: Normal breath sounds.   Abdominal:      General: Bowel sounds are normal.      Palpations: Abdomen is soft.   Musculoskeletal:         General: Swelling present.      Cervical back: Neck supple.      Right lower leg: Edema present.      Left lower leg: Edema present.   Skin:     General: Skin is warm and dry.      Findings: No erythema or rash.   Neurological:      Mental Status: She is alert and oriented to person, place, and time.         Significant Labs: All pertinent labs within the past 24 hours have been reviewed.  CBC:   Recent Labs   Lab 10/06/23  0724 10/07/23  1420   WBC 9.71 10.66   HGB 9.0* 10.4*   HCT 27.4* 31.0*    332       CMP:   Recent Labs   Lab 10/07/23  1420      K 3.7      CO2 31*   *   BUN 7*   CREATININE 0.6   CALCIUM 8.3*   ANIONGAP 8         Significant Imaging: I have reviewed all pertinent imaging results/findings within the past 24 hours.

## 2023-10-07 NOTE — ASSESSMENT & PLAN NOTE
Atrial flutter  Anticoagulant long-term use    -chronic, has been in Aflutter for quite some time  -RVR rate worsened after admission in setting of neurosurgical procedure, anemia, and chronic hypothyroidism  -Cardiology following   -home propafenone discontinued per recs (failed drug in setting of several months history of AFlutter)  -continue home metoprolol (titrations limited due to BP)  -s/p IV dig x 3 doses   -initiated on esmolol gtt 10/01 due to no improvement/elevation in HR overnight   -will need outpt follow up with EP for consideration of DCCV +/- ablation  -continue tele monitoring  -EKG PRN concerns  -trend labs, address/replete electrolytes as indicated  -monitor    -started on diltiazem drip which is on hold at this time.  Will continue metoprolol 25 mg po q.6  -status post DCCV +/- ablation now in sinus rhythm  -continuing full-dose Lovenox and will transition per Cardiology to oral tomorrow  -10/6 converted back into Afib with RVR today. Amino gtt initiated per cardiology.   10/7- appreciate cardiology---- on Amiodarone IV load and metoprolol (uptritrate metoprolol to obtain goal HR < 120 bpm), on AC for CVA ppx. HR has been 130-140.

## 2023-10-07 NOTE — PROGRESS NOTES
[facility] Medicine  Progress Note    Patient Name: Graciela Barton  MRN: [id number]  Patient Class: IP- Inpatient   Admission Date: 9/29/2023  Length of Stay: 8 days  Attending Physician: Scooter Salter MD  Primary Care Provider: Rocco Miller MD        Subjective:     Principal Problem:Lumbar stenosis with neurogenic claudication        HPI:  Ms. Barton is a 73 YOF with PMHx of HTN, HLD, BPPV, PAfib/flutter on OAC, history of thyroid cancer s/p thyroidectomy in 2018 with hypothyroidism, thoracic myelopathy with neurogenic claudication s/p s/p T6-7 T7-8 anterior diskectomy (05/12/2023) and now s/p left L3-4, L4-5 OLIF, L5-S1 ALIF and L3-S1 posterior instrumented fusion (09/29/2023).     Admission testing reviewed noting WBC 12 >>13, hemoglobin 9.7 >> 8.5 >> 7.4, hematocrit 30 >> 25.9 >> 22.8; chemistry stable; lactic 1.0; TSH 7.448 with FT4 0.97; CXR with clearing of pleural fluid and atelectasis in the right lung base; BLE US without evidence of DVT; and TTE with LVEF >55%, LA mild dilation, RA mild dilation, normal RV size and function, mild MR, mild TR, RVSP 44mmHg. From neurosurgery perspective she is progressing well in post operative course however the course has been complicated by AFib/flutter with RVR refractory to home medications, propafenone and metoprolol, and amiodarone gtt.     She is seen today in consult for medical management. She notes that overall pain is well controlled however she is developing cramping sensation in BLEs. She denies palpitations, SOB, ELLIS, CP, abdominal pain, N/V/D, constipation, fever, chills, light headiness, dizziness, seizures, or syncope.     Hospital Medicine Service was consulted for assistance with medical management.         Overview/Hospital Course:  Ms. Barton was admitted 09/29 by Neurosurgery and is now s/p left L3-4, L4-5 OLIF, L5-S1 ALIF and L3-S1 posterior instrumented fusion. From neurosurgery perspective she is progressing well in post  operative course. However the course has been complicated by AFib/flutter with RVR that was refractory to home medications, propafenone and metoprolol; and amiodarone gtt. Cardiology following with recommendations to transfuse due to anemia (two units PRBCs transfused 10/01), stop home propafenone (failed drug in setting of several months history of AFlutter), continue home metoprolol (titrations limited due to BP), and was give 3 doses of IV digoxin (completed). However without improvement and HR elevating to 140's she was initiated on esmolol gtt evening of 10/01.      Other concerns currently to monitor include monitoring for s/s of urinary retention and bowel regimen titrated 10/2 as last BM was day of admission per patient.   10/4:  Cardiology planning cardioversion on today.  Cardizem drip.  She was started on full-dose on 10/03/2023.  Disposition plans: to rehab when medically ready for intensive therapy.   10/5: Cardioversion yesterday now noted to be in NSR.  Still receiving full-dose Lovenox with plans to transition to oral on tomorrow per Cardiology.  Doing well on metoprolol.  Carver catheter still in place secondary to urinary retention.  10/6: DC held on today, converted back to Afib with RVR. She was transferred to [facility] and started on Amino gtt.   10/7: patient in Afib with a RVR rate since last night per chart review. Cardiology has assessed and following noted per cardiology's plan: - on Amiodarone IV load and metoprolol (uptritrate metoprolol to obtain goal HR < 120 bpm), on AC for CVA ppx      Interval History:  Noted to be in Afib with RVR---started on Amino gtt per cardiology. Per cardiology, ok to up-titrate metoprolol.      Review of Systems   Constitutional:  Positive for activity change. Negative for appetite change, chills, diaphoresis, fatigue and fever.   HENT:  Negative for congestion and sore throat.    Respiratory:  Negative for cough, chest tightness, shortness of breath and wheezing.     Cardiovascular:  Positive for leg swelling. Negative for chest pain and palpitations.   Gastrointestinal:  Negative for abdominal distention, abdominal pain, constipation, diarrhea, nausea and vomiting.   Musculoskeletal:  Positive for arthralgias, back pain and gait problem.   Skin:  Positive for wound.   Neurological:  Negative for dizziness, syncope, weakness, light-headedness and headaches.     Objective:     Vital Signs (Most Recent):  Temp: 98.3 °F (36.8 °C) (10/07/23 1403)  Pulse: (!) 134 (10/07/23 1403)  Resp: 19 (10/07/23 1403)  BP: 126/73 (10/07/23 1403)  SpO2: (!) 94 % (10/07/23 1432) Vital Signs (24h Range):  Temp:  [98 °F (36.7 °C)-98.8 °F (37.1 °C)] 98.3 °F (36.8 °C)  Pulse:  [111-141] 134  Resp:  [16-20] 19  SpO2:  [93 %-98 %] 94 %  BP: (126-185)/(60-97) 126/73     Weight: 88.3 kg (194 lb 10.7 oz)  Body mass index is 30.49 kg/m².    Intake/Output Summary (Last 24 hours) at 10/7/2023 1505  Last data filed at 10/7/2023 1504  Gross per 24 hour   Intake --   Output 3400 ml   Net -3400 ml           Physical Exam  Vitals and nursing note reviewed.   Constitutional:       General: She is not in acute distress.     Appearance: Normal appearance. She is well-developed. She is not toxic-appearing.   HENT:      Head: Normocephalic and atraumatic.      Mouth/Throat:      Dentition: Normal dentition.   Eyes:      General: Lids are normal.      Extraocular Movements: Extraocular movements intact.      Conjunctiva/sclera: Conjunctivae normal.   Cardiovascular:      Rate and Rhythm: Tachycardia present. Rhythm irregular.   Pulmonary:      Effort: Pulmonary effort is normal.      Breath sounds: Normal breath sounds.   Abdominal:      General: Bowel sounds are normal.      Palpations: Abdomen is soft.   Musculoskeletal:         General: Swelling present.      Cervical back: Neck supple.      Right lower leg: Edema present.      Left lower leg: Edema present.   Skin:     General: Skin is warm and dry.      Findings:  No erythema or rash.   Neurological:      Mental Status: She is alert and oriented to person, place, and time.         Significant Labs: All pertinent labs within the past 24 hours have been reviewed.  CBC:   Recent Labs   Lab 10/06/23  0724 10/07/23  1420   WBC 9.71 10.66   HGB 9.0* 10.4*   HCT 27.4* 31.0*    332       CMP:   Recent Labs   Lab 10/07/23  1420      K 3.7      CO2 31*   *   BUN 7*   CREATININE 0.6   CALCIUM 8.3*   ANIONGAP 8         Significant Imaging: I have reviewed all pertinent imaging results/findings within the past 24 hours.      Assessment/Plan:      * Lumbar stenosis with neurogenic claudication  Chronic myelopathy  Foraminal stenosis of lumbar region  Neurogenic claudication   DDD, lumbar  Decreased ROM of R knee  Assistance needed for mobility     -thoracic myelopathy with neurogenic claudication s/p s/p T6-7 T7-8 anterior diskectomy (05/12/2023) and now s/p left L3-4, L4-5 OLIF, L5-S1 ALIF and L3-S1 posterior instrumented fusion (09/29/2023)  -management per primary team   -remains with surgical drain  -PT/OT following   -rehab placement    Acute blood loss anemia  -s/p 2 units PRBC transfusion 10/2 with appropriate response  -monitor for s/s of bleeding   -trend labs and monitor  -further transfusions as indicated     PAF (paroxysmal atrial fibrillation)  Atrial flutter  Anticoagulant long-term use    -chronic, has been in Aflutter for quite some time  -RVR rate worsened after admission in setting of neurosurgical procedure, anemia, and chronic hypothyroidism  -Cardiology following   -home propafenone discontinued per recs (failed drug in setting of several months history of AFlutter)  -continue home metoprolol (titrations limited due to BP)  -s/p IV dig x 3 doses   -initiated on esmolol gtt 10/01 due to no improvement/elevation in HR overnight   -will need outpt follow up with EP for consideration of DCCV +/- ablation  -continue tele monitoring  -EKG PRN  concerns  -trend labs, address/replete electrolytes as indicated  -monitor    -started on diltiazem drip which is on hold at this time.  Will continue metoprolol 25 mg po q.6  -status post DCCV +/- ablation now in sinus rhythm  -continuing full-dose Lovenox and will transition per Cardiology to oral tomorrow  -10/6 converted back into Afib with RVR today. Amino gtt initiated per cardiology.   10/7- appreciate cardiology---- on Amiodarone IV load and metoprolol (uptritrate metoprolol to obtain goal HR < 120 bpm), on AC for CVA ppx. HR has been 130-140.    Mixed hyperlipidemia  -chronic  -resume home rosuvastatin at discharge     Essential hypertension, benign  -chronic  -low/normotensive pressures at current  -hold home HCTZ  -continue metoprolol as noted above   -dose/medication adjustment as appropriate   -monitor     Postoperative hypothyroidism  History of thyroid cancer  History of thyroidectomy     -chronic  -improvement in TSH   -continue home levothyroxine      VTE Risk Mitigation (From admission, onward)         Ordered     apixaban tablet 5 mg  2 times daily         10/06/23 0846     IP VTE HIGH RISK PATIENT  Once         09/29/23 2314     Place sequential compression device  Until discontinued         09/29/23 2314                Discharge Planning   ASYA: 10/6/2023     Code Status: Full Code   Is the patient medically ready for discharge?:     Reason for patient still in hospital (select all that apply): Laboratory test, Treatment, Imaging, Consult recommendations, PT / OT recommendations and Pending disposition  Discharge Plan A: Rehab   Discharge Delays: None known at this time              Yenni Swift NP  Department of Hospital Medicine   [place] [facility]

## 2023-10-07 NOTE — SUBJECTIVE & OBJECTIVE
Interval History: Pt noted to have A. Fib w/ RVR and was started on amiodarone and continued on metoprolol.  Pt is asymptomatic and hemodynamically stable.  No further reports overnight.      Review of Systems   Constitutional: Negative for diaphoresis and fever.   HENT:  Negative for congestion and hearing loss.    Eyes:  Negative for blurred vision and pain.   Cardiovascular:  Negative for chest pain, claudication, dyspnea on exertion, leg swelling, near-syncope, palpitations and syncope.   Respiratory:  Negative for shortness of breath and sleep disturbances due to breathing.    Hematologic/Lymphatic: Negative for bleeding problem. Does not bruise/bleed easily.   Skin:  Negative for color change and poor wound healing.   Gastrointestinal:  Negative for abdominal pain and nausea.   Genitourinary:  Negative for bladder incontinence and flank pain.   Neurological:  Negative for focal weakness and light-headedness.     Objective:     Vital Signs (Most Recent):  Temp: 98 °F (36.7 °C) (10/07/23 0828)  Pulse: (!) 122 (10/07/23 0828)  Resp: 18 (10/07/23 0828)  BP: (!) 146/87 (10/07/23 0828)  SpO2: 96 % (10/07/23 0844) Vital Signs (24h Range):  Temp:  [97.9 °F (36.6 °C)-98.8 °F (37.1 °C)] 98 °F (36.7 °C)  Pulse:  [] 122  Resp:  [12-18] 18  SpO2:  [95 %-98 %] 96 %  BP: (129-185)/(60-97) 146/87     Weight: 88.3 kg (194 lb 10.7 oz)  Body mass index is 30.49 kg/m².     SpO2: 96 %         Intake/Output Summary (Last 24 hours) at 10/7/2023 0951  Last data filed at 10/7/2023 0654  Gross per 24 hour   Intake 240 ml   Output 2365 ml   Net -2125 ml       Lines/Drains/Airways       Drain  Duration                  Urethral Catheter 10/02/23 1445 Non-latex;Straight-tip 4 days              Peripheral Intravenous Line  Duration                  Peripheral IV - Single Lumen 10/06/23 20 G Anterior;Left Forearm 1 day                       Physical Exam  Constitutional:       Appearance: She is well-developed. She is not diaphoretic.  "  HENT:      Head: Normocephalic and atraumatic.   Eyes:      General: No scleral icterus.     Pupils: Pupils are equal, round, and reactive to light.   Neck:      Vascular: No JVD.   Cardiovascular:      Rate and Rhythm: Tachycardia present. Rhythm irregular.      Pulses: Intact distal pulses.      Heart sounds: S1 normal and S2 normal. No murmur heard.     No friction rub. No gallop.   Pulmonary:      Effort: Pulmonary effort is normal. No respiratory distress.      Breath sounds: Normal breath sounds. No wheezing or rales.   Chest:      Chest wall: No tenderness.   Abdominal:      General: Bowel sounds are normal. There is no distension.      Palpations: Abdomen is soft. There is no mass.      Tenderness: There is no abdominal tenderness. There is no rebound.   Musculoskeletal:         General: No tenderness. Normal range of motion.      Cervical back: Normal range of motion and neck supple.      Right lower leg: No edema.      Left lower leg: No edema.   Skin:     General: Skin is warm and dry.      Coloration: Skin is not pale.   Neurological:      Mental Status: She is alert and oriented to person, place, and time.      Coordination: Coordination normal.      Deep Tendon Reflexes: Reflexes normal.   Psychiatric:         Behavior: Behavior normal.         Judgment: Judgment normal.            Significant Labs: BMP: No results for input(s): "GLU", "NA", "K", "CL", "CO2", "BUN", "CREATININE", "CALCIUM", "MG" in the last 48 hours., CMP No results for input(s): "NA", "K", "CL", "CO2", "GLU", "BUN", "CREATININE", "CALCIUM", "PROT", "ALBUMIN", "BILITOT", "ALKPHOS", "AST", "ALT", "ANIONGAP", "ESTGFRAFRICA", "EGFRNONAA" in the last 48 hours., CBC   Recent Labs   Lab 10/06/23  0724   WBC 9.71   HGB 9.0*   HCT 27.4*      , INR No results for input(s): "INR", "PROTIME" in the last 48 hours., Lipid Panel No results for input(s): "CHOL", "HDL", "LDLCALC", "TRIG", "CHOLHDL" in the last 48 hours., Troponin No results " "for input(s): "TROPONINI" in the last 48 hours., and All pertinent lab results from the last 24 hours have been reviewed.    Significant Imaging: Echocardiogram: 2D echo with color flow doppler: No results found for this or any previous visit. and Transthoracic echo (TTE) complete (Cupid Only):   Results for orders placed or performed during the hospital encounter of 09/29/23   Echo   Result Value Ref Range    BSA 1.9 m2    Hoyt's Biplane MOD Ejection Fraction 64 %    LVOT stroke volume 51.18 cm3    LVIDd 4.07 3.5 - 6.0 cm    LV Systolic Volume 33.51 mL    LV Systolic Volume Index 17.8 mL/m2    LVIDs 2.95 2.1 - 4.0 cm    LV Diastolic Volume 72.74 mL    LV Diastolic Volume Index 38.69 mL/m2    IVS 0.87 0.6 - 1.1 cm    LVOT diameter 2.0 cm    LVOT area 3.1 cm2    FS 28 28 - 44 %    Left Ventricle Relative Wall Thickness 0.36 cm    Posterior Wall 0.74 0.6 - 1.1 cm    LV mass 96.97 g    LV Mass Index 52 g/m2    TDI LATERAL 0.26 m/s    TDI SEPTAL 0.26 m/s    TR Max Mathew 2.71 m/s    LVOT peak mathew 1.01 m/s    Left Ventricular Outflow Tract Mean Velocity 0.76 cm/s    Left Ventricular Outflow Tract Mean Gradient 2.53 mmHg    LA size 3.83 cm    Left Atrium Major Axis 5.13 cm    RV S' 9.74 cm/s    TAPSE 2.27 cm    RA Major Axis 5.45 cm    RA Width 4.05 cm    AV mean gradient 4 mmHg    AV peak gradient 7 mmHg    Ao peak mathew 1.31 m/s    Ao VTI 31.90 cm    LVOT peak VTI 16.30 cm    AV valve area 1.60 cm²    AV Velocity Ratio 0.77     AV index (prosthetic) 0.51     MADY by Velocity Ratio 2.42 cm²    Mr max mathew 4.15 m/s    MV mean gradient 2 mmHg    MV peak gradient 5 mmHg    MV valve area by continuity eq 2.35 cm2    MV VTI 21.8 cm    Triscuspid Valve Regurgitation Peak Gradient 29 mmHg    PV PEAK VELOCITY 1.00 m/s    PV peak gradient 4 mmHg    Sinus 2.67 cm    STJ 2.62 cm    Ascending aorta 2.93 cm    IVC diameter 2.15 cm    Mean e' 0.26 m/s    ZLVIDS -0.74     ZLVIDD -2.55     LA WIDTH 4.3 cm    EF 55 %    TV resting pulmonary " artery pressure 44 mmHg    RV TB RVSP 18 mmHg    Est. RA pres 15 mmHg    Narrative      Left Ventricle: The left ventricle is normal in size. Normal wall   thickness. Normal wall motion. There is normal systolic function. Ejection   fraction by visual approximation is 55%. Unable to assess diastolic   function due to atrial fibrillation.    Left Atrium: Left atrium is mildly dilated.    Right Ventricle: Normal right ventricular cavity size. Wall thickness   is normal. Right ventricle wall motion  is normal. Systolic function is   normal.    Right Atrium: Right atrium is mildly dilated.    Mitral Valve: There is mild regurgitation.    Tricuspid Valve: There is mild regurgitation.    Pulmonary Artery: The estimated pulmonary artery systolic pressure is   44 mmHg.    IVC/SVC: Elevated venous pressure at 15 mmHg.      and EKG: reviewed.

## 2023-10-07 NOTE — NURSING
Pt complaining has CP/tightness (like an elephant sitting on her chest). just given pain med and vital signs taken per flow sheet. Informed Arekeva  NP. Placed new orders

## 2023-10-07 NOTE — PT/OT/SLP PROGRESS
Physical Therapy Treatment    Patient Name:  Graciela Barton   MRN:  [id number]    Recommendations:     Discharge Recommendations:  (High Intensity Therapy)  Discharge Equipment Recommendations:  (TBD at next level of care)  Barriers to discharge: Decreased caregiver support and increased assistance needed for all functional mobility    Assessment:     Graciela Barton is a 73 y.o. female admitted with a medical diagnosis of Lumbar stenosis with neurogenic claudication.  She presents with the following impairments/functional limitations: weakness, impaired endurance, impaired self care skills, impaired functional mobility, gait instability, impaired balance, decreased coordination, decreased upper extremity function, decreased lower extremity function, decreased safety awareness, pain, decreased ROM, impaired coordination, impaired fine motor, impaired skin, edema, orthopedic precautions. Pt's HR prior to movement 125-133. Sat at EOB requiring mod/max A secondary to pt's increased tone BLE's greatly affecting her sitting balance. Squat stand required total A of 2. HR elevated to 146 during activity. Would benefit from continued PT services when HR is controlled to increase her out of bed therapeutic activity and exercise.    Rehab Prognosis: Fair; patient would benefit from acute skilled PT services to address these deficits and reach maximum level of function.    Recent Surgery: Procedure(s) (LRB):  Transesophageal echo (CARLYLE) intra-procedure log documentation (N/A) 3 Days Post-Op    Plan:     During this hospitalization, patient to be seen daily to address the identified rehab impairments via gait training, therapeutic activities, therapeutic exercises, neuromuscular re-education and progress toward the following goals:    Plan of Care Expires:  11/01/23    Subjective     Chief Complaint: None Expressed  Patient/Family Comments/goals: None Expressed  Pain/Comfort:  Pain Rating 1:  (Did not rate)  Location - Side 1:  Bilateral  Location - Orientation 1: generalized  Location 1:  (Spine and BLE's)  Pain Addressed 1: Reposition, Pre-medicate for activity, Distraction, Cessation of Activity, Nurse notified  Pain Rating Post-Intervention 1:  (Did not rate)      Objective:     Communicated with nurse prior to session.  Patient found HOB elevated with bed alarm, aden catheter, peripheral IV, oxygen, telemetry upon PT entry to room.     General Precautions: Standard, fall  Orthopedic Precautions: spinal precautions  Braces: TLSO  Respiratory Status: Nasal cannula, flow 1 L/min     Functional Mobility:  Bed Mobility:     Rolling Left:  maximal assistance and total assistance  Rolling Right: maximal assistance and total assistance  Scooting: total assistance and of 2 persons  Supine to Sit: maximal assistance  Sit to Supine: maximal assistance, total assistance, and of 2 persons  Transfers:     Sit to Stand:  total assistance, of 2 persons, and pt performed a squat stand as she was unable to clear buttocks from the EOB with rolling walker      AM-PAC 6 CLICK MOBILITY  Turning over in bed (including adjusting bedclothes, sheets and blankets)?: 2  Sitting down on and standing up from a chair with arms (e.g., wheelchair, bedside commode, etc.): 2  Moving from lying on back to sitting on the side of the bed?: 2  Moving to and from a bed to a chair (including a wheelchair)?: 1  Need to walk in hospital room?: 1  Climbing 3-5 steps with a railing?: 1  Basic Mobility Total Score: 9       Treatment & Education:  Pt's HR prior to movement 122-133. Nurse okayed therapy session.Pt sat at EOB requiring mod/max assist to maintain sitting balance. Pt's increased tone in BLE's decreases her sitting balance requiring constant assistance. Squat stand performed with pillow placed between LE's to increase her abduction. Pt unable to clear buttocks from EOB. Pt scooted along EOB in sitting requiring max/total A of 2 people. HR while performing activities  increased to 146. After session . Pt positioned left side lying with 2 pillows placed between BLE's to promote adductor stretch.     Patient left left sidelying with all lines intact, call button in reach, bed alarm on, and nurse notified..    GOALS:   Multidisciplinary Problems       Physical Therapy Goals          Problem: Physical Therapy    Goal Priority Disciplines Outcome Goal Variances Interventions   Physical Therapy Goal     PT, PT/OT Ongoing, Progressing     Description: Goals to be met by: 2023     Patient will increase functional independence with mobility by performin. Supine to sit with Modified [place]  2. Sit to supine with Modified [place]  3. Sit to stand transfer with Modified [place]  4. Gait  x 100 feet with Minimal Assistance using Rolling Walker.   5. Stand for 10 minutes with Minimal Assistance using Rolling Walker                         Time Tracking:     PT Received On: 10/07/23  PT Start Time: 1234     PT Stop Time: 1317  PT Total Time (min): 43 min     Billable Minutes: Therapeutic Activity 43    Treatment Type: Treatment        Number of PTA visits since last PT visit: 1     10/07/2023

## 2023-10-07 NOTE — NURSING
Called Dr. Del Rosario(Cardiologist) informed HR continues to sustain in the upper 140's despite having received a total of 75 mg po,  And that the order for 5 mg IVP q 5 min prn cannot be adm on  the floor per frequency orders. Md states has already seen this pt and that she is hemodynamically stable and is okay to continue observation. Per LAURYN Escoto(ICU) Md stated to hold the 5 mg IVP.

## 2023-10-07 NOTE — NURSING
RAPID RESPONSE NURSE PROACTIVE ROUNDING NOTE         Admit Date: 2023  LOS: 8  Code Status: Full Code   Date of Visit: 10/07/2023  : 1950  Age: 73 y.o.  Sex: female  Race: White  Bed: K426/K426 A:   MRN: [id number]  Was the patient discharged from an ICU this admission? Yes   Was the patient discharged from a PACU within last 24 hours? No   Did the patient receive conscious sedation/general anesthesia in last 24 hours? No   Was the patient in the ED within the past 24 hours? No   Was the patient on NIPPV within the past 24 hours? No   Attending Physician: Scooter Salter MD  Primary Service: Neurosurgery       SITUATION      Diagnosis: Lumbar stenosis with neurogenic claudication   has a past medical history of Arthritis, Cancer, Edema, Hyperlipidemia, Hypertension, and Thyroid disease.    Last Vitals:  Temp: 98.4 °F (36.9 °C) (10/07 0408)  Pulse: 133 (10/07 0415)  Resp: 18 (10/07 0408)  BP: 141/88 (10/07 0607)  SpO2: 98 % (10/07 0408)    24 Hour Vitals Range:  Temp:  [97.9 °F (36.6 °C)-98.8 °F (37.1 °C)]   Pulse:  [6-153]   Resp:  [12-18]   BP: (129-185)/(60-97)   SpO2:  [95 %-98 %]     Pt tachycardic, sustaining HR above 130, PRN metoprolol order in place. Dose given with appropriate response.    FOLLOW UP    Call back the Rapid Response NurseCarlos at [phone] for additional questions or concerns.

## 2023-10-07 NOTE — PROGRESS NOTES
Ct lumbar spine reviewed. Expected post-operative changes with mild subsidence of the interbody spacer at L3-4 with associated non displaced linear coronal fracture. Small non concerning hematoma gus-psoas on the left side.     Scooter Salter MD

## 2023-10-07 NOTE — SIGNIFICANT EVENT
Patient evaluated at the bedside after nursing called to report patient with Chest tightness and SOB.  On my bedside eval patient is AAOx4 with 2L NC in use.  Patient states she has never had this type of discomfort before.  Patient describes this feeling in her chest as if an elephant is sitting on her chest.  -Stat CXR  -Stat EKG  -Stat troponin, CBC, BMP, and MG ordered  -nursing has been asked to give IV metoprolol prn orders  -HR on tele 130-140 Afib noted on tele  -Placed a call to Dr. Del Rosario (cardiology)  -Metoprolol changed from Toprol to Metoprolol tartrate 50 mg PO daily--ok to uptitrate per cardiology.  -Cont Amino gtt per cardiology orders  -Plan to transfer to the ICU if no improvement with IV metoprolol 5 mg prn orders    Critical Care Time: 30 Minutes of critical care time was spent in the care of the patient. This included management of organ failures related to critical illness, titration of continuous infusions, review of pertinent labs and imaging studies, discussion of the patient with consulting services, and discussion of the patients condition with the patient and family.       Yenni Swift, JONELLE

## 2023-10-07 NOTE — NURSING
Rapid Response Nurse Follow-up Note     Followed up with patient for proactive rounding.   Patient C/O CP.  JONELLE Swift at bedside to assess.  Labs, CXR, and 12-lead EKG ordered.  5 mg Lopressor IVP given at 1430.  Shortly afterwards, patient denies CP.    Patient given metoprolol tartrate 25 mg PO x 1 at 1527.  Reviewed plan of care with bedside [facility] .     @1600: Patient's HR sustaining 130's  Team will continue to follow.    Please call Rapid Response RN, Tigist Hawkins RN with any questions or concerns at [phone].

## 2023-10-08 PROCEDURE — 63600175 PHARM REV CODE 636 W HCPCS: Performed by: NURSE PRACTITIONER

## 2023-10-08 PROCEDURE — 93005 ELECTROCARDIOGRAM TRACING: CPT

## 2023-10-08 PROCEDURE — 93010 EKG 12-LEAD: ICD-10-PCS | Mod: ,,, | Performed by: INTERNAL MEDICINE

## 2023-10-08 PROCEDURE — 25000003 PHARM REV CODE 250: Performed by: NEUROLOGICAL SURGERY

## 2023-10-08 PROCEDURE — 11000001 HC ACUTE MED/SURG PRIVATE ROOM

## 2023-10-08 PROCEDURE — 93010 ELECTROCARDIOGRAM REPORT: CPT | Mod: 76,,, | Performed by: INTERNAL MEDICINE

## 2023-10-08 PROCEDURE — 25000003 PHARM REV CODE 250: Performed by: HOSPITALIST

## 2023-10-08 PROCEDURE — 93010 ELECTROCARDIOGRAM REPORT: CPT | Mod: ,,, | Performed by: INTERNAL MEDICINE

## 2023-10-08 PROCEDURE — 25000003 PHARM REV CODE 250: Performed by: INTERNAL MEDICINE

## 2023-10-08 PROCEDURE — 94799 UNLISTED PULMONARY SVC/PX: CPT

## 2023-10-08 PROCEDURE — 99233 PR SUBSEQUENT HOSPITAL CARE,LEVL III: ICD-10-PCS | Mod: ,,, | Performed by: INTERNAL MEDICINE

## 2023-10-08 PROCEDURE — 99233 SBSQ HOSP IP/OBS HIGH 50: CPT | Mod: ,,, | Performed by: INTERNAL MEDICINE

## 2023-10-08 PROCEDURE — 94761 N-INVAS EAR/PLS OXIMETRY MLT: CPT

## 2023-10-08 RX ORDER — METOPROLOL TARTRATE 25 MG/1
25 TABLET, FILM COATED ORAL 2 TIMES DAILY
Status: DISCONTINUED | OUTPATIENT
Start: 2023-10-08 | End: 2023-10-10 | Stop reason: HOSPADM

## 2023-10-08 RX ORDER — METOPROLOL TARTRATE 25 MG/1
25 TABLET, FILM COATED ORAL 2 TIMES DAILY
Status: COMPLETED | OUTPATIENT
Start: 2023-10-08 | End: 2023-10-08

## 2023-10-08 RX ORDER — CYCLOBENZAPRINE HCL 5 MG
5 TABLET ORAL 3 TIMES DAILY PRN
Status: DISCONTINUED | OUTPATIENT
Start: 2023-10-08 | End: 2023-10-10 | Stop reason: HOSPADM

## 2023-10-08 RX ADMIN — APIXABAN 5 MG: 5 TABLET, FILM COATED ORAL at 08:10

## 2023-10-08 RX ADMIN — APIXABAN 5 MG: 5 TABLET, FILM COATED ORAL at 09:10

## 2023-10-08 RX ADMIN — BISACODYL 10 MG: 10 SUPPOSITORY RECTAL at 09:10

## 2023-10-08 RX ADMIN — AMIODARONE HYDROCHLORIDE 1 MG/MIN: 1.8 INJECTION, SOLUTION INTRAVENOUS at 04:10

## 2023-10-08 RX ADMIN — DOCUSATE SODIUM AND SENNOSIDES 2 TABLET: 8.6; 5 TABLET, FILM COATED ORAL at 08:10

## 2023-10-08 RX ADMIN — AMIODARONE HYDROCHLORIDE 1 MG/MIN: 1.8 INJECTION, SOLUTION INTRAVENOUS at 12:10

## 2023-10-08 RX ADMIN — AMIODARONE HYDROCHLORIDE 1 MG/MIN: 1.8 INJECTION, SOLUTION INTRAVENOUS at 10:10

## 2023-10-08 RX ADMIN — METOPROLOL TARTRATE 25 MG: 25 TABLET, FILM COATED ORAL at 10:10

## 2023-10-08 RX ADMIN — OXYCODONE HYDROCHLORIDE 10 MG: 5 TABLET ORAL at 05:10

## 2023-10-08 RX ADMIN — DOCUSATE SODIUM AND SENNOSIDES 2 TABLET: 8.6; 5 TABLET, FILM COATED ORAL at 09:10

## 2023-10-08 RX ADMIN — BACLOFEN 5 MG: 5 TABLET ORAL at 09:10

## 2023-10-08 RX ADMIN — CYCLOBENZAPRINE HYDROCHLORIDE 5 MG: 5 TABLET, FILM COATED ORAL at 01:10

## 2023-10-08 RX ADMIN — AMIODARONE HYDROCHLORIDE 1 MG/MIN: 1.8 INJECTION, SOLUTION INTRAVENOUS at 05:10

## 2023-10-08 RX ADMIN — AMIODARONE HYDROCHLORIDE 1 MG/MIN: 1.8 INJECTION, SOLUTION INTRAVENOUS at 11:10

## 2023-10-08 RX ADMIN — TRAMADOL HYDROCHLORIDE 100 MG: 50 TABLET, COATED ORAL at 01:10

## 2023-10-08 RX ADMIN — BACLOFEN 5 MG: 5 TABLET ORAL at 03:10

## 2023-10-08 RX ADMIN — METOPROLOL TARTRATE 25 MG: 25 TABLET, FILM COATED ORAL at 08:10

## 2023-10-08 RX ADMIN — CYCLOBENZAPRINE HYDROCHLORIDE 5 MG: 5 TABLET, FILM COATED ORAL at 11:10

## 2023-10-08 RX ADMIN — BACLOFEN 5 MG: 5 TABLET ORAL at 08:10

## 2023-10-08 RX ADMIN — LEVOTHYROXINE SODIUM 137 MCG: 25 TABLET ORAL at 05:10

## 2023-10-08 NOTE — NURSING
Received report from ICU nurse, pt arrived on unit. VS taken. Pt Amiodarone 33.3ml/hr IV. Bed in lowest position call bell within reach. Instructed to call for assistance.

## 2023-10-08 NOTE — ASSESSMENT & PLAN NOTE
Rated controlled on metoprolol and amiodarone  Patient is back to SR  Continue anticoagulation for stroke prophylaxis   If patient needs long term amiodarone, will need outpatient PFT monitoring on amiodarone

## 2023-10-08 NOTE — PLAN OF CARE
10/08/23 1741   Admission   Initial VN Admission Questions Complete   Communication Issues? None   Shift   Virtual Nurse - Rounding Complete   Pain Management Interventions care clustered;diversional activity provided;quiet environment facilitated;relaxation techniques promoted   Virtual Nurse - Patient Verbalized Approval Of VN Rounding;Camera Use   Type of Frequent Check   Type Patient Rounds;Telemetry Monitoring   Safety/Activity   Patient Rounds bed in low position;ID band on;placement of personal items at bedside;bed wheels locked;call light in patient/parent reach;visualized patient;clutter free environment maintained   Safety Promotion/Fall Prevention assistive device/personal item within reach;bed alarm set;diversional activities provided;Fall Risk reviewed with patient/family;room near unit station;side rails raised x 2;instructed to call staff for mobility   Safety Precautions emergency equipment at bedside   Activity Management Rolling - L1   Positioning   Body Position supine   Head of Bed (HOB) Positioning HOB at 30-45 degrees   Pain/Comfort/Sleep   Preferred Pain Scale number (Numeric Rating Pain Scale)   Cardiac   Cardiac/Telemetry Monitor On Yes   ECG   Rhythm normal sinus rhythm     VN cued into patient's room for introduction with patient's permission.  VN role explained and informed patient that VN would be working with bedside nurse and rest of care team.  Plan of care reviewed. Fall risk and bed alarm protocol education provided.  Instructed patient to call for assistance.  Patient aware and agreeable.  Patient's chart, labs and vital signs reviewed.  Allowed time for questions.  No acute distress noted.  Will continue to be available as needed.

## 2023-10-08 NOTE — ASSESSMENT & PLAN NOTE
Atrial flutter  Anticoagulant long-term use  -chronic, has been in Aflutter for quite some time  -RVR rate worsened after admission in setting of neurosurgical procedure, anemia, and chronic hypothyroidism  -Cardiology following   -home propafenone discontinued per recs (failed drug in setting of several months history of AFlutter)  -continue home metoprolol (titrations limited due to BP)  -s/p IV dig x 3 doses   -initiated on esmolol gtt 10/01 due to no improvement/elevation in HR overnight   -will need outpt follow up with EP for consideration of DCCV +/- ablation  -continue tele monitoring  -EKG PRN concerns  -trend labs, address/replete electrolytes as indicated  -monitor    -started on diltiazem drip which is on hold at this time.  Will continue metoprolol 25 mg po q.6  -status post DCCV +/- ablation now in sinus rhythm, return to AFib with RVR on 10/6. Resume amiodarone and switched to metoprolol succinate to tartrate  -continuing full-dose Lovenox and will transition per Cardiology to oral   -10/6 converted back into Afib with RVR today. Amino gtt initiated per cardiology.    10/7- appreciate cardiology---- on Amiodarone IV load and metoprolol (uptritrate metoprolol to obtain goal HR < 120 bpm), on AC for CVA ppx. HR has been 130-140.

## 2023-10-08 NOTE — PROGRESS NOTES
[facility] Medicine  Progress Note    Patient Name: Graciela Barton  MRN: [id number]  Patient Class: IP- Inpatient   Admission Date: 9/29/2023  Length of Stay: 9 days  Attending Physician: Scooter Salter MD  Primary Care Provider: Rocco Miller MD        Subjective:     Principal Problem:Lumbar stenosis with neurogenic claudication      HPI:  Ms. Barton is a 73 YOF with PMHx of HTN, HLD, BPPV, PAfib/flutter on OAC, history of thyroid cancer s/p thyroidectomy in 2018 with hypothyroidism, thoracic myelopathy with neurogenic claudication s/p s/p T6-7 T7-8 anterior diskectomy (05/12/2023) and now s/p left L3-4, L4-5 OLIF, L5-S1 ALIF and L3-S1 posterior instrumented fusion (09/29/2023).     Admission testing reviewed noting WBC 12 >>13, hemoglobin 9.7 >> 8.5 >> 7.4, hematocrit 30 >> 25.9 >> 22.8; chemistry stable; lactic 1.0; TSH 7.448 with FT4 0.97; CXR with clearing of pleural fluid and atelectasis in the right lung base; BLE US without evidence of DVT; and TTE with LVEF >55%, LA mild dilation, RA mild dilation, normal RV size and function, mild MR, mild TR, RVSP 44mmHg. From neurosurgery perspective she is progressing well in post operative course however the course has been complicated by AFib/flutter with RVR refractory to home medications, propafenone and metoprolol, and amiodarone gtt.     She is seen today in consult for medical management. She notes that overall pain is well controlled however she is developing cramping sensation in BLEs. She denies palpitations, SOB, ELLIS, CP, abdominal pain, N/V/D, constipation, fever, chills, light headiness, dizziness, seizures, or syncope.     Hospital Medicine Service was consulted for assistance with medical management.         Overview/Hospital Course:  Ms. Barton was admitted 09/29 by Neurosurgery and is now s/p left L3-4, L4-5 OLIF, L5-S1 ALIF and L3-S1 posterior instrumented fusion. From neurosurgery perspective she is progressing well in post  operative course. However the course has been complicated by AFib/flutter with RVR that was refractory to home medications, propafenone and metoprolol; and amiodarone gtt. Cardiology following with recommendations to transfuse due to anemia (two units PRBCs transfused 10/01), stop home propafenone (failed drug in setting of several months history of AFlutter), continue home metoprolol (titrations limited due to BP), and was give 3 doses of IV digoxin (completed). However without improvement and HR elevating to 140's she was initiated on esmolol gtt evening of 10/01.      Other concerns currently to monitor include monitoring for s/s of urinary retention and bowel regimen titrated 10/2 as last BM was day of admission per patient.   10/4:  Cardiology planning cardioversion on today.  Cardizem drip.  She was started on full-dose on 10/03/2023.  Disposition plans: to rehab when medically ready for intensive therapy.   10/5: Cardioversion yesterday now noted to be in NSR.  Still receiving full-dose Lovenox with plans to transition to oral on tomorrow per Cardiology.  Doing well on metoprolol.  Carver catheter still in place secondary to urinary retention.  10/6: DC held on today, converted back to Afib with RVR. She was transferred to [facility] and started on Amino gtt.   10/7: patient in Afib with a RVR rate since last night per chart review. Cardiology has assessed and following noted per cardiology's plan: - on Amiodarone IV load and metoprolol (uptritrate metoprolol to obtain goal HR < 120 bpm), on AC for CVA ppx      Interval History: awake and alert, stepped up to ICU due to uncontrolled Afib. Continued on Amio and switched from metoprolol succinate to tartrate.  Will step-down to floor  Appreciates cardiology plan for possible repeat cardioversion if atrial flutter persist    Review of Systems   Constitutional:  Positive for activity change. Negative for appetite change, chills, diaphoresis, fatigue and fever.    Respiratory:  Negative for cough, chest tightness, shortness of breath and wheezing.    Cardiovascular:  Negative for chest pain, palpitations and leg swelling.   Gastrointestinal:  Negative for abdominal distention, abdominal pain, constipation, diarrhea, nausea and vomiting.   Musculoskeletal:  Positive for arthralgias, back pain and gait problem.   Skin:  Positive for wound.   Neurological:  Negative for dizziness, syncope, weakness, light-headedness and headaches.     Objective:     Vital Signs (Most Recent):  Temp: 98.9 °F (37.2 °C) (10/08/23 0730)  Pulse: 63 (10/08/23 1100)  Resp: 16 (10/08/23 1100)  BP: 135/63 (10/08/23 1100)  SpO2: 95 % (10/08/23 1100) Vital Signs (24h Range):  Temp:  [97.6 °F (36.4 °C)-99.8 °F (37.7 °C)] 98.9 °F (37.2 °C)  Pulse:  [] 63  Resp:  [8-37] 16  SpO2:  [77 %-97 %] 95 %  BP: (101-163)/(53-78) 135/63     Weight: 88.3 kg (194 lb 10.7 oz)  Body mass index is 30.49 kg/m².    Intake/Output Summary (Last 24 hours) at 10/8/2023 1234  Last data filed at 10/8/2023 1100  Gross per 24 hour   Intake 1099.51 ml   Output 3405 ml   Net -2305.49 ml         Physical Exam  Vitals and nursing note reviewed.   Constitutional:       General: She is not in acute distress.     Appearance: Normal appearance. She is well-developed. She is not toxic-appearing.   HENT:      Head: Normocephalic and atraumatic.      Mouth/Throat:      Dentition: Normal dentition.   Eyes:      General: Lids are normal.   Cardiovascular:      Rate and Rhythm: Normal rate. Rhythm irregular.   Pulmonary:      Effort: Pulmonary effort is normal.      Breath sounds: Normal breath sounds.   Abdominal:      General: Bowel sounds are normal.      Palpations: Abdomen is soft.   Musculoskeletal:         General: Swelling present.      Cervical back: Neck supple.      Right lower leg: Edema present.      Left lower leg: Edema present.   Skin:     General: Skin is warm and dry.      Findings: No erythema or rash.   Neurological:     "  Mental Status: She is alert and oriented to person, place, and time.             Significant Labs: A1C: No results for input(s): "HGBA1C" in the last 4320 hours.  ABGs: No results for input(s): "PH", "PCO2", "HCO3", "POCSATURATED", "BE", "TOTALHB", "COHB", "METHB", "O2HB", "POCFIO2", "PO2" in the last 48 hours.  Blood Culture: No results for input(s): "LABBLOO" in the last 48 hours.  CBC:   Recent Labs   Lab 10/07/23  1420   WBC 10.66   HGB 10.4*   HCT 31.0*        CMP:   Recent Labs   Lab 10/07/23  1420      K 3.7      CO2 31*   *   BUN 7*   CREATININE 0.6   CALCIUM 8.3*   ANIONGAP 8     Lactic Acid: No results for input(s): "LACTATE" in the last 48 hours.  Lipase: No results for input(s): "LIPASE" in the last 48 hours.  Lipid Panel: No results for input(s): "CHOL", "HDL", "LDLCALC", "TRIG", "CHOLHDL" in the last 48 hours.  Magnesium:   Recent Labs   Lab 10/07/23  1420   MG 1.8     Troponin:   Recent Labs   Lab 10/07/23  1420   TROPONINI 0.006     TSH:   Recent Labs   Lab 09/30/23  1148   TSH 7.448*     Urine Culture: No results for input(s): "LABURIN" in the last 48 hours.  Urine Studies: No results for input(s): "COLORU", "APPEARANCEUA", "PHUR", "SPECGRAV", "PROTEINUA", "GLUCUA", "KETONESU", "BILIRUBINUA", "OCCULTUA", "NITRITE", "UROBILINOGEN", "LEUKOCYTESUR", "RBCUA", "WBCUA", "BACTERIA", "SQUAMEPITHEL", "HYALINECASTS" in the last 48 hours.    Invalid input(s): "WRIGHTSUR"    Significant Imaging: I have reviewed all pertinent imaging results/findings within the past 24 hours.      Assessment/Plan:      * Lumbar stenosis with neurogenic claudication  Chronic myelopathy  Foraminal stenosis of lumbar region  Neurogenic claudication   DDD, lumbar  Decreased ROM of R knee  Assistance needed for mobility     -thoracic myelopathy with neurogenic claudication s/p s/p T6-7 T7-8 anterior diskectomy (05/12/2023) and now s/p left L3-4, L4-5 OLIF, L5-S1 ALIF and L3-S1 posterior instrumented fusion " (09/29/2023)  -management per primary team   -remains with surgical drain  -PT/OT following   -rehab placement    Acute blood loss anemia  -s/p 2 units PRBC transfusion 10/2 with appropriate response  -monitor for s/s of bleeding   -trend labs and monitor  -further transfusions as indicated     PAF (paroxysmal atrial fibrillation)  Atrial flutter  Anticoagulant long-term use  -chronic, has been in Aflutter for quite some time  -RVR rate worsened after admission in setting of neurosurgical procedure, anemia, and chronic hypothyroidism  -Cardiology following   -home propafenone discontinued per recs (failed drug in setting of several months history of AFlutter)  -continue home metoprolol (titrations limited due to BP)  -s/p IV dig x 3 doses   -initiated on esmolol gtt 10/01 due to no improvement/elevation in HR overnight   -will need outpt follow up with EP for consideration of DCCV +/- ablation  -continue tele monitoring  -EKG PRN concerns  -trend labs, address/replete electrolytes as indicated  -monitor    -started on diltiazem drip which is on hold at this time.  Will continue metoprolol 25 mg po q.6  -status post DCCV +/- ablation now in sinus rhythm, return to AFib with RVR on 10/6. Resume amiodarone and switched to metoprolol succinate to tartrate  -continuing full-dose Lovenox and will transition per Cardiology to oral   -10/6 converted back into Afib with RVR today. Amino gtt initiated per cardiology.    10/7- appreciate cardiology---- on Amiodarone IV load and metoprolol (uptritrate metoprolol to obtain goal HR < 120 bpm), on AC for CVA ppx. HR has been 130-140.    Mixed hyperlipidemia  -chronic  -resume home rosuvastatin at discharge     Essential hypertension, benign  -chronic  -low/normotensive pressures at current  -hold home HCTZ  -continue metoprolol as noted above   -dose/medication adjustment as appropriate   -monitor     Postoperative hypothyroidism  History of thyroid cancer  History of  thyroidectomy     -chronic  -improvement in TSH   -continue home levothyroxine      VTE Risk Mitigation (From admission, onward)         Ordered     apixaban tablet 5 mg  2 times daily         10/06/23 0846     IP VTE HIGH RISK PATIENT  Once         09/29/23 2314     Place sequential compression device  Until discontinued         09/29/23 2314                Discharge Planning   ASYA: 10/6/2023     Code Status: Full Code   Is the patient medically ready for discharge?:     Reason for patient still in hospital (select all that apply): Patient trending condition  Discharge Plan A: Rehab   Discharge Delays: None known at this time        Critical care time spent on the evaluation and treatment of severe organ dysfunction, review of pertinent labs and imaging studies, discussions with consulting providers and discussions with patient/family: 35 minutes.      Fabienne Melgar MD  Department of Hospital Medicine   [facility] - Intensive Care

## 2023-10-08 NOTE — NURSING TRANSFER
Nursing Transfer Note      10/8/2023   5:00 PM    Nurse giving handoff:Devi RN   Nurse receiving handoff:Steven LAKE    Reason patient is being transferred: Step down to telemetry    Transfer To: Telemetry     Transfer via bed    Transfer with cardiac monitoring    Transported by Transport Tech and This RN    Transfer Vital Signs:  Blood Pressure:158/76   Heart Rate:68  O2:97  Temperature:98.8  Respirations:18    Telemetry: Box Number [id number]  Order for Tele Monitor? Yes    Additional Lines: Carver Catheter    Medicines sent: Amiodarone IV    Patient belongings transferred with patient: Yes, including black rolling walker, phone, , and back brace.     Chart send with patient: Yes    Notified: Pt notified family via phone.     Upon arrival to floor: Personal belongings at room, phone within reach, cardiac monitor applied, patient oriented to room, call bell in reach, and bed in lowest position. Receiving nurse at bedside.

## 2023-10-08 NOTE — PROGRESS NOTES
[facility]  Pulmonology  Progress Note    Patient Name: Graciela Barton  MRN: [id number]  Admission Date: 9/29/2023  Hospital Length of Stay: 9 days  Code Status: Full Code  Attending Provider: Scooter Salter MD  Primary Care Provider: Rocco Miller MD   Principal Problem: Lumbar stenosis with neurogenic claudication    Subjective:     Interval History:   Patient stepped up to ICU because of atrial fib with RVR  Patient is on amiodarone drip. Patient is rate controlled and converted back to sinus rhythm.      Objective:     Vital Signs (Most Recent):  Temp: 98.8 °F (37.1 °C) (10/08/23 1330)  Pulse: 66 (10/08/23 1603)  Resp: 13 (10/08/23 1603)  BP: (!) 165/74 (10/08/23 1603)  SpO2: 96 % (10/08/23 1603) Vital Signs (24h Range):  Temp:  [98.3 °F (36.8 °C)-99.8 °F (37.7 °C)] 98.8 °F (37.1 °C)  Pulse:  [] 66  Resp:  [8-37] 13  SpO2:  [77 %-97 %] 96 %  BP: (101-177)/() 165/74     Weight: 88.3 kg (194 lb 10.7 oz)  Body mass index is 30.49 kg/m².      Intake/Output Summary (Last 24 hours) at 10/8/2023 1711  Last data filed at 10/8/2023 1500  Gross per 24 hour   Intake 1674.82 ml   Output 2510 ml   Net -835.18 ml        Physical Exam  Vitals reviewed.   Constitutional:       General: She is not in acute distress.     Appearance: Normal appearance. She is not ill-appearing.   HENT:      Mouth/Throat:      Mouth: Mucous membranes are moist.   Eyes:      Conjunctiva/sclera: Conjunctivae normal.   Cardiovascular:      Rate and Rhythm: Normal rate and regular rhythm.      Pulses: Normal pulses.      Heart sounds: No murmur heard.  Pulmonary:      Effort: Pulmonary effort is normal. No respiratory distress.      Breath sounds: Normal breath sounds. No wheezing or rhonchi.   Abdominal:      Palpations: Abdomen is soft.   Musculoskeletal:      Right lower leg: No edema.      Left lower leg: No edema.   Neurological:      Mental Status: She is alert and oriented to person, place, and time.   Psychiatric:          Mood and Affect: Mood normal.         Behavior: Behavior normal.           Review of Systems    Vents:  Vent Mode: SPONT-PS (09/30/23 1136)  Set Rate: 18 BPM (09/30/23 0930)  Vt Set: 380 mL (09/30/23 0930)  PEEP/CPAP: 0.1 cmH20 (09/30/23 1136)  Oxygen Concentration (%): 28 (10/01/23 2030)  Peak Airway Pressure: 0.1 cmH20 (09/30/23 1136)  Plateau Pressure: 15.8 cmH20 (09/30/23 0551)  Total Ve: 2.11 L/m (09/30/23 1136)  F/VT Ratio<105 (RSBI): (!) 26.47 (09/30/23 0930)    Lines/Drains/Airways       Drain  Duration                  Urethral Catheter 10/02/23 1445 Non-latex;Straight-tip 6 days              Peripheral Intravenous Line  Duration                  Peripheral IV - Single Lumen 10/06/23 20 G Anterior;Left Forearm 2 days                    Significant Labs:    CBC/Anemia Profile:  Recent Labs   Lab 10/07/23  1420   WBC 10.66   HGB 10.4*   HCT 31.0*      MCV 93   RDW 13.9        Chemistries:  Recent Labs   Lab 10/07/23  1420      K 3.7      CO2 31*   BUN 7*   CREATININE 0.6   CALCIUM 8.3*   MG 1.8   PHOS 3.1       All pertinent labs within the past 24 hours have been reviewed.    Significant Imaging:  I have reviewed all pertinent imaging results/findings within the past 24 hours.    Assessment/Plan:     Cardiac/Vascular  Atrial fibrillation with RVR  Rated controlled on metoprolol and amiodarone  Patient is back to SR  Continue anticoagulation for stroke prophylaxis   If patient needs long term amiodarone, will need outpatient PFT monitoring on amiodarone         Okay to step down from ICU.    Patient was seen with the attending physician Dr. Cristopher Romero.         Shorty Lebron MD  Pulmonology  [facility] - Telemetry    Pt seen and examined with Pulmonary/Critical Care team and this note reviewed and validated with the following additional comments:  Known to me from earlier this month.  Spontaneously converted to sinus rhythm.  Reloaded with amiodarone. OK to step down to  floor.    Medical Decision Making (MDM) was straight forward.  The number and complexity of problems addressed was limited.  The amount and complexity of data reviewed was limited.  The risk of complications and/or morbidity/mortality was high.  The tests ordered were BMP.  I communicated with the following providers Cardiology, [facility].  Time spent in the care of this patient was 10 minutes    Jean Lim MD  Phone [phone]

## 2023-10-08 NOTE — PROGRESS NOTES
[facility] - Intensive Care  Cardiology  Progress Note    Patient Name: Graciela Barton  MRN: [id number]  Admission Date: 9/29/2023  Hospital Length of Stay: 9 days  Code Status: Full Code   Attending Physician: Scooter Salter MD   Primary Care Physician: Rocco Miller MD  Expected Discharge Date: 10/6/2023  Principal Problem:Lumbar stenosis with neurogenic claudication    Subjective:     Hospital Course:   10/2/2023 Seen by Dr. Ortega over the weekend for consult of atrial flutter with RVR. Hx of PAF on propafenone. In Aflutter since 5/2023- HR in 115s-120s and on Lopressor and propafenone as outpatient.  Hgb 7 down from 12. And off anticoagulation. Given Digoxin .25mg IV every 6 hours x 3 overnight with continued HR in 140s. Started on Esmolol overnight due to elevated BP and currently on Esmolol drip along with oral Metoprolol. SBP 120s and patient asymptomatic. BONY drains remain in place from recent neurosurgery. Echo with EF 55%. Neurosurgery following as well   10/3/2023 Remains in atrial flutter with HR up to 160s-170s this AM. Started on IV Cardizem overnight with continued Metoprolol BID. SBP 100s-110s overnight. Asymptomatic from cardiac standpoint. H&H 10.3&31.4  BMP WNL. BONY drain remains in place from NS last week   10/4/2023 NPO for CARLYLE/DCCV  10/5/2023 Successful cardioversion yesterday with restoration to NSR. Remains in NSR with occasional runs of afib overnight short lived H&H 9.3/28.0 BMP WNL. SBP 120s-150s. No complaints this AM       Interval History: Pt noted to convert to NSR overnight.  She has no complaints.      Review of Systems   Constitutional: Negative for diaphoresis and fever.   HENT:  Negative for congestion and hearing loss.    Eyes:  Negative for blurred vision and pain.   Cardiovascular:  Negative for chest pain, claudication, dyspnea on exertion, leg swelling, near-syncope, palpitations and syncope.   Respiratory:  Negative for shortness of breath and sleep disturbances due to  breathing.    Hematologic/Lymphatic: Negative for bleeding problem. Does not bruise/bleed easily.   Skin:  Negative for color change and poor wound healing.   Gastrointestinal:  Negative for abdominal pain and nausea.   Genitourinary:  Negative for bladder incontinence and flank pain.   Neurological:  Negative for focal weakness and light-headedness.     Objective:     Vital Signs (Most Recent):  Temp: 98.7 °F (37.1 °C) (10/08/23 1130)  Pulse: 65 (10/08/23 1300)  Resp: 16 (10/08/23 1300)  BP: (!) 154/72 (10/08/23 1300)  SpO2: (!) 94 % (10/08/23 1300) Vital Signs (24h Range):  Temp:  [97.6 °F (36.4 °C)-99.8 °F (37.7 °C)] 98.7 °F (37.1 °C)  Pulse:  [] 65  Resp:  [8-37] 16  SpO2:  [77 %-97 %] 94 %  BP: (101-177)/(53-89) 154/72     Weight: 88.3 kg (194 lb 10.7 oz)  Body mass index is 30.49 kg/m².     SpO2: (!) 94 %         Intake/Output Summary (Last 24 hours) at 10/8/2023 1323  Last data filed at 10/8/2023 1300  Gross per 24 hour   Intake 1161.54 ml   Output 3905 ml   Net -2743.46 ml       Lines/Drains/Airways       Drain  Duration                  Urethral Catheter 10/02/23 1445 Non-latex;Straight-tip 5 days              Peripheral Intravenous Line  Duration                  Peripheral IV - Single Lumen 10/06/23 20 G Anterior;Left Forearm 2 days                       Physical Exam  Constitutional:       Appearance: She is well-developed. She is not diaphoretic.   HENT:      Head: Normocephalic and atraumatic.   Eyes:      General: No scleral icterus.     Pupils: Pupils are equal, round, and reactive to light.   Neck:      Vascular: No JVD.   Cardiovascular:      Rate and Rhythm: Normal rate and regular rhythm.      Pulses: Intact distal pulses.      Heart sounds: S1 normal and S2 normal. No murmur heard.     No friction rub. No gallop.   Pulmonary:      Effort: Pulmonary effort is normal. No respiratory distress.      Breath sounds: Normal breath sounds. No wheezing or rales.   Chest:      Chest wall: No  "tenderness.   Abdominal:      General: Bowel sounds are normal. There is no distension.      Palpations: Abdomen is soft. There is no mass.      Tenderness: There is no abdominal tenderness. There is no rebound.   Musculoskeletal:         General: No tenderness. Normal range of motion.      Cervical back: Normal range of motion and neck supple.      Right lower leg: No edema.      Left lower leg: No edema.   Skin:     General: Skin is warm and dry.      Coloration: Skin is not pale.   Neurological:      Mental Status: She is alert and oriented to person, place, and time.      Coordination: Coordination normal.      Deep Tendon Reflexes: Reflexes normal.   Psychiatric:         Behavior: Behavior normal.         Judgment: Judgment normal.            Significant Labs: BMP:   Recent Labs   Lab 10/07/23  1420   *      K 3.7      CO2 31*   BUN 7*   CREATININE 0.6   CALCIUM 8.3*   MG 1.8   , CMP   Recent Labs   Lab 10/07/23  1420      K 3.7      CO2 31*   *   BUN 7*   CREATININE 0.6   CALCIUM 8.3*   ANIONGAP 8   , CBC   Recent Labs   Lab 10/07/23  1420   WBC 10.66   HGB 10.4*   HCT 31.0*      , INR No results for input(s): "INR", "PROTIME" in the last 48 hours., Lipid Panel No results for input(s): "CHOL", "HDL", "LDLCALC", "TRIG", "CHOLHDL" in the last 48 hours., Troponin   Recent Labs   Lab 10/07/23  1420   TROPONINI 0.006   , and All pertinent lab results from the last 24 hours have been reviewed.    Significant Imaging: Echocardiogram: 2D echo with color flow doppler: No results found for this or any previous visit. and Transthoracic echo (TTE) complete (Cupid Only):   Results for orders placed or performed during the hospital encounter of 09/29/23   Echo   Result Value Ref Range    BSA 1.9 m2    Hoyt's Biplane MOD Ejection Fraction 64 %    LVOT stroke volume 51.18 cm3    LVIDd 4.07 3.5 - 6.0 cm    LV Systolic Volume 33.51 mL    LV Systolic Volume Index 17.8 mL/m2    " LVIDs 2.95 2.1 - 4.0 cm    LV Diastolic Volume 72.74 mL    LV Diastolic Volume Index 38.69 mL/m2    IVS 0.87 0.6 - 1.1 cm    LVOT diameter 2.0 cm    LVOT area 3.1 cm2    FS 28 28 - 44 %    Left Ventricle Relative Wall Thickness 0.36 cm    Posterior Wall 0.74 0.6 - 1.1 cm    LV mass 96.97 g    LV Mass Index 52 g/m2    TDI LATERAL 0.26 m/s    TDI SEPTAL 0.26 m/s    TR Max Mathew 2.71 m/s    LVOT peak mathew 1.01 m/s    Left Ventricular Outflow Tract Mean Velocity 0.76 cm/s    Left Ventricular Outflow Tract Mean Gradient 2.53 mmHg    LA size 3.83 cm    Left Atrium Major Axis 5.13 cm    RV S' 9.74 cm/s    TAPSE 2.27 cm    RA Major Axis 5.45 cm    RA Width 4.05 cm    AV mean gradient 4 mmHg    AV peak gradient 7 mmHg    Ao peak mathew 1.31 m/s    Ao VTI 31.90 cm    LVOT peak VTI 16.30 cm    AV valve area 1.60 cm²    AV Velocity Ratio 0.77     AV index (prosthetic) 0.51     MADY by Velocity Ratio 2.42 cm²    Mr max mathew 4.15 m/s    MV mean gradient 2 mmHg    MV peak gradient 5 mmHg    MV valve area by continuity eq 2.35 cm2    MV VTI 21.8 cm    Triscuspid Valve Regurgitation Peak Gradient 29 mmHg    PV PEAK VELOCITY 1.00 m/s    PV peak gradient 4 mmHg    Sinus 2.67 cm    STJ 2.62 cm    Ascending aorta 2.93 cm    IVC diameter 2.15 cm    Mean e' 0.26 m/s    ZLVIDS -0.74     ZLVIDD -2.55     LA WIDTH 4.3 cm    EF 55 %    TV resting pulmonary artery pressure 44 mmHg    RV TB RVSP 18 mmHg    Est. RA pres 15 mmHg    Narrative      Left Ventricle: The left ventricle is normal in size. Normal wall   thickness. Normal wall motion. There is normal systolic function. Ejection   fraction by visual approximation is 55%. Unable to assess diastolic   function due to atrial fibrillation.    Left Atrium: Left atrium is mildly dilated.    Right Ventricle: Normal right ventricular cavity size. Wall thickness   is normal. Right ventricle wall motion  is normal. Systolic function is   normal.    Right Atrium: Right atrium is mildly dilated.     Mitral Valve: There is mild regurgitation.    Tricuspid Valve: There is mild regurgitation.    Pulmonary Artery: The estimated pulmonary artery systolic pressure is   44 mmHg.    IVC/SVC: Elevated venous pressure at 15 mmHg.      and EKG: reviewed.      Assessment and Plan:       Acute blood loss anemia  - H&H 7&22 initially with improvement post PRBC transfusion   - tolerating Eliquis      Typical atrial flutter  Noted to convert to NSR while on amio gtt.    - history of atrial flutter and treated with Metoprolol and Propafenone as an outpatient  - atrial flutter with RVR with rate difficult to control (Metoprolol Q6hrs, Cardizem gtt and Digoxin)  - successful cardioversion 10/3/2023 with restoration to NSR; however reverted back to A. Fib/AFL w/ RVR  - on Amiodarone IV load and metoprolol (uptritrate metoprolol prn to obtain goal HR < 120 bpm), on AC for CVA ppx  - continue amio gtt for 10/8/2023 and in AM transition to PO amio taper load (400 mg BID x14 days, 400 mg daily x14 days, then 200 mg daily)  - arrange for EP follow up as an outpatient     PAF (paroxysmal atrial fibrillation)  - as detailed under atrial flutter     Mixed hyperlipidemia  Continue medical therapy.  Risk factor and lifestyle modifications.      Essential hypertension, benign  - SBP 120s-150s overnight  - on Metoprolol and HCTZ as an outpatient  - continue Metoprolol  - BP goal less than 130/80  - BP well controlled         VTE Risk Mitigation (From admission, onward)         Ordered     apixaban tablet 5 mg  2 times daily         10/06/23 0846     IP VTE HIGH RISK PATIENT  Once         09/29/23 2316     Place sequential compression device  Until discontinued         09/29/23 8137                Manuel Del Rosario III, MD  Cardiology  [facility] - Intensive Care

## 2023-10-08 NOTE — SUBJECTIVE & OBJECTIVE
Interval History: awake and alert, stepped up to ICU due to uncontrolled Afib. Continued on Amio and switched from metoprolol succinate to tartrate.  Will step-down to floor  Appreciates cardiology plan for possible repeat cardioversion if atrial flutter persist    Review of Systems   Constitutional:  Positive for activity change. Negative for appetite change, chills, diaphoresis, fatigue and fever.   Respiratory:  Negative for cough, chest tightness, shortness of breath and wheezing.    Cardiovascular:  Negative for chest pain, palpitations and leg swelling.   Gastrointestinal:  Negative for abdominal distention, abdominal pain, constipation, diarrhea, nausea and vomiting.   Musculoskeletal:  Positive for arthralgias, back pain and gait problem.   Skin:  Positive for wound.   Neurological:  Negative for dizziness, syncope, weakness, light-headedness and headaches.     Objective:     Vital Signs (Most Recent):  Temp: 98.9 °F (37.2 °C) (10/08/23 0730)  Pulse: 63 (10/08/23 1100)  Resp: 16 (10/08/23 1100)  BP: 135/63 (10/08/23 1100)  SpO2: 95 % (10/08/23 1100) Vital Signs (24h Range):  Temp:  [97.6 °F (36.4 °C)-99.8 °F (37.7 °C)] 98.9 °F (37.2 °C)  Pulse:  [] 63  Resp:  [8-37] 16  SpO2:  [77 %-97 %] 95 %  BP: (101-163)/(53-78) 135/63     Weight: 88.3 kg (194 lb 10.7 oz)  Body mass index is 30.49 kg/m².    Intake/Output Summary (Last 24 hours) at 10/8/2023 1234  Last data filed at 10/8/2023 1100  Gross per 24 hour   Intake 1099.51 ml   Output 3405 ml   Net -2305.49 ml         Physical Exam  Vitals and nursing note reviewed.   Constitutional:       General: She is not in acute distress.     Appearance: Normal appearance. She is well-developed. She is not toxic-appearing.   HENT:      Head: Normocephalic and atraumatic.      Mouth/Throat:      Dentition: Normal dentition.   Eyes:      General: Lids are normal.   Cardiovascular:      Rate and Rhythm: Normal rate. Rhythm irregular.   Pulmonary:      Effort: Pulmonary  "effort is normal.      Breath sounds: Normal breath sounds.   Abdominal:      General: Bowel sounds are normal.      Palpations: Abdomen is soft.   Musculoskeletal:         General: Swelling present.      Cervical back: Neck supple.      Right lower leg: Edema present.      Left lower leg: Edema present.   Skin:     General: Skin is warm and dry.      Findings: No erythema or rash.   Neurological:      Mental Status: She is alert and oriented to person, place, and time.             Significant Labs: A1C: No results for input(s): "HGBA1C" in the last 4320 hours.  ABGs: No results for input(s): "PH", "PCO2", "HCO3", "POCSATURATED", "BE", "TOTALHB", "COHB", "METHB", "O2HB", "POCFIO2", "PO2" in the last 48 hours.  Blood Culture: No results for input(s): "LABBLOO" in the last 48 hours.  CBC:   Recent Labs   Lab 10/07/23  1420   WBC 10.66   HGB 10.4*   HCT 31.0*        CMP:   Recent Labs   Lab 10/07/23  1420      K 3.7      CO2 31*   *   BUN 7*   CREATININE 0.6   CALCIUM 8.3*   ANIONGAP 8     Lactic Acid: No results for input(s): "LACTATE" in the last 48 hours.  Lipase: No results for input(s): "LIPASE" in the last 48 hours.  Lipid Panel: No results for input(s): "CHOL", "HDL", "LDLCALC", "TRIG", "CHOLHDL" in the last 48 hours.  Magnesium:   Recent Labs   Lab 10/07/23  1420   MG 1.8     Troponin:   Recent Labs   Lab 10/07/23  1420   TROPONINI 0.006     TSH:   Recent Labs   Lab 09/30/23  1148   TSH 7.448*     Urine Culture: No results for input(s): "LABURIN" in the last 48 hours.  Urine Studies: No results for input(s): "COLORU", "APPEARANCEUA", "PHUR", "SPECGRAV", "PROTEINUA", "GLUCUA", "KETONESU", "BILIRUBINUA", "OCCULTUA", "NITRITE", "UROBILINOGEN", "LEUKOCYTESUR", "RBCUA", "WBCUA", "BACTERIA", "SQUAMEPITHEL", "HYALINECASTS" in the last 48 hours.    Invalid input(s): "WRIGHTSUR"    Significant Imaging: I have reviewed all pertinent imaging results/findings within the past 24 hours.  "

## 2023-10-08 NOTE — ASSESSMENT & PLAN NOTE
Noted to convert to NSR while on amio gtt.    - history of atrial flutter and treated with Metoprolol and Propafenone as an outpatient  - atrial flutter with RVR with rate difficult to control (Metoprolol Q6hrs, Cardizem gtt and Digoxin)  - successful cardioversion 10/3/2023 with restoration to NSR; however reverted back to A. Fib/AFL w/ RVR  - on Amiodarone IV load and metoprolol (uptritrate metoprolol prn to obtain goal HR < 120 bpm), on AC for CVA ppx  - continue amio gtt for 10/8/2023 and in AM transition to PO amio taper load (400 mg BID x14 days, 400 mg daily x14 days, then 200 mg daily)  - arrange for EP follow up as an outpatient

## 2023-10-08 NOTE — SUBJECTIVE & OBJECTIVE
Interval History:   Patient stepped up to ICU because of atrial fib with RVR  Patient is on amiodarone drip. Patient is rate controlled and converted back to sinus rhythm.      Objective:     Vital Signs (Most Recent):  Temp: 98.8 °F (37.1 °C) (10/08/23 1330)  Pulse: 66 (10/08/23 1603)  Resp: 13 (10/08/23 1603)  BP: (!) 165/74 (10/08/23 1603)  SpO2: 96 % (10/08/23 1603) Vital Signs (24h Range):  Temp:  [98.3 °F (36.8 °C)-99.8 °F (37.7 °C)] 98.8 °F (37.1 °C)  Pulse:  [] 66  Resp:  [8-37] 13  SpO2:  [77 %-97 %] 96 %  BP: (101-177)/() 165/74     Weight: 88.3 kg (194 lb 10.7 oz)  Body mass index is 30.49 kg/m².      Intake/Output Summary (Last 24 hours) at 10/8/2023 1711  Last data filed at 10/8/2023 1500  Gross per 24 hour   Intake 1674.82 ml   Output 2510 ml   Net -835.18 ml        Physical Exam  Vitals reviewed.   Constitutional:       General: She is not in acute distress.     Appearance: Normal appearance. She is not ill-appearing.   HENT:      Mouth/Throat:      Mouth: Mucous membranes are moist.   Eyes:      Conjunctiva/sclera: Conjunctivae normal.   Cardiovascular:      Rate and Rhythm: Normal rate and regular rhythm.      Pulses: Normal pulses.      Heart sounds: No murmur heard.  Pulmonary:      Effort: Pulmonary effort is normal. No respiratory distress.      Breath sounds: Normal breath sounds. No wheezing or rhonchi.   Abdominal:      Palpations: Abdomen is soft.   Musculoskeletal:      Right lower leg: No edema.      Left lower leg: No edema.   Neurological:      Mental Status: She is alert and oriented to person, place, and time.   Psychiatric:         Mood and Affect: Mood normal.         Behavior: Behavior normal.           Review of Systems    Vents:  Vent Mode: SPONT-PS (09/30/23 1136)  Set Rate: 18 BPM (09/30/23 0930)  Vt Set: 380 mL (09/30/23 0930)  PEEP/CPAP: 0.1 cmH20 (09/30/23 1136)  Oxygen Concentration (%): 28 (10/01/23 2030)  Peak Airway Pressure: 0.1 cmH20 (09/30/23 1136)  Plateau  Pressure: 15.8 cmH20 (09/30/23 0551)  Total Ve: 2.11 L/m (09/30/23 1136)  F/VT Ratio<105 (RSBI): (!) 26.47 (09/30/23 0930)    Lines/Drains/Airways       Drain  Duration                  Urethral Catheter 10/02/23 1445 Non-latex;Straight-tip 6 days              Peripheral Intravenous Line  Duration                  Peripheral IV - Single Lumen 10/06/23 20 G Anterior;Left Forearm 2 days                    Significant Labs:    CBC/Anemia Profile:  Recent Labs   Lab 10/07/23  1420   WBC 10.66   HGB 10.4*   HCT 31.0*      MCV 93   RDW 13.9        Chemistries:  Recent Labs   Lab 10/07/23  1420      K 3.7      CO2 31*   BUN 7*   CREATININE 0.6   CALCIUM 8.3*   MG 1.8   PHOS 3.1       All pertinent labs within the past 24 hours have been reviewed.    Significant Imaging:  I have reviewed all pertinent imaging results/findings within the past 24 hours.

## 2023-10-08 NOTE — PLAN OF CARE
Problem: Adult Inpatient Plan of Care  Goal: Plan of Care Review  Outcome: Ongoing, Progressing  -POC discussed with patient.   -Pt stepped down to telemetry.      Problem: Bleeding (Spinal Surgery)  Goal: Absence of Bleeding  Outcome: Ongoing, Progressing  -Spinal and abdominal staples intact. No drainage noted.      Problem: Pain (Spinal Surgery)  Goal: Acceptable Pain Control  Outcome: Ongoing, Progressing  -Tramadol and Baclofen given this shift for pain and muscle spasm control. Pain relief achieved.      Problem: Dysrhythmia  Goal: Normalized Cardiac Rhythm  Outcome: Ongoing, Progressing  -Amiodarone 1 mg/min continously infusing this shift to maintain NSR.      Problem: Hypertension Comorbidity  Goal: Blood Pressure in Desired Range  Outcome: Ongoing, Progressing   -Metoprolol 25 mg BID administered this shift.

## 2023-10-08 NOTE — PLAN OF CARE
"  Care Plan    Pt remains in ICU at this time. No acute events overnight. VSS. AAOX4. Pt converted from Aflutter to NSR, amio gtt remains infusing. O2 sats 93%-97% on RA. UOP adequate, Carver remains in place. POC reviewed with pt and family. Questions and concerns addressed. Safety and infection precautions in place. See below and flowsheets for full assessment and VS info.     Neuro:  Olman Coma Scale  Best Eye Response: 4-->(E4) spontaneous  Best Motor Response: 6-->(M6) obeys commands  Best Verbal Response: 5-->(V5) oriented  Olman Coma Scale Score: 15  Assessment Qualifiers: patient not sedated/intubated, no eye obstruction present  Pupil PERRLA: yes  24 hr Temp:  [97.6 °F (36.4 °C)-99.8 °F (37.7 °C)]      CV:  Rhythm: normal sinus rhythm  DVT prophylaxis: VTE Required Core Measure: Pharmacological prophylaxis initiated/maintained    Resp:     Vent Mode: SPONT-PS  Set Rate: 18 BPM  Oxygen Concentration (%): 28  Vt Set: 380 mL  PEEP/CPAP: 0.1 cmH20    GI/:  GI prophylaxis: no  Diet/Nutrition Received: consistent carb/diabetic diet  Last Bowel Movement: 10/03/23  Voiding Characteristics: urethral catheter (bladder)       Urethral Catheter 10/02/23 1445 Non-latex;Straight-tip-Reason for Continuing Urinary Catheterization: Urinary retention  [REMOVED]      Urethral Catheter 09/29/23 Silicone-Reason for Continuing Urinary Catheterization: Urinary retention, Critically ill in ICU and requiring hourly monitoring of intake/output, Post operative   Intake/Output Summary (Last 24 hours) at 10/8/2023 0716  Last data filed at 10/8/2023 0630  Gross per 24 hour   Intake 949.73 ml   Output 3405 ml   Net -2455.27 ml       Labs/Accuchecks:  Recent Labs   Lab 10/07/23  1420   WBC 10.66   RBC 3.34*   HGB 10.4*   HCT 31.0*         Recent Labs   Lab 10/07/23  1420      K 3.7   CO2 31*      BUN 7*   CREATININE 0.6    No results for input(s): "PROTIME", "INR", "APTT", "HEPANTIXA" in the last 168 hours. "   Recent Labs   Lab 10/07/23  1420   TROPONINI 0.006       Electrolytes: N/A - electrolytes WDL  Accuchecks: none    Gtts/LDAs:   amiodarone in dextrose 5% 1 mg/min (10/08/23 0630)       Lines/Drains/Airways       Drain  Duration                  Urethral Catheter 10/02/23 1445 Non-latex;Straight-tip 5 days              Peripheral Intravenous Line  Duration                  Peripheral IV - Single Lumen 10/06/23 20 G Anterior;Left Forearm 2 days                    Skin/Wounds  Bathing/Skin Care: linen changed (10/07/23 1311)  Wounds: No  Wound care consulted: No    Consults  Consults (From admission, onward)          Status Ordering Provider     IP consult to case management  Once        Provider:  (Not yet assigned)    Completed CHRISTINA JOHNSTON     Inpatient consult to [facility]  Once        Provider:  (Not yet assigned)    Completed RACQUEL JORDAN     Inpatient consult to Cardiology-[facility]  Once        Provider:  (Not yet assigned)    Completed ROX SHIRLEY

## 2023-10-08 NOTE — SUBJECTIVE & OBJECTIVE
Interval History: Pt noted to convert to NSR overnight.  She has no complaints.      Review of Systems   Constitutional: Negative for diaphoresis and fever.   HENT:  Negative for congestion and hearing loss.    Eyes:  Negative for blurred vision and pain.   Cardiovascular:  Negative for chest pain, claudication, dyspnea on exertion, leg swelling, near-syncope, palpitations and syncope.   Respiratory:  Negative for shortness of breath and sleep disturbances due to breathing.    Hematologic/Lymphatic: Negative for bleeding problem. Does not bruise/bleed easily.   Skin:  Negative for color change and poor wound healing.   Gastrointestinal:  Negative for abdominal pain and nausea.   Genitourinary:  Negative for bladder incontinence and flank pain.   Neurological:  Negative for focal weakness and light-headedness.     Objective:     Vital Signs (Most Recent):  Temp: 98.7 °F (37.1 °C) (10/08/23 1130)  Pulse: 65 (10/08/23 1300)  Resp: 16 (10/08/23 1300)  BP: (!) 154/72 (10/08/23 1300)  SpO2: (!) 94 % (10/08/23 1300) Vital Signs (24h Range):  Temp:  [97.6 °F (36.4 °C)-99.8 °F (37.7 °C)] 98.7 °F (37.1 °C)  Pulse:  [] 65  Resp:  [8-37] 16  SpO2:  [77 %-97 %] 94 %  BP: (101-177)/(53-89) 154/72     Weight: 88.3 kg (194 lb 10.7 oz)  Body mass index is 30.49 kg/m².     SpO2: (!) 94 %         Intake/Output Summary (Last 24 hours) at 10/8/2023 1323  Last data filed at 10/8/2023 1300  Gross per 24 hour   Intake 1161.54 ml   Output 3905 ml   Net -2743.46 ml       Lines/Drains/Airways       Drain  Duration                  Urethral Catheter 10/02/23 1445 Non-latex;Straight-tip 5 days              Peripheral Intravenous Line  Duration                  Peripheral IV - Single Lumen 10/06/23 20 G Anterior;Left Forearm 2 days                       Physical Exam  Constitutional:       Appearance: She is well-developed. She is not diaphoretic.   HENT:      Head: Normocephalic and atraumatic.   Eyes:      General: No scleral icterus.     " Pupils: Pupils are equal, round, and reactive to light.   Neck:      Vascular: No JVD.   Cardiovascular:      Rate and Rhythm: Normal rate and regular rhythm.      Pulses: Intact distal pulses.      Heart sounds: S1 normal and S2 normal. No murmur heard.     No friction rub. No gallop.   Pulmonary:      Effort: Pulmonary effort is normal. No respiratory distress.      Breath sounds: Normal breath sounds. No wheezing or rales.   Chest:      Chest wall: No tenderness.   Abdominal:      General: Bowel sounds are normal. There is no distension.      Palpations: Abdomen is soft. There is no mass.      Tenderness: There is no abdominal tenderness. There is no rebound.   Musculoskeletal:         General: No tenderness. Normal range of motion.      Cervical back: Normal range of motion and neck supple.      Right lower leg: No edema.      Left lower leg: No edema.   Skin:     General: Skin is warm and dry.      Coloration: Skin is not pale.   Neurological:      Mental Status: She is alert and oriented to person, place, and time.      Coordination: Coordination normal.      Deep Tendon Reflexes: Reflexes normal.   Psychiatric:         Behavior: Behavior normal.         Judgment: Judgment normal.            Significant Labs: BMP:   Recent Labs   Lab 10/07/23  1420   *      K 3.7      CO2 31*   BUN 7*   CREATININE 0.6   CALCIUM 8.3*   MG 1.8   , CMP   Recent Labs   Lab 10/07/23  1420      K 3.7      CO2 31*   *   BUN 7*   CREATININE 0.6   CALCIUM 8.3*   ANIONGAP 8   , CBC   Recent Labs   Lab 10/07/23  1420   WBC 10.66   HGB 10.4*   HCT 31.0*      , INR No results for input(s): "INR", "PROTIME" in the last 48 hours., Lipid Panel No results for input(s): "CHOL", "HDL", "LDLCALC", "TRIG", "CHOLHDL" in the last 48 hours., Troponin   Recent Labs   Lab 10/07/23  1420   TROPONINI 0.006   , and All pertinent lab results from the last 24 hours have been reviewed.    Significant Imaging: " Echocardiogram: 2D echo with color flow doppler: No results found for this or any previous visit. and Transthoracic echo (TTE) complete (Cupid Only):   Results for orders placed or performed during the hospital encounter of 09/29/23   Echo   Result Value Ref Range    BSA 1.9 m2    Hoyt's Biplane MOD Ejection Fraction 64 %    LVOT stroke volume 51.18 cm3    LVIDd 4.07 3.5 - 6.0 cm    LV Systolic Volume 33.51 mL    LV Systolic Volume Index 17.8 mL/m2    LVIDs 2.95 2.1 - 4.0 cm    LV Diastolic Volume 72.74 mL    LV Diastolic Volume Index 38.69 mL/m2    IVS 0.87 0.6 - 1.1 cm    LVOT diameter 2.0 cm    LVOT area 3.1 cm2    FS 28 28 - 44 %    Left Ventricle Relative Wall Thickness 0.36 cm    Posterior Wall 0.74 0.6 - 1.1 cm    LV mass 96.97 g    LV Mass Index 52 g/m2    TDI LATERAL 0.26 m/s    TDI SEPTAL 0.26 m/s    TR Max Mathew 2.71 m/s    LVOT peak mathew 1.01 m/s    Left Ventricular Outflow Tract Mean Velocity 0.76 cm/s    Left Ventricular Outflow Tract Mean Gradient 2.53 mmHg    LA size 3.83 cm    Left Atrium Major Axis 5.13 cm    RV S' 9.74 cm/s    TAPSE 2.27 cm    RA Major Axis 5.45 cm    RA Width 4.05 cm    AV mean gradient 4 mmHg    AV peak gradient 7 mmHg    Ao peak mathew 1.31 m/s    Ao VTI 31.90 cm    LVOT peak VTI 16.30 cm    AV valve area 1.60 cm²    AV Velocity Ratio 0.77     AV index (prosthetic) 0.51     MADY by Velocity Ratio 2.42 cm²    Mr max mathew 4.15 m/s    MV mean gradient 2 mmHg    MV peak gradient 5 mmHg    MV valve area by continuity eq 2.35 cm2    MV VTI 21.8 cm    Triscuspid Valve Regurgitation Peak Gradient 29 mmHg    PV PEAK VELOCITY 1.00 m/s    PV peak gradient 4 mmHg    Sinus 2.67 cm    STJ 2.62 cm    Ascending aorta 2.93 cm    IVC diameter 2.15 cm    Mean e' 0.26 m/s    ZLVIDS -0.74     ZLVIDD -2.55     LA WIDTH 4.3 cm    EF 55 %    TV resting pulmonary artery pressure 44 mmHg    RV TB RVSP 18 mmHg    Est. RA pres 15 mmHg    Narrative      Left Ventricle: The left ventricle is normal in size.  Normal wall   thickness. Normal wall motion. There is normal systolic function. Ejection   fraction by visual approximation is 55%. Unable to assess diastolic   function due to atrial fibrillation.    Left Atrium: Left atrium is mildly dilated.    Right Ventricle: Normal right ventricular cavity size. Wall thickness   is normal. Right ventricle wall motion  is normal. Systolic function is   normal.    Right Atrium: Right atrium is mildly dilated.    Mitral Valve: There is mild regurgitation.    Tricuspid Valve: There is mild regurgitation.    Pulmonary Artery: The estimated pulmonary artery systolic pressure is   44 mmHg.    IVC/SVC: Elevated venous pressure at 15 mmHg.      and EKG: reviewed.

## 2023-10-09 LAB
ANION GAP SERPL CALC-SCNC: 8 MMOL/L (ref 8–16)
BASOPHILS # BLD AUTO: 0.02 K/UL (ref 0–0.2)
BASOPHILS NFR BLD: 0.2 % (ref 0–1.9)
BUN SERPL-MCNC: 5 MG/DL (ref 8–23)
CALCIUM SERPL-MCNC: 8.1 MG/DL (ref 8.7–10.5)
CHLORIDE SERPL-SCNC: 102 MMOL/L (ref 95–110)
CO2 SERPL-SCNC: 32 MMOL/L (ref 23–29)
CREAT SERPL-MCNC: 0.6 MG/DL (ref 0.5–1.4)
DIFFERENTIAL METHOD: ABNORMAL
EOSINOPHIL # BLD AUTO: 0.3 K/UL (ref 0–0.5)
EOSINOPHIL NFR BLD: 3.5 % (ref 0–8)
ERYTHROCYTE [DISTWIDTH] IN BLOOD BY AUTOMATED COUNT: 14.4 % (ref 11.5–14.5)
EST. GFR  (NO RACE VARIABLE): >60 ML/MIN/1.73 M^2
GLUCOSE SERPL-MCNC: 98 MG/DL (ref 70–110)
HCT VFR BLD AUTO: 27.6 % (ref 37–48.5)
HGB BLD-MCNC: 8.9 G/DL (ref 12–16)
IMM GRANULOCYTES # BLD AUTO: 0.07 K/UL (ref 0–0.04)
IMM GRANULOCYTES NFR BLD AUTO: 0.7 % (ref 0–0.5)
LYMPHOCYTES # BLD AUTO: 1.2 K/UL (ref 1–4.8)
LYMPHOCYTES NFR BLD: 12.4 % (ref 18–48)
MAGNESIUM SERPL-MCNC: 2 MG/DL (ref 1.6–2.6)
MCH RBC QN AUTO: 31 PG (ref 27–31)
MCHC RBC AUTO-ENTMCNC: 32.2 G/DL (ref 32–36)
MCV RBC AUTO: 96 FL (ref 82–98)
MONOCYTES # BLD AUTO: 0.9 K/UL (ref 0.3–1)
MONOCYTES NFR BLD: 9.4 % (ref 4–15)
NEUTROPHILS # BLD AUTO: 7.1 K/UL (ref 1.8–7.7)
NEUTROPHILS NFR BLD: 73.8 % (ref 38–73)
NRBC BLD-RTO: 0 /100 WBC
PLATELET # BLD AUTO: 335 K/UL (ref 150–450)
PMV BLD AUTO: 9.3 FL (ref 9.2–12.9)
POTASSIUM SERPL-SCNC: 3.5 MMOL/L (ref 3.5–5.1)
RBC # BLD AUTO: 2.87 M/UL (ref 4–5.4)
SODIUM SERPL-SCNC: 142 MMOL/L (ref 136–145)
WBC # BLD AUTO: 9.55 K/UL (ref 3.9–12.7)

## 2023-10-09 PROCEDURE — 97530 THERAPEUTIC ACTIVITIES: CPT

## 2023-10-09 PROCEDURE — 99900035 HC TECH TIME PER 15 MIN (STAT)

## 2023-10-09 PROCEDURE — 80048 BASIC METABOLIC PNL TOTAL CA: CPT | Performed by: NURSE PRACTITIONER

## 2023-10-09 PROCEDURE — 11000001 HC ACUTE MED/SURG PRIVATE ROOM

## 2023-10-09 PROCEDURE — 25000003 PHARM REV CODE 250: Performed by: NEUROLOGICAL SURGERY

## 2023-10-09 PROCEDURE — 97535 SELF CARE MNGMENT TRAINING: CPT

## 2023-10-09 PROCEDURE — 94761 N-INVAS EAR/PLS OXIMETRY MLT: CPT

## 2023-10-09 PROCEDURE — 51702 INSERT TEMP BLADDER CATH: CPT

## 2023-10-09 PROCEDURE — 83735 ASSAY OF MAGNESIUM: CPT | Performed by: NURSE PRACTITIONER

## 2023-10-09 PROCEDURE — 94799 UNLISTED PULMONARY SVC/PX: CPT

## 2023-10-09 PROCEDURE — 25000003 PHARM REV CODE 250: Performed by: INTERNAL MEDICINE

## 2023-10-09 PROCEDURE — 25000003 PHARM REV CODE 250: Performed by: NURSE PRACTITIONER

## 2023-10-09 PROCEDURE — 63600175 PHARM REV CODE 636 W HCPCS: Performed by: NURSE PRACTITIONER

## 2023-10-09 PROCEDURE — 85025 COMPLETE CBC W/AUTO DIFF WBC: CPT | Performed by: NURSE PRACTITIONER

## 2023-10-09 PROCEDURE — 36415 COLL VENOUS BLD VENIPUNCTURE: CPT | Performed by: NURSE PRACTITIONER

## 2023-10-09 RX ORDER — AMIODARONE HYDROCHLORIDE 200 MG/1
400 TABLET ORAL DAILY
Status: DISCONTINUED | OUTPATIENT
Start: 2023-10-22 | End: 2023-10-10 | Stop reason: HOSPADM

## 2023-10-09 RX ORDER — AMIODARONE HYDROCHLORIDE 200 MG/1
200 TABLET ORAL DAILY
Status: DISCONTINUED | OUTPATIENT
Start: 2023-11-05 | End: 2023-10-10 | Stop reason: HOSPADM

## 2023-10-09 RX ORDER — AMIODARONE HYDROCHLORIDE 200 MG/1
400 TABLET ORAL 2 TIMES DAILY
Status: DISCONTINUED | OUTPATIENT
Start: 2023-10-09 | End: 2023-10-10 | Stop reason: HOSPADM

## 2023-10-09 RX ORDER — AMIODARONE HYDROCHLORIDE 200 MG/1
400 TABLET ORAL DAILY
Status: DISCONTINUED | OUTPATIENT
Start: 2023-10-09 | End: 2023-10-09

## 2023-10-09 RX ADMIN — METOPROLOL TARTRATE 25 MG: 25 TABLET, FILM COATED ORAL at 09:10

## 2023-10-09 RX ADMIN — OXYCODONE HYDROCHLORIDE 10 MG: 5 TABLET ORAL at 03:10

## 2023-10-09 RX ADMIN — METOPROLOL TARTRATE 25 MG: 25 TABLET, FILM COATED ORAL at 08:10

## 2023-10-09 RX ADMIN — TRAMADOL HYDROCHLORIDE 100 MG: 50 TABLET, COATED ORAL at 07:10

## 2023-10-09 RX ADMIN — LEVOTHYROXINE SODIUM 137 MCG: 25 TABLET ORAL at 05:10

## 2023-10-09 RX ADMIN — AMIODARONE HYDROCHLORIDE 1 MG/MIN: 1.8 INJECTION, SOLUTION INTRAVENOUS at 05:10

## 2023-10-09 RX ADMIN — APIXABAN 5 MG: 5 TABLET, FILM COATED ORAL at 09:10

## 2023-10-09 RX ADMIN — DOCUSATE SODIUM AND SENNOSIDES 2 TABLET: 8.6; 5 TABLET, FILM COATED ORAL at 08:10

## 2023-10-09 RX ADMIN — DOCUSATE SODIUM AND SENNOSIDES 2 TABLET: 8.6; 5 TABLET, FILM COATED ORAL at 09:10

## 2023-10-09 RX ADMIN — BACLOFEN 5 MG: 5 TABLET ORAL at 09:10

## 2023-10-09 RX ADMIN — AMIODARONE HYDROCHLORIDE 400 MG: 200 TABLET ORAL at 08:10

## 2023-10-09 RX ADMIN — OXYCODONE HYDROCHLORIDE 10 MG: 5 TABLET ORAL at 02:10

## 2023-10-09 RX ADMIN — BACLOFEN 5 MG: 5 TABLET ORAL at 08:10

## 2023-10-09 RX ADMIN — APIXABAN 5 MG: 5 TABLET, FILM COATED ORAL at 08:10

## 2023-10-09 RX ADMIN — AMIODARONE HYDROCHLORIDE 400 MG: 200 TABLET ORAL at 09:10

## 2023-10-09 RX ADMIN — BACLOFEN 5 MG: 5 TABLET ORAL at 02:10

## 2023-10-09 NOTE — PT/OT/SLP PROGRESS
Occupational Therapy   Treatment    Name: Graciela Barton  MRN: [id number]  Admitting Diagnosis:  Lumbar stenosis with neurogenic claudication  5 Days Post-Op    Recommendations:     Discharge Recommendations: acute care hospital (high intensity therapy  Simultaneous filing. User may not have seen previous data.)  Discharge Equipment Recommendations:  hospital bed (Simultaneous filing. User may not have seen previous data.)  Barriers to discharge:  Decreased caregiver support    Assessment:     Graciela Barton is a 73 y.o. female with a medical diagnosis of Lumbar stenosis with neurogenic claudication.  She presents with The primary encounter diagnosis was Lumbar stenosis with neurogenic claudication. Diagnoses of Spinal stenosis, Assistance needed for mobility, Bilateral leg edema, Decreased range of motion of right knee, Chronic myelopathy, Foraminal stenosis of lumbar region, Atrial flutter, unspecified type, Tachycardia, A-fib, PAF (paroxysmal atrial fibrillation), Atypical atrial flutter, Paroxysmal atrial fibrillation, Atrial flutter, and Chest pain were also pertinent to this visit.  . Performance deficits affecting function are weakness, impaired endurance, impaired self care skills, impaired functional mobility, impaired sensation, decreased coordination, impaired coordination, orthopedic precautions, impaired balance, gait instability, edema, pain, decreased lower extremity function, impaired skin, decreased ROM, impaired joint extensibility, impaired muscle length.     Pt seen post t/f back to floor from ICU for A fib with RVR; HR ranging from 76-80 with axs (EOB sitting and standing; donning/doffing TLSo brace); pt able to abduct BLEs this date and demo's improvement in active ROM post baclofen administration. Stood x 3 trials from EOB. Remains unable to take functional steps alongside EOB, but improvement noted. Remains excellent high intensity therapy candidate; pt is motivated to participate in therapy as  well as requires continued medical and MD management.     Rehab Prognosis:  Good; patient would benefit from acute skilled OT services to address these deficits and reach maximum level of function.       Plan:     Patient to be seen 5 x/week to address the above listed problems via self-care/home management, therapeutic activities, therapeutic exercises  Plan of Care Expires: 11/09/23  Plan of Care Reviewed with: patient (Simultaneous filing. User may not have seen previous data.)    Subjective     Chief Complaint: no current complaints   Patient/Family Comments/goals: return to PLOF; be able to walk   Pain/Comfort:  Pain Rating 1: 0/10 (Simultaneous filing. User may not have seen previous data.)    Objective:     Communicated with: nsg prior to session.  Patient found left sidelying with   upon OT entry to room.    General Precautions: Standard, fall (Simultaneous filing. User may not have seen previous data.)    Orthopedic Precautions:spinal precautions (Simultaneous filing. User may not have seen previous data.)  Braces: TLSO (Simultaneous filing. User may not have seen previous data.)  Respiratory Status: Room air     Occupational Performance:     Bed Mobility:    Patient completed Rolling/Turning to Left with  minimum assistance  Patient completed Rolling/Turning to Right with minimum assistance  Patient completed Scooting/Bridging with maximal assistance and 2 persons  Patient completed Supine to Sit with maximal assistance and 2 persons  Patient completed Sit to Supine with moderate assistance and 2 persons     Functional Mobility/Transfers:  Patient completed Sit <> Stand Transfer with maximal assistance and of 2 persons  with  rolling walker   Functional Mobility: unable to perform     Activities of Daily Living:  Upper Body Dressing: maximal assistance don/doff TLSO brace   Lower Body Dressing: dependence    Toileting: total assistance        AMPA 6 Click ADL: 13    Treatment & Education:  Pt found  supine; pt premedicated prior to session- pain and baclofen; improvement noted in BLE tone and ability for AROM and repositioning   Pt able to independently recall spinal precautions and log rolling for bed mobility   Bed mobility as above   Pt with noted in improvement in sitting balance EOB; SBA/CGA while EOB this date with BUE support, but does require mod A for dynamic balance with donning/doffing TLSO   Pt able to perform dynamic sitting balance without UE support, but CGA- mod A for sitting balance  Max A donning TLSO brace, but pt able to assist with threading BUEs and reaching around back; pt able to assist with velcro waist straps without assist   Seated scooting max A of 2 forward and laterally alongside EOB   Pt requires assist to focus/ become aware of positioning of BLEs while EOB; improvement noted in pt's ability to reposition LEs while seated in order to stand and scoot   Max A of 2 required to stand; forward flexed; unable to extend at trunk and BLEs ; pt unable to weight shift and/or step   Seated scooting to HOB performed   HR ranging from 75-80 bpm during axs  Repositioned pt in L side lying with wedge   Pillow bolsters placed between BLEs to assist in abduction and passive stretching        Patient left right sidelying with all lines intact, call button in reach, bed alarm on, and nsg notified    GOALS:   Multidisciplinary Problems       Occupational Therapy Goals          Problem: Occupational Therapy    Goal Priority Disciplines Outcome Interventions   Occupational Therapy Goal     OT, PT/OT Ongoing, Progressing    Description: Goals to be met by: 11/09/2023      Patient will increase functional independence with ADLs by performing:    UE Dressing with Minimal Assistance.  LE Dressing with Maximum Assistance.  Grooming while seated with Stand-by Assistance.  Toileting from bedside commode with Minimal Assistance for hygiene and clothing management.   Toilet transfer to bedside commode with  Moderate Assistance.  Increased functional strength to WFL for self care.  Upper extremity exercise program x10 reps per handout, with assistance as needed.                           Time Tracking:     OT Date of Treatment: 10/09/23  OT Start Time: 1522  OT Stop Time: 1601  OT Total Time (min): 39 min    Billable Minutes:Self Care/Home Management 10  Therapeutic Activity 29    OT/LUPIS: OT     Number of LUPIS visits since last OT visit: 0    10/9/2023

## 2023-10-09 NOTE — PLAN OF CARE
Problem: Occupational Therapy  Goal: Occupational Therapy Goal  Description: Goals to be met by: 11/09/2023      Patient will increase functional independence with ADLs by performing:    UE Dressing with Minimal Assistance.  LE Dressing with Maximum Assistance.  Grooming while seated with Stand-by Assistance.  Toileting from bedside commode with Minimal Assistance for hygiene and clothing management.   Toilet transfer to bedside commode with Moderate Assistance.  Increased functional strength to WFL for self care.  Upper extremity exercise program x10 reps per handout, with assistance as needed.      Outcome: Ongoing, Progressing     Pt seen post t/f back to floor from ICU for A fib with RVR; HR ranging from 76-80 with axs (EOB sitting and standing; donning/doffing TLSo brace); pt able to abduct BLEs this date and demo's improvement in active ROM post baclofen administration. Stood x 3 trials from EOB. Remains unable to take functional steps alongside EOB, but improvement noted. Remains excellent high intensity therapy candidate; pt is motivated to participate in therapy as well as requires continued medical and MD management.

## 2023-10-09 NOTE — PLAN OF CARE
The sw spoke to Pepper at [phone] who states she is still following the pt. She will speak about this pt in her 11am meeting with the team. She will f/u with the sw after the meeting. The sw will continue to follow the pt throughout her transitions of care and will assist with any d/c needs. The pt did not have a cardio-version yesterday,she converted to normal sinus rhythm yesterday.     1:21pm Per Pepper at [facility] via secure chat: We will definitely continue to follow. She is not ready today. She will have to be off of Amio infusion and cardiac clearance. We dont have telemetry.  Also our MD wants to see how she does with therapy again. Will continue to follow.        10/09/23 0904   Discharge Reassessment   Assessment Type Discharge Planning Reassessment   Did the patient's condition or plan change since previous assessment? Yes   Discharge Plan discussed with: Patient   Communicated ASYA with patient/caregiver Date not available/Unable to determine   Discharge Plan A Rehab   Discharge Plan B Other  (TBD)   DME Needed Upon Discharge  none   Transition of Care Barriers None   Post-Acute Status   Post-Acute Authorization Placement   Post-Acute Placement Status Referrals Sent   Discharge Delays None known at this time  (Pt had cardioversion done yesterday)

## 2023-10-09 NOTE — PHARMACY MED REC
"  [facility]           Pharmacy  Admission Medication History     The home medication history was taken by Mari Posada PharmD.      Medication history obtained from Medications listed below were obtained from: Patient/family and Analytic software- "[employer]    Based on information gathered for medication list, you may go to "Admission" then "Reconcile Home Medications" tabs to review and/or act upon those items.     The home medication list has been updated by the Pharmacy department.   Please read ALL comments highlighted in yellow.   Please address this information as you see fit.    Feel free to contact us if you have any questions or require assistance.    The current inpatient medication list has been compared to the home medication list and the following discrepancies were noted:    Patient reports NOT TAKING the following medication(s):    Patient reports he/she IS TAKING the following which was not ordered upon admit    Patient reports taking a DIFFERENT DRUG than that ordered upon admit    Patient reports taking a drug DIFFERENTLY than how ordered upon admit      Potential issues to be addressed PRIOR TO DISCHARGE      No current facility-administered medications on file prior to encounter.     Current Outpatient Medications on File Prior to Encounter   Medication Sig Dispense Refill    hydroCHLOROthiazide (HYDRODIURIL) 25 MG tablet Take 1 tablet (25 mg total) by mouth once daily. 30 tablet 11    multivitamin (THERAGRAN) per tablet Take 1 tablet by mouth once daily.      rosuvastatin (CRESTOR) 10 MG tablet Take 1 tablet (10 mg total) by mouth once daily. 90 tablet 0    vit A/vit C/vit E/zinc/copper (ICAPS AREDS ORAL) Take 1 capsule by mouth 2 (two) times a day.      ELIQUIS 5 mg Tab Take 1 tablet (5 mg total) by mouth 2 (two) times daily. 60 tablet 11    [DISCONTINUED] atorvastatin (LIPITOR) 40 MG tablet Take 1 tablet (40 mg total) by mouth once daily. 90 tablet 3       Please address " this information as you see fit.  Feel free to contact us if you have any questions or require assistance.    Mari Posada PharmD  [phone]                .

## 2023-10-09 NOTE — SUBJECTIVE & OBJECTIVE
Interval History: awake and alert, doing well this a.m..  Rate has been better control with recommendations per Cardiology.  Appreciates cardiology plan for possible repeat cardioversion if atrial flutter persist    Review of Systems   Constitutional:  Positive for activity change. Negative for appetite change, chills, diaphoresis, fatigue and fever.   Respiratory:  Negative for cough, chest tightness, shortness of breath and wheezing.    Cardiovascular:  Negative for chest pain, palpitations and leg swelling.   Gastrointestinal:  Negative for abdominal distention, abdominal pain, constipation, diarrhea, nausea and vomiting.   Musculoskeletal:  Positive for arthralgias, back pain and gait problem.   Skin:  Positive for wound.   Neurological:  Negative for dizziness, syncope, weakness, light-headedness and headaches.     Objective:     Vital Signs (Most Recent):  Temp: 98.2 °F (36.8 °C) (10/09/23 0746)  Pulse: 67 (10/09/23 1204)  Resp: 20 (10/09/23 0746)  BP: (!) 169/79 (10/09/23 0746)  SpO2: (!) 94 % (10/09/23 0829) Vital Signs (24h Range):  Temp:  [97.5 °F (36.4 °C)-98.8 °F (37.1 °C)] 98.2 °F (36.8 °C)  Pulse:  [61-79] 67  Resp:  [13-20] 20  SpO2:  [86 %-97 %] 94 %  BP: (127-169)/() 169/79     Weight: 88.3 kg (194 lb 10.7 oz)  Body mass index is 30.49 kg/m².    Intake/Output Summary (Last 24 hours) at 10/9/2023 1208  Last data filed at 10/9/2023 0501  Gross per 24 hour   Intake 397.9 ml   Output 1100 ml   Net -702.1 ml           Physical Exam  Vitals and nursing note reviewed.   Constitutional:       General: She is not in acute distress.     Appearance: Normal appearance. She is well-developed. She is not toxic-appearing.   HENT:      Head: Normocephalic and atraumatic.      Mouth/Throat:      Dentition: Normal dentition.   Eyes:      General: Lids are normal.   Cardiovascular:      Rate and Rhythm: Normal rate. Rhythm irregular.   Pulmonary:      Effort: Pulmonary effort is normal.      Breath sounds:  "Normal breath sounds.   Abdominal:      General: Bowel sounds are normal.      Palpations: Abdomen is soft.   Musculoskeletal:         General: Swelling present.      Cervical back: Neck supple.      Right lower leg: Edema present.      Left lower leg: Edema present.   Skin:     General: Skin is warm and dry.      Findings: No erythema or rash.   Neurological:      Mental Status: She is alert and oriented to person, place, and time.             Significant Labs: A1C: No results for input(s): "HGBA1C" in the last 4320 hours.  ABGs: No results for input(s): "PH", "PCO2", "HCO3", "POCSATURATED", "BE", "TOTALHB", "COHB", "METHB", "O2HB", "POCFIO2", "PO2" in the last 48 hours.  Blood Culture: No results for input(s): "LABBLOO" in the last 48 hours.  CBC:   Recent Labs   Lab 10/07/23  1420 10/09/23  0831   WBC 10.66 9.55   HGB 10.4* 8.9*   HCT 31.0* 27.6*    335       CMP:   Recent Labs   Lab 10/07/23  1420 10/09/23  0831    142   K 3.7 3.5    102   CO2 31* 32*   * 98   BUN 7* 5*   CREATININE 0.6 0.6   CALCIUM 8.3* 8.1*   ANIONGAP 8 8       Lactic Acid: No results for input(s): "LACTATE" in the last 48 hours.  Lipase: No results for input(s): "LIPASE" in the last 48 hours.  Lipid Panel: No results for input(s): "CHOL", "HDL", "LDLCALC", "TRIG", "CHOLHDL" in the last 48 hours.  Magnesium:   Recent Labs   Lab 10/07/23  1420 10/09/23  0831   MG 1.8 2.0       Troponin:   Recent Labs   Lab 10/07/23  1420   TROPONINI 0.006       TSH:   Recent Labs   Lab 09/30/23  1148   TSH 7.448*       Urine Culture: No results for input(s): "LABURIN" in the last 48 hours.  Urine Studies: No results for input(s): "COLORU", "APPEARANCEUA", "PHUR", "SPECGRAV", "PROTEINUA", "GLUCUA", "KETONESU", "BILIRUBINUA", "OCCULTUA", "NITRITE", "UROBILINOGEN", "LEUKOCYTESUR", "RBCUA", "WBCUA", "BACTERIA", "SQUAMEPITHEL", "HYALINECASTS" in the last 48 hours.    Invalid input(s): "WRIGHTSUR"    Significant Imaging: I have reviewed all " pertinent imaging results/findings within the past 24 hours.

## 2023-10-09 NOTE — ASSESSMENT & PLAN NOTE
Patient with Long standing persistent (>12 months) atrial fibrillation which is uncontrolled currently with Beta Blocker and Amiodarone. Patient is currently in atrial fibrillation.[id number] Score: 2. . Anticoagulation indicated. Anticoagulation done with eliquis.

## 2023-10-09 NOTE — PT/OT/SLP PROGRESS
Physical Therapy Treatment    Patient Name:  Graciela Barton   MRN:  [id number]    Recommendations:     Discharge Recommendations:  (high intensity therapy)  Discharge Equipment Recommendations: hospital bed  Barriers to discharge:  requires increased level of physical caregiver assistance with mobility    Assessment:     Graciela Barton is a 73 y.o. female admitted with a medical diagnosis of Lumbar stenosis with neurogenic claudication.  She presents with the following impairments/functional limitations: weakness, impaired endurance, impaired self care skills, impaired functional mobility, gait instability, impaired balance, decreased lower extremity function, abnormal tone, impaired coordination, impaired cardiopulmonary response to activity, orthopedic precautions.    PT/OT co-tx to ensure safety with transfers due to limited functional strength. Pt able to perform x3 stands with MAX Ax2 with use of RW; incomplete knee extension (requires x2 therapy assistance for stability and to block BLE). Pt able to perform bed mobility with MAX/MOD Ax2. PT continuing to recommend high intensity therapy. Therapy will continue to progress pt as able. HR during session with mobility remains 75-80bpm.     Rehab Prognosis: Good; patient would benefit from acute skilled PT services to address these deficits and reach maximum level of function.    Recent Surgery: Procedure(s) (LRB):  Transesophageal echo (CARLYLE) intra-procedure log documentation (N/A) 5 Days Post-Op    Plan:     During this hospitalization, patient to be seen 5 x/week to address the identified rehab impairments via gait training, therapeutic activities, therapeutic exercises, neuromuscular re-education, wheelchair management/training and progress toward the following goals:    Plan of Care Expires:  11/01/23    Subjective     Chief Complaint: no complaints  Patient/Family Comments/goals: to continue increasing independence with mobility  Pain/Comfort:  Pain Rating 1:  0/10  Pain Rating Post-Intervention 1: 0/10      Objective:     Communicated with Nurse prior to session.  Patient found HOB elevated with bed alarm, aden catheter, peripheral IV, telemetry upon PT entry to room.     General Precautions: Standard, fall  Orthopedic Precautions: spinal precautions  Braces: TLSO  Respiratory Status: Room air     Functional Mobility:  Bed Mobility:     Rolling Right: minimum assistance  Scooting: maximal assistance, of 2 persons, and to scoot in sitting to position closer towards HOB  Supine to Sit: maximal assistance and of 2 persons  Sit to Supine: moderate assistance and of 2 persons  Transfers:     Sit to Stand:  maximal assistance and of 2 persons with rolling walker and PT/OT blocking BLE to increase stability. Pt requires verbal cues to improve BLE positioning prior to standing to ensure success with stand attempts. Noted incomplete BLE knee extension       AM-PAC 6 CLICK MOBILITY  Turning over in bed (including adjusting bedclothes, sheets and blankets)?: 2  Sitting down on and standing up from a chair with arms (e.g., wheelchair, bedside commode, etc.): 2  Moving from lying on back to sitting on the side of the bed?: 2  Moving to and from a bed to a chair (including a wheelchair)?: 1  Need to walk in hospital room?: 1  Climbing 3-5 steps with a railing?: 1  Basic Mobility Total Score: 9       Treatment & Education:  Pt performs mobility as stated above in functional mobility section of note with MOD/MAX Ax2 with use of RW.   Verbal cues to improve overall safety and sequencing with mobility to improve success with transfers.   Pt able to don/doff TLSO with MIN/MOD A and verbal cues for sequencing/placement.   Pt requires CGA with static sitting balance and MIN A sitting balance (without BUE support when donning/doffing TLSO.)  Pt positioned in R sidelying with pillows positioned between limbs to prevent bed sores/skin tears and wedge for proper placement/positioning in  sidelying to offload pressure on back.     Patient left right sidelying with all lines intact, call button in reach, bed alarm on, and nurse notified.    GOALS:   Multidisciplinary Problems       Physical Therapy Goals          Problem: Physical Therapy    Goal Priority Disciplines Outcome Goal Variances Interventions   Physical Therapy Goal     PT, PT/OT Ongoing, Progressing     Description: Goals to be met by: 2023     Patient will increase functional independence with mobility by performin. Supine to sit with Modified [place]  2. Sit to supine with Modified [place]  3. Sit to stand transfer with Modified [place]  4. Gait  x 100 feet with Minimal Assistance using Rolling Walker.   5. Stand for 10 minutes with Minimal Assistance using Rolling Walker                         Time Tracking:     PT Received On: 10/09/23  PT Start Time: 1522     PT Stop Time: 1601  PT Total Time (min): 39 min With OT to ensure overall safety with mobility    Billable Minutes: Therapeutic Activity 39    Treatment Type: Treatment  PT/PTA: PT     Number of PTA visits since last PT visit: 0     10/09/2023

## 2023-10-09 NOTE — PROGRESS NOTES
[facility] - Telemetry  Adult Nutrition  Progress Note    SUMMARY       Recommendations    Recommendation:   1. Encourage intake of meals & Boost as tolerated.   2. Monitor weight/labs.   3. RD to follow to monitor po intake    Goals:  Pt will tolerate diet with at least 50-75% intake at meals by RD follow up  Nutrition Goal Status: new  Communication of RD Recs: reviewed with RN    Assessment and Plan  Nutrition Problem  Increased protein needs    Related to (etiology):   Wound healing    Signs and Symptoms (as evidenced by):   S/p spine surgery     Interventions:  Collaboration with other providers  Beddit    Nutrition Diagnosis Status:   New      Malnutrition Assessment     Orbital Region (Subcutaneous Fat Loss): well nourished  Upper Arm Region (Subcutaneous Fat Loss): well nourished  Thoracic and Lumbar Region: well nourished   [place] Region (Muscle Loss): well nourished  Clavicle Bone Region (Muscle Loss): well nourished  Dorsal Hand (Muscle Loss): well nourished  Patellar Region (Muscle Loss): well nourished  Anterior Thigh Region (Muscle Loss): well nourished  Posterior Calf Region (Muscle Loss): well nourished       Reason for Assessment  Reason For Assessment: length of stay  Diagnosis:  (lumbar stenosis)  Relevant Medical History: arthritis, thyroid cancer, HTN, HLD, thyroidectomy, hysterectomy, thoracic discectomy  General Information Comments: Pt on Cardiac diet with Boost Plus. Pt reports appetite is improving and drinking some Boost. s/p lumbar spine fusion, spine laminectomy, spine foraminotomy, & facetectomy on 9/29. Jason 18-abd and back incision. No recent weight loss noted. NFPE completed today 10/9-nourished  Nutrition Discharge Planning: pt to d/c on Cardiac diet    Nutrition Risk Screen  Nutrition Risk Screen: no indicators present    Nutrition/Diet History  Food Preferences: no Oriental orthodox or cultural food prefs identified  Spiritual, Cultural Beliefs, Yazidi Practices, Values that  "Affect Care: no  Factors Affecting Nutritional Intake: None identified at this time    Anthropometrics  Temp: 97.9 °F (36.6 °C)  Height Method: Stated  Height: 5' 7" (170.2 cm)  Height (inches): 67 in  Weight Method: Bed Scale  Weight: 88.3 kg (194 lb 10.7 oz)  Weight (lb): 194.67 lb  Ideal Body Weight (IBW), Female: 135 lb  % Ideal Body Weight, Female (lb): 144.2 %  BMI (Calculated): 30.5  BMI Grade: 30 - 34.9- obesity - grade I  Usual Body Weight (UBW), k kg (5/2)  % Usual Body Weight: 113.44  % Weight Change From Usual Weight: 13.2 %    Lab/Procedures/Meds  Pertinent Labs Reviewed: reviewed  Pertinent Labs Comments: BUN 5L, Ca 8.1L  Pertinent Medications Reviewed: reviewed  Pertinent Medications Comments: amiodarone, baclofen, senna, lopressor    Estimated/Assessed Needs  Weight Used For Calorie Calculations: 88.3 kg (194 lb 10.7 oz)  Energy Calorie Requirements (kcal): 1781  Energy Need Method: Meigs-St Jeor (x1.25)  Protein Requirements: 70-77g (1.0-1.1g/kg)  Weight Used For Protein Calculations: 88.3 kg (194 lb 10.7 oz)  Estimated Fluid Requirement Method: RDA Method  RDA Method (mL): 1781    Nutrition Prescription Ordered  Current Diet Order: Cardiac  Oral Nutrition Supplement: Boost Plus    Evaluation of Received Nutrient/Fluid Intake  I/O: 799/1575  Energy Calories Required: not meeting needs  Protein Required: not meeting needs  Fluid Required: not meeting needs  Comments: LBM 10/3  % Intake of Estimated Energy Needs: 50 - 75 %  % Meal Intake: 50 - 75 %    Nutrition Risk  Level of Risk/Frequency of Follow-up:  (2xweekly)     Monitor and Evaluation  Food and Nutrient Intake: food and beverage intake  Food and Nutrient Adminstration: diet order  Physical Activity and Function: nutrition-related ADLs and IADLs  Anthropometric Measurements: weight  Biochemical Data, Medical Tests and Procedures: electrolyte and renal panel  Nutrition-Focused Physical Findings: overall appearance     Nutrition " Follow-Up  RD Follow-up?: Yes

## 2023-10-09 NOTE — ASSESSMENT & PLAN NOTE
Atrial flutter  Anticoagulant long-term use  -chronic, has been in Aflutter for quite some time  -RVR rate worsened after admission in setting of neurosurgical procedure, anemia, and chronic hypothyroidism  -Cardiology following   -home propafenone discontinued per recs (failed drug in setting of several months history of AFlutter)  -continue home metoprolol (titrations limited due to BP)  -s/p IV dig x 3 doses   -initiated on esmolol gtt 10/01 due to no improvement/elevation in HR overnight   -will need outpt follow up with EP for consideration of DCCV +/- ablation  -continue tele monitoring  -EKG PRN concerns  -trend labs, address/replete electrolytes as indicated  -monitor    -started on diltiazem drip which is on hold at this time.  Will continue metoprolol 25 mg po q.6  -status post DCCV +/- ablation now in sinus rhythm, return to AFib with RVR on 10/6. Resume amiodarone and switched to metoprolol succinate to tartrate  -continuing full-dose Lovenox and will transition per Cardiology to oral   -10/6 converted back into Afib with RVR today. Amino gtt initiated per cardiology.    10/7- appreciate cardiology---- on Amiodarone IV load and metoprolol (uptritrate metoprolol to obtain goal HR < 120 bpm), on AC for CVA ppx. HR has been 130-140.  10/8-- on Amiodarone IV load and metoprolol (uptritrate metoprolol prn to obtain goal HR < 120 bpm), on AC for CVA ppx  - continue amio gtt for 10/8/2023 and in AM transition to PO amio taper load (400 mg BID x14 days, 400 mg daily x14 days, then 200 mg daily)  - arrange for EP follow up as an outpatient

## 2023-10-09 NOTE — PLAN OF CARE
Problem: Physical Therapy  Goal: Physical Therapy Goal  Description: Goals to be met by: 2023     Patient will increase functional independence with mobility by performin. Supine to sit with Modified [place]  2. Sit to supine with Modified [place]  3. Sit to stand transfer with Modified [place]  4. Gait  x 100 feet with Minimal Assistance using Rolling Walker.   5. Stand for 10 minutes with Minimal Assistance using Rolling Walker    Outcome: Ongoing, Progressing     PT/OT co-tx to ensure safety with transfers due to limited functional strength. Pt able to perform x3 stands with MAX Ax2 with use of RW; incomplete knee extension (requires x2 therapy assistance for stability and to block BLE). Pt able to perform bed mobility with MAX/MOD Ax2. PT continuing to recommend high intensity therapy. Therapy will continue to progress pt as able. HR during session with mobility remains 75-80bpm.

## 2023-10-09 NOTE — PROGRESS NOTES
NEUROSURGICAL POST-OPERATIVE PROGRESS NOTE    DATE OF SERVICE:  10/09/2023      ATTENDING PHYSICIAN:  Scooter Salter MD    SUBJECTIVE:    INTERIM HISTORY:    This is a very pleasant 73 y.o. y.o. female, who is status postop day 10 L3-S1 fusion.  Converted to normal sinus rhythm yesterday.  Doing well.  Pain well controlled.  Did not get up with PT yesterday                OBJECTIVE:    PHYSICAL EXAMINATION:   Vitals:    10/09/23 0746   BP: (!) 169/79   Pulse: 79   Resp: 20   Temp: 98.2 °F (36.8 °C)       Physical Exam:  Vitals reviewed.    Constitutional: She appears well-developed and well-nourished.     Eyes: Pupils are equal, round, and reactive to light. Conjunctivae and EOM are normal.     Cardiovascular: Normal distal pulses and no edema.     Abdominal: Soft.     Skin: Skin displays no rash on trunk and no rash on extremities. Skin displays no lesions on trunk and no lesions on extremities.     Psych/Behavior: She is alert. She is oriented to person, place, and time. She has a normal mood and affect.     Musculoskeletal:        Neck: Range of motion is full.       Ortho Exam    Neurologic Exam     Mental Status   Oriented to person, place, and time.     Cranial Nerves     CN III, IV, VI   Pupils are equal, round, and reactive to light.  Extraocular motions are normal.     Incisions clean dry      DIAGNOSTIC DATA:      ASSESMENT:    This is a 73 y.o. female who is s/p day 10 L3-S1 fusion.  Now in normal sinus rhythm.    Problem List Items Addressed This Visit          Neuro    * (Principal) Lumbar stenosis with neurogenic claudication - Primary    Relevant Orders    Vital signs    Turn cough deep breathe    Oral Care    Intake and output    Place sequential compression device    Incentive spirometry    Pulse Oximetry Q4H    PT evaluate and treat    OT evaluate and treat    Transfer patient (Completed)    PT assistance with ambulation    Drain - full suction (Completed)    Document drain output    TLSO brace     Patient must wear orthosis while sitting or standing    CBC auto differential (Completed)    Basic metabolic panel (Completed)    CBC auto differential (Completed)    Basic metabolic panel (Completed)    CBC auto differential (Completed)    Basic metabolic panel (Completed)    Transfer patient (Completed)    CT Lumbar Spine Without Contrast (Completed)    Foraminal stenosis of lumbar region    Relevant Orders    Vital signs    Turn cough deep breathe    Oral Care    Intake and output    Place sequential compression device    Incentive spirometry    Pulse Oximetry Q4H    PT evaluate and treat    OT evaluate and treat    Transfer patient (Completed)    PT assistance with ambulation    Drain - full suction (Completed)    Document drain output    TLSO brace    Patient must wear orthosis while sitting or standing    CBC auto differential (Completed)    Basic metabolic panel (Completed)    Chronic myelopathy    Relevant Orders    Vital signs    Turn cough deep breathe    Oral Care    Intake and output    Place sequential compression device    Incentive spirometry    Pulse Oximetry Q4H    PT evaluate and treat    OT evaluate and treat    Transfer patient (Completed)    PT assistance with ambulation    Drain - full suction (Completed)    Document drain output    TLSO brace    Patient must wear orthosis while sitting or standing    CBC auto differential (Completed)    Basic metabolic panel (Completed)    Magnesium (Completed)       Cardiac/Vascular    PAF (paroxysmal atrial fibrillation)    Overview       Continue DOAC  Continue metoprolol  Rate controlled today           Typical atrial flutter    Overview     With 2:1 conduction, rate in 90s  EKG reviewed with Dr. Weller and cardioversion discussed.  This may be scheduled after surgery when she has been back on anticoagulation 6-8 weeks.    Metoprolol dosing increased by PCP which has modestly improved her HR.  She has been seen by EP but has been unable to follow up with  their service due to her impaired function. Continue Rythmol (prescribed TID but taking 1.5 tabs BID), continue metoprolol 50 BID, continue DOAC.  Likely needs a higher dose of BB but BP is low end normal in clinic today.  Had been bradycardic at last visit with EP and hope to be able to get her for follow up soon            Orthopedic    Decreased range of motion of right knee    Relevant Orders    Vital signs    Turn cough deep breathe    Oral Care    Intake and output    Place sequential compression device    Incentive spirometry    Pulse Oximetry Q4H    PT evaluate and treat    OT evaluate and treat    Transfer patient (Completed)    PT assistance with ambulation    Drain - full suction (Completed)    Document drain output    TLSO brace    Patient must wear orthosis while sitting or standing    CBC auto differential (Completed)    Basic metabolic panel (Completed)       Other    Bilateral leg edema    Relevant Orders    Vital signs    Turn cough deep breathe    Oral Care    Intake and output    Place sequential compression device    Incentive spirometry    Pulse Oximetry Q4H    PT evaluate and treat    OT evaluate and treat    Transfer patient (Completed)    PT assistance with ambulation    Drain - full suction (Completed)    Document drain output    TLSO brace    Patient must wear orthosis while sitting or standing    CBC auto differential (Completed)    Basic metabolic panel (Completed)    Assistance needed for mobility    Relevant Orders    Vital signs    Turn cough deep breathe    Oral Care    Intake and output    Place sequential compression device    Incentive spirometry    Pulse Oximetry Q4H    PT evaluate and treat    OT evaluate and treat    Transfer patient (Completed)    PT assistance with ambulation    Drain - full suction (Completed)    Document drain output    TLSO brace    Patient must wear orthosis while sitting or standing    CBC auto differential (Completed)    Basic metabolic panel (Completed)      Other Visit Diagnoses       Spinal stenosis        Tachycardia        Relevant Orders    EKG 12-lead (Completed)    Inpatient consult to Cardiology[facility] (Completed)    EKG 12-lead (Completed)    A-fib        Relevant Orders    EKG 12-lead    Echo (Completed)    Inpatient consult to Hospital [facility] (Completed)    Paroxysmal atrial fibrillation        Relevant Orders    EKG 12-lead    Chest pain        Relevant Orders    EKG 12-lead (Completed)            PLAN:    Appreciate cardiology management and recommendation  Okay to transfer to inpatient rehab tomorrow if hemodynamically stable  Please mobilize with physical therapy today        Scooter Salter MD  Pager: [phone]

## 2023-10-09 NOTE — PLAN OF CARE
Problem: Adult Inpatient Plan of Care  Goal: Plan of Care Review  Outcome: Ongoing, Progressing  Goal: Absence of Hospital-Acquired Illness or Injury  Outcome: Ongoing, Progressing  Goal: Optimal Comfort and Wellbeing  Outcome: Ongoing, Progressing     Problem: Fall Injury Risk  Goal: Absence of Fall and Fall-Related Injury  Outcome: Ongoing, Progressing     Problem: Skin Injury Risk Increased  Goal: Skin Health and Integrity  Outcome: Ongoing, Progressing     Problem: Neurologic Impairment (Spinal Surgery)  Goal: Optimal Neurologic Function  Outcome: Ongoing, Progressing     Problem: Pain (Spinal Surgery)  Goal: Acceptable Pain Control  Outcome: Ongoing, Progressing     Problem: Postoperative Urinary Retention (Spinal Surgery)  Goal: Effective Urinary Elimination  Outcome: Ongoing, Progressing     Problem: Dysrhythmia  Goal: Normalized Cardiac Rhythm  Outcome: Ongoing, Progressing     POC reviewed with patient. All questions and concerns reviewed. Vital signs stable throughout shift. For full assessment please refer to flowsheet. Fall/ safety precautions implemented & maintained. Bed locked in lowest position, bed alarm activated & audible, & call light in reach. Will continue to monitor.

## 2023-10-09 NOTE — PROGRESS NOTES
[facility] Medicine  Progress Note    Patient Name: Graciela Barton  MRN: [id number]  Patient Class: IP- Inpatient   Admission Date: 9/29/2023  Length of Stay: 10 days  Attending Physician: Scooter Salter MD  Primary Care Provider: Rocco Miller MD        Subjective:     Principal Problem:Lumbar stenosis with neurogenic claudication        HPI:  Ms. Barton is a 73 YOF with PMHx of HTN, HLD, BPPV, PAfib/flutter on OAC, history of thyroid cancer s/p thyroidectomy in 2018 with hypothyroidism, thoracic myelopathy with neurogenic claudication s/p s/p T6-7 T7-8 anterior diskectomy (05/12/2023) and now s/p left L3-4, L4-5 OLIF, L5-S1 ALIF and L3-S1 posterior instrumented fusion (09/29/2023).     Admission testing reviewed noting WBC 12 >>13, hemoglobin 9.7 >> 8.5 >> 7.4, hematocrit 30 >> 25.9 >> 22.8; chemistry stable; lactic 1.0; TSH 7.448 with FT4 0.97; CXR with clearing of pleural fluid and atelectasis in the right lung base; BLE US without evidence of DVT; and TTE with LVEF >55%, LA mild dilation, RA mild dilation, normal RV size and function, mild MR, mild TR, RVSP 44mmHg. From neurosurgery perspective she is progressing well in post operative course however the course has been complicated by AFib/flutter with RVR refractory to home medications, propafenone and metoprolol, and amiodarone gtt.     She is seen today in consult for medical management. She notes that overall pain is well controlled however she is developing cramping sensation in BLEs. She denies palpitations, SOB, ELLIS, CP, abdominal pain, N/V/D, constipation, fever, chills, light headiness, dizziness, seizures, or syncope.     Hospital Medicine Service was consulted for assistance with medical management.         Overview/Hospital Course:  Ms. Barton was admitted 09/29 by Neurosurgery and is now s/p left L3-4, L4-5 OLIF, L5-S1 ALIF and L3-S1 posterior instrumented fusion. From neurosurgery perspective she is progressing well in post  operative course. However the course has been complicated by AFib/flutter with RVR that was refractory to home medications, propafenone and metoprolol; and amiodarone gtt. Cardiology following with recommendations to transfuse due to anemia (two units PRBCs transfused 10/01), stop home propafenone (failed drug in setting of several months history of AFlutter), continue home metoprolol (titrations limited due to BP), and was give 3 doses of IV digoxin (completed). However without improvement and HR elevating to 140's she was initiated on esmolol gtt evening of 10/01.      Other concerns currently to monitor include monitoring for s/s of urinary retention and bowel regimen titrated 10/2 as last BM was day of admission per patient.   10/4:  Cardiology planning cardioversion on today.  Cardizem drip.  She was started on full-dose on 10/03/2023.  Disposition plans: to rehab when medically ready for intensive therapy.   10/5: Cardioversion yesterday now noted to be in NSR.  Still receiving full-dose Lovenox with plans to transition to oral on tomorrow per Cardiology.  Doing well on metoprolol.  Carver catheter still in place secondary to urinary retention.  10/6: DC held on today, converted back to Afib with RVR. She was transferred to [facility] and started on Amino gtt.   10/7: patient in Afib with a RVR rate since last night per chart review. Cardiology has assessed and following noted per cardiology's plan: - on Amiodarone IV load and metoprolol (uptritrate metoprolol to obtain goal HR < 120 bpm), on AC for CVA ppx.  10/8:- on Amiodarone IV load and metoprolol (uptritrate metoprolol prn to obtain goal HR < 120 bpm), on AC for CVA ppx  - continue amio gtt for 10/8/2023 and in AM transition to PO amio taper load (400 mg BID x14 days, 400 mg daily x14 days, then 200 mg daily)  - arrange for EP follow up as an outpatient       Interval History: awake and alert, doing well this a.m..  Rate has been better control with  recommendations per Cardiology.  Appreciates cardiology plan for possible repeat cardioversion if atrial flutter persist    Review of Systems   Constitutional:  Positive for activity change. Negative for appetite change, chills, diaphoresis, fatigue and fever.   Respiratory:  Negative for cough, chest tightness, shortness of breath and wheezing.    Cardiovascular:  Negative for chest pain, palpitations and leg swelling.   Gastrointestinal:  Negative for abdominal distention, abdominal pain, constipation, diarrhea, nausea and vomiting.   Musculoskeletal:  Positive for arthralgias, back pain and gait problem.   Skin:  Positive for wound.   Neurological:  Negative for dizziness, syncope, weakness, light-headedness and headaches.     Objective:     Vital Signs (Most Recent):  Temp: 98.2 °F (36.8 °C) (10/09/23 0746)  Pulse: 67 (10/09/23 1204)  Resp: 20 (10/09/23 0746)  BP: (!) 169/79 (10/09/23 0746)  SpO2: (!) 94 % (10/09/23 0829) Vital Signs (24h Range):  Temp:  [97.5 °F (36.4 °C)-98.8 °F (37.1 °C)] 98.2 °F (36.8 °C)  Pulse:  [61-79] 67  Resp:  [13-20] 20  SpO2:  [86 %-97 %] 94 %  BP: (127-169)/() 169/79     Weight: 88.3 kg (194 lb 10.7 oz)  Body mass index is 30.49 kg/m².    Intake/Output Summary (Last 24 hours) at 10/9/2023 1208  Last data filed at 10/9/2023 0501  Gross per 24 hour   Intake 397.9 ml   Output 1100 ml   Net -702.1 ml           Physical Exam  Vitals and nursing note reviewed.   Constitutional:       General: She is not in acute distress.     Appearance: Normal appearance. She is well-developed. She is not toxic-appearing.   HENT:      Head: Normocephalic and atraumatic.      Mouth/Throat:      Dentition: Normal dentition.   Eyes:      General: Lids are normal.   Cardiovascular:      Rate and Rhythm: Normal rate. Rhythm irregular.   Pulmonary:      Effort: Pulmonary effort is normal.      Breath sounds: Normal breath sounds.   Abdominal:      General: Bowel sounds are normal.      Palpations:  "Abdomen is soft.   Musculoskeletal:         General: Swelling present.      Cervical back: Neck supple.      Right lower leg: Edema present.      Left lower leg: Edema present.   Skin:     General: Skin is warm and dry.      Findings: No erythema or rash.   Neurological:      Mental Status: She is alert and oriented to person, place, and time.             Significant Labs: A1C: No results for input(s): "HGBA1C" in the last 4320 hours.  ABGs: No results for input(s): "PH", "PCO2", "HCO3", "POCSATURATED", "BE", "TOTALHB", "COHB", "METHB", "O2HB", "POCFIO2", "PO2" in the last 48 hours.  Blood Culture: No results for input(s): "LABBLOO" in the last 48 hours.  CBC:   Recent Labs   Lab 10/07/23  1420 10/09/23  0831   WBC 10.66 9.55   HGB 10.4* 8.9*   HCT 31.0* 27.6*    335       CMP:   Recent Labs   Lab 10/07/23  1420 10/09/23  0831    142   K 3.7 3.5    102   CO2 31* 32*   * 98   BUN 7* 5*   CREATININE 0.6 0.6   CALCIUM 8.3* 8.1*   ANIONGAP 8 8       Lactic Acid: No results for input(s): "LACTATE" in the last 48 hours.  Lipase: No results for input(s): "LIPASE" in the last 48 hours.  Lipid Panel: No results for input(s): "CHOL", "HDL", "LDLCALC", "TRIG", "CHOLHDL" in the last 48 hours.  Magnesium:   Recent Labs   Lab 10/07/23  1420 10/09/23  0831   MG 1.8 2.0       Troponin:   Recent Labs   Lab 10/07/23  1420   TROPONINI 0.006       TSH:   Recent Labs   Lab 09/30/23  1148   TSH 7.448*       Urine Culture: No results for input(s): "LABURIN" in the last 48 hours.  Urine Studies: No results for input(s): "COLORU", "APPEARANCEUA", "PHUR", "SPECGRAV", "PROTEINUA", "GLUCUA", "KETONESU", "BILIRUBINUA", "OCCULTUA", "NITRITE", "UROBILINOGEN", "LEUKOCYTESUR", "RBCUA", "WBCUA", "BACTERIA", "SQUAMEPITHEL", "HYALINECASTS" in the last 48 hours.    Invalid input(s): "WRIGHTSUR"    Significant Imaging: I have reviewed all pertinent imaging results/findings within the past 24 hours.      Assessment/Plan:      * " Lumbar stenosis with neurogenic claudication  Chronic myelopathy  Foraminal stenosis of lumbar region  Neurogenic claudication   DDD, lumbar  Decreased ROM of R knee  Assistance needed for mobility     -thoracic myelopathy with neurogenic claudication s/p s/p T6-7 T7-8 anterior diskectomy (05/12/2023) and now s/p left L3-4, L4-5 OLIF, L5-S1 ALIF and L3-S1 posterior instrumented fusion (09/29/2023)  -management per primary team   -remains with surgical drain  -PT/OT following   -rehab placement    Acute blood loss anemia  -s/p 2 units PRBC transfusion 10/2 with appropriate response  -monitor for s/s of bleeding   -trend labs and monitor  -further transfusions as indicated     PAF (paroxysmal atrial fibrillation)  Atrial flutter  Anticoagulant long-term use  -chronic, has been in Aflutter for quite some time  -RVR rate worsened after admission in setting of neurosurgical procedure, anemia, and chronic hypothyroidism  -Cardiology following   -home propafenone discontinued per recs (failed drug in setting of several months history of AFlutter)  -continue home metoprolol (titrations limited due to BP)  -s/p IV dig x 3 doses   -initiated on esmolol gtt 10/01 due to no improvement/elevation in HR overnight   -will need outpt follow up with EP for consideration of DCCV +/- ablation  -continue tele monitoring  -EKG PRN concerns  -trend labs, address/replete electrolytes as indicated  -monitor    -started on diltiazem drip which is on hold at this time.  Will continue metoprolol 25 mg po q.6  -status post DCCV +/- ablation now in sinus rhythm, return to AFib with RVR on 10/6. Resume amiodarone and switched to metoprolol succinate to tartrate  -continuing full-dose Lovenox and will transition per Cardiology to oral   -10/6 converted back into Afib with RVR today. Amino gtt initiated per cardiology.    10/7- appreciate cardiology---- on Amiodarone IV load and metoprolol (uptritrate metoprolol to obtain goal HR < 120 bpm), on AC  for CVA ppx. HR has been 130-140.  10/8-- on Amiodarone IV load and metoprolol (uptritrate metoprolol prn to obtain goal HR < 120 bpm), on AC for CVA ppx  - continue amio gtt for 10/8/2023 and in AM transition to PO amio taper load (400 mg BID x14 days, 400 mg daily x14 days, then 200 mg daily)  - arrange for EP follow up as an outpatient     Atrial fibrillation with RVR  Patient with Long standing persistent (>12 months) atrial fibrillation which is uncontrolled currently with Beta Blocker and Amiodarone. Patient is currently in atrial fibrillation.[id number] Score: 2. . Anticoagulation indicated. Anticoagulation done with eliquis.    Mixed hyperlipidemia  -chronic  -resume home rosuvastatin at discharge     Essential hypertension, benign  -chronic  -low/normotensive pressures at current  -hold home HCTZ  -continue metoprolol as noted above   -dose/medication adjustment as appropriate   -monitor     Postoperative hypothyroidism  History of thyroid cancer  History of thyroidectomy     -chronic  -improvement in TSH   -continue home levothyroxine      VTE Risk Mitigation (From admission, onward)         Ordered     apixaban tablet 5 mg  2 times daily         10/06/23 0846     IP VTE HIGH RISK PATIENT  Once         09/29/23 2314     Place sequential compression device  Until discontinued         09/29/23 2314                Discharge Planning   ASYA: 10/6/2023     Code Status: Full Code   Is the patient medically ready for discharge?:     Reason for patient still in hospital (select all that apply): Laboratory test, Treatment, Imaging and Consult recommendations  Discharge Plan A: Rehab   Discharge Delays: None known at this time (Pt had cardioversion done yesterday)              Yenni Swift NP  Department of Hospital Medicine   [place] [facility]

## 2023-10-09 NOTE — PHARMACY MED REC
"  [facility]           Pharmacy  Admission Medication History     The home medication history was taken by Mari Posada PharmD.      Medication history obtained from Medications listed below were obtained from: Patient/family    Based on information gathered for medication list, you may go to "Admission" then "Reconcile Home Medications" tabs to review and/or act upon those items.     The home medication list has been updated by the Pharmacy department.   Please read ALL comments highlighted in yellow.   Please address this information as you see fit.    Feel free to contact us if you have any questions or require assistance.    The current inpatient medication list has been compared to the home medication list and the following discrepancies were noted:    Patient reports NOT TAKING the following medication(s):    Patient reports he/she IS TAKING the following which was not ordered upon admit  Holding home statin until discharge  Holding HCTZ due to hypotension  Holding AERDS oral vitamin due to nonformulary  Patient reports taking a DIFFERENT DRUG than that ordered upon admit  Home Metoprolol tartrate substituted with Metoprolol Succinate  Patient reports taking a drug DIFFERENTLY than how ordered upon admit      Potential issues to be addressed PRIOR TO DISCHARGE      No current facility-administered medications on file prior to encounter.     Current Outpatient Medications on File Prior to Encounter   Medication Sig Dispense Refill    hydroCHLOROthiazide (HYDRODIURIL) 25 MG tablet Take 1 tablet (25 mg total) by mouth once daily. 30 tablet 11    multivitamin (THERAGRAN) per tablet Take 1 tablet by mouth once daily.      rosuvastatin (CRESTOR) 10 MG tablet Take 1 tablet (10 mg total) by mouth once daily. 90 tablet 0    vit A/vit C/vit E/zinc/copper (ICAPS AREDS ORAL) Take 1 capsule by mouth 2 (two) times a day.      ELIQUIS 5 mg Tab Take 1 tablet (5 mg total) by mouth 2 (two) times daily. 60 " tablet 11    [DISCONTINUED] atorvastatin (LIPITOR) 40 MG tablet Take 1 tablet (40 mg total) by mouth once daily. 90 tablet 3       Please address this information as you see fit.  Feel free to contact us if you have any questions or require assistance.    Mari Posada, PharmD  [phone]                .

## 2023-10-09 NOTE — PLAN OF CARE
Recommendation:   1. Encourage intake of meals & Boost as tolerated.   2. Monitor weight/labs.   3. RD to follow to monitor po intake    Goals:  Pt will tolerate diet with at least 50-75% intake at meals by RD follow up  Nutrition Goal Status: new

## 2023-10-10 ENCOUNTER — NURSE TRIAGE (OUTPATIENT)
Dept: ADMINISTRATIVE | Facility: CLINIC | Age: 73
End: 2023-10-10
Payer: COMMERCIAL

## 2023-10-10 VITALS
SYSTOLIC BLOOD PRESSURE: 158 MMHG | OXYGEN SATURATION: 98 % | WEIGHT: 194.69 LBS | RESPIRATION RATE: 18 BRPM | HEIGHT: 67 IN | HEART RATE: 60 BPM | TEMPERATURE: 98 F | DIASTOLIC BLOOD PRESSURE: 70 MMHG | BODY MASS INDEX: 30.56 KG/M2

## 2023-10-10 PROCEDURE — 25000003 PHARM REV CODE 250: Performed by: NURSE PRACTITIONER

## 2023-10-10 PROCEDURE — 25000003 PHARM REV CODE 250: Performed by: NEUROLOGICAL SURGERY

## 2023-10-10 PROCEDURE — 25000003 PHARM REV CODE 250: Performed by: HOSPITALIST

## 2023-10-10 PROCEDURE — 27000221 HC OXYGEN, UP TO 24 HOURS

## 2023-10-10 PROCEDURE — 25000003 PHARM REV CODE 250: Performed by: INTERNAL MEDICINE

## 2023-10-10 RX ORDER — AMIODARONE HYDROCHLORIDE 400 MG/1
400 TABLET ORAL 2 TIMES DAILY
Qty: 60 TABLET | Refills: 11
Start: 2023-10-10 | End: 2023-11-17

## 2023-10-10 RX ORDER — AMIODARONE HYDROCHLORIDE 200 MG/1
200 TABLET ORAL DAILY
Qty: 30 TABLET | Refills: 11
Start: 2023-11-05 | End: 2024-02-27 | Stop reason: SDUPTHER

## 2023-10-10 RX ORDER — AMIODARONE HYDROCHLORIDE 400 MG/1
400 TABLET ORAL DAILY
Qty: 14 TABLET | Refills: 0
Start: 2023-10-22 | End: 2023-11-17

## 2023-10-10 RX ORDER — METOPROLOL TARTRATE 25 MG/1
25 TABLET, FILM COATED ORAL 2 TIMES DAILY
Qty: 60 TABLET | Refills: 11
Start: 2023-10-10 | End: 2023-11-17

## 2023-10-10 RX ADMIN — OXYCODONE HYDROCHLORIDE 10 MG: 5 TABLET ORAL at 07:10

## 2023-10-10 RX ADMIN — CYCLOBENZAPRINE HYDROCHLORIDE 5 MG: 5 TABLET, FILM COATED ORAL at 01:10

## 2023-10-10 RX ADMIN — AMIODARONE HYDROCHLORIDE 400 MG: 200 TABLET ORAL at 06:10

## 2023-10-10 RX ADMIN — METOPROLOL TARTRATE 25 MG: 25 TABLET, FILM COATED ORAL at 06:10

## 2023-10-10 RX ADMIN — OXYCODONE HYDROCHLORIDE 10 MG: 5 TABLET ORAL at 02:10

## 2023-10-10 RX ADMIN — APIXABAN 5 MG: 5 TABLET, FILM COATED ORAL at 08:10

## 2023-10-10 RX ADMIN — LEVOTHYROXINE SODIUM 137 MCG: 25 TABLET ORAL at 05:10

## 2023-10-10 RX ADMIN — BACLOFEN 5 MG: 5 TABLET ORAL at 08:10

## 2023-10-10 RX ADMIN — DOCUSATE SODIUM AND SENNOSIDES 2 TABLET: 8.6; 5 TABLET, FILM COATED ORAL at 08:10

## 2023-10-10 NOTE — NURSING
Patient transferring to [facility], attempted to call report. Number left with  to have nurse call back to receive report. AVS placed in packet. IV and telemetry removed, patient tolerated well.

## 2023-10-10 NOTE — PLAN OF CARE
Matt - Telemetry  Facility Transfer Orders        Admit to: [facility]    Diagnoses:   Active Hospital Problems    Diagnosis  POA    *Lumbar stenosis with neurogenic claudication [M48.062]  Yes    Acute blood loss anemia [D62]  Yes    Foraminal stenosis of lumbar region [M48.061]  Yes    Chronic myelopathy [G95.9]  Yes    Decreased range of motion of right knee [M25.661]  Yes    Assistance needed for mobility [Z74.1]  Not Applicable    Typical atrial flutter [I48.3]  Yes     With 2:1 conduction, rate in 90s  EKG reviewed with Dr. Weller and cardioversion discussed.  This may be scheduled after surgery when she has been back on anticoagulation 6-8 weeks.    Metoprolol dosing increased by PCP which has modestly improved her HR.  She has been seen by EP but has been unable to follow up with their service due to her impaired function. Continue Rythmol (prescribed TID but taking 1.5 tabs BID), continue metoprolol 50 BID, continue DOAC.  Likely needs a higher dose of BB but BP is low end normal in clinic today.  Had been bradycardic at last visit with EP and hope to be able to get her for follow up soon      Anticoagulant long-term use [Z79.01]  Not Applicable    PAF (paroxysmal atrial fibrillation) [I48.0]  Yes       Continue DOAC  Continue metoprolol  Rate controlled today        DDD (degenerative disc disease), lumbar [M51.36]  Yes    Atrial fibrillation with RVR [I48.91]  Yes    Postoperative hypothyroidism [E89.0]  Yes    History of thyroid cancer [Z85.850]  Not Applicable    Essential hypertension, benign [I10]  Yes     Well controlled- slightly high but was waiting for a while as she did not have a way to get from lobby up to clinic  Have some SHANE, agree with resuming HCTZ 12.5 mg   Continue as now      Mixed hyperlipidemia [E78.2]  Yes      Resolved Hospital Problems   No resolved problems to display.     Allergies:   Review of patient's allergies indicates:   Allergen Reactions    Vancomycin Tinitus     Vancomycin analogues Tinitus     Code Status: Full     Vitals: Routine       Diet: cardiac diet     Activity: Activity as tolerated     Nursing Precautions:   Pressure ulcer prevention  Turn patient every 2 hours and keep pressure off her back incision  Up in chair with meals  Aden to gravity  Aden catheter care every 12 hours  Needs aden removed on 10/11/23 for voiding trial  Oral care  Teds/Scds  Foot boots  TLSO brace when OOB  Remove staples on 10/13/23 and monitor wound  Ok to get wound wet in shower with soap and water.  Leave open to air.        Bed/Surface: Pressure Redistribution     Consults:   PT/OT evaluate and treat  Wound care evaluate and treat low back surgical wound     Oxygen: 2 liters/min via nasal cannula with humidification    Pending Diagnostic Studies:       None            IV Access: Peripheral     Medications: Discontinue all previous medication orders, if any. See new list below.  Current Discharge Medication List        START taking these medications    Details   acetaminophen (TYLENOL) 650 mg/20.3 mL Soln Take 20.3 mLs (650 mg total) by mouth every 6 (six) hours as needed for Pain.      aluminum-magnesium hydroxide-simethicone (MAALOX) 200-200-20 mg/5 mL Susp Take 30 mLs by mouth every 4 (four) hours as needed (indigestion).      !! amiodarone (PACERONE) 200 MG Tab Take 1 tablet (200 mg total) by mouth once daily. Start 11/5/23 at 0900  Qty: 30 tablet, Refills: 11      !! amiodarone (PACERONE) 400 MG tablet Take 1 tablet (400 mg total) by mouth once daily. Start 10/22/23 at 0900 for 14 days for 14 days  Qty: 14 tablet, Refills: 0      !! amiodarone (PACERONE) 400 MG tablet Take 1 tablet (400 mg total) by mouth 2 (two) times daily. Stop on 10/21 with last dose at 2100  Qty: 60 tablet, Refills: 11      baclofen (LIORESAL) 5 mg Tab tablet Take 1 tablet (5 mg total) by mouth 3 (three) times daily.  Qty: 1 tablet, Refills: 0      bisacodyL (DULCOLAX) 10 mg Supp Place 1 suppository (10 mg  total) rectally daily as needed (constipation).  Refills: 0      ondansetron (ZOFRAN-ODT) 8 MG TbDL Take 1 tablet (8 mg total) by mouth every 6 (six) hours as needed (nausea/vomiting).      oxyCODONE (ROXICODONE) 10 mg Tab immediate release tablet Take 1 tablet (10 mg total) by mouth every 6 (six) hours as needed for Pain (severe pain).  Refills: 0    Comments: Quantity prescribed more than 7 day supply? Yes, quantity medically necessary      traMADoL 100 mg Tab Take 100 mg by mouth every 6 (six) hours as needed for Pain (moderate pain).    Comments: Quantity prescribed more than 7 day supply? Yes, quantity medically necessary       !! - Potential duplicate medications found. Please discuss with provider.        CONTINUE these medications which have CHANGED    Details   metoprolol tartrate (LOPRESSOR) 25 MG tablet Take 1 tablet (25 mg total) by mouth 2 (two) times daily.  Qty: 60 tablet, Refills: 11    Comments: .      senna-docusate 8.6-50 mg (PERICOLACE) 8.6-50 mg per tablet Take 2 tablets by mouth 2 (two) times daily.           CONTINUE these medications which have NOT CHANGED    Details   hydroCHLOROthiazide (HYDRODIURIL) 25 MG tablet Take 1 tablet (25 mg total) by mouth once daily.  Qty: 30 tablet, Refills: 11    Comments: .  Associated Diagnoses: Fluid retention in legs      levothyroxine (SYNTHROID) 137 MCG Tab tablet Take 1 tablet (137 mcg total) by mouth before breakfast.  Qty: 90 tablet, Refills: 0    Associated Diagnoses: Postoperative hypothyroidism      multivitamin (THERAGRAN) per tablet Take 1 tablet by mouth once daily.      rosuvastatin (CRESTOR) 10 MG tablet Take 1 tablet (10 mg total) by mouth once daily.  Qty: 90 tablet, Refills: 0    Associated Diagnoses: Mixed hyperlipidemia      ELIQUIS 5 mg Tab Take 1 tablet (5 mg total) by mouth 2 (two) times daily.  Qty: 60 tablet, Refills: 11           STOP taking these medications       Ca comb no.1/vit D3/B6/FA/B12 (VITAMIN D3, CALCIUM CIT-PHOS, ORAL)  Comments:   Reason for Stopping:         collagen/biotin/ascorbic acid (COLLAGEN 1500 PLUS C ORAL) Comments:   Reason for Stopping:         propafenone (RHTHYMOL) 150 MG Tab Comments:   Reason for Stopping:         vit A/vit C/vit E/zinc/copper (ICAPS AREDS ORAL) Comments:   Reason for Stopping:         vit A/vit C/vit E/zinc/copper (ICAPS AREDS ORAL) Comments:   Reason for Stopping:         meclizine (ANTIVERT) 25 mg tablet Comments:   Reason for Stopping:         methocarbamoL (ROBAXIN) 750 MG Tab Comments:   Reason for Stopping:             Follow up:       Immunizations Administered as of 10/10/2023       Name Date Dose VIS Date Route Exp Date    COVID-19, MRNA, LN-S, PF (Pfizer) (Purple Cap) 3/16/2021 11:09 AM 0.3 mL 12/12/2020 Intramuscular 6/30/2021    Site: Left deltoid     Given By: Jennifer Finn     : [employer]     Lot: [id number]     COVID-19, MRNA, LN-S, PF (Pfizer) (Purple Cap) 2/23/2021  1:43 PM 0.3 mL 12/12/2020 Intramuscular 6/30/2021    Site: Left deltoid     Given By: Jennifer Finn     : [employer]     Lot: [id number]             This patient has had both covid vaccinations    Some patients may experience side effects after vaccination.  These may include fever, headache, muscle or joint aches.  Most symptoms resolve with 24-48 hours and do not require urgent medical evaluation unless they persist for more than 72 hours or symptoms are concerning for an unrelated medical condition.          Taryn Tang PA-C

## 2023-10-10 NOTE — NURSING
[facility] nurse called back, report given. Awaiting transport to arrive. [facility] transport scheduled for 1 pm.

## 2023-10-10 NOTE — NURSING
Dr Mohr called and notified of pt's /82 HR 73. Informed him of scheduled 9AM lopressor 25mg and amiodarone 400mg will be due. VO give both amniodarone and lopressor doses now.

## 2023-10-10 NOTE — SUBJECTIVE & OBJECTIVE
Interval History: Ok to discharge to facility from Hospital Medicine standpoint.    Review of Systems   Constitutional:  Positive for activity change. Negative for appetite change, chills, diaphoresis, fatigue and fever.   Respiratory:  Negative for cough, chest tightness, shortness of breath and wheezing.    Cardiovascular:  Negative for chest pain, palpitations and leg swelling.   Gastrointestinal:  Negative for abdominal distention, abdominal pain, constipation, diarrhea, nausea and vomiting.   Musculoskeletal:  Positive for arthralgias, back pain and gait problem.   Skin:  Positive for wound.   Neurological:  Negative for dizziness, syncope, weakness, light-headedness and headaches.     Objective:     Vital Signs (Most Recent):  Temp: 97.7 °F (36.5 °C) (10/10/23 1139)  Pulse: 60 (10/10/23 1206)  Resp: 18 (10/10/23 1139)  BP: (!) 158/70 (10/10/23 1139)  SpO2: 98 % (10/10/23 1139) Vital Signs (24h Range):  Temp:  [97.3 °F (36.3 °C)-98.6 °F (37 °C)] 97.7 °F (36.5 °C)  Pulse:  [] 60  Resp:  [16-20] 18  SpO2:  [87 %-98 %] 98 %  BP: (130-188)/(61-90) 158/70     Weight: 88.3 kg (194 lb 10.7 oz)  Body mass index is 30.49 kg/m².    Intake/Output Summary (Last 24 hours) at 10/10/2023 1225  Last data filed at 10/10/2023 0912  Gross per 24 hour   Intake --   Output 600 ml   Net -600 ml         Physical Exam  Vitals and nursing note reviewed.   Constitutional:       General: She is not in acute distress.     Appearance: Normal appearance. She is well-developed. She is not toxic-appearing.   HENT:      Head: Normocephalic and atraumatic.      Mouth/Throat:      Dentition: Normal dentition.   Eyes:      General: Lids are normal.   Cardiovascular:      Rate and Rhythm: Normal rate and regular rhythm. Extrasystoles are present.  Pulmonary:      Effort: Pulmonary effort is normal.      Breath sounds: Normal breath sounds.   Abdominal:      General: Bowel sounds are normal.      Palpations: Abdomen is soft.    Musculoskeletal:         General: Swelling present.      Cervical back: Neck supple.      Right lower leg: Edema present.      Left lower leg: Edema present.   Skin:     General: Skin is warm and dry.      Findings: No erythema or rash.   Neurological:      Mental Status: She is alert and oriented to person, place, and time.             Significant Labs: All pertinent labs within the past 24 hours have been reviewed.  CBC:   Recent Labs   Lab 10/09/23  0831   WBC 9.55   HGB 8.9*   HCT 27.6*        CMP:   Recent Labs   Lab 10/09/23  0831      K 3.5      CO2 32*   GLU 98   BUN 5*   CREATININE 0.6   CALCIUM 8.1*   ANIONGAP 8       Significant Imaging:

## 2023-10-10 NOTE — PROGRESS NOTES
[facility] Medicine  Progress Note    Patient Name: Graciela Barton  MRN: [id number]  Patient Class: IP- Inpatient   Admission Date: 9/29/2023  Length of Stay: 11 days  Attending Physician: Scooter Salter MD  Primary Care Provider: Rocco Miller MD        Subjective:     Principal Problem:Lumbar stenosis with neurogenic claudication        HPI:  Ms. Barton is a 73 YOF with PMHx of HTN, HLD, BPPV, PAfib/flutter on OAC, history of thyroid cancer s/p thyroidectomy in 2018 with hypothyroidism, thoracic myelopathy with neurogenic claudication s/p s/p T6-7 T7-8 anterior diskectomy (05/12/2023) and now s/p left L3-4, L4-5 OLIF, L5-S1 ALIF and L3-S1 posterior instrumented fusion (09/29/2023).     Admission testing reviewed noting WBC 12 >>13, hemoglobin 9.7 >> 8.5 >> 7.4, hematocrit 30 >> 25.9 >> 22.8; chemistry stable; lactic 1.0; TSH 7.448 with FT4 0.97; CXR with clearing of pleural fluid and atelectasis in the right lung base; BLE US without evidence of DVT; and TTE with LVEF >55%, LA mild dilation, RA mild dilation, normal RV size and function, mild MR, mild TR, RVSP 44mmHg. From neurosurgery perspective she is progressing well in post operative course however the course has been complicated by AFib/flutter with RVR refractory to home medications, propafenone and metoprolol, and amiodarone gtt.     She is seen today in consult for medical management. She notes that overall pain is well controlled however she is developing cramping sensation in BLEs. She denies palpitations, SOB, ELLIS, CP, abdominal pain, N/V/D, constipation, fever, chills, light headiness, dizziness, seizures, or syncope.     Hospital Medicine Service was consulted for assistance with medical management.         Overview/Hospital Course:  Ms. Barton was admitted 09/29 by Neurosurgery and is now s/p left L3-4, L4-5 OLIF, L5-S1 ALIF and L3-S1 posterior instrumented fusion. From neurosurgery perspective she is progressing well in post  operative course. However the course has been complicated by AFib/flutter with RVR that was refractory to home medications, propafenone and metoprolol; and amiodarone gtt. Cardiology following with recommendations to transfuse due to anemia (two units PRBCs transfused 10/01), stop home propafenone (failed drug in setting of several months history of AFlutter), continue home metoprolol (titrations limited due to BP), and was give 3 doses of IV digoxin (completed). However without improvement and HR elevating to 140's she was initiated on esmolol gtt evening of 10/01.      Other concerns currently to monitor include monitoring for s/s of urinary retention and bowel regimen titrated 10/2 as last BM was day of admission per patient.   10/4:  Cardiology planning cardioversion on today.  Cardizem drip.  She was started on full-dose on 10/03/2023.  Disposition plans: to rehab when medically ready for intensive therapy.   10/5: Cardioversion yesterday now noted to be in NSR.  Still receiving full-dose Lovenox with plans to transition to oral on tomorrow per Cardiology.  Doing well on metoprolol.  Carver catheter still in place secondary to urinary retention.  10/6: DC held on today, converted back to Afib with RVR. She was transferred to [facility] and started on Amino gtt.   10/7: patient in Afib with a RVR rate since last night per chart review. Cardiology has assessed and following noted per cardiology's plan: - on Amiodarone IV load and metoprolol (uptritrate metoprolol to obtain goal HR < 120 bpm), on AC for CVA ppx.  10/8:- on Amiodarone IV load and metoprolol (uptritrate metoprolol prn to obtain goal HR < 120 bpm), on AC for CVA ppx  - continue amio gtt for 10/8/2023 and in AM transition to PO amio taper load (400 mg BID x14 days, 400 mg daily x14 days, then 200 mg daily)  - arrange for EP follow up as an outpatient   10/10- Patient seen and examined this AM, feeling well, vital signs and heart stable. Ok to discharge for  Hospital Medicine standpoint        Interval History: Ok to discharge to facility from Hospital Medicine standpoint.    Review of Systems   Constitutional:  Positive for activity change. Negative for appetite change, chills, diaphoresis, fatigue and fever.   Respiratory:  Negative for cough, chest tightness, shortness of breath and wheezing.    Cardiovascular:  Negative for chest pain, palpitations and leg swelling.   Gastrointestinal:  Negative for abdominal distention, abdominal pain, constipation, diarrhea, nausea and vomiting.   Musculoskeletal:  Positive for arthralgias, back pain and gait problem.   Skin:  Positive for wound.   Neurological:  Negative for dizziness, syncope, weakness, light-headedness and headaches.     Objective:     Vital Signs (Most Recent):  Temp: 97.7 °F (36.5 °C) (10/10/23 1139)  Pulse: 60 (10/10/23 1206)  Resp: 18 (10/10/23 1139)  BP: (!) 158/70 (10/10/23 1139)  SpO2: 98 % (10/10/23 1139) Vital Signs (24h Range):  Temp:  [97.3 °F (36.3 °C)-98.6 °F (37 °C)] 97.7 °F (36.5 °C)  Pulse:  [] 60  Resp:  [16-20] 18  SpO2:  [87 %-98 %] 98 %  BP: (130-188)/(61-90) 158/70     Weight: 88.3 kg (194 lb 10.7 oz)  Body mass index is 30.49 kg/m².    Intake/Output Summary (Last 24 hours) at 10/10/2023 1225  Last data filed at 10/10/2023 0912  Gross per 24 hour   Intake --   Output 600 ml   Net -600 ml         Physical Exam  Vitals and nursing note reviewed.   Constitutional:       General: She is not in acute distress.     Appearance: Normal appearance. She is well-developed. She is not toxic-appearing.   HENT:      Head: Normocephalic and atraumatic.      Mouth/Throat:      Dentition: Normal dentition.   Eyes:      General: Lids are normal.   Cardiovascular:      Rate and Rhythm: Normal rate and regular rhythm. Extrasystoles are present.  Pulmonary:      Effort: Pulmonary effort is normal.      Breath sounds: Normal breath sounds.   Abdominal:      General: Bowel sounds are normal.       Palpations: Abdomen is soft.   Musculoskeletal:         General: Swelling present.      Cervical back: Neck supple.      Right lower leg: Edema present.      Left lower leg: Edema present.   Skin:     General: Skin is warm and dry.      Findings: No erythema or rash.   Neurological:      Mental Status: She is alert and oriented to person, place, and time.             Significant Labs: All pertinent labs within the past 24 hours have been reviewed.  CBC:   Recent Labs   Lab 10/09/23  0831   WBC 9.55   HGB 8.9*   HCT 27.6*        CMP:   Recent Labs   Lab 10/09/23  0831      K 3.5      CO2 32*   GLU 98   BUN 5*   CREATININE 0.6   CALCIUM 8.1*   ANIONGAP 8       Significant Imaging:            Assessment/Plan:      * Lumbar stenosis with neurogenic claudication  Chronic myelopathy  Foraminal stenosis of lumbar region  Neurogenic claudication   DDD, lumbar  Decreased ROM of R knee  Assistance needed for mobility     -thoracic myelopathy with neurogenic claudication s/p s/p T6-7 T7-8 anterior diskectomy (05/12/2023) and now s/p left L3-4, L4-5 OLIF, L5-S1 ALIF and L3-S1 posterior instrumented fusion (09/29/2023)  -management per primary team   -remains with surgical drain  -PT/OT following   -rehab placement    Acute blood loss anemia  -s/p 2 units PRBC transfusion 10/2 with appropriate response  -monitor for s/s of bleeding   -trend labs and monitor  -further transfusions as indicated           PAF (paroxysmal atrial fibrillation)  Atrial flutter  Anticoagulant long-term use  -chronic, has been in Aflutter for quite some time  -RVR rate worsened after admission in setting of neurosurgical procedure, anemia, and chronic hypothyroidism  -Cardiology following   -home propafenone discontinued per recs (failed drug in setting of several months history of AFlutter)  -continue home metoprolol (titrations limited due to BP)  -s/p IV dig x 3 doses   -initiated on esmolol gtt 10/01 due to no  improvement/elevation in HR overnight   -will need outpt follow up with EP for consideration of DCCV +/- ablation  -continue tele monitoring  -EKG PRN concerns  -trend labs, address/replete electrolytes as indicated  -monitor    -started on diltiazem drip which is on hold at this time.  Will continue metoprolol 25 mg po q.6  -status post DCCV +/- ablation now in sinus rhythm, return to AFib with RVR on 10/6. Resume amiodarone and switched to metoprolol succinate to tartrate  -continuing full-dose Lovenox and will transition per Cardiology to oral   -10/6 converted back into Afib with RVR today. Amino gtt initiated per cardiology.    10/7- appreciate cardiology---- on Amiodarone IV load and metoprolol (uptritrate metoprolol to obtain goal HR < 120 bpm), on AC for CVA ppx. HR has been 130-140.  10/8-- on Amiodarone IV load and metoprolol (uptritrate metoprolol prn to obtain goal HR < 120 bpm), on AC for CVA ppx  - continue amio gtt for 10/8/2023 and in AM transition to PO amio taper load (400 mg BID x14 days, 400 mg daily x14 days, then 200 mg daily)  - arrange for EP follow up as an outpatient     Atrial fibrillation with RVR  Patient with Long standing persistent (>12 months) atrial fibrillation which is uncontrolled currently with Beta Blocker and Amiodarone. Patient is currently in atrial fibrillation.[id number] Score: 2. . Anticoagulation indicated. Anticoagulation done with eliquis.    Mixed hyperlipidemia  -chronic  -resume home rosuvastatin at discharge     Essential hypertension, benign  -chronic  -low/normotensive pressures at current  -hold home HCTZ  -continue metoprolol as noted above   -dose/medication adjustment as appropriate   -monitor     Postoperative hypothyroidism  History of thyroid cancer  History of thyroidectomy     -chronic  -improvement in TSH   -continue home levothyroxine      VTE Risk Mitigation (From admission, onward)         Ordered     apixaban tablet 5 mg  2 times daily          10/06/23 0846     IP VTE HIGH RISK PATIENT  Once         09/29/23 2314     Place sequential compression device  Until discontinued         09/29/23 2314                Discharge Planning   ASYA: 10/10/2023     Code Status: Full Code   Is the patient medically ready for discharge?:     Reason for patient still in hospital (select all that apply): Patient trending condition and Treatment  Discharge Plan A: Rehab   Discharge Delays: None known at this time              Howard Lopez NP  Department of Hospital Medicine   [facility]

## 2023-10-10 NOTE — PROGRESS NOTES
The sw spoke to the pt and she's in agreement with the d/c plan to [facility]. The sw has requested a 1pm ambulance transport for the pt. The pt has no further Case Management needs and is clear to d/c to [facility]. The pt's nurse has the contact  info to call report.     Future Appointments   Date Time Provider Department Center   10/13/2023  1:15 PM [facility] XR-A LIMIT 350 LBS [facility] XRAY [facility]   10/13/2023  2:30 PM Taryn Tang PA-C [facility] NEUROSU [facility]   11/10/2023 10:00 AM [facility] XR-A LIMIT 350 LBS [facility] XRAY [facility]   11/10/2023 11:00 AM Taryn Tang PA-C [facility] NEUROSU [facility]   11/17/2023  1:40 PM Manuel Del Rosario III, MD [facility] CARDIO [facility]   12/13/2023  2:00 PM Silvia Giordano NP [facility]   1/3/2024  7:30 AM [facility] XR-A LIMIT 350 LBS [facility] XRAY [facility]   1/3/2024  8:30 AM Scooter Salter MD [facility] NEUROSU [place] [facility]   1/3/2024  2:30 PM Екатерина Tamez DNP [facility] CARDIO [facility]    [place] - Telemetry  Discharge Final Note    Primary Care Provider: Rocco Miller MD    Expected Discharge Date: 10/10/2023    Final Discharge Note (most recent)       Final Note - 10/10/23 1120          Post-Acute Status    Post-Acute Authorization Placement     Post-Acute Placement Status Set-up Complete/Auth obtained     Discharge Delays None known at this time                     Important Message from Medicare             Contact Info       [facility]   Specialty: Rehabilitation, Rehab Facility    [address] [address]   Phone: [phone]       Next Steps: Follow up    Instructions: The pt is discharging today to the facility listed above for rehab services.    Juan Manuel Singletary MD   Specialty: Electrophysiology, Cardiovascular Disease, Cardiology    [address] [address]   Phone: [phone]       Next Steps: Follow up    Instructions: The sw requested a 6 week follow up with the  provider listed above with the schedulers.

## 2023-10-10 NOTE — ASSESSMENT & PLAN NOTE
Patient with Long standing persistent (>12 months) atrial fibrillation which is uncontrolled currently with Beta Blocker and Amiodarone. Patient is currently in atrial fibrillation.[id number] Score: 2. . Anticoagulation indicated. Anticoagulation done with eliquis.

## 2023-10-10 NOTE — PROGRESS NOTES
[facility] - Intensive Care  Neurosurgery  Progress Note    Subjective:     Interval History:Leg spasms better.  No back pain.  Stood up with PT yesterday and took some steps.  No CP or SOB.    History of Present Illness:   This is a very pleasant 72 y.o. female, who was active at the beginning of 2022, mowing her lawn then suddenly started to have difficulty walking, severe back pain and right leg pain.  She has developed a paralysis involving the right leg.  Her right leg appears spastic.  She is mainly complaining of right knee pain. She is being mobilized in a wheelchair.  She admits still being able to drive her car.  In June 2022 she had a thoracic and lumbar spine MRI.  She is being investigated for possible multiple sclerosis.  She reports having no loss of sensation to pain or temperature.  She has no voiding difficulty or incontinence.  She reports right more than left hand numbness and dropping things frequently.      Post-Op Info:  Procedure(s) (LRB):  Transesophageal echo (CARLYLE) intra-procedure log documentation (N/A)   6 Days Post-Op      Medications:  Continuous Infusions:        Scheduled Meds:   [START ON 11/5/2023] amiodarone  200 mg Oral Daily    [START ON 10/22/2023] amiodarone  400 mg Oral Daily    amiodarone  400 mg Oral BID    apixaban  5 mg Oral BID    baclofen  5 mg Oral TID    bisacodyL  10 mg Rectal Daily    levothyroxine  137 mcg Oral Before breakfast    metoprolol tartrate  25 mg Oral BID    senna-docusate 8.6-50 mg  2 tablet Oral BID     PRN Meds:acetaminophen, aluminum-magnesium hydroxide-simethicone, cyclobenzaprine, metoprolol, ondansetron, oxyCODONE, prochlorperazine, traMADoL     Review of Systems  Objective:     Weight: 88.3 kg (194 lb 10.7 oz)  Body mass index is 30.49 kg/m².  Vital Signs (Most Recent):  Temp: 97.3 °F (36.3 °C) (10/10/23 0756)  Pulse: 62 (10/10/23 0756)  Resp: 18 (10/10/23 0757)  BP: (!) 166/77 (10/10/23 0756)  SpO2: 96 % (10/10/23 0756) Vital Signs (24h  "Range):  Temp:  [97.3 °F (36.3 °C)-98.6 °F (37 °C)] 97.3 °F (36.3 °C)  Pulse:  [] 62  Resp:  [16-20] 18  SpO2:  [87 %-97 %] 96 %  BP: (130-188)/(61-90) 166/77     Date 10/10/23 0700 - 10/11/23 0659   Shift 2800-6395 6573-6306 8529-3729 24 Hour Total   INTAKE   Shift Total(mL/kg)       OUTPUT   Urine(mL/kg/hr) 600   600   Shift Total(mL/kg) 600(6.8)   600(6.8)   Weight (kg) 88.3 88.3 88.3 88.3                Oxygen Concentration (%):  [28] 28             Closed/Suction Drain 09/29/23 2123 Medial Back Bulb 10 Fr. (Active)   Site Description Unable to view 09/30/23 1319   Dressing Type Transparent (Tegaderm);Other (Comment) 09/30/23 1319   Dressing Status Clean;Dry;Intact 09/30/23 1319   Dressing Intervention First dressing 09/30/23 1319   Drainage Bloody 09/30/23 1319   Status To bulb suction 09/30/23 1319   Output (mL) 25 mL 09/30/23 1156            Urethral Catheter 09/29/23 Silicone (Active)   Site Assessment Clean;Intact;Dry 09/30/23 1319   Collection Container Urimeter 09/30/23 1319   Securement Method secured to top of thigh w/ adhesive device 09/30/23 1319   Catheter Care Performed yes 09/30/23 1040   Reason for Continuing Urinary Catheterization Critically ill in ICU and requiring hourly monitoring of intake/output 09/30/23 1319   CAUTI Prevention Bundle Securement Device in place with 1" slack;Intact seal between catheter & drainage tubing;Drainage bag/urimeter off the floor;Sheeting clip in use;No dependent loops or kinks;Drainage bag/urimeter not overfilled (<2/3 full);Drainage bag/urimeter below bladder 09/30/23 1319   Output (mL) 75 mL 09/30/23 1150       Neurosurgery Physical Exam    General: well developed, well nourished, no distress  Mental Status: Awake, Alert, Oriented X3.Thought content appropriate  GCS: Motor: 6/Verbal: 5/Eyes: 4 GCS Total: 15    Motor Strength:  Strength                                HF KF KE DF PF EHL   Lower: R 5/5 5/5 5/5 5/5 5/5 5/5    L 5/5 5/5 5/5 5/5 5/5 5/5 " "        Incision- CDI        Significant Labs:  Recent Labs   Lab 10/09/23  0831   GLU 98      K 3.5      CO2 32*   BUN 5*   CREATININE 0.6   CALCIUM 8.1*   MG 2.0       Recent Labs   Lab 10/09/23  0831   WBC 9.55   HGB 8.9*   HCT 27.6*          No results for input(s): "LABPT", "INR", "APTT" in the last 48 hours.  Microbiology Results (last 7 days)       ** No results found for the last 168 hours. **              Assessment/Plan:     Active Diagnoses:    Diagnosis Date Noted POA    PRINCIPAL PROBLEM:  Lumbar stenosis with neurogenic claudication [M48.062] 09/29/2023 Yes    Acute blood loss anemia [D62] 10/01/2023 Yes    Foraminal stenosis of lumbar region [M48.061] 09/29/2023 Yes    Chronic myelopathy [G95.9] 09/29/2023 Yes    Decreased range of motion of right knee [M25.661] 09/29/2023 Yes    Assistance needed for mobility [Z74.1] 09/29/2023 Not Applicable    Typical atrial flutter [I48.3] 09/15/2023 Yes    Anticoagulant long-term use [Z79.01] 02/23/2023 Not Applicable    PAF (paroxysmal atrial fibrillation) [I48.0] 08/10/2022 Yes    DDD (degenerative disc disease), lumbar [M51.36] 08/09/2022 Yes    Atrial fibrillation with RVR [I48.91] 08/09/2022 Yes    Postoperative hypothyroidism [E89.0] 02/19/2018 Yes    History of thyroid cancer [Z85.850] 02/19/2018 Not Applicable    Essential hypertension, benign [I10] 02/19/2018 Yes    Mixed hyperlipidemia [E78.2] 02/19/2018 Yes      Problems Resolved During this Admission:       A/P:  POD #11 Left L3-4, L4-5 OLIF, L5-S1 ALIF and L3-S1 posterior instrumented fusion     --Neurologically stable          -q4h neuro checks  --Imaging: Post op CT reviewed by Dr. Salter  --Drains: Dced  --Pain control: Tylenol 650mg Q 6 hours, Tramadol 100mg Q 6 hours PRN, Oxycodone IR 10mg Q 6 hours PRN, Robaxin 750mg TID,  --DVT ppx: TEDs/SCDs/SQH  --Activity: PT/OT, OOB. TLSO brace.  Needs to be turned every 2 hours so back incision is not pressed on.  --Diet: " Cardiac  --Bowel regimen: PRN suppository, senna PRN  --Urinary: Carver  --Atelectasis ppx: Encourage IS hourly  --Dc to rehab today          All of the above discussed and reviewed with Dr. Salter.      MALCOLM BagleyC  Neurosurgery  [place] - Intensive Care

## 2023-10-10 NOTE — PLAN OF CARE
The sw spoke to Pepper after the d/c orders were written. The physician at [facility] approved the pt to arrive today. The sw spoke to the pt and her nurse. The sw arranged a 1pm ambulance transport via [facility]. The pt's in agreement with the d/c plan and states she will reach out to her family and friends to inform them. The sw gave the pt's nurse the contact info to call report.        10/10/23 1120   Post-Acute Status   Post-Acute Authorization Placement   Post-Acute Placement Status Set-up Complete/Auth obtained   Discharge Delays None known at this time   Discharge Plan   Discharge Plan A Rehab

## 2023-10-11 NOTE — TELEPHONE ENCOUNTER
Patient's brother called to verify if patient was currently inpatient at an [facility]. Call transferred to the [facility]  for patient's inpatient status.     Additional Information   Negative: Caller is angry or rude (e.g., hangs up, verbally abusive, yelling)   Negative: Caller hangs up   Negative: Caller has already spoken with the PCP and has no further questions.   Negative: Caller has already spoken with another triager and has no further questions.   Negative: Caller has already spoken with another triager or PCP AND has further questions AND triager able to answer questions.   Negative: Busy signal.  First attempt to contact caller.  Follow-up call scheduled within 15 minutes.   Negative: No answer.  First attempt to contact caller.  Follow-up call scheduled within 15 minutes.   Negative: Message left on identified voice mail   Negative: Message left on unidentified voice mail.  Phone number verified.   Negative: Message left with person in household.   Negative: Wrong number reached.  Phone number verified.   Negative: Second attempt to contact caller AND no contact made. Phone number verified.   Negative: Third attempt to contact caller AND no contact made. Phone number verified.   Negative: Cell phone out of range.  Phone number verified.   Negative: Pager number given.  Answering service notified.   Negative: Patient already left for the hospital/clinic.   Negative: Caller has cancelled the call before the first contact   Negative: Unable to complete triage due to phone connection issues   Negative: NON-URGENT call redirected to PCP's office because it is open    Protocols used: No Contact or Duplicate Contact Call-A-

## 2023-10-11 NOTE — DISCHARGE SUMMARY
[facility]  Neurosurgery  Discharge Summary      Patient Name: Graciela Barton  MRN: [id number]  Admission Date: 9/29/2023  Hospital Length of Stay: 11 days  Discharge Date and Time: 10/10/2023  1:27 PM  Attending Physician: Scooter Salter MD  Discharging Provider: Taryn Tang PA-C  Primary Care Provider: Rocco Miller MD     HPI:   This is a very pleasant 72 y.o. female, who was active at the beginning of 2022, mowing her lawn then suddenly started to have difficulty walking, severe back pain and right leg pain.  She has developed a paralysis involving the right leg.  Her right leg appears spastic.  She is mainly complaining of right knee pain. She is being mobilized in a wheelchair.  She admits still being able to drive her car.  In June 2022 she had a thoracic and lumbar spine MRI.  She is being investigated for possible multiple sclerosis.  She reports having no loss of sensation to pain or temperature.  She has no voiding difficulty or incontinence.  She reports right more than left hand numbness and dropping things frequently.    Procedure(s) (LRB):  Transesophageal echo (CARLYLE) intra-procedure log documentation (N/A)   Lumbar fusion    Hospital Course:   Patient had L3-4 L4-5 OLIF and L3-S1 posterior instrumentation on September 29, 2023.  Postoperatively she was admitted to the ICU.  She had urinary retention in the Carver had to be reinserted.  Patient eventually went into a flutter AFib with RVR and cardiology was consulted to manage this throughout the rest of her hospital stay.  Once she was able to be fully anticoagulated when the drain was removed she was eventually cardioverted. Once in normal sinus rhythm she was transferred to the floor.  Physical therapy worked with her throughout her hospital stay which she was able to do very minimal.  She was able to stand up and take side steps with the time of her discharge to rehab.  She was planning to go to rehab on October 6, 2023 but her  rhythm went back into AFib with RVR.  She was placed back on amiodarone infusion and transferred to telemetry and eventually a by mouth amiodarone load.  She denied CP, SOB during her inpatient hospital stay.  She converted on her own back to normal sinus rhythm.  She was eventually discharged to [facility] inpatient [facility] on October 10, 2023 after she was cleared medically by [facility].  She will need a voiding trial on October 11, 2023 and she will need her staples removed on October 13, 2023.  She will need to see PT OT and also wound care to monitor her surgical wounds especially the lower lumbar wound that has some redness.    Consults:   Consults (From admission, onward)          Status Ordering Provider     IP consult to case management  Once        Provider:  (Not yet assigned)    Completed CHRISTINA JOHNSTON     Inpatient consult to [facility]  Once        Provider:  (Not yet assigned)    Completed RACQUEL JORDAN     Inpatient consult to Cardiology-[facility]  Once        Provider:  (Not yet assigned)    Completed ROX SHIRLEY            Pending Diagnostic Studies:       None          Final Active Diagnoses:    Diagnosis Date Noted POA    PRINCIPAL PROBLEM:  Lumbar stenosis with neurogenic claudication [M48.062] 09/29/2023 Yes    Acute blood loss anemia [D62] 10/01/2023 Yes    Chronic myelopathy [G95.9] 09/29/2023 Yes    Typical atrial flutter [I48.3] 09/15/2023 Yes    PAF (paroxysmal atrial fibrillation) [I48.0] 08/10/2022 Yes    DDD (degenerative disc disease), lumbar [M51.36] 08/09/2022 Yes    Atrial fibrillation with RVR [I48.91] 08/09/2022 Yes    Postoperative hypothyroidism [E89.0] 02/19/2018 Yes    Essential hypertension, benign [I10] 02/19/2018 Yes    Mixed hyperlipidemia [E78.2] 02/19/2018 Yes      Problems Resolved During this Admission:      Discharged Condition: good    Disposition: Rehab Facility    Follow Up:   Follow-up Information       [place]  Follow up.    Specialties: Rehabilitation, Rehab Facility  Why: The pt is discharging today to the facility listed above for rehab services.  Contact information:  [address]  [facility] [id number]  [phone]               Juan Manuel Singletary MD Follow up.    Specialties: Electrophysiology, Cardiovascular Disease, Cardiology  Why: The sw requested a 6 week follow up with the provider listed above with the schedulers.  Contact information:  [address]  [facility] [address]  [phone]                           Patient Instructions:   No discharge procedures on file.  Medications:  Reconciled Home Medications:      Medication List        START taking these medications      acetaminophen 650 mg/20.3 mL Soln  Commonly known as: TYLENOL  Take 20.3 mLs (650 mg total) by mouth every 6 (six) hours as needed for Pain.     aluminum-magnesium hydroxide-simethicone 200-200-20 mg/5 mL Susp  Commonly known as: MAALOX  Take 30 mLs by mouth every 4 (four) hours as needed (indigestion).     * amiodarone 400 MG tablet  Commonly known as: PACERONE  Take 1 tablet (400 mg total) by mouth 2 (two) times daily. Stop on 10/21 with last dose at 2100     * amiodarone 400 MG tablet  Commonly known as: PACERONE  Take 1 tablet (400 mg total) by mouth once daily. Start 10/22/23 at 0900 for 14 days for 14 days  Start taking on: October 22, 2023     * amiodarone 200 MG Tab  Commonly known as: PACERONE  Take 1 tablet (200 mg total) by mouth once daily. Start 11/5/23 at 0900  Start taking on: November 5, 2023     baclofen 5 mg Tab tablet  Commonly known as: LIORESAL  Take 1 tablet (5 mg total) by mouth 3 (three) times daily.     bisacodyL 10 mg Supp  Commonly known as: DULCOLAX  Place 1 suppository (10 mg total) rectally daily as needed (constipation).     ondansetron 8 MG Tbdl  Commonly known as: ZOFRAN-ODT  Take 1 tablet (8 mg total) by mouth every 6 (six) hours as needed (nausea/vomiting).     oxyCODONE 10 mg Tab immediate  release tablet  Commonly known as: ROXICODONE  Take 1 tablet (10 mg total) by mouth every 6 (six) hours as needed for Pain (severe pain).     traMADoL 100 mg Tab  Take 100 mg by mouth every 6 (six) hours as needed for Pain (moderate pain).           * This list has 3 medication(s) that are the same as other medications prescribed for you. Read the directions carefully, and ask your doctor or other care provider to review them with you.                CHANGE how you take these medications      metoprolol tartrate 25 MG tablet  Commonly known as: LOPRESSOR  Take 1 tablet (25 mg total) by mouth 2 (two) times daily.  What changed:   medication strength  how much to take     senna-docusate 8.6-50 mg 8.6-50 mg per tablet  Commonly known as: PERICOLACE  Take 2 tablets by mouth 2 (two) times daily.  What changed:   when to take this  reasons to take this            CONTINUE taking these medications      ELIQUIS 5 mg Tab  Generic drug: apixaban  Take 1 tablet (5 mg total) by mouth 2 (two) times daily.     hydroCHLOROthiazide 25 MG tablet  Commonly known as: HYDRODIURIL  Take 1 tablet (25 mg total) by mouth once daily.     levothyroxine 137 MCG Tab tablet  Commonly known as: SYNTHROID  Take 1 tablet (137 mcg total) by mouth before breakfast.     multivitamin per tablet  Commonly known as: THERAGRAN  Take 1 tablet by mouth once daily.     rosuvastatin 10 MG tablet  Commonly known as: CRESTOR  Take 1 tablet (10 mg total) by mouth once daily.            STOP taking these medications      COLLAGEN 1500 PLUS C ORAL     ICAPS AREDS ORAL     meclizine 25 mg tablet  Commonly known as: ANTIVERT     methocarbamoL 750 MG Tab  Commonly known as: ROBAXIN     propafenone 150 MG Tab  Commonly known as: RHTHYMOL     VITAMIN D3 (CALCIUM CIT-PHOS) ORAL              Taryn Tang PA-C  Neurosurgery  [place] - Telemetry

## 2023-11-01 ENCOUNTER — TELEPHONE (OUTPATIENT)
Dept: NEUROSURGERY | Facility: CLINIC | Age: 73
End: 2023-11-01
Payer: COMMERCIAL

## 2023-11-02 ENCOUNTER — TELEPHONE (OUTPATIENT)
Dept: CARDIOLOGY | Facility: CLINIC | Age: 73
End: 2023-11-02
Payer: COMMERCIAL

## 2023-11-02 NOTE — TELEPHONE ENCOUNTER
I tried reaching out patient to reschedule her November 17th appointment with Dr. Del Rosario in [place]. Since you live in [place] I can schedule you with a Cardiologist in [place].  Please reach out so I can assist.      I've tried to reach patient to reschedule. I took the liberty to schedule in closer to your home in it's with Dr. Weller. He's located at [address] [place] [address] in [facility]. Please reach out if you having any questions. Appoitment is November 17th at 9am

## 2023-11-03 ENCOUNTER — APPOINTMENT (OUTPATIENT)
Dept: LAB | Facility: HOSPITAL | Age: 73
End: 2023-11-03
Attending: FAMILY MEDICINE
Payer: COMMERCIAL

## 2023-11-03 DIAGNOSIS — T81.30XA WOUND DISRUPTION: Primary | ICD-10-CM

## 2023-11-03 PROCEDURE — 87070 CULTURE OTHR SPECIMN AEROBIC: CPT | Performed by: FAMILY MEDICINE

## 2023-11-03 PROCEDURE — 87186 SC STD MICRODIL/AGAR DIL: CPT | Performed by: FAMILY MEDICINE

## 2023-11-03 PROCEDURE — 87077 CULTURE AEROBIC IDENTIFY: CPT | Performed by: FAMILY MEDICINE

## 2023-11-06 LAB — BACTERIA SPEC AEROBE CULT: ABNORMAL

## 2023-11-09 ENCOUNTER — OFFICE VISIT (OUTPATIENT)
Dept: NEUROSURGERY | Facility: CLINIC | Age: 73
End: 2023-11-09
Payer: MEDICARE

## 2023-11-09 ENCOUNTER — HOSPITAL ENCOUNTER (OUTPATIENT)
Dept: RADIOLOGY | Facility: HOSPITAL | Age: 73
Discharge: HOME | End: 2023-11-09
Attending: NEUROLOGICAL SURGERY
Payer: MEDICARE

## 2023-11-09 VITALS
DIASTOLIC BLOOD PRESSURE: 68 MMHG | HEIGHT: 67 IN | SYSTOLIC BLOOD PRESSURE: 114 MMHG | BODY MASS INDEX: 30.56 KG/M2 | HEART RATE: 60 BPM | WEIGHT: 194.69 LBS

## 2023-11-09 DIAGNOSIS — M47.12 CERVICAL SPONDYLOSIS WITH MYELOPATHY: ICD-10-CM

## 2023-11-09 DIAGNOSIS — M48.062 LUMBAR STENOSIS WITH NEUROGENIC CLAUDICATION: ICD-10-CM

## 2023-11-09 DIAGNOSIS — Z98.1 S/P LUMBAR SPINAL FUSION: Primary | ICD-10-CM

## 2023-11-09 DIAGNOSIS — M48.061 FORAMINAL STENOSIS OF LUMBAR REGION: ICD-10-CM

## 2023-11-09 DIAGNOSIS — M54.16 LUMBAR RADICULOPATHY: ICD-10-CM

## 2023-11-09 PROCEDURE — 99024 PR POST-OP FOLLOW-UP VISIT: ICD-10-PCS | Mod: POP,,, | Performed by: PHYSICIAN ASSISTANT

## 2023-11-09 PROCEDURE — 99024 POSTOP FOLLOW-UP VISIT: CPT | Mod: POP,,, | Performed by: PHYSICIAN ASSISTANT

## 2023-11-09 PROCEDURE — 99999 PR PBB SHADOW E&M-EST. PATIENT-LVL V: CPT | Mod: PBBFAC,,, | Performed by: PHYSICIAN ASSISTANT

## 2023-11-09 PROCEDURE — 72100 X-RAY EXAM L-S SPINE 2/3 VWS: CPT | Mod: TC,FY

## 2023-11-09 PROCEDURE — 72100 X-RAY EXAM L-S SPINE 2/3 VWS: CPT | Mod: 26,,, | Performed by: RADIOLOGY

## 2023-11-09 PROCEDURE — 72100 XR LUMBAR SPINE AP AND LATERAL: ICD-10-PCS | Mod: 26,,, | Performed by: RADIOLOGY

## 2023-11-09 PROCEDURE — 99999 PR PBB SHADOW E&M-EST. PATIENT-LVL V: ICD-10-PCS | Mod: PBBFAC,,, | Performed by: PHYSICIAN ASSISTANT

## 2023-11-09 PROCEDURE — 99215 OFFICE O/P EST HI 40 MIN: CPT | Mod: PBBFAC,PN | Performed by: PHYSICIAN ASSISTANT

## 2023-11-09 RX ORDER — MECLIZINE HYDROCHLORIDE 25 MG/1
25 TABLET ORAL
COMMUNITY
Start: 2023-10-27

## 2023-11-09 RX ORDER — SULFAMETHOXAZOLE AND TRIMETHOPRIM 800; 160 MG/1; MG/1
TABLET ORAL
COMMUNITY
Start: 2023-11-08 | End: 2024-02-07

## 2023-11-09 RX ORDER — TAMSULOSIN HYDROCHLORIDE 0.4 MG/1
1 CAPSULE ORAL
COMMUNITY
End: 2024-02-07

## 2023-11-09 NOTE — PROGRESS NOTES
Subjective:     Patient ID:  Graciela Barton is a 73 y.o. female.    [place]    Chief Complaint: 6 week po fu    09/29/2023     Preop diagnosis   1. Severe spinal stenosis at L3-4 and L4-5 with neurogenic claudication and radiculopathy  2. Foraminal stenosis with lumbar radiculopathy     Postop diagnosis   Same     Surgery   1. Left L3-4, L4-5 oblique interbody fusion with placement of interbody spacer, Globus, Transcontinental TPS cage filled with allograft DBM and BMP  2. L5-S1 anterior interbody fusion with placement of interbody spacer, Globus, Intercontinental TPS cage filled with allograft DBM and BMP  3. L3-S1 posterior segmental instrumentation using Globus Creo screws  4. L3-4, L4-5 laminectomy, medial facetectomy, foraminotomy  5. L3-4, L4-5, L5-S1 posterolateral arthrodesis using autograft harvested from the same incision  6. Registration of navigated instruments using intraop 3D imaging Ziehm and Brain[employer] station  7. Neuro monitoring using SSEP, EMG, MEP     Surgeon   Scooter Salter MD    HPI    Graciela Barton is a 73 y.o. female who presents for follow up.  Patient is currently at the [facility] in the skilled nursing facility.  She was at [facility] before this.  She has been at this facility for about a week and a half.  She still is primarily getting around in a wheelchair.  Recently she has been able to walk 139 ft with a walker which was the most she has walked.  She continues to have spasms in her bilateral anterior thighs right greater than left.  She has mild achy back pain.  She states she is had urinary retention off since the procedure and currently has a Carver.  She has been treated for UTI and is on Bactrim.  She states she has not had any chest pain shortness of breath or tachycardia.  She is been wearing her back brace.  She denies any leg pain or paresthesias.    Patient denies any recent accidents or trauma, no saddle anesthesias, and no bowel or bladder  "incontinence.      Review of Systems:  Constitution: Negative for chills, fever, night sweats and weight loss.   Musculoskeletal: Negative for falls.   Gastrointestinal: Negative for bowel incontinence, nausea and vomiting.   Genitourinary: Negative for bladder incontinence.   Neurological: Negative for disturbances in coordination and loss of balance.      Objective:      Vitals:    11/09/23 0852   BP: 114/68   Pulse: 60   Weight: 88.3 kg (194 lb 10.7 oz)   Height: 5' 7" (1.702 m)   PainSc: 0-No pain   PainLoc: Back         Physical Exam: Exam done in the wheelchair      General:  Graciela Barton is well-developed, well-nourished, appears stated age, in no acute distress, alert and oriented to person, place, and time.    Pulmonary/Chest:  Respiratory effort normal  Abdominal: Exhibits no distension  Psychiatric:  Normal mood and affect.  Behavior is normal.  Judgement and thought content normal      Musculoskeletal:    Patient is able to walk on heels and toes without difficulty.    Lumbar ROM:   No pain in lumbar flexion, extension, left lateral bending, and right lateral bending.    Lumbar Spine Inspection:  LLQ incisions healed.  Low back incision with a small area of wound dehiscence in the middle portion of the wound with no visible rashes.  No redness, swelling, or drainage.    Neurological:  Alert and oriented to person, place, and time    Muscle strength against resistance:     Right Left   Hip flexion  5 / 5 5 / 5   Hip extension 5 / 5 5 / 5   Hip abduction 5 / 5 5 / 5   Hip adduction  5 / 5 5 / 5   Knee extension  4 / 5 4 / 5   Knee flexion 4 / 5 4 / 5   Dorsiflexion  5 / 5 5 / 5   EHL  5 / 5 5 / 5   Plantar flexion  5 / 5 5 / 5   Inversion of the feet 5 / 5 5 / 5   Eversion of the feet  5 / 5 5 / 5     BL LE spasticity    Reflexes:    Clonus:  Negative bilaterally    On gross examination of the bilateral upper extremities, patient has full painfree ROM with no signs of clubbing, cyanosis, edema, or " weakness.       XRAY Interpretation:     Lumbar spine xrays were personally reviewed today.  No fractures. Hardware intact.      Assessment:          1. S/P lumbar spinal fusion            Plan:             Patient seems to be doing better.  She needs continued aggressive physical therapy and occupational therapy.  Recommend she have a urology consult or outpatient urology follow-up for persistent urinary retention and now UTI.  She has cardiology scheduled follow-ups already made in the [facility] system and she was encouraged to get to those appointments.      I will call about the bone growth stimulator she has not received yet.      Continue wearing back brace.      Continue wound care at [facility] for lumbar incision which she is currently already getting.      Follow-up with Dr. Salter in 6 weeks with x-rays for 3 postop follow-up.    She is still at the skilled nursing facility so all of this was communicated by paperwork back to them regarding recommendations.      Follow-Up:  Follow up in about 6 weeks (around 12/21/2023). If there are any questions prior to this, the patient was instructed to contact the office.       Taryn Tang [facility], PA-C  Neurosurgery  [facility]  11/09/2023

## 2023-11-10 ENCOUNTER — TELEPHONE (OUTPATIENT)
Dept: NEUROSURGERY | Facility: CLINIC | Age: 73
End: 2023-11-10
Payer: COMMERCIAL

## 2023-11-14 ENCOUNTER — LAB VISIT (OUTPATIENT)
Dept: LAB | Facility: HOSPITAL | Age: 73
End: 2023-11-14
Attending: NURSE PRACTITIONER
Payer: MEDICARE

## 2023-11-14 DIAGNOSIS — N39.0 URINARY TRACT INFECTION, SITE NOT SPECIFIED: Primary | ICD-10-CM

## 2023-11-14 LAB
BILIRUB UR QL STRIP: NEGATIVE
CLARITY UR: CLEAR
COLOR UR: YELLOW
GLUCOSE UR QL STRIP: NEGATIVE
HGB UR QL STRIP: ABNORMAL
KETONES UR QL STRIP: NEGATIVE
LEUKOCYTE ESTERASE UR QL STRIP: NEGATIVE
MICROSCOPIC COMMENT: NORMAL
NITRITE UR QL STRIP: NEGATIVE
PH UR STRIP: 7 [PH] (ref 5–8)
PROT UR QL STRIP: NEGATIVE
RBC #/AREA URNS HPF: 3 /HPF (ref 0–4)
SP GR UR STRIP: <1.005 (ref 1–1.03)
SQUAMOUS #/AREA URNS HPF: 0 /HPF
UNIDENT CRYS URNS QL MICRO: 2
URN SPEC COLLECT METH UR: ABNORMAL
UROBILINOGEN UR STRIP-ACNC: NEGATIVE EU/DL
WBC #/AREA URNS HPF: 1 /HPF (ref 0–5)

## 2023-11-14 PROCEDURE — 81000 URINALYSIS NONAUTO W/SCOPE: CPT | Performed by: NURSE PRACTITIONER

## 2023-11-14 PROCEDURE — 87086 URINE CULTURE/COLONY COUNT: CPT | Performed by: NURSE PRACTITIONER

## 2023-11-16 ENCOUNTER — TELEPHONE (OUTPATIENT)
Dept: UROLOGY | Facility: CLINIC | Age: 73
End: 2023-11-16
Payer: COMMERCIAL

## 2023-11-16 NOTE — TELEPHONE ENCOUNTER
Returned call, spoke with  at [facility], appt made for patient, she verbally understood.

## 2023-11-16 NOTE — TELEPHONE ENCOUNTER
----- Message from Esha Byrnes sent at 11/16/2023 10:45 AM CST -----  Regarding: Needs return call  Type: Needs Medical Advice  Who Called:  Romana Santiago    Best Call Back Number: [phone] ext [phone]  Additional Information: Faxed over referral for 11/7 and they never got a return call please call to confrim the fax was aecieved

## 2023-11-17 ENCOUNTER — OFFICE VISIT (OUTPATIENT)
Dept: CARDIOLOGY | Facility: CLINIC | Age: 73
End: 2023-11-17
Payer: MEDICARE

## 2023-11-17 VITALS
SYSTOLIC BLOOD PRESSURE: 125 MMHG | HEIGHT: 67 IN | BODY MASS INDEX: 28.09 KG/M2 | OXYGEN SATURATION: 95 % | HEART RATE: 84 BPM | WEIGHT: 179 LBS | DIASTOLIC BLOOD PRESSURE: 58 MMHG

## 2023-11-17 DIAGNOSIS — R60.0 BILATERAL LEG EDEMA: ICD-10-CM

## 2023-11-17 DIAGNOSIS — E78.2 MIXED HYPERLIPIDEMIA: ICD-10-CM

## 2023-11-17 DIAGNOSIS — I48.0 PAF (PAROXYSMAL ATRIAL FIBRILLATION): ICD-10-CM

## 2023-11-17 DIAGNOSIS — I10 ESSENTIAL HYPERTENSION, BENIGN: Primary | ICD-10-CM

## 2023-11-17 DIAGNOSIS — I48.3 TYPICAL ATRIAL FLUTTER: ICD-10-CM

## 2023-11-17 DIAGNOSIS — I27.20 PULMONARY HTN: ICD-10-CM

## 2023-11-17 LAB — BACTERIA UR CULT: NO GROWTH

## 2023-11-17 PROCEDURE — 99214 PR OFFICE/OUTPT VISIT, EST, LEVL IV, 30-39 MIN: ICD-10-PCS | Mod: S$PBB,,, | Performed by: INTERNAL MEDICINE

## 2023-11-17 PROCEDURE — 99999 PR PBB SHADOW E&M-EST. PATIENT-LVL IV: ICD-10-PCS | Mod: PBBFAC,,, | Performed by: INTERNAL MEDICINE

## 2023-11-17 PROCEDURE — 99214 OFFICE O/P EST MOD 30 MIN: CPT | Mod: S$PBB,,, | Performed by: INTERNAL MEDICINE

## 2023-11-17 PROCEDURE — 99214 OFFICE O/P EST MOD 30 MIN: CPT | Mod: PBBFAC,PN | Performed by: INTERNAL MEDICINE

## 2023-11-17 PROCEDURE — 99999 PR PBB SHADOW E&M-EST. PATIENT-LVL IV: CPT | Mod: PBBFAC,,, | Performed by: INTERNAL MEDICINE

## 2023-11-17 NOTE — PROGRESS NOTES
[facility]  Cardiology Clinic Note      Chief Complaint  Chief Complaint   Patient presents with    Hospital Follow Up       HPI:      73-year-old female past medical history thyroid cancer/hypothyroidism, BPPV, multiple sclerosis, ABLA postoperatively 10/2023, hypertension, hyperlipidemia, paroxysmal atrial fibrillation/atrial flutter on propafenone previously at home cardioversion as an inpatient 10/2023, transesophageal echocardiogram 10/2023 normal estimated EF normal RV prior echocardiogram 09/2023 normal EF diastolic function indeterminate mild left atrial dilation mild mitral valve regurgitation normal RV mild right atrial enlargement mild tricuspid regurgitation PASP 44 prior echo 2022 normal EF normal diastolic function mild left atrial enlargement normal RV PASP 30 prior stress echo 2019 normal EF no ischemia on echocardiogram epic images    Followed by electrophysiology Dr Singletary / JONELLE Magaña  Recent admission treated for atrial fibrillation RVR last month required amiodarone  Last EKG 10/08/2023 normal sinus rhythm with sinus arrhythmia nonspecific ST-T changes  Reports she is not taking hydrochlorothiazide any longer    Patient is doing well  Limited mobility after surgery    Medications  Current Outpatient Medications   Medication Sig Dispense Refill    acetaminophen (TYLENOL) 650 mg/20.3 mL Soln Take 20.3 mLs (650 mg total) by mouth every 6 (six) hours as needed for Pain.      amiodarone (PACERONE) 200 MG Tab Take 1 tablet (200 mg total) by mouth once daily. Start 11/5/23 at 0900 30 tablet 11    baclofen (LIORESAL) 5 mg Tab tablet Take 1 tablet (5 mg total) by mouth 3 (three) times daily. 1 tablet 0    bisacodyL (DULCOLAX) 10 mg Supp Place 1 suppository (10 mg total) rectally daily as needed (constipation).  0    ELIQUIS 5 mg Tab Take 1 tablet (5 mg total) by mouth 2 (two) times daily. 60 tablet 11    levothyroxine (SYNTHROID) 137 MCG Tab tablet Take 1 tablet (137 mcg total) by  mouth before breakfast. 90 tablet 0    metoprolol succinate (TOPROL-XL) 25 MG 24 hr tablet TAKE 1 TABLET BY MOUTH EVERY DAY 90 tablet 3    multivitamin (THERAGRAN) per tablet Take 1 tablet by mouth once daily.      oxyCODONE (ROXICODONE) 10 mg Tab immediate release tablet Take 1 tablet (10 mg total) by mouth every 6 (six) hours as needed for Pain (severe pain).  0    senna-docusate 8.6-50 mg (PERICOLACE) 8.6-50 mg per tablet Take 2 tablets by mouth 2 (two) times daily.      tamsulosin (FLOMAX) 0.4 mg Cap Take 1 capsule by mouth.      aluminum-magnesium hydroxide-simethicone (MAALOX) 200-200-20 mg/5 mL Susp Take 30 mLs by mouth every 4 (four) hours as needed (indigestion). (Patient not taking: Reported on 11/17/2023)      levothyroxine (SYNTHROID) 112 MCG tablet TAKE 1 TABLET BY MOUTH EVERY DAY IN THE MORNING ON AN EMPTY STOMACH (Patient not taking: Reported on 11/17/2023) 90 tablet 3    meclizine (ANTIVERT) 25 mg tablet Take by mouth.      ondansetron (ZOFRAN-ODT) 8 MG TbDL Take 1 tablet (8 mg total) by mouth every 6 (six) hours as needed (nausea/vomiting). (Patient not taking: Reported on 11/17/2023)      rosuvastatin (CRESTOR) 10 MG tablet Take 1 tablet (10 mg total) by mouth once daily. (Patient not taking: Reported on 11/17/2023) 90 tablet 0    sulfamethoxazole-trimethoprim 800-160mg (BACTRIM DS) 800-160 mg Tab       traMADoL 100 mg Tab Take 100 mg by mouth every 6 (six) hours as needed for Pain (moderate pain). (Patient not taking: Reported on 11/17/2023)       No current facility-administered medications for this visit.        History  Past Medical History:   Diagnosis Date    Arthritis     Cancer     Thyroid    Edema     Hyperlipidemia     Hypertension     Thyroid disease      Past Surgical History:   Procedure Laterality Date    ADENOIDECTOMY      COLONOSCOPY  10/18/2018    COLONOSCOPY N/A 10/18/2018    Procedure: COLONOSCOPY;  Surgeon: Yosvany Alejandra MD;  Location: [facility];  Service: General;   Laterality: N/A;    ECHOCARDIOGRAM,TRANSESOPHAGEAL N/A 10/4/2023    Procedure: Transesophageal echo (CARLYLE) intra-procedure log documentation;  Surgeon: Mark Ortega MD;  Location: [facility] OR;  Service: Cardiology;  Laterality: N/A;    EPIDURAL STEROID INJECTION N/A 9/30/2022    Procedure: LUMBAR L3/4 MALKA CONTRAST DIRRECT REFERRAL MONICA CARRERA IN CHART;  Surgeon: Nicholas Kumar MD;  Location: [facility] PAIN [facility]GT;  Service: Pain Management;  Laterality: N/A;    FACETECTOMY OF VERTEBRA N/A 9/29/2023    Procedure: FACETECTOMY;  Surgeon: Scooter Salter MD;  Location: [facility] OR;  Service: Neurosurgery;  Laterality: N/A;    FORAMINOTOMY N/A 9/29/2023    Procedure: FORAMINOTOMY, SPINE;  Surgeon: Scooter Salter MD;  Location: [facility] OR;  Service: Neurosurgery;  Laterality: N/A;    FUSION OF LUMBAR SPINE USING POSTERIOR INTERBODY TECHNIQUE N/A 9/29/2023    Procedure: FUSION, SPINE, LUMBAR, PLIF;  Surgeon: Scooter Salter MD;  Location: [facility] OR;  Service: Neurosurgery;  Laterality: N/A;  L3-S1 posterior instrumentation   L3-L5 Lmainectomy sacro pelvic fixation Globus cage   -lop 3 hr   -los 5 days   -Lami segments 1  -anterior fusion insterspace 2  -general   -type and screen   -Ziehm brain notified cc  -EMG bairon notified cc  -SEP  -MEP  -TLSO       HYSTERECTOMY  1992    total hyst     KNEE SURGERY      16 pins and two plates implanted    LAMINECTOMY N/A 9/29/2023    Procedure: LAMINECTOMY, SPINE;  Surgeon: Scooter Salter MD;  Location: [facility] OR;  Service: Neurosurgery;  Laterality: N/A;    MINIMALLY INVASIVE TRANSFORAMINAL LUMBAR INTERBODY FUSION (TLIF) N/A 9/29/2023    Procedure: FUSION, SPINE, LUMBAR, TLIF, MINIMALLY INVASIVE;  Surgeon: Scooter Salter MD;  Location: [facility] OR;  Service: Neurosurgery;  Laterality: N/A;  L3-L5 OLIF Globus ATP  L5-S1 ALIF Globus ATP  -Lami add segment 1  -anterior fusion interspances 2  -lop 3 hr   -general   -type and screen   -C arm   -EMG bairon notified  cc  -SEP  -MEP  -TLSO  -Regular Bed   -Lateral right down   -globus reji    THORACIC DISCECTOMY Right 5/12/2023    Procedure: DISCECTOMY, SPINE, THORACIC Right T6-7, T7-8 interior discectomy retropleural;  Surgeon: Scooter Salter MD;  Location: [facility];  Service: Neurosurgery;  Laterality: Right;  general  eliquis   type and screen  C-arm  Metrx  EMG bairon notified cc  SEP  MEP  Regular bed   lateral left down   globus (Jose Enrique) notified cc    THYROIDECTOMY      TONSILLECTOMY       Social History     Socioeconomic History    Marital status:    Tobacco Use    Smoking status: Never    Smokeless tobacco: Never   Substance and Sexual Activity    Alcohol use: No    Drug use: No    Sexual activity: Not Currently     Partners: Male   Social History Narrative    ** Merged History Encounter **          Family History   Problem Relation Age of Onset    Alcohol abuse Mother     Heart disease Mother     Alcohol abuse Father     Heart disease Father     Heart disease Maternal Grandmother     Heart disease Maternal Grandfather         Allergies  Review of patient's allergies indicates:   Allergen Reactions    Vancomycin Tinitus    Vancomycin analogues Tinitus       Review of Systems   Review of Systems   Constitutional: Negative for fever.   HENT:  Negative for nosebleeds.    Eyes:  Negative for visual disturbance.   Cardiovascular:  Negative for chest pain, claudication, dyspnea on exertion, palpitations and syncope.   Respiratory:  Negative for cough, hemoptysis and wheezing.    Endocrine: Negative for cold intolerance, heat intolerance, polyphagia and polyuria.   Hematologic/Lymphatic: Negative for bleeding problem.   Skin:  Negative for rash.   Musculoskeletal:  Negative for myalgias.   Gastrointestinal:  Negative for hematemesis, hematochezia, nausea and vomiting.   Genitourinary:  Negative for dysuria.   Neurological:  Negative for focal weakness and sensory change.   Psychiatric/Behavioral:  Negative for altered  mental status.        Physical Exam  Vitals:    11/17/23 0856   BP: (!) 125/58   Pulse: 84     Wt Readings from Last 1 Encounters:   11/17/23 81.2 kg (179 lb)     Physical Exam  Constitutional:       General: She is not in acute distress.  HENT:      Head: Normocephalic and atraumatic.      Mouth/Throat:      Mouth: Mucous membranes are moist.   Eyes:      Extraocular Movements: Extraocular movements intact.      Pupils: Pupils are equal, round, and reactive to light.   Neck:      Vascular: No carotid bruit or JVD.   Cardiovascular:      Rate and Rhythm: Normal rate and regular rhythm.      Heart sounds: No murmur heard.     No friction rub. No gallop.   Pulmonary:      Effort: Pulmonary effort is normal.      Breath sounds: Normal breath sounds.   Abdominal:      Tenderness: There is no abdominal tenderness. There is no guarding or rebound.   Musculoskeletal:      Right lower leg: No edema.      Left lower leg: No edema.   Skin:     General: Skin is warm and dry.      Capillary Refill: Capillary refill takes less than 2 seconds.   Neurological:      Mental Status: She is alert. Mental status is at baseline.   Psychiatric:         Mood and Affect: Mood normal.         Labs  Lab Visit on 11/14/2023   Component Date Value Ref Range Status    Specimen UA 11/14/2023 Urine, Clean Catch   Final    Color, UA 11/14/2023 Yellow  Yellow, Straw, Adali Final    Appearance, UA 11/14/2023 Clear  Clear Final    pH, UA 11/14/2023 7.0  5.0 - 8.0 Final    Specific Gravity, UA 11/14/2023 <1.005 (A)  1.005 - 1.030 Final    Protein, UA 11/14/2023 Negative  Negative Final    Comment: Recommend a 24 hour urine protein or a urine   protein/creatinine ratio if globulin induced proteinuria is  clinically suspected.      Glucose, UA 11/14/2023 Negative  Negative Final    Ketones, UA 11/14/2023 Negative  Negative Final    Bilirubin (UA) 11/14/2023 Negative  Negative Final    Occult Blood UA 11/14/2023 2+ (A)  Negative Final    Nitrite, UA  11/14/2023 Negative  Negative Final    Urobilinogen, UA 11/14/2023 Negative  <2.0 EU/dL Final    Leukocytes, UA 11/14/2023 Negative  Negative Final    Urine Culture, Routine 11/14/2023 No growth   Final    RBC, UA 11/14/2023 3  0 - 4 /hpf Final    WBC, UA 11/14/2023 1  0 - 5 /hpf Final    Squam Epithel, UA 11/14/2023 0  /hpf Final    Unclass Ana UA 11/14/2023 2  None-Moderate Final    Microscopic Comment 11/14/2023 SEE COMMENT   Final    Comment: Other formed elements not mentioned in the report are not   present in the microscopic examination.      Appointment on 11/03/2023   Component Date Value Ref Range Status    Aerobic Bacterial Culture 11/03/2023  (A)   Final                    Value:PROTEUS MIRABILIS  Moderate     No results displayed because visit has over 200 results.      Lab Visit on 09/06/2023   Component Date Value Ref Range Status    Prothrombin Time 09/06/2023 11.4  9.0 - 12.5 sec Final    INR 09/06/2023 1.1  0.8 - 1.2 Final    Comment: Coumadin Therapy:  2.0 - 3.0 for INR for all indicators except mechanical heart valves  and antiphospholipid syndromes which should use 2.5 - 3.5.  LOT^040^PT Inn^[id number]     Admission on 08/30/2023, Discharged on 08/30/2023   Component Date Value Ref Range Status    Specimen UA 08/30/2023 Urine, Clean Catch   Final    Color, UA 08/30/2023 Yellow  Yellow, Straw, Adali Final    Appearance, UA 08/30/2023 Clear  Clear Final    pH, UA 08/30/2023 7.0  5.0 - 8.0 Final    Specific Gravity, UA 08/30/2023 1.025  1.005 - 1.030 Final    Protein, UA 08/30/2023 Trace (A)  Negative Final    Comment: Recommend a 24 hour urine protein or a urine   protein/creatinine ratio if globulin induced proteinuria is  clinically suspected.      Glucose, UA 08/30/2023 Negative  Negative Final    Ketones, UA 08/30/2023 Negative  Negative Final    Bilirubin (UA) 08/30/2023 Negative  Negative Final    Occult Blood UA 08/30/2023 Negative  Negative Final    Nitrite, UA 08/30/2023 Negative  Negative  Final    Urobilinogen, UA 08/30/2023 Negative  Negative EU/dL Final    Leukocytes, UA 08/30/2023 1+ (A)  Negative Final    WBC 08/30/2023 8.11  3.90 - 12.70 K/uL Final    RBC 08/30/2023 4.08  4.00 - 5.40 M/uL Final    Hemoglobin 08/30/2023 12.2  12.0 - 16.0 g/dL Final    Hematocrit 08/30/2023 40.0  37.0 - 48.5 % Final    MCV 08/30/2023 98  82 - 98 fL Final    MCH 08/30/2023 29.9  27.0 - 31.0 pg Final    MCHC 08/30/2023 30.5 (L)  32.0 - 36.0 g/dL Final    RDW 08/30/2023 12.8  11.5 - 14.5 % Final    Platelets 08/30/2023 308  150 - 450 K/uL Final    MPV 08/30/2023 10.6  9.2 - 12.9 fL Final    Immature Granulocytes 08/30/2023 0.2  0.0 - 0.5 % Final    Gran # (ANC) 08/30/2023 5.7  1.8 - 7.7 K/uL Final    Immature Grans (Abs) 08/30/2023 0.02  0.00 - 0.04 K/uL Final    Comment: Mild elevation in immature granulocytes is non specific and   can be seen in a variety of conditions including stress response,   acute inflammation, trauma and pregnancy. Correlation with other   laboratory and clinical findings is essential.      Lymph # 08/30/2023 1.5  1.0 - 4.8 K/uL Final    Mono # 08/30/2023 0.7  0.3 - 1.0 K/uL Final    Eos # 08/30/2023 0.2  0.0 - 0.5 K/uL Final    Baso # 08/30/2023 0.03  0.00 - 0.20 K/uL Final    nRBC 08/30/2023 0  0 /100 WBC Final    Gran % 08/30/2023 70.0  38.0 - 73.0 % Final    Lymph % 08/30/2023 19.0  18.0 - 48.0 % Final    Mono % 08/30/2023 8.3  4.0 - 15.0 % Final    Eosinophil % 08/30/2023 2.1  0.0 - 8.0 % Final    Basophil % 08/30/2023 0.4  0.0 - 1.9 % Final    Differential Method 08/30/2023 Automated   Final    Sodium 08/30/2023 143  136 - 145 mmol/L Final    Potassium 08/30/2023 3.8  3.5 - 5.1 mmol/L Final    Chloride 08/30/2023 103  95 - 110 mmol/L Final    CO2 08/30/2023 29  23 - 29 mmol/L Final    Glucose 08/30/2023 106  70 - 110 mg/dL Final    BUN 08/30/2023 24 (H)  8 - 23 mg/dL Final    Creatinine 08/30/2023 0.9  0.5 - 1.4 mg/dL Final    Calcium 08/30/2023 9.8  8.7 - 10.5 mg/dL Final    Total  Protein 08/30/2023 7.1  6.0 - 8.4 g/dL Final    Albumin 08/30/2023 4.0  3.5 - 5.2 g/dL Final    Total Bilirubin 08/30/2023 0.3  0.1 - 1.0 mg/dL Final    Comment: For infants and newborns, interpretation of results should be based  on gestational age, weight and in agreement with clinical  observations.    Premature Infant recommended reference ranges:  Up to 24 hours.............<8.0 mg/dL  Up to 48 hours............<12.0 mg/dL  3-5 days..................<15.0 mg/dL  6-29 days.................<15.0 mg/dL      Alkaline Phosphatase 08/30/2023 81  55 - 135 U/L Final    AST 08/30/2023 54 (H)  10 - 40 U/L Final    ALT 08/30/2023 56 (H)  10 - 44 U/L Final    eGFR 08/30/2023 >60.0  >60 mL/min/1.73 m^2 Final    Anion Gap 08/30/2023 11  8 - 16 mmol/L Final    Magnesium 08/30/2023 2.2  1.6 - 2.6 mg/dL Final    TSH 08/30/2023 34.849 (H)  0.400 - 4.000 uIU/mL Final    Free T4 08/30/2023 0.88  0.71 - 1.51 ng/dL Final    RBC, UA 08/30/2023 4  0 - 4 /hpf Final    WBC, UA 08/30/2023 13 (H)  0 - 5 /hpf Final    Bacteria 08/30/2023 Rare  None-Occ /hpf Final    Squam Epithel, UA 08/30/2023 3  /hpf Final    Microscopic Comment 08/30/2023 SEE COMMENT   Final    Comment: Other formed elements not mentioned in the report are not   present in the microscopic examination.       Urine Culture, Routine 08/30/2023 No growth   Final   Admission on 08/04/2023, Discharged on 08/04/2023   Component Date Value Ref Range Status    WBC 08/04/2023 6.62  3.90 - 12.70 K/uL Final    RBC 08/04/2023 3.77 (L)  4.00 - 5.40 M/uL Final    Hemoglobin 08/04/2023 11.7 (L)  12.0 - 16.0 g/dL Final    Hematocrit 08/04/2023 37.5  37.0 - 48.5 % Final    MCV 08/04/2023 100 (H)  82 - 98 fL Final    MCH 08/04/2023 31.0  27.0 - 31.0 pg Final    MCHC 08/04/2023 31.2 (L)  32.0 - 36.0 g/dL Final    RDW 08/04/2023 13.1  11.5 - 14.5 % Final    Platelets 08/04/2023 293  150 - 450 K/uL Final    MPV 08/04/2023 10.4  9.2 - 12.9 fL Final    Immature Granulocytes 08/04/2023 0.2  0.0  - 0.5 % Final    Gran # (ANC) 08/04/2023 4.0  1.8 - 7.7 K/uL Final    Immature Grans (Abs) 08/04/2023 0.01  0.00 - 0.04 K/uL Final    Comment: Mild elevation in immature granulocytes is non specific and   can be seen in a variety of conditions including stress response,   acute inflammation, trauma and pregnancy. Correlation with other   laboratory and clinical findings is essential.      Lymph # 08/04/2023 1.6  1.0 - 4.8 K/uL Final    Mono # 08/04/2023 0.7  0.3 - 1.0 K/uL Final    Eos # 08/04/2023 0.3  0.0 - 0.5 K/uL Final    Baso # 08/04/2023 0.03  0.00 - 0.20 K/uL Final    nRBC 08/04/2023 0  0 /100 WBC Final    Gran % 08/04/2023 60.8  38.0 - 73.0 % Final    Lymph % 08/04/2023 24.6  18.0 - 48.0 % Final    Mono % 08/04/2023 10.1  4.0 - 15.0 % Final    Eosinophil % 08/04/2023 3.8  0.0 - 8.0 % Final    Basophil % 08/04/2023 0.5  0.0 - 1.9 % Final    Differential Method 08/04/2023 Automated   Final    Sodium 08/04/2023 141  136 - 145 mmol/L Final    Potassium 08/04/2023 3.6  3.5 - 5.1 mmol/L Final    Chloride 08/04/2023 104  95 - 110 mmol/L Final    CO2 08/04/2023 28  23 - 29 mmol/L Final    Glucose 08/04/2023 120 (H)  70 - 110 mg/dL Final    BUN 08/04/2023 14  8 - 23 mg/dL Final    Creatinine 08/04/2023 0.8  0.5 - 1.4 mg/dL Final    Calcium 08/04/2023 9.2  8.7 - 10.5 mg/dL Final    Total Protein 08/04/2023 6.5  6.0 - 8.4 g/dL Final    Albumin 08/04/2023 3.8  3.5 - 5.2 g/dL Final    Total Bilirubin 08/04/2023 0.3  0.1 - 1.0 mg/dL Final    Comment: For infants and newborns, interpretation of results should be based  on gestational age, weight and in agreement with clinical  observations.    Premature Infant recommended reference ranges:  Up to 24 hours.............<8.0 mg/dL  Up to 48 hours............<12.0 mg/dL  3-5 days..................<15.0 mg/dL  6-29 days.................<15.0 mg/dL      Alkaline Phosphatase 08/04/2023 83  55 - 135 U/L Final    AST 08/04/2023 25  10 - 40 U/L Final    ALT 08/04/2023 35  10 - 44  U/L Final    eGFR 08/04/2023 >60.0  >60 mL/min/1.73 m^2 Final    Anion Gap 08/04/2023 9  8 - 16 mmol/L Final    Troponin I 08/04/2023 <0.006  0.000 - 0.026 ng/mL Final    Comment: The reference interval for Troponin I represents the 99th percentile   cutoff   for our facility and is consistent with 3rd generation assay   performance.      BNP 08/04/2023 215 (H)  0 - 99 pg/mL Final    Values of less than 100 pg/ml are consistent with non-CHF populations.    Magnesium 08/04/2023 2.1  1.6 - 2.6 mg/dL Final    TSH 08/04/2023 21.916 (H)  0.400 - 4.000 uIU/mL Final    Free T4 08/04/2023 1.19  0.71 - 1.51 ng/dL Final       EKG  See HPI will repeat today    Echo   Results for orders placed or performed during the hospital encounter of 09/29/23   Echo   Result Value Ref Range    BSA 1.9 m2    Hoyt's Biplane MOD Ejection Fraction 64 %    LVOT stroke volume 51.18 cm3    LVIDd 4.07 3.5 - 6.0 cm    LV Systolic Volume 33.51 mL    LV Systolic Volume Index 17.8 mL/m2    LVIDs 2.95 2.1 - 4.0 cm    LV Diastolic Volume 72.74 mL    LV Diastolic Volume Index 38.69 mL/m2    IVS 0.87 0.6 - 1.1 cm    LVOT diameter 2.0 cm    LVOT area 3.1 cm2    FS 28 28 - 44 %    Left Ventricle Relative Wall Thickness 0.36 cm    Posterior Wall 0.74 0.6 - 1.1 cm    LV mass 96.97 g    LV Mass Index 52 g/m2    TDI LATERAL 0.26 m/s    TDI SEPTAL 0.26 m/s    TR Max Mathew 2.71 m/s    LVOT peak mathew 1.01 m/s    Left Ventricular Outflow Tract Mean Velocity 0.76 cm/s    Left Ventricular Outflow Tract Mean Gradient 2.53 mmHg    LA size 3.83 cm    Left Atrium Major Axis 5.13 cm    RV S' 9.74 cm/s    TAPSE 2.27 cm    RA Major Axis 5.45 cm    RA Width 4.05 cm    AV mean gradient 4 mmHg    AV peak gradient 7 mmHg    Ao peak mtahew 1.31 m/s    Ao VTI 31.90 cm    LVOT peak VTI 16.30 cm    AV valve area 1.60 cm²    AV Velocity Ratio 0.77     AV index (prosthetic) 0.51     MADY by Velocity Ratio 2.42 cm²    Mr max mathew 4.15 m/s    MV mean gradient 2 mmHg    MV peak gradient 5  mmHg    MV valve area by continuity eq 2.35 cm2    MV VTI 21.8 cm    Triscuspid Valve Regurgitation Peak Gradient 29 mmHg    PV PEAK VELOCITY 1.00 m/s    PV peak gradient 4 mmHg    Sinus 2.67 cm    STJ 2.62 cm    Ascending aorta 2.93 cm    IVC diameter 2.15 cm    Mean e' 0.26 m/s    ZLVIDS -0.74     ZLVIDD -2.55     LA WIDTH 4.3 cm    EF 55 %    TV resting pulmonary artery pressure 44 mmHg    RV TB RVSP 18 mmHg    Est. RA pres 15 mmHg    Narrative      Left Ventricle: The left ventricle is normal in size. Normal wall   thickness. Normal wall motion. There is normal systolic function. Ejection   fraction by visual approximation is 55%. Unable to assess diastolic   function due to atrial fibrillation.    Left Atrium: Left atrium is mildly dilated.    Right Ventricle: Normal right ventricular cavity size. Wall thickness   is normal. Right ventricle wall motion  is normal. Systolic function is   normal.    Right Atrium: Right atrium is mildly dilated.    Mitral Valve: There is mild regurgitation.    Tricuspid Valve: There is mild regurgitation.    Pulmonary Artery: The estimated pulmonary artery systolic pressure is   44 mmHg.    IVC/SVC: Elevated venous pressure at 15 mmHg.         Imaging  X-Ray Lumbar Spine Ap And Lateral    Result Date: 11/9/2023  EXAMINATION: XR LUMBAR SPINE AP AND LATERAL CLINICAL HISTORY: Spinal stenosis, lumbar region with neurogenic claudication TECHNIQUE: AP, lateral and spot images were performed of the lumbar spine. COMPARISON: CT lumbar spine dated 10/06/2023. FINDINGS: Posterior instrumented fusion spanning L3 through S1 with disc spacer.  Hardware projects in appropriate alignment.  Previously noted gus-hardware fractures from comparison CT are less conspicuous by this exam.  Similar discogenic change at the non-operative levels.     As above. Electronically signed by: Joao Monroy Date:    11/09/2023 Time:    10:17      Prior coronary angiogram / intervention:  No  known    Assessment and Plan  1. Essential hypertension, benign  Continue metoprolol    2. Mixed hyperlipidemia  Continue    3. PAF (paroxysmal atrial fibrillation)  Amiodarone, Eliquis, beta-blocker  Sent back to electrophysiology for consideration of whether or not to place patient back on propafenone  EKG to be done today  - IN OFFICE EKG 12-LEAD (to Muse)    4. Typical atrial flutter  As above    5. Bilateral leg edema  Not apparent on exam    6. Pulmonary HTN  Noted on an echo in the past  Mild  Follow with surveillance echocardiography    Follow Up  Follow up in about 3 months (around 2/17/2024).  EKG today    Rocco Weller MD, FACC, [facility]  Interventional Cardiology     Total professional time spent for the encounter: 30 minutes  Time was spent preparing to see the patient, reviewing results of prior testing, obtaining and/or reviewing separately obtained history, performing a medically appropriate examination and interview, counseling and educating the patient/family, ordering medications/tests/procedures, referring and communicating with other health care professionals, documenting clinical information in the electronic health record, and independently interpreting results.

## 2023-11-20 ENCOUNTER — DOCUMENTATION ONLY (OUTPATIENT)
Dept: UROLOGY | Facility: CLINIC | Age: 73
End: 2023-11-20
Payer: COMMERCIAL

## 2023-11-20 ENCOUNTER — OFFICE VISIT (OUTPATIENT)
Dept: UROLOGY | Facility: CLINIC | Age: 73
End: 2023-11-20
Payer: MEDICARE

## 2023-11-20 VITALS
SYSTOLIC BLOOD PRESSURE: 125 MMHG | DIASTOLIC BLOOD PRESSURE: 58 MMHG | HEIGHT: 67 IN | BODY MASS INDEX: 28.09 KG/M2 | HEART RATE: 84 BPM | WEIGHT: 179 LBS

## 2023-11-20 DIAGNOSIS — R33.9 URINARY RETENTION: Primary | ICD-10-CM

## 2023-11-20 PROCEDURE — 99213 OFFICE O/P EST LOW 20 MIN: CPT | Mod: S$PBB,,, | Performed by: NURSE PRACTITIONER

## 2023-11-20 PROCEDURE — 99999 PR PBB SHADOW E&M-EST. PATIENT-LVL IV: ICD-10-PCS | Mod: PBBFAC,,, | Performed by: NURSE PRACTITIONER

## 2023-11-20 PROCEDURE — 99999 PR PBB SHADOW E&M-EST. PATIENT-LVL IV: CPT | Mod: PBBFAC,,, | Performed by: NURSE PRACTITIONER

## 2023-11-20 PROCEDURE — 99213 PR OFFICE/OUTPT VISIT, EST, LEVL III, 20-29 MIN: ICD-10-PCS | Mod: S$PBB,,, | Performed by: NURSE PRACTITIONER

## 2023-11-20 PROCEDURE — 99214 OFFICE O/P EST MOD 30 MIN: CPT | Mod: PBBFAC,PO | Performed by: NURSE PRACTITIONER

## 2023-11-20 NOTE — PROGRESS NOTES
[facility] Note  Staff: ANA Polo    PCP: EBENEZER Miller  *[facility]    Chief Complaint: NEW PT-Urinary Retention    Subjective:        HPI: Graciela Barton is a 73 y.o. female NEW PT presents TODAY via WC from [facility] for evaluation of urinary retention issues at this time.    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    Hospitalization Hx:  On 9/29/2023 pt underwent the following procedure by Dr. Scooter Salter with neurosurgery for the following procedures:  Surgery   1. Left L3-4, L4-5 oblique interbody fusion with placement of interbody spacer, Globus, Transcontinental TPS cage filled with allograft DBM and BMP  2. L5-S1 anterior interbody fusion with placement of interbody spacer, Globus, Intercontinental TPS cage filled with allograft DBM and BMP  3. L3-S1 posterior segmental instrumentation using Globus Creo screws  4. L3-4, L4-5 laminectomy, medial facetectomy, foraminotomy  5. L3-4, L4-5, L5-S1 posterolateral arthrodesis using autograft harvested from the same incision  6. Registration of navigated instruments using intraop 3D imaging Ziehm and BrainLAB station  7. Neuro monitoring using SSEP, EMG, MEP    Postoperatively she was admitted to the ICU.  She had urinary retention in the Carver had to be reinserted.  Patient eventually went into a flutter AFib with RVR and cardiology was consulted to manage this throughout the rest of her hospital stay.  Once she was able to be fully anticoagulated when the drain was removed she was eventually cardioverted. Once in normal sinus rhythm she was transferred to the floor.  Physical therapy worked with her throughout her hospital stay which she was able to do very minimal.  She was able to stand up and take side steps with the time of her discharge to rehab.  She was planning to go to rehab on October 6, 2023 but her rhythm went back into AFib with RVR.  She was placed back on amiodarone infusion and transferred to  "telemetry and eventually a by mouth amiodarone load.  She denied CP, SOB during her inpatient hospital stay.  She converted on her own back to normal sinus rhythm.  She was eventually discharged to [facility] on October 10, 2023 after she was cleared medically by [facility].  She has underwent "2" different voiding trials-one during hospitalization after surgery and one mid October at [facility] and both were unsuccessful at that time.    Currently pt is at [facility] for PT and Rehab purposes.  She has PT daily and slowly improving with walking.  She is having normal bowel movements daily.  They assist her to bathroom with no problems.    REVIEW OF SYSTEMS:  A comprehensive 10 system review was performed and is negative except as noted above in HPI    PMHx:  Past Medical History:   Diagnosis Date    Arthritis     Cancer     Thyroid    Edema     Hyperlipidemia     Hypertension     Thyroid disease      PSHx:  Past Surgical History:   Procedure Laterality Date    ADENOIDECTOMY      COLONOSCOPY  10/18/2018    COLONOSCOPY N/A 10/18/2018    Procedure: COLONOSCOPY;  Surgeon: Yosvany Alejandra MD;  Location: [facility];  Service: General;  Laterality: N/A;    ECHOCARDIOGRAM,TRANSESOPHAGEAL N/A 10/4/2023    Procedure: Transesophageal echo (CARLYLE) intra-procedure log documentation;  Surgeon: Mark Ortega MD;  Location: [facility] OR;  Service: Cardiology;  Laterality: N/A;    EPIDURAL STEROID INJECTION N/A 9/30/2022    Procedure: LUMBAR L3/4 MALKA CONTRAST DIRRECT REFERRAL ELIQUROWENA MITCHELLNCE IN CHART;  Surgeon: Nicholas Kumar MD;  Location: [facility] PAIN MGT;  Service: Pain Management;  Laterality: N/A;    FACETECTOMY OF VERTEBRA N/A 9/29/2023    Procedure: FACETECTOMY;  Surgeon: Scooter Salter MD;  Location: [facility] OR;  Service: Neurosurgery;  Laterality: N/A;    FORAMINOTOMY N/A 9/29/2023    Procedure: FORAMINOTOMY, SPINE;  Surgeon: Scooter Salter MD;  Location: [facility] OR;  Service: " Neurosurgery;  Laterality: N/A;    FUSION OF LUMBAR SPINE USING POSTERIOR INTERBODY TECHNIQUE N/A 9/29/2023    Procedure: FUSION, SPINE, LUMBAR, PLIF;  Surgeon: Scooter Salter MD;  Location: [facility] OR;  Service: Neurosurgery;  Laterality: N/A;  L3-S1 posterior instrumentation   L3-L5 Lmainectomy sacro pelvic fixation Globus cage   -lop 3 hr   -los 5 days   -Lami segments 1  -anterior fusion insterspace 2  -general   -type and screen   -Ziehm brain notified cc  -EMG bairon notified cc  -SEP  -MEP  -TLSO       HYSTERECTOMY  1992    total hyst     KNEE SURGERY      16 pins and two plates implanted    LAMINECTOMY N/A 9/29/2023    Procedure: LAMINECTOMY, SPINE;  Surgeon: Scooter Salter MD;  Location: [facility] OR;  Service: Neurosurgery;  Laterality: N/A;    MINIMALLY INVASIVE TRANSFORAMINAL LUMBAR INTERBODY FUSION (TLIF) N/A 9/29/2023    Procedure: FUSION, SPINE, LUMBAR, TLIF, MINIMALLY INVASIVE;  Surgeon: Scooter Salter MD;  Location: [facility] OR;  Service: Neurosurgery;  Laterality: N/A;  L3-L5 OLIF Globus ATP  L5-S1 ALIF Globus ATP  -Lami add segment 1  -anterior fusion interspances 2  -lop 3 hr   -general   -type and screen   -C arm   -EMG bairon notified cc  -SEP  -MEP  -TLSO  -Regular Bed   -Lateral right down   -globus reji    THORACIC DISCECTOMY Right 5/12/2023    Procedure: DISCECTOMY, SPINE, THORACIC Right T6-7, T7-8 interior discectomy retropleural;  Surgeon: Scooter Salter MD;  Location: [facility] OR;  Service: Neurosurgery;  Laterality: Right;  general  eliquis   type and screen  C-arm  Metrx  EMG bairon notified cc  SEP  MEP  Regular bed   lateral left down   globus (Jose Enrique) notified cc    THYROIDECTOMY      TONSILLECTOMY       Allergies:  Vancomycin and Vancomycin analogues    Medications: reviewed   Objective:     Vitals:    11/20/23 0801   BP: (!) 125/58   Pulse: 84       Physical Exam  Constitutional:       Appearance: She is well-developed.   HENT:      Head: Normocephalic and atraumatic.   Eyes:       Conjunctiva/sclera: Conjunctivae normal.      Pupils: Pupils are equal, round, and reactive to light.   Cardiovascular:      Rate and Rhythm: Normal rate and regular rhythm.      Heart sounds: Normal heart sounds.   Pulmonary:      Effort: Pulmonary effort is normal.      Breath sounds: Normal breath sounds.   Abdominal:      General: Bowel sounds are normal.      Palpations: Abdomen is soft.   Musculoskeletal:         General: Normal range of motion.      Cervical back: Normal range of motion and neck supple.   Skin:     General: Skin is warm and dry.   Neurological:      Mental Status: She is alert and oriented to person, place, and time.      Deep Tendon Reflexes: Reflexes are normal and symmetric.   Psychiatric:         Behavior: Behavior normal.         Thought Content: Thought content normal.         Judgment: Judgment normal.         Assessment:       1. Urinary retention          Plan:   The following orders were sent back with pt to [facility] with the following instructions:    OUTPATIENT ORDERS:                                                                      On TUESDAY, NOVEMBER 21ST remove aden catheter in early morning on for voiding trial.  (NO LATER THAN 9 am)  Make sure pt is urinating or attempts to urinate on [facility] chair or to toilet every 2-3 hrs during the daytime.  If by 3-4 pm, on the same day the patient is unable to urinate on their own then perform an in and out catheter in order to check for urine residual.  If urine residual 250 mL or Greater, then reinsert an 14 Argentine or 16 Argentine straight indwelling aden catheter within the bladder.  Then contact our Urology office at [phone] for further instructions if indicated.    Pt verbalized understanding at this time.      Emilianoner: N/A    ANA Santos

## 2023-11-20 NOTE — PROGRESS NOTES
2023      ORDERING MD:   ROEL Santos     [facility] - UROLOGY  [facility] [place]         Patient Name: Graciela Barton               : 1950    [facility] Number: [id number]                  #:        OUTPATIENT ORDERS:         On  remove aden catheter in early morning on for voiding trial.  (NO LATER THAN 9 am)  Make sure pt is urinating or attempts to urinate on [facility] chair or to toilet every 2-3 hrs during the daytime.  If by 3-4 pm, on the same day the patient is unable to urinate on their own then perform an in and out catheter in order to check for urine residual.  If urine residual 250 mL or Greater, then reinsert an 14 Georgian or 16 Georgian straight indwelling aden catheter within the bladder.  Then contact our Urology office at [phone] for further instructions if indicated.

## 2023-11-24 ENCOUNTER — TELEPHONE (OUTPATIENT)
Dept: CARDIOLOGY | Facility: CLINIC | Age: 73
End: 2023-11-24
Payer: COMMERCIAL

## 2023-12-08 ENCOUNTER — TELEPHONE (OUTPATIENT)
Dept: NEUROSURGERY | Facility: CLINIC | Age: 73
End: 2023-12-08
Payer: COMMERCIAL

## 2024-01-03 ENCOUNTER — HOSPITAL ENCOUNTER (OUTPATIENT)
Dept: RADIOLOGY | Facility: HOSPITAL | Age: 74
Discharge: HOME | End: 2024-01-03
Attending: NEUROLOGICAL SURGERY
Payer: MEDICARE

## 2024-01-03 ENCOUNTER — OFFICE VISIT (OUTPATIENT)
Dept: NEUROSURGERY | Facility: CLINIC | Age: 74
End: 2024-01-03
Payer: MEDICARE

## 2024-01-03 ENCOUNTER — TELEPHONE (OUTPATIENT)
Dept: NEUROSURGERY | Facility: CLINIC | Age: 74
End: 2024-01-03
Payer: COMMERCIAL

## 2024-01-03 VITALS
BODY MASS INDEX: 28.09 KG/M2 | SYSTOLIC BLOOD PRESSURE: 143 MMHG | HEART RATE: 57 BPM | DIASTOLIC BLOOD PRESSURE: 72 MMHG | HEIGHT: 67 IN | WEIGHT: 179 LBS

## 2024-01-03 DIAGNOSIS — Z98.1 S/P LUMBAR SPINAL FUSION: ICD-10-CM

## 2024-01-03 DIAGNOSIS — M47.12 CERVICAL SPONDYLOSIS WITH MYELOPATHY AND RADICULOPATHY: ICD-10-CM

## 2024-01-03 DIAGNOSIS — Z98.1 S/P LUMBAR SPINAL FUSION: Primary | ICD-10-CM

## 2024-01-03 DIAGNOSIS — M47.14 THORACIC MYELOPATHY: ICD-10-CM

## 2024-01-03 DIAGNOSIS — Z98.1 STATUS POST LUMBAR SPINAL FUSION: Primary | ICD-10-CM

## 2024-01-03 DIAGNOSIS — M47.22 CERVICAL SPONDYLOSIS WITH MYELOPATHY AND RADICULOPATHY: ICD-10-CM

## 2024-01-03 PROCEDURE — 99214 OFFICE O/P EST MOD 30 MIN: CPT | Mod: PBBFAC,PN | Performed by: NEUROLOGICAL SURGERY

## 2024-01-03 PROCEDURE — 72100 X-RAY EXAM L-S SPINE 2/3 VWS: CPT | Mod: TC,FY

## 2024-01-03 PROCEDURE — 72100 X-RAY EXAM L-S SPINE 2/3 VWS: CPT | Mod: 26,,, | Performed by: RADIOLOGY

## 2024-01-03 PROCEDURE — 99214 OFFICE O/P EST MOD 30 MIN: CPT | Mod: S$PBB,,, | Performed by: NEUROLOGICAL SURGERY

## 2024-01-03 PROCEDURE — 99999 PR PBB SHADOW E&M-EST. PATIENT-LVL IV: CPT | Mod: PBBFAC,,, | Performed by: NEUROLOGICAL SURGERY

## 2024-01-03 NOTE — PROGRESS NOTES
NEUROSURGICAL PROGRESS NOTE    DATE OF SERVICE:  01/03/2024    ATTENDING PHYSICIAN:  Scooter Salter MD    SUBJECTIVE:    INTERIM HISTORY:    This is a very pleasant 73 y.o. female, who is 7 months status post T6-7 and T7-8 anterior diskectomy for thoracic myelopathy, 3 months status post L3-4, L4-5 oblique interbody fusion, L5-S1 anterior interbody fusion L3-S1 posterior segmental instrumentation, L3-4, L4-5 laminectomy, medial facetectomy, foraminotomy.  Patient is doing rehabilitation.  She walks with a walker.  Her right leg weakness has partially improved.  Her preoperative left leg pain has completely subsided since surgery.  She is complaining of right shoulder pain that is intermittent.  She still has some gait imbalance.  Pain in the right shoulder radiates towards the right hand.  Her walking ability has partially improved.              PAST MEDICAL HISTORY:  Active Ambulatory Problems     Diagnosis Date Noted    Postoperative hypothyroidism 02/19/2018    History of thyroid cancer 02/19/2018    Essential hypertension, benign 02/19/2018    Mixed hyperlipidemia 02/19/2018    Benign paroxysmal positional vertigo 08/01/2018    History of colon polyps 10/18/2018    Bilateral post-traumatic osteoarthritis of knee 06/21/2022    Paroxysmal tachycardia 06/22/2022    DDD (degenerative disc disease), lumbar 08/09/2022    Lumbar radiculopathy 08/09/2022    Atrial fibrillation with RVR 08/09/2022    PAF (paroxysmal atrial fibrillation) 08/10/2022    Multiple sclerosis 12/01/2022    Anticoagulant long-term use 02/23/2023    Thoracic disc disease with myelopathy 03/14/2023    Spinal cord compression 03/14/2023    Aortic atherosclerosis 03/14/2023    Preprocedural cardiovascular examination 03/15/2023    Thoracic myelopathy 05/12/2023    Atelectasis 05/12/2023    Typical atrial flutter 09/15/2023    Lumbar stenosis with neurogenic claudication 09/29/2023    Foraminal stenosis of lumbar region 09/29/2023    Chronic  myelopathy 09/29/2023    Decreased range of motion of right knee 09/29/2023    Bilateral leg edema 09/29/2023    Assistance needed for mobility 09/29/2023    Acute blood loss anemia 10/01/2023    S/P lumbar spinal fusion 11/09/2023    Pulmonary HTN 11/17/2023     Resolved Ambulatory Problems     Diagnosis Date Noted    Weakness 06/20/2022     Past Medical History:   Diagnosis Date    Arthritis     Cancer     Edema     Hyperlipidemia     Hypertension     Thyroid disease        PAST SURGICAL HISTORY:  Past Surgical History:   Procedure Laterality Date    ADENOIDECTOMY      COLONOSCOPY  10/18/2018    COLONOSCOPY N/A 10/18/2018    Procedure: COLONOSCOPY;  Surgeon: Yosvany Alejandra MD;  [facility] ENDO;  Service: General;  Laterality: N/A;    ECHOCARDIOGRAM,TRANSESOPHAGEAL N/A 10/4/2023    Procedure: Transesophageal echo (CARLYLE) intra-procedure log documentation;  Surgeon: Mark Ortega MD;  Location: [facility] OR;  Service: Cardiology;  Laterality: N/A;    EPIDURAL STEROID INJECTION N/A 9/30/2022    Procedure: LUMBAR L3/4 MALKA CONTRAST DIRRECT REFERRAL MONICA CARRERA IN CHART;  Surgeon: Nicholas Kumar MD;  Location: [facility] PAIN MGT;  Service: Pain Management;  Laterality: N/A;    FACETECTOMY OF VERTEBRA N/A 9/29/2023    Procedure: FACETECTOMY;  Surgeon: Scooter Salter MD;  Location: [facility] OR;  Service: Neurosurgery;  Laterality: N/A;    FORAMINOTOMY N/A 9/29/2023    Procedure: FORAMINOTOMY, SPINE;  Surgeon: Scooter Salter MD;  Location: [facility] OR;  Service: Neurosurgery;  Laterality: N/A;    FUSION OF LUMBAR SPINE USING POSTERIOR INTERBODY TECHNIQUE N/A 9/29/2023    Procedure: FUSION, SPINE, LUMBAR, PLIF;  Surgeon: Scooter Salter MD;  Location: [facility] OR;  Service: Neurosurgery;  Laterality: N/A;  L3-S1 posterior instrumentation   L3-L5 Lmainectomy sacro pelvic fixation Globus cage   -lop 3 hr   -los 5 days   -Lami segments 1  -anterior fusion insterspace 2  -general   -type and screen   -Ziehm brain notified  cc  -EMG bairon notified cc  -SEP  -MEP  -TLSO       HYSTERECTOMY  1992    total hyst     KNEE SURGERY      16 pins and two plates implanted    LAMINECTOMY N/A 9/29/2023    Procedure: LAMINECTOMY, SPINE;  Surgeon: Scooter Salter MD;  Location: [facility] OR;  Service: Neurosurgery;  Laterality: N/A;    MINIMALLY INVASIVE TRANSFORAMINAL LUMBAR INTERBODY FUSION (TLIF) N/A 9/29/2023    Procedure: FUSION, SPINE, LUMBAR, TLIF, MINIMALLY INVASIVE;  Surgeon: Scooter Salter MD;  Location: [facility] OR;  Service: Neurosurgery;  Laterality: N/A;  L3-L5 OLIF Globus ATP  L5-S1 ALIF Globus ATP  -Lami add segment 1  -anterior fusion interspances 2  -lop 3 hr   -general   -type and screen   -C arm   -EMG bairon notified cc  -SEP  -MEP  -TLSO  -Regular Bed   -Lateral right down   -globus reji    THORACIC DISCECTOMY Right 5/12/2023    Procedure: DISCECTOMY, SPINE, THORACIC Right T6-7, T7-8 interior discectomy retropleural;  Surgeon: Scooter Salter MD;  Location: [facility] OR;  Service: Neurosurgery;  Laterality: Right;  general  eliquis   type and screen  C-arm  Metrx  EMG bairon notified cc  SEP  MEP  Regular bed   lateral left down   globus (Jose Enrique) notified cc    THYROIDECTOMY      TONSILLECTOMY         SOCIAL HISTORY:   Social History     Socioeconomic History    Marital status:    Tobacco Use    Smoking status: Never    Smokeless tobacco: Never   Substance and Sexual Activity    Alcohol use: No    Drug use: No    Sexual activity: Not Currently     Partners: Male   Social History Narrative    ** Merged History Encounter **            FAMILY HISTORY:  Family History   Problem Relation Age of Onset    Alcohol abuse Mother     Heart disease Mother     Alcohol abuse Father     Heart disease Father     Heart disease Maternal Grandmother     Heart disease Maternal Grandfather        CURRENTS MEDICATIONS:  Current Outpatient Medications on File Prior to Visit   Medication Sig Dispense Refill    acetaminophen (TYLENOL) 650 mg/20.3  mL Soln Take 20.3 mLs (650 mg total) by mouth every 6 (six) hours as needed for Pain.      aluminum-magnesium hydroxide-simethicone (MAALOX) 200-200-20 mg/5 mL Susp Take 30 mLs by mouth every 4 (four) hours as needed (indigestion).      amiodarone (PACERONE) 200 MG Tab Take 1 tablet (200 mg total) by mouth once daily. Start 11/5/23 at 0900 30 tablet 11    baclofen (LIORESAL) 5 mg Tab tablet Take 1 tablet (5 mg total) by mouth 3 (three) times daily. 1 tablet 0    bisacodyL (DULCOLAX) 10 mg Supp Place 1 suppository (10 mg total) rectally daily as needed (constipation).  0    ELIQUIS 5 mg Tab Take 1 tablet (5 mg total) by mouth 2 (two) times daily. 60 tablet 11    levothyroxine (SYNTHROID) 112 MCG tablet TAKE 1 TABLET BY MOUTH EVERY DAY IN THE MORNING ON AN EMPTY STOMACH (Patient not taking: Reported on 11/17/2023) 90 tablet 3    levothyroxine (SYNTHROID) 137 MCG Tab tablet Take 1 tablet (137 mcg total) by mouth before breakfast. 90 tablet 0    meclizine (ANTIVERT) 25 mg tablet Take by mouth.      metoprolol succinate (TOPROL-XL) 25 MG 24 hr tablet TAKE 1 TABLET BY MOUTH EVERY DAY 90 tablet 3    multivitamin (THERAGRAN) per tablet Take 1 tablet by mouth once daily.      ondansetron (ZOFRAN-ODT) 8 MG TbDL Take 1 tablet (8 mg total) by mouth every 6 (six) hours as needed (nausea/vomiting). (Patient not taking: Reported on 11/17/2023)      oxyCODONE (ROXICODONE) 10 mg Tab immediate release tablet Take 1 tablet (10 mg total) by mouth every 6 (six) hours as needed for Pain (severe pain).  0    rosuvastatin (CRESTOR) 10 MG tablet Take 1 tablet (10 mg total) by mouth once daily. 90 tablet 0    senna-docusate 8.6-50 mg (PERICOLACE) 8.6-50 mg per tablet Take 2 tablets by mouth 2 (two) times daily.      sulfamethoxazole-trimethoprim 800-160mg (BACTRIM DS) 800-160 mg Tab       tamsulosin (FLOMAX) 0.4 mg Cap Take 1 capsule by mouth.      traMADoL 100 mg Tab Take 100 mg by mouth every 6 (six) hours as needed for Pain (moderate  pain). (Patient not taking: Reported on 11/17/2023)      [DISCONTINUED] atorvastatin (LIPITOR) 40 MG tablet Take 1 tablet (40 mg total) by mouth once daily. 90 tablet 3     No current facility-administered medications on file prior to visit.       ALLERGIES:  Review of patient's allergies indicates:   Allergen Reactions    Vancomycin Tinitus    Vancomycin analogues Tinitus       REVIEW OF SYSTEMS:  Review of Systems   Constitutional:  Negative for diaphoresis, fever and weight loss.   Respiratory:  Negative for shortness of breath.    Cardiovascular:  Negative for chest pain.   Gastrointestinal:  Negative for blood in stool.   Genitourinary:  Negative for hematuria.   Endo/Heme/Allergies:  Does not bruise/bleed easily.   All other systems reviewed and are negative.        OBJECTIVE:    PHYSICAL EXAMINATION:   Vitals:    01/03/24 1009   BP: (!) 143/72   Pulse: (!) 57       Physical Exam:  Vitals reviewed.    Constitutional: She appears well-developed and well-nourished.     Eyes: Pupils are equal, round, and reactive to light. Conjunctivae and EOM are normal.     Cardiovascular: Normal distal pulses and no edema.     Abdominal: Soft.     Skin: Skin displays no rash on trunk and no rash on extremities. Skin displays no lesions on trunk and no lesions on extremities.     Psych/Behavior: She is alert. She is oriented to person, place, and time. She has a normal mood and affect.     Musculoskeletal:        Neck: Range of motion is limited.     Neurological:        DTRs: Tricep reflexes are 2+ on the right side and 3+ on the left side. Bicep reflexes are 2+ on the right side and 3+ on the left side. Brachioradialis reflexes are 2+ on the right side and 3+ on the left side. Patellar reflexes are 0 on the right side and 0 on the left side. Achilles reflexes are 0 on the right side and 0 on the left side.       Back Exam     Muscle Strength   Right Quadriceps:  4/5   Left Quadriceps:  5/5   Right Hamstrings:  5/5   Left  Hamstrings:  5/5               Neurologic Exam     Mental Status   Oriented to person, place, and time.   Speech: speech is normal   Level of consciousness: alert    Cranial Nerves   Cranial nerves II through XII intact.     CN III, IV, VI   Pupils are equal, round, and reactive to light.  Extraocular motions are normal.     Motor Exam   Muscle bulk: normal  Overall muscle tone: normal    Strength   Right deltoid: 5/5  Left deltoid: 5/5  Right biceps: 5/5  Left biceps: 5/5  Right triceps: 5/5  Left triceps: 5/5  Right wrist flexion: 5/5  Left wrist flexion: 5/5  Right wrist extension: 5/5  Left wrist extension: 5/5  Right interossei: 5/5  Left interossei: 5/5  Right iliopsoas: 5/5  Left iliopsoas: 5/5  Right quadriceps: 4/5  Left quadriceps: 5/5  Right hamstrin/5  Left hamstrin/5  Right anterior tibial: 5/5  Left anterior tibial: 5/5  Right posterior tibial: 5/5  Left posterior tibial: 5/5  Right peroneal: 5/5  Left peroneal: 5/5  Right gastroc: 5/5  Left gastroc: 5/5    Sensory Exam   Light touch normal.   Pinprick normal.     Gait, Coordination, and Reflexes     Reflexes   Right brachioradialis: 2+  Left brachioradialis: 3+  Right biceps: 2+  Left biceps: 3+  Right triceps: 2+  Left triceps: 3+  Right patellar: 0  Left patellar: 0  Right achilles: 0  Left achilles: 0  Right plantar: normal  Left plantar: normal  Right Betancourt: absent  Left Betancourt: present  Right ankle clonus: absent  Left ankle clonus: absent        DIAGNOSTIC DATA:  I personally interpreted the following imaging:   Lumbar x-ray today shows good positioning of hardware from L3-S1.  No lucency around screws.  Patient is wearing her TLSO brace    Cervical spine MRI from 2023 was showing mild stenosis at C4-5, moderate severe stenosis at C5-6 with severe bilateral foraminal stenosis, mild stenosis at C6-7 with severe bilateral foraminal stenosis    ASSESMENT:  This is a 73 y.o. female with     Problem List Items Addressed This Visit           Neuro    Thoracic myelopathy     Other Visit Diagnoses       Status post lumbar spinal fusion    -  Primary    Cervical spondylosis with myelopathy and radiculopathy                  PLAN:  In order to prevent worsening myelopathy symptoms to improve her radiculopathy symptoms I recommended a C5-6 and C6-7 anterior diskectomy and instrumented fusion.  We have treated her thoracic myelopathy and her lumbar severe lumbar spinal stenosis.  Her overall ability to walk is improving.  However the C5-6 central canal stenosis and bilateral C6-7 foraminal stenosis are concerning. In order to prevent worsening gait imbalance I recommended a C5-6 and C6-7 anterior fusion and instrumentation.    I explained the natural history of the disease and all treatment options.    We have discussed the risks of surgery including death, coma, bleeding, infection, failure of surgery, CSF leak, nerve root injury, spinal cord injury, dysphagia, vocal cord paralysis, weakness, paralysis, peripheral neuropathy, malplaced hardware, migration of hardware, non-union, need for reoperation. Patient understands the risks and would like to proceed with surgery.          Scooter Salter MD  Cell:[phone]

## 2024-01-05 ENCOUNTER — TELEPHONE (OUTPATIENT)
Dept: NEUROSURGERY | Facility: CLINIC | Age: 74
End: 2024-01-05
Payer: COMMERCIAL

## 2024-01-05 DIAGNOSIS — M47.12 CERVICAL SPONDYLOSIS WITH MYELOPATHY AND RADICULOPATHY: Primary | ICD-10-CM

## 2024-01-05 DIAGNOSIS — Z98.1 S/P CERVICAL SPINAL FUSION: ICD-10-CM

## 2024-01-05 DIAGNOSIS — M47.22 CERVICAL SPONDYLOSIS WITH MYELOPATHY AND RADICULOPATHY: Primary | ICD-10-CM

## 2024-01-09 ENCOUNTER — TELEPHONE (OUTPATIENT)
Dept: NEUROSURGERY | Facility: CLINIC | Age: 74
End: 2024-01-09
Payer: COMMERCIAL

## 2024-01-12 ENCOUNTER — TELEPHONE (OUTPATIENT)
Dept: NEUROSURGERY | Facility: CLINIC | Age: 74
End: 2024-01-12
Payer: COMMERCIAL

## 2024-01-12 NOTE — TELEPHONE ENCOUNTER
Returned pt's call, pt stated that she will be having he surgery with  on February 15 and she will be discharged from the rehab on that day. Pt is requesting to extend her stay at the rehab facility. I stated to pt that I will send a message to  contact her back. Pt voiced understanding

## 2024-01-16 NOTE — ASSESSMENT & PLAN NOTE
History of thyroid cancer  History of thyroidectomy   -chronic  -improvement in TSH   -continue home levothyroxine   She had a Prevnar 13 pneumonia vaccine on 2/24/2016.  She can come get the Prevnar 20 vaccine as long as we have it in stock.

## 2024-01-18 ENCOUNTER — TELEPHONE (OUTPATIENT)
Dept: NEUROSURGERY | Facility: CLINIC | Age: 74
End: 2024-01-18
Payer: COMMERCIAL

## 2024-01-18 NOTE — TELEPHONE ENCOUNTER
Returned pt's call, pt stated that the rehab facility that she is currently at stated to her that It is not an good idea for her to go home since she will need help still. Pt stated that they will cut her skilled nursing rehab down to 3 times a week and she will like to be transferred. I stated to pt that she will have to contact her insurance company to see what facility they will cover and speak to their . Pt voiced understanding

## 2024-01-23 PROCEDURE — G0180 MD CERTIFICATION HHA PATIENT: HCPCS | Mod: ,,, | Performed by: FAMILY MEDICINE

## 2024-01-26 ENCOUNTER — TELEPHONE (OUTPATIENT)
Dept: PRIMARY CARE CLINIC | Facility: CLINIC | Age: 74
End: 2024-01-26
Payer: MEDICARE

## 2024-01-26 NOTE — TELEPHONE ENCOUNTER
----- Message from Mt Lawson MA sent at 1/26/2024  4:06 PM CST -----  Contact: [phone]    ----- Message -----  From: Melani Montilla  Sent: 1/26/2024  12:09 PM CST  To: [facility] Staff    Patient is requesting a call from the staff : patient state she can make the appointment for Monday the 29 th

## 2024-01-29 ENCOUNTER — TELEPHONE (OUTPATIENT)
Dept: PRIMARY CARE CLINIC | Facility: CLINIC | Age: 74
End: 2024-01-29
Payer: MEDICARE

## 2024-01-29 NOTE — TELEPHONE ENCOUNTER
----- Message from Fabrice Barth sent at 1/29/2024  2:33 PM CST -----  Contact: Pt [phone]  Caller is requesting an earlier appointment then we can schedule.  Caller is requesting a message be sent to the provider.     When is the next available appointment with their provider:  3/2024  Reason for the appointment:    Patient preference of timeframe to be scheduled:  Any day and anytime    Comments:  She was discharged from physical rehab 4 days ago and she needs to get a clearance for surgery that's on 2/15/24

## 2024-01-30 ENCOUNTER — PATIENT OUTREACH (OUTPATIENT)
Dept: ADMINISTRATIVE | Facility: HOSPITAL | Age: 74
End: 2024-01-30
Payer: MEDICARE

## 2024-01-30 NOTE — PROGRESS NOTES
Health Maintenance Due   Topic Date Due    RSV Vaccine (Age 60+ and Pregnant patients) (1 - 1-dose 60+ series) Never done    Shingles Vaccine (2 of 3) 02/23/2015    Influenza Vaccine (1) 09/01/2023    COVID-19 Vaccine (3 - 2023-24 season) 09/01/2023    Colorectal Cancer Screening  10/18/2023    Mammogram  03/23/2024        Chart review done.   HM updated.   Immunizations reviewed & updated.   Care Everywhere updated.

## 2024-01-31 ENCOUNTER — OFFICE VISIT (OUTPATIENT)
Dept: CARDIOLOGY | Facility: CLINIC | Age: 74
End: 2024-01-31
Payer: MEDICARE

## 2024-01-31 DIAGNOSIS — I48.0 PAF (PAROXYSMAL ATRIAL FIBRILLATION): Primary | ICD-10-CM

## 2024-01-31 DIAGNOSIS — Z01.810 PREPROCEDURAL CARDIOVASCULAR EXAMINATION: ICD-10-CM

## 2024-01-31 DIAGNOSIS — R60.0 BILATERAL LEG EDEMA: ICD-10-CM

## 2024-01-31 DIAGNOSIS — I70.0 AORTIC ATHEROSCLEROSIS: ICD-10-CM

## 2024-01-31 DIAGNOSIS — I27.20 PULMONARY HTN: ICD-10-CM

## 2024-01-31 DIAGNOSIS — I48.91 ATRIAL FIBRILLATION WITH RVR: ICD-10-CM

## 2024-01-31 PROCEDURE — 93010 ELECTROCARDIOGRAM REPORT: CPT | Mod: S$PBB,,, | Performed by: INTERNAL MEDICINE

## 2024-01-31 PROCEDURE — 93005 ELECTROCARDIOGRAM TRACING: CPT | Mod: PBBFAC,PN | Performed by: INTERNAL MEDICINE

## 2024-01-31 PROCEDURE — 99214 OFFICE O/P EST MOD 30 MIN: CPT | Mod: S$PBB,,, | Performed by: NURSE PRACTITIONER

## 2024-01-31 PROCEDURE — 99999 PR PBB SHADOW E&M-EST. PATIENT-LVL III: CPT | Mod: PBBFAC,,, | Performed by: NURSE PRACTITIONER

## 2024-01-31 PROCEDURE — 99213 OFFICE O/P EST LOW 20 MIN: CPT | Mod: PBBFAC,PN | Performed by: NURSE PRACTITIONER

## 2024-01-31 RX ORDER — HYDROCHLOROTHIAZIDE 12.5 MG/1
12.5 TABLET ORAL DAILY
COMMUNITY
Start: 2024-01-24 | End: 2024-04-16 | Stop reason: CLARIF

## 2024-01-31 RX ORDER — OXYCODONE HYDROCHLORIDE 5 MG/1
5 TABLET ORAL 4 TIMES DAILY PRN
COMMUNITY
End: 2024-02-26

## 2024-01-31 RX ORDER — POLYETHYLENE GLYCOL 3350 17 G/17G
17 POWDER, FOR SOLUTION ORAL
COMMUNITY
Start: 2023-12-11 | End: 2024-02-07

## 2024-01-31 NOTE — PATIENT INSTRUCTIONS
Continue your current medications for now    We will clarify with Dr. Salter regarding the length of time he wants to have you hold your Eliquis- in the past it has been 5 days.    There us a risk of blood clotting with holding this medication.    Please let Dr. Rios know if that swelling in your legs and the redness gets worse-     No recommendations from a cardiac standpoint against proceeding with surgery.    I do recommend seeing electrophysiology to discuss your A fib management

## 2024-01-31 NOTE — PROGRESS NOTES
Cardiology    1/31/2024  9:10 AM    Problem list  Patient Active Problem List   Diagnosis    Postoperative hypothyroidism    History of thyroid cancer    Essential hypertension, benign    Mixed hyperlipidemia    Benign paroxysmal positional vertigo    History of colon polyps    Bilateral post-traumatic osteoarthritis of knee    Paroxysmal tachycardia    DDD (degenerative disc disease), lumbar    Lumbar radiculopathy    Atrial fibrillation with RVR    PAF (paroxysmal atrial fibrillation)    Multiple sclerosis    Anticoagulant long-term use    Thoracic disc disease with myelopathy    Spinal cord compression    Aortic atherosclerosis    Preprocedural cardiovascular examination    Thoracic myelopathy    Atelectasis    Typical atrial flutter    Lumbar stenosis with neurogenic claudication    Foraminal stenosis of lumbar region    Chronic myelopathy    Decreased range of motion of right knee    Bilateral leg edema    Assistance needed for mobility    Acute blood loss anemia    S/P lumbar spinal fusion    Pulmonary HTN       CC:  Preprocedural eval    HPI:  No chest pain or shortness of breath.  No problems with Will be having cervical spine surgery with Dr. Salter 2/15/24. Was in rehab from 9/28/23 until  9  days ago.   After surgery last fall went into a fib with RVR and required cardioversion. Was seen in November by Dr. Weller and follow up with EP recommended at that time to discuss medication adjustment.   Had an appt with EP on 11/27/23 and unfortunately did not make it.  She is accompanied by a friend in clinic today.     Medications  Current Outpatient Medications   Medication Sig Dispense Refill    acetaminophen (TYLENOL) 650 mg/20.3 mL Soln Take 20.3 mLs (650 mg total) by mouth every 6 (six) hours as needed for Pain.      aluminum-magnesium hydroxide-simethicone (MAALOX) 200-200-20 mg/5 mL Susp Take 30 mLs by mouth every 4 (four) hours as needed (indigestion).      amiodarone (PACERONE) 200 MG Tab Take 1  tablet (200 mg total) by mouth once daily. Start 11/5/23 at 0900 30 tablet 11    baclofen (LIORESAL) 5 mg Tab tablet Take 1 tablet (5 mg total) by mouth 3 (three) times daily. 1 tablet 0    ELIQUIS 5 mg Tab Take 1 tablet (5 mg total) by mouth 2 (two) times daily. 60 tablet 11    hydroCHLOROthiazide (HYDRODIURIL) 12.5 MG Tab Take 12.5 mg by mouth.      levothyroxine (SYNTHROID) 137 MCG Tab tablet Take 1 tablet (137 mcg total) by mouth before breakfast. 90 tablet 0    meclizine (ANTIVERT) 25 mg tablet Take 25 mg by mouth. PRN      multivitamin (THERAGRAN) per tablet Take 1 tablet by mouth once daily.      oxyCODONE (ROXICODONE) 5 MG immediate release tablet Take 5 mg by mouth 4 (four) times daily as needed.      polyethylene glycol (GLYCOLAX) 17 gram/dose powder Take 17 g by mouth.      senna-docusate 8.6-50 mg (PERICOLACE) 8.6-50 mg per tablet Take 2 tablets by mouth 2 (two) times daily.      levothyroxine (SYNTHROID) 112 MCG tablet TAKE 1 TABLET BY MOUTH EVERY DAY IN THE MORNING ON AN EMPTY STOMACH (Patient not taking: Reported on 1/31/2024) 90 tablet 3    metoprolol succinate (TOPROL-XL) 25 MG 24 hr tablet TAKE 1 TABLET BY MOUTH EVERY DAY (Patient not taking: Reported on 1/31/2024) 90 tablet 3    ondansetron (ZOFRAN-ODT) 8 MG TbDL Take 1 tablet (8 mg total) by mouth every 6 (six) hours as needed (nausea/vomiting). (Patient not taking: Reported on 11/17/2023)      rosuvastatin (CRESTOR) 10 MG tablet Take 1 tablet (10 mg total) by mouth once daily. (Patient not taking: Reported on 1/31/2024) 90 tablet 0    sulfamethoxazole-trimethoprim 800-160mg (BACTRIM DS) 800-160 mg Tab       tamsulosin (FLOMAX) 0.4 mg Cap Take 1 capsule by mouth.      traMADoL 100 mg Tab Take 100 mg by mouth every 6 (six) hours as needed for Pain (moderate pain). (Patient not taking: Reported on 11/17/2023)       No current facility-administered medications for this visit.      Prior to Admission medications    Medication Sig Start Date End Date  Taking? Authorizing Provider   acetaminophen (TYLENOL) 650 mg/20.3 mL Soln Take 20.3 mLs (650 mg total) by mouth every 6 (six) hours as needed for Pain. 10/6/23  Yes Taryn Tang PA-C   aluminum-magnesium hydroxide-simethicone (MAALOX) 200-200-20 mg/5 mL Susp Take 30 mLs by mouth every 4 (four) hours as needed (indigestion). 10/6/23 10/5/24 Yes Taryn Tang PA-C   amiodarone (PACERONE) 200 MG Tab Take 1 tablet (200 mg total) by mouth once daily. Start 11/5/23 at 0900 11/5/23  Yes Taryn Tang PA-C   baclofen (LIORESAL) 5 mg Tab tablet Take 1 tablet (5 mg total) by mouth 3 (three) times daily. 10/7/23  Yes Scooter Salter MD   ELIQUIS 5 mg Tab Take 1 tablet (5 mg total) by mouth 2 (two) times daily. 7/17/23  Yes Silvia Giordano NP   hydroCHLOROthiazide (HYDRODIURIL) 12.5 MG Tab Take 12.5 mg by mouth. 1/24/24  Yes Provider, Historical   levothyroxine (SYNTHROID) 112 MCG tablet TAKE 1 TABLET BY MOUTH EVERY DAY IN THE MORNING ON AN EMPTY STOMACH 10/2/17  Yes Rocco Miller MD   levothyroxine (SYNTHROID) 137 MCG Tab tablet Take 1 tablet (137 mcg total) by mouth before breakfast. 9/6/23  Yes Rocco Miller MD   meclizine (ANTIVERT) 25 mg tablet Take 25 mg by mouth. PRN 10/27/23  Yes Provider, Historical   multivitamin (THERAGRAN) per tablet Take 1 tablet by mouth once daily.   Yes Provider, Historical   oxyCODONE (ROXICODONE) 5 MG immediate release tablet Take 5 mg by mouth 4 (four) times daily as needed.   Yes Provider, Historical   polyethylene glycol (GLYCOLAX) 17 gram/dose powder Take 17 g by mouth. 12/11/23  Yes Provider, Historical   senna-docusate 8.6-50 mg (PERICOLACE) 8.6-50 mg per tablet Take 2 tablets by mouth 2 (two) times daily. 10/6/23  Yes Taryn Tang PA-C   bisacodyL (DULCOLAX) 10 mg Supp Place 1 suppository (10 mg total) rectally daily as needed (constipation). 10/6/23 1/31/24 Yes Taryn Tang PA-C   oxyCODONE (ROXICODONE) 10 mg Tab immediate  release tablet Take 1 tablet (10 mg total) by mouth every 6 (six) hours as needed for Pain (severe pain). 10/6/23 1/31/24 Yes Taryn Tang PA-C   metoprolol succinate (TOPROL-XL) 25 MG 24 hr tablet TAKE 1 TABLET BY MOUTH EVERY DAY  Patient not taking: Reported on 1/31/2024 10/6/17   Rocco Miller MD   ondansetron (ZOFRAN-ODT) 8 MG TbDL Take 1 tablet (8 mg total) by mouth every 6 (six) hours as needed (nausea/vomiting).  Patient not taking: Reported on 11/17/2023 10/6/23   Taryn Tang PA-C   rosuvastatin (CRESTOR) 10 MG tablet Take 1 tablet (10 mg total) by mouth once daily.  Patient not taking: Reported on 1/31/2024 7/14/23   Charles Rios MD   sulfamethoxazole-trimethoprim 800-160mg (BACTRIM DS) 800-160 mg Tab  11/8/23   Provider, Historical   tamsulosin (FLOMAX) 0.4 mg Cap Take 1 capsule by mouth.    Provider, Historical   traMADoL 100 mg Tab Take 100 mg by mouth every 6 (six) hours as needed for Pain (moderate pain).  Patient not taking: Reported on 11/17/2023 10/6/23   Taryn Tang PA-C   atorvastatin (LIPITOR) 40 MG tablet Take 1 tablet (40 mg total) by mouth once daily. 6/24/22 8/10/22  Radha Gomez MD         History  Past Medical History:   Diagnosis Date    Arthritis     Cancer     Thyroid    Edema     Hyperlipidemia     Hypertension     Thyroid disease      Past Surgical History:   Procedure Laterality Date    ADENOIDECTOMY      COLONOSCOPY  10/18/2018    COLONOSCOPY N/A 10/18/2018    Procedure: COLONOSCOPY;  Surgeon: Yosvany Alejandra MD;  Location: [facility] ENDO;  Service: General;  Laterality: N/A;    ECHOCARDIOGRAM,TRANSESOPHAGEAL N/A 10/4/2023    Procedure: Transesophageal echo (CARLYLE) intra-procedure log documentation;  Surgeon: Mark Ortega MD;  Location[facility] OR;  Service: Cardiology;  Laterality: N/A;    EPIDURAL STEROID INJECTION N/A 9/30/2022    Procedure: LUMBAR L3/4 MALKA CONTRAST DIRRECT REFERRAL ELIQUIS CLEARNCE IN CHART;  Surgeon: Nicholas  MD José;  Location: [facility] PAIN MGT;  Service: Pain Management;  Laterality: N/A;    FACETECTOMY OF VERTEBRA N/A 9/29/2023    Procedure: FACETECTOMY;  Surgeon: Scooter Salter MD;  Location: [facility] OR;  Service: Neurosurgery;  Laterality: N/A;    FORAMINOTOMY N/A 9/29/2023    Procedure: FORAMINOTOMY, SPINE;  Surgeon: Scooter Salter MD;  Location: [facility] OR;  Service: Neurosurgery;  Laterality: N/A;    FUSION OF LUMBAR SPINE USING POSTERIOR INTERBODY TECHNIQUE N/A 9/29/2023    Procedure: FUSION, SPINE, LUMBAR, PLIF;  Surgeon: Scooter Salter MD;  Location: [facility] OR;  Service: Neurosurgery;  Laterality: N/A;  L3-S1 posterior instrumentation   L3-L5 Lmainectomy sacro pelvic fixation Globus cage   -lop 3 hr   -los 5 days   -Lami segments 1  -anterior fusion insterspace 2  -general   -type and screen   -Ziehm brain notified cc  -EMG bairon notified cc  -SEP  -MEP  -TLSO       HYSTERECTOMY  1992    total hyst     KNEE SURGERY      16 pins and two plates implanted    LAMINECTOMY N/A 9/29/2023    Procedure: LAMINECTOMY, SPINE;  Surgeon: Scooter Salter MD;  Location: [facility] OR;  Service: Neurosurgery;  Laterality: N/A;    MINIMALLY INVASIVE TRANSFORAMINAL LUMBAR INTERBODY FUSION (TLIF) N/A 9/29/2023    Procedure: FUSION, SPINE, LUMBAR, TLIF, MINIMALLY INVASIVE;  Surgeon: Scooter Salter MD;  Location: [facility] OR;  Service: Neurosurgery;  Laterality: N/A;  L3-L5 OLIF Globus ATP  L5-S1 ALIF Globus ATP  -Lami add segment 1  -anterior fusion interspances 2  -lop 3 hr   -general   -type and screen   -C arm   -EMG bairon notified cc  -SEP  -MEP  -TLSO  -Regular Bed   -Lateral right down   -globus reji    THORACIC DISCECTOMY Right 5/12/2023    Procedure: DISCECTOMY, SPINE, THORACIC Right T6-7, T7-8 interior discectomy retropleural;  Surgeon: Scooter Salter MD;  Location: [facility] OR;  Service: Neurosurgery;  Laterality: Right;  general  eliquis   type and screen  C-arm  Metrx  EMG bairon notified cc  SEP  MEP  Regular bed    lateral left down   globus (Jose Enrique) notified cc    THYROIDECTOMY      TONSILLECTOMY       Social History     Socioeconomic History    Marital status:    Tobacco Use    Smoking status: Never    Smokeless tobacco: Never   Substance and Sexual Activity    Alcohol use: No    Drug use: No    Sexual activity: Not Currently     Partners: Male   Social History Narrative    ** Merged History Encounter **              Allergies  Review of patient's allergies indicates:   Allergen Reactions    Vancomycin Tinitus    Vancomycin analogues Tinitus         Review of Systems   Review of Systems   Constitutional: Negative for diaphoresis and malaise/fatigue.   HENT: Negative.     Cardiovascular:  Negative for chest pain, claudication, dyspnea on exertion, irregular heartbeat, leg swelling, near-syncope, orthopnea, palpitations, paroxysmal nocturnal dyspnea and syncope.   Respiratory:  Negative for shortness of breath.    Endocrine: Negative for polydipsia, polyphagia and polyuria.   Hematologic/Lymphatic: Does not bruise/bleed easily.   Gastrointestinal:  Negative for bloating, nausea and vomiting.   Genitourinary: Negative.    Neurological:  Positive for dizziness (one time with therapy, passed quickly). Negative for excessive daytime sleepiness, light-headedness, loss of balance and weakness.   Psychiatric/Behavioral:  The patient is not nervous/anxious.    Allergic/Immunologic: Negative.          Physical Exam  Wt Readings from Last 1 Encounters:   01/03/24 81.2 kg (179 lb 0.2 oz)     BP Readings from Last 3 Encounters:   01/03/24 (!) 143/72   11/20/23 (!) 125/58   11/17/23 (!) 125/58     Pulse Readings from Last 1 Encounters:   01/03/24 (!) 57     There is no height or weight on file to calculate BMI.    Physical Exam  Vitals and nursing note reviewed.   Constitutional:       General: She is not in acute distress.     Appearance: Normal appearance.   HENT:      Head: Normocephalic and atraumatic.      Mouth/Throat:       Mouth: Mucous membranes are moist.   Eyes:      Extraocular Movements: Extraocular movements intact.      Pupils: Pupils are equal, round, and reactive to light.   Neck:      Vascular: No carotid bruit or JVD.   Cardiovascular:      Rate and Rhythm: Normal rate and regular rhythm.      Pulses:           Radial pulses are 2+ on the right side and 2+ on the left side.        Dorsalis pedis pulses are 2+ on the right side and 2+ on the left side.        Posterior tibial pulses are 2+ on the right side and 2+ on the left side.      Heart sounds: No murmur heard.     No friction rub. No gallop.   Pulmonary:      Effort: Pulmonary effort is normal. No respiratory distress.      Breath sounds: Normal breath sounds.   Abdominal:      General: There is no distension.      Tenderness: There is no abdominal tenderness. There is no guarding or rebound.   Musculoskeletal:      Cervical back: Normal range of motion.      Right lower leg: Edema present.      Left lower leg: Edema present.   Skin:     General: Skin is warm and dry.      Capillary Refill: Capillary refill takes less than 2 seconds.      Findings: Erythema present.   Neurological:      General: No focal deficit present.      Mental Status: She is alert. Mental status is at baseline.   Psychiatric:         Mood and Affect: Mood normal.         Behavior: Behavior normal.               Problem List Items Addressed This Visit          Cardiac/Vascular    Atrial fibrillation with RVR    Relevant Orders    IN OFFICE EKG 12-LEAD (to Muse)    PAF (paroxysmal atrial fibrillation) - Primary    Overview     Recommend visit with EP as she is due  Continue DOAC  Continue metoprolol  Rate controlled today           Current Assessment & Plan     As above         Relevant Orders    IN OFFICE EKG 12-LEAD (to Muse)    Aortic atherosclerosis    Preprocedural cardiovascular examination    Overview       Is followed by EP for pAF and is on amiodarone, metoprolol and Eliquis, had appt in  November 2023, unable to attend that appointment unfortunately.  There is a risk of blood clots and stroke with holding Eliquis and this was discussed with patient at length. She wishes to hold med so she may proceed with surgery.  Neurosurgery in the past has requested Eliquis to be held 5 days prior (our typical time frame is 48 hours), will await conformation from neurosurgery team  Would recommend restarting Eliquis as soon as possible barring any bleeding problems, defer to Dr. Salter.  Would not recommend against pursuing surgery from a cardiovascular standpoint.          Current Assessment & Plan     As above         Pulmonary HTN       Other    Bilateral leg edema    Overview     Associated with redness- patient reports that the redness and swelling have improved.  Encourage her to discuss with PCP if symptoms worsen or if she develops fever or chills         Current Assessment & Plan     As above                @Екатерина Tamez DNP

## 2024-02-01 ENCOUNTER — TELEPHONE (OUTPATIENT)
Dept: PREADMISSION TESTING | Facility: HOSPITAL | Age: 74
End: 2024-02-01
Payer: MEDICARE

## 2024-02-01 NOTE — TELEPHONE ENCOUNTER
Attempted to contact patient multiple times on both numbers in chart with no avail and no voicemail.

## 2024-02-02 ENCOUNTER — TELEPHONE (OUTPATIENT)
Dept: PRIMARY CARE CLINIC | Facility: CLINIC | Age: 74
End: 2024-02-02
Payer: MEDICARE

## 2024-02-02 NOTE — TELEPHONE ENCOUNTER
----- Message from Brandy Morgan sent at 2/2/2024  5:10 PM CST -----  Regarding: PT ADVICE  Contact: PT  Pt called regarding needing a clearance for her surgery. Pt wanted to schedule an appt. Pt already scheduled for a hospital f/u on 2.12.24. Pt would like a call back to discuss clearance.    Please advise. Pt can be reached at [phone]

## 2024-02-02 NOTE — TELEPHONE ENCOUNTER
----- Message from Tyesha Lynn sent at 2/2/2024  4:29 PM CST -----  Type: General Call Back     Name of Caller:JEAN CLAUDE BONNIE HERRERA [[id number]]  Symptoms:medical clearance for 02/15  Would the patient rather a call back or a response via [facility]? Call back   Best Call Back Number: [phone]  Additional Information: Patient indicates she has a procedure for 02/15 and needs to get a medical clearance for the procedure from the provider if possible. Patient indicates she is scheduled for a hospital follow up for 02/12. Patient would like to see if she can get the medical clearance in the same appointment. Patient would like a call back with further assistance and more information.

## 2024-02-02 NOTE — TELEPHONE ENCOUNTER
----- Message from Tyesha Lynn sent at 2/2/2024  4:29 PM CST -----  Type: General Call Back     Name of Caller:JEAN CLAUDE BONNIE HERRERA [[id number]]  Symptoms:medical clearance for 02/15  Would the patient rather a call back or a response via [facility]? Call back   Best Call Back Number: [phone]  Additional Information: Patient indicates she has a procedure for 02/15 and needs to get a medical clearance for the procedure from the provider if possible. Patient indicates she is scheduled for a hospital follow up for 02/12. Patient would like to see if she can get the medical clearance in the same appointment. Patient would like a call back with further assistance and more information.

## 2024-02-07 ENCOUNTER — HOSPITAL ENCOUNTER (OUTPATIENT)
Dept: PREADMISSION TESTING | Facility: HOSPITAL | Age: 74
Discharge: HOME | End: 2024-02-07
Attending: NURSE PRACTITIONER
Payer: MEDICARE

## 2024-02-07 NOTE — DISCHARGE INSTRUCTIONS
Your surgery is scheduled for 2/15/24.    Please report to [facility] on the 1st Floor at 1000 a.m.    THIS TIME IS SUBJECT TO CHANGE.  YOU WILL RECEIVE A PHONE CALL THE DAY BEFORE SURGERY BY 3:30 PM TO CONFIRM YOUR TIME OF ARRIVAL.  IF YOU HAVE NOT RECEIVED A PHONE CALL BY 3:30 PM THE DAY BEFORE YOUR SURGERY PLEASE CALL [phone]     INSTRUCTIONS IMPORTANT!!!  ¨ Do not eat or drink after 12 midnight-including water, candy, gum, & mints. OK to brush teeth.      ____  Proceed to [facility] on *** for additional testing.        ____  Do not wear makeup, including mascara.  ____  No powder, lotions or creams to surgical area.  ____  Please remove all jewelry, including piercings and leave at home.  ____  No money or valuables needed. Please leave at home.  ____  Please bring any documents given by your doctor.  ____  If going home the same day, arrange for a ride home. You will not be able to             drive if Anesthesia was used.  ____  Children under 18 years require a parent / guardian present the entire time             they are in surgery / recovery.  ____  Wear loose fitting clothing. Allow for dressings, bandages.  ____  Stop Aspirin, Ibuprofen, Motrin, Aleve, Goody's/BC powders, Excedrine and Naproxen (NSAIDS) at least 3-5 days before surgery, unless otherwise instructed by your doctor, or the nurse.   You MAY use Tylenol/acetaminophen until day of surgery.  ____  Wash the surgical area with Hibiclens or Dial Antibacterial bar soap the night before surgery, and again the             morning of surgery.  Be sure to rinse hibiclens or Dial Antibacterial bar soap off completely (if instructed by   nurse).  ____  If you take diabetic medication including Metformin, Glimepiride, Glipizide, Glyburide, Byetta, Januvia, Actos, do not take am of surgery unless instructed by Doctor.  ____ Hold Invokana, Farxiga, and Jardiance for 3 days prior to surgery.   ____  Call MD for temperature  above 101 degrees or any other signs of infection such as Urinary (bladder) infection, Upper respiratory infection, skin boils, etc.  ____ Stop taking any Fish Oil supplement or any Vitamins that contain Vitamin E at least 5 days prior to surgery.  ____ Do Not wear your contact lenses the day of your procedure.  You may wear your glasses.      ____Do not shave surgical site for 3 days prior to surgery.  ____ Practice Good hand washing before, during, and after procedure.      I have read or had read and explained to me, and understand the above information.  Additional comments or instructions:  For additional questions call [phone]      ANESTHESIA SIDE EFFECTS  -For the first 24 hours after surgery:  Do not drive, use heavy equipment, make important decisions, or drink alcohol  -It is normal to feel sleepy for several hours.  Rest until you are more awake.  -Have someone stay with you, if needed.  They can watch for problems and help keep you safe.  -Some possible post anesthesia side effects include: nausea and vomiting, sore throat and hoarseness, sleepiness, and dizziness.        Pre-Op Bathing Instructions    Before surgery, you can play an important role in your own health.    Because skin is not sterile, we need to be sure that your skin is as free of germs as possible. By following the instructions below, you can reduce the number of germs on your skin before surgery.    IMPORTANT: You will need to shower with a special soap called Hibiclens*, available at any pharmacy.  If you are allergic to Chlorhexidine (the antiseptic in Hibiclens), use an antibacterial soap such as Dial Soap for your preoperative shower.  You will shower with Hibiclens both the night before your surgery and the morning of your surgery.  Do not use Hibiclens on the head, face or genitals to avoid injury to those areas.    STEP #1: THE NIGHT BEFORE YOUR SURGERY     Do not shave the area of your body where your surgery will be  performed.  Shower and wash your hair and body as usual with your normal soap and shampoo.  Rinse your hair and body thoroughly after you shower to remove all soap residue.  With your hand, apply one packet of Hibiclens soap to the surgical site.   Wash the site gently for five (5) minutes. Do not scrub your skin too hard.   Do not wash with your regular soap after Hibiclens is used.  Rinse your body thoroughly.  Pat yourself dry with a clean, soft towel.  Do not use lotion, cream, or powder.  Wear clean clothes.    STEP #2: THE MORNING OF YOUR SURGERY     Repeat Step #1.    * Not to be used by people allergic to Chlorhexidine.      Please take the morning of surgery the following medications Levothyroxine and Amiodarone. Also stop Eliquis 5 days prior to surgery.

## 2024-02-07 NOTE — PRE-PROCEDURE INSTRUCTIONS
Admit      Allergies, medical, surgical, family and psychosocial histories reviewed with patient. Periop plan of care reviewed. Preop instructions given, including medications to take and to hold. Hibiclens soap and instructions on use given. Time allotted for questions to be addressed.  Patient verbalized understanding.      Arrival time 1000.

## 2024-02-08 ENCOUNTER — NURSE TRIAGE (OUTPATIENT)
Dept: ADMINISTRATIVE | Facility: CLINIC | Age: 74
End: 2024-02-08
Payer: MEDICARE

## 2024-02-09 NOTE — TELEPHONE ENCOUNTER
Personal discussion with patient regarding her bleeding.  Reports improvement in bleeding and is unsure if the bleeding was coming from her urethra or her vagina.  Initially dark, thin blood but has since turned into a blood tinge when she wipes.  Denies any symptomatic hypovolemia including dizziness or chest pain.  Continues on Eliquis 5 mg b.i.d..  Discussed with patient that, unless she is currently hemorrhaging, this does not likely need her to go to the emergency room unless she becomes symptomatic.  She has an upcoming appointment with Dr. NICOLAS next Tuesday who can determine if the bleeding was from her bladder or her vagina.  Additional discussion regarding signs of pyelonephritis as she is currently numb from the waist down and does not feel bladder pressure.  Any nausea, vomiting, high fevers, pelvic pain or pressure, hemorrhaging, dizziness patient needs to go to the emergency room for evaluation.  Verbalized understanding.

## 2024-02-09 NOTE — TELEPHONE ENCOUNTER
Called pt regarding message. Pt stated she was seen today in ER for bilateral leg swelling. During phone call pt reported vaginal bleeding. Pt reported period like bleeding from vagina. No clots or fever reported. Message was routed to NP.

## 2024-02-09 NOTE — TELEPHONE ENCOUNTER
Pt calling regarding bilateral foot swelling w/ redness that is painful when touched. Also reports swelling in both hands that is new for her. Advised, per protocol and verbalizes understanding.     Reason for Disposition   [1] Redness AND [2] painful when touched AND [3] no fever    Additional Information   Negative: Difficulty breathing   Negative: Entire foot is cool or blue in comparison to other side   Negative: [1] Ankle pain AND [2] fever   Negative: [1] Ankle redness AND [2] fever   Negative: Patient sounds very sick or weak to the triager    Protocols used: Ankle Swelling-A-AH

## 2024-02-12 ENCOUNTER — OFFICE VISIT (OUTPATIENT)
Dept: PRIMARY CARE CLINIC | Facility: CLINIC | Age: 74
End: 2024-02-12
Payer: MEDICARE

## 2024-02-12 VITALS
HEART RATE: 64 BPM | WEIGHT: 184.06 LBS | HEIGHT: 67 IN | DIASTOLIC BLOOD PRESSURE: 68 MMHG | OXYGEN SATURATION: 99 % | SYSTOLIC BLOOD PRESSURE: 118 MMHG | BODY MASS INDEX: 28.89 KG/M2 | RESPIRATION RATE: 18 BRPM

## 2024-02-12 DIAGNOSIS — G95.9 CHRONIC MYELOPATHY: ICD-10-CM

## 2024-02-12 DIAGNOSIS — Z79.01 ANTICOAGULANT LONG-TERM USE: ICD-10-CM

## 2024-02-12 DIAGNOSIS — E44.1 MILD PROTEIN-CALORIE MALNUTRITION: ICD-10-CM

## 2024-02-12 DIAGNOSIS — G35 MULTIPLE SCLEROSIS: ICD-10-CM

## 2024-02-12 DIAGNOSIS — I48.0 PAF (PAROXYSMAL ATRIAL FIBRILLATION): ICD-10-CM

## 2024-02-12 DIAGNOSIS — R60.0 BILATERAL LEG EDEMA: Primary | ICD-10-CM

## 2024-02-12 DIAGNOSIS — M50.30 DDD (DEGENERATIVE DISC DISEASE), CERVICAL: ICD-10-CM

## 2024-02-12 DIAGNOSIS — F32.0 MAJOR DEPRESSIVE DISORDER, SINGLE EPISODE, MILD: ICD-10-CM

## 2024-02-12 DIAGNOSIS — E78.2 MIXED HYPERLIPIDEMIA: ICD-10-CM

## 2024-02-12 DIAGNOSIS — I10 ESSENTIAL HYPERTENSION, BENIGN: ICD-10-CM

## 2024-02-12 DIAGNOSIS — Z98.1 S/P LUMBAR SPINAL FUSION: ICD-10-CM

## 2024-02-12 DIAGNOSIS — Z74.1 ASSISTANCE NEEDED FOR MOBILITY: ICD-10-CM

## 2024-02-12 DIAGNOSIS — M17.2 BILATERAL POST-TRAUMATIC OSTEOARTHRITIS OF KNEE: ICD-10-CM

## 2024-02-12 PROCEDURE — 99213 OFFICE O/P EST LOW 20 MIN: CPT | Mod: PBBFAC,PN | Performed by: FAMILY MEDICINE

## 2024-02-12 PROCEDURE — 99214 OFFICE O/P EST MOD 30 MIN: CPT | Mod: S$PBB,,, | Performed by: FAMILY MEDICINE

## 2024-02-12 PROCEDURE — 99999 PR PBB SHADOW E&M-EST. PATIENT-LVL III: CPT | Mod: PBBFAC,,, | Performed by: FAMILY MEDICINE

## 2024-02-12 NOTE — PROGRESS NOTES
Subjective:       Patient ID: Graciela Barton is a 73 y.o. female.    Chief Complaint: Follow-up (Ed follow up /Pre op)    HPI:Pt was here for surgery clearance but problem listed as ER follow up  see below   Pt with recent cellulitits and severe peripheral edema lower extremities bilaterally left much greater than right  See plan patient to follow-up with Dr. Miller 2 weeks see if able to get neck surgery       74 yo WF in for ER follow up --patient was seen in the emergency room 279504 for leg swelling with bilateral cellulitis--lab was reviewed CBCs hematocrit 39 was 27.6 CMP BNP troponin D-dimer all within normal limits chest x-ray was normal except for slight dilatation of the cardiac silhouette which was felt to be due to type of chest x-ray and positioning or poor inspiration EKG was basically normal with some PVCs.  When patient got home--had gotten Lasix IV in emergency room urine there was cleared but when patient got home had bettye blood--so was sent home with Lasix 20  and potassium 10   Patient was discharged on Lasix 40 q.a.m. Micro-K 10 and Keflex 500 t.i.d.       States swelling is a lot better leg was red from the ankle to the shin much improved--legs with pins and plates--and 2 bone graft 18 pins then 2 removed 2 plates --stepping off ladder        Patient with a walker and  a wheelchair--able walk 150 ft with walker---wheelchair is a transport chair so does not have large wheels to push--pt propels with feet ;      Patient here today for preop clearance for neck surgery-- to have surgery 12-     ROS:  Skin: no psoriasis, eczema, skin cancer--cellulitis left lower leg treated with Keflex appears to have resolved  HEENT: No headache, ocular pain, blurred vision, diplopia, epistaxis, hoarseness change in voice, history hypothyroidism patient on Synthroid 137 Hx thyroid cancer    Lung: No pneumonia, asthma, Tb, wheezing, SOB, no smoking  Heart: No chest pain, ankle edema, palpitations, MI,  graciela murmur,+ hypertension, +hyperlipidemia--+ atrial fibrillation did have cardioversion still on Pacerone and Eliquis--no stent or bypass  Abdomen: No nausea, vomiting, diarrhea, constipation, ulcers, hepatitis, gallbladder disease, melena, hematochezia, hematemesis Hx colonic polyps  : no UTI, renal disease, stones  GYN hyst -- no breast problem history of left knee surgery 18 pins to plates see history of present illness and bone grafts history of neck and back surgery in the past has some other vertebra problems--needs neck   MS: no fractures, O/A, lupus, rheumatoid, gout  Neuro: No dizziness, LOC, seizures   No diabetes,+ hx  anemia-- given 2 units blood , no anxiety, + depression over medical conditions not taking medication   one child retired lives alone      Objective:   Physical Exam:  General: Well nourished, well developed, no acute distress  Skin: No lesions no cellulitis now   HEENT: Eyes PERRLA, EOM intact, nose patent, throat non-erythematous ears TMs clear  NECK: Supple, no bruits, No JVD, no nodes  Lungs: Clear, no rales, rhonchi, wheezing  Heart: Regular rate and rhythm, no murmurs, gallops, or rubs  Abdomen: flat, bowel sounds positive, no tenderness, or organomegaly  MS: Range of motion and muscle strength intact--up in wheelchair--weakness legs since back surgery and knee surgery    Neuro: Alert, CN intact, oriented X 3  Extremities: No cyanosis, clubbing, +2-3/4 pitting to knees left much greater than right        Assessment:       1. Bilateral leg edema    2. Assistance needed for mobility    3. Bilateral post-traumatic osteoarthritis of knee    4. DDD (degenerative disc disease), cervical    5. Multiple sclerosis    6. S/P lumbar spinal fusion    7. Essential hypertension, benign    8. Mixed hyperlipidemia    9. PAF (paroxysmal atrial fibrillation)    10. Anticoagulant long-term use    11. Mild protein-calorie malnutrition    12. Major depressive disorder, single episode, mild     13. Chronic myelopathy    14. Multiple sclerosis        Plan:       Bilateral leg edema    Assistance needed for mobility    Bilateral post-traumatic osteoarthritis of knee    DDD (degenerative disc disease), cervical    Multiple sclerosis    S/P lumbar spinal fusion    Essential hypertension, benign    Mixed hyperlipidemia    PAF (paroxysmal atrial fibrillation)    Anticoagulant long-term use    Mild protein-calorie malnutrition    Major depressive disorder, single episode, mild    Chronic myelopathy    Multiple sclerosis        Main Reason for Visit  Preop clearance for neck surgery patient has silver abnormalities of the cervical vertebra scheduled for surgery in 3 days  Recent episode of cellulitis of the left lower leg--with bilateral peripheral edema--seen in the emergency room given IV Lasix--started on Lasix 40 mg q.a.m. Micro-K 10 q.d.--states legs are markedly improved however still with significant edema of both legs--patient needs to limit fluids to a 1000 cc per day elevate the legs above the heart 30 minutes in the morning and 30 in the afternoon should not sleep and wheelchair but sleep in a bed--states has difficulty getting in and out of bed so will order a hospital bed--also a recliner that tilts  Needs increase Lasix to 40 mg b.i.d. x3 days/40 mg in morning 20 in the afternoon x7 days then back to 40 mg per day take 1 Micro-K with each Lasix  See Dr. Miller in 2 weeks see if able clear for surgery --I feel high risk DVT with amount edema legs meeta left --at this point   Patient had held Eliquis told to restart Eliquis  CBC CMP in 2 weeks

## 2024-02-12 NOTE — PATIENT INSTRUCTIONS
Nurse: Sending in order for Hospital bed- Hospital Bed needs to tilt and recline     Do Not Sleep in recliner of wheelchair   Limit fluid intake to 1000 cc/ daily   Elevate feet above heart 30 minutes in the morning and 30 minutes at night  Complete labs in 2 weeks CBC and CMP    IMPORTANT    Take Lasix 40 mg twice daily for 3 days   Then Take Lasix 40 mg in the morning and 20 mg at night for 8 days  Take Micro-K with each Lasix       Follow-up with  in 2 weeks

## 2024-02-13 ENCOUNTER — NURSE TRIAGE (OUTPATIENT)
Dept: ADMINISTRATIVE | Facility: CLINIC | Age: 74
End: 2024-02-13
Payer: MEDICARE

## 2024-02-13 NOTE — TELEPHONE ENCOUNTER
Went to provider yesterday, advised to change dose of Lasix, patient does not remember exactly what he said. Instructions from visit read to patient as below, copied from providers visit.   Nurse: Sending in order for Hospital bed- Hospital Bed needs to tilt and recline      Do Not Sleep in recliner of wheelchair   Limit fluid intake to 1000 cc/ daily   Elevate feet above heart 30 minutes in the morning and 30 minutes at night  Complete labs in 2 weeks CBC and CMP     IMPORTANT     Take Lasix 40 mg twice daily for 3 days   Then Take Lasix 40 mg in the morning and 20 mg at night for 8 days  Take Micro-K with each Lasix         Follow-up with  in 2 weeks           Patient repeated back and wrote down,     Lasix 40 mg twice a day for 3 days  Then take Lasix 40 mg in the morning and 20 mg at night for 8 days. Take Micro-K with each Lasix.     Verb understanding, no further questions or concerns.   Reason for Disposition   Caller has medicine question only, adult not sick, AND triager answers question    Protocols used: Medication Question Call-A-

## 2024-02-14 ENCOUNTER — TELEPHONE (OUTPATIENT)
Dept: NEUROSURGERY | Facility: CLINIC | Age: 74
End: 2024-02-14
Payer: MEDICARE

## 2024-02-14 NOTE — TELEPHONE ENCOUNTER
Called patient regarding message. Patient said that she didn't receive her discharge paperwork. I explained to the patient that I will have a copy downstairs available for pick-up. Patient verbalized understand.

## 2024-02-16 ENCOUNTER — TELEPHONE (OUTPATIENT)
Dept: PAIN MEDICINE | Facility: CLINIC | Age: 74
End: 2024-02-16
Payer: MEDICARE

## 2024-02-16 ENCOUNTER — TELEPHONE (OUTPATIENT)
Dept: NEUROSURGERY | Facility: CLINIC | Age: 74
End: 2024-02-16
Payer: MEDICARE

## 2024-02-16 DIAGNOSIS — M79.89 LEG SWELLING: ICD-10-CM

## 2024-02-16 NOTE — TELEPHONE ENCOUNTER
No care due was identified.  [facility] [place] Embedded Care Due Messages. Reference number: [id number].   2/16/2024 1:21:43 PM CST

## 2024-02-16 NOTE — TELEPHONE ENCOUNTER
----- Message from Mona Diaz sent at 2/16/2024 12:38 PM CST -----  Contact: Self/ [phone]  Requesting an RX refill or new RX.  Is this a refill or new RX: New  RX name and strength :  furosemide (LASIX) 40 MG tablet  Is this a 30 day or 90 day RX: 30  Pharmacy name and phone # : [place] [id number] [facility] [place] - [address]  [facility] [employer]  [facility] [address]  Phone: [phone] Fax: [phone]     The doctors have asked that we provide their patients with the following 2 reminders -- prescription refills can take up to 72 hours, and a friendly reminder that in the future you can use your [facility] account to request refills:       Requesting an RX refill or new RX.  Is this a refill or new RX: New  RX name and strength :  potassium chloride (KLOR-CON) 10 MEQ TbSR  Is this a 30 day or 90 day RX: 30  Pharmacy name and phone # :  [place] [phone] [facility] [place] - [address]  [address]  [facility] [address]  Phone: [phone] Fax: [phone]     The doctors have asked that we provide their patients with the following 2 reminders -- prescription refills can take up to 72 hours, and a friendly reminder that in the future you can use your [facility] account to request refills:

## 2024-02-17 ENCOUNTER — NURSE TRIAGE (OUTPATIENT)
Dept: ADMINISTRATIVE | Facility: CLINIC | Age: 74
End: 2024-02-17
Payer: MEDICARE

## 2024-02-17 DIAGNOSIS — M79.89 LEG SWELLING: Primary | ICD-10-CM

## 2024-02-17 RX ORDER — FUROSEMIDE 40 MG/1
40 TABLET ORAL DAILY
Qty: 90 TABLET | Refills: 0 | Status: SHIPPED | OUTPATIENT
Start: 2024-02-17 | End: 2024-02-17 | Stop reason: SDUPTHER

## 2024-02-17 RX ORDER — POTASSIUM CHLORIDE 750 MG/1
10 TABLET, EXTENDED RELEASE ORAL 3 TIMES DAILY
Qty: 21 TABLET | Refills: 0 | Status: SHIPPED | OUTPATIENT
Start: 2024-02-17 | End: 2024-02-24

## 2024-02-17 RX ORDER — FUROSEMIDE 40 MG/1
40 TABLET ORAL 3 TIMES DAILY
Qty: 21 TABLET | Refills: 0 | Status: SHIPPED | OUTPATIENT
Start: 2024-02-17 | End: 2024-04-16

## 2024-02-17 RX ORDER — POTASSIUM CHLORIDE 750 MG/1
10 TABLET, EXTENDED RELEASE ORAL DAILY
Qty: 90 TABLET | Refills: 0 | Status: SHIPPED | OUTPATIENT
Start: 2024-02-17 | End: 2024-02-17 | Stop reason: SDUPTHER

## 2024-02-17 NOTE — TELEPHONE ENCOUNTER
Patient states c/o cellulitis, bilateral leg swelling and weeping and blisters on left foot. Patient states she was seen in the ED on 02/09/24 for Bilateral Leg Swelling & Pain and diagnosed with Cellulitis. Patient states she was prescribed Cephalexin 500 mg, Lasix 40 mg and Potassium Chloride 10 mEq. Patient states she followed up with her PCP and her Lasix and Potassium regimen was increased but she did not receive a new prescription for Lasix or Potassium and is out of both medications. Patient is requesting refill of Lasix and Potassium to complete the regimen prescribed by her PCP.     Care Advice given per Leg Swelling and Edema Adult Guideline. Patient advised to Go to an Urgent Care Center within Four (4) Hours. Patient also advised that outreach will be made to the On Call Provider to review patient's chart that indicates medication change for refill of medications.     On Call Provider, Dr. Rosa Maria Zabala, advised that she will submit refills for Lasix and Potassium to allow patient to complete new regimen. Dr. Zabala also advised to have patient Go to ED for any worsening symptoms.     Patient advised that refills for Lasix and Potassium have been submitted to her Pharmacy. Patient also advised to Go to ED for any worsening symptoms. Patient states understanding of care advice.    Reason for Disposition   SEVERE leg swelling (e.g., swelling extends above knee, entire leg is swollen, weeping fluid)    Additional Information   Negative: SEVERE difficulty breathing (e.g., struggling for each breath, speaks in single words)   Negative: Looks like a broken bone or dislocated joint (e.g., crooked or deformed)   Negative: Sounds like a life-threatening emergency to the triager   Negative: Chest pain   Negative: Followed a leg injury   Negative: [1] Followed an insect bite AND [2] localized swelling (e.g., small area of puffy or swollen skin)   Negative: Swelling of one ankle joint   Negative: Swelling of knee is  main symptom   Negative: Pregnant   Negative: Postpartum (from 0 to 6 weeks after delivery)   Negative: [1] Heart failure suspected (e.g., ankle swelling, shortness of breath, weight gain) AND [2] recently in hospital for heart failure   Negative: Difficulty breathing at rest   Negative: Entire foot is cool or blue in comparison to other side   Negative: [1] Can't walk or can barely walk AND [2] new-onset   Negative: [1] Difficulty breathing with exertion (e.g., walking) AND [2] new-onset or worsening   Negative: [1] Red area or streak AND [2] fever   Negative: [1] Swelling is painful to touch AND [2] fever   Negative: [1] Cast on leg or ankle AND [2] now increased pain   Negative: Patient sounds very sick or weak to the triager    Protocols used: Leg Swelling and Edema-A-AH

## 2024-02-17 NOTE — TELEPHONE ENCOUNTER
Refill Routing Note   Medication(s) are not appropriate for processing by [facility] for the following reason(s):        No active prescription written by provider  New or recently adjusted medication  ED/Hospital Visit since last OV with provider    [facility] action(s):  Approve               Appointments  past 12m or future 3m with PCP    Date Provider   Last Visit   9/15/2023 Rocco Miller MD   Next Visit   2/26/2024 Rocco Miller MD   ED visits in past 90 days: 1        Note composed:8:06 PM 02/16/2024

## 2024-02-19 ENCOUNTER — TELEPHONE (OUTPATIENT)
Dept: PRIMARY CARE CLINIC | Facility: CLINIC | Age: 74
End: 2024-02-19
Payer: MEDICARE

## 2024-02-19 ENCOUNTER — EXTERNAL HOME HEALTH (OUTPATIENT)
Dept: HOME HEALTH SERVICES | Facility: HOSPITAL | Age: 74
End: 2024-02-19
Payer: MEDICARE

## 2024-02-19 NOTE — TELEPHONE ENCOUNTER
Called patient regarding message. Patient was informed that the order was fax and I will fax it again. Patient verbalized understand.

## 2024-02-19 NOTE — TELEPHONE ENCOUNTER
----- Message from Apryl Leggett sent at 2/19/2024  9:23 AM CST -----  Contact: Patient, [phone]  Calling to inquire about the order for a hospital bed. Please call her. Thanks.

## 2024-02-20 ENCOUNTER — TELEPHONE (OUTPATIENT)
Dept: PRIMARY CARE CLINIC | Facility: CLINIC | Age: 74
End: 2024-02-20
Payer: MEDICARE

## 2024-02-20 NOTE — TELEPHONE ENCOUNTER
I faxed order over to Rosa Maria at [facility]. I also called patient and notified her about a status on her hospital bed. Patient said that she spoke with [facility] regarding the over request.

## 2024-02-20 NOTE — TELEPHONE ENCOUNTER
----- Message from Beronica Morel sent at 2/20/2024  8:54 AM CST -----  Contact: [phone] ext [phone]  Rosa Maria with [facility] is calling she states she received an order for a hospital bed and they are missing the signed clinical notes please advise    Fax [phone]  Sharlene Crane

## 2024-02-21 DIAGNOSIS — I47.9 PAROXYSMAL TACHYCARDIA: Primary | ICD-10-CM

## 2024-02-26 ENCOUNTER — OFFICE VISIT (OUTPATIENT)
Dept: PRIMARY CARE CLINIC | Facility: CLINIC | Age: 74
End: 2024-02-26
Payer: MEDICARE

## 2024-02-26 VITALS
TEMPERATURE: 97 F | HEIGHT: 67 IN | HEART RATE: 66 BPM | DIASTOLIC BLOOD PRESSURE: 72 MMHG | WEIGHT: 179.13 LBS | BODY MASS INDEX: 28.11 KG/M2 | RESPIRATION RATE: 18 BRPM | OXYGEN SATURATION: 97 % | SYSTOLIC BLOOD PRESSURE: 150 MMHG

## 2024-02-26 DIAGNOSIS — L03.116 CELLULITIS OF LEFT FOOT: Primary | ICD-10-CM

## 2024-02-26 DIAGNOSIS — I48.0 PAF (PAROXYSMAL ATRIAL FIBRILLATION): ICD-10-CM

## 2024-02-26 PROCEDURE — 99214 OFFICE O/P EST MOD 30 MIN: CPT | Mod: PBBFAC,PN | Performed by: FAMILY MEDICINE

## 2024-02-26 PROCEDURE — 99214 OFFICE O/P EST MOD 30 MIN: CPT | Mod: S$PBB,,, | Performed by: FAMILY MEDICINE

## 2024-02-26 PROCEDURE — 99999 PR PBB SHADOW E&M-EST. PATIENT-LVL IV: CPT | Mod: PBBFAC,,, | Performed by: FAMILY MEDICINE

## 2024-02-26 RX ORDER — POTASSIUM CHLORIDE 20 MEQ/1
20 TABLET, EXTENDED RELEASE ORAL
COMMUNITY
Start: 2024-02-09 | End: 2024-04-16

## 2024-02-26 RX ORDER — CLINDAMYCIN HYDROCHLORIDE 300 MG/1
300 CAPSULE ORAL 3 TIMES DAILY
Qty: 30 CAPSULE | Refills: 0 | Status: SHIPPED | OUTPATIENT
Start: 2024-02-26 | End: 2024-03-07

## 2024-02-26 NOTE — PROGRESS NOTES
"Subjective:       Patient ID: Graciela Barton is a 73 y.o. female.    Chief Complaint: Surgery clearance    Left lower leg swelling, redness and tenderness for the past few weeks.  Took a course of Keflex with minimal improvement.  Has subsequently developed a blister to her left lower leg that has been draining clear yellow fluid.  No fevers, but endorses some chills.  No nausea, vomiting or diarrhea.  Minimal improvement in lower extremity swelling with the use of Lasix.  Has been taking Eliquis consistently.      Review of Systems   Constitutional:  Positive for chills. Negative for fever.   Respiratory:  Negative for shortness of breath.    Cardiovascular:  Positive for leg swelling. Negative for chest pain.   Skin:  Positive for color change and wound.       Objective:      Vitals:    24 1355   BP: (!) 150/72   BP Location: Right arm   Patient Position: Sitting   BP Method: Medium (Manual)   Pulse: 66   Resp: 18   Temp: 97 °F (36.1 °C)   TempSrc: Temporal   SpO2: 97%   Weight: 81.3 kg (179 lb 2 oz)   Height: 5' 7" (1.702 m)     BP Readings from Last 5 Encounters:   24 (!) 150/72   24 118/68   24 131/63   24 (!) 143/72   23 (!) 125/58     Wt Readings from Last 5 Encounters:   24 81.3 kg (179 lb 2 oz)   24 83.5 kg (184 lb 1.4 oz)   24 83.5 kg (184 lb)   24 81.2 kg (179 lb 0.2 oz)   23 81.2 kg (179 lb 0.2 oz)     Physical Exam  Vitals and nursing note reviewed.   Constitutional:       General: She is not in acute distress.     Appearance: Normal appearance. She is well-developed.   HENT:      Head: Normocephalic and atraumatic.   Cardiovascular:      Rate and Rhythm: Normal rate and regular rhythm.      Heart sounds: Normal heart sounds.   Pulmonary:      Effort: Pulmonary effort is normal.      Breath sounds: Normal breath sounds.   Musculoskeletal:      Right lower leg: No edema.      Left lower le+ Pitting Edema present.   Skin:     General: " Skin is warm and dry.          Neurological:      Mental Status: She is alert and oriented to person, place, and time.   Psychiatric:         Mood and Affect: Mood normal.         Behavior: Behavior normal.         Lab Results   Component Value Date    WBC 7.06 02/09/2024    HGB 12.0 02/09/2024    HCT 39.0 02/09/2024     02/09/2024    CHOL 127 06/20/2022    TRIG 135 06/20/2022    HDL 56 06/20/2022    ALT 19 02/09/2024    AST 25 02/09/2024     02/09/2024    K 3.8 02/09/2024     02/09/2024    CREATININE 0.9 02/09/2024    BUN 23 02/09/2024    CO2 29 02/09/2024    TSH 7.448 (H) 09/30/2023    INR 1.1 09/06/2023    HGBA1C 5.5 06/20/2022      Assessment:       1. Cellulitis of left foot    2. PAF (paroxysmal atrial fibrillation)        Plan:       Cellulitis of left foot  -     clindamycin (CLEOCIN) 300 MG capsule; Take 1 capsule (300 mg total) by mouth 3 (three) times daily. for 10 days  Dispense: 30 capsule; Refill: 0  Will treat with course of broader spectrum antibiotics.  Elevate leg as much as possible.  Seek immediate medical attention for any acutely worsening symptoms.  Surgical clearance on hold for now.  PAF (paroxysmal atrial fibrillation)  Continue medical management    Medication List with Changes/Refills   New Medications    CLINDAMYCIN (CLEOCIN) 300 MG CAPSULE    Take 1 capsule (300 mg total) by mouth 3 (three) times daily. for 10 days   Current Medications    ACETAMINOPHEN (TYLENOL) 650 MG/20.3 ML SOLN    Take 20.3 mLs (650 mg total) by mouth every 6 (six) hours as needed for Pain.    ALUMINUM-MAGNESIUM HYDROXIDE-SIMETHICONE (MAALOX) 200-200-20 MG/5 ML SUSP    Take 30 mLs by mouth every 4 (four) hours as needed (indigestion).    AMIODARONE (PACERONE) 200 MG TAB    Take 1 tablet (200 mg total) by mouth once daily. Start 11/5/23 at 0900    ELIQUIS 5 MG TAB    Take 1 tablet (5 mg total) by mouth 2 (two) times daily.    FUROSEMIDE (LASIX) 40 MG TABLET    Take 1 tablet (40 mg total) by mouth  3 (three) times daily. for 7 days    HYDROCHLOROTHIAZIDE (HYDRODIURIL) 12.5 MG TAB    Take 12.5 mg by mouth once daily.    LEVOTHYROXINE (SYNTHROID) 137 MCG TAB TABLET    Take 1 tablet (137 mcg total) by mouth before breakfast.    MECLIZINE (ANTIVERT) 25 MG TABLET    Take 25 mg by mouth. PRN    MULTIVITAMIN (THERAGRAN) PER TABLET    Take 1 tablet by mouth once daily.    POTASSIUM CHLORIDE SA (K-DUR,KLOR-CON) 20 MEQ TABLET    Take 20 mEq by mouth.    SENNA-DOCUSATE 8.6-50 MG (PERICOLACE) 8.6-50 MG PER TABLET    Take 2 tablets by mouth 2 (two) times daily.   Discontinued Medications    BACLOFEN (LIORESAL) 5 MG TAB TABLET    Take 1 tablet (5 mg total) by mouth 3 (three) times daily.    CEPHALEXIN (KEFLEX) 500 MG CAPSULE    Take 1 capsule (500 mg total) by mouth every 8 (eight) hours. for 7 days    LEVOTHYROXINE (SYNTHROID) 112 MCG TABLET    TAKE 1 TABLET BY MOUTH EVERY DAY IN THE MORNING ON AN EMPTY STOMACH    OXYCODONE (ROXICODONE) 5 MG IMMEDIATE RELEASE TABLET    Take 5 mg by mouth 4 (four) times daily as needed.

## 2024-02-27 DIAGNOSIS — I48.0 PAF (PAROXYSMAL ATRIAL FIBRILLATION): Primary | ICD-10-CM

## 2024-02-27 RX ORDER — AMIODARONE HYDROCHLORIDE 200 MG/1
200 TABLET ORAL DAILY
Qty: 90 TABLET | Refills: 0
Start: 2024-02-27 | End: 2024-02-28 | Stop reason: SDUPTHER

## 2024-02-27 NOTE — TELEPHONE ENCOUNTER
----- Message from Indiana Starr sent at 2/27/2024  1:33 PM CST -----  Contact: [phone]  Patient called, requested courtesy call in regards medication, stated that is urgent - did not want to specify. Thank you.

## 2024-02-27 NOTE — TELEPHONE ENCOUNTER
----- Message from Apryl Leggett sent at 2/27/2024 11:06 AM CST -----  Contact: Patient, [phone]  Requesting an RX refill or new RX.  Is this a refill or new RX: Refill  RX name and strength (copy/paste from chart):  amiodarone (PACERONE) 200 MG Tab  Is this a 30 day or 90 day RX: 30  Pharmacy name and phone # (copy/paste from chart):    [address] [email or web address] [employer] [facility]  [address] [facility] [employer] [facility] [address]  Phone: [phone] Fax: [phone]  The doctors have asked that we provide their patients with the following 2 reminders -- prescription refills can take up to 72 hours, and a friendly reminder that in the future you can use your [facility] account to request refills: Yes

## 2024-02-27 NOTE — TELEPHONE ENCOUNTER
----- Message from Teena Means sent at 2/27/2024  8:15 AM CST -----  Contact: Mobile  [phone]  Patient would like to get a prior authorization for her amiodarone (PACERONE) 200 MG Tab.

## 2024-02-28 ENCOUNTER — TELEPHONE (OUTPATIENT)
Dept: PRIMARY CARE CLINIC | Facility: CLINIC | Age: 74
End: 2024-02-28
Payer: MEDICARE

## 2024-02-28 DIAGNOSIS — I48.0 PAF (PAROXYSMAL ATRIAL FIBRILLATION): ICD-10-CM

## 2024-02-28 RX ORDER — AMIODARONE HYDROCHLORIDE 200 MG/1
200 TABLET ORAL DAILY
Qty: 90 TABLET | Refills: 0 | Status: SHIPPED | OUTPATIENT
Start: 2024-02-28 | End: 2024-04-16 | Stop reason: SDUPTHER

## 2024-02-28 NOTE — TELEPHONE ENCOUNTER
----- Message from Teena Means sent at 2/28/2024  2:41 PM CST -----  Contact: Mobile  [phone]  Patient said that she ordered her amiodarone (PACERONE) 200 MG Tab three days ago and her script was never sent to..      [address] [place] [facility] [address] [facility]  [address] [facility]  [place] [address]  Phone: [phone] Fax: [phone]    Please send in the patient script.

## 2024-02-28 NOTE — TELEPHONE ENCOUNTER
Called pt regarding message. Informed pt cardiologist sent prescription to pharmacy. Pt verbalized understanding,

## 2024-02-28 NOTE — TELEPHONE ENCOUNTER
----- Message from Alana Alvarado sent at 2/28/2024 11:56 AM CST -----  Contact: [phone] Patient  Pt is calling in regards to the status of the amiodarone (PACERONE) 200 MG Tab being filled. Pt states she needs it ASAP. Thank you.      [address] [facility] [place] [facility] [address]  [place] [address]  Phone: [phone] Fax: [phone]

## 2024-02-28 NOTE — TELEPHONE ENCOUNTER
----- Message from Shannon Roberts sent at 2/28/2024  2:17 PM CST -----  Contact: Pt  [phone]  Patient is returning a phone call.  Who left a message for the patient: MA  Does patient know what this is regarding:  Yes  Would you like a call back, or a response through your [facility] portal?:   call  Comments: Pt states this is urgent

## 2024-03-03 ENCOUNTER — EXTERNAL HOME HEALTH (OUTPATIENT)
Dept: HOME HEALTH SERVICES | Facility: HOSPITAL | Age: 74
End: 2024-03-03
Payer: MEDICARE

## 2024-03-18 ENCOUNTER — DOCUMENT SCAN (OUTPATIENT)
Dept: HOME HEALTH SERVICES | Facility: HOSPITAL | Age: 74
End: 2024-03-18
Payer: MEDICARE

## 2024-03-19 NOTE — TELEPHONE ENCOUNTER
Refill Routing Note   Medication(s) are not appropriate for processing by [facility] for the following reason(s):        Outside of protocol    [facility] action(s):  Route             Appointments  past 12m or future 3m with PCP    Date Provider   Last Visit   2/26/2024 Rocco Miller MD   Next Visit   4/16/2024 Rocco Miller MD   ED visits in past 90 days: 1        Note composed:9:52 AM 03/19/2024

## 2024-03-19 NOTE — TELEPHONE ENCOUNTER
----- Message from Shannon Roberts sent at 3/19/2024  9:45 AM CDT -----  Contact: Pt [phone]  Requesting an RX refill or new RX.  Is this a refill or new RX: Refill  RX name and strength (copy/paste from chart):  ELIQUIS 5 mg Tab  Is this a 30 day or 90 day RX: 90  Pharmacy name and phone # (copy/paste from chart):  [place] [phone] [facility]   Phone: [phone]  Fax: [phone]        The doctors have asked that we provide their patients with the following 2 reminders -- prescription refills can take up to 72 hours, and a friendly reminder that in the future you can use your [facility] account to request refills: yes

## 2024-03-23 PROCEDURE — G0179 MD RECERTIFICATION HHA PT: HCPCS | Mod: ,,, | Performed by: FAMILY MEDICINE

## 2024-04-16 ENCOUNTER — OFFICE VISIT (OUTPATIENT)
Dept: PRIMARY CARE CLINIC | Facility: CLINIC | Age: 74
End: 2024-04-16
Payer: MEDICARE

## 2024-04-16 VITALS
HEART RATE: 62 BPM | TEMPERATURE: 98 F | WEIGHT: 179.25 LBS | OXYGEN SATURATION: 96 % | DIASTOLIC BLOOD PRESSURE: 64 MMHG | SYSTOLIC BLOOD PRESSURE: 134 MMHG | RESPIRATION RATE: 18 BRPM | BODY MASS INDEX: 28.13 KG/M2 | HEIGHT: 67 IN

## 2024-04-16 DIAGNOSIS — E89.0 POSTOPERATIVE HYPOTHYROIDISM: ICD-10-CM

## 2024-04-16 DIAGNOSIS — Z12.31 ENCOUNTER FOR SCREENING MAMMOGRAM FOR BREAST CANCER: ICD-10-CM

## 2024-04-16 DIAGNOSIS — M50.30 DDD (DEGENERATIVE DISC DISEASE), CERVICAL: ICD-10-CM

## 2024-04-16 DIAGNOSIS — M79.89 LEG SWELLING: ICD-10-CM

## 2024-04-16 DIAGNOSIS — Z01.818 PREOPERATIVE EXAMINATION: Primary | ICD-10-CM

## 2024-04-16 DIAGNOSIS — I48.0 PAF (PAROXYSMAL ATRIAL FIBRILLATION): ICD-10-CM

## 2024-04-16 PROCEDURE — 99214 OFFICE O/P EST MOD 30 MIN: CPT | Mod: S$PBB,,, | Performed by: FAMILY MEDICINE

## 2024-04-16 PROCEDURE — 99999 PR PBB SHADOW E&M-EST. PATIENT-LVL IV: CPT | Mod: PBBFAC,,, | Performed by: FAMILY MEDICINE

## 2024-04-16 PROCEDURE — 99214 OFFICE O/P EST MOD 30 MIN: CPT | Mod: PBBFAC,PN | Performed by: FAMILY MEDICINE

## 2024-04-16 RX ORDER — LEVOTHYROXINE SODIUM 137 UG/1
137 TABLET ORAL
Qty: 90 TABLET | Refills: 0 | Status: SHIPPED | OUTPATIENT
Start: 2024-04-16 | End: 2024-04-16

## 2024-04-16 RX ORDER — HYDROCHLOROTHIAZIDE 25 MG/1
25 TABLET ORAL DAILY
Status: ON HOLD | COMMUNITY
End: 2024-06-06 | Stop reason: HOSPADM

## 2024-04-16 RX ORDER — FUROSEMIDE 40 MG/1
40 TABLET ORAL DAILY
Start: 2024-04-16 | End: 2024-04-30

## 2024-04-16 RX ORDER — AMIODARONE HYDROCHLORIDE 200 MG/1
200 TABLET ORAL DAILY
Qty: 90 TABLET | Refills: 0 | Status: SHIPPED | OUTPATIENT
Start: 2024-04-16

## 2024-04-16 RX ORDER — POTASSIUM CHLORIDE 750 MG/1
10 TABLET, EXTENDED RELEASE ORAL DAILY
COMMUNITY
End: 2024-05-13

## 2024-04-16 NOTE — PROGRESS NOTES
"Subjective:       Patient ID: Graciela Barton is a 74 y.o. female.    Chief Complaint: Medication Refill    Needs surgical clearance for multilevel cervical fusion.  Lower extremity cellulitis has completely cleared, no longer has any open sores or wounds.  Lower extremity swelling much improved on current diuretic regimen of HCTZ and Lasix daily.  Denies chest pain, palpitations or shortness of breath.  No dizziness or lightheadedness.  No fevers or chills.  No previous surgical complications.      Review of Systems   Constitutional:  Negative for fever.   HENT:  Negative for trouble swallowing.    Respiratory:  Negative for shortness of breath.    Cardiovascular:  Positive for leg swelling. Negative for chest pain and palpitations.   Gastrointestinal:  Negative for diarrhea, nausea and vomiting.   Genitourinary:  Negative for dysuria.   Musculoskeletal:  Positive for arthralgias and neck pain.   Skin:  Negative for wound.   Neurological:  Positive for weakness.   Psychiatric/Behavioral:  Negative for agitation and confusion.        Objective:      Vitals:    04/16/24 1452   BP: 134/64   BP Location: Right arm   Patient Position: Sitting   BP Method: Medium (Manual)   Pulse: 62   Resp: 18   Temp: 97.5 °F (36.4 °C)   TempSrc: Temporal   SpO2: 96%   Weight: 81.3 kg (179 lb 3.7 oz)   Height: 5' 7" (1.702 m)     BP Readings from Last 5 Encounters:   04/16/24 134/64   02/26/24 (!) 150/72   02/12/24 118/68   02/09/24 131/63   01/03/24 (!) 143/72     Wt Readings from Last 5 Encounters:   04/16/24 81.3 kg (179 lb 3.7 oz)   02/26/24 81.3 kg (179 lb 2 oz)   02/12/24 83.5 kg (184 lb 1.4 oz)   02/08/24 83.5 kg (184 lb)   01/03/24 81.2 kg (179 lb 0.2 oz)     Physical Exam  Vitals and nursing note reviewed.   Constitutional:       General: She is not in acute distress.     Appearance: Normal appearance. She is well-developed.   HENT:      Head: Normocephalic and atraumatic.   Cardiovascular:      Rate and Rhythm: Normal rate and " "regular rhythm.      Heart sounds: Normal heart sounds.   Pulmonary:      Effort: Pulmonary effort is normal.      Breath sounds: Normal breath sounds.   Musculoskeletal:      Right lower le+ Pitting Edema present.      Left lower le+ Pitting Edema present.   Skin:     General: Skin is warm and dry.   Neurological:      Mental Status: She is alert and oriented to person, place, and time.   Psychiatric:         Mood and Affect: Mood normal.         Behavior: Behavior normal.         Lab Results   Component Value Date    WBC 7.03 2024    HGB 12.1 2024    HCT 37.9 2024     2024    CHOL 127 2022    TRIG 135 2022    HDL 56 2022    ALT 19 2024    AST 25 2024     2024    K 3.8 2024     2024    CREATININE 1.0 2024    BUN 32 (H) 2024    CO2 32 (H) 2024    TSH 5.155 (H) 2024    INR 1.1 2023    HGBA1C 5.5 2022    Transesophageal echo (CARLYLE) with Doppler, color and cardioversion    Height: 5' 7" (1.702 m)   Weight: 76.7 kg (169 lb)   Blood Pressure: 125/56    Date of Study: 10/4/23   Ordering Provider: Keara Monsalve APRN, ANP   Clinical Indications: Atypical atrial flutter [I48.4 (ICD-10-CM)]       Reading Physicians  Performing Staff   Cardiology: Mark Ortega MD    Tech: Charlotte Puri        Reason for Exam  Priority: Routine  Dx: Atypical atrial flutter [I48.4 (ICD-10-CM)]     View Images Vital Vitrea     Show images for Transesophageal echo (CARLYLE) with possible cardioversion  Summary  Show Result Comparison     Left Ventricle: The left ventricle is normal in size. Normal wall thickness. Normal wall motion. There is normal systolic function. Ejection fraction by visual approximation is 55%.    Left Atrium: The left atrial appendage appears normal. There is no thrombus in the left atrial appendage.    Right Ventricle: Normal right ventricular cavity size. Wall " thickness is normal. Right ventricle wall motion  is normal. Systolic function is normal.    IVC/SVC: Normal venous pressure at 3 mmHg.    . A 200 J synchronized cardioversion was successfully performed with restoration of normal sinus rhythm. The post-cardioversion rhythm was normal sinus rhythm.  Assessment:       1. Preoperative examination    2. PAF (paroxysmal atrial fibrillation)    3. Postoperative hypothyroidism    4. Leg swelling    5. DDD (degenerative disc disease), cervical    6. Encounter for screening mammogram for breast cancer        Plan:       Preoperative examination  -     CBC Auto Differential; Future; Expected date: 04/16/2024  -     Basic Metabolic Panel; Future; Expected date: 04/16/2024  Patient is medically cleared for cervical fusion under general anesthesia  PAF (paroxysmal atrial fibrillation)  -     amiodarone (PACERONE) 200 MG Tab; Take 1 tablet (200 mg total) by mouth once daily.  Dispense: 90 tablet; Refill: 0  Continue current medication regimen  Postoperative hypothyroidism  -     TSH; Future; Expected date: 04/16/2024  -     levothyroxine (SYNTHROID) 137 MCG Tab tablet; Take 1 tablet (137 mcg total) by mouth before breakfast.  Dispense: 90 tablet; Refill: 0  Adjust levothyroxine  Leg swelling  -     furosemide (LASIX) 40 MG tablet; Take 1 tablet (40 mg total) by mouth once daily.    DDD (degenerative disc disease), cervical    Encounter for screening mammogram for breast cancer  -     Mammo Digital Screening Bilat w/ [place]; Future; Expected date: 04/16/2024      Medication List with Changes/Refills   Current Medications    ACETAMINOPHEN (TYLENOL) 650 MG/20.3 ML SOLN    Take 20.3 mLs (650 mg total) by mouth every 6 (six) hours as needed for Pain.    ALUMINUM-MAGNESIUM HYDROXIDE-SIMETHICONE (MAALOX) 200-200-20 MG/5 ML SUSP    Take 30 mLs by mouth every 4 (four) hours as needed (indigestion).    APIXABAN (ELIQUIS) 5 MG TAB    Take 1 tablet (5 mg total) by mouth 2 (two) times daily.     HYDROCHLOROTHIAZIDE (HYDRODIURIL) 25 MG TABLET    Take 25 mg by mouth once daily.    MECLIZINE (ANTIVERT) 25 MG TABLET    Take 25 mg by mouth. PRN    MULTIVITAMIN (THERAGRAN) PER TABLET    Take 1 tablet by mouth once daily.    POTASSIUM CHLORIDE (KLOR-CON) 10 MEQ TBSR    Take 10 mEq by mouth once daily.    SENNA-DOCUSATE 8.6-50 MG (PERICOLACE) 8.6-50 MG PER TABLET    Take 2 tablets by mouth 2 (two) times daily.    ZINC ORAL    Take by mouth.   Changed and/or Refilled Medications    Modified Medication Previous Medication    AMIODARONE (PACERONE) 200 MG TAB amiodarone (PACERONE) 200 MG Tab       Take 1 tablet (200 mg total) by mouth once daily.    Take 1 tablet (200 mg total) by mouth once daily.    FUROSEMIDE (LASIX) 40 MG TABLET furosemide (LASIX) 40 MG tablet       Take 1 tablet (40 mg total) by mouth once daily.    Take 1 tablet (40 mg total) by mouth 3 (three) times daily. for 7 days    LEVOTHYROXINE (SYNTHROID) 137 MCG TAB TABLET levothyroxine (SYNTHROID) 137 MCG Tab tablet       Take 1 tablet (137 mcg total) by mouth before breakfast.    Take 1 tablet (137 mcg total) by mouth before breakfast.   Discontinued Medications    HYDROCHLOROTHIAZIDE (HYDRODIURIL) 12.5 MG TAB    Take 12.5 mg by mouth once daily.    POTASSIUM CHLORIDE SA (K-DUR,KLOR-CON) 20 MEQ TABLET    Take 20 mEq by mouth.

## 2024-04-17 ENCOUNTER — TELEPHONE (OUTPATIENT)
Dept: PRIMARY CARE CLINIC | Facility: CLINIC | Age: 74
End: 2024-04-17
Payer: MEDICARE

## 2024-04-17 ENCOUNTER — EXTERNAL HOME HEALTH (OUTPATIENT)
Dept: HOME HEALTH SERVICES | Facility: HOSPITAL | Age: 74
End: 2024-04-17
Payer: MEDICARE

## 2024-04-17 NOTE — TELEPHONE ENCOUNTER
Appeal letter written regarding ambulance transportation in early February.  Please give signed letter to patient or her representative.

## 2024-04-17 NOTE — LETTER
April 17, 2024    Graciela Barton  [address] [facility] [address]             [facility] [id number]  [facility] [address]  [facility] [address]  Phone: [phone]  Fax: [phone] To whom it may concern:    I have been Ms. Barton's primary care physician for several years, and I am writing to advocate on her behalf regarding ambulance transportation to a local emergency department on February 9, 2024.  It is my understanding that coverage was denied because ambulance transportation was deemed to not be medically necessary.  I wholeheartedly disagree with that assessment and urge that this determination be reconsidered.    Ms. Barton has multiple sclerosis and spinal cord compression syndrome, which has had dramatic negative effects on her mobility.  She has had multiple surgeries for this, and is currently in need of another extensive surgery on her cervical spine.  She is able to ambulate only very short distances under normal circumstances with the aid of a walker, and requires use of a wheelchair for anything beyond that.  On the day in question, she had developed progressively worsening bilateral lower extremity swelling, pain and blistering, dramatically hampering her already limited mobility.  She appropriately called EMS and was transported to a local emergency department by ambulance, as she lives alone and does not have any other reliable means of transportation in a situation such as this.  She was ultimately diagnosed with bilateral lower extremity lymphedema and cellulitis, which was successfully treated with a regimen of antibiotics and diuretics.    I once again urge that coverage determination be reconsidered for the aforementioned ambulance transport, as I believe it was medically appropriate and indicated.  If additional information is needed, please feel free to contact my office.      Respectfully,          Rocco Miller MD

## 2024-04-19 ENCOUNTER — TELEPHONE (OUTPATIENT)
Dept: NEUROSURGERY | Facility: CLINIC | Age: 74
End: 2024-04-19
Payer: MEDICARE

## 2024-04-19 NOTE — TELEPHONE ENCOUNTER
Returned pt's call, pt stated that she would like to reschedule her surgery with  since her PCP cleared her. I stated to pt that I will send a message to  due to him might wanting to see her in office again. Pt voice understanding

## 2024-04-23 ENCOUNTER — TELEPHONE (OUTPATIENT)
Dept: PRIMARY CARE CLINIC | Facility: CLINIC | Age: 74
End: 2024-04-23
Payer: MEDICARE

## 2024-04-26 ENCOUNTER — TELEPHONE (OUTPATIENT)
Dept: NEUROSURGERY | Facility: CLINIC | Age: 74
End: 2024-04-26
Payer: MEDICARE

## 2024-04-26 NOTE — TELEPHONE ENCOUNTER
Returned pt's call, informed her that I am waiting on  to fill her case request and I will call to reschedule her surgery. Pt voiced understanding

## 2024-04-29 DIAGNOSIS — M79.89 LEG SWELLING: ICD-10-CM

## 2024-04-29 NOTE — TELEPHONE ENCOUNTER
No care due was identified.  [facility] Embedded Care Due Messages. Reference number: [id number].   4/29/2024 9:10:04 AM CDT

## 2024-04-30 ENCOUNTER — TELEPHONE (OUTPATIENT)
Dept: CARDIOLOGY | Facility: CLINIC | Age: 74
End: 2024-04-30

## 2024-04-30 ENCOUNTER — OFFICE VISIT (OUTPATIENT)
Dept: CARDIOLOGY | Facility: CLINIC | Age: 74
End: 2024-04-30
Payer: MEDICARE

## 2024-04-30 VITALS
WEIGHT: 180 LBS | BODY MASS INDEX: 28.25 KG/M2 | SYSTOLIC BLOOD PRESSURE: 126 MMHG | HEART RATE: 70 BPM | HEIGHT: 67 IN | DIASTOLIC BLOOD PRESSURE: 60 MMHG | OXYGEN SATURATION: 98 %

## 2024-04-30 DIAGNOSIS — I27.20 PULMONARY HTN: ICD-10-CM

## 2024-04-30 DIAGNOSIS — I10 ESSENTIAL HYPERTENSION, BENIGN: ICD-10-CM

## 2024-04-30 DIAGNOSIS — I48.3 TYPICAL ATRIAL FLUTTER: ICD-10-CM

## 2024-04-30 DIAGNOSIS — E78.2 MIXED HYPERLIPIDEMIA: ICD-10-CM

## 2024-04-30 DIAGNOSIS — I48.0 PAF (PAROXYSMAL ATRIAL FIBRILLATION): Primary | ICD-10-CM

## 2024-04-30 PROCEDURE — 99999 PR PBB SHADOW E&M-EST. PATIENT-LVL III: CPT | Mod: PBBFAC,,, | Performed by: INTERNAL MEDICINE

## 2024-04-30 PROCEDURE — 99214 OFFICE O/P EST MOD 30 MIN: CPT | Mod: S$PBB,,, | Performed by: INTERNAL MEDICINE

## 2024-04-30 PROCEDURE — 99213 OFFICE O/P EST LOW 20 MIN: CPT | Mod: PBBFAC,PN | Performed by: INTERNAL MEDICINE

## 2024-04-30 RX ORDER — FUROSEMIDE 40 MG/1
40 TABLET ORAL
Qty: 90 TABLET | Refills: 1 | Status: SHIPPED | OUTPATIENT
Start: 2024-04-30

## 2024-04-30 RX ORDER — ATORVASTATIN CALCIUM 20 MG/1
20 TABLET, FILM COATED ORAL DAILY
Qty: 90 TABLET | Refills: 3 | Status: ON HOLD | OUTPATIENT
Start: 2024-04-30 | End: 2024-06-06 | Stop reason: HOSPADM

## 2024-04-30 NOTE — TELEPHONE ENCOUNTER
Refill Routing Note   Medication(s) are not appropriate for processing by [facility] for the following reason(s):        New or recently adjusted medication    [facility] action(s):  Defer               Appointments  past 12m or future 3m with PCP    Date Provider   Last Visit   4/16/2024 Rocco Miller MD   Next Visit   Visit date not found Rocco iMller MD   ED visits in past 90 days: 1        Note composed:11:00 AM 04/30/2024

## 2024-04-30 NOTE — TELEPHONE ENCOUNTER
----- Message from [place] sent at 4/30/2024  4:11 PM CDT -----  Regarding: Appt Inquiry  Patient called in regards to r/s 5/27 EKG.     Please call back to further assist- [phone]

## 2024-04-30 NOTE — PROGRESS NOTES
[facility]  Cardiology Clinic Note      Chief Complaint  Chief Complaint   Patient presents with    Pre-op Exam     Neck surgery TBD       HPI:      73-year-old female past medical history thyroid cancer/hypothyroidism, BPPV, multiple sclerosis, ABLA postoperatively 10/2023, hypertension, hyperlipidemia, paroxysmal atrial fibrillation/atrial flutter on propafenone previously at home cardioversion as an inpatient 10/2023, transesophageal echocardiogram 10/2023 normal estimated EF normal RV prior echocardiogram 09/2023 normal EF diastolic function indeterminate mild left atrial dilation mild mitral valve regurgitation normal RV mild right atrial enlargement mild tricuspid regurgitation PASP 44 prior echo 2022 normal EF normal diastolic function mild left atrial enlargement normal RV PASP 30 prior stress echo 2019 normal EF no ischemia on echocardiogram epic images    Followed by electrophysiology Dr Singletary / JONELLE Magaña  Previously admitted and treated for atrial fibrillation RVR last month required amiodarone  Sent back to electrophysiology not yet seen  Patient states he was taken off of metoprolol though do not see evidence of this    Medications  Current Outpatient Medications   Medication Sig Dispense Refill    amiodarone (PACERONE) 200 MG Tab Take 1 tablet (200 mg total) by mouth once daily. 90 tablet 0    apixaban (ELIQUIS) 5 mg Tab Take 1 tablet (5 mg total) by mouth 2 (two) times daily. 180 tablet 3    furosemide (LASIX) 40 MG tablet Take 1 tablet by mouth once daily 90 tablet 1    hydroCHLOROthiazide (HYDRODIURIL) 25 MG tablet Take 25 mg by mouth once daily.      levothyroxine (SYNTHROID) 150 MCG tablet Take 1 tablet (150 mcg total) by mouth before breakfast. 90 tablet 1    potassium chloride (KLOR-CON) 10 MEQ TbSR Take 10 mEq by mouth once daily.      acetaminophen (TYLENOL) 650 mg/20.3 mL Soln Take 20.3 mLs (650 mg total) by mouth every 6 (six) hours as needed for Pain.       aluminum-magnesium hydroxide-simethicone (MAALOX) 200-200-20 mg/5 mL Susp Take 30 mLs by mouth every 4 (four) hours as needed (indigestion).      meclizine (ANTIVERT) 25 mg tablet Take 25 mg by mouth. PRN      multivitamin (THERAGRAN) per tablet Take 1 tablet by mouth once daily.      senna-docusate 8.6-50 mg (PERICOLACE) 8.6-50 mg per tablet Take 2 tablets by mouth 2 (two) times daily.      ZINC ORAL Take by mouth.       No current facility-administered medications for this visit.        History  Past Medical History:   Diagnosis Date    Arthritis     Cancer     Thyroid    Edema     Hyperlipidemia     Hypertension     Thyroid disease      Past Surgical History:   Procedure Laterality Date    ADENOIDECTOMY      COLONOSCOPY  10/18/2018    COLONOSCOPY N/A 10/18/2018    Procedure: COLONOSCOPY;  Surgeon: Yosvany Alejandra MD;  Location: [facility] ENDO;  Service: General;  Laterality: N/A;    ECHOCARDIOGRAM,TRANSESOPHAGEAL N/A 10/4/2023    Procedure: Transesophageal echo (CARLYLE) intra-procedure log documentation;  Surgeon: Mark Ortega MD;  Location: [facility] OR;  Service: Cardiology;  Laterality: N/A;    EPIDURAL STEROID INJECTION N/A 9/30/2022    Procedure: LUMBAR L3/4 MALKA CONTRAST DIRRECT REFERRAL MONICA CARRERA IN CHART;  Surgeon: Nicholas Kumar MD;  Location: [facility] PAIN MGT;  Service: Pain Management;  Laterality: N/A;    FACETECTOMY OF VERTEBRA N/A 9/29/2023    Procedure: FACETECTOMY;  Surgeon: Scooter Salter MD;  Location: [facility] OR;  Service: Neurosurgery;  Laterality: N/A;    FORAMINOTOMY N/A 9/29/2023    Procedure: FORAMINOTOMY, SPINE;  Surgeon: Scooter Salter MD;  Location: [facility] OR;  Service: Neurosurgery;  Laterality: N/A;    FUSION OF LUMBAR SPINE USING POSTERIOR INTERBODY TECHNIQUE N/A 9/29/2023    Procedure: FUSION, SPINE, LUMBAR, PLIF;  Surgeon: Scooter Salter MD;  Location: [facility] OR;  Service: Neurosurgery;  Laterality: N/A;  L3-S1 posterior instrumentation   L3-L5 Lmainectomy sacro pelvic fixation  Globus cage   -lop 3 hr   -los 5 days   -Lami segments 1  -anterior fusion insterspace 2  -general   -type and screen   -Ziehm brain notified cc  -EMG bairon notified cc  -SEP  -MEP  -TLSO       HYSTERECTOMY  1992    total hyst     KNEE SURGERY      16 pins and two plates implanted    LAMINECTOMY N/A 9/29/2023    Procedure: LAMINECTOMY, SPINE;  Surgeon: Scooter Salter MD;  Location: [facility] OR;  Service: Neurosurgery;  Laterality: N/A;    MINIMALLY INVASIVE TRANSFORAMINAL LUMBAR INTERBODY FUSION (TLIF) N/A 9/29/2023    Procedure: FUSION, SPINE, LUMBAR, TLIF, MINIMALLY INVASIVE;  Surgeon: Scooter Salter MD;  Location: [facility] OR;  Service: Neurosurgery;  Laterality: N/A;  L3-L5 OLIF Globus ATP  L5-S1 ALIF Globus ATP  -Lami add segment 1  -anterior fusion interspances 2  -lop 3 hr   -general   -type and screen   -C arm   -EMG bairon notified cc  -SEP  -MEP  -TLSO  -Regular Bed   -Lateral right down   -globus reji    THORACIC DISCECTOMY Right 5/12/2023    Procedure: DISCECTOMY, SPINE, THORACIC Right T6-7, T7-8 interior discectomy retropleural;  Surgeon: Scooter Salter MD;  Location: [facility] OR;  Service: Neurosurgery;  Laterality: Right;  general  eliquis   type and screen  C-arm  Metrx  EMG bairon notified cc  SEP  MEP  Regular bed   lateral left down   globus (Jose Enrique) notified cc    THYROIDECTOMY      TONSILLECTOMY       Social History     Socioeconomic History    Marital status:    Tobacco Use    Smoking status: Never    Smokeless tobacco: Never   Substance and Sexual Activity    Alcohol use: No    Drug use: No    Sexual activity: Not Currently     Partners: Male   Social History Narrative    ** Merged History Encounter **          Family History   Problem Relation Name Age of Onset    Alcohol abuse Mother      Heart disease Mother      Alcohol abuse Father      Heart disease Father      Heart disease Maternal Grandmother      Heart disease Maternal Grandfather          Allergies  Review of patient's  allergies indicates:   Allergen Reactions    Vancomycin Tinitus    Vancomycin analogues Tinitus       Review of Systems   Review of Systems   Constitutional: Negative for fever.   HENT:  Negative for nosebleeds.    Eyes:  Negative for visual disturbance.   Cardiovascular:  Negative for chest pain, claudication, dyspnea on exertion, palpitations and syncope.   Respiratory:  Negative for cough, hemoptysis and wheezing.    Endocrine: Negative for cold intolerance, heat intolerance, polyphagia and polyuria.   Hematologic/Lymphatic: Negative for bleeding problem.   Skin:  Negative for rash.   Musculoskeletal:  Negative for myalgias.   Gastrointestinal:  Negative for hematemesis, hematochezia, nausea and vomiting.   Genitourinary:  Negative for dysuria.   Neurological:  Negative for focal weakness and sensory change.   Psychiatric/Behavioral:  Negative for altered mental status.        Physical Exam  Vitals:    04/30/24 1431   BP: 126/60   Pulse: 70     Wt Readings from Last 1 Encounters:   04/30/24 81.6 kg (180 lb)     Physical Exam  Constitutional:       General: She is not in acute distress.  HENT:      Head: Normocephalic and atraumatic.      Mouth/Throat:      Mouth: Mucous membranes are moist.   Eyes:      Extraocular Movements: Extraocular movements intact.      Pupils: Pupils are equal, round, and reactive to light.   Neck:      Vascular: No carotid bruit or JVD.   Cardiovascular:      Rate and Rhythm: Normal rate and regular rhythm.      Heart sounds: No murmur heard.     No friction rub. No gallop.   Pulmonary:      Effort: Pulmonary effort is normal.      Breath sounds: Normal breath sounds.   Abdominal:      Tenderness: There is no abdominal tenderness. There is no guarding or rebound.   Musculoskeletal:      Right lower leg: Edema present.      Left lower leg: Edema present.   Skin:     General: Skin is warm and dry.      Capillary Refill: Capillary refill takes less than 2 seconds.   Neurological:       Mental Status: She is alert. Mental status is at baseline.   Psychiatric:         Mood and Affect: Mood normal.         Labs  Lab Visit on 04/16/2024   Component Date Value Ref Range Status    WBC 04/16/2024 7.03  3.90 - 12.70 K/uL Final    RBC 04/16/2024 3.82 (L)  4.00 - 5.40 M/uL Final    Hemoglobin 04/16/2024 12.1  12.0 - 16.0 g/dL Final    Hematocrit 04/16/2024 37.9  37.0 - 48.5 % Final    MCV 04/16/2024 99 (H)  82 - 98 fL Final    MCH 04/16/2024 31.7 (H)  27.0 - 31.0 pg Final    MCHC 04/16/2024 31.9 (L)  32.0 - 36.0 g/dL Final    RDW 04/16/2024 13.6  11.5 - 14.5 % Final    Platelets 04/16/2024 287  150 - 450 K/uL Final    MPV 04/16/2024 10.5  9.2 - 12.9 fL Final    Immature Granulocytes 04/16/2024 0.1  0.0 - 0.5 % Final    Gran # (ANC) 04/16/2024 4.9  1.8 - 7.7 K/uL Final    Immature Grans (Abs) 04/16/2024 0.01  0.00 - 0.04 K/uL Final    Comment: Mild elevation in immature granulocytes is non specific and   can be seen in a variety of conditions including stress response,   acute inflammation, trauma and pregnancy. Correlation with other   laboratory and clinical findings is essential.      Lymph # 04/16/2024 1.2  1.0 - 4.8 K/uL Final    Mono # 04/16/2024 0.8  0.3 - 1.0 K/uL Final    Eos # 04/16/2024 0.1  0.0 - 0.5 K/uL Final    Baso # 04/16/2024 0.05  0.00 - 0.20 K/uL Final    nRBC 04/16/2024 0  0 /100 WBC Final    Gran % 04/16/2024 70.1  38.0 - 73.0 % Final    Lymph % 04/16/2024 16.4 (L)  18.0 - 48.0 % Final    Mono % 04/16/2024 10.7  4.0 - 15.0 % Final    Eosinophil % 04/16/2024 2.0  0.0 - 8.0 % Final    Basophil % 04/16/2024 0.7  0.0 - 1.9 % Final    Differential Method 04/16/2024 Automated   Final    Sodium 04/16/2024 142  136 - 145 mmol/L Final    Potassium 04/16/2024 3.8  3.5 - 5.1 mmol/L Final    Chloride 04/16/2024 101  95 - 110 mmol/L Final    CO2 04/16/2024 32 (H)  23 - 29 mmol/L Final    Glucose 04/16/2024 91  70 - 110 mg/dL Final    BUN 04/16/2024 32 (H)  8 - 23 mg/dL Final    Creatinine 04/16/2024  1.0  0.5 - 1.4 mg/dL Final    Calcium 04/16/2024 9.3  8.7 - 10.5 mg/dL Final    Anion Gap 04/16/2024 9  8 - 16 mmol/L Final    eGFR 04/16/2024 59.5 (A)  >60 mL/min/1.73 m^2 Final    TSH 04/16/2024 5.155 (H)  0.400 - 4.000 uIU/mL Final    Free T4 04/16/2024 1.52 (H)  0.71 - 1.51 ng/dL Final   Admission on 02/09/2024, Discharged on 02/09/2024   Component Date Value Ref Range Status    WBC 02/09/2024 7.06  3.90 - 12.70 K/uL Final    RBC 02/09/2024 4.07  4.00 - 5.40 M/uL Final    Hemoglobin 02/09/2024 12.0  12.0 - 16.0 g/dL Final    Hematocrit 02/09/2024 39.0  37.0 - 48.5 % Final    MCV 02/09/2024 96  82 - 98 fL Final    MCH 02/09/2024 29.5  27.0 - 31.0 pg Final    MCHC 02/09/2024 30.8 (L)  32.0 - 36.0 g/dL Final    RDW 02/09/2024 14.5  11.5 - 14.5 % Final    Platelets 02/09/2024 329  150 - 450 K/uL Final    MPV 02/09/2024 10.2  9.2 - 12.9 fL Final    Immature Granulocytes 02/09/2024 0.3  0.0 - 0.5 % Final    Gran # (ANC) 02/09/2024 4.7  1.8 - 7.7 K/uL Final    Immature Grans (Abs) 02/09/2024 0.02  0.00 - 0.04 K/uL Final    Comment: Mild elevation in immature granulocytes is non specific and   can be seen in a variety of conditions including stress response,   acute inflammation, trauma and pregnancy. Correlation with other   laboratory and clinical findings is essential.      Lymph # 02/09/2024 1.5  1.0 - 4.8 K/uL Final    Mono # 02/09/2024 0.7  0.3 - 1.0 K/uL Final    Eos # 02/09/2024 0.2  0.0 - 0.5 K/uL Final    Baso # 02/09/2024 0.03  0.00 - 0.20 K/uL Final    nRBC 02/09/2024 0  0 /100 WBC Final    Gran % 02/09/2024 66.1  38.0 - 73.0 % Final    Lymph % 02/09/2024 21.0  18.0 - 48.0 % Final    Mono % 02/09/2024 10.1  4.0 - 15.0 % Final    Eosinophil % 02/09/2024 2.1  0.0 - 8.0 % Final    Basophil % 02/09/2024 0.4  0.0 - 1.9 % Final    Differential Method 02/09/2024 Automated   Final    Sodium 02/09/2024 141  136 - 145 mmol/L Final    Potassium 02/09/2024 3.8  3.5 - 5.1 mmol/L Final    Chloride 02/09/2024 101  95 - 110  mmol/L Final    CO2 02/09/2024 29  23 - 29 mmol/L Final    Glucose 02/09/2024 103  70 - 110 mg/dL Final    BUN 02/09/2024 23  8 - 23 mg/dL Final    Creatinine 02/09/2024 0.9  0.5 - 1.4 mg/dL Final    Calcium 02/09/2024 9.4  8.7 - 10.5 mg/dL Final    Total Protein 02/09/2024 7.2  6.0 - 8.4 g/dL Final    Albumin 02/09/2024 3.8  3.5 - 5.2 g/dL Final    Total Bilirubin 02/09/2024 0.3  0.1 - 1.0 mg/dL Final    Comment: For infants and newborns, interpretation of results should be based  on gestational age, weight and in agreement with clinical  observations.    Premature Infant recommended reference ranges:  Up to 24 hours.............<8.0 mg/dL  Up to 48 hours............<12.0 mg/dL  3-5 days..................<15.0 mg/dL  6-29 days.................<15.0 mg/dL      Alkaline Phosphatase 02/09/2024 106  55 - 135 U/L Final    AST 02/09/2024 25  10 - 40 U/L Final    ALT 02/09/2024 19  10 - 44 U/L Final    eGFR 02/09/2024 >60.0  >60 mL/min/1.73 m^2 Final    Anion Gap 02/09/2024 11  8 - 16 mmol/L Final    BNP 02/09/2024 67  0 - 99 pg/mL Final    Values of less than 100 pg/ml are consistent with non-CHF populations.    Troponin I 02/09/2024 0.018  0.000 - 0.026 ng/mL Final    Comment: The reference interval for Troponin I represents the 99th percentile   cutoff   for our facility and is consistent with 3rd generation assay   performance.      QRS Duration 02/09/2024 74  ms Final    [facility] QTC Calculation 02/09/2024 486  ms Final    D-Dimer 02/09/2024 0.43  <0.50 mg/L FEU Final    Comment: The quantitative D-dimer assay should be used as an aid in   the diagnosis of deep vein thrombosis and pulmonary embolism  in patients with the appropriate presentation and clinical  history. The upper limit of the reference interval and the clinical   cut off   point are identical. Causes of a positive (>0.50 mg/L FEU) D-Dimer   test  include, but are not limited to: DVT, PE, DIC, thrombolytic   therapy, anticoagulant therapy, recent surgery,  trauma, or   pregnancy, disseminated malignancy, aortic aneurysm, cirrhosis,  and severe infection. False negative results may occur in   patients with distal DVT.  SHWETHA^612^^9^  LOT^610^DDiSup^[id number]\DDiBuf^[id number]\[id number]     Lab Visit on 11/14/2023   Component Date Value Ref Range Status    Specimen UA 11/14/2023 Urine, Clean Catch   Final    Color, UA 11/14/2023 Yellow  Yellow, Straw, Adali Final    Appearance, UA 11/14/2023 Clear  Clear Final    pH, UA 11/14/2023 7.0  5.0 - 8.0 Final    Specific Gravity, UA 11/14/2023 <1.005 (A)  1.005 - 1.030 Final    Protein, UA 11/14/2023 Negative  Negative Final    Comment: Recommend a 24 hour urine protein or a urine   protein/creatinine ratio if globulin induced proteinuria is  clinically suspected.      Glucose, UA 11/14/2023 Negative  Negative Final    Ketones, UA 11/14/2023 Negative  Negative Final    Bilirubin (UA) 11/14/2023 Negative  Negative Final    Occult Blood UA 11/14/2023 2+ (A)  Negative Final    Nitrite, UA 11/14/2023 Negative  Negative Final    Urobilinogen, UA 11/14/2023 Negative  <2.0 EU/dL Final    Leukocytes, UA 11/14/2023 Negative  Negative Final    Urine Culture, Routine 11/14/2023 No growth   Final    RBC, UA 11/14/2023 3  0 - 4 /hpf Final    WBC, UA 11/14/2023 1  0 - 5 /hpf Final    Squam Epithel, UA 11/14/2023 0  /hpf Final    Unclass Ana UA 11/14/2023 2  None-Moderate Final    Microscopic Comment 11/14/2023 SEE COMMENT   Final    Comment: Other formed elements not mentioned in the report are not   present in the microscopic examination.      Appointment on 11/03/2023   Component Date Value Ref Range Status    Aerobic Bacterial Culture 11/03/2023  (A)   Final                    Value:PROTEUS MIRABILIS  Moderate         EKG  02/2024 sinus rhythm PACs subtle nonspecific ST-T prolonged QTC    Echo   Results for orders placed or performed during the hospital encounter of 09/29/23   Echo   Result Value Ref Range    BSA 1.9 m2    Hoyt's Biplane  MOD Ejection Fraction 64 %    LVOT stroke volume 51.18 cm3    LVIDd 4.07 3.5 - 6.0 cm    LV Systolic Volume 33.51 mL    LV Systolic Volume Index 17.8 mL/m2    LVIDs 2.95 2.1 - 4.0 cm    LV Diastolic Volume 72.74 mL    LV Diastolic Volume Index 38.69 mL/m2    IVS 0.87 0.6 - 1.1 cm    LVOT diameter 2.0 cm    LVOT area 3.1 cm2    FS 28 28 - 44 %    Left Ventricle Relative Wall Thickness 0.36 cm    Posterior Wall 0.74 0.6 - 1.1 cm    LV mass 96.97 g    LV Mass Index 52 g/m2    TDI LATERAL 0.26 m/s    TDI SEPTAL 0.26 m/s    TR Max Mathew 2.71 m/s    LVOT peak mathew 1.01 m/s    Left Ventricular Outflow Tract Mean Velocity 0.76 cm/s    Left Ventricular Outflow Tract Mean Gradient 2.53 mmHg    LA size 3.83 cm    Left Atrium Major Axis 5.13 cm    RV S' 9.74 cm/s    TAPSE 2.27 cm    RA Major Axis 5.45 cm    RA Width 4.05 cm    AV mean gradient 4 mmHg    AV peak gradient 7 mmHg    Ao peak mathew 1.31 m/s    Ao VTI 31.90 cm    LVOT peak VTI 16.30 cm    AV valve area 1.60 cm²    AV Velocity Ratio 0.77     AV index (prosthetic) 0.51     MADY by Velocity Ratio 2.42 cm²    Mr max mathew 4.15 m/s    MV mean gradient 2 mmHg    MV peak gradient 5 mmHg    MV valve area by continuity eq 2.35 cm2    MV VTI 21.8 cm    Triscuspid Valve Regurgitation Peak Gradient 29 mmHg    PV PEAK VELOCITY 1.00 m/s    PV peak gradient 4 mmHg    Sinus 2.67 cm    STJ 2.62 cm    Ascending aorta 2.93 cm    IVC diameter 2.15 cm    Mean e' 0.26 m/s    ZLVIDS -0.74     ZLVIDD -2.55     LA WIDTH 4.3 cm    EF 55 %    TV resting pulmonary artery pressure 44 mmHg    RV TB RVSP 18 mmHg    Est. RA pres 15 mmHg    Narrative      Left Ventricle: The left ventricle is normal in size. Normal wall   thickness. Normal wall motion. There is normal systolic function. Ejection   fraction by visual approximation is 55%. Unable to assess diastolic   function due to atrial fibrillation.    Left Atrium: Left atrium is mildly dilated.    Right Ventricle: Normal right ventricular cavity size.  Wall thickness   is normal. Right ventricle wall motion  is normal. Systolic function is   normal.    Right Atrium: Right atrium is mildly dilated.    Mitral Valve: There is mild regurgitation.    Tricuspid Valve: There is mild regurgitation.    Pulmonary Artery: The estimated pulmonary artery systolic pressure is   44 mmHg.    IVC/SVC: Elevated venous pressure at 15 mmHg.         Imaging  X-Ray Lumbar Spine Ap And Lateral    Result Date: 11/9/2023  EXAMINATION: XR LUMBAR SPINE AP AND LATERAL CLINICAL HISTORY: Spinal stenosis, lumbar region with neurogenic claudication TECHNIQUE: AP, lateral and spot images were performed of the lumbar spine. COMPARISON: CT lumbar spine dated 10/06/2023. FINDINGS: Posterior instrumented fusion spanning L3 through S1 with disc spacer.  Hardware projects in appropriate alignment.  Previously noted gus-hardware fractures from comparison CT are less conspicuous by this exam.  Similar discogenic change at the non-operative levels.     As above. Electronically signed by: Joao Monroy Date:    11/09/2023 Time:    10:17      Prior coronary angiogram / intervention:  No known    Assessment and Plan  1. Essential hypertension, benign  Continue hydrochlorothiazide Lasix    2. Mixed hyperlipidemia  Continue statin    3. PAF (paroxysmal atrial fibrillation)  Amiodarone, Eliquis   Unclear why patient was taken off beta-blocker or if she self DC'd  Send back to electrophysiology for consideration of whether or not to place patient back on propafenone    4. Typical atrial flutter  As above    5. Bilateral leg edema  Trace to 1+ bilaterally  Taking Lasix     6. Pulmonary HTN  Noted on an echo in the past  Mild  Taking Lasix   Euvolemic  Follow with surveillance echocardiography    Follow Up  3 months  Echo at that time  Follow up with electrophysiology    Rocco Weller MD, FACC, RPVI  Interventional Cardiology     Total professional time spent for the encounter: 30 minutes  Time was spent  preparing to see the patient, reviewing results of prior testing, obtaining and/or reviewing separately obtained history, performing a medically appropriate examination and interview, counseling and educating the patient/family, ordering medications/tests/procedures, referring and communicating with other health care professionals, documenting clinical information in the electronic health record, and independently interpreting results.

## 2024-04-30 NOTE — TELEPHONE ENCOUNTER
Spoke with patient, the surgeon is trying to schedule surgery for 05/22/2024 or 06/03/2024.  Informed that 05/27/2024 appointment with Dr. Singletary at [facility] was scheduled back in January.  Advised patient to keep this appointment and schedule surgery for 06/03/2024.  Patient verbalized understanding.

## 2024-05-01 ENCOUNTER — TELEPHONE (OUTPATIENT)
Dept: NEUROSURGERY | Facility: CLINIC | Age: 74
End: 2024-05-01
Payer: MEDICARE

## 2024-05-01 DIAGNOSIS — M47.22 CERVICAL SPONDYLOSIS WITH MYELOPATHY AND RADICULOPATHY: Primary | ICD-10-CM

## 2024-05-01 DIAGNOSIS — Z98.1 S/P CERVICAL SPINAL FUSION: ICD-10-CM

## 2024-05-01 DIAGNOSIS — M47.12 CERVICAL SPONDYLOSIS WITH MYELOPATHY AND RADICULOPATHY: Primary | ICD-10-CM

## 2024-05-01 NOTE — TELEPHONE ENCOUNTER
Returned pt's call, pt stated that she would have to be scheduled for surgery on Ro 3 since her cardiology office can't move her EKG up. I stated to pt that I will get her scheduled. Pt voiced understanding

## 2024-05-02 ENCOUNTER — ANESTHESIA EVENT (OUTPATIENT)
Dept: SURGERY | Facility: HOSPITAL | Age: 74
End: 2024-05-02
Payer: MEDICARE

## 2024-05-12 NOTE — TELEPHONE ENCOUNTER
No care due was identified.  [facility] Embedded Care Due Messages. Reference number: [id number].   5/12/2024 11:06:09 AM CDT

## 2024-05-13 RX ORDER — POTASSIUM CHLORIDE 750 MG/1
10 TABLET, EXTENDED RELEASE ORAL
Qty: 90 TABLET | Refills: 3 | Status: ON HOLD | OUTPATIENT
Start: 2024-05-13 | End: 2024-06-06 | Stop reason: HOSPADM

## 2024-05-13 NOTE — TELEPHONE ENCOUNTER
Refill Routing Note   Medication(s) are not appropriate for processing by [facility] for the following reason(s):        No active prescription written by provider    [facility] action(s):  Defer               Appointments  past 12m or future 3m with PCP    Date Provider   Last Visit   4/16/2024 Rocco Miller MD   Next Visit   Visit date not found Rocco Miller MD   ED visits in past 90 days: 0        Note composed:10:24 AM 05/13/2024

## 2024-05-20 ENCOUNTER — HOSPITAL ENCOUNTER (OUTPATIENT)
Dept: PREADMISSION TESTING | Facility: HOSPITAL | Age: 74
Discharge: HOME | End: 2024-05-20
Attending: NURSE PRACTITIONER
Payer: MEDICARE

## 2024-05-20 ENCOUNTER — TELEPHONE (OUTPATIENT)
Dept: PREADMISSION TESTING | Facility: HOSPITAL | Age: 74
End: 2024-05-20
Payer: MEDICARE

## 2024-05-20 NOTE — TELEPHONE ENCOUNTER
Good morning, Mrs. Bejarano is scheduled on 6/3/24 for a cervical fusion with Dr. Salter. She currently takes Eliquis and will need recommendations on when to stop prior to surgery? Please advise. Thanks

## 2024-05-20 NOTE — DISCHARGE INSTRUCTIONS
Your surgery is scheduled for 6/3/24.    Please report to [facility] on the 1st Floor at 530 a.m.    THIS TIME IS SUBJECT TO CHANGE.  YOU WILL RECEIVE A PHONE CALL THE DAY BEFORE SURGERY BY 3:30 PM TO CONFIRM YOUR TIME OF ARRIVAL.  IF YOU HAVE NOT RECEIVED A PHONE CALL BY 3:30 PM THE DAY BEFORE YOUR SURGERY PLEASE CALL [phone]     INSTRUCTIONS IMPORTANT!!!  ¨ Do not eat or drink 8 hours prior to arrival time-including water, candy, gum, & mints. OK to brush teeth.      ____  Proceed to [facility] on *** for additional testing.        ____  Do not wear makeup, including mascara.  ____  No powder, lotions or creams to surgical area.  ____  Please remove all jewelry, including piercings and leave at home.  ____  No money or valuables needed. Please leave at home.  ____  Please bring any documents given by your doctor.  ____  If going home the same day, arrange for a ride home. You will not be able to             drive if Anesthesia was used.  ____  Children under 18 years require a parent / guardian present the entire time             they are in surgery / recovery.  ____  Wear loose fitting clothing. Allow for dressings, bandages.  ____  Stop Aspirin and blood thinners as directed by prescribing provider.  ____  Do not take any herbal, vitamins, and or supplements 2 weeks prior to surgery.  ____  Stop NSAIDS (Naproxen, Aleve, Voltaren, Celebrex, Melxoicam), Goody's, and BC powder 7 days prior to surgery.  ____  Wash the surgical area with Hibiclens or Dial Antibacterial bar soap the night before surgery, and again the             morning of surgery.  Be sure to rinse hibiclens or Dial Antibacterial bar soap off completely (if instructed by   nurse).  ____  If you take diabetic medication including Metformin, Glimepiride, Glipizide, Glyburide, Byetta, Januvia, Actos, do not take am of surgery unless            instructed by Doctor.  ____  Hold Invokana, Farxiga, and Jardiance for 3 days  prior to surgery.   ____  Call MD for temperature above 101 degrees or any other signs of infection such as Urinary (bladder) infection, Upper respiratory infection, skin boils,             etc.  ____ Do Not wear your contact lenses the day of your procedure.  You may wear your glasses.      ____ Do not shave surgical site for 3 days prior to surgery.  ____ Practice Good hand washing before, during, and after procedure.      I have read or had read and explained to me, and understand the above information.  Additional comments or instructions:  For additional questions call [phone]      ANESTHESIA SIDE EFFECTS  -For the first 24 hours after surgery:  Do not drive, use heavy equipment, make important decisions, or drink alcohol  -It is normal to feel sleepy for several hours.  Rest until you are more awake.  -Have someone stay with you, if needed.  They can watch for problems and help keep you safe.  -Some possible post anesthesia side effects include: nausea and vomiting, sore throat and hoarseness, sleepiness, and dizziness.        Pre-Op Bathing Instructions    Before surgery, you can play an important role in your own health.    Because skin is not sterile, we need to be sure that your skin is as free of germs as possible. By following the instructions below, you can reduce the number of germs on your skin before surgery.    IMPORTANT: You will need to shower with a special soap called Hibiclens*, available at any pharmacy.  If you are allergic to Chlorhexidine (the antiseptic in Hibiclens), use an antibacterial soap such as Dial Soap for your preoperative shower.  You will shower with Hibiclens both the night before your surgery and the morning of your surgery.  Do not use Hibiclens on the head, face or genitals to avoid injury to those areas.    STEP #1: THE NIGHT BEFORE YOUR SURGERY     Do not shave the area of your body where your surgery will be performed.  Shower and wash your hair and body as usual with  your normal soap and shampoo.  Rinse your hair and body thoroughly after you shower to remove all soap residue.  With your hand, apply one packet of Hibiclens soap to the surgical site.   Wash the site gently for five (5) minutes. Do not scrub your skin too hard.   Do not wash with your regular soap after Hibiclens is used.  Rinse your body thoroughly.  Pat yourself dry with a clean, soft towel.  Do not use lotion, cream, or powder.  Wear clean clothes.    STEP #2: THE MORNING OF YOUR SURGERY     Repeat Step #1.    * Not to be used by people allergic to Chlorhexidine.      Please take Amiodarone and Levothyroxine the morning of surgery.

## 2024-05-20 NOTE — ANESTHESIA PREPROCEDURE EVALUATION
"                                                                                                             05/20/2024  Graciela Barton is a 74 y.o., female scheduled for FUSION, SPINE, CERVICAL (Spine Cervical) - Procedure:C5-6,C6-7 ACDF and plate 6/3/24.    Per primary care Dr. Miller, "Patient is medically cleared for cervical fusion under general anesthesia".    Past Medical History:   Diagnosis Date    Arthritis     Cancer     Thyroid    Edema     Hyperlipidemia     Hypertension     Thyroid disease      Past Surgical History:   Procedure Laterality Date    ADENOIDECTOMY      COLONOSCOPY  10/18/2018    COLONOSCOPY N/A 10/18/2018    Procedure: COLONOSCOPY;  Surgeon: Yosvany Alejandra MD;  Location: [facility] ENDO;  Service: General;  Laterality: N/A;    ECHOCARDIOGRAM,TRANSESOPHAGEAL N/A 10/4/2023    Procedure: Transesophageal echo (CARLYLE) intra-procedure log documentation;  Surgeon: Mark Ortega MD;  Location: [facility] OR;  Service: Cardiology;  Laterality: N/A;    EPIDURAL STEROID INJECTION N/A 9/30/2022    Procedure: LUMBAR L3/4 MALKA CONTRAST DIRRECT REFERRAL MONICA CARRERA IN CHART;  Surgeon: Nicholas Kumar MD;  Location: [facility] PAIN MGT;  Service: Pain Management;  Laterality: N/A;    FACETECTOMY OF VERTEBRA N/A 9/29/2023    Procedure: FACETECTOMY;  Surgeon: Scooter Salter MD;  Location: [facility] OR;  Service: Neurosurgery;  Laterality: N/A;    FORAMINOTOMY N/A 9/29/2023    Procedure: FORAMINOTOMY, SPINE;  Surgeon: Scooter Salter MD;  Location: [facility] OR;  Service: Neurosurgery;  Laterality: N/A;    FUSION OF LUMBAR SPINE USING POSTERIOR INTERBODY TECHNIQUE N/A 9/29/2023    Procedure: FUSION, SPINE, LUMBAR, PLIF;  Surgeon: Scooter Salter MD;  Location: [facility] OR;  Service: Neurosurgery;  Laterality: N/A;  L3-S1 posterior instrumentation   L3-L5 Lmainectomy sacro pelvic fixation Globus cage   -lop 3 hr   -los 5 days   -Lami segments 1  -anterior fusion insterspace 2  -general   -type and screen   -Ziehm brain " notified cc  -EMG bairon notified cc  -SEP  -MEP  -TLSO       HYSTERECTOMY  1992    total hyst     KNEE SURGERY      16 pins and two plates implanted    LAMINECTOMY N/A 9/29/2023    Procedure: LAMINECTOMY, SPINE;  Surgeon: Scooter Salter MD;  Location: [facility] OR;  Service: Neurosurgery;  Laterality: N/A;    MINIMALLY INVASIVE TRANSFORAMINAL LUMBAR INTERBODY FUSION (TLIF) N/A 9/29/2023    Procedure: FUSION, SPINE, LUMBAR, TLIF, MINIMALLY INVASIVE;  Surgeon: Scooter Salter MD;  Location: [facility] OR;  Service: Neurosurgery;  Laterality: N/A;  L3-L5 OLIF Globus ATP  L5-S1 ALIF Globus ATP  -Lami add segment 1  -anterior fusion interspances 2  -lop 3 hr   -general   -type and screen   -C arm   -EMG bairon notified cc  -SEP  -MEP  -TLSO  -Regular Bed   -Lateral right down   -globus reji    THORACIC DISCECTOMY Right 5/12/2023    Procedure: DISCECTOMY, SPINE, THORACIC Right T6-7, T7-8 interior discectomy retropleural;  Surgeon: Scooter Salter MD;  Location: [facility] OR;  Service: Neurosurgery;  Laterality: Right;  general  eliquis   type and screen  C-arm  Metrx  EMG bairon notified cc  SEP  MEP  Regular bed   lateral left down   globus (Jose Enrique) notified cc    THYROIDECTOMY      TONSILLECTOMY       Review of patient's allergies indicates:   Allergen Reactions    Vancomycin Tinitus    Vancomycin analogues Tinitus     Current Outpatient Medications   Medication Instructions    acetaminophen (TYLENOL) 650 mg, Oral, Every 6 hours PRN    aluminum-magnesium hydroxide-simethicone (MAALOX) 200-200-20 mg/5 mL Susp 30 mLs, Oral, Every 4 hours PRN    amiodarone (PACERONE) 200 mg, Oral, Daily    apixaban (ELIQUIS) 5 mg, Oral, 2 times daily    atorvastatin (LIPITOR) 20 mg, Oral, Daily    furosemide (LASIX) 40 mg, Oral    hydroCHLOROthiazide (HYDRODIURIL) 25 mg, Oral, Daily    levothyroxine (SYNTHROID) 150 mcg, Oral, Before breakfast    meclizine (ANTIVERT) 25 mg, Oral, PRN    multivitamin (THERAGRAN) per tablet 1 tablet, Oral, Daily     potassium chloride (KLOR-CON) 10 MEQ TbSR 10 mEq, Oral    senna-docusate 8.6-50 mg (PERICOLACE) 8.6-50 mg per tablet 2 tablets, Oral, 2 times daily    ZINC ORAL Oral       Pre-op Assessment    I have reviewed the Patient Summary Reports.     I have reviewed the Nursing Notes.    I have reviewed the Medications.     Review of Systems  Anesthesia Hx:  No problems with previous Anesthesia   History of prior surgery of interest to airway management or planning:            Denies Personal Hx of Anesthesia complications.                    Social:  Non-Smoker, No Alcohol Use       Hematology/Oncology:       -- Anemia:                    --  Cancer in past history (thyroid):                     EENT/Dental:     Ears General/Symptom(s) Hearing Impairment: hearing-aid left, hearing-aid right            Cardiovascular:  Exercise tolerance: good   Denies Pacemaker. Hypertension, well controlled    Dysrhythmias (paroxysmal tachycardia and a fib, a flutter)       hyperlipidemia                             Pulmonary:  Pulmonary Normal      Denies Shortness of breath.   Denies Sleep Apnea.                Hepatic/GI:  Hepatic/GI Normal     Denies GERD.             Musculoskeletal:  Arthritis          Spine Disorders: lumbar and thoracic Disc disease and Degenerative disease           Neurological:  Denies TIA.  Denies CVA. Neuromuscular Disease, (MS)   Denies Seizures.                                Endocrine:  Denies Diabetes. Hypothyroidism          Psych:    depression                Physical Exam  General: Cooperative and Oriented    Dental:  Dentures      Lab Results   Component Value Date    WBC 7.03 04/16/2024    HGB 12.1 04/16/2024    HCT 37.9 04/16/2024     04/16/2024    CHOL 127 06/20/2022    TRIG 135 06/20/2022    HDL 56 06/20/2022    ALT 19 02/09/2024    AST 25 02/09/2024     04/16/2024    K 3.8 04/16/2024     04/16/2024    CREATININE 1.0 04/16/2024    BUN 32 (H) 04/16/2024    CO2 32 (H) 04/16/2024     TSH 5.155 (H) 04/16/2024    INR 1.1 09/06/2023    HGBA1C 5.5 06/20/2022     Results for orders placed or performed during the hospital encounter of 02/09/24   EKG 12-lead    Collection Time: 02/09/24  1:44 AM   Result Value Ref Range    QRS Duration 74 ms    OHS QTC Calculation 486 ms    Narrative    Test Reason : EDEMA    Vent. Rate : 070 BPM     Atrial Rate : 070 BPM     P-R Int : 180 ms          QRS Dur : 074 ms      QT Int : 450 ms       P-R-T Axes : 033 026 022 degrees     QTc Int : 486 ms    Sinus rhythm with Premature supraventricular complexes  Otherwise normal ECG  No previous ECGs available  Confirmed by Tamica ABDI, Efraín MILLARD ([id number]) on 2/12/2024 10:51:41 AM    Referred By: [email or web address]   SELF           Confirmed By:Efraín Davey MD     Results for orders placed during the hospital encounter of 09/29/23    Echo    Interpretation Summary    Left Ventricle: The left ventricle is normal in size. Normal wall thickness. Normal wall motion. There is normal systolic function. Ejection fraction by visual approximation is 55%. Unable to assess diastolic function due to atrial fibrillation.    Left Atrium: Left atrium is mildly dilated.    Right Ventricle: Normal right ventricular cavity size. Wall thickness is normal. Right ventricle wall motion  is normal. Systolic function is normal.    Right Atrium: Right atrium is mildly dilated.    Mitral Valve: There is mild regurgitation.    Tricuspid Valve: There is mild regurgitation.    Pulmonary Artery: The estimated pulmonary artery systolic pressure is 44 mmHg.    IVC/SVC: Elevated venous pressure at 15 mmHg.      Anesthesia Plan  Type of Anesthesia, risks & benefits discussed:    Anesthesia Type: Gen ETT  Intra-op Monitoring Plan: Standard ASA Monitors and Art Line  Post Op Pain Control Plan: multimodal analgesia  Induction:  IV  Airway Plan: Video  Informed Consent: Informed consent signed with the Patient and all parties understand the risks and agree with anesthesia  plan.  All questions answered.   ASA Score: 3  Day of Surgery Review of History & Physical: H&P Update referred to the surgeon/provider.  Anesthesia Plan Notes: Anesthesia consent to be signed prior to surgery 6/3/24      Ready For Surgery From Anesthesia Perspective.     .

## 2024-05-20 NOTE — PRE-PROCEDURE INSTRUCTIONS
Friend.    Allergies, medical, surgical, family and psychosocial histories reviewed with patient. Periop plan of care reviewed. Preop instructions given, including medications to take and to hold. Hibiclens soap and instructions on use given. Time allotted for questions to be addressed.      Arrival time 0530.      Message sent to Dr. Weller regarding Eliquis recommendations.

## 2024-05-21 NOTE — TELEPHONE ENCOUNTER
Spoke to the patient instructed to hold Eliquis for 3 days prior to surgery per Dr. Weller. Verbalizes understanding.

## 2024-05-21 NOTE — PROGRESS NOTES
Subjective:     HPI    I had the pleasure of seeing Graciela Barton in follow-up for her history of AF. Last seen in EP clinic in  2/2023. She is a 74F with HTN, HLD, debilitating back pains, newly diagnosed MS, thyroid CA s/p resection (on synthroid), who was well until 8/2022 when she presented to [facility] with 2 weeks of worsening ELLIS and leg swelling and was found to be in AF with RVR. She was rate controlled, started on amiodarone GTT, and chemically converted to sinus rhythm. She was discharged on metoprolol and eliquis.    Echo in 6/2022 showed an EF of 60%. Exercise stress echo from 4/2019 showed no evidence of ischemia.    I reviewed all ECGs in the EMR at initial visit. ECGs from 8/2022 show both AF and atrial flutter with RVR.    Propafenone 150 mg tid started in 8/2022 visit. Pt denied recurrent arrhythmias at 2/2023 visit. Plan at that time to hold the course.    Since last visit pt has had multiple orthopedic/neurosurgeries--thoracic myelopathy with neurogenic claudication s/p s/p T6-7 T7-8 anterior diskectomy (05/12/2023),  s/p left L3-4, L4-5 OLIF, L5-S1 ALIF and L3-S1 posterior instrumented fusion (09/29/2023).  Pt with recurrent AF with RVR in 10/2023, status-post DCCV. Discharged on amiodarone 200 mg daily.    Pt states she has a final surgery scheduled for 6/3/2024.    I have personally reviewed the patient's EKG today, which shows sinus bradycardia at 52 bpm. CO interval is 174 ms. QRS is 80 ms. QT is 468.    Review of Systems   Constitutional: Positive for malaise/fatigue. Negative for decreased appetite, weight gain and weight loss.   HENT:  Negative for sore throat.    Eyes:  Negative for blurred vision.   Cardiovascular:  Negative for chest pain, dyspnea on exertion, irregular heartbeat, leg swelling, near-syncope, orthopnea, palpitations, paroxysmal nocturnal dyspnea and syncope.   Respiratory:  Negative for shortness of breath.    Skin:  Negative for rash.   Musculoskeletal:  Positive  for back pain. Negative for arthritis.   Gastrointestinal:  Negative for abdominal pain.   Neurological:  Negative for focal weakness.   Psychiatric/Behavioral:  Negative for altered mental status.         Objective:    Physical Exam  Vitals and nursing note reviewed.   Constitutional:       General: She is not in acute distress.     Appearance: She is well-developed.   HENT:      Head: Normocephalic and atraumatic.   Eyes:      General: No scleral icterus.     Conjunctiva/sclera: Conjunctivae normal.      Pupils: Pupils are equal, round, and reactive to light.   Neck:      Thyroid: No thyromegaly.      Vascular: No JVD.   Cardiovascular:      Rate and Rhythm: Normal rate and regular rhythm.      Pulses: Intact distal pulses.      Heart sounds: Normal heart sounds. No murmur heard.     No friction rub. No gallop.   Pulmonary:      Effort: Pulmonary effort is normal. No respiratory distress.      Breath sounds: Normal breath sounds.   Abdominal:      General: Bowel sounds are normal. There is no distension.      Palpations: Abdomen is soft.   Musculoskeletal:      Cervical back: Normal range of motion and neck supple.   Skin:     General: Skin is warm and dry.   Neurological:      Mental Status: She is alert and oriented to person, place, and time.   Psychiatric:         Behavior: Behavior normal.           Assessment:       1. PAF (paroxysmal atrial fibrillation)    2. Essential hypertension, benign    3. Mixed hyperlipidemia    4. Typical atrial flutter    5. Anticoagulant long-term use         Plan:       In summary, Graciela Barton is a 74F with symptomatic PAF. Her [id number] Score is 3 (age, female, HTN) so she should continue eliquis and stop aspirin.    Ms. Barton was maintaining sinus rhythm on propafenone until 10/2023 when she had recurrent AF within a week of spine surgery. Now s/p DCCV and on amiodarone 200 mg daily.    Plan is to hold the course, and follow-up 6 months. At that time will stop amiodarone  (possibly replacing with propafenone or sotalol several months later) provided all neurosurgical/orthopedic issues and their associated physiologic stress are stable.    Thank you for allowing me to participate in the care of this patient. Please do not hesitate to call me with any questions or concerns.

## 2024-05-22 ENCOUNTER — PATIENT OUTREACH (OUTPATIENT)
Dept: ADMINISTRATIVE | Facility: OTHER | Age: 74
End: 2024-05-22
Payer: MEDICARE

## 2024-05-22 PROCEDURE — G0179 MD RECERTIFICATION HHA PT: HCPCS | Mod: ,,, | Performed by: FAMILY MEDICINE

## 2024-05-23 ENCOUNTER — TELEPHONE (OUTPATIENT)
Dept: ELECTROPHYSIOLOGY | Facility: CLINIC | Age: 74
End: 2024-05-23
Payer: MEDICARE

## 2024-05-23 ENCOUNTER — PATIENT OUTREACH (OUTPATIENT)
Dept: ADMINISTRATIVE | Facility: OTHER | Age: 74
End: 2024-05-23
Payer: MEDICARE

## 2024-05-23 NOTE — PROGRESS NOTES
CHW - Follow Up    Araceli Rollins Community Health Worker completed a follow up visit with patient via telephone today.  Community Health Worker provided:  CHW informed Ms. Barton that transportation is covered by Medicare if it is medically necessary.   CHW faxed Dr. Ponce' letter to Medicare.  CHW informed Pt that transportation is a reimbursable expense and she would have to send a receipt to Medicare to be reimbursed for transportation.  Pt verbalized understanding.  CHW also suggested Pt apply for Medicaid for transportation.  Ms. Barton stated her income is too high for Medicaid.       Case Closure - Due: 5/23/2024

## 2024-05-23 NOTE — PROGRESS NOTES
CHW - Initial Contact    Araceli Rollins  Community Health Worker updated the Social Determinant of Health questionnaire with patient via telephone today.    Pt identified barriers of most importance are: transportation .  CHW received a referral for Ms. Barton from our Patient Engagement Department.  Pt needs transportation for an upcoming medical test.  CHW contacted Dr. Rcoco Miller to write a medically necessary letter for Pt to submit to Medicare.     Referrals to community agencies completed with patient/caregiver consent outside of [facility] include: None   Referrals were put through [facility] - no:   Other information discussed the patient needs / wants help with:  None at this time.       Follow-up Outreach - Due: 5/29/2024

## 2024-05-23 NOTE — TELEPHONE ENCOUNTER
Called patient and she states that she would like to keep her appointment at [facility] with Dr. Singletary. She states she has someone that will bring her to this appointment. Appointment for 6/5/24 with Dr. Singletary in [place] cancelled. Patient in agreement.

## 2024-05-27 ENCOUNTER — OFFICE VISIT (OUTPATIENT)
Dept: ELECTROPHYSIOLOGY | Facility: CLINIC | Age: 74
End: 2024-05-27
Payer: MEDICARE

## 2024-05-27 ENCOUNTER — HOSPITAL ENCOUNTER (OUTPATIENT)
Dept: CARDIOLOGY | Facility: CLINIC | Age: 74
Discharge: HOME | End: 2024-05-27
Payer: MEDICARE

## 2024-05-27 VITALS
WEIGHT: 180 LBS | HEIGHT: 67 IN | DIASTOLIC BLOOD PRESSURE: 59 MMHG | SYSTOLIC BLOOD PRESSURE: 124 MMHG | HEART RATE: 49 BPM | BODY MASS INDEX: 28.25 KG/M2

## 2024-05-27 DIAGNOSIS — Z79.01 ANTICOAGULANT LONG-TERM USE: ICD-10-CM

## 2024-05-27 DIAGNOSIS — I10 ESSENTIAL HYPERTENSION, BENIGN: ICD-10-CM

## 2024-05-27 DIAGNOSIS — I48.0 PAF (PAROXYSMAL ATRIAL FIBRILLATION): Primary | ICD-10-CM

## 2024-05-27 DIAGNOSIS — I48.3 TYPICAL ATRIAL FLUTTER: ICD-10-CM

## 2024-05-27 DIAGNOSIS — E78.2 MIXED HYPERLIPIDEMIA: ICD-10-CM

## 2024-05-27 DIAGNOSIS — I47.9 PAROXYSMAL TACHYCARDIA: ICD-10-CM

## 2024-05-27 LAB
OHS QRS DURATION: 80 MS
OHS QTC CALCULATION: 435 MS

## 2024-05-27 PROCEDURE — 99213 OFFICE O/P EST LOW 20 MIN: CPT | Mod: PBBFAC,25 | Performed by: INTERNAL MEDICINE

## 2024-05-27 PROCEDURE — 93010 ELECTROCARDIOGRAM REPORT: CPT | Mod: S$PBB,,, | Performed by: INTERNAL MEDICINE

## 2024-05-27 PROCEDURE — 99999 PR PBB SHADOW E&M-EST. PATIENT-LVL III: CPT | Mod: PBBFAC,,, | Performed by: INTERNAL MEDICINE

## 2024-05-27 PROCEDURE — 99214 OFFICE O/P EST MOD 30 MIN: CPT | Mod: S$PBB,,, | Performed by: INTERNAL MEDICINE

## 2024-05-27 PROCEDURE — 93005 ELECTROCARDIOGRAM TRACING: CPT | Mod: PBBFAC | Performed by: INTERNAL MEDICINE

## 2024-05-27 RX ORDER — IBUPROFEN 100 MG/5ML
1000 SUSPENSION, ORAL (FINAL DOSE FORM) ORAL
Status: ON HOLD | COMMUNITY
End: 2024-06-06 | Stop reason: HOSPADM

## 2024-06-03 ENCOUNTER — HOSPITAL ENCOUNTER (INPATIENT)
Facility: HOSPITAL | Age: 74
LOS: 1 days | Discharge: NURSING, REHAB OR LONG-TERM CARE | End: 2024-06-06
Attending: NEUROLOGICAL SURGERY | Admitting: NEUROLOGICAL SURGERY
Payer: MEDICARE

## 2024-06-03 ENCOUNTER — ANESTHESIA (OUTPATIENT)
Dept: SURGERY | Facility: HOSPITAL | Age: 74
End: 2024-06-03
Payer: MEDICARE

## 2024-06-03 DIAGNOSIS — Z98.1 S/P LUMBAR FUSION: ICD-10-CM

## 2024-06-03 DIAGNOSIS — M47.12 CERVICAL SPONDYLOSIS WITH MYELOPATHY AND RADICULOPATHY: Primary | ICD-10-CM

## 2024-06-03 DIAGNOSIS — I48.91 ATRIAL FIBRILLATION WITH RVR: ICD-10-CM

## 2024-06-03 DIAGNOSIS — I10 ESSENTIAL HYPERTENSION, BENIGN: ICD-10-CM

## 2024-06-03 DIAGNOSIS — G95.9 CHRONIC MYELOPATHY: ICD-10-CM

## 2024-06-03 DIAGNOSIS — M47.22 CERVICAL SPONDYLOSIS WITH MYELOPATHY AND RADICULOPATHY: Primary | ICD-10-CM

## 2024-06-03 PROBLEM — M51.04 THORACIC DISC DISEASE WITH MYELOPATHY: Status: RESOLVED | Noted: 2023-03-14 | Resolved: 2024-06-03

## 2024-06-03 PROBLEM — M47.14 THORACIC MYELOPATHY: Status: RESOLVED | Noted: 2023-05-12 | Resolved: 2024-06-03

## 2024-06-03 PROBLEM — M48.061 FORAMINAL STENOSIS OF LUMBAR REGION: Status: RESOLVED | Noted: 2023-09-29 | Resolved: 2024-06-03

## 2024-06-03 PROBLEM — M48.062 LUMBAR STENOSIS WITH NEUROGENIC CLAUDICATION: Status: RESOLVED | Noted: 2023-09-29 | Resolved: 2024-06-03

## 2024-06-03 LAB
ABO + RH BLD: NORMAL
BLD GP AB SCN CELLS X3 SERPL QL: NORMAL
SPECIMEN OUTDATE: NORMAL

## 2024-06-03 PROCEDURE — 25000003 PHARM REV CODE 250: Performed by: ANESTHESIOLOGY

## 2024-06-03 PROCEDURE — 25000003 PHARM REV CODE 250: Performed by: NURSE ANESTHETIST, CERTIFIED REGISTERED

## 2024-06-03 PROCEDURE — 00NW0ZZ RELEASE CERVICAL SPINAL CORD, OPEN APPROACH: ICD-10-PCS | Performed by: NEUROLOGICAL SURGERY

## 2024-06-03 PROCEDURE — D9220A PRA ANESTHESIA: Mod: CRNA,,, | Performed by: NURSE ANESTHETIST, CERTIFIED REGISTERED

## 2024-06-03 PROCEDURE — 25000003 PHARM REV CODE 250: Performed by: NURSE PRACTITIONER

## 2024-06-03 PROCEDURE — C1713 ANCHOR/SCREW BN/BN,TIS/BN: HCPCS | Performed by: NEUROLOGICAL SURGERY

## 2024-06-03 PROCEDURE — 22845 INSERT SPINE FIXATION DEVICE: CPT | Mod: 59,,, | Performed by: NEUROLOGICAL SURGERY

## 2024-06-03 PROCEDURE — 36000711: Performed by: NEUROLOGICAL SURGERY

## 2024-06-03 PROCEDURE — 27800903 OPTIME MED/SURG SUP & DEVICES OTHER IMPLANTS: Performed by: NEUROLOGICAL SURGERY

## 2024-06-03 PROCEDURE — 63600175 PHARM REV CODE 636 W HCPCS: Performed by: NEUROLOGICAL SURGERY

## 2024-06-03 PROCEDURE — 0RT30ZZ RESECTION OF CERVICAL VERTEBRAL DISC, OPEN APPROACH: ICD-10-PCS | Performed by: NEUROLOGICAL SURGERY

## 2024-06-03 PROCEDURE — 94799 UNLISTED PULMONARY SVC/PX: CPT

## 2024-06-03 PROCEDURE — 25000003 PHARM REV CODE 250

## 2024-06-03 PROCEDURE — 37000008 HC ANESTHESIA 1ST 15 MINUTES: Performed by: NEUROLOGICAL SURGERY

## 2024-06-03 PROCEDURE — 71000033 HC RECOVERY, INTIAL HOUR: Performed by: NEUROLOGICAL SURGERY

## 2024-06-03 PROCEDURE — 94761 N-INVAS EAR/PLS OXIMETRY MLT: CPT

## 2024-06-03 PROCEDURE — 63600175 PHARM REV CODE 636 W HCPCS: Performed by: NURSE ANESTHETIST, CERTIFIED REGISTERED

## 2024-06-03 PROCEDURE — 20930 SP BONE ALGRFT MORSEL ADD-ON: CPT | Mod: ,,, | Performed by: NEUROLOGICAL SURGERY

## 2024-06-03 PROCEDURE — 36000710: Performed by: NEUROLOGICAL SURGERY

## 2024-06-03 PROCEDURE — 01N10ZZ RELEASE CERVICAL NERVE, OPEN APPROACH: ICD-10-PCS | Performed by: NEUROLOGICAL SURGERY

## 2024-06-03 PROCEDURE — 22552 ARTHRD ANT NTRBD CERVICAL EA: CPT | Mod: ,,, | Performed by: NEUROLOGICAL SURGERY

## 2024-06-03 PROCEDURE — 27201423 OPTIME MED/SURG SUP & DEVICES STERILE SUPPLY: Performed by: NEUROLOGICAL SURGERY

## 2024-06-03 PROCEDURE — 63600175 PHARM REV CODE 636 W HCPCS: Performed by: NURSE PRACTITIONER

## 2024-06-03 PROCEDURE — 86900 BLOOD TYPING SEROLOGIC ABO: CPT | Performed by: NEUROLOGICAL SURGERY

## 2024-06-03 PROCEDURE — D9220A PRA ANESTHESIA: Mod: ANES,,, | Performed by: ANESTHESIOLOGY

## 2024-06-03 PROCEDURE — C1729 CATH, DRAINAGE: HCPCS | Performed by: NEUROLOGICAL SURGERY

## 2024-06-03 PROCEDURE — 36415 COLL VENOUS BLD VENIPUNCTURE: CPT | Performed by: NEUROLOGICAL SURGERY

## 2024-06-03 PROCEDURE — 25000003 PHARM REV CODE 250: Performed by: NEUROLOGICAL SURGERY

## 2024-06-03 PROCEDURE — 0RG20A0 FUSION OF 2 OR MORE CERVICAL VERTEBRAL JOINTS WITH INTERBODY FUSION DEVICE, ANTERIOR APPROACH, ANTERIOR COLUMN, OPEN APPROACH: ICD-10-PCS | Performed by: NEUROLOGICAL SURGERY

## 2024-06-03 PROCEDURE — 22551 ARTHRD ANT NTRBDY CERVICAL: CPT | Mod: ,,, | Performed by: NEUROLOGICAL SURGERY

## 2024-06-03 PROCEDURE — 2W62X0Z TRACTION OF NECK USING TRACTION APPARATUS: ICD-10-PCS | Performed by: NEUROLOGICAL SURGERY

## 2024-06-03 PROCEDURE — 86850 RBC ANTIBODY SCREEN: CPT | Performed by: NEUROLOGICAL SURGERY

## 2024-06-03 PROCEDURE — 71000039 HC RECOVERY, EACH ADD'L HOUR: Performed by: NEUROLOGICAL SURGERY

## 2024-06-03 PROCEDURE — 37000009 HC ANESTHESIA EA ADD 15 MINS: Performed by: NEUROLOGICAL SURGERY

## 2024-06-03 PROCEDURE — 99900035 HC TECH TIME PER 15 MIN (STAT)

## 2024-06-03 PROCEDURE — 22853 INSJ BIOMECHANICAL DEVICE: CPT | Mod: ,,, | Performed by: NEUROLOGICAL SURGERY

## 2024-06-03 DEVICE — IMPLANTABLE DEVICE: Type: IMPLANTABLE DEVICE | Site: NECK | Status: FUNCTIONAL

## 2024-06-03 DEVICE — SCREW BONE ANT SKYLINE 18MM TI: Type: IMPLANTABLE DEVICE | Site: NECK | Status: FUNCTIONAL

## 2024-06-03 DEVICE — GRAFT PRIME DBM HD BONE 1.0CC: Type: IMPLANTABLE DEVICE | Site: NECK | Status: FUNCTIONAL

## 2024-06-03 RX ORDER — BISACODYL 10 MG/1
10 SUPPOSITORY RECTAL DAILY
Status: DISCONTINUED | OUTPATIENT
Start: 2024-06-03 | End: 2024-06-06 | Stop reason: HOSPADM

## 2024-06-03 RX ORDER — ACETAMINOPHEN 325 MG/1
650 TABLET ORAL EVERY 6 HOURS
Status: DISCONTINUED | OUTPATIENT
Start: 2024-06-03 | End: 2024-06-06 | Stop reason: HOSPADM

## 2024-06-03 RX ORDER — CELECOXIB 100 MG/1
200 CAPSULE ORAL
Status: COMPLETED | OUTPATIENT
Start: 2024-06-03 | End: 2024-06-03

## 2024-06-03 RX ORDER — DIPHENHYDRAMINE HYDROCHLORIDE 50 MG/ML
INJECTION INTRAMUSCULAR; INTRAVENOUS
Status: DISCONTINUED | OUTPATIENT
Start: 2024-06-03 | End: 2024-06-03

## 2024-06-03 RX ORDER — OXYCODONE AND ACETAMINOPHEN 5; 325 MG/1; MG/1
1 TABLET ORAL EVERY 4 HOURS PRN
Status: DISCONTINUED | OUTPATIENT
Start: 2024-06-03 | End: 2024-06-06 | Stop reason: HOSPADM

## 2024-06-03 RX ORDER — MECLIZINE HCL 12.5 MG 12.5 MG/1
25 TABLET ORAL 3 TIMES DAILY PRN
Status: DISCONTINUED | OUTPATIENT
Start: 2024-06-03 | End: 2024-06-06 | Stop reason: HOSPADM

## 2024-06-03 RX ORDER — PHENYLEPHRINE HYDROCHLORIDE 10 MG/ML
INJECTION INTRAVENOUS
Status: DISCONTINUED | OUTPATIENT
Start: 2024-06-03 | End: 2024-06-03

## 2024-06-03 RX ORDER — HEPARIN SODIUM 5000 [USP'U]/ML
5000 INJECTION, SOLUTION INTRAVENOUS; SUBCUTANEOUS EVERY 12 HOURS
Status: DISCONTINUED | OUTPATIENT
Start: 2024-06-03 | End: 2024-06-06 | Stop reason: HOSPADM

## 2024-06-03 RX ORDER — SODIUM CHLORIDE, SODIUM LACTATE, POTASSIUM CHLORIDE, CALCIUM CHLORIDE 600; 310; 30; 20 MG/100ML; MG/100ML; MG/100ML; MG/100ML
INJECTION, SOLUTION INTRAVENOUS CONTINUOUS
Status: DISCONTINUED | OUTPATIENT
Start: 2024-06-03 | End: 2024-06-03

## 2024-06-03 RX ORDER — LIDOCAINE HYDROCHLORIDE 20 MG/ML
INJECTION, SOLUTION EPIDURAL; INFILTRATION; INTRACAUDAL; PERINEURAL
Status: DISCONTINUED | OUTPATIENT
Start: 2024-06-03 | End: 2024-06-03

## 2024-06-03 RX ORDER — LEVOTHYROXINE SODIUM 75 UG/1
150 TABLET ORAL
Status: DISCONTINUED | OUTPATIENT
Start: 2024-06-04 | End: 2024-06-06 | Stop reason: HOSPADM

## 2024-06-03 RX ORDER — PROPOFOL 10 MG/ML
VIAL (ML) INTRAVENOUS
Status: DISCONTINUED | OUTPATIENT
Start: 2024-06-03 | End: 2024-06-03

## 2024-06-03 RX ORDER — DEXAMETHASONE SODIUM PHOSPHATE 4 MG/ML
INJECTION, SOLUTION INTRA-ARTICULAR; INTRALESIONAL; INTRAMUSCULAR; INTRAVENOUS; SOFT TISSUE
Status: DISCONTINUED | OUTPATIENT
Start: 2024-06-03 | End: 2024-06-03

## 2024-06-03 RX ORDER — NEOMYCIN AND POLYMYXIN B SULFATES AND BACITRACIN ZINC 400; 3.5; 1 [USP'U]/G; MG/G; [USP'U]/G
OINTMENT OPHTHALMIC
Status: DISCONTINUED | OUTPATIENT
Start: 2024-06-03 | End: 2024-06-03 | Stop reason: HOSPADM

## 2024-06-03 RX ORDER — TRAMADOL HYDROCHLORIDE 50 MG/1
50 TABLET ORAL EVERY 6 HOURS PRN
Status: DISCONTINUED | OUTPATIENT
Start: 2024-06-03 | End: 2024-06-06 | Stop reason: HOSPADM

## 2024-06-03 RX ORDER — PROCHLORPERAZINE EDISYLATE 5 MG/ML
5 INJECTION INTRAMUSCULAR; INTRAVENOUS EVERY 6 HOURS PRN
Status: DISCONTINUED | OUTPATIENT
Start: 2024-06-03 | End: 2024-06-06 | Stop reason: HOSPADM

## 2024-06-03 RX ORDER — PROPOFOL 10 MG/ML
VIAL (ML) INTRAVENOUS CONTINUOUS PRN
Status: DISCONTINUED | OUTPATIENT
Start: 2024-06-03 | End: 2024-06-03

## 2024-06-03 RX ORDER — DEXMEDETOMIDINE HYDROCHLORIDE 100 UG/ML
INJECTION, SOLUTION INTRAVENOUS
Status: DISCONTINUED | OUTPATIENT
Start: 2024-06-03 | End: 2024-06-03

## 2024-06-03 RX ORDER — ONDANSETRON 8 MG/1
8 TABLET, ORALLY DISINTEGRATING ORAL EVERY 6 HOURS PRN
Status: DISCONTINUED | OUTPATIENT
Start: 2024-06-03 | End: 2024-06-06 | Stop reason: HOSPADM

## 2024-06-03 RX ORDER — FUROSEMIDE 40 MG/1
40 TABLET ORAL DAILY
Status: DISCONTINUED | OUTPATIENT
Start: 2024-06-04 | End: 2024-06-06 | Stop reason: HOSPADM

## 2024-06-03 RX ORDER — POTASSIUM CHLORIDE 750 MG/1
10 TABLET, EXTENDED RELEASE ORAL DAILY
Status: DISCONTINUED | OUTPATIENT
Start: 2024-06-03 | End: 2024-06-06

## 2024-06-03 RX ORDER — CELECOXIB 100 MG/1
200 CAPSULE ORAL 2 TIMES DAILY
Status: DISCONTINUED | OUTPATIENT
Start: 2024-06-03 | End: 2024-06-06 | Stop reason: HOSPADM

## 2024-06-03 RX ORDER — FENTANYL CITRATE 50 UG/ML
INJECTION, SOLUTION INTRAMUSCULAR; INTRAVENOUS
Status: DISCONTINUED | OUTPATIENT
Start: 2024-06-03 | End: 2024-06-03

## 2024-06-03 RX ORDER — KETAMINE HCL IN 0.9 % NACL 50 MG/5 ML
SYRINGE (ML) INTRAVENOUS
Status: DISCONTINUED | OUTPATIENT
Start: 2024-06-03 | End: 2024-06-03

## 2024-06-03 RX ORDER — ATORVASTATIN CALCIUM 20 MG/1
20 TABLET, FILM COATED ORAL NIGHTLY
Status: DISCONTINUED | OUTPATIENT
Start: 2024-06-03 | End: 2024-06-06 | Stop reason: HOSPADM

## 2024-06-03 RX ORDER — AMIODARONE HYDROCHLORIDE 200 MG/1
200 TABLET ORAL DAILY
Status: DISCONTINUED | OUTPATIENT
Start: 2024-06-04 | End: 2024-06-06 | Stop reason: HOSPADM

## 2024-06-03 RX ORDER — MEPERIDINE HYDROCHLORIDE 50 MG/ML
12.5 INJECTION INTRAMUSCULAR; INTRAVENOUS; SUBCUTANEOUS ONCE AS NEEDED
Status: DISCONTINUED | OUTPATIENT
Start: 2024-06-03 | End: 2024-06-03 | Stop reason: HOSPADM

## 2024-06-03 RX ORDER — METHOCARBAMOL 750 MG/1
750 TABLET, FILM COATED ORAL 3 TIMES DAILY PRN
Status: DISCONTINUED | OUTPATIENT
Start: 2024-06-03 | End: 2024-06-06 | Stop reason: HOSPADM

## 2024-06-03 RX ORDER — OXYCODONE HYDROCHLORIDE 5 MG/1
5 TABLET ORAL
Status: DISCONTINUED | OUTPATIENT
Start: 2024-06-03 | End: 2024-06-03 | Stop reason: HOSPADM

## 2024-06-03 RX ORDER — AMOXICILLIN 250 MG
2 CAPSULE ORAL NIGHTLY PRN
Status: DISCONTINUED | OUTPATIENT
Start: 2024-06-03 | End: 2024-06-06 | Stop reason: HOSPADM

## 2024-06-03 RX ORDER — PREGABALIN 75 MG/1
75 CAPSULE ORAL
Status: COMPLETED | OUTPATIENT
Start: 2024-06-03 | End: 2024-06-03

## 2024-06-03 RX ORDER — ONDANSETRON HYDROCHLORIDE 2 MG/ML
4 INJECTION, SOLUTION INTRAVENOUS ONCE AS NEEDED
Status: DISCONTINUED | OUTPATIENT
Start: 2024-06-03 | End: 2024-06-03 | Stop reason: HOSPADM

## 2024-06-03 RX ORDER — SODIUM CHLORIDE, SODIUM LACTATE, POTASSIUM CHLORIDE, CALCIUM CHLORIDE 600; 310; 30; 20 MG/100ML; MG/100ML; MG/100ML; MG/100ML
INJECTION, SOLUTION INTRAVENOUS CONTINUOUS
Status: DISCONTINUED | OUTPATIENT
Start: 2024-06-03 | End: 2024-06-04

## 2024-06-03 RX ORDER — OXYCODONE HCL 10 MG/1
10 TABLET, FILM COATED, EXTENDED RELEASE ORAL
Status: COMPLETED | OUTPATIENT
Start: 2024-06-03 | End: 2024-06-03

## 2024-06-03 RX ORDER — ONDANSETRON HYDROCHLORIDE 2 MG/ML
INJECTION, SOLUTION INTRAVENOUS
Status: DISCONTINUED | OUTPATIENT
Start: 2024-06-03 | End: 2024-06-03

## 2024-06-03 RX ORDER — HYDROMORPHONE HYDROCHLORIDE 2 MG/ML
0.2 INJECTION, SOLUTION INTRAMUSCULAR; INTRAVENOUS; SUBCUTANEOUS EVERY 5 MIN PRN
Status: DISCONTINUED | OUTPATIENT
Start: 2024-06-03 | End: 2024-06-03 | Stop reason: HOSPADM

## 2024-06-03 RX ORDER — MUPIROCIN 20 MG/G
OINTMENT TOPICAL 2 TIMES DAILY
Status: COMPLETED | OUTPATIENT
Start: 2024-06-03 | End: 2024-06-05

## 2024-06-03 RX ORDER — ALUMINUM HYDROXIDE, MAGNESIUM HYDROXIDE, AND SIMETHICONE 1200; 120; 1200 MG/30ML; MG/30ML; MG/30ML
30 SUSPENSION ORAL EVERY 4 HOURS PRN
Status: DISCONTINUED | OUTPATIENT
Start: 2024-06-03 | End: 2024-06-06 | Stop reason: HOSPADM

## 2024-06-03 RX ORDER — SCOLOPAMINE TRANSDERMAL SYSTEM 1 MG/1
1 PATCH, EXTENDED RELEASE TRANSDERMAL
Status: DISCONTINUED | OUTPATIENT
Start: 2024-06-03 | End: 2024-06-03 | Stop reason: HOSPADM

## 2024-06-03 RX ORDER — SUCCINYLCHOLINE CHLORIDE 20 MG/ML
INJECTION INTRAMUSCULAR; INTRAVENOUS
Status: DISCONTINUED | OUTPATIENT
Start: 2024-06-03 | End: 2024-06-03

## 2024-06-03 RX ORDER — HYDROMORPHONE HYDROCHLORIDE 1 MG/ML
1 INJECTION, SOLUTION INTRAMUSCULAR; INTRAVENOUS; SUBCUTANEOUS
Status: DISCONTINUED | OUTPATIENT
Start: 2024-06-03 | End: 2024-06-06 | Stop reason: HOSPADM

## 2024-06-03 RX ORDER — MIDAZOLAM HYDROCHLORIDE 1 MG/ML
INJECTION INTRAMUSCULAR; INTRAVENOUS
Status: DISCONTINUED | OUTPATIENT
Start: 2024-06-03 | End: 2024-06-03

## 2024-06-03 RX ORDER — ROCURONIUM BROMIDE 10 MG/ML
INJECTION, SOLUTION INTRAVENOUS
Status: DISCONTINUED | OUTPATIENT
Start: 2024-06-03 | End: 2024-06-03

## 2024-06-03 RX ORDER — LIDOCAINE HYDROCHLORIDE 10 MG/ML
1 INJECTION, SOLUTION EPIDURAL; INFILTRATION; INTRACAUDAL; PERINEURAL ONCE
Status: DISCONTINUED | OUTPATIENT
Start: 2024-06-03 | End: 2024-06-03 | Stop reason: HOSPADM

## 2024-06-03 RX ORDER — SODIUM CHLORIDE 0.9 % (FLUSH) 0.9 %
3 SYRINGE (ML) INJECTION
Status: DISCONTINUED | OUTPATIENT
Start: 2024-06-03 | End: 2024-06-06 | Stop reason: HOSPADM

## 2024-06-03 RX ORDER — CEFAZOLIN SODIUM 2 G/50ML
2 SOLUTION INTRAVENOUS ONCE
Status: COMPLETED | OUTPATIENT
Start: 2024-06-03 | End: 2024-06-03

## 2024-06-03 RX ORDER — ACETAMINOPHEN 325 MG/1
650 TABLET ORAL
Status: COMPLETED | OUTPATIENT
Start: 2024-06-03 | End: 2024-06-03

## 2024-06-03 RX ADMIN — MUPIROCIN: 20 OINTMENT TOPICAL at 01:06

## 2024-06-03 RX ADMIN — CEFAZOLIN SODIUM 2 G: 2 SOLUTION INTRAVENOUS at 07:06

## 2024-06-03 RX ADMIN — ATORVASTATIN CALCIUM 20 MG: 20 TABLET, FILM COATED ORAL at 08:06

## 2024-06-03 RX ADMIN — MIDAZOLAM HYDROCHLORIDE 2 MG: 1 INJECTION, SOLUTION INTRAMUSCULAR; INTRAVENOUS at 07:06

## 2024-06-03 RX ADMIN — SODIUM CHLORIDE, SODIUM LACTATE, POTASSIUM CHLORIDE, AND CALCIUM CHLORIDE: .6; .31; .03; .02 INJECTION, SOLUTION INTRAVENOUS at 07:06

## 2024-06-03 RX ADMIN — Medication 10 MG: at 07:06

## 2024-06-03 RX ADMIN — DEXAMETHASONE SODIUM PHOSPHATE 8 MG: 4 INJECTION, SOLUTION INTRA-ARTICULAR; INTRALESIONAL; INTRAMUSCULAR; INTRAVENOUS; SOFT TISSUE at 07:06

## 2024-06-03 RX ADMIN — SUCCINYLCHOLINE CHLORIDE 120 MG: 20 INJECTION, SOLUTION INTRAMUSCULAR; INTRAVENOUS at 07:06

## 2024-06-03 RX ADMIN — PHENYLEPHRINE HYDROCHLORIDE 100 MCG: 10 INJECTION INTRAVENOUS at 10:06

## 2024-06-03 RX ADMIN — PHENYLEPHRINE HYDROCHLORIDE 0.1 MCG/KG/MIN: 10 INJECTION INTRAVENOUS at 07:06

## 2024-06-03 RX ADMIN — PHENYLEPHRINE HYDROCHLORIDE 100 MCG: 10 INJECTION INTRAVENOUS at 09:06

## 2024-06-03 RX ADMIN — ACETAMINOPHEN 650 MG: 325 TABLET ORAL at 01:06

## 2024-06-03 RX ADMIN — PREGABALIN 75 MG: 75 CAPSULE ORAL at 06:06

## 2024-06-03 RX ADMIN — PROPOFOL 150 MCG/KG/MIN: 10 INJECTION, EMULSION INTRAVENOUS at 07:06

## 2024-06-03 RX ADMIN — FENTANYL CITRATE 25 MCG: 50 INJECTION INTRAMUSCULAR; INTRAVENOUS at 08:06

## 2024-06-03 RX ADMIN — HEPARIN SODIUM 5000 UNITS: 5000 INJECTION INTRAVENOUS; SUBCUTANEOUS at 08:06

## 2024-06-03 RX ADMIN — HEPARIN SODIUM 5000 UNITS: 5000 INJECTION INTRAVENOUS; SUBCUTANEOUS at 11:06

## 2024-06-03 RX ADMIN — OXYCODONE 5 MG: 5 TABLET ORAL at 11:06

## 2024-06-03 RX ADMIN — PROPOFOL 150 MG: 10 INJECTION, EMULSION INTRAVENOUS at 07:06

## 2024-06-03 RX ADMIN — CELECOXIB 200 MG: 100 CAPSULE ORAL at 08:06

## 2024-06-03 RX ADMIN — DIPHENHYDRAMINE HYDROCHLORIDE 12.5 MG: 50 INJECTION, SOLUTION INTRAMUSCULAR; INTRAVENOUS at 09:06

## 2024-06-03 RX ADMIN — FENTANYL CITRATE 100 MCG: 50 INJECTION INTRAMUSCULAR; INTRAVENOUS at 07:06

## 2024-06-03 RX ADMIN — HYPROMELLOSE 2910 2 DROP: 5 SOLUTION OPHTHALMIC at 07:06

## 2024-06-03 RX ADMIN — OXYCODONE HYDROCHLORIDE 10 MG: 10 TABLET, FILM COATED, EXTENDED RELEASE ORAL at 06:06

## 2024-06-03 RX ADMIN — Medication 20 MG: at 07:06

## 2024-06-03 RX ADMIN — MUPIROCIN: 20 OINTMENT TOPICAL at 08:06

## 2024-06-03 RX ADMIN — ACETAMINOPHEN 650 MG: 325 TABLET ORAL at 06:06

## 2024-06-03 RX ADMIN — LIDOCAINE HYDROCHLORIDE 60 MG: 20 INJECTION, SOLUTION EPIDURAL; INFILTRATION; INTRACAUDAL; PERINEURAL at 07:06

## 2024-06-03 RX ADMIN — PHENYLEPHRINE HYDROCHLORIDE 200 MCG: 10 INJECTION INTRAVENOUS at 10:06

## 2024-06-03 RX ADMIN — BISACODYL 10 MG: 10 SUPPOSITORY RECTAL at 01:06

## 2024-06-03 RX ADMIN — DEXMEDETOMIDINE HCL 8 MCG: 100 INJECTION INTRAVENOUS at 08:06

## 2024-06-03 RX ADMIN — DEXMEDETOMIDINE HCL 8 MCG: 100 INJECTION INTRAVENOUS at 07:06

## 2024-06-03 RX ADMIN — POTASSIUM CHLORIDE 10 MEQ: 750 TABLET, EXTENDED RELEASE ORAL at 11:06

## 2024-06-03 RX ADMIN — FENTANYL CITRATE 25 MCG: 50 INJECTION INTRAMUSCULAR; INTRAVENOUS at 09:06

## 2024-06-03 RX ADMIN — FENTANYL CITRATE 50 MCG: 50 INJECTION INTRAMUSCULAR; INTRAVENOUS at 07:06

## 2024-06-03 RX ADMIN — SODIUM CHLORIDE, POTASSIUM CHLORIDE, SODIUM LACTATE AND CALCIUM CHLORIDE: 600; 310; 30; 20 INJECTION, SOLUTION INTRAVENOUS at 01:06

## 2024-06-03 RX ADMIN — ONDANSETRON 8 MG: 2 INJECTION, SOLUTION INTRAMUSCULAR; INTRAVENOUS at 09:06

## 2024-06-03 RX ADMIN — GLYCOPYRROLATE 0.2 MG: 0.2 INJECTION, SOLUTION INTRAMUSCULAR; INTRAVITREAL at 07:06

## 2024-06-03 RX ADMIN — ROCURONIUM BROMIDE 5 MG: 10 INJECTION, SOLUTION INTRAVENOUS at 07:06

## 2024-06-03 RX ADMIN — Medication 10 MG: at 08:06

## 2024-06-03 RX ADMIN — SODIUM CHLORIDE: 0.9 INJECTION, SOLUTION INTRAVENOUS at 07:06

## 2024-06-03 RX ADMIN — CELECOXIB 200 MG: 100 CAPSULE ORAL at 06:06

## 2024-06-03 NOTE — PT/OT/SLP PROGRESS
"Speech Language Pathology      Graciela Barton  MRN: [id number]      1400pm:  ST orders for swallow eval received this date, pt stated she is tired and would rather "sleep than eat right now."  Will re-attempt next date.       6/3/2024    "

## 2024-06-03 NOTE — TRANSFER OF CARE
"Anesthesia Transfer of Care Note    Patient: Graciela Barton    Procedure(s) Performed: Procedure(s) (LRB):  FUSION, SPINE, CERVICAL (N/A)    Patient location: PACU    Anesthesia Type: general    Transport from OR: Transported from OR on 6-10 L/min O2 by face mask with adequate spontaneous ventilation    Post pain: adequate analgesia    Post assessment: no apparent anesthetic complications    Post vital signs: stable    Level of consciousness: awake    Nausea/Vomiting: no nausea/vomiting    Complications: none    Transfer of care protocol was followed      Last vitals: Visit Vitals  BP (!) 91/53   Pulse (!) 48   Temp 36.4 °C (97.5 °F) (Skin)   Resp 16   Ht 5' 7" (1.702 m)   Wt 81.6 kg (180 lb)   SpO2 99%   Breastfeeding No   BMI 28.19 kg/m²     "

## 2024-06-03 NOTE — ANESTHESIA PROCEDURE NOTES
Intubation    Date/Time: 6/3/2024 7:19 AM    Performed by: Aide Nielsen CRNA  Authorized by: Mark Syed Jr., MD    Intubation:     Induction:  Intravenous    Intubated:  Postinduction    Mask Ventilation:  N/a    Attempts:  1    Attempted By:  CRNA    Method of Intubation:  Video laryngoscopy    Blade:  Chawla 3    Laryngeal View Grade: Grade I - full view of cords      Difficult Airway Encountered?: No      Complications:  None    Airway Device:  Oral endotracheal tube    Airway Device Size:  7.0    Style/Cuff Inflation:  Cuffed    Inflation Amount (mL):  5    Tube secured:  22    Secured at:  The teeth    Placement Verified By:  Capnometry    Complicating Factors:  Poor neck/head extension    Findings Post-Intubation:  BS equal bilateral

## 2024-06-03 NOTE — PLAN OF CARE
06/03/24 1714   Admission   Initial VN Admission Questions Complete   Communication Issues? None   Safety/Activity   Patient Rounds visualized patient;bed in low position;bed wheels locked;call light in patient/parent reach;clutter free environment maintained;ID band on

## 2024-06-03 NOTE — ANESTHESIA PROCEDURE NOTES
Arterial    Diagnosis: CAD    Patient location during procedure: done in OR  Timeout: 6/3/2024 7:24 AM  Procedure end time: 6/3/2024 7:26 AM    Staffing  Authorizing Provider: Mark Syed Jr., MD  Performing Provider: Mark Syed Jr., MD    Staffing  Performed by: Mark Syed Jr., MD  Authorized by: Mark Syed Jr., MD    Anesthesiologist was present at the time of the procedure.    Preanesthetic Checklist  Completed: patient identified, IV checked, site marked, risks and benefits discussed, surgical consent, monitors and equipment checked, pre-op evaluation, timeout performed and anesthesia consent givenArterial  Skin Prep: chlorhexidine gluconate  Local Infiltration: lidocaine  Orientation: left  Location: radial    Catheter Size: 20 G  Catheter placement by Anatomical landmarks. Heme positive aspiration all ports. Insertion Attempts: 1  Assessment  Dressing: secured with tape and tegaderm  Patient: Tolerated well

## 2024-06-03 NOTE — OP NOTE
Surgery 06/03/2024    Preop diagnosis   1. Cervical spondylosis with myelopathy and radiculopathy     Postop diagnosis   Same    Surgery   1. C5-6, C6-7 anterior interbody arthrodesis, placement of interbody spacers, DePuy ACIS ProTi spacers filled with allograft DBM  2. Anterior instrumentation with separate plate from C5-C7, DePuy [place] plate  3. Fluoroscopy  4. Neuro monitoring using SSEP, EMG, MEP    Surgeon   Scooter Salter MD    Indication   This is a very pleasant 73 y.o. female, who is 7 months status post T6-7 and T7-8 anterior diskectomy for thoracic myelopathy, 3 months status post L3-4, L4-5 oblique interbody fusion, L5-S1 anterior interbody fusion L3-S1 posterior segmental instrumentation, L3-4, L4-5 laminectomy, medial facetectomy, foraminotomy. Patient is doing rehabilitation. She walks with a walker. Her right leg weakness has partially improved. Her preoperative left leg pain has completely subsided since surgery. She is complaining of right shoulder pain that is intermittent. She still has some gait imbalance. Pain in the right shoulder radiates towards the right hand. Her walking ability has partially improved.     Procedure   The patient was intubated under general anesthesia and positioned supine on a radiolucent table, the head resting on a horseshoe, we placed Caceres-Wells tongs with 5 lb of traction, all pressure points were carefully padded and the shoulders were taped gently inferiorly.  The anterior cervical area was prepped and draped in a typical sterile fashion.  Using fluoroscopy we planned a horizontal incision at C6 on the right side.  Local anesthesia with 0.5 Marcaine with epi.  The skin was incised and hemostasis was carried out.  Placement of orthostatic retractor.  The platysma muscle was divided sharply.  Blunt dissection between the sternocleidomastoid muscle and the strap muscle just medial to the internal carotid artery.  The prevertebral space was dissected.  There was  significant scar tissue.  We decided to place a NG tube to ensure that we were posterior to the esophagus.  We then continued more blunt dissection.  The C6-7 disc space was identified fluoroscopy.  Placement of orthostatic retractor.  The anterior osteophytes were removed.  Under microscopic visualization we removed all disc material from the endplates.  The posterior osteophytes and uncovertebral joints were drilled.  The PLL was dissected and divided Kerrisons rongeur.  The dura was fully decompressed as well as foramen bilaterally.  Gelfoam powder mixed with thrombin for hemostasis.  Irrigation.  We then used serial trials and placed a final interbody spacer, large size, 8 mm in height with 7° of lordosis filled with DBM.  Good positioning of the spacer was confirmed with fluoroscopy.  The same exact procedure was done at C5-6 after moving a retractor.  Again we placed a 8 mm in height with 7° of lordosis, large size spacer filled with DBM at C5-6.  Good positioning of the spacer was confirmed with fluoroscopy.  The weights from the traction were removed.  We then placed a separate plate and anchored the plate with 18 mm variable screws at C5, C6, C7.  The locking mechanism was engaged.  The positioning of the instrumentation was confirmed with fluoroscopy.  Irrigation and hemostasis with the bipolar.  Placement of a 10 Irish Ramy drain.  The platysma muscle was closed with interrupted 3-0 Vicryl.  The dermal layer was closed with inverted interrupted 3-0 Vicryl.  The skin was closed with a running subcuticular 4-0 Monocryl.  Mastisol Steri-Strips Telfa Tegaderm for dressing.

## 2024-06-03 NOTE — H&P
NEUROSURGICAL PROGRESS NOTE     DATE OF SERVICE:  06/03/24     ATTENDING PHYSICIAN:  Scooter Salter MD     SUBJECTIVE:     INTERIM HISTORY:     This is a very pleasant 73 y.o. female, who is 7 months status post T6-7 and T7-8 anterior diskectomy for thoracic myelopathy, 3 months status post L3-4, L4-5 oblique interbody fusion, L5-S1 anterior interbody fusion L3-S1 posterior segmental instrumentation, L3-4, L4-5 laminectomy, medial facetectomy, foraminotomy.  Patient is doing rehabilitation.  She walks with a walker.  Her right leg weakness has partially improved.  Her preoperative left leg pain has completely subsided since surgery.  She is complaining of right shoulder pain that is intermittent.  She still has some gait imbalance.  Pain in the right shoulder radiates towards the right hand.  Her walking ability has partially improved.           PAST MEDICAL HISTORY:       Active Ambulatory Problems     Diagnosis Date Noted    Postoperative hypothyroidism 02/19/2018    History of thyroid cancer 02/19/2018    Essential hypertension, benign 02/19/2018    Mixed hyperlipidemia 02/19/2018    Benign paroxysmal positional vertigo 08/01/2018    History of colon polyps 10/18/2018    Bilateral post-traumatic osteoarthritis of knee 06/21/2022    Paroxysmal tachycardia 06/22/2022    DDD (degenerative disc disease), lumbar 08/09/2022    Lumbar radiculopathy 08/09/2022    Atrial fibrillation with RVR 08/09/2022    PAF (paroxysmal atrial fibrillation) 08/10/2022    Multiple sclerosis 12/01/2022    Anticoagulant long-term use 02/23/2023    Thoracic disc disease with myelopathy 03/14/2023    Spinal cord compression 03/14/2023    Aortic atherosclerosis 03/14/2023    Preprocedural cardiovascular examination 03/15/2023    Thoracic myelopathy 05/12/2023    Atelectasis 05/12/2023    Typical atrial flutter 09/15/2023    Lumbar stenosis with neurogenic claudication 09/29/2023    Foraminal stenosis of lumbar region 09/29/2023    Chronic  myelopathy 09/29/2023    Decreased range of motion of right knee 09/29/2023    Bilateral leg edema 09/29/2023    Assistance needed for mobility 09/29/2023    Acute blood loss anemia 10/01/2023    S/P lumbar spinal fusion 11/09/2023    Pulmonary HTN 11/17/2023           Resolved Ambulatory Problems     Diagnosis Date Noted    Weakness 06/20/2022           Past Medical History:   Diagnosis Date    Arthritis      Cancer      Edema      Hyperlipidemia      Hypertension      Thyroid disease           PAST SURGICAL HISTORY:        Past Surgical History:   Procedure Laterality Date    ADENOIDECTOMY        COLONOSCOPY   10/18/2018    COLONOSCOPY N/A 10/18/2018     Procedure: COLONOSCOPY;  Surgeon: Yosvany Alejandra MD;  Location: [facility] ENDO;  Service: General;  Laterality: N/A;    ECHOCARDIOGRAM,TRANSESOPHAGEAL N/A 10/4/2023     Procedure: Transesophageal echo (CARLYLE) intra-procedure log documentation;  Surgeon: Mark Ortega MD;  Location: [facility] OR;  Service: Cardiology;  Laterality: N/A;    EPIDURAL STEROID INJECTION N/A 9/30/2022     Procedure: LUMBAR L3/4 MALKA CONTRAST DIRRECT REFERRAL MONICA CARRERA IN CHART;  Surgeon: Nicholas Kumar MD;  Location: [facility] PAIN MGT;  Service: Pain Management;  Laterality: N/A;    FACETECTOMY OF VERTEBRA N/A 9/29/2023     Procedure: FACETECTOMY;  Surgeon: Scooter Salter MD;  Location: [facility] OR;  Service: Neurosurgery;  Laterality: N/A;    FORAMINOTOMY N/A 9/29/2023     Procedure: FORAMINOTOMY, SPINE;  Surgeon: Scooter Salter MD;  Location: [facility] OR;  Service: Neurosurgery;  Laterality: N/A;    FUSION OF LUMBAR SPINE USING POSTERIOR INTERBODY TECHNIQUE N/A 9/29/2023     Procedure: FUSION, SPINE, LUMBAR, PLIF;  Surgeon: Scooter Salter MD;  Location: [facility] OR;  Service: Neurosurgery;  Laterality: N/A;  L3-S1 posterior instrumentation   L3-L5 Lmainectomy sacro pelvic fixation Globus cage   -lop 3 hr   -los 5 days   -Lami segments 1  -anterior fusion insterspace 2  -general   -type and  screen   -Ziehm brain notified cc  -EMG bairon notified cc  -SEP  -MEP  -TLSO        HYSTERECTOMY   1992     total hyst     KNEE SURGERY         16 pins and two plates implanted    LAMINECTOMY N/A 9/29/2023     Procedure: LAMINECTOMY, SPINE;  Surgeon: Scooter Salter MD;  Location: [facility];  Service: Neurosurgery;  Laterality: N/A;    MINIMALLY INVASIVE TRANSFORAMINAL LUMBAR INTERBODY FUSION (TLIF) N/A 9/29/2023     Procedure: FUSION, SPINE, LUMBAR, TLIF, MINIMALLY INVASIVE;  Surgeon: Scooter Salter MD;  Location: [facility] OR;  Service: Neurosurgery;  Laterality: N/A;  L3-L5 OLIF Globus ATP  L5-S1 ALIF Globus ATP  -Lami add segment 1  -anterior fusion interspances 2  -lop 3 hr   -general   -type and screen   -C arm   -EMG bairon notified cc  -SEP  -MEP  -TLSO  -Regular Bed   -Lateral right down   -globus reji    THORACIC DISCECTOMY Right 5/12/2023     Procedure: DISCECTOMY, SPINE, THORACIC Right T6-7, T7-8 interior discectomy retropleural;  Surgeon: Scooter Salter MD;  Location: [facility] OR;  Service: Neurosurgery;  Laterality: Right;  general  eliquis   type and screen  C-arm  Metrx  EMG bairon notified cc  SEP  MEP  Regular bed   lateral left down   globus (Jose Enrqiue) notified cc    THYROIDECTOMY        TONSILLECTOMY             SOCIAL HISTORY:   Social History            Socioeconomic History    Marital status:    Tobacco Use    Smoking status: Never    Smokeless tobacco: Never   Substance and Sexual Activity    Alcohol use: No    Drug use: No    Sexual activity: Not Currently       Partners: Male   Social History Narrative     ** Merged History Encounter **               FAMILY HISTORY:        Family History   Problem Relation Age of Onset    Alcohol abuse Mother      Heart disease Mother      Alcohol abuse Father      Heart disease Father      Heart disease Maternal Grandmother      Heart disease Maternal Grandfather           CURRENTS MEDICATIONS:  Medications Ordered Prior to Encounter           Current Outpatient Medications on File Prior to Visit   Medication Sig Dispense Refill    acetaminophen (TYLENOL) 650 mg/20.3 mL Soln Take 20.3 mLs (650 mg total) by mouth every 6 (six) hours as needed for Pain.        aluminum-magnesium hydroxide-simethicone (MAALOX) 200-200-20 mg/5 mL Susp Take 30 mLs by mouth every 4 (four) hours as needed (indigestion).        amiodarone (PACERONE) 200 MG Tab Take 1 tablet (200 mg total) by mouth once daily. Start 11/5/23 at 0900 30 tablet 11    baclofen (LIORESAL) 5 mg Tab tablet Take 1 tablet (5 mg total) by mouth 3 (three) times daily. 1 tablet 0    bisacodyL (DULCOLAX) 10 mg Supp Place 1 suppository (10 mg total) rectally daily as needed (constipation).   0    ELIQUIS 5 mg Tab Take 1 tablet (5 mg total) by mouth 2 (two) times daily. 60 tablet 11    levothyroxine (SYNTHROID) 112 MCG tablet TAKE 1 TABLET BY MOUTH EVERY DAY IN THE MORNING ON AN EMPTY STOMACH (Patient not taking: Reported on 11/17/2023) 90 tablet 3    levothyroxine (SYNTHROID) 137 MCG Tab tablet Take 1 tablet (137 mcg total) by mouth before breakfast. 90 tablet 0    meclizine (ANTIVERT) 25 mg tablet Take by mouth.        metoprolol succinate (TOPROL-XL) 25 MG 24 hr tablet TAKE 1 TABLET BY MOUTH EVERY DAY 90 tablet 3    multivitamin (THERAGRAN) per tablet Take 1 tablet by mouth once daily.        ondansetron (ZOFRAN-ODT) 8 MG TbDL Take 1 tablet (8 mg total) by mouth every 6 (six) hours as needed (nausea/vomiting). (Patient not taking: Reported on 11/17/2023)        oxyCODONE (ROXICODONE) 10 mg Tab immediate release tablet Take 1 tablet (10 mg total) by mouth every 6 (six) hours as needed for Pain (severe pain).   0    rosuvastatin (CRESTOR) 10 MG tablet Take 1 tablet (10 mg total) by mouth once daily. 90 tablet 0    senna-docusate 8.6-50 mg (PERICOLACE) 8.6-50 mg per tablet Take 2 tablets by mouth 2 (two) times daily.        sulfamethoxazole-trimethoprim 800-160mg (BACTRIM DS) 800-160 mg Tab          tamsulosin  (FLOMAX) 0.4 mg Cap Take 1 capsule by mouth.        traMADoL 100 mg Tab Take 100 mg by mouth every 6 (six) hours as needed for Pain (moderate pain). (Patient not taking: Reported on 11/17/2023)        [DISCONTINUED] atorvastatin (LIPITOR) 40 MG tablet Take 1 tablet (40 mg total) by mouth once daily. 90 tablet 3      No current facility-administered medications on file prior to visit.            ALLERGIES:       Review of patient's allergies indicates:   Allergen Reactions    Vancomycin Tinitus    Vancomycin analogues Tinitus         REVIEW OF SYSTEMS:  Review of Systems   Constitutional:  Negative for diaphoresis, fever and weight loss.   Respiratory:  Negative for shortness of breath.    Cardiovascular:  Negative for chest pain.   Gastrointestinal:  Negative for blood in stool.   Genitourinary:  Negative for hematuria.   Endo/Heme/Allergies:  Does not bruise/bleed easily.   All other systems reviewed and are negative.           OBJECTIVE:     PHYSICAL EXAMINATION:       Vitals:     01/03/24 1009   BP: (!) 143/72   Pulse: (!) 57         Physical Exam:  Vitals reviewed.     Constitutional: She appears well-developed and well-nourished.      Eyes: Pupils are equal, round, and reactive to light. Conjunctivae and EOM are normal.      Cardiovascular: Normal distal pulses and no edema.      Abdominal: Soft.      Skin: Skin displays no rash on trunk and no rash on extremities. Skin displays no lesions on trunk and no lesions on extremities.      Psych/Behavior: She is alert. She is oriented to person, place, and time. She has a normal mood and affect.      Musculoskeletal:        Neck: Range of motion is limited.      Neurological:        DTRs: Tricep reflexes are 2+ on the right side and 3+ on the left side. Bicep reflexes are 2+ on the right side and 3+ on the left side. Brachioradialis reflexes are 2+ on the right side and 3+ on the left side. Patellar reflexes are 0 on the right side and 0 on the left side. Achilles  reflexes are 0 on the right side and 0 on the left side.         Back Exam      Muscle Strength   Right Quadriceps:  4/5   Left Quadriceps:  5/5   Right Hamstrings:  5/5   Left Hamstrings:  5/5                     Neurologic Exam      Mental Status   Oriented to person, place, and time.   Speech: speech is normal   Level of consciousness: alert     Cranial Nerves   Cranial nerves II through XII intact.      CN III, IV, VI   Pupils are equal, round, and reactive to light.  Extraocular motions are normal.      Motor Exam   Muscle bulk: normal  Overall muscle tone: normal     Strength   Right deltoid: 5/5  Left deltoid: 5/5  Right biceps: 5/5  Left biceps: 5/5  Right triceps: 5/5  Left triceps: 5/5  Right wrist flexion: 5/5  Left wrist flexion: 5/5  Right wrist extension: 5/5  Left wrist extension: 5/5  Right interossei: 5/5  Left interossei: 5/5  Right iliopsoas: 5/5  Left iliopsoas: 5/5  Right quadriceps: 4/5  Left quadriceps: 5/5  Right hamstrin/5  Left hamstrin/5  Right anterior tibial: 5/5  Left anterior tibial: 5/5  Right posterior tibial: 5/5  Left posterior tibial: 5/5  Right peroneal: 5/5  Left peroneal: 5/5  Right gastroc: 5/5  Left gastroc: 5/5     Sensory Exam   Light touch normal.   Pinprick normal.      Gait, Coordination, and Reflexes      Reflexes   Right brachioradialis: 2+  Left brachioradialis: 3+  Right biceps: 2+  Left biceps: 3+  Right triceps: 2+  Left triceps: 3+  Right patellar: 0  Left patellar: 0  Right achilles: 0  Left achilles: 0  Right plantar: normal  Left plantar: normal  Right Betancourt: absent  Left Betancourt: present  Right ankle clonus: absent  Left ankle clonus: absent           DIAGNOSTIC DATA:  I personally interpreted the following imaging:   Lumbar x-ray today shows good positioning of hardware from L3-S1.  No lucency around screws.  Patient is wearing her TLSO brace     Cervical spine MRI from 2023 was showing mild stenosis at C4-5, moderate severe stenosis at  C5-6 with severe bilateral foraminal stenosis, mild stenosis at C6-7 with severe bilateral foraminal stenosis     ASSESMENT:  This is a 73 y.o. female with      Problem List Items Addressed This Visit                  Neuro     Thoracic myelopathy      Other Visit Diagnoses         Status post lumbar spinal fusion    -  Primary     Cervical spondylosis with myelopathy and radiculopathy                       PLAN:  In order to prevent worsening myelopathy symptoms to improve her radiculopathy symptoms I recommended a C5-6 and C6-7 anterior diskectomy and instrumented fusion.  We have treated her thoracic myelopathy and her lumbar severe lumbar spinal stenosis.  Her overall ability to walk is improving.  However the C5-6 central canal stenosis and bilateral C6-7 foraminal stenosis are concerning. In order to prevent worsening gait imbalance I recommended a C5-6 and C6-7 anterior fusion and instrumentation.     I explained the natural history of the disease and all treatment options.     We have discussed the risks of surgery including death, coma, bleeding, infection, failure of surgery, CSF leak, nerve root injury, spinal cord injury, dysphagia, vocal cord paralysis, weakness, paralysis, peripheral neuropathy, malplaced hardware, migration of hardware, non-union, need for reoperation. Patient understands the risks and would like to proceed with surgery.             Scooter Salter MD  Cell:[phone]

## 2024-06-03 NOTE — ANESTHESIA POSTPROCEDURE EVALUATION
Anesthesia Post Evaluation    Patient: Graciela Barton    Procedure(s) Performed: Procedure(s) (LRB):  FUSION, SPINE, CERVICAL (N/A)    Final Anesthesia Type: general      Patient location during evaluation: PACU  Patient participation: Yes- Able to Participate  Level of consciousness: awake and alert  Post-procedure vital signs: reviewed and stable  Pain management: adequate  Airway patency: patent    PONV status at discharge: No PONV  Anesthetic complications: no      Cardiovascular status: stable  Respiratory status: spontaneous ventilation  Hydration status: euvolemic  Follow-up not needed.              Vitals Value Taken Time   /74 06/03/24 1152   Temp 36.4 °C (97.5 °F) 06/03/24 1020   Pulse 52 06/03/24 1152   Resp 11 06/03/24 1152   SpO2 97 % 06/03/24 1152   Vitals shown include unfiled device data.      No case tracking events are documented in the log.      Pain/Tyler Score: Pain Rating Prior to Med Admin: 5 (6/3/2024 11:31 AM)  Tyler Score: 10 (6/3/2024 11:45 AM)

## 2024-06-03 NOTE — PT/OT/SLP PROGRESS
Physical Therapy      Patient Name:  Graciela Barton   MRN:  [id number]    Patient not seen today secondary to Patient unwilling to participate (Ptappears very sleepy; reports that she didn't get any sleep last night and wants to sleep.). Will follow-up as able  6/3/2024  .

## 2024-06-04 LAB
ANION GAP SERPL CALC-SCNC: 10 MMOL/L (ref 8–16)
BASOPHILS # BLD AUTO: 0.02 K/UL (ref 0–0.2)
BASOPHILS NFR BLD: 0.2 % (ref 0–1.9)
BUN SERPL-MCNC: 24 MG/DL (ref 8–23)
CALCIUM SERPL-MCNC: 9.1 MG/DL (ref 8.7–10.5)
CHLORIDE SERPL-SCNC: 104 MMOL/L (ref 95–110)
CO2 SERPL-SCNC: 25 MMOL/L (ref 23–29)
CREAT SERPL-MCNC: 0.8 MG/DL (ref 0.5–1.4)
DIFFERENTIAL METHOD BLD: ABNORMAL
EOSINOPHIL # BLD AUTO: 0 K/UL (ref 0–0.5)
EOSINOPHIL NFR BLD: 0 % (ref 0–8)
ERYTHROCYTE [DISTWIDTH] IN BLOOD BY AUTOMATED COUNT: 11.7 % (ref 11.5–14.5)
EST. GFR  (NO RACE VARIABLE): >60 ML/MIN/1.73 M^2
GLUCOSE SERPL-MCNC: 128 MG/DL (ref 70–110)
HCT VFR BLD AUTO: 35.8 % (ref 37–48.5)
HGB BLD-MCNC: 11.7 G/DL (ref 12–16)
IMM GRANULOCYTES # BLD AUTO: 0.07 K/UL (ref 0–0.04)
IMM GRANULOCYTES NFR BLD AUTO: 0.6 % (ref 0–0.5)
LYMPHOCYTES # BLD AUTO: 0.8 K/UL (ref 1–4.8)
LYMPHOCYTES NFR BLD: 6.5 % (ref 18–48)
MCH RBC QN AUTO: 32 PG (ref 27–31)
MCHC RBC AUTO-ENTMCNC: 32.7 G/DL (ref 32–36)
MCV RBC AUTO: 98 FL (ref 82–98)
MONOCYTES # BLD AUTO: 0.8 K/UL (ref 0.3–1)
MONOCYTES NFR BLD: 6.5 % (ref 4–15)
NEUTROPHILS # BLD AUTO: 10.8 K/UL (ref 1.8–7.7)
NEUTROPHILS NFR BLD: 86.2 % (ref 38–73)
NRBC BLD-RTO: 0 /100 WBC
PLATELET # BLD AUTO: 247 K/UL (ref 150–450)
PMV BLD AUTO: 11.1 FL (ref 9.2–12.9)
POTASSIUM SERPL-SCNC: 4.4 MMOL/L (ref 3.5–5.1)
RBC # BLD AUTO: 3.66 M/UL (ref 4–5.4)
SODIUM SERPL-SCNC: 139 MMOL/L (ref 136–145)
WBC # BLD AUTO: 12.57 K/UL (ref 3.9–12.7)

## 2024-06-04 PROCEDURE — 63600175 PHARM REV CODE 636 W HCPCS: Performed by: NEUROLOGICAL SURGERY

## 2024-06-04 PROCEDURE — 94799 UNLISTED PULMONARY SVC/PX: CPT | Mod: XB

## 2024-06-04 PROCEDURE — 99900035 HC TECH TIME PER 15 MIN (STAT)

## 2024-06-04 PROCEDURE — 97165 OT EVAL LOW COMPLEX 30 MIN: CPT

## 2024-06-04 PROCEDURE — 97530 THERAPEUTIC ACTIVITIES: CPT

## 2024-06-04 PROCEDURE — 97535 SELF CARE MNGMENT TRAINING: CPT

## 2024-06-04 PROCEDURE — 36415 COLL VENOUS BLD VENIPUNCTURE: CPT | Performed by: NEUROLOGICAL SURGERY

## 2024-06-04 PROCEDURE — 97166 OT EVAL MOD COMPLEX 45 MIN: CPT

## 2024-06-04 PROCEDURE — 97161 PT EVAL LOW COMPLEX 20 MIN: CPT

## 2024-06-04 PROCEDURE — 80048 BASIC METABOLIC PNL TOTAL CA: CPT | Performed by: NEUROLOGICAL SURGERY

## 2024-06-04 PROCEDURE — 85025 COMPLETE CBC W/AUTO DIFF WBC: CPT | Performed by: NEUROLOGICAL SURGERY

## 2024-06-04 PROCEDURE — 92610 EVALUATE SWALLOWING FUNCTION: CPT

## 2024-06-04 PROCEDURE — 25000003 PHARM REV CODE 250: Performed by: NEUROLOGICAL SURGERY

## 2024-06-04 PROCEDURE — 94761 N-INVAS EAR/PLS OXIMETRY MLT: CPT

## 2024-06-04 RX ADMIN — MUPIROCIN: 20 OINTMENT TOPICAL at 09:06

## 2024-06-04 RX ADMIN — HEPARIN SODIUM 5000 UNITS: 5000 INJECTION INTRAVENOUS; SUBCUTANEOUS at 09:06

## 2024-06-04 RX ADMIN — POTASSIUM CHLORIDE 10 MEQ: 750 TABLET, EXTENDED RELEASE ORAL at 09:06

## 2024-06-04 RX ADMIN — ATORVASTATIN CALCIUM 20 MG: 20 TABLET, FILM COATED ORAL at 08:06

## 2024-06-04 RX ADMIN — LEVOTHYROXINE SODIUM 150 MCG: 75 TABLET ORAL at 05:06

## 2024-06-04 RX ADMIN — HEPARIN SODIUM 5000 UNITS: 5000 INJECTION INTRAVENOUS; SUBCUTANEOUS at 08:06

## 2024-06-04 RX ADMIN — ACETAMINOPHEN 650 MG: 325 TABLET ORAL at 11:06

## 2024-06-04 RX ADMIN — ACETAMINOPHEN 650 MG: 325 TABLET ORAL at 05:06

## 2024-06-04 RX ADMIN — FUROSEMIDE 40 MG: 40 TABLET ORAL at 09:06

## 2024-06-04 RX ADMIN — MUPIROCIN: 20 OINTMENT TOPICAL at 08:06

## 2024-06-04 RX ADMIN — CELECOXIB 200 MG: 100 CAPSULE ORAL at 09:06

## 2024-06-04 RX ADMIN — CELECOXIB 200 MG: 100 CAPSULE ORAL at 08:06

## 2024-06-04 RX ADMIN — BISACODYL 10 MG: 10 SUPPOSITORY RECTAL at 09:06

## 2024-06-04 RX ADMIN — SODIUM CHLORIDE, POTASSIUM CHLORIDE, SODIUM LACTATE AND CALCIUM CHLORIDE: 600; 310; 30; 20 INJECTION, SOLUTION INTRAVENOUS at 05:06

## 2024-06-04 RX ADMIN — AMIODARONE HYDROCHLORIDE 200 MG: 200 TABLET ORAL at 09:06

## 2024-06-04 NOTE — PT/OT/SLP EVAL
Speech Language Pathology Evaluation  Bedside Swallow    Patient Name:  Graciela Barton   MRN:  [id number]  Admitting Diagnosis: Cervical spondylosis with myelopathy and radiculopathy    Recommendations:                 Diet recommendations:  Soft & Bite Sized Diet - IDDSI Level 6, Thin liquids - IDDSI Level 0   Aspiration Precautions: upright for meals, slow rate, alternate sips and bites, whole meds one by one   General Precautions: Standard, fall  Communication strategies:  none    Assessment:     Graciela Barton is a 74 y.o. female s/p Cervical spondylosis with myelopathy and radiculopathy who is deemed safe for initiation of oral diet.     History per MD    INTERIM HISTORY:     This is a very pleasant 73 y.o. female, who is 7 months status post T6-7 and T7-8 anterior diskectomy for thoracic myelopathy, 3 months status post L3-4, L4-5 oblique interbody fusion, L5-S1 anterior interbody fusion L3-S1 posterior segmental instrumentation, L3-4, L4-5 laminectomy, medial facetectomy, foraminotomy.  Patient is doing rehabilitation.  She walks with a walker.  Her right leg weakness has partially improved.  Her preoperative left leg pain has completely subsided since surgery.  She is complaining of right shoulder pain that is intermittent.  She still has some gait imbalance.  Pain in the right shoulder radiates towards the right hand.  Her walking ability has partially improved.        Past Medical History:   Diagnosis Date    Arthritis     Cancer     Thyroid    Edema     Hyperlipidemia     Hypertension     Thyroid disease        Past Surgical History:   Procedure Laterality Date    ADENOIDECTOMY      COLONOSCOPY  10/18/2018    COLONOSCOPY N/A 10/18/2018    Procedure: COLONOSCOPY;  Surgeon: Yosvany Alejandra MD;  Location: [facility];  Service: General;  Laterality: N/A;    ECHOCARDIOGRAM,TRANSESOPHAGEAL N/A 10/4/2023    Procedure: Transesophageal echo (CARLYLE) intra-procedure log documentation;  Surgeon: Mark Ortega MD;   Location: [facility] OR;  Service: Cardiology;  Laterality: N/A;    EPIDURAL STEROID INJECTION N/A 9/30/2022    Procedure: LUMBAR L3/4 MALKA CONTRAST DIRRECT REFERRAL AUDIEROWENA DEBI IN CHART;  Surgeon: Nicholas Kumar MD;  Location: [facility] PAIN MGT;  Service: Pain Management;  Laterality: N/A;    FACETECTOMY OF VERTEBRA N/A 9/29/2023    Procedure: FACETECTOMY;  Surgeon: Scooter Salter MD;  Location: [facility] OR;  Service: Neurosurgery;  Laterality: N/A;    FORAMINOTOMY N/A 9/29/2023    Procedure: FORAMINOTOMY, SPINE;  Surgeon: Scooter Salter MD;  Location: [facility] OR;  Service: Neurosurgery;  Laterality: N/A;    FUSION OF LUMBAR SPINE USING POSTERIOR INTERBODY TECHNIQUE N/A 9/29/2023    Procedure: FUSION, SPINE, LUMBAR, PLIF;  Surgeon: Scooter Salter MD;  Location: [facility] OR;  Service: Neurosurgery;  Laterality: N/A;  L3-S1 posterior instrumentation   L3-L5 Lmainectomy sacro pelvic fixation Globus cage   -lop 3 hr   -los 5 days   -Lami segments 1  -anterior fusion insterspace 2  -general   -type and screen   -Ziehm brain notified cc  -EMG bairon notified cc  -SEP  -MEP  -TLSO       FUSION, SPINE, CERVICAL N/A 6/3/2024    Procedure: FUSION, SPINE, CERVICAL;  Surgeon: Scooter Salter MD;  Location: [facility] OR;  Service: Neurosurgery;  Laterality: N/A;  Procedure:C5-6,C6-7 ACDF and plate  Length of procedure:3 hours  LOS: 0-1 night  Anesthesia:General  Blood:Type and screen  Radiology:C-arm  SNS:EMG,SEP,MEP  Brace:[place]  Bed:Regular Bed  Headrest:Charles Wells, Horse shoe  Position:Prone  Equipment:Depuy-Charles, Orthof    HYSTERECTOMY  1992    total hyst     KNEE SURGERY      16 pins and two plates implanted    LAMINECTOMY N/A 9/29/2023    Procedure: LAMINECTOMY, SPINE;  Surgeon: Scooter Salter MD;  Location: [facility] OR;  Service: Neurosurgery;  Laterality: N/A;    MINIMALLY INVASIVE TRANSFORAMINAL LUMBAR INTERBODY FUSION (TLIF) N/A 9/29/2023    Procedure: FUSION, SPINE, LUMBAR, TLIF, MINIMALLY INVASIVE;  Surgeon: Gaetano  "Scooter WATSON MD;  Location: [facility] OR;  Service: Neurosurgery;  Laterality: N/A;  L3-L5 OLIF Globus ATP  L5-S1 ALIF Globus ATP  -Lami add segment 1  -anterior fusion interspances 2  -lop 3 hr   -general   -type and screen   -C arm   -EMG bairon notified cc  -SEP  -MEP  -TLSO  -Regular Bed   -Lateral right down   -globus reji    THORACIC DISCECTOMY Right 5/12/2023    Procedure: DISCECTOMY, SPINE, THORACIC Right T6-7, T7-8 interior discectomy retropleural;  Surgeon: Scooter Salter MD;  Location: [facility] OR;  Service: Neurosurgery;  Laterality: Right;  general  eliquis   type and screen  C-arm  Metrx  EMG bairon notified cc  SEP  MEP  Regular bed   lateral left down   globus (Jose Enrique) notified cc    THYROIDECTOMY      TONSILLECTOMY         Social History: Patient lives at home.    Prior Intubation HX:  for surgery only     Modified Barium Swallow: none in EMR    Prior diet: reg/thin prior to surgery.    Subjective     Consult received for clinical swallow eval this date, SLP did communicate with RN prior to eval/treat.    Patient goals: "I cannot do any more liquids, I need some solid food."     Pain/Comfort:  Pain Rating 1: 0/10    Respiratory Status: room air     Objective:   Pt seen in room, pt able to sit upright and SLP did elevate HOB.   Pt is awake, verbal and follows commands.   Pt eager to eat some solid food.     Oral Musculature Evaluation  Oral Musculature: WFL  Dentition: present and adequate  Secretion Management: adequate  Mucosal Quality: good, adequate  Mandibular Strength and Mobility: WFL  Oral Labial Strength and Mobility: WFL  Lingual Strength and Mobility:  (fair)  Buccal Strength and Mobility:  (fair)  Volitional Cough: elicited  Volitional Swallow: multiple swallows were observed  Voice Prior to PO Intake: clear    Bedside Swallow Eval:   Consistencies Assessed:  Thin liquids juice by straw  Puree pudding   Solids saltine cracker       Oral Phase:   WFL    Pharyngeal Phase:   no overt clinical " signs/symptoms of aspiration  no overt clinical signs/symptoms of pharyngeal dysphagia  multiple spontaneous swallows  No wet voice, coughing or choking appreciated across trials     Compensatory Strategies  Multiple swallows    Treatment: SLP provided patient education on SLP recommendations, SLP role, s/s and risks of aspiration, safe swallow precautions, and POC. The patient v/u of all discussed and is in agreement with POC.       Goals:   Multidisciplinary Problems       SLP Goals       Not on file                    Plan:     Patient to be seen:      Plan of Care expires:     Plan of Care reviewed with:  patient   SLP Follow-Up:  No       Discharge recommendations:   (pending PT and OT recs)   Barriers to Discharge:  None    Time Tracking:     SLP Treatment Date:   06/04/24  Speech Start Time:  1048  Speech Stop Time:  1113     Speech Total Time (min):  25 min    Billable Minutes: Eval Swallow and Oral Function 14 and Self Care/Home Management Training 11    06/04/2024

## 2024-06-04 NOTE — PT/OT/SLP EVAL
Physical Therapy Evaluation    Patient Name:  Graciela Barton   MRN:  [id number]    Recommendations:     Discharge Recommendations: Moderate Intensity Therapy   Discharge Equipment Recommendations: to be determined by next level of care   Barriers to discharge:  fall risk, increased assistance required, resides alone    Assessment:     Graciela Barton is a 74 y.o. female admitted with a medical diagnosis of Cervical spondylosis with myelopathy and radiculopathy.  She presents with the following impairments/functional limitations: weakness, impaired endurance, impaired self care skills, impaired functional mobility, gait instability, impaired balance, decreased lower extremity function, impaired muscle length, impaired joint extensibility, decreased ROM, impaired cognition, decreased safety awareness, edema Co-eval performed by PT/OT on first attempt OOB s/p cervical fusion post op day 1; prior to this admit for surgery, patient lived alone, was Andrey with ADLs, Andrey with use of transport wheelchair for mobility inside home using BLEs to propel and according to patient, she is able to amb in her home with a paid caregiver (friend) closely following with the chair; the hired help/friend is present for short duration daily to assist pt with household chores and for walking around the home as noted above; at this time, pt is limited to bed mobility with min/modA; pt with decreased safety, requiring frequent VCs for maintaining cervical spine precautions; recommend moderated intensity therapy when medically stable to maximize functional independence. .    Rehab Prognosis: Good and Fair; patient would benefit from acute skilled PT services to address these deficits and reach maximum level of function.    Recent Surgery: Procedure(s) (LRB):  FUSION, SPINE, CERVICAL (N/A) 1 Day Post-Op    Plan:     During this hospitalization, patient to be seen daily to address the identified rehab impairments via gait training, therapeutic  activities, therapeutic exercises, neuromuscular re-education, wheelchair management/training and progress toward the following goals:    Plan of Care Expires:  07/04/24    Subjective     Chief Complaint: pt reports not being able to access her second floor~2 yrs  Patient/Family Comments/goals: wants to return to being able to walk using RW with the support of  her friend   Pain/Comfort:  Pain Rating 1: 0/10  Pain Rating Post-Intervention 1:  (no s/s of pain - did not rate)    Patients cultural, spiritual, Sabianism conflicts given the current situation: no    Living Environment:  resides alone in a 2 story home with 2  steps to enter without handrails - in process of looking toward getting a ramp. Walk in shower with shower chair. Does not access 2nd floor of home  Previous level of function:  modified independent with ADLs, modified independent with household mobility with use of transport chair (using BLE to propel chair forward) and very short distance functional mobility with rolling walker (from threshold of bathroom to commode and back), and seated positioning performance of some light IADLs (ex has all items at countertop level or lower to reach from transport chair level). Has hired help/friend assist for short duration daily to assist in IADL home tasks and for walking around home with rolling walker and close supervision. Does not drive. Reports to be incontinent and self changes diapers at home. Uses adaptive dressing equipment  Roles and Routines: retired rehab RN  Equipment Used at Home: shower chair, walker, rolling, grab bar, other (see comments) (transport chair; elevated bed with bed rails; gait belt; owns spinal stimulator)  Assistance upon Discharge: limited; resides alone; does have occasional supports from friend    Objective:     Communicated with nurse prior to session.  Patient found HOB elevated with bed alarm, BONY drain, PureWick, SCD  upon PT entry to room.    General Precautions:  Standard, fall  Orthopedic Precautions:spinal precautions   Braces: Aspen collar  Respiratory Status: Room air    Exams:  Cognitive Exam:  Patient is oriented to Person, Place, Time, Situation, and follows one and two step commands primarily with decreased follow through at times, decreased safety awareness concerning use of cervical spinal precautions such as avoiding pulling on overhead bed rails, reaching above head  Gross Motor Coordination:  decreased fluidity of movement; appears to have increased adductor/ext pattern tone BLEs L>R  Postural Exam:  Patient presented with the following abnormalities:    -       Rounded shoulders  -       while lying in bed, feet are shifted to R along with trunk  Sensation:    -       Intact  light/touch BLEs  Skin Integrity/Edema:      -       Skin integrity: closed suction drain present  -       Edema: Mild B feet  RLE ROM: deficits: hip fl~85*, knee ext~-30*, knee fl~90*, df~neutral  RLE Strength: hip fl~3+, remaining WFL~4  LLE ROM: deficits: hip fl~80*, knee fl~45*, df~neutral  LLE Strength: hip fl~3+, remaining~4    Functional Mobility:  Bed Mobility:     Rolling Left/Right:  minimal assistance with moderate VCs/TCs for safety and for performance of log roll technique  Supine <> Sit: moderate assistance with increased time/effort and VCs for safety and effective technique  Transfers/Gait: pt sat at EOB with CG/SBA for ~5 min; deferred sit to stand 2/2 safety      AM-PAC 6 CLICK MOBILITY  Total Score:9     Treatment & Education:  Pt educated on role of PT/POC  Co-eval with OT in consideration of first attempt OOB post cervical surgery  Educated pt on cervical precautions, log roll technique (also in coordination of pursed lip breathing), positioning of collar with limited understanding demonstrated.  Interview initiated with pt answering questions then adding more information as session progressed- extended questioning to understand PLOF; initially reported instructed to  wear spinal stimulator present at bedside then reported she didn't have to wear the device  Initiated instruction in mobility with emphasis on cervical precautions for activities as above  *Deferred out of bed effort this date  conservative approach - secure chat to be sent to Dr. Salter for clarification for spinal stimulator wearing schedule.*   Educated patient in wearing ASPEN collar at all times per MD  Educated patient on posture in bed and rolling/shifting wt to prevent pressure areas  Assisted pt to HOB via drawsheet and pt assisting with BLEs with max VCs to prevent pt from pulling on headboard with UEs  Educated on BLE AROM for DVT prophylaxis, lubrication of joints      Patient left HOB elevated with all lines intact, call button in reach, bed alarm on, and nurse notified.    GOALS:   Multidisciplinary Problems       Physical Therapy Goals          Problem: Physical Therapy    Goal Priority Disciplines Outcome Goal Variances Interventions   Physical Therapy Goal     PT, PT/OT Progressing     Description: Goals to be met by: 2024     Patient will increase functional independence with mobility by performin. Supine to sit with Modified [place]  2. Sit to supine with Modified [place]  3. Rolling with Modified [place].  4. Sit to stand transfer with Stand-by Assistance using Rolling Walker  5. Bed to chair transfer with Stand-by Assistance using Rolling Walker  6. Gait  x 150 with Stand-by Assistance using Rolling Walker.   7. Wheelchair propulsion 2 x 50 feet with Supervision using bilateral lower extremities  8. Ascend/Descend on inch curb step with Minimal Assistance using Rolling Walker.  9. Lower extremity exercise program x10 reps with supervision                         History:     Past Medical History:   Diagnosis Date    Arthritis     Cancer     Thyroid    Edema     Hyperlipidemia     Hypertension     Thyroid disease        Past Surgical History:   Procedure Laterality  Date    ADENOIDECTOMY      COLONOSCOPY  10/18/2018    COLONOSCOPY N/A 10/18/2018    Procedure: COLONOSCOPY;  Surgeon: Yosvany Alejandra MD;  Location: [facility] ENDO;  Service: General;  Laterality: N/A;    ECHOCARDIOGRAM,TRANSESOPHAGEAL N/A 10/4/2023    Procedure: Transesophageal echo (CARLYLE) intra-procedure log documentation;  Surgeon: Mark Ortega MD;  Location: [facility] OR;  Service: Cardiology;  Laterality: N/A;    EPIDURAL STEROID INJECTION N/A 9/30/2022    Procedure: LUMBAR L3/4 MALKA CONTRAST DIRRECT REFERRAL MONICA CARRERA IN CHART;  Surgeon: Nicholas Kumar MD;  Location: [facility] PAIN [facility]GT;  Service: Pain Management;  Laterality: N/A;    FACETECTOMY OF VERTEBRA N/A 9/29/2023    Procedure: FACETECTOMY;  Surgeon: Scooter Salter MD;  Location: [facility];  Service: Neurosurgery;  Laterality: N/A;    FORAMINOTOMY N/A 9/29/2023    Procedure: FORAMINOTOMY, SPINE;  Surgeon: Scooter Salter MD;  Location: [facility] OR;  Service: Neurosurgery;  Laterality: N/A;    FUSION OF LUMBAR SPINE USING POSTERIOR INTERBODY TECHNIQUE N/A 9/29/2023    Procedure: FUSION, SPINE, LUMBAR, PLIF;  Surgeon: Scooter Salter MD;  Location: [facility] OR;  Service: Neurosurgery;  Laterality: N/A;  L3-S1 posterior instrumentation   L3-L5 Lmainectomy sacro pelvic fixation Globus cage   -lop 3 hr   -los 5 days   -Lami segments 1  -anterior fusion insterspace 2  -general   -type and screen   -Ziehm brain notified cc  -EMG bairon notified cc  -SEP  -MEP  -TLSO       FUSION, SPINE, CERVICAL N/A 6/3/2024    Procedure: FUSION, SPINE, CERVICAL;  Surgeon: Scooter Salter MD;  Location: [facility];  Service: Neurosurgery;  Laterality: N/A;  Procedure:C5-6,C6-7 ACDF and plate  Length of procedure:3 hours  LOS: 0-1 night  Anesthesia:General  Blood:Type and screen  Radiology:C-arm  SNS:EMG,SEP,MEP  Brace:[place]  Bed:Regular Bed  Headrest:Charles Wells, Horse shoe  Position:Prone  Equipment:Dewey-Kasia Soto    HYSTERECTOMY  1992    total hyst     KNEE SURGERY       16 pins and two plates implanted    LAMINECTOMY N/A 9/29/2023    Procedure: LAMINECTOMY, SPINE;  Surgeon: Scooter Salter MD;  Location: [facility] OR;  Service: Neurosurgery;  Laterality: N/A;    MINIMALLY INVASIVE TRANSFORAMINAL LUMBAR INTERBODY FUSION (TLIF) N/A 9/29/2023    Procedure: FUSION, SPINE, LUMBAR, TLIF, MINIMALLY INVASIVE;  Surgeon: Scooter Salter MD;  Location: [facility] OR;  Service: Neurosurgery;  Laterality: N/A;  L3-L5 OLIF Globus ATP  L5-S1 ALIF Globus ATP  -Lami add segment 1  -anterior fusion interspances 2  -lop 3 hr   -general   -type and screen   -C arm   -EMG bairon notified cc  -SEP  -MEP  -TLSO  -Regular Bed   -Lateral right down   -globus reji    THORACIC DISCECTOMY Right 5/12/2023    Procedure: DISCECTOMY, SPINE, THORACIC Right T6-7, T7-8 interior discectomy retropleural;  Surgeon: Scooter Salter MD;  Location: [facility] OR;  Service: Neurosurgery;  Laterality: Right;  general  eliquis   type and screen  C-arm  Metrx  EMG bairon notified cc  SEP  MEP  Regular bed   lateral left down   globus (Jose Enrique) notified cc    THYROIDECTOMY      TONSILLECTOMY         Time Tracking:     PT Received On: 06/04/24  PT Start Time: 0955     PT Stop Time: 1050  PT Total Time (min): 55 min     Billable Minutes: Evaluation 20 and Therapeutic Activity 10 Self Care 10      06/04/2024

## 2024-06-04 NOTE — PLAN OF CARE
SOCIAL WORK DISCHARGE PLANNING ASSESSMENT    SW completed discharge planning assessment with pt in pt's room K510. Pt was easily engaged and education on the role of  was provided. Pt reported she lives alone. Pt stated she has good support from friends and advised her friend Jennifer ([phone]) will provide assistance as needed after returning home. Pt stated Jennifer is good support for her, but her  is currently in rehab so she might not be able to help as much as she use to. Pt stated she does have a neighbor who comes to see her every day to help with cooking, mail, etc. and they try to go for walk around the neighbor everyday. Pt stated she has the following DME: rw, shower chair, and rails in bathroom. Pt requested a wheelchair upon discharge. SW will attempt to get pt a wheelchair upon discharge. Pt stated she is current with [facility] and wishes to resume services with them upon discharge. SW sent referral to [facility] via careport so therefore pt can resume services upon discharge. Pt's friends drives her to doctor appointment and Jennifer will provide transportation home following discharge. Pt was encouraged to call with any questions or concerns. Pt verbalized understanding.     11:55a.m. PT/OT are recommending SNF placement upon discharge.   SW met with pt to discuss SNF placement upon discharge. Pt is agreeable to SNF placement upon discharge. Pt stated her first choice is [facility]. Pt stated she is open to any SNF facility as long as its not [facility]. SW sent SNF referrals via careport.     Future Appointments   Date Time Provider Department Center   6/18/2024  9:00 AM [facility] ODC XR-A LIMIT 350 LBS [facility] XRAY [facility]   6/18/2024 10:00 AM Taryn Tang PA-C [facility] NEUROSU [facility]   7/17/2024 10:00 AM [facility] ODC XR-A LIMIT 350 LBS [facility] XRAY OP [facility]   7/17/2024 11:00 AM Taryn Tang PA-C [facility] NEUROSU [facility]   8/1/2024 11:20 AM Nithin  Rcoco BEAR MD [facility] CARDIO [facility]   8/14/2024  9:30 AM Silvia Giordano NP [facility] PRCAR [facility]   9/4/2024 10:30 AM [facility] ODC XR-A LIMIT 350 LBS [facility] XRAY OP [facility]   9/4/2024 11:30 AM Scooter Salter MD [facility] NEUROSU [facility]        Patient Active Problem List   Diagnosis    Postoperative hypothyroidism    History of thyroid cancer    Essential hypertension, benign    Mixed hyperlipidemia    Benign paroxysmal positional vertigo    History of colon polyps    Bilateral post-traumatic osteoarthritis of knee    Paroxysmal tachycardia    DDD (degenerative disc disease), lumbar    Lumbar radiculopathy    Atrial fibrillation with RVR    PAF (paroxysmal atrial fibrillation)    Multiple sclerosis    Anticoagulant long-term use    Spinal cord compression    Aortic atherosclerosis    Preprocedural cardiovascular examination    Atelectasis    Typical atrial flutter    Chronic myelopathy    Decreased range of motion of right knee    Bilateral leg edema    Assistance needed for mobility    Acute blood loss anemia    S/P lumbar spinal fusion    Pulmonary HTN    Cervical spondylosis with myelopathy and radiculopathy    Mild protein-calorie malnutrition    Major depressive disorder, single episode, mild    S/P lumbar fusion      06/04/24 0957   Discharge Assessment   Assessment Type Discharge Planning Assessment   Confirmed/corrected address, phone number and insurance Yes   Confirmed Demographics Correct on Facesheet   Source of Information patient   If unable to respond/provide information was family/caregiver contacted? No Contact Information Available   Communicated ASYA with patient/caregiver Date not available/Unable to determine   People in Home alone   Facility Arrived From: home   Do you expect to return to your current living situation? Yes   Do you have help at home or someone to help you manage your care at home? Yes   Who are your caregiver(s) and their phone number(s)? pt's friend Jennifer  [phone]   Prior to hospitilization cognitive status: Alert/Oriented   Current cognitive status: Alert/Oriented   Walking or Climbing Stairs Difficulty yes   Walking or Climbing Stairs ambulation difficulty, requires equipment   Dressing/Bathing Difficulty yes   Dressing/Bathing bathing difficulty, requires equipment   Home Accessibility wheelchair accessible   Equipment Currently Used at Home walker, rolling;shower chair;grab bar   Readmission within 30 days? No   Patient currently being followed by outpatient case management? No   Do you currently have service(s) that help you manage your care at home? Yes   Name and Contact number of agency [facility]   Is the pt/caregiver preference to resume services with current agency Yes   Do you take prescription medications? Yes   Do you have prescription coverage? Yes   Coverage Medicare   Do you have any problems affording any of your prescribed medications? No   Is the patient taking medications as prescribed? yes   Who is going to help you get home at discharge? pt's friend Jennifer [phone]   How do you get to doctors appointments? family or friend will provide   Are you on dialysis? No   Do you take coumadin? No   Discharge Plan A Home Health   DME Needed Upon Discharge  wheelchair  (Pt is requesting a wheelchair upon discharge.)   Discharge Plan discussed with: Patient   Transition of Care Barriers None

## 2024-06-04 NOTE — PLAN OF CARE
VN note: progress notes, labs and vital signs reviewed. Will be available to intervene if needed.     Problem: Adult Inpatient Plan of Care  Goal: Plan of Care Review  Outcome: Progressing

## 2024-06-04 NOTE — PLAN OF CARE
Occupational therapy evaluation completed, co-evaluation with physical therapy 2/2 first attempt out of bed s/p cervical fusion  by Dr. Salter post op Day 1. At baseline patient is usually functioning at variance level, with presurgery functioning of residing alone, modified independent with ADLs, modified independent with household mobility with use of transport chair (using BLE to propel chair forward) and very short distance functional mobility with rolling walker, and seated positioning performance of some light IADLs. Has hired help/friend assist for short duration daily to assist in IADL home tasks and for walking around home with rolling walker and close supervision. On eval this date, patient limited to performing bed mobility with minimal to moderate assist, with inability to progress out of bed this date. Increased assist for ADL at this time and decreased safety noted as requiring frequent verbal cues during session to maintain cervical spinal precautions. Recommend moderate intensity therapy when medically stable.     Problem: Occupational Therapy  Goal: Occupational Therapy Goal  Description: Goals to be met by: 7/2/2024     Patient will increase functional independence with ADLs by performing:    UE Dressing with Set-up Assistance.  LE Dressing with Stand-by Assistance with use of adaptive dressing equipment and implementation of spinal precautions  Toileting from toilet with Stand-by Assistance for hygiene and clothing management.   Toilet transfer to toilet with Stand-by Assistance with use of least restrictive device and implementation of spinal precautions.  100% reciprocation and integration of 3/3 spinal precautions, DME recommendations, and ADL/task modifications post fusion to support safe d/c at highest level of function.    **assess further out of bed efforts    Outcome: Progressing

## 2024-06-04 NOTE — PLAN OF CARE
AOX4. VS stable. Safety maintained. Meds given per MAR. Denies pain or N/V at this time. Aspen collar in place. Dressing to right anterior neck CDI. BONY drain to right anterior neck in place draining sanguinous drainage. IV fluids infusing per orders. Clear liquid diet maintained. Neurovascular checks Q 4. Resting quietly. SR up X 2. Call light in reach. Bed alarm set. Pt denies any further needs at this time. Plan of care ongoing.       Problem: Adult Inpatient Plan of Care  Goal: Plan of Care Review  Outcome: Progressing  Goal: Patient-Specific Goal (Individualized)  Outcome: Progressing     Problem: Wound  Goal: Optimal Wound Healing  Outcome: Progressing     Problem: Spinal Surgery  Goal: Optimal Neurologic Function  Outcome: Progressing     Problem: Hypertension Acute  Goal: Blood Pressure Within Desired Range  Outcome: Progressing

## 2024-06-04 NOTE — PLAN OF CARE
Problem: Physical Therapy  Goal: Physical Therapy Goal  Description: Goals to be met by: 2024     Patient will increase functional independence with mobility by performin. Supine to sit with Modified Story  2. Sit to supine with Modified Story  3. Rolling with Modified Story.  4. Sit to stand transfer with Stand-by Assistance using Rolling Walker  5. Bed to chair transfer with Stand-by Assistance using Rolling Walker  6. Gait  x 150 with Stand-by Assistance using Rolling Walker.   7. Wheelchair propulsion 2 x 50 feet with Supervision using bilateral lower extremities  8. Ascend/Descend on inch curb step with Minimal Assistance using Rolling Walker.  9. Lower extremity exercise program x10 reps with supervision    Outcome: Progressing   Co-eval performed by PT/OT on first attempt OOB s/p cervical fusion post op day 1; prior to this admit for surgery, patient lived alone, was Andrey with ADLs, Andrey with use of transport wheelchair for mobility inside home using BLEs to propel and according to patient, she is able to amb in her home with a paid caregiver (friend) closely following with the chair; the hired help/friend is present for short duration daily to assist pt with household chores and for walking around the home as noted above; at this time, pt is limited to bed mobility with min/modA; pt with decreased safety, requiring frequent VCs for maintaining cervical spine precautions; recommend moderated intensity therapy when medically stable to maximize functional independence.

## 2024-06-04 NOTE — PT/OT/SLP EVAL
Occupational Therapy   Evaluation and treatment    Name: Graciela Barton  MRN: [id number]  Admitting Diagnosis: Cervical spondylosis with myelopathy and radiculopathy  Recent Surgery: Procedure(s) (LRB):  FUSION, SPINE, CERVICAL (N/A) 1 Day Post-Op    Recommendations:     Discharge Recommendations: Moderate Intensity Therapy  Discharge Equipment Recommendations:  to be determined by next level of care  Barriers to discharge:  Other (Comment) (resides alone; increased assist needed; fall risk)    Assessment:     Graciela Barton is a 74 y.o. female with a medical diagnosis of Cervical spondylosis with myelopathy and radiculopathy.  She presents with ..The primary encounter diagnosis was Cervical spondylosis with myelopathy and radiculopathy. Diagnoses of S/P lumbar fusion, Essential hypertension, benign, Atrial fibrillation with RVR, and Chronic myelopathy were also pertinent to this visit.  . Performance deficits affecting function: weakness, impaired endurance, impaired self care skills, impaired functional mobility, gait instability, impaired balance, decreased lower extremity function, decreased safety awareness, impaired muscle length, impaired joint extensibility, decreased ROM, impaired cognition.      Occupational therapy evaluation completed, co-evaluation with physical therapy 2/2 first attempt out of bed s/p cervical fusion  by Dr. Salter post op Day 1. At baseline patient is usually functioning at variance level, with presurgery functioning of residing alone, modified independent with ADLs, modified independent with household mobility with use of transport chair (using BLE to propel chair forward) and very short distance functional mobility with rolling walker, and seated positioning performance of some light IADLs. Has hired help/friend assist for short duration daily to assist in IADL home tasks and for walking around home with rolling walker and close supervision. On eval this date, patient limited to performing  bed mobility with minimal to moderate assist, with inability to progress out of bed this date. Increased assist for ADL at this time and decreased safety noted as requiring frequent verbal cues during session to maintain cervical spinal precautions. Recommend moderate intensity therapy when medically stable.       Rehab Prognosis: Good and Fair; patient would benefit from acute skilled OT services to address these deficits and reach maximum level of function.       Plan:     Patient to be seen 5 x/week to address the above listed problems via self-care/home management, therapeutic activities, therapeutic exercises, wheelchair management/training  Plan of Care Expires: 07/02/24  Plan of Care Reviewed with: patient    Subjective     Chief Complaint: reports that she has had difficulties for awhile, has not been able to access upstairs of her home for ~ 2 years   Patient/Family Comments/goals: indicates goal for returning to be able to walk with support of her friend and use of rolling walker    Occupational Profile:  Living Environment: resides alone in a 2 story home with 2  steps to enter without handrails - in process of looking toward getting a ramp. Walk in shower with shower chair. Does not access 2nd floor of home  Previous level of function:  modified independent with ADLs, modified independent with household mobility with use of transport chair (using BLE to propel chair forward) and very short distance functional mobility with rolling walker (from threshold of bathroom to commode and back), and seated positioning performance of some light IADLs (ex has all items at countertop level or lower to reach from transport chair level). Has hired help/friend assist for short duration daily to assist in IADL home tasks and for walking around home with rolling walker and close supervision. Does not drive. Reports to be incontinent and self changes diapers at home. Uses adaptive dressing equipment  Roles and Routines:  retired rehab RN  Equipment Used at Home: shower chair, walker, rolling, grab bar, other (see comments) (transport chair; elevated bed with bed rails; gait belt; owns spinal stimulator)  Assistance upon Discharge: limited; resides alone; does have occasional supports from friend    Pain/Comfort:  Pain Rating 1: other (see comments) (no reports of pain)  Pain Addressed 1: Reposition, Nurse notified  Pain Rating Post-Intervention 1: other (see comments) (none rated)      Objective:     Communicated with: nursing prior to session.  Patient found HOB elevated with bed alarm, BONY drain, PureWick, SCD upon OT entry to room.    General Precautions: Standard, fall  Orthopedic Precautions: spinal precautions  Braces: Aspen collar  Respiratory Status: Room air    Occupational Performance:    Bed Mobility:    Patient completed Rolling/Turning to Left and to Right with  minimal assist, moderate verbal cues/tactile cues for safety and for performance of log roll technique  Patient completed Supine to Sit and Sit to Supine with moderate assist, increased time and verbal cues for safety    Functional Mobility/Transfers:  Functional Mobility: eob sitting with CGA/SBA x ~ 5 minutes; deferred sit to stand effort this date due to safety    Activities of Daily Living:  Feeding:   pending SLP eval; demonstrates capacity to bring hand  to mouth; requires verbal safety cues to instruct on necessity to avoid chin tuck (2/2 cervical spinal precautions - SLP informed)  Upper Body Dressing: limited assessment; minimal assist  Lower Body Dressing: total assist  Toileting: total assist; bed level    Cognitive/Visual Perceptual:  Cognitive/Psychosocial Skills:     -       Oriented to: Person, Place, Time, and Situation   -       Follows Commands/attention:follows two step commands most of the time with exception of poor carryover/ follow through of cervical spinal precautions/ awareness of necessity to avoid pulling on bed rails overhead  -        Safety awareness/insight to disability: decreased   -       Mood/Affect/Coping skills/emotional control: Cooperative    Physical Exam:  Skin integrity: ASPEN collar in place; BONY drain (surgical incision R neck)  Sensation:    -       Intact  light/touch BUE  Dominant hand:    -       right  Upper Extremity Range of Motion:     -       Right Upper Extremity: WFL  -       Left Upper Extremity: WFL  Upper Extremity Strength:    -       Right Upper Extremity: WFL  -       Left Upper Extremity: WFL  Fine Motor Coordination:    -       Intact  Left hand, diadochokinesis skill  and Right hand, diadochokinesis skill   Lower extremity: lacking full R knee extension; presentation of decreased LLE ROM     AMPAC 6 Click ADL:  AMPA Total Score: 14    Treatment & Education:  Patient educated on role of OT/ POC development.   Co-evaluation with physical therapy in consideration of first attempt out of bed s/p cervical surgery.   Patient educated on cervical precautions, log rolling technique, positioning of collar; limited reciprocation noted.   Occupational profile developed via extended question / answers with follow up inquiries to understand PLOF. Patient initially reporting instructed to wear spinal stimulator present at bedside then reporting no longer requiring to wear device.  ADL / functional mobility training performed as above in supine and seated eob via adaptive approach, hand placement cues, strategies for breathing with movement.  *Deferred out of bed effort this date 2/2 conservative approach - secure chat to be sent to Dr. Salter for clarification for spinal stimulator wearing schedule.*  Educated patient on need to wear ASPEN collar at all times as directed by MD.    Educated patient will progress as able and necessity to move/redistribute weight for pressure injury prevention.  Guided for scooting up in bed with max verbal cues to prevent pulling overhead with BUE.      Patient left HOB elevated with all lines  intact, call button in reach, bed alarm on, and nursing notified    GOALS:   Multidisciplinary Problems       Occupational Therapy Goals          Problem: Occupational Therapy    Goal Priority Disciplines Outcome Interventions   Occupational Therapy Goal     OT, PT/OT Progressing    Description: Goals to be met by: 7/2/2024     Patient will increase functional independence with ADLs by performing:    UE Dressing with Set-up Assistance.  LE Dressing with Stand-by Assistance with use of adaptive dressing equipment and implementation of spinal precautions  Toileting from toilet with Stand-by Assistance for hygiene and clothing management.   Toilet transfer to toilet with Stand-by Assistance with use of least restrictive device and implementation of spinal precautions.  100% reciprocation and integration of 3/3 spinal precautions, DME recommendations, and ADL/task modifications post fusion to support safe d/c at highest level of function.    **assess further out of bed efforts                         History:     Past Medical History:   Diagnosis Date    Arthritis     Cancer     Thyroid    Edema     Hyperlipidemia     Hypertension     Thyroid disease          Past Surgical History:   Procedure Laterality Date    ADENOIDECTOMY      COLONOSCOPY  10/18/2018    COLONOSCOPY N/A 10/18/2018    Procedure: COLONOSCOPY;  Surgeon: Yosvany Alejandra MD;  Location: [facility] ENDO;  Service: General;  Laterality: N/A;    ECHOCARDIOGRAM,TRANSESOPHAGEAL N/A 10/4/2023    Procedure: Transesophageal echo (CARLYLE) intra-procedure log documentation;  Surgeon: Mark Ortega MD;  Location: [facility] OR;  Service: Cardiology;  Laterality: N/A;    EPIDURAL STEROID INJECTION N/A 9/30/2022    Procedure: LUMBAR L3/4 MALKA CONTRAST DIRRECT REFERRAL MONICA CARRERA IN CHART;  Surgeon: Nicholas Kumar MD;  Location: [facility] PAIN MGT;  Service: Pain Management;  Laterality: N/A;    FACETECTOMY OF VERTEBRA N/A 9/29/2023    Procedure: FACETECTOMY;  Surgeon: Gaetano  Scooter WATSON MD;  Location: [facility] OR;  Service: Neurosurgery;  Laterality: N/A;    FORAMINOTOMY N/A 9/29/2023    Procedure: FORAMINOTOMY, SPINE;  Surgeon: Scooter Salter MD;  Location: [facility] OR;  Service: Neurosurgery;  Laterality: N/A;    FUSION OF LUMBAR SPINE USING POSTERIOR INTERBODY TECHNIQUE N/A 9/29/2023    Procedure: FUSION, SPINE, LUMBAR, PLIF;  Surgeon: Scooter Salter MD;  Location: [facility] OR;  Service: Neurosurgery;  Laterality: N/A;  L3-S1 posterior instrumentation   L3-L5 Lmainectomy sacro pelvic fixation Globus cage   -lop 3 hr   -los 5 days   -Lami segments 1  -anterior fusion insterspace 2  -general   -type and screen   -Ziehm brain notified cc  -EMG bairon notified cc  -SEP  -MEP  -TLSO       FUSION, SPINE, CERVICAL N/A 6/3/2024    Procedure: FUSION, SPINE, CERVICAL;  Surgeon: Scooter Salter MD;  Location: [facility] OR;  Service: Neurosurgery;  Laterality: N/A;  Procedure:C5-6,C6-7 ACDF and plate  Length of procedure:3 hours  LOS: 0-1 night  Anesthesia:General  Blood:Type and screen  Radiology:C-arm  SNS:EMG,SEP,MEP  Brace:Ayer  Bed:Regular Bed  Headrest:Charles Wells, Horse shoe  Position:Prone  Equipment:Depuy-Charles, Orthof    HYSTERECTOMY  1992    total hyst     KNEE SURGERY      16 pins and two plates implanted    LAMINECTOMY N/A 9/29/2023    Procedure: LAMINECTOMY, SPINE;  Surgeon: Scooter Salter MD;  Location: [facility] OR;  Service: Neurosurgery;  Laterality: N/A;    MINIMALLY INVASIVE TRANSFORAMINAL LUMBAR INTERBODY FUSION (TLIF) N/A 9/29/2023    Procedure: FUSION, SPINE, LUMBAR, TLIF, MINIMALLY INVASIVE;  Surgeon: Scooter Salter MD;  Location: [facility] OR;  Service: Neurosurgery;  Laterality: N/A;  L3-L5 OLIF Globus ATP  L5-S1 ALIF Globus ATP  -Lami add segment 1  -anterior fusion interspances 2  -lop 3 hr   -general   -type and screen   -C arm   -EMG bairon notified cc  -SEP  -MEP  -TLSO  -Regular Bed   -Lateral right down   -viryus reji    THORACIC DISCECTOMY Right 5/12/2023     Procedure: DISCECTOMY, SPINE, THORACIC Right T6-7, T7-8 interior discectomy retropleural;  Surgeon: Scooter Salter MD;  Location: [facility];  Service: Neurosurgery;  Laterality: Right;  general  eliquis   type and screen  C-arm  Metrx  EMG bairon notified cc  SEP  MEP  Regular bed   lateral left down   globus (Jose Enrique) notified cc    THYROIDECTOMY      TONSILLECTOMY         Time Tracking:     OT Date of Treatment: 06/04/24  OT Start Time: 0955  OT Stop Time: 1050  OT Total Time (min): 55 min total time    Billable Minutes:Evaluation 20 min  Self Care/Home Management 10 min  Therapeutic Activity 10 min    6/4/2024

## 2024-06-04 NOTE — PROGRESS NOTES
[facility] Surg  Neurosurgery  Progress Note    Subjective:     Interval History: No arm pain or paresthesias.  No difficulty swallowing liquids.    History of Present Illness:     This is a very pleasant 73 y.o. female, who is 7 months status post T6-7 and T7-8 anterior diskectomy for thoracic myelopathy, 3 months status post L3-4, L4-5 oblique interbody fusion, L5-S1 anterior interbody fusion L3-S1 posterior segmental instrumentation, L3-4, L4-5 laminectomy, medial facetectomy, foraminotomy. Patient is doing rehabilitation. She walks with a walker. Her right leg weakness has partially improved. Her preoperative left leg pain has completely subsided since surgery. She is complaining of right shoulder pain that is intermittent. She still has some gait imbalance. Pain in the right shoulder radiates towards the right hand. Her walking ability has partially improved.     Post-Op Info:  Procedure(s) (LRB):  FUSION, SPINE, CERVICAL (N/A)   1 Day Post-Op      Medications:  Continuous Infusions:   lactated ringers   Intravenous Continuous 50 mL/hr at 06/04/24 0547 Rate Verify at 06/04/24 0547     Scheduled Meds:   acetaminophen  650 mg Oral Q6H    amiodarone  200 mg Oral Daily    atorvastatin  20 mg Oral QHS    bisacodyL  10 mg Rectal Daily    celecoxib  200 mg Oral BID    furosemide  40 mg Oral Daily    heparin (porcine)  5,000 Units Subcutaneous Q12H    levothyroxine  150 mcg Oral Before breakfast    mupirocin   Nasal BID    potassium chloride SA  10 mEq Oral Daily     PRN Meds:  Current Facility-Administered Medications:     aluminum-magnesium hydroxide-simethicone, 30 mL, Oral, Q4H PRN    HYDROmorphone, 1 mg, Subcutaneous, Q3H PRN    meclizine, 25 mg, Oral, TID PRN    methocarbamoL, 750 mg, Oral, TID PRN    ondansetron, 8 mg, Oral, Q6H PRN    oxyCODONE-acetaminophen, 1 tablet, Oral, Q4H PRN    prochlorperazine, 5 mg, Intravenous, Q6H PRN    senna-docusate 8.6-50 mg, 2 tablet, Oral, Nightly PRN    sodium chloride  0.9%, 3 mL, Intravenous, PRN    traMADoL, 50 mg, Oral, Q6H PRN     Review of Systems  Objective:     Weight: 81.6 kg (179 lb 14.3 oz)  Body mass index is 28.18 kg/m².  Vital Signs (Most Recent):  Temp: 97.8 °F (36.6 °C) (06/04/24 0723)  Pulse: 64 (06/04/24 0723)  Resp: 20 (06/04/24 0723)  BP: 135/63 (06/04/24 0723)  SpO2: 95 % (06/04/24 0728) Vital Signs (24h Range):  Temp:  [96.8 °F (36 °C)-97.9 °F (36.6 °C)] 97.8 °F (36.6 °C)  Pulse:  [52-70] 64  Resp:  [10-20] 20  SpO2:  [94 %-99 %] 95 %  BP: (125-185)/(58-86) 135/63     Date 06/04/24 0700 - 06/05/24 0659   Shift 8862-5746 8135-5481 9915-8028 24 Hour Total   INTAKE   P.O. 581   581   Shift Total(mL/kg) 581(7.1)   581(7.1)   OUTPUT   Shift Total(mL/kg)       Weight (kg) 81.6 81.6 81.6 81.6                            Closed/Suction Drain 06/03/24 0940 Tube - 1 Anterior Neck Bulb 10 Fr. (Active)   Site Description Unable to view 06/03/24 2023   Dressing Type Transparent (Tegaderm) 06/03/24 2023   Dressing Status Clean;Dry;Intact 06/03/24 2023   Dressing Intervention Integrity maintained 06/03/24 2023   Drainage Bloody 06/03/24 2023   Status To bulb suction 06/03/24 2023   Output (mL) 20 mL 06/03/24 2358       Female External Urinary Catheter w/ Suction 06/03/24 1800 (Active)   Date of last wick change 06/04/24 06/04/24 0619   Time of last wick change 0320 06/04/24 0619   Output (mL) 100 mL 06/04/24 0618       Neurosurgery Physical Exam    General: well developed, well nourished, no distress  Mental Status: Awake, Alert, Oriented X3.Thought content appropriate  GCS: Motor: 6/Verbal: 5/Eyes: 4 GCS Total: 15    Motor Strength:  Strength  Deltoids Triceps Biceps Wrist Extension Wrist Flexion Hand    Upper: R 5/5 5/5 5/5 5/5 5/5 5/5    L 5/5 5/5 5/5 5/5 5/5 5/5     HF KF KE DF PF EHL   Lower: R 5/5 5/5 5/5 5/5 5/5 5/5    L 5/5 5/5 5/5 5/5 5/5 5/5       Betancourt: absent B/L  Clonus: absent B/L  Incision- CDI  Drain- 90      Significant Labs:  Recent Labs   Lab  "06/04/24  0547   *      K 4.4      CO2 25   BUN 24*   CREATININE 0.8   CALCIUM 9.1     Recent Labs   Lab 06/04/24  0547   WBC 12.57   HGB 11.7*   HCT 35.8*        No results for input(s): "LABPT", "INR", "APTT" in the last 48 hours.  Microbiology Results (last 7 days)       ** No results found for the last 168 hours. **              Assessment/Plan:     Active Diagnoses:    Diagnosis Date Noted POA    PRINCIPAL PROBLEM:  Cervical spondylosis with myelopathy and radiculopathy [M47.12, M47.22] 02/12/2024 Yes    S/P lumbar fusion [Z98.1] 06/03/2024 Not Applicable    PAF (paroxysmal atrial fibrillation) [I48.0] 08/10/2022 Yes    History of thyroid cancer [Z85.850] 02/19/2018 Not Applicable    Essential hypertension, benign [I10] 02/19/2018 Yes      Problems Resolved During this Admission:     A/P:  POD #1 C5-6, C6-7 ACDF     --Neurologically stable          -q4h neuro checks  --Imaging: Post op xrays pending  --Drains: Continue  --Pain control  --DVT ppx: TEDs/SCDs/SQH  --Activity: PT/OT, OOB. C collar.  Needs aspen ccolar from [facility]  --Diet: Advance per ST recs Saline Lock IV  --Bowel regimen: PRN suppository, senna PRN  --Urinary: Voiding spontaneously  --Atelectasis ppx: Encourage IS hourly     Dispo: Pending PT/OT recs, imaging, drain    All of the above discussed and reviewed with Dr. Salter.      Taryn Tang PAIngridC  Neurosurgery  [place] - Med S[facility]    "

## 2024-06-05 PROCEDURE — 97530 THERAPEUTIC ACTIVITIES: CPT

## 2024-06-05 PROCEDURE — 94799 UNLISTED PULMONARY SVC/PX: CPT | Mod: XB

## 2024-06-05 PROCEDURE — 94761 N-INVAS EAR/PLS OXIMETRY MLT: CPT

## 2024-06-05 PROCEDURE — 99900035 HC TECH TIME PER 15 MIN (STAT)

## 2024-06-05 PROCEDURE — 25000003 PHARM REV CODE 250: Performed by: NEUROLOGICAL SURGERY

## 2024-06-05 PROCEDURE — 63600175 PHARM REV CODE 636 W HCPCS: Performed by: NEUROLOGICAL SURGERY

## 2024-06-05 PROCEDURE — 97535 SELF CARE MNGMENT TRAINING: CPT

## 2024-06-05 PROCEDURE — 97116 GAIT TRAINING THERAPY: CPT

## 2024-06-05 RX ADMIN — POTASSIUM CHLORIDE 10 MEQ: 750 TABLET, EXTENDED RELEASE ORAL at 08:06

## 2024-06-05 RX ADMIN — FUROSEMIDE 40 MG: 40 TABLET ORAL at 08:06

## 2024-06-05 RX ADMIN — MUPIROCIN: 20 OINTMENT TOPICAL at 08:06

## 2024-06-05 RX ADMIN — ACETAMINOPHEN 650 MG: 325 TABLET ORAL at 05:06

## 2024-06-05 RX ADMIN — ACETAMINOPHEN 650 MG: 325 TABLET ORAL at 11:06

## 2024-06-05 RX ADMIN — CELECOXIB 200 MG: 100 CAPSULE ORAL at 08:06

## 2024-06-05 RX ADMIN — HEPARIN SODIUM 5000 UNITS: 5000 INJECTION INTRAVENOUS; SUBCUTANEOUS at 08:06

## 2024-06-05 RX ADMIN — AMIODARONE HYDROCHLORIDE 200 MG: 200 TABLET ORAL at 08:06

## 2024-06-05 RX ADMIN — ATORVASTATIN CALCIUM 20 MG: 20 TABLET, FILM COATED ORAL at 08:06

## 2024-06-05 RX ADMIN — BISACODYL 10 MG: 10 SUPPOSITORY RECTAL at 08:06

## 2024-06-05 RX ADMIN — LEVOTHYROXINE SODIUM 150 MCG: 75 TABLET ORAL at 05:06

## 2024-06-05 RX ADMIN — ACETAMINOPHEN 650 MG: 325 TABLET ORAL at 12:06

## 2024-06-05 NOTE — PLAN OF CARE
Pt feels she would benefit from SNF placement upon discharge. Pt's first choice is [facility].     DENI spoke with Jenn with [facility]. Per Jenn, there are no beds available this week so therefore pt is not accepted    Pt has been accepted by the following SNF facilities via careport:   - [facility]   -[facility]  -[facility]  -[facility]  -[facility]    SW met with pt to discuss d/c planning. SW informed pt that [facility] cannot accept at this time. Pt stated that is still her first choice but SW informed her that it looks like [facility] is not an option at the moment. SW informed pt of the following SNF facilities that have accepted. SW provided pt with a list of them. Pt stated she will review list and decide later today on which SNF facility she would like. SW encouraged pt to decide on a SNF facility in a timely manner. Pt verbalized understanding. SW will meet with pt later to see which SNF facility she would like.     1:05p.m. DENI met with pt again. Pt stated she has not decided which SNF facility she would like to go to. Pt stated she will like to discuss it with her granddaughter first. Pt stated she will have a decision made tomorrow morning. SW will meet with pt tomorrow morning.     DENI will continue to follow.        06/05/24 1047   Post-Acute Status   Post-Acute Authorization Placement   Post-Acute Placement Status Referrals Sent   Coverage Medicare   Discharge Plan   Discharge Plan A Skilled Nursing Facility

## 2024-06-05 NOTE — PLAN OF CARE
Continue OT POC. Progression this date. Decreased posture in standing/ with mobility, but progression for out of bed functional mobility with min/CGA with rolling walker and chair follow for safety. Increased participation in ADL dressing. Cotreatment performed this date with PT in consideration of limited performance on prior date's initial eval; future sessions no longer require cotreatment. Recommend moderate intensity therapy.     Problem: Occupational Therapy  Goal: Occupational Therapy Goal  Description: Goals to be met by: 7/2/2024     Patient will increase functional independence with ADLs by performing:    UE Dressing with Set-up Assistance.  LE Dressing with Stand-by Assistance with use of adaptive dressing equipment and implementation of spinal precautions  Toileting from toilet with Stand-by Assistance for hygiene and clothing management.   Toilet transfer to toilet with Stand-by Assistance with use of least restrictive device and implementation of spinal precautions.  100% reciprocation and integration of 3/3 spinal precautions, DME recommendations, and ADL/task modifications post fusion to support safe d/c at highest level of function.    Functional mobility x 5 minutes with least restrictive assistive device and supervision assist.    Outcome: Progressing

## 2024-06-05 NOTE — PLAN OF CARE
Problem: Physical Therapy  Goal: Physical Therapy Goal  Description: Goals to be met by: 2024     Patient will increase functional independence with mobility by performin. Supine to sit with Modified [place]  2. Sit to supine with Modified [place]  3. Rolling with Modified [place].  4. Sit to stand transfer with Stand-by Assistance using Rolling Walker  5. Bed to chair transfer with Stand-by Assistance using Rolling Walker  6. Gait  x 150 with Stand-by Assistance using Rolling Walker.   7. Wheelchair propulsion 2 x 50 feet with Supervision using bilateral lower extremities  8. Ascend/Descend on inch curb step with Minimal Assistance using Rolling Walker.  9. Lower extremity exercise program x10 reps with supervision    Outcome: Progressing   Co-tx performed with OT in consideration of limited performance on last visit- no further co-tx required for future sessions; noted progression for today's session with OOB mobility; min/CGA using RW and chair to follow during amb for safety; recommend moderate intensity therapy.

## 2024-06-05 NOTE — PT/OT/SLP PROGRESS
Occupational Therapy   Treatment    Name: Graciela Barton  MRN: [id number]  Admitting Diagnosis:  Cervical spondylosis with myelopathy and radiculopathy  2 Days Post-Op    Recommendations:     Discharge Recommendations: Moderate Intensity Therapy  Discharge Equipment Recommendations:  to be determined by next level of care  Barriers to discharge:  Other (Comment) (fall risk; lives alone)    Assessment:     Graciela Barton is a 74 y.o. female with a medical diagnosis of Cervical spondylosis with myelopathy and radiculopathy.  She presents with .The primary encounter diagnosis was Cervical spondylosis with myelopathy and radiculopathy. Diagnoses of S/P lumbar fusion, Essential hypertension, benign, Atrial fibrillation with RVR, and Chronic myelopathy were also pertinent to this visit.  . Performance deficits affecting function are weakness, impaired endurance, impaired balance, gait instability, decreased lower extremity function, impaired sensation, impaired self care skills, impaired functional mobility, impaired cognition, decreased safety awareness, pain.     Continue OT POC. Progression this date. Decreased posture in standing/ with mobility, but progression for out of bed functional mobility with min/CGA with rolling walker and chair follow for safety. Increased participation in ADL dressing. Cotreatment performed this date with PT in consideration of limited performance on prior date's initial eval; future sessions no longer require cotreatment. Recommend moderate intensity therapy.         Rehab Prognosis:  Good; patient would benefit from acute skilled OT services to address these deficits and reach maximum level of function.       Plan:     Patient to be seen 5 x/week to address the above listed problems via self-care/home management, therapeutic activities, therapeutic exercises, wheelchair management/training  Plan of Care Expires: 07/02/24  Plan of Care Reviewed with: patient    Subjective     Chief Complaint: no  complaints  Patient/Family Comments/goals: reports continued goal for progressing her ability to walk better  Pain/Comfort:  Pain Rating 1: 0/10  Pain Addressed 1: Reposition  Pain Rating Post-Intervention 1: 0/10    Objective:     Communicated with: nursing prior to session.  Patient found HOB elevated with bed alarm, BONY drain, SCD, PureWick upon OT entry to room.    General Precautions: Standard, fall    Orthopedic Precautions:spinal precautions  Braces: Aspen collar  Respiratory Status: Room air     Occupational Performance:     Bed Mobility:    Patient completed Rolling/Turning to Left with  minimal assist via log roll effort  Patient completed Supine to Sit with moderate assist, increased time, with verbal cues for breathing with activity and mindfulness to hand placement in consideration of BONY drain      Functional Mobility/Transfers:  Patient completed Sit <> Stand Transfer with minimal assist, repeat 3 trials during session with rest break in seated,  with  rolling walker and minimal to moderate verbal cues for technique and instruction for pushing through BUE on walker for stability   Patient completed Bed <> Chair Transfer using Step Transfer technique with minimum assistance with rolling walker  Functional Mobility:   2x trials of in room mobility with rolling walker, CGA; fluctuating posture of moderately flexed to minimal flexed improving with repeat cues for elongation of spine (pretend a string is pulling you up to stand/ push through arms to stand tall); chair follow at all times for safety    Activities of Daily Living- with min verbal cues throughout to avoid attempt of forward neck flexion (in collar)  Upper Body Dressing: minimal assist; minimal verbal cues; educated on recommendation for easy front button shirts or wide scoop neck shirts to fit over collar  Lower Body Dressing: partial performance only (infer moderate assist for full LB dressing); minimal assist with long handled shoe horn  donning footwear; minimal assist for standing balance with rolling walker x6 trials alternating sides of reaching back with one UE to adjust back of brief       [facility] 6 Click ADL: 16    Treatment & Education:  Oriented x4. Cotreatment overlap with PT for safety and efficacy.  Patient with recall of spinal precautions, with one additional verbal reminder to support avoidance of pulling/reaching overhead to pull self up in bed.  Log roll re-education and bed mobility training as above.  Preparatory simple UE   Sit to stand breakdown instruction provided.  Educated to slowly change positions and fixate gaze ahead.  Moderate cues for posture awareness throughout session.  ADL training as above to support improved dressing engagement.  Functional mobility training as above with seated rest breaks and chair follow for safety.  End of session, seated up in chair with BLE elevated.   Educated CNA in presence of patient for x1 staff assist to bedside commode (additional practice with therapy needed prior to staff ambulation to bathroom).       Patient left up in chair with all lines intact, call button in reach, chair alarm on, and nursing notified    GOALS:   Multidisciplinary Problems       Occupational Therapy Goals          Problem: Occupational Therapy    Goal Priority Disciplines Outcome Interventions   Occupational Therapy Goal     OT, PT/OT Progressing    Description: Goals to be met by: 7/2/2024     Patient will increase functional independence with ADLs by performing:    UE Dressing with Set-up Assistance.  LE Dressing with Stand-by Assistance with use of adaptive dressing equipment and implementation of spinal precautions  Toileting from toilet with Stand-by Assistance for hygiene and clothing management.   Toilet transfer to toilet with Stand-by Assistance with use of least restrictive device and implementation of spinal precautions.  100% reciprocation and integration of 3/3 spinal precautions, DME  recommendations, and ADL/task modifications post fusion to support safe d/c at highest level of function.    Functional mobility x 5 minutes with least restrictive assistive device and supervision assist.                         Time Tracking:     OT Date of Treatment: 06/05/24  OT Start Time: 0904  OT Stop Time: 0952  OT Total Time (min): 48 min total time    Billable Minutes:Self Care/Home Management 15 min  Therapeutic Activity 10 min    OT/LUPIS: OT          6/5/2024

## 2024-06-05 NOTE — PROGRESS NOTES
[facility] - [facility]  Neurosurgery  Progress Note    Subjective:     Interval History: No arm pain or paresthesias.  No difficulty swallowing liquids or solids cut up in pieces.  Walking with PT.    History of Present Illness:     This is a very pleasant 73 y.o. female, who is 7 months status post T6-7 and T7-8 anterior diskectomy for thoracic myelopathy, 3 months status post L3-4, L4-5 oblique interbody fusion, L5-S1 anterior interbody fusion L3-S1 posterior segmental instrumentation, L3-4, L4-5 laminectomy, medial facetectomy, foraminotomy. Patient is doing rehabilitation. She walks with a walker. Her right leg weakness has partially improved. Her preoperative left leg pain has completely subsided since surgery. She is complaining of right shoulder pain that is intermittent. She still has some gait imbalance. Pain in the right shoulder radiates towards the right hand. Her walking ability has partially improved.     Post-Op Info:  Procedure(s) (LRB):  FUSION, SPINE, CERVICAL (N/A)   2 Days Post-Op      Medications:  Continuous Infusions:      Scheduled Meds:   acetaminophen  650 mg Oral Q6H    amiodarone  200 mg Oral Daily    atorvastatin  20 mg Oral QHS    bisacodyL  10 mg Rectal Daily    celecoxib  200 mg Oral BID    furosemide  40 mg Oral Daily    heparin (porcine)  5,000 Units Subcutaneous Q12H    levothyroxine  150 mcg Oral Before breakfast    potassium chloride SA  10 mEq Oral Daily     PRN Meds:  Current Facility-Administered Medications:     aluminum-magnesium hydroxide-simethicone, 30 mL, Oral, Q4H PRN    HYDROmorphone, 1 mg, Subcutaneous, Q3H PRN    meclizine, 25 mg, Oral, TID PRN    methocarbamoL, 750 mg, Oral, TID PRN    ondansetron, 8 mg, Oral, Q6H PRN    oxyCODONE-acetaminophen, 1 tablet, Oral, Q4H PRN    prochlorperazine, 5 mg, Intravenous, Q6H PRN    senna-docusate 8.6-50 mg, 2 tablet, Oral, Nightly PRN    sodium chloride 0.9%, 3 mL, Intravenous, PRN    traMADoL, 50 mg, Oral, Q6H PRN     Review of  "Systems  Objective:     Weight: 81.6 kg (179 lb 14.3 oz)  Body mass index is 28.18 kg/m².  Vital Signs (Most Recent):  Temp: 98 °F (36.7 °C) (06/05/24 1137)  Pulse: 67 (06/05/24 1137)  Resp: 17 (06/05/24 1137)  BP: (!) 146/64 (06/05/24 1137)  SpO2: 96 % (06/05/24 1137) Vital Signs (24h Range):  Temp:  [97.7 °F (36.5 °C)-98.4 °F (36.9 °C)] 98 °F (36.7 °C)  Pulse:  [49-67] 67  Resp:  [17-20] 17  SpO2:  [93 %-98 %] 96 %  BP: (109-146)/(53-70) 146/64                                Closed/Suction Drain 06/03/24 0940 Tube - 1 Anterior Neck Bulb 10 Fr. (Active)   Site Description Unable to view 06/03/24 2023   Dressing Type Transparent (Tegaderm) 06/03/24 2023   Dressing Status Clean;Dry;Intact 06/03/24 2023   Dressing Intervention Integrity maintained 06/03/24 2023   Drainage Bloody 06/03/24 2023   Status To bulb suction 06/03/24 2023   Output (mL) 20 mL 06/03/24 2358       Female External Urinary Catheter w/ Suction 06/03/24 1800 (Active)   Date of last wick change 06/04/24 06/04/24 0619   Time of last wick change 0320 06/04/24 0619   Output (mL) 100 mL 06/04/24 0618       Neurosurgery Physical Exam    General: well developed, well nourished, no distress  Mental Status: Awake, Alert, Oriented X3.Thought content appropriate  GCS: Motor: 6/Verbal: 5/Eyes: 4 GCS Total: 15    Motor Strength:  Strength  Deltoids Triceps Biceps Wrist Extension Wrist Flexion Hand    Upper: R 5/5 5/5 5/5 5/5 5/5 5/5    L 5/5 5/5 5/5 5/5 5/5 5/5     HF KF KE DF PF EHL   Lower: R 5/5 5/5 5/5 5/5 5/5 5/5    L 5/5 5/5 5/5 5/5 5/5 5/5       Betancourt: absent B/L  Clonus: absent B/L  Incision- CDI  Drain- 25      Significant Labs:  Recent Labs   Lab 06/04/24  0547   *      K 4.4      CO2 25   BUN 24*   CREATININE 0.8   CALCIUM 9.1     Recent Labs   Lab 06/04/24  0547   WBC 12.57   HGB 11.7*   HCT 35.8*        No results for input(s): "LABPT", "INR", "APTT" in the last 48 hours.  Microbiology Results (last 7 days)       ** " No results found for the last 168 hours. **              Assessment/Plan:     Active Diagnoses:    Diagnosis Date Noted POA    PRINCIPAL PROBLEM:  Cervical spondylosis with myelopathy and radiculopathy [M47.12, M47.22] 02/12/2024 Yes    S/P lumbar fusion [Z98.1] 06/03/2024 Not Applicable    PAF (paroxysmal atrial fibrillation) [I48.0] 08/10/2022 Yes    History of thyroid cancer [Z85.850] 02/19/2018 Not Applicable    Essential hypertension, benign [I10] 02/19/2018 Yes      Problems Resolved During this Admission:     A/P:  POD #2 C5-6, C6-7 ACDF     --Neurologically stable          -q4h neuro checks  --Imaging: Post op xrays pending. Lspine xray pending.  --Drains: Continue  --Pain control  --DVT ppx: TEDs/SCDs/SQH  --Activity: PT/OT, OOB. C collar.  Needs aspen ccolar from DME  --Diet: Advance per ST recs Saline Lock IV  --Bowel regimen: PRN suppository, senna PRN  --Urinary: Voiding spontaneously  --Atelectasis ppx: Encourage IS hourly     Dispo: Pending SNF placement    Patient has chronic myelopathy symptoms which causes her to have balance issues.  PTOT feels like she would benefit from SNF placement to help with her gait and balance and give her more time for rehab.  She also has limited social support and family support and lives alone and it is not safe for her to return home by herself while recovering from surgery.    All of the above discussed and reviewed with Dr. Salter.      Taryn Tang PA-C  Neurosurgery  [place] [facility]

## 2024-06-05 NOTE — PLAN OF CARE
Heart Palpitations    Palpitations are the feeling that your heart is beating hard, fast, or irregular. Some describe it as \"pounding,\" \"flip-flopping in the chest,\" or \"skipped beats. \" Palpitations may occur in someone with heart disease.  But they VN note: progress notes, labs and vital signs reviewed. Will be available to intervene if needed.     Problem: Adult Inpatient Plan of Care  Goal: Plan of Care Review  Outcome: Progressing      Call your healthcare provider right away if you have palpitations that last longer than normal, or are different from your past palpitations. Date Last Reviewed: 4/27/2016  © [id number] [employer]. [address] [facility] [place], [address] [place] [address]. · Heart failure. This problem occurs when the heart is so weak it no longer pumps blood well. · Blood clots and stroke  · Sudden cardiac arrest. This problem occurs when the heart suddenly stops beating. When should I call my healthcare provider?   Call y · Follow up with your doctor, or as advised. Call 911  This is the fastest and safest way to get to the emergency department. The paramedics can also start treatment on the way to the hospital, if needed.   Don't wait until your symptoms are severe to ca

## 2024-06-05 NOTE — PT/OT/SLP PROGRESS
Physical Therapy Treatment    Patient Name:  Graciela Barton   MRN:  [id number]    Recommendations:     Discharge Recommendations: Moderate Intensity Therapy  Discharge Equipment Recommendations: to be determined by next level of care  Barriers to discharge:  Fall risk, lives alone, decreased from baseline of function    Assessment:     Graciela Barton is a 74 y.o. female admitted with a medical diagnosis of Cervical spondylosis with myelopathy and radiculopathy.  She presents with the following impairments/functional limitations: weakness, impaired endurance, impaired self care skills, impaired functional mobility, gait instability, impaired balance, impaired sensation, decreased safety awareness, decreased lower extremity function, impaired cognition Co-tx performed with OT in consideration of limited performance on last visit- no further co-tx required for future sessions; noted progression for today's session with OOB mobility; min/CGA using RW and chair to follow during amb for safety; recommend moderate intensity therapy. .    Rehab Prognosis: Good; patient would benefit from acute skilled PT services to address these deficits and reach maximum level of function.    Recent Surgery: Procedure(s) (LRB):  FUSION, SPINE, CERVICAL (N/A) 2 Days Post-Op    Plan:     During this hospitalization, patient to be seen daily to address the identified rehab impairments via gait training, therapeutic activities, therapeutic exercises, neuromuscular re-education, wheelchair management/training and progress toward the following goals:    Plan of Care Expires:  07/04/24    Subjective     Chief Complaint: none  Patient/Family Comments/goals: to walk better, return to walking around house (7 laps) with her friend  Pain/Comfort:  Pain Rating 1: 0/10  Pain Rating Post-Intervention 1: 0/10      Objective:     Communicated with nurse prior to session.  Patient found HOB elevated with bed alarm, BONY drain, PureWick, SCD upon PT entry to room.      General Precautions: Standard, fall  Orthopedic Precautions: spinal precautions  Braces: Aspen collar  Respiratory Status: Room air     Functional Mobility:  Bed Mobility:     Rolling Left:  minimum assistance and use of log roll and side rail  Supine to Sit: moderate assistance and increased time/effort, VCs for technique/breathing in coordination with movement  Transfers:     Sit to Stand:  minimum assistance with rolling walker and performed 3 trials during session with seated rest break between, min to mod VCs for technique and instruction for pushing through BUE on RW for stability and upright stance  Bed to Chair: minimum assistance with  rolling walker  using  Step Transfer  Gait: Patient amb using RW with CGA; 2 x 20ft with chair following close behind for safety; mod <> min forward flexed trunk improved with repeated VCs for upright posture (pretend string attached to your head is pulling you up and to push through UEs to assist); VCs also to relax shoulders and use triceps (TCs) to push; L pelvis elevated, R knee flexed and directed medially, R lateral lean of trunk, short step length, slow nichole, fair floor clearance      AM-PAC 6 CLICK MOBILITY  Turning over in bed (including adjusting bedclothes, sheets and blankets)?: 3  Sitting down on and standing up from a chair with arms (e.g., wheelchair, bedside commode, etc.): 3  Moving from lying on back to sitting on the side of the bed?: 3  Moving to and from a bed to a chair (including a wheelchair)?: 3  Need to walk in hospital room?: 3  Climbing 3-5 steps with a railing?: 1  Basic Mobility Total Score: 16       Treatment & Education:  Cotx overlap with OT for safety and improved functional outcomes; pt able to recall spinal precautions with VC to avoid overhead reaching above head to head rail to pull self up in bed  Log roll and bed mobility as above  Performed BLE warm up ex 10 x each- APs, heel slides, hip abd/add  VCs for coordination of  breathing with movement along with slow transitions  Performed functional mobility and amb as above with seated rest breaks and following with chair for safety  Elevated LEs following seated in chair    Patient left up in chair with all lines intact, call button in reach, bed alarm on, and nurse notified..    GOALS:   Multidisciplinary Problems       Physical Therapy Goals          Problem: Physical Therapy    Goal Priority Disciplines Outcome Goal Variances Interventions   Physical Therapy Goal     PT, PT/OT Progressing     Description: Goals to be met by: 2024     Patient will increase functional independence with mobility by performin. Supine to sit with Modified [place]  2. Sit to supine with Modified [place]  3. Rolling with Modified [place].  4. Sit to stand transfer with Stand-by Assistance using Rolling Walker  5. Bed to chair transfer with Stand-by Assistance using Rolling Walker  6. Gait  x 150 with Stand-by Assistance using Rolling Walker.   7. Wheelchair propulsion 2 x 50 feet with Supervision using bilateral lower extremities  8. Ascend/Descend on inch curb step with Minimal Assistance using Rolling Walker.  9. Lower extremity exercise program x10 reps with supervision                         Time Tracking:     PT Received On: 24  PT Start Time: 904     PT Stop Time: 952  PT Total Time (min): 48 min     Billable Minutes: Gait Training 13 and Therapeutic Activity 10       PT/PTA: PT     Number of PTA visits since last PT visit: 0     2024

## 2024-06-06 VITALS
HEART RATE: 60 BPM | RESPIRATION RATE: 20 BRPM | WEIGHT: 199.06 LBS | BODY MASS INDEX: 31.24 KG/M2 | HEIGHT: 67 IN | TEMPERATURE: 98 F | DIASTOLIC BLOOD PRESSURE: 75 MMHG | OXYGEN SATURATION: 96 % | SYSTOLIC BLOOD PRESSURE: 130 MMHG

## 2024-06-06 PROCEDURE — 25000003 PHARM REV CODE 250: Performed by: NEUROLOGICAL SURGERY

## 2024-06-06 PROCEDURE — 11000001 HC ACUTE MED/SURG PRIVATE ROOM

## 2024-06-06 PROCEDURE — 97530 THERAPEUTIC ACTIVITIES: CPT

## 2024-06-06 PROCEDURE — 97535 SELF CARE MNGMENT TRAINING: CPT

## 2024-06-06 PROCEDURE — 94799 UNLISTED PULMONARY SVC/PX: CPT

## 2024-06-06 PROCEDURE — 99900035 HC TECH TIME PER 15 MIN (STAT)

## 2024-06-06 PROCEDURE — 94761 N-INVAS EAR/PLS OXIMETRY MLT: CPT

## 2024-06-06 PROCEDURE — 63600175 PHARM REV CODE 636 W HCPCS: Performed by: NEUROLOGICAL SURGERY

## 2024-06-06 PROCEDURE — 25000003 PHARM REV CODE 250: Performed by: PHYSICIAN ASSISTANT

## 2024-06-06 RX ORDER — METHOCARBAMOL 750 MG/1
750 TABLET, FILM COATED ORAL 3 TIMES DAILY PRN
Start: 2024-06-06

## 2024-06-06 RX ORDER — OXYCODONE AND ACETAMINOPHEN 5; 325 MG/1; MG/1
1 TABLET ORAL EVERY 6 HOURS PRN
Start: 2024-06-06

## 2024-06-06 RX ORDER — BISACODYL 10 MG/1
10 SUPPOSITORY RECTAL DAILY PRN
Start: 2024-06-06

## 2024-06-06 RX ORDER — ATORVASTATIN CALCIUM 20 MG/1
20 TABLET, FILM COATED ORAL NIGHTLY
Start: 2024-06-06 | End: 2025-06-06

## 2024-06-06 RX ORDER — AMOXICILLIN 250 MG
2 CAPSULE ORAL NIGHTLY PRN
Start: 2024-06-06

## 2024-06-06 RX ORDER — CELECOXIB 200 MG/1
200 CAPSULE ORAL 2 TIMES DAILY
Start: 2024-06-06

## 2024-06-06 RX ORDER — ONDANSETRON 8 MG/1
8 TABLET, ORALLY DISINTEGRATING ORAL EVERY 6 HOURS PRN
Start: 2024-06-06

## 2024-06-06 RX ADMIN — ACETAMINOPHEN 325 MG: 325 TABLET ORAL at 05:06

## 2024-06-06 RX ADMIN — FUROSEMIDE 40 MG: 40 TABLET ORAL at 09:06

## 2024-06-06 RX ADMIN — BISACODYL 10 MG: 10 SUPPOSITORY RECTAL at 09:06

## 2024-06-06 RX ADMIN — CELECOXIB 200 MG: 100 CAPSULE ORAL at 09:06

## 2024-06-06 RX ADMIN — POTASSIUM BICARBONATE 10 MEQ: 391 TABLET, EFFERVESCENT ORAL at 11:06

## 2024-06-06 RX ADMIN — LEVOTHYROXINE SODIUM 150 MCG: 75 TABLET ORAL at 05:06

## 2024-06-06 RX ADMIN — AMIODARONE HYDROCHLORIDE 200 MG: 200 TABLET ORAL at 09:06

## 2024-06-06 RX ADMIN — HEPARIN SODIUM 5000 UNITS: 5000 INJECTION INTRAVENOUS; SUBCUTANEOUS at 09:06

## 2024-06-06 RX ADMIN — ACETAMINOPHEN 650 MG: 325 TABLET ORAL at 11:06

## 2024-06-06 NOTE — PLAN OF CARE
[facility] Surg  Facility Transfer Orders        Admit to: [facility]    Diagnoses:   Active Hospital Problems    Diagnosis  POA    *Cervical spondylosis with myelopathy and radiculopathy [M47.12, M47.22]  Yes    S/P lumbar fusion [Z98.1]  Not Applicable    PAF (paroxysmal atrial fibrillation) [I48.0]  Yes     Recommend visit with EP as she is due  Continue DOAC  Continue metoprolol  Rate controlled today        History of thyroid cancer [Z85.850]  Not Applicable    Essential hypertension, benign [I10]  Yes     Well controlled- slightly high but was waiting for a while as she did not have a way to get from lobby up to clinic  Have some SHANE, agree with resuming HCTZ 12.5 mg   Continue as now        Resolved Hospital Problems   No resolved problems to display.     Allergies:   Review of patient's allergies indicates:   Allergen Reactions    Vancomycin Tinitus    Vancomycin analogues Tinitus       Code Status: Full    Vitals: Routine       Diet: soft diet and bite sized. Cardiac.  Thin liquids    Activity: Activity as tolerated    Nursing:  IS every 4 hours  Teds/SCDs  Up in chair with meals  Neck brace on at all time  Remove mepilex drain dressing on 06/08/24     Bed/Surface: Normal    Consults:   PT to evaluate and treat  OT to evaluate and treat   ST to evaluate and treat    Oxygen: room air      Pending Diagnostic Studies:       Procedure Component Value Units Date/Time    X-Ray Cervical Spine AP And Lateral [[id number]]     Order Status: Sent Lab Status: No result     X-Ray Lumbar Spine Ap And Lateral [[id number]]     Order Status: Sent Lab Status: No result               IV Access: Peripheral     Medications: Discontinue all previous medication orders, if any. See new list below.  Current Discharge Medication List        START taking these medications    Details   bisacodyL (DULCOLAX) 10 mg Supp Place 1 suppository (10 mg total) rectally daily as needed (constipation).      celecoxib (CELEBREX) 200 MG capsule  Take 1 capsule (200 mg total) by mouth 2 (two) times daily.      methocarbamoL (ROBAXIN) 750 MG Tab Take 1 tablet (750 mg total) by mouth 3 (three) times daily as needed (muscle spasms).      ondansetron (ZOFRAN-ODT) 8 MG TbDL Take 1 tablet (8 mg total) by mouth every 6 (six) hours as needed (nausea/vomiting).      oxyCODONE-acetaminophen (PERCOCET) 5-325 mg per tablet Take 1 tablet by mouth every 6 (six) hours as needed for Pain.    Comments: Quantity prescribed more than 7 day supply? Yes, quantity medically necessary      potassium bicarbonate disintegrating tablet Take 1 tablet (10 mEq total) by mouth once daily.           CONTINUE these medications which have CHANGED    Details   apixaban (ELIQUIS) 5 mg Tab Take 1 tablet (5 mg total) by mouth 2 (two) times daily. Resume on 06/09/24    Comments: Resume on 06/09/24      atorvastatin (LIPITOR) 20 MG tablet Take 1 tablet (20 mg total) by mouth every evening.      senna-docusate 8.6-50 mg (PERICOLACE) 8.6-50 mg per tablet Take 2 tablets by mouth nightly as needed for Constipation.           CONTINUE these medications which have NOT CHANGED    Details   amiodarone (PACERONE) 200 MG Tab Take 1 tablet (200 mg total) by mouth once daily.  Qty: 90 tablet, Refills: 0    Associated Diagnoses: PAF (paroxysmal atrial fibrillation)      furosemide (LASIX) 40 MG tablet Take 1 tablet by mouth once daily  Qty: 90 tablet, Refills: 1    Associated Diagnoses: Leg swelling      levothyroxine (SYNTHROID) 150 MCG tablet Take 1 tablet (150 mcg total) by mouth before breakfast.  Qty: 90 tablet, Refills: 1      meclizine (ANTIVERT) 25 mg tablet Take 25 mg by mouth. PRN           STOP taking these medications       acetaminophen (TYLENOL) 650 mg/20.3 mL Soln Comments:   Reason for Stopping:         potassium chloride (KLOR-CON) 10 MEQ TbSR Comments:   Reason for Stopping:         aluminum-magnesium hydroxide-simethicone (MAALOX) 200-200-20 mg/5 mL Susp Comments:   Reason for Stopping:          ascorbic acid, vitamin C, (VITAMIN C) 1000 MG tablet Comments:   Reason for Stopping:         hydroCHLOROthiazide (HYDRODIURIL) 25 MG tablet Comments:   Reason for Stopping:         multivitamin (THERAGRAN) per tablet Comments:   Reason for Stopping:         ZINC ORAL Comments:   Reason for Stopping:             Follow up:       Immunizations Administered as of 6/6/2024       Name Date Dose VIS Date Route Exp Date    COVID-19, MRNA, LN-S, PF (Pfizer) (Purple Cap) 3/16/2021 11:09 AM 0.3 mL 12/12/2020 Intramuscular 6/30/2021    Site: Left deltoid     Given By: eJnnifer Finn     : [employer]     Lot: [id number]     COVID-19, MRNA, LN-S, PF (Pfizer) (Purple Cap) 2/23/2021  1:43 PM 0.3 mL 12/12/2020 Intramuscular 6/30/2021    Site: Left deltoid     Given By: Jennifer Finn     : [employer]     Lot: [id number]             This patient has had both covid vaccinations    Some patients may experience side effects after vaccination.  These may include fever, headache, muscle or joint aches.  Most symptoms resolve with 24-48 hours and do not require urgent medical evaluation unless they persist for more than 72 hours or symptoms are concerning for an unrelated medical condition.          Taryn Tang PA-C

## 2024-06-06 NOTE — PLAN OF CARE
SW spoke with Pt about SNF.    Pt still wanting [facility] as first option.    SW explained to Pt that [facility] is reviewing Pt but their bed availability is scarce and when MD states Pt is medically ready for D/C we have to have a another SNF as an option if [facility] doesn't have any beds. Pt stated she understood but what if [facility] says they have a bed. SW explained if they have a bed and Pt is ready for D/C CM would work to get Pt there but Pt needs to have a backup plan just in case [facility] doesn't have a bed. Pt stated [facility] is still her option and she would think about other options. SW explained SW would follow back up after lunch but Pt needed to have a back up option choose by then as once Pt is medically ready for D/C we have to proceed with D/C. Pt stated she understood    SW to go back at 2PM and speak with Pt       06/06/24 1026   Post-Acute Status   Post-Acute Authorization Placement   Post-Acute Placement Status Referrals Sent   Discharge Plan   Discharge Plan A Skilled Nursing Facility     Jaimee Colin LMSW  Phone: [phone]  [facility]

## 2024-06-06 NOTE — PT/OT/SLP PROGRESS
Physical Therapy      Patient Name:  Graciela Barton   MRN:  [id number]    1603 - Pt discharged getting ready to leave floor.

## 2024-06-06 NOTE — PLAN OF CARE
Problem: Adult Inpatient Plan of Care  Goal: Plan of Care Review  Outcome: Progressing   Patient AAox4. No events overnight. BM on 6/5. Pure wick changed overnight with good urine output. No skin breakdown noted. Patient denies numbness or tingling to extremities. Scds to BLE. Armington collar on Richard drain to anterior neck with 10 ml of serosanguinous output overnight. Gauze dressing to anterior neck c/d/I. Medications crushed and mixed with pudding or applesauce per patient request. Safety ensured . Call light within reach. Bed alarm on

## 2024-06-06 NOTE — NURSING
Patient received to room 510, oriented to the room and call light, positioned for comfort.   
Pt unable to receive xrays on this day d/t not being able to stand independently.  Nurse consulted and verified w/ xray.  Will try again on tomorrow.    
Received report from Graciela   
Report given to Magaly at [facility].   
5

## 2024-06-06 NOTE — PT/OT/SLP PROGRESS
"Occupational Therapy   Treatment    Name: Graciela Barton  MRN: [id number]  Admitting Diagnosis:  Cervical spondylosis with myelopathy and radiculopathy  3 Days Post-Op    Recommendations:     Discharge Recommendations: Moderate Intensity Therapy  Discharge Equipment Recommendations:   (tbd at next level care)  Barriers to discharge:   (resides alone, increased caregiver assistance, fall risk)    Assessment:     Graciela Barton is a 74 y.o. female with a medical diagnosis of Cervical spondylosis with myelopathy and radiculopathy.  She presents with ..The primary encounter diagnosis was Cervical spondylosis with myelopathy and radiculopathy. Diagnoses of S/P lumbar fusion, Essential hypertension, benign, Atrial fibrillation with RVR, and Chronic myelopathy were also pertinent to this visit. . Performance deficits affecting function are weakness, impaired endurance, impaired self care skills, decreased upper extremity function, impaired functional mobility, decreased lower extremity function, impaired balance, orthopedic precautions, decreased ROM.     Continue OT POC. Patient is progressing. Able to perform short distance out of room mobility with CGA with use of rolling walker and toilet t/f with Min A in real bathroom with use of rolling walker, able to perform toileting in real bathroom commode with Mod A for wiping & clothing management with use of reacher. Pt has decreased posture with standing/mobility. Recommend Moderate intensity therapy when stable. DME TBD during next level care.      Rehab Prognosis:  Good; patient would benefit from acute skilled OT services to address these deficits and reach maximum level of function.       Plan:     Patient to be seen 5 x/week to address the above listed problems via self-care/home management, therapeutic activities, therapeutic exercises  Plan of Care Expires: 07/04/24  Plan of Care Reviewed with: patient    Subjective     Chief Complaint: "I've been peeing a lot " "today"  Patient/Family Comments/goals: eager to leave hospital  Pain/Comfort:  Pain Rating 1: 0/10  Pain Addressed 2: Reposition, Distraction, Nurse notified    Objective:     Communicated with: nursing prior to session.  Patient found HOB elevated with PureWick upon OT entry to room.    General Precautions: Standard, fall    Orthopedic Precautions:spinal precautions  Braces: Aspen collar  Respiratory Status: Room air     Occupational Performance:     Bed Mobility:    Patient completed Supine to Sit with moderate assistance for trunk support.   Patient completed Sit to Supine with maximal assistance for assistance with LE and trunk    Functional Mobility/Transfers:  Patient completed Sit <> Stand Transfer with minimum assistance  with  rolling walker   Patient completed Toilet Transfer Step Transfer technique with Min assistance with use rolling walker and L  grab bar for sitting on commode.  Patient completed short distance out of room mobility with CGA with use of rolling walker with reminders to relax shoulders and breathe.      Activities of Daily Living:  Lower Body Dressing: minimum assistance for pulling up underwear above hips; pt used reacher to don underwear over LE.    Toileting: moderate assistance for wiping and removing brief; patient attempted wiping while on commode and therapist completed wiping while lying in supine.       [facility] 6 Click ADL: 19    Treatment & Education:  A&O x4. Patient reciprocated 3/3 spinal precautions and additional no lifting or pulling above 90 degrees and reciprocated log rolling technique to student therapist. Initiated ADL / functional mobility training as above. Discussed next level care. Answered questions within scope.     Patient left HOB elevated with all lines intact, call button in reach, bed alarm on, and nursing notified    GOALS:   Multidisciplinary Problems       Occupational Therapy Goals          Problem: Occupational Therapy    Goal Priority Disciplines " Outcome Interventions   Occupational Therapy Goal     OT, PT/OT Progressing    Description: Goals to be met by: 7/2/2024     Patient will increase functional independence with ADLs by performing:    UE Dressing with Set-up Assistance.  LE Dressing with Stand-by Assistance with use of adaptive dressing equipment and implementation of spinal precautions  Toileting from toilet with Stand-by Assistance for hygiene and clothing management.   Toilet transfer to toilet with Stand-by Assistance with use of least restrictive device and implementation of spinal precautions.  100% reciprocation and integration of 3/3 spinal precautions, DME recommendations, and ADL/task modifications post fusion to support safe d/c at highest level of function.    Functional mobility x 5 minutes with least restrictive assistive device and supervision assist.                         Time Tracking:     OT Date of Treatment: 06/06/24  OT Start Time: 1300  OT Stop Time: 1330  OT Total Time (min): 30 min    Billable Minutes:Self Care/Home Management 15 min  Therapeutic Activity 15 min    OT/LUPIS: OT          6/6/2024

## 2024-06-06 NOTE — PLAN OF CARE
Pt medically stable to D/C to [facility] on [address].  Pt will be transported by [facility] at D/C. Pt appts have been requested and or scheduled. No other D/C needs noted. Pt clear from CM.    Report Info: [facility]  Phone: [phone]   Transport: [facility] 4[facility]    Future Appointments   Date Time Provider Department Center   6/18/2024  9:00 AM [facility] XR-A LIMIT 350 LBS [facility] XRAY [facility]   6/18/2024 10:00 AM Taryn Tang PA-C [facility] NEUROSU [facility]   7/17/2024 10:00 AM [facility] XR-A LIMIT 350 LBS [facility] XRAY OP [facility]   7/17/2024 11:00 AM Taryn Tang PA-C [facility] NEUROSU [facility]   8/1/2024 11:20 AM Rocco Weller MD [facility] CARDIO [facility]   8/14/2024  9:30 AM Silvia Giordano NP [facility] PRCAR [facility]   9/4/2024 10:30 AM [facility] XR-A LIMIT 350 LBS [facility] XRAY OP [facility]   9/4/2024 11:30 AM Scooter Salter MD [facility] NEUROSU [facility]         [facility] Surg  Discharge Final Note    Primary Care Provider: Rocco Miller MD    Expected Discharge Date: 6/6/2024    Final Discharge Note (most recent)       Final Note - 06/06/24 1512          Final Note    Assessment Type Final Discharge Note (P)      Anticipated Discharge Disposition Rehab Facility (P)      What phone number can be called within the next 1-3 days to see how you are doing after discharge? [phone] (P)      Hospital Resources/Appts/Education Provided Appointments scheduled and added to AVS (P)         Post-Acute Status    Post-Acute Authorization Placement (P)      Post-Acute Placement Status Set-up Complete/Auth obtained (P)                      Important Message from Medicare Rosie Walker, LMSW  Phone: [phone]  [facility]

## 2024-06-06 NOTE — PROGRESS NOTES
[facility] Surg  Neurosurgery  Progress Note    Subjective:     Interval History: No arm pain or paresthesias.  No neck pain. No difficulty swallowing liquids or solids cut up in pieces.  Walking with PT.    History of Present Illness:     This is a very pleasant 73 y.o. female, who is 7 months status post T6-7 and T7-8 anterior diskectomy for thoracic myelopathy, 3 months status post L3-4, L4-5 oblique interbody fusion, L5-S1 anterior interbody fusion L3-S1 posterior segmental instrumentation, L3-4, L4-5 laminectomy, medial facetectomy, foraminotomy. Patient is doing rehabilitation. She walks with a walker. Her right leg weakness has partially improved. Her preoperative left leg pain has completely subsided since surgery. She is complaining of right shoulder pain that is intermittent. She still has some gait imbalance. Pain in the right shoulder radiates towards the right hand. Her walking ability has partially improved.     Post-Op Info:  Procedure(s) (LRB):  FUSION, SPINE, CERVICAL (N/A)   3 Days Post-Op      Medications:  Continuous Infusions:      Scheduled Meds:   acetaminophen  650 mg Oral Q6H    amiodarone  200 mg Oral Daily    atorvastatin  20 mg Oral QHS    bisacodyL  10 mg Rectal Daily    celecoxib  200 mg Oral BID    furosemide  40 mg Oral Daily    heparin (porcine)  5,000 Units Subcutaneous Q12H    levothyroxine  150 mcg Oral Before breakfast    potassium bicarbonate  10 mEq Oral Daily     PRN Meds:  Current Facility-Administered Medications:     aluminum-magnesium hydroxide-simethicone, 30 mL, Oral, Q4H PRN    HYDROmorphone, 1 mg, Subcutaneous, Q3H PRN    meclizine, 25 mg, Oral, TID PRN    methocarbamoL, 750 mg, Oral, TID PRN    ondansetron, 8 mg, Oral, Q6H PRN    oxyCODONE-acetaminophen, 1 tablet, Oral, Q4H PRN    prochlorperazine, 5 mg, Intravenous, Q6H PRN    senna-docusate 8.6-50 mg, 2 tablet, Oral, Nightly PRN    sodium chloride 0.9%, 3 mL, Intravenous, PRN    traMADoL, 50 mg, Oral, Q6H PRN  "    Review of Systems  Objective:     Weight: 90.3 kg (199 lb 1.2 oz)  Body mass index is 31.18 kg/m².  Vital Signs (Most Recent):  Temp: 98.5 °F (36.9 °C) (06/06/24 0806)  Pulse: 70 (06/06/24 1006)  Resp: 18 (06/06/24 0806)  BP: (!) 152/65 (06/06/24 0806)  SpO2: 95 % (06/06/24 1006) Vital Signs (24h Range):  Temp:  [97.8 °F (36.6 °C)-98.7 °F (37.1 °C)] 98.5 °F (36.9 °C)  Pulse:  [57-72] 70  Resp:  [17-20] 18  SpO2:  [94 %-98 %] 95 %  BP: (115-184)/(56-73) 152/65                                Closed/Suction Drain 06/03/24 0940 Tube - 1 Anterior Neck Bulb 10 Fr. (Active)   Site Description Unable to view 06/03/24 2023   Dressing Type Transparent (Tegaderm) 06/03/24 2023   Dressing Status Clean;Dry;Intact 06/03/24 2023   Dressing Intervention Integrity maintained 06/03/24 2023   Drainage Bloody 06/03/24 2023   Status To bulb suction 06/03/24 2023   Output (mL) 20 mL 06/03/24 2358       Female External Urinary Catheter w/ Suction 06/03/24 1800 (Active)   Date of last wick change 06/04/24 06/04/24 0619   Time of last wick change 0320 06/04/24 0619   Output (mL) 100 mL 06/04/24 0618       Neurosurgery Physical Exam    General: well developed, well nourished, no distress  Mental Status: Awake, Alert, Oriented X3.Thought content appropriate  GCS: Motor: 6/Verbal: 5/Eyes: 4 GCS Total: 15    Motor Strength:  Strength  Deltoids Triceps Biceps Wrist Extension Wrist Flexion Hand    Upper: R 5/5 5/5 5/5 5/5 5/5 5/5    L 5/5 5/5 5/5 5/5 5/5 5/5     HF KF KE DF PF EHL   Lower: R 5/5 5/5 5/5 5/5 5/5 5/5    L 5/5 5/5 5/5 5/5 5/5 5/5       Betancourt: absent B/L  Clonus: absent B/L  Incision- CDI  Drain- 20      Significant Labs:  No results for input(s): "GLU", "NA", "K", "CL", "CO2", "BUN", "CREATININE", "CALCIUM", "MG" in the last 48 hours.    No results for input(s): "WBC", "HGB", "HCT", "PLT" in the last 48 hours.    No results for input(s): "LABPT", "INR", "APTT" in the last 48 hours.  Microbiology Results (last 7 days)       " ** No results found for the last 168 hours. **              Assessment/Plan:     Active Diagnoses:    Diagnosis Date Noted POA    PRINCIPAL PROBLEM:  Cervical spondylosis with myelopathy and radiculopathy [M47.12, M47.22] 02/12/2024 Yes    S/P lumbar fusion [Z98.1] 06/03/2024 Not Applicable    PAF (paroxysmal atrial fibrillation) [I48.0] 08/10/2022 Yes    History of thyroid cancer [Z85.850] 02/19/2018 Not Applicable    Essential hypertension, benign [I10] 02/19/2018 Yes      Problems Resolved During this Admission:     A/P:  POD #3 C5-6, C6-7 ACDF     --Neurologically stable          -q4h neuro checks  --Imaging: Post op xrays pending. Lspine xray pending.  --Drains: Removed  --Pain control  --DVT ppx: TEDs/SCDs/SQH  --Activity: PT/OT, OOB. C collar.   --Diet: Advance per ST recs Saline Lock IV  --Bowel regimen: PRN suppository, senna PRN  --Urinary: Voiding spontaneously  --Atelectasis ppx: Encourage IS hourly     Dispo: Pending SNF placement    Patient has chronic myelopathy symptoms which causes her to have balance issues.  PTOT feels like she would benefit from SNF placement to help with her gait and balance and give her more time for rehab.  She also has limited social support and family support and lives alone and it is not safe for her to return home by herself while recovering from surgery.    All of the above discussed and reviewed with Dr. Salter.      Taryn Tang PA-C  Neurosurgery  [facility] S[facility]

## 2024-06-06 NOTE — PLAN OF CARE
Continue OT POC. Patient is progressing. Able to perform short distance out of room mobility with CGA with use of rolling walker and toilet t/f with Min A in real bathroom with use of rolling walker, able to perform toileting in real bathroom commode with Mod A for wiping & clothing management with use of reacher. Pt has decreased posture with standing/mobility. Recommend Moderate intensity therapy when stable. DME TBD during next level care.     Problem: Occupational Therapy  Goal: Occupational Therapy Goal  Description: Goals to be met by: 7/2/2024     Patient will increase functional independence with ADLs by performing:    UE Dressing with Set-up Assistance.  LE Dressing with Stand-by Assistance with use of adaptive dressing equipment and implementation of spinal precautions  Toileting from toilet with Stand-by Assistance for hygiene and clothing management.   Toilet transfer to toilet with Stand-by Assistance with use of least restrictive device and implementation of spinal precautions.  100% reciprocation and integration of 3/3 spinal precautions, DME recommendations, and ADL/task modifications post fusion to support safe d/c at highest level of function.    Functional mobility x 5 minutes with least restrictive assistive device and supervision assist.    6/6/2024 1431 by Esmer Slater, JEREMÍAS  Outcome: Progressing

## 2024-06-11 NOTE — PROGRESS NOTES
[facility] Surg  Neurosurgery  Discharge Summary      Patient Name: Graciela Barton  MRN: [id number]  Admission Date: 6/3/2024  Hospital Length of Stay: 1 days  Discharge Date and Time: 6/6/2024  4:17 PM  Attending Physician: No att. providers found   Discharging Provider: Scooter Salter MD  Primary Care Provider: Rocco Miller MD    HPI:   This is a very pleasant 73 y.o. female, who is 7 months status post T6-7 and T7-8 anterior diskectomy for thoracic myelopathy, 3 months status post L3-4, L4-5 oblique interbody fusion, L5-S1 anterior interbody fusion L3-S1 posterior segmental instrumentation, L3-4, L4-5 laminectomy, medial facetectomy, foraminotomy. Patient is doing rehabilitation. She walks with a walker. Her right leg weakness has partially improved. Her preoperative left leg pain has completely subsided since surgery. She is complaining of right shoulder pain that is intermittent. She still has some gait imbalance. Pain in the right shoulder radiates towards the right hand. Her walking ability has partially improved.     Procedure(s) (LRB):  1. C5-6, C6-7 anterior interbody arthrodesis, placement of interbody spacers, DePuy ACIS ProTi spacers filled with allograft DBM  2. Anterior instrumentation with separate plate from C5-C7, DePuy [place] plate  3. Fluoroscopy  4. Neuro monitoring using SSEP, EMG, MEP    Hospital Course: The surgery was uncomplicated and postoperative course uneventful.  Patient has significant chronic myelopathy and difficulty mobilizing.  It was judged necessary to transfer her to inpatient rehab for physical therapy daily.      Goals of Care Treatment Preferences:  Code Status: Full Code      Consults:   Consults (From admission, onward)          Status Ordering Provider     Inpatient consult to Social Work  Once        Provider:  (Not yet assigned)    RACQUEL Whyte            Significant Diagnostic Studies: N/A    Pending Diagnostic Studies:       None          Final  Active Diagnoses:    Diagnosis Date Noted POA    PRINCIPAL PROBLEM:  Cervical spondylosis with myelopathy and radiculopathy [M47.12, M47.22] 02/12/2024 Yes    S/P lumbar fusion [Z98.1] 06/03/2024 Not Applicable    PAF (paroxysmal atrial fibrillation) [I48.0] 08/10/2022 Yes    History of thyroid cancer [Z85.850] 02/19/2018 Not Applicable    Essential hypertension, benign [I10] 02/19/2018 Yes      Problems Resolved During this Admission:      Discharged Condition: good     Disposition: Rehab Facility    Follow Up:    Patient Instructions:   No discharge procedures on file.  Medications:  Transfer Medications (for Discharge Readmit only):   No current facility-administered medications for this encounter.     Current Outpatient Medications   Medication Sig Dispense Refill    amiodarone (PACERONE) 200 MG Tab Take 1 tablet (200 mg total) by mouth once daily. 90 tablet 0    furosemide (LASIX) 40 MG tablet Take 1 tablet by mouth once daily 90 tablet 1    levothyroxine (SYNTHROID) 150 MCG tablet Take 1 tablet (150 mcg total) by mouth before breakfast. 90 tablet 1    apixaban (ELIQUIS) 5 mg Tab Take 1 tablet (5 mg total) by mouth 2 (two) times daily. Resume on 06/09/24      atorvastatin (LIPITOR) 20 MG tablet Take 1 tablet (20 mg total) by mouth every evening.      bisacodyL (DULCOLAX) 10 mg Supp Place 1 suppository (10 mg total) rectally daily as needed (constipation).      celecoxib (CELEBREX) 200 MG capsule Take 1 capsule (200 mg total) by mouth 2 (two) times daily.      meclizine (ANTIVERT) 25 mg tablet Take 25 mg by mouth. PRN      methocarbamoL (ROBAXIN) 750 MG Tab Take 1 tablet (750 mg total) by mouth 3 (three) times daily as needed (muscle spasms).      ondansetron (ZOFRAN-ODT) 8 MG TbDL Take 1 tablet (8 mg total) by mouth every 6 (six) hours as needed (nausea/vomiting).      oxyCODONE-acetaminophen (PERCOCET) 5-325 mg per tablet Take 1 tablet by mouth every 6 (six) hours as needed for Pain.      potassium bicarbonate  disintegrating tablet Take 1 tablet (10 mEq total) by mouth once daily.      senna-docusate 8.6-50 mg (PERICOLACE) 8.6-50 mg per tablet Take 2 tablets by mouth nightly as needed for Constipation.         Scooter Salter MD  Neurosurgery  [facility] Surg

## 2024-06-25 ENCOUNTER — OFFICE VISIT (OUTPATIENT)
Dept: PRIMARY CARE CLINIC | Facility: CLINIC | Age: 74
End: 2024-06-25
Payer: MEDICARE

## 2024-06-25 VITALS
DIASTOLIC BLOOD PRESSURE: 64 MMHG | OXYGEN SATURATION: 97 % | HEIGHT: 67 IN | HEART RATE: 67 BPM | BODY MASS INDEX: 31.24 KG/M2 | WEIGHT: 199.06 LBS | SYSTOLIC BLOOD PRESSURE: 132 MMHG | RESPIRATION RATE: 19 BRPM

## 2024-06-25 DIAGNOSIS — Z98.890 H/O CERVICAL SPINE SURGERY: ICD-10-CM

## 2024-06-25 DIAGNOSIS — Z09 HOSPITAL DISCHARGE FOLLOW-UP: Primary | ICD-10-CM

## 2024-06-25 DIAGNOSIS — M79.89 LEFT LEG SWELLING: ICD-10-CM

## 2024-06-25 PROCEDURE — 99213 OFFICE O/P EST LOW 20 MIN: CPT | Mod: PBBFAC,PN | Performed by: STUDENT IN AN ORGANIZED HEALTH CARE EDUCATION/TRAINING PROGRAM

## 2024-06-25 PROCEDURE — 99214 OFFICE O/P EST MOD 30 MIN: CPT | Mod: S$PBB,,, | Performed by: STUDENT IN AN ORGANIZED HEALTH CARE EDUCATION/TRAINING PROGRAM

## 2024-06-25 PROCEDURE — 99999 PR PBB SHADOW E&M-EST. PATIENT-LVL III: CPT | Mod: PBBFAC,,, | Performed by: STUDENT IN AN ORGANIZED HEALTH CARE EDUCATION/TRAINING PROGRAM

## 2024-06-25 RX ORDER — ACETAMINOPHEN 325 MG/1
650 TABLET ORAL EVERY 6 HOURS
COMMUNITY
Start: 2024-06-20

## 2024-06-25 RX ORDER — MULTIVIT WITH MINERALS/HERBS
1 TABLET ORAL DAILY
COMMUNITY

## 2024-06-25 RX ORDER — HYDROCHLOROTHIAZIDE 12.5 MG/1
12.5 TABLET ORAL DAILY
Qty: 90 TABLET | Refills: 3 | Status: SHIPPED | OUTPATIENT
Start: 2024-06-25 | End: 2025-06-25

## 2024-06-25 RX ORDER — MULTIVITAMIN
1 TABLET ORAL DAILY
COMMUNITY

## 2024-06-25 NOTE — PROGRESS NOTES
"Subjective:       Patient ID: Graciela Barton is a 74 y.o. female.    Chief Complaint: Hospital Follow Up (Neck and back surgery )      HPI:  74 y.o. female presents to [facility] for follow-up visit from neck surgery with C5-6, C6-7 anterior interbody arthrodesis and placement [place] plate.    Acute concerns?: None today's visit. Has only needed Tylenol once since discharge.    Home rehab?: Has come, will start rehab soon.    Will follow-up with Neurosurgery 7/17/2024    Collar stays on except for showers. Will likely need for 4 months    Still numb in left buttock and hip. Some incontinence since prior to surgery.    Having swelling to legs. L>R. No pain in calf. Was taken off of HCTZ in hospital.    Review of Systems   Constitutional:  Negative for chills, diaphoresis, fatigue (Didn't sleep well past night) and fever.   HENT:  Negative for congestion, sinus pressure, sneezing and sore throat.    Respiratory:  Negative for cough and shortness of breath.    Cardiovascular:  Positive for leg swelling. Negative for chest pain and palpitations.   Gastrointestinal:  Negative for abdominal pain, diarrhea, nausea and vomiting.   Musculoskeletal:  Negative for joint swelling and myalgias.   Skin:  Negative for rash and wound.   Neurological:  Negative for dizziness and weakness.       Objective:      Vitals:    06/25/24 0901   BP: 132/64   BP Location: Left arm   Patient Position: Sitting   BP Method: Medium (Manual)   Pulse: 67   Resp: 19   SpO2: 97%   Weight: 90.3 kg (199 lb 1.2 oz)   Height: 5' 7" (1.702 m)     Physical Exam  Vitals reviewed.   Constitutional:       General: She is not in acute distress.     Appearance: Normal appearance. She is not ill-appearing.   HENT:      Head: Normocephalic and atraumatic.   Eyes:      General:         Right eye: No discharge.         Left eye: No discharge.      Conjunctiva/sclera: Conjunctivae normal.   Cardiovascular:      Rate and Rhythm: Normal rate.   Pulmonary:      " Effort: Pulmonary effort is normal.   Musculoskeletal:         General: No deformity.      Right lower le+ Pitting Edema present.      Left lower leg: 3+ Pitting Edema present.   Skin:     Coloration: Skin is not jaundiced or pale.   Neurological:      General: No focal deficit present.      Mental Status: She is alert and oriented to person, place, and time.   Psychiatric:         Mood and Affect: Mood normal.         Behavior: Behavior normal.             Lab Results   Component Value Date     2024    K 4.4 2024     2024    CO2 25 2024    BUN 24 (H) 2024    CREATININE 0.8 2024    GLUCOSE 108 (H) 2022    ANIONGAP 10 2024     Lab Results   Component Value Date    HGBA1C 5.5 2022     Lab Results   Component Value Date    BNP 67 2024     (H) 10/07/2023     (H) 2023       Lab Results   Component Value Date    WBC 12.57 2024    HGB 11.7 (L) 2024    HCT 35.8 (L) 2024    HCT 24 (L) 2023     2024    GRAN 10.8 (H) 2024    GRAN 86.2 (H) 2024     Lab Results   Component Value Date    CHOL 127 2022    HDL 56 2022    LDLCALC 44.0 (L) 2022    TRIG 135 2022          Current Outpatient Medications:     acetaminophen (TYLENOL) 325 MG tablet, Take 650 mg by mouth every 6 (six) hours., Disp: , Rfl:     amiodarone (PACERONE) 200 MG Tab, Take 1 tablet (200 mg total) by mouth once daily., Disp: 90 tablet, Rfl: 0    apixaban (ELIQUIS) 5 mg Tab, Take 1 tablet (5 mg total) by mouth 2 (two) times daily. Resume on 24, Disp: , Rfl:     ascorbic acid, vitamin C, (VITAMIN C) 100 MG tablet, Take 100 mg by mouth once daily., Disp: , Rfl:     atorvastatin (LIPITOR) 20 MG tablet, Take 1 tablet (20 mg total) by mouth every evening., Disp: , Rfl:     b complex vitamins tablet, Take 1 tablet by mouth once daily., Disp: , Rfl:     furosemide (LASIX) 40 MG tablet, Take 1 tablet by  mouth once daily, Disp: 90 tablet, Rfl: 1    levothyroxine (SYNTHROID) 150 MCG tablet, Take 1 tablet (150 mcg total) by mouth before breakfast., Disp: 90 tablet, Rfl: 1    meclizine (ANTIVERT) 25 mg tablet, Take 25 mg by mouth. PRN, Disp: , Rfl:     multivitamin (ONE DAILY MULTIVITAMIN) per tablet, Take 1 tablet by mouth once daily., Disp: , Rfl:     ondansetron (ZOFRAN-ODT) 8 MG TbDL, Take 1 tablet (8 mg total) by mouth every 6 (six) hours as needed (nausea/vomiting)., Disp: , Rfl:     potassium bicarbonate disintegrating tablet, Take 1 tablet (10 mEq total) by mouth once daily., Disp: , Rfl:     hydroCHLOROthiazide (HYDRODIURIL) 12.5 MG Tab, Take 1 tablet (12.5 mg total) by mouth once daily., Disp: 90 tablet, Rfl: 3        Assessment:       1. Hospital discharge follow-up    2. Left leg swelling    3. H/O cervical spine surgery           Plan:       Hospital discharge follow-up  H/O cervical spine surgery  Left leg swelling  -     Cancel: D dimer, quantitative; Future; Expected date: 06/25/2024  -     hydroCHLOROthiazide (HYDRODIURIL) 12.5 MG Tab; Take 1 tablet (12.5 mg total) by mouth once daily.  Dispense: 90 tablet; Refill: 3  - Well's DVT criternia score 1/2  -     D dimer, quantitative; Future; Expected date: 06/25/2024  - Informed patient if D-dimer is elevated, will need Urgent/emergent evaluation to rule out clot. Phone number verified and will be available to take call if abnormal    RTC PRN

## 2024-07-02 ENCOUNTER — TELEPHONE (OUTPATIENT)
Dept: PRIMARY CARE CLINIC | Facility: CLINIC | Age: 74
End: 2024-07-02
Payer: MEDICARE

## 2024-07-02 NOTE — TELEPHONE ENCOUNTER
----- Message from Brenna Green MA sent at 6/26/2024  9:40 AM CDT -----  Good morning,    This pt was discharged from the ED on 6/25/2024 and has orders to follow up with Dr. Miller. In compliance with Medicare 2+ Chronic Condition patient measure mandates, this patient requires  a follow up appt within 7 days of ED visit d/c date.   I am requesting scheduling assistance as you are booked, and I am unable to schedule pt an appt within 7 days.  Thank you for your assistance.  Please advise of appt date and time so that I can set an appt reminder and confirm with patient.     NILO Kowalski  Ed Navigator

## 2024-07-09 ENCOUNTER — EXTERNAL HOME HEALTH (OUTPATIENT)
Dept: HOME HEALTH SERVICES | Facility: HOSPITAL | Age: 74
End: 2024-07-09
Payer: MEDICARE

## 2024-07-17 ENCOUNTER — OFFICE VISIT (OUTPATIENT)
Dept: NEUROSURGERY | Facility: CLINIC | Age: 74
End: 2024-07-17
Payer: MEDICARE

## 2024-07-17 ENCOUNTER — HOSPITAL ENCOUNTER (OUTPATIENT)
Dept: RADIOLOGY | Facility: HOSPITAL | Age: 74
Discharge: HOME | End: 2024-07-17
Attending: NEUROLOGICAL SURGERY
Payer: MEDICARE

## 2024-07-17 VITALS
HEART RATE: 46 BPM | BODY MASS INDEX: 29.03 KG/M2 | HEIGHT: 67 IN | WEIGHT: 184.94 LBS | SYSTOLIC BLOOD PRESSURE: 135 MMHG | DIASTOLIC BLOOD PRESSURE: 65 MMHG

## 2024-07-17 DIAGNOSIS — Z98.1 S/P CERVICAL SPINAL FUSION: Primary | ICD-10-CM

## 2024-07-17 DIAGNOSIS — Z98.1 S/P CERVICAL SPINAL FUSION: ICD-10-CM

## 2024-07-17 PROCEDURE — 99214 OFFICE O/P EST MOD 30 MIN: CPT | Mod: PBBFAC,25,PN | Performed by: PHYSICIAN ASSISTANT

## 2024-07-17 PROCEDURE — 72040 X-RAY EXAM NECK SPINE 2-3 VW: CPT | Mod: TC,FY

## 2024-07-17 PROCEDURE — 99999 PR PBB SHADOW E&M-EST. PATIENT-LVL IV: CPT | Mod: PBBFAC,,, | Performed by: PHYSICIAN ASSISTANT

## 2024-07-17 PROCEDURE — 99024 POSTOP FOLLOW-UP VISIT: CPT | Mod: POP,,, | Performed by: PHYSICIAN ASSISTANT

## 2024-07-17 PROCEDURE — 72040 X-RAY EXAM NECK SPINE 2-3 VW: CPT | Mod: 26,,, | Performed by: RADIOLOGY

## 2024-07-17 NOTE — PROGRESS NOTES
"  Subjective:     Patient ID:  Graciela Barton is a 74 y.o. female.    [place]    Chief Complaint: 6 week po     Surgery 06/03/2024     Preop diagnosis   1. Cervical spondylosis with myelopathy and radiculopathy      Postop diagnosis   Same     Surgery   1. C5-6, C6-7 anterior interbody arthrodesis, placement of interbody spacers, DePuy ACIS ProTi spacers filled with allograft DBM  2. Anterior instrumentation with separate plate from C5-C7, DePuy [place] plate  3. Fluoroscopy  4. Neuro monitoring using SSEP, EMG, MEP     Surgeon   Scooter Salter MD          HPI    Graciela Barton is a 74 y.o. female who presents for follow up.  Overall she is doing well.  No neck pain no arm pain or paresthesias.  She walks with a walker.  She was wearing her neck brace and using her bone growth stimulator.    Patient denies any recent accidents or trauma, no saddle anesthesias, and no bowel or bladder incontinence.      Review of Systems:  Constitution: Negative for chills, fever, night sweats and weight loss.   Musculoskeletal: Negative for falls.   Gastrointestinal: Negative for bowel incontinence, nausea and vomiting.   Genitourinary: Negative for bladder incontinence.   Neurological: Negative for disturbances in coordination and loss of balance.      Objective:      Vitals:    07/17/24 1036   BP: 135/65   Pulse: (!) 46   Weight: 83.9 kg (184 lb 15.5 oz)   Height: 5' 7" (1.702 m)   PainSc: 0-No pain         Physical Exam: exam done in the wheelchair      General:  Graciela Barton is well-developed, well-nourished, appears stated age, in no acute distress, alert and oriented to person, place, and time.    Pulmonary/Chest:  Respiratory effort normal  Abdominal: Exhibits no distension  Psychiatric:  Normal mood and affect.  Behavior is normal.  Judgement and thought content normal      Musculoskeletal:      Cervical Spine Inspection:  Healed surgical scars with no visible rashes.    Cervical Spine Palpation:  No tenderness to cervical spine " palpation.      Neurological:    Muscle strength against resistance:     Right Left   Deltoid  5 / 5 5 / 5   Biceps  5 / 5 5 / 5   Triceps 5 / 5 5 / 5   Wrist flexion  5 / 5 5 / 5   Wrist extension 5 / 5 5 / 5   Finger abduction 5 / 5 5 / 5   Thumb opposition 5 / 5 5 / 5   Handgrip 5 / 5 5 / 5   Hip flexion  5 / 5 5 / 5   Hip extension 5 / 5 5 / 5   Hip abduction 5 / 5 5 / 5   Hip adduction 5/ 5 5 / 5   Knee extension  5 / 5 5 / 5   Knee flexion  5 / 5 5 / 5   Dorsiflexion  5 / 5 5 / 5   EHL  5 / 5 5 / 5   Plantar flexion  5 / 5 5 / 5   Inversion of the feet 5 / 5 5 / 5   Eversion of the feet 5 / 5 5 / 5       Reflexes:      Clonus:  Negative bilaterally  Betancourt: Negative bilaterally    On gross examination of the bilateral lower extremities, patient has full painfree ROM with no signs of clubbing, cyanosis, edema, and weakness.      XRAY Interpretation:     Cervical spine xrays were personally reviewed today and also reviewed with Dr. Salter.  No fractures.  Hardware intact.      Assessment:          1. S/P cervical spinal fusion            Plan:             -Continue using BGS  -Continue wearing neck brace  -FU with Dr. Salter in 6 weeks with repeat cspine xray      Follow-Up:  Follow up in about 6 weeks (around 8/28/2024). If there are any questions prior to this, the patient was instructed to contact the office.       Taryn Tang, [facility], PA-C  Neurosurgery  [facility]  07/17/2024

## 2024-08-01 ENCOUNTER — OFFICE VISIT (OUTPATIENT)
Dept: CARDIOLOGY | Facility: CLINIC | Age: 74
End: 2024-08-01
Payer: MEDICARE

## 2024-08-01 VITALS
HEIGHT: 67 IN | HEART RATE: 67 BPM | WEIGHT: 180 LBS | DIASTOLIC BLOOD PRESSURE: 54 MMHG | OXYGEN SATURATION: 97 % | SYSTOLIC BLOOD PRESSURE: 102 MMHG | BODY MASS INDEX: 28.25 KG/M2

## 2024-08-01 DIAGNOSIS — I10 ESSENTIAL HYPERTENSION, BENIGN: ICD-10-CM

## 2024-08-01 DIAGNOSIS — I48.3 TYPICAL ATRIAL FLUTTER: Primary | ICD-10-CM

## 2024-08-01 DIAGNOSIS — I27.20 PULMONARY HTN: ICD-10-CM

## 2024-08-01 DIAGNOSIS — E78.2 MIXED HYPERLIPIDEMIA: ICD-10-CM

## 2024-08-01 DIAGNOSIS — I48.0 PAF (PAROXYSMAL ATRIAL FIBRILLATION): ICD-10-CM

## 2024-08-01 PROCEDURE — 99215 OFFICE O/P EST HI 40 MIN: CPT | Mod: S$PBB,,, | Performed by: INTERNAL MEDICINE

## 2024-08-01 PROCEDURE — 99213 OFFICE O/P EST LOW 20 MIN: CPT | Mod: PBBFAC,PN | Performed by: INTERNAL MEDICINE

## 2024-08-01 PROCEDURE — 99999 PR PBB SHADOW E&M-EST. PATIENT-LVL III: CPT | Mod: PBBFAC,,, | Performed by: INTERNAL MEDICINE

## 2024-08-01 NOTE — PROGRESS NOTES
[facility]  Cardiology Clinic Note      Chief Complaint  Chief Complaint   Patient presents with    Follow-up       HPI:      73-year-old female past medical history thyroid cancer/hypothyroidism, BPPV, multiple sclerosis, ABLA postoperatively 10/2023, hypertension, hyperlipidemia, paroxysmal atrial fibrillation/atrial flutter on propafenone previously at home cardioversion as an inpatient 10/2023, transesophageal echocardiogram 10/2023 normal estimated EF normal RV prior echocardiogram 09/2023 normal EF diastolic function indeterminate mild left atrial dilation mild mitral valve regurgitation normal RV mild right atrial enlargement mild tricuspid regurgitation PASP 44 prior echo 2022 normal EF normal diastolic function mild left atrial enlargement normal RV PASP 30 prior stress echo 2019 normal EF no ischemia on echocardiogram epic images    Followed by electrophysiology Dr Singletary / JONELLE Magaña  Previously admitted and treated for atrial fibrillation RVR last month required amiodarone  Patient states he was taken off of metoprolol previously  Seen by electrophysiology plan is to stay with amiodarone for now and make a decision for an alternative antiarrhythmic in the near future  The patient is doing well postoperatively  Blood pressure soft today will stop Lasix    Medications  Current Outpatient Medications   Medication Sig Dispense Refill    acetaminophen (TYLENOL) 325 MG tablet Take 650 mg by mouth every 6 (six) hours.      amiodarone (PACERONE) 200 MG Tab Take 1 tablet (200 mg total) by mouth once daily. 90 tablet 0    apixaban (ELIQUIS) 5 mg Tab Take 1 tablet (5 mg total) by mouth 2 (two) times daily. Resume on 06/09/24      ascorbic acid, vitamin C, (VITAMIN C) 100 MG tablet Take 100 mg by mouth once daily.      atorvastatin (LIPITOR) 20 MG tablet Take 1 tablet (20 mg total) by mouth every evening.      b complex vitamins tablet Take 1 tablet by mouth once daily.      furosemide (LASIX)  40 MG tablet Take 1 tablet by mouth once daily 90 tablet 1    hydroCHLOROthiazide (HYDRODIURIL) 12.5 MG Tab Take 1 tablet (12.5 mg total) by mouth once daily. 90 tablet 3    levothyroxine (SYNTHROID) 150 MCG tablet Take 1 tablet (150 mcg total) by mouth before breakfast. 90 tablet 1    meclizine (ANTIVERT) 25 mg tablet Take 25 mg by mouth. PRN      multivitamin (ONE DAILY MULTIVITAMIN) per tablet Take 1 tablet by mouth once daily.      ondansetron (ZOFRAN-ODT) 8 MG TbDL Take 1 tablet (8 mg total) by mouth every 6 (six) hours as needed (nausea/vomiting).      potassium bicarbonate disintegrating tablet Take 1 tablet (10 mEq total) by mouth once daily.       No current facility-administered medications for this visit.        History  Past Medical History:   Diagnosis Date    Arthritis     Cancer     Thyroid    Edema     Hyperlipidemia     Hypertension     Thyroid disease      Past Surgical History:   Procedure Laterality Date    ADENOIDECTOMY      COLONOSCOPY  10/18/2018    COLONOSCOPY N/A 10/18/2018    Procedure: COLONOSCOPY;  Surgeon: Yosvany Alejandra MD;  Location: [facility] ENDO;  Service: General;  Laterality: N/A;    ECHOCARDIOGRAM,TRANSESOPHAGEAL N/A 10/4/2023    Procedure: Transesophageal echo (CARLYLE) intra-procedure log documentation;  Surgeon: Mark Ortega MD;  Location: [facility] OR;  Service: Cardiology;  Laterality: N/A;    EPIDURAL STEROID INJECTION N/A 9/30/2022    Procedure: LUMBAR L3/4 MALKA CONTRAST DIRRECT REFERRAL MONICA CARRERA IN CHART;  Surgeon: Nicholas Kumar MD;  Location: [facility] PAIN MGT;  Service: Pain Management;  Laterality: N/A;    FACETECTOMY OF VERTEBRA N/A 9/29/2023    Procedure: FACETECTOMY;  Surgeon: Scooter Salter MD;  Location: [facility] OR;  Service: Neurosurgery;  Laterality: N/A;    FORAMINOTOMY N/A 9/29/2023    Procedure: FORAMINOTOMY, SPINE;  Surgeon: Scooter Salter MD;  Location: [facility] OR;  Service: Neurosurgery;  Laterality: N/A;    FUSION OF LUMBAR SPINE USING POSTERIOR INTERBODY  TECHNIQUE N/A 9/29/2023    Procedure: FUSION, SPINE, LUMBAR, PLIF;  Surgeon: Scooter Salter MD;  Location: [facility] OR;  Service: Neurosurgery;  Laterality: N/A;  L3-S1 posterior instrumentation   L3-L5 Lmainectomy sacro pelvic fixation Globus cage   -lop 3 hr   -los 5 days   -Lami segments 1  -anterior fusion insterspace 2  -general   -type and screen   -Ziehm brain notified cc  -EMG bairon notified cc  -SEP  -MEP  -TLSO       FUSION, SPINE, CERVICAL N/A 6/3/2024    Procedure: FUSION, SPINE, CERVICAL;  Surgeon: Scooter Salter MD;  Location: [facility] OR;  Service: Neurosurgery;  Laterality: N/A;  Procedure:C5-6,C6-7 ACDF and plate  Length of procedure:3 hours  LOS: 0-1 night  Anesthesia:General  Blood:Type and screen  Radiology:C-arm  SNS:EMG,SEP,MEP  Brace:[place]  Bed:Regular Bed  Headrest:Charles Wells, Horse shoe  Position:Prone  Equipment:Depuy-Charles, Orthof    HYSTERECTOMY  1992    total hyst     KNEE SURGERY      16 pins and two plates implanted    LAMINECTOMY N/A 9/29/2023    Procedure: LAMINECTOMY, SPINE;  Surgeon: Scooter Salter MD;  Location: [facility] OR;  Service: Neurosurgery;  Laterality: N/A;    MINIMALLY INVASIVE TRANSFORAMINAL LUMBAR INTERBODY FUSION (TLIF) N/A 9/29/2023    Procedure: FUSION, SPINE, LUMBAR, TLIF, MINIMALLY INVASIVE;  Surgeon: Scooter Salter MD;  Location: [facility] OR;  Service: Neurosurgery;  Laterality: N/A;  L3-L5 OLIF Globus ATP  L5-S1 ALIF Globus ATP  -Lami add segment 1  -anterior fusion interspances 2  -lop 3 hr   -general   -type and screen   -C arm   -EMG bairon notified cc  -SEP  -MEP  -TLSO  -Regular Bed   -Lateral right down   -globus reji    THORACIC DISCECTOMY Right 5/12/2023    Procedure: DISCECTOMY, SPINE, THORACIC Right T6-7, T7-8 interior discectomy retropleural;  Surgeon: Scooter Salter MD;  Location: [facility] OR;  Service: Neurosurgery;  Laterality: Right;  general  eliquis   type and screen  C-arm  Metrx  EMG bairon notified cc  SEP  MEP  Regular bed   lateral  left down   globus (Jose Enrique) notified cc    THYROIDECTOMY      TONSILLECTOMY       Social History     Socioeconomic History    Marital status:    Tobacco Use    Smoking status: Never    Smokeless tobacco: Never   Substance and Sexual Activity    Alcohol use: No    Drug use: No    Sexual activity: Not Currently     Partners: Male   Social History Narrative    ** Merged History Encounter **          Social Determinants of Health     Financial Resource Strain: Low Risk  (6/26/2024)    Overall Financial Resource Strain (CARDIA)     Difficulty of Paying Living Expenses: Not hard at all   Food Insecurity: No Food Insecurity (6/26/2024)    Hunger Vital Sign     Worried About Running Out of Food in the Last Year: Never true     Ran Out of Food in the Last Year: Never true   Transportation Needs: No Transportation Needs (6/26/2024)    PRAPARE - Transportation     Lack of Transportation (Medical): No     Lack of Transportation (Non-Medical): No   Recent Concern: Transportation Needs - Unmet Transportation Needs (5/22/2024)    TRANSPORTATION NEEDS     Transportation : Yes, it has kept me from medical appointments or from getting my medications.   Physical Activity: Patient Unable To Answer (6/26/2024)    Exercise Vital Sign     Days of Exercise per Week: Patient unable to answer     Minutes of Exercise per Session: Patient unable to answer   Stress: Patient Unable To Answer (6/26/2024)    [employer] - Occupational Stress Questionnaire     Feeling of Stress : Patient unable to answer   Housing Stability: Low Risk  (6/26/2024)    Housing Stability Vital Sign     Unable to Pay for Housing in the Last Year: No     Homeless in the Last Year: No     Family History   Problem Relation Name Age of Onset    Alcohol abuse Mother      Heart disease Mother      Alcohol abuse Father      Heart disease Father      Heart disease Maternal Grandmother      Heart disease Maternal Grandfather           Allergies  Review of patient's allergies indicates:   Allergen Reactions    Vancomycin Tinitus    Vancomycin analogues Tinitus       Review of Systems   Review of Systems   Constitutional: Negative for fever.   HENT:  Negative for nosebleeds.    Eyes:  Negative for visual disturbance.   Cardiovascular:  Negative for chest pain, claudication, dyspnea on exertion, palpitations and syncope.   Respiratory:  Negative for cough, hemoptysis and wheezing.    Endocrine: Negative for cold intolerance, heat intolerance, polyphagia and polyuria.   Hematologic/Lymphatic: Negative for bleeding problem.   Skin:  Negative for rash.   Musculoskeletal:  Negative for myalgias.   Gastrointestinal:  Negative for hematemesis, hematochezia, nausea and vomiting.   Genitourinary:  Negative for dysuria.   Neurological:  Negative for focal weakness and sensory change.   Psychiatric/Behavioral:  Negative for altered mental status.        Physical Exam  Vitals:    08/01/24 0926   BP: (!) 102/54   Pulse: 67     Wt Readings from Last 1 Encounters:   08/01/24 81.6 kg (180 lb)     Physical Exam  Constitutional:       General: She is not in acute distress.  HENT:      Head: Normocephalic and atraumatic.      Mouth/Throat:      Mouth: Mucous membranes are moist.   Eyes:      Extraocular Movements: Extraocular movements intact.      Pupils: Pupils are equal, round, and reactive to light.   Neck:      Vascular: No carotid bruit or JVD.   Cardiovascular:      Rate and Rhythm: Normal rate and regular rhythm.      Heart sounds: No murmur heard.     No friction rub. No gallop.   Pulmonary:      Effort: Pulmonary effort is normal.      Breath sounds: Normal breath sounds.   Abdominal:      Tenderness: There is no abdominal tenderness. There is no guarding or rebound.   Musculoskeletal:      Right lower leg: Edema present.      Left lower leg: Edema present.   Skin:     General: Skin is warm and dry.      Capillary Refill: Capillary refill takes less  than 2 seconds.   Neurological:      Mental Status: She is alert. Mental status is at baseline.   Psychiatric:         Mood and Affect: Mood normal.         Labs  Lab Visit on 06/25/2024   Component Date Value Ref Range Status    D-Dimer 06/25/2024 0.60 (H)  <0.50 mg/L FEU Final    Comment: The quantitative D-dimer assay should be used as an aid in   the diagnosis of deep vein thrombosis and pulmonary embolism  in patients with the appropriate presentation and clinical  history. The upper limit of the reference interval and the clinical   cut off   point are identical. Causes of a positive (>0.50 mg/L FEU) D-Dimer   test  include, but are not limited to: DVT, PE, DIC, thrombolytic   therapy, anticoagulant therapy, recent surgery, trauma, or   pregnancy, disseminated malignancy, aortic aneurysm, cirrhosis,  and severe infection. False negative results may occur in   patients with distal DVT.  SHWETHA^612^^16^  LOT^610^DDiSup^[id number]\DDiBuf^[id number]\DDiReag^[id number]     Admission on 06/03/2024, Discharged on 06/06/2024   Component Date Value Ref Range Status    Group & Rh 06/03/2024 O NEG   Final    Indirect Michelle 06/03/2024 NEG   Final    Specimen Outdate 06/03/2024 06/06/2024 23:59   Final    WBC 06/04/2024 12.57  3.90 - 12.70 K/uL Final    RBC 06/04/2024 3.66 (L)  4.00 - 5.40 M/uL Final    Hemoglobin 06/04/2024 11.7 (L)  12.0 - 16.0 g/dL Final    Hematocrit 06/04/2024 35.8 (L)  37.0 - 48.5 % Final    MCV 06/04/2024 98  82 - 98 fL Final    MCH 06/04/2024 32.0 (H)  27.0 - 31.0 pg Final    MCHC 06/04/2024 32.7  32.0 - 36.0 g/dL Final    RDW 06/04/2024 11.7  11.5 - 14.5 % Final    Platelets 06/04/2024 247  150 - 450 K/uL Final    MPV 06/04/2024 11.1  9.2 - 12.9 fL Final    Immature Granulocytes 06/04/2024 0.6 (H)  0.0 - 0.5 % Final    Gran # (ANC) 06/04/2024 10.8 (H)  1.8 - 7.7 K/uL Final    Immature Grans (Abs) 06/04/2024 0.07 (H)  0.00 - 0.04 K/uL Final    Comment: Mild elevation in immature granulocytes is non specific and    can be seen in a variety of conditions including stress response,   acute inflammation, trauma and pregnancy. Correlation with other   laboratory and clinical findings is essential.      Lymph # 06/04/2024 0.8 (L)  1.0 - 4.8 K/uL Final    Mono # 06/04/2024 0.8  0.3 - 1.0 K/uL Final    Eos # 06/04/2024 0.0  0.0 - 0.5 K/uL Final    Baso # 06/04/2024 0.02  0.00 - 0.20 K/uL Final    nRBC 06/04/2024 0  0 /100 WBC Final    Gran % 06/04/2024 86.2 (H)  38.0 - 73.0 % Final    Lymph % 06/04/2024 6.5 (L)  18.0 - 48.0 % Final    Mono % 06/04/2024 6.5  4.0 - 15.0 % Final    Eosinophil % 06/04/2024 0.0  0.0 - 8.0 % Final    Basophil % 06/04/2024 0.2  0.0 - 1.9 % Final    Differential Method 06/04/2024 Automated   Final    Sodium 06/04/2024 139  136 - 145 mmol/L Final    Potassium 06/04/2024 4.4  3.5 - 5.1 mmol/L Final    Chloride 06/04/2024 104  95 - 110 mmol/L Final    CO2 06/04/2024 25  23 - 29 mmol/L Final    Glucose 06/04/2024 128 (H)  70 - 110 mg/dL Final    BUN 06/04/2024 24 (H)  8 - 23 mg/dL Final    Creatinine 06/04/2024 0.8  0.5 - 1.4 mg/dL Final    Calcium 06/04/2024 9.1  8.7 - 10.5 mg/dL Final    Anion Gap 06/04/2024 10  8 - 16 mmol/L Final    eGFR 06/04/2024 >60  >60 mL/min/1.73 m^2 Final   Hospital Outpatient Visit on 05/27/2024   Component Date Value Ref Range Status    QRS Duration 05/27/2024 80  ms Final    [facility] QTC Calculation 05/27/2024 435  ms Final   Lab Visit on 04/16/2024   Component Date Value Ref Range Status    WBC 04/16/2024 7.03  3.90 - 12.70 K/uL Final    RBC 04/16/2024 3.82 (L)  4.00 - 5.40 M/uL Final    Hemoglobin 04/16/2024 12.1  12.0 - 16.0 g/dL Final    Hematocrit 04/16/2024 37.9  37.0 - 48.5 % Final    MCV 04/16/2024 99 (H)  82 - 98 fL Final    MCH 04/16/2024 31.7 (H)  27.0 - 31.0 pg Final    MCHC 04/16/2024 31.9 (L)  32.0 - 36.0 g/dL Final    RDW 04/16/2024 13.6  11.5 - 14.5 % Final    Platelets 04/16/2024 287  150 - 450 K/uL Final    MPV 04/16/2024 10.5  9.2 - 12.9 fL Final    Immature  Granulocytes 04/16/2024 0.1  0.0 - 0.5 % Final    Gran # (ANC) 04/16/2024 4.9  1.8 - 7.7 K/uL Final    Immature Grans (Abs) 04/16/2024 0.01  0.00 - 0.04 K/uL Final    Comment: Mild elevation in immature granulocytes is non specific and   can be seen in a variety of conditions including stress response,   acute inflammation, trauma and pregnancy. Correlation with other   laboratory and clinical findings is essential.      Lymph # 04/16/2024 1.2  1.0 - 4.8 K/uL Final    Mono # 04/16/2024 0.8  0.3 - 1.0 K/uL Final    Eos # 04/16/2024 0.1  0.0 - 0.5 K/uL Final    Baso # 04/16/2024 0.05  0.00 - 0.20 K/uL Final    nRBC 04/16/2024 0  0 /100 WBC Final    Gran % 04/16/2024 70.1  38.0 - 73.0 % Final    Lymph % 04/16/2024 16.4 (L)  18.0 - 48.0 % Final    Mono % 04/16/2024 10.7  4.0 - 15.0 % Final    Eosinophil % 04/16/2024 2.0  0.0 - 8.0 % Final    Basophil % 04/16/2024 0.7  0.0 - 1.9 % Final    Differential Method 04/16/2024 Automated   Final    Sodium 04/16/2024 142  136 - 145 mmol/L Final    Potassium 04/16/2024 3.8  3.5 - 5.1 mmol/L Final    Chloride 04/16/2024 101  95 - 110 mmol/L Final    CO2 04/16/2024 32 (H)  23 - 29 mmol/L Final    Glucose 04/16/2024 91  70 - 110 mg/dL Final    BUN 04/16/2024 32 (H)  8 - 23 mg/dL Final    Creatinine 04/16/2024 1.0  0.5 - 1.4 mg/dL Final    Calcium 04/16/2024 9.3  8.7 - 10.5 mg/dL Final    Anion Gap 04/16/2024 9  8 - 16 mmol/L Final    eGFR 04/16/2024 59.5 (A)  >60 mL/min/1.73 m^2 Final    TSH 04/16/2024 5.155 (H)  0.400 - 4.000 uIU/mL Final    Free T4 04/16/2024 1.52 (H)  0.71 - 1.51 ng/dL Final   Admission on 02/09/2024, Discharged on 02/09/2024   Component Date Value Ref Range Status    WBC 02/09/2024 7.06  3.90 - 12.70 K/uL Final    RBC 02/09/2024 4.07  4.00 - 5.40 M/uL Final    Hemoglobin 02/09/2024 12.0  12.0 - 16.0 g/dL Final    Hematocrit 02/09/2024 39.0  37.0 - 48.5 % Final    MCV 02/09/2024 96  82 - 98 fL Final    MCH 02/09/2024 29.5  27.0 - 31.0 pg Final    MCHC 02/09/2024  30.8 (L)  32.0 - 36.0 g/dL Final    RDW 02/09/2024 14.5  11.5 - 14.5 % Final    Platelets 02/09/2024 329  150 - 450 K/uL Final    MPV 02/09/2024 10.2  9.2 - 12.9 fL Final    Immature Granulocytes 02/09/2024 0.3  0.0 - 0.5 % Final    Gran # (ANC) 02/09/2024 4.7  1.8 - 7.7 K/uL Final    Immature Grans (Abs) 02/09/2024 0.02  0.00 - 0.04 K/uL Final    Comment: Mild elevation in immature granulocytes is non specific and   can be seen in a variety of conditions including stress response,   acute inflammation, trauma and pregnancy. Correlation with other   laboratory and clinical findings is essential.      Lymph # 02/09/2024 1.5  1.0 - 4.8 K/uL Final    Mono # 02/09/2024 0.7  0.3 - 1.0 K/uL Final    Eos # 02/09/2024 0.2  0.0 - 0.5 K/uL Final    Baso # 02/09/2024 0.03  0.00 - 0.20 K/uL Final    nRBC 02/09/2024 0  0 /100 WBC Final    Gran % 02/09/2024 66.1  38.0 - 73.0 % Final    Lymph % 02/09/2024 21.0  18.0 - 48.0 % Final    Mono % 02/09/2024 10.1  4.0 - 15.0 % Final    Eosinophil % 02/09/2024 2.1  0.0 - 8.0 % Final    Basophil % 02/09/2024 0.4  0.0 - 1.9 % Final    Differential Method 02/09/2024 Automated   Final    Sodium 02/09/2024 141  136 - 145 mmol/L Final    Potassium 02/09/2024 3.8  3.5 - 5.1 mmol/L Final    Chloride 02/09/2024 101  95 - 110 mmol/L Final    CO2 02/09/2024 29  23 - 29 mmol/L Final    Glucose 02/09/2024 103  70 - 110 mg/dL Final    BUN 02/09/2024 23  8 - 23 mg/dL Final    Creatinine 02/09/2024 0.9  0.5 - 1.4 mg/dL Final    Calcium 02/09/2024 9.4  8.7 - 10.5 mg/dL Final    Total Protein 02/09/2024 7.2  6.0 - 8.4 g/dL Final    Albumin 02/09/2024 3.8  3.5 - 5.2 g/dL Final    Total Bilirubin 02/09/2024 0.3  0.1 - 1.0 mg/dL Final    Comment: For infants and newborns, interpretation of results should be based  on gestational age, weight and in agreement with clinical  observations.    Premature Infant recommended reference ranges:  Up to 24 hours.............<8.0 mg/dL  Up to 48 hours............<12.0  mg/dL  3-5 days..................<15.0 mg/dL  6-29 days.................<15.0 mg/dL      Alkaline Phosphatase 02/09/2024 106  55 - 135 U/L Final    AST 02/09/2024 25  10 - 40 U/L Final    ALT 02/09/2024 19  10 - 44 U/L Final    eGFR 02/09/2024 >60.0  >60 mL/min/1.73 m^2 Final    Anion Gap 02/09/2024 11  8 - 16 mmol/L Final    BNP 02/09/2024 67  0 - 99 pg/mL Final    Values of less than 100 pg/ml are consistent with non-CHF populations.    Troponin I 02/09/2024 0.018  0.000 - 0.026 ng/mL Final    Comment: The reference interval for Troponin I represents the 99th percentile   cutoff   for our facility and is consistent with 3rd generation assay   performance.      QRS Duration 02/09/2024 74  ms Final    [facility] QTC Calculation 02/09/2024 486  ms Final    D-Dimer 02/09/2024 0.43  <0.50 mg/L FEU Final    Comment: The quantitative D-dimer assay should be used as an aid in   the diagnosis of deep vein thrombosis and pulmonary embolism  in patients with the appropriate presentation and clinical  history. The upper limit of the reference interval and the clinical   cut off   point are identical. Causes of a positive (>0.50 mg/L FEU) D-Dimer   test  include, but are not limited to: DVT, PE, DIC, thrombolytic   therapy, anticoagulant therapy, recent surgery, trauma, or   pregnancy, disseminated malignancy, aortic aneurysm, cirrhosis,  and severe infection. False negative results may occur in   patients with distal DVT.  SHWETHA^612^^9^  LOT^610^DDiSup^[id number]\DDiBuf^[id number]\DDiReag^[id number]         EKG  Reviewed prior    Echo   Results for orders placed or performed during the hospital encounter of 09/29/23   Echo   Result Value Ref Range    BSA 1.9 m2    Hoyt's Biplane MOD Ejection Fraction 64 %    LVOT stroke volume 51.18 cm3    LVIDd 4.07 3.5 - 6.0 cm    LV Systolic Volume 33.51 mL    LV Systolic Volume Index 17.8 mL/m2    LVIDs 2.95 2.1 - 4.0 cm    LV Diastolic Volume 72.74 mL    LV Diastolic Volume Index 38.69 mL/m2    IVS 0.87  0.6 - 1.1 cm    LVOT diameter 2.0 cm    LVOT area 3.1 cm2    FS 28 28 - 44 %    Left Ventricle Relative Wall Thickness 0.36 cm    Posterior Wall 0.74 0.6 - 1.1 cm    LV mass 96.97 g    LV Mass Index 52 g/m2    TDI LATERAL 0.26 m/s    TDI SEPTAL 0.26 m/s    TR Max Mathew 2.71 m/s    LVOT peak mathew 1.01 m/s    Left Ventricular Outflow Tract Mean Velocity 0.76 cm/s    Left Ventricular Outflow Tract Mean Gradient 2.53 mmHg    LA size 3.83 cm    Left Atrium Major Axis 5.13 cm    RV S' 9.74 cm/s    TAPSE 2.27 cm    RA Major Axis 5.45 cm    RA Width 4.05 cm    AV mean gradient 4 mmHg    AV peak gradient 7 mmHg    Ao peak mathew 1.31 m/s    Ao VTI 31.90 cm    LVOT peak VTI 16.30 cm    AV valve area 1.60 cm²    AV Velocity Ratio 0.77     AV index (prosthetic) 0.51     MADY by Velocity Ratio 2.42 cm²    Mr max mathew 4.15 m/s    MV mean gradient 2 mmHg    MV peak gradient 5 mmHg    MV valve area by continuity eq 2.35 cm2    MV VTI 21.8 cm    Triscuspid Valve Regurgitation Peak Gradient 29 mmHg    PV PEAK VELOCITY 1.00 m/s    PV peak gradient 4 mmHg    Sinus 2.67 cm    STJ 2.62 cm    Ascending aorta 2.93 cm    IVC diameter 2.15 cm    Mean e' 0.26 m/s    ZLVIDS -0.74     ZLVIDD -2.55     LA WIDTH 4.3 cm    EF 55 %    TV resting pulmonary artery pressure 44 mmHg    RV TB RVSP 18 mmHg    Est. RA pres 15 mmHg    Narrative      Left Ventricle: The left ventricle is normal in size. Normal wall   thickness. Normal wall motion. There is normal systolic function. Ejection   fraction by visual approximation is 55%. Unable to assess diastolic   function due to atrial fibrillation.    Left Atrium: Left atrium is mildly dilated.    Right Ventricle: Normal right ventricular cavity size. Wall thickness   is normal. Right ventricle wall motion  is normal. Systolic function is   normal.    Right Atrium: Right atrium is mildly dilated.    Mitral Valve: There is mild regurgitation.    Tricuspid Valve: There is mild regurgitation.    Pulmonary Artery: The  estimated pulmonary artery systolic pressure is   44 mmHg.    IVC/SVC: Elevated venous pressure at 15 mmHg.         Imaging  X-Ray Lumbar Spine Ap And Lateral    Result Date: 11/9/2023  EXAMINATION: XR LUMBAR SPINE AP AND LATERAL CLINICAL HISTORY: Spinal stenosis, lumbar region with neurogenic claudication TECHNIQUE: AP, lateral and spot images were performed of the lumbar spine. COMPARISON: CT lumbar spine dated 10/06/2023. FINDINGS: Posterior instrumented fusion spanning L3 through S1 with disc spacer.  Hardware projects in appropriate alignment.  Previously noted gus-hardware fractures from comparison CT are less conspicuous by this exam.  Similar discogenic change at the non-operative levels.     As above. Electronically signed by: Joao Monroy Date:    11/09/2023 Time:    10:17      Prior coronary angiogram / intervention:  No known    Assessment and Plan  1. Essential hypertension, benign  Continue hydrochlorothiazide   Stop Lasix blood pressure soft    2. Mixed hyperlipidemia  Continue statin    3. PAF (paroxysmal atrial fibrillation)  Amiodarone, Eliquis   Unclear why patient was taken off beta-blocker or if she self DC'd  Seen by electrophysiology see HPI may consider alternative antiarrhythmic in the future    4. Typical atrial flutter  As above    5. Bilateral leg edema  Taking Lasix but will stop due to soft blood pressure  Repeat echo  Wt Readings from Last 3 Encounters:   08/01/24 0926 81.6 kg (180 lb)   07/17/24 1036 83.9 kg (184 lb 15.5 oz)   06/25/24 1430 83.9 kg (185 lb)        6. Pulmonary HTN  Noted on an echo in the past  Mild  Taking Lasix   Euvolemic  Repeat echo    Follow Up  3 months      Rocco Weller MD, FACC, RPVI  Interventional Cardiology   Labs 1 week after stopping Lasix to determine whether or not potassium needs to be discontinued as well    Total professional time spent for the encounter: 30 minutes  Time was spent preparing to see the patient, reviewing results of prior  testing, obtaining and/or reviewing separately obtained history, performing a medically appropriate examination and interview, counseling and educating the patient/family, ordering medications/tests/procedures, referring and communicating with other health care professionals, documenting clinical information in the electronic health record, and independently interpreting results.

## 2024-08-20 ENCOUNTER — TELEPHONE (OUTPATIENT)
Dept: CARDIOLOGY | Facility: CLINIC | Age: 74
End: 2024-08-20
Payer: MEDICARE

## 2024-08-20 NOTE — TELEPHONE ENCOUNTER
Spoke with patient, informed of test results per provider message and recommendations.  Patient verbalized understanding.

## 2024-08-20 NOTE — TELEPHONE ENCOUNTER
----- Message from Rocco Weller MD sent at 8/19/2024  8:51 PM CDT -----  Please let her know her echo is fine but she is back in atrial fib  I have sent a message to her EP Dr. Singletary for recs  ----- Message -----  From: Rocco Weller MD  Sent: 8/19/2024   3:36 PM CDT  To: Rocco Weller MD

## 2024-08-20 NOTE — TELEPHONE ENCOUNTER
----- Message from Rocco Weller MD sent at 8/19/2024  8:48 PM CDT -----  Please let her know her labs look good off of the lasix  ----- Message -----  From: [facility] [employer] [facility]  Sent: 8/19/2024  12:36 PM CDT  To: Rocco Weller MD

## 2024-08-22 ENCOUNTER — TELEPHONE (OUTPATIENT)
Dept: PRIMARY CARE CLINIC | Facility: CLINIC | Age: 74
End: 2024-08-22
Payer: MEDICARE

## 2024-08-22 DIAGNOSIS — R30.0 DYSURIA: Primary | ICD-10-CM

## 2024-08-22 NOTE — TELEPHONE ENCOUNTER
Called pt regarding message. Pt reported dysuria & urinary frequency. Symptoms started on 8/21. Pt is requesting rx.

## 2024-08-22 NOTE — TELEPHONE ENCOUNTER
----- Message from Yariel Spence sent at 8/22/2024 10:33 AM CDT -----  Type:  Patient Returning Call    Who Called:pt  Who Left Message for Patient:  Does the patient know what this is regarding?:uti  Would the patient rather a call back or a response via [facility]? call  Best Call Back Number: [phone]  Additional Information: pt states she would like to speak with someone in office in regards to some UTI symptoms she is experiencing. Thank you

## 2024-08-23 RX ORDER — NITROFURANTOIN 25; 75 MG/1; MG/1
100 CAPSULE ORAL 2 TIMES DAILY
Qty: 5 CAPSULE | Refills: 0 | Status: SHIPPED | OUTPATIENT
Start: 2024-08-23

## 2024-08-23 NOTE — TELEPHONE ENCOUNTER
I have sent in an order for an antibiotic.  I have also ordered a UA and culture for downstairs.  Please see if patient can drop off a urine prior to starting antibiotics.

## 2024-09-04 ENCOUNTER — HOSPITAL ENCOUNTER (OUTPATIENT)
Dept: RADIOLOGY | Facility: HOSPITAL | Age: 74
Discharge: HOME | End: 2024-09-04
Attending: NEUROLOGICAL SURGERY
Payer: MEDICARE

## 2024-09-04 ENCOUNTER — TELEPHONE (OUTPATIENT)
Dept: NEUROSURGERY | Facility: CLINIC | Age: 74
End: 2024-09-04

## 2024-09-04 ENCOUNTER — OFFICE VISIT (OUTPATIENT)
Dept: NEUROSURGERY | Facility: CLINIC | Age: 74
End: 2024-09-04
Payer: MEDICARE

## 2024-09-04 VITALS
HEIGHT: 65 IN | HEART RATE: 55 BPM | DIASTOLIC BLOOD PRESSURE: 68 MMHG | SYSTOLIC BLOOD PRESSURE: 143 MMHG | WEIGHT: 179.88 LBS | BODY MASS INDEX: 29.97 KG/M2

## 2024-09-04 DIAGNOSIS — Z98.1 S/P CERVICAL SPINAL FUSION: ICD-10-CM

## 2024-09-04 DIAGNOSIS — Z98.1 STATUS POST LUMBAR SPINAL FUSION: ICD-10-CM

## 2024-09-04 DIAGNOSIS — Z98.1 STATUS POST CERVICAL SPINAL FUSION: ICD-10-CM

## 2024-09-04 DIAGNOSIS — Z98.1 ARTHRODESIS STATUS: Primary | ICD-10-CM

## 2024-09-04 DIAGNOSIS — Z78.0 MENOPAUSE: ICD-10-CM

## 2024-09-04 DIAGNOSIS — G95.9 CHRONIC MYELOPATHY: ICD-10-CM

## 2024-09-04 PROCEDURE — 99999 PR PBB SHADOW E&M-EST. PATIENT-LVL III: CPT | Mod: PBBFAC,,, | Performed by: NEUROLOGICAL SURGERY

## 2024-09-04 PROCEDURE — 72040 X-RAY EXAM NECK SPINE 2-3 VW: CPT | Mod: TC,FY

## 2024-09-04 PROCEDURE — 99213 OFFICE O/P EST LOW 20 MIN: CPT | Mod: PBBFAC,25,PN | Performed by: NEUROLOGICAL SURGERY

## 2024-09-04 PROCEDURE — 99213 OFFICE O/P EST LOW 20 MIN: CPT | Mod: S$PBB,,, | Performed by: NEUROLOGICAL SURGERY

## 2024-09-04 PROCEDURE — 72040 X-RAY EXAM NECK SPINE 2-3 VW: CPT | Mod: 26,,, | Performed by: RADIOLOGY

## 2024-09-04 NOTE — PROGRESS NOTES
NEUROSURGICAL PROGRESS NOTE    DATE OF SERVICE:  09/04/2024    ATTENDING PHYSICIAN:  Scooter Salter MD    SUBJECTIVE:    INTERIM HISTORY:    She is 1 year and 4 months status post T6-7 and T7-8 anterior diskectomy for thoracic myelopathy, she is almost 1 year status post L3-S1 laminectomy and fusion for severe spinal stenosis, she is more than 3 months status post C5-6 and C6-7 anterior cervical fusion for cervical myelopathy.  She has been compliant with using her neck brace.  She is using a walker at home.  She is now able to stand and walk using her walker.  She denies having any pain.  No other complaint.  She has been doing physical therapy.              PAST MEDICAL HISTORY:  Active Ambulatory Problems     Diagnosis Date Noted    Postoperative hypothyroidism 02/19/2018    History of thyroid cancer 02/19/2018    Essential hypertension, benign 02/19/2018    Mixed hyperlipidemia 02/19/2018    Benign paroxysmal positional vertigo 08/01/2018    History of colon polyps 10/18/2018    Bilateral post-traumatic osteoarthritis of knee 06/21/2022    Paroxysmal tachycardia 06/22/2022    DDD (degenerative disc disease), lumbar 08/09/2022    Lumbar radiculopathy 08/09/2022    Atrial fibrillation with RVR 08/09/2022    PAF (paroxysmal atrial fibrillation) 08/10/2022    Multiple sclerosis 12/01/2022    Anticoagulant long-term use 02/23/2023    Spinal cord compression 03/14/2023    Aortic atherosclerosis 03/14/2023    Preprocedural cardiovascular examination 03/15/2023    Atelectasis 05/12/2023    Typical atrial flutter 09/15/2023    Chronic myelopathy 09/29/2023    Decreased range of motion of right knee 09/29/2023    Bilateral leg edema 09/29/2023    Assistance needed for mobility 09/29/2023    Acute blood loss anemia 10/01/2023    S/P lumbar spinal fusion 11/09/2023    Pulmonary HTN 11/17/2023    Cervical spondylosis with myelopathy and radiculopathy 02/12/2024    Mild protein-calorie malnutrition 02/12/2024    Major  depressive disorder, single episode, mild 02/12/2024    S/P lumbar fusion 06/03/2024     Resolved Ambulatory Problems     Diagnosis Date Noted    Weakness 06/20/2022    Thoracic disc disease with myelopathy 03/14/2023    Thoracic myelopathy 05/12/2023    Lumbar stenosis with neurogenic claudication 09/29/2023    Foraminal stenosis of lumbar region 09/29/2023     Past Medical History:   Diagnosis Date    Arthritis     Cancer     Edema     Hyperlipidemia     Hypertension     Thyroid disease        PAST SURGICAL HISTORY:  Past Surgical History:   Procedure Laterality Date    ADENOIDECTOMY      COLONOSCOPY  10/18/2018    COLONOSCOPY N/A 10/18/2018    Procedure: COLONOSCOPY;  Surgeon: Yosvany Alejandra MD;  Location: [facility] ENDO;  Service: General;  Laterality: N/A;    ECHOCARDIOGRAM,TRANSESOPHAGEAL N/A 10/4/2023    Procedure: Transesophageal echo (CARLYLE) intra-procedure log documentation;  Surgeon: Mark Ortega MD;  Location: [facility] OR;  Service: Cardiology;  Laterality: N/A;    EPIDURAL STEROID INJECTION N/A 9/30/2022    Procedure: LUMBAR L3/4 MALKA CONTRAST DIRRECT REFERRAL MONICA CARRERA IN CHART;  Surgeon: Nicholas Kumar MD;  Location: [facility] PAIN MGT;  Service: Pain Management;  Laterality: N/A;    FACETECTOMY OF VERTEBRA N/A 9/29/2023    Procedure: FACETECTOMY;  Surgeon: Scooter Salter MD;  Location: [facility] OR;  Service: Neurosurgery;  Laterality: N/A;    FORAMINOTOMY N/A 9/29/2023    Procedure: FORAMINOTOMY, SPINE;  Surgeon: Scooter Salter MD;  Location: [facility] OR;  Service: Neurosurgery;  Laterality: N/A;    FUSION OF LUMBAR SPINE USING POSTERIOR INTERBODY TECHNIQUE N/A 9/29/2023    Procedure: FUSION, SPINE, LUMBAR, PLIF;  Surgeon: Scooter Salter MD;  Location: [facility] OR;  Service: Neurosurgery;  Laterality: N/A;  L3-S1 posterior instrumentation   L3-L5 Lmainectomy sacro pelvic fixation Globus cage   -lop 3 hr   -los 5 days   -Lami segments 1  -anterior fusion insterspace 2  -general   -type and screen    -[facility] brain notified cc  -EMG bairon notified cc  -SEP  -MEP  -TLSO       FUSION, SPINE, CERVICAL N/A 6/3/2024    Procedure: FUSION, SPINE, CERVICAL;  Surgeon: Scooter Salter MD;  Location: [facility];  Service: Neurosurgery;  Laterality: N/A;  Procedure:C5-6,C6-7 ACDF and plate  Length of procedure:3 hours  LOS: 0-1 night  Anesthesia:General  Blood:Type and screen  Radiology:C-arm  SNS:EMG,SEP,MEP  Brace:[place]  Bed:Regular Bed  Headrest:Charles Wells, Horse shoe  Position:Prone  Equipment:Depuy-Charles, Orthof    HYSTERECTOMY  1992    total hyst     KNEE SURGERY      16 pins and two plates implanted    LAMINECTOMY N/A 9/29/2023    Procedure: LAMINECTOMY, SPINE;  Surgeon: Scooter Salter MD;  Location: [facility];  Service: Neurosurgery;  Laterality: N/A;    MINIMALLY INVASIVE TRANSFORAMINAL LUMBAR INTERBODY FUSION (TLIF) N/A 9/29/2023    Procedure: FUSION, SPINE, LUMBAR, TLIF, MINIMALLY INVASIVE;  Surgeon: Scooter Salter MD;  Location: [facility];  Service: Neurosurgery;  Laterality: N/A;  L3-L5 OLIF Globus ATP  L5-S1 ALIF Globus ATP  -Lami add segment 1  -anterior fusion interspances 2  -lop 3 hr   -general   -type and screen   -C arm   -EMG bairon notified cc  -SEP  -MEP  -TLSO  -Regular Bed   -Lateral right down   -globus reji    THORACIC DISCECTOMY Right 5/12/2023    Procedure: DISCECTOMY, SPINE, THORACIC Right T6-7, T7-8 interior discectomy retropleural;  Surgeon: Scooter Salter MD;  Location: [facility];  Service: Neurosurgery;  Laterality: Right;  general  eliquis   type and screen  C-arm  Metrx  EMG bairon notified cc  SEP  MEP  Regular bed   lateral left down   globus (Jose Enrique) notified cc    THYROIDECTOMY      TONSILLECTOMY         SOCIAL HISTORY:   Social History     Socioeconomic History    Marital status:    Tobacco Use    Smoking status: Never    Smokeless tobacco: Never   Substance and Sexual Activity    Alcohol use: No    Drug use: No    Sexual activity: Not Currently     Partners: Male    Social History Narrative    ** Merged History Encounter **          Social Determinants of Health     Financial Resource Strain: Low Risk  (6/26/2024)    Overall Financial Resource Strain (CARDIA)     Difficulty of Paying Living Expenses: Not hard at all   Food Insecurity: No Food Insecurity (6/26/2024)    Hunger Vital Sign     Worried About Running Out of Food in the Last Year: Never true     Ran Out of Food in the Last Year: Never true   Transportation Needs: No Transportation Needs (6/26/2024)    PRAPARE - Transportation     Lack of Transportation (Medical): No     Lack of Transportation (Non-Medical): No   Recent Concern: Transportation Needs - Unmet Transportation Needs (5/22/2024)    TRANSPORTATION NEEDS     Transportation : Yes, it has kept me from medical appointments or from getting my medications.   Physical Activity: Patient Unable To Answer (6/26/2024)    Exercise Vital Sign     Days of Exercise per Week: Patient unable to answer     Minutes of Exercise per Session: Patient unable to answer   Stress: Patient Unable To Answer (6/26/2024)    [place] [employer] - Occupational Stress Questionnaire     Feeling of Stress : Patient unable to answer   Housing Stability: Low Risk  (6/26/2024)    Housing Stability Vital Sign     Unable to Pay for Housing in the Last Year: No     Homeless in the Last Year: No       FAMILY HISTORY:  Family History   Problem Relation Name Age of Onset    Alcohol abuse Mother      Heart disease Mother      Alcohol abuse Father      Heart disease Father      Heart disease Maternal Grandmother      Heart disease Maternal Grandfather         CURRENTS MEDICATIONS:  Current Outpatient Medications on File Prior to Visit   Medication Sig Dispense Refill    acetaminophen (TYLENOL) 325 MG tablet Take 650 mg by mouth every 6 (six) hours.      amiodarone (PACERONE) 200 MG Tab Take 1 tablet (200 mg total) by mouth once daily. 90 tablet 0    apixaban (ELIQUIS) 5 mg Tab Take  1 tablet (5 mg total) by mouth 2 (two) times daily. Resume on 06/09/24 180 tablet 1    ascorbic acid, vitamin C, (VITAMIN C) 100 MG tablet Take 100 mg by mouth once daily.      atorvastatin (LIPITOR) 20 MG tablet Take 1 tablet (20 mg total) by mouth every evening.      b complex vitamins tablet Take 1 tablet by mouth once daily.      hydroCHLOROthiazide (HYDRODIURIL) 12.5 MG Tab Take 1 tablet (12.5 mg total) by mouth once daily. 90 tablet 3    levothyroxine (SYNTHROID) 150 MCG tablet Take 1 tablet (150 mcg total) by mouth before breakfast. 90 tablet 1    meclizine (ANTIVERT) 25 mg tablet Take 25 mg by mouth. PRN      multivitamin (ONE DAILY MULTIVITAMIN) per tablet Take 1 tablet by mouth once daily.      nitrofurantoin, macrocrystal-monohydrate, (MACROBID) 100 MG capsule Take 1 capsule (100 mg total) by mouth 2 (two) times daily. (Patient not taking: Reported on 9/4/2024) 5 capsule 0    ondansetron (ZOFRAN-ODT) 8 MG TbDL Take 1 tablet (8 mg total) by mouth every 6 (six) hours as needed (nausea/vomiting). (Patient not taking: Reported on 9/4/2024)      potassium bicarbonate disintegrating tablet Take 1 tablet (10 mEq total) by mouth once daily. (Patient not taking: Reported on 9/4/2024)       No current facility-administered medications on file prior to visit.       ALLERGIES:  Review of patient's allergies indicates:   Allergen Reactions    Vancomycin Tinitus    Vancomycin analogues Tinitus       REVIEW OF SYSTEMS:  Review of Systems   Constitutional:  Negative for diaphoresis, fever and weight loss.   Respiratory:  Negative for shortness of breath.    Cardiovascular:  Negative for chest pain.   Gastrointestinal:  Negative for blood in stool.   Genitourinary:  Negative for hematuria.   Endo/Heme/Allergies:  Does not bruise/bleed easily.   All other systems reviewed and are negative.        OBJECTIVE:    PHYSICAL EXAMINATION:   Vitals:    09/04/24 1112   BP: (!) 143/68   Pulse: (!) 55       Physical Exam:  Vitals  reviewed.    Constitutional: She appears well-developed and well-nourished.     Eyes: Pupils are equal, round, and reactive to light. Conjunctivae and EOM are normal.     Cardiovascular: Normal distal pulses and no edema.     Abdominal: Soft.     Skin: Skin displays no rash on trunk and no rash on extremities. Skin displays no lesions on trunk and no lesions on extremities.     Psych/Behavior: She is alert. She is oriented to person, place, and time. She has a normal mood and affect.     Musculoskeletal:        Neck: Range of motion is limited.     Neurological:        DTRs: Tricep reflexes are 0 on the right side and 0 on the left side. Bicep reflexes are 0 on the right side and 0 on the left side. Brachioradialis reflexes are 0 on the right side and 0 on the left side. Patellar reflexes are 0 on the right side and 0 on the left side. Achilles reflexes are 0 on the right side and 0 on the left side.       Ortho Exam        Neurologic Exam     Mental Status   Oriented to person, place, and time.     Cranial Nerves     CN III, IV, VI   Pupils are equal, round, and reactive to light.  Extraocular motions are normal.     Motor Exam     Strength   Strength 5/5 throughout.     Gait, Coordination, and Reflexes     Reflexes   Right brachioradialis: 0  Left brachioradialis: 0  Right biceps: 0  Left biceps: 0  Right triceps: 0  Left triceps: 0  Right patellar: 0  Left patellar: 0  Right achilles: 0  Left achilles: 0        DIAGNOSTIC DATA:  I personally interpreted the following imaging:   Cervical x-ray today shows stability of the C5-6 and C6-7 anterior fusion hardware, reversal of cervical lordosis    ASSESSMENT:  This is a 74 y.o. female with     Problem List Items Addressed This Visit          Neuro    Chronic myelopathy     Other Visit Diagnoses       Arthrodesis status    -  Primary    Relevant Orders    X-Ray Scoliosis Complete    CT Lumbar Spine Without Contrast    Status post lumbar spinal fusion        Status post  cervical spinal fusion                  PLAN:  Complete workup with scoliosis film and CT lumbar spine to assess lumbar fusion arthrodesis  Follow up in 6 months with repeat scoliosis film  All questions answered  Okay to stop wearing neck brace  Continue to use walker    More than 50% of the time was spent on discussing conservative management treatments (medication, physical therapy exercises) and possible interventions (spinal injections and surgical procedures). Care coordination was discussed.    20 min        Scooter Salter MD  Cell:[phone]

## 2024-09-09 DIAGNOSIS — I48.0 PAF (PAROXYSMAL ATRIAL FIBRILLATION): ICD-10-CM

## 2024-09-09 NOTE — TELEPHONE ENCOUNTER
----- Message from Melani Gonzalez sent at 9/9/2024 10:42 AM CDT -----  Contact: [phone]  Requesting an RX refill or new RX.    Is this a refill or new RX:       amiodarone (PACERONE) 200 MG Tab  atorvastatin (LIPITOR) 20 MG tablet  Is this a 30 day or 90 day RX: 90    Pharmacy name and phone #       [facility] [address] [place] - [address] [facility]  [address]  [place] [address]  Phone: [phone] Fax: [phone]

## 2024-09-09 NOTE — TELEPHONE ENCOUNTER
No care due was identified.  [facility] Embedded Care Due Messages. Reference number: [id number].   9/09/2024 11:03:31 AM CDT

## 2024-09-10 RX ORDER — AMIODARONE HYDROCHLORIDE 200 MG/1
200 TABLET ORAL DAILY
Qty: 90 TABLET | Refills: 1 | Status: SHIPPED | OUTPATIENT
Start: 2024-09-10

## 2024-09-10 RX ORDER — ATORVASTATIN CALCIUM 20 MG/1
20 TABLET, FILM COATED ORAL NIGHTLY
Qty: 90 TABLET | Refills: 1 | Status: SHIPPED | OUTPATIENT
Start: 2024-09-10 | End: 2025-09-10

## 2024-09-10 NOTE — TELEPHONE ENCOUNTER
Refill Routing Note   Medication(s) are not appropriate for processing by [facility] for the following reason(s):        Required labs outdated  Outside of protocol    [facility] action(s):  Defer  Route               Appointments  past 12m or future 3m with PCP    Date Provider   Last Visit   4/16/2024 Rocco Miller MD   Next Visit   Visit date not found Rocco Miller MD   ED visits in past 90 days: 1        Note composed:8:30 PM 09/09/2024

## 2024-09-12 ENCOUNTER — DOCUMENT SCAN (OUTPATIENT)
Dept: HOME HEALTH SERVICES | Facility: HOSPITAL | Age: 74
End: 2024-09-12
Payer: MEDICARE

## 2024-09-20 ENCOUNTER — TELEPHONE (OUTPATIENT)
Dept: NEUROSURGERY | Facility: CLINIC | Age: 74
End: 2024-09-20
Payer: MEDICARE

## 2024-09-20 NOTE — TELEPHONE ENCOUNTER
"Informed pt, per     "ct lumbar spine shows that her fusion has healed completely."    Pt voiced understanding  "

## 2024-10-16 ENCOUNTER — DOCUMENT SCAN (OUTPATIENT)
Dept: HOME HEALTH SERVICES | Facility: HOSPITAL | Age: 74
End: 2024-10-16
Payer: MEDICARE

## 2024-10-23 ENCOUNTER — DOCUMENT SCAN (OUTPATIENT)
Dept: HOME HEALTH SERVICES | Facility: HOSPITAL | Age: 74
End: 2024-10-23
Payer: MEDICARE

## 2024-11-15 RX ORDER — NITROFURANTOIN 25; 75 MG/1; MG/1
100 CAPSULE ORAL 2 TIMES DAILY
Qty: 5 CAPSULE | Refills: 0 | OUTPATIENT
Start: 2024-11-15

## 2024-11-15 NOTE — TELEPHONE ENCOUNTER
Pt states she has a blister on her left leg and her feet are swelling. Pt is requesting a rx for the blister. Pt stated the medication you sent out last time worked well (Macrobid).

## 2024-11-15 NOTE — TELEPHONE ENCOUNTER
----- Message from Alana sent at 11/15/2024 11:04 AM CST -----  Contact: [phone] Patient  .[email or web address]     Patient is calling for Medical Advice regarding: pt has another blister on foot    How long has patient had these symptoms: 2-3 days    Pharmacy name and phone#:   [facility] [address] [place] - [address]  [address]  [place] [address]  Phone: [phone] Fax: [phone]        Patient wants a call back or thru [facility]:Call Back please. Thank you. Pt is asking for the same medicine Dr Miller gave her the last time. Pt does not remember what it was but it helped.     Comments:    Please advise patient replies from provider may take up to 48 hours.

## 2024-11-18 ENCOUNTER — TELEPHONE (OUTPATIENT)
Dept: PRIMARY CARE CLINIC | Facility: CLINIC | Age: 74
End: 2024-11-18
Payer: MEDICARE

## 2024-11-18 PROCEDURE — G0179 MD RECERTIFICATION HHA PT: HCPCS | Mod: ,,, | Performed by: FAMILY MEDICINE

## 2024-11-18 RX ORDER — VALACYCLOVIR HYDROCHLORIDE 1 G/1
2000 TABLET, FILM COATED ORAL EVERY 12 HOURS
Qty: 4 TABLET | Refills: 0 | Status: SHIPPED | OUTPATIENT
Start: 2024-11-18 | End: 2024-11-19

## 2024-11-18 NOTE — TELEPHONE ENCOUNTER
----- Message from Nancy sent at 11/18/2024  8:34 AM CST -----  Contact: [phone]@patient  Good morning patient would like to request the doc call her in some antibiotics for her fever blister that has broke skin and she doesn't want it to get infected. Patient would like [employer] and it sent [facility] [address] [facility] [place], [place] - [address] [place]    Please call patient to advise [phone]

## 2024-12-14 ENCOUNTER — TELEPHONE (OUTPATIENT)
Dept: ADMINISTRATIVE | Facility: CLINIC | Age: 74
End: 2024-12-14
Payer: MEDICARE

## 2025-01-14 DIAGNOSIS — Z00.00 ENCOUNTER FOR MEDICARE ANNUAL WELLNESS EXAM: ICD-10-CM

## 2025-02-18 ENCOUNTER — TELEPHONE (OUTPATIENT)
Dept: CARDIOLOGY | Facility: CLINIC | Age: 75
End: 2025-02-18
Payer: MEDICARE

## 2025-02-18 NOTE — TELEPHONE ENCOUNTER
Spoke with patient, she had back surgery and is requesting a later time for her appointment.  Informed that I currently do not have a later time on 02/20/2025.  Patient wants to reschedule for a Tuesday or Wednesday afternoon.  Appointment rescheduled to 03/26/2025.  Patient verbalized understanding.

## 2025-02-18 NOTE — TELEPHONE ENCOUNTER
----- Message from Lawanda sent at 2/18/2025 12:50 PM CST -----  Type: General Call Back Name of Caller:PtWould the patient rather a call back or a response via [facility]? callP[facility]izer Call Back Number:[phone] Information: Pt has a appt. On 2/20/25 at 12:00 she would like to be scheduled a little later, please contact patient with further information

## 2025-03-11 ENCOUNTER — EXTERNAL HOME HEALTH (OUTPATIENT)
Dept: HOME HEALTH SERVICES | Facility: HOSPITAL | Age: 75
End: 2025-03-11
Payer: MEDICARE

## 2025-03-11 NOTE — TELEPHONE ENCOUNTER
Refill Routing Note   Medication(s) are not appropriate for processing by [facility] for the following reason(s):        Non-participating provider    [facility] action(s):  Route               Appointments  past 12m or future 3m with PCP    Date Provider   Last Visit   6/13/2023 Silvia Giordano NP   Next Visit   Visit date not found Silvia Giordano NP   ED visits in past 90 days: 0        Note composed:2:16 AM 03/11/2025

## 2025-03-12 RX ORDER — ATORVASTATIN CALCIUM 20 MG/1
20 TABLET, FILM COATED ORAL NIGHTLY
Qty: 90 TABLET | Refills: 1 | Status: SHIPPED | OUTPATIENT
Start: 2025-03-12

## 2025-03-25 ENCOUNTER — OFFICE VISIT (OUTPATIENT)
Dept: CARDIOLOGY | Facility: CLINIC | Age: 75
End: 2025-03-25
Payer: MEDICARE

## 2025-03-25 VITALS
HEIGHT: 65 IN | WEIGHT: 179 LBS | HEART RATE: 68 BPM | DIASTOLIC BLOOD PRESSURE: 72 MMHG | SYSTOLIC BLOOD PRESSURE: 150 MMHG | OXYGEN SATURATION: 98 % | BODY MASS INDEX: 29.82 KG/M2

## 2025-03-25 DIAGNOSIS — I70.0 AORTIC ATHEROSCLEROSIS: ICD-10-CM

## 2025-03-25 DIAGNOSIS — I48.0 PAF (PAROXYSMAL ATRIAL FIBRILLATION): ICD-10-CM

## 2025-03-25 DIAGNOSIS — I48.3 TYPICAL ATRIAL FLUTTER: ICD-10-CM

## 2025-03-25 DIAGNOSIS — Z79.01 ANTICOAGULANT LONG-TERM USE: ICD-10-CM

## 2025-03-25 DIAGNOSIS — R60.0 BILATERAL LEG EDEMA: Primary | ICD-10-CM

## 2025-03-25 DIAGNOSIS — I10 ESSENTIAL HYPERTENSION, BENIGN: ICD-10-CM

## 2025-03-25 DIAGNOSIS — E78.2 MIXED HYPERLIPIDEMIA: ICD-10-CM

## 2025-03-25 DIAGNOSIS — I27.20 PULMONARY HTN: ICD-10-CM

## 2025-03-25 PROCEDURE — 99999 PR PBB SHADOW E&M-EST. PATIENT-LVL III: CPT | Mod: PBBFAC,,,

## 2025-03-25 PROCEDURE — 99213 OFFICE O/P EST LOW 20 MIN: CPT | Mod: PBBFAC,PN

## 2025-03-25 RX ORDER — AMIODARONE HYDROCHLORIDE 200 MG/1
200 TABLET ORAL DAILY
Qty: 90 TABLET | Refills: 3 | Status: SHIPPED | OUTPATIENT
Start: 2025-03-25

## 2025-03-25 RX ORDER — FUROSEMIDE 40 MG/1
40 TABLET ORAL DAILY
Qty: 30 TABLET | Refills: 11 | Status: SHIPPED | OUTPATIENT
Start: 2025-03-25 | End: 2026-03-25

## 2025-03-25 NOTE — PROGRESS NOTES
[facility] [address]  Cardiology Clinic Note      Chief Complaint  Chief Complaint   Patient presents with    Follow-up       HPI:  Ms. Barton is a 75-year-old female with a past medical history of thyroid cancer/hypothyroidism, BPPV, multiple sclerosis, ABLA postoperatively 10/2023, hypertension, hyperlipidemia, paroxysmal atrial fibrillation/atrial flutter on propafenone previously at home cardioversion as an inpatient 10/2023, transesophageal echocardiogram 10/2023 normal estimated EF normal RV prior echocardiogram 09/2023 normal EF diastolic function indeterminate mild left atrial dilation mild mitral valve regurgitation normal RV mild right atrial enlargement mild tricuspid regurgitation PASP 44 prior echo 2022 normal EF normal diastolic function mild left atrial enlargement normal RV PASP 30 prior stress echo 2019 normal EF no ischemia on echocardiogram epic images     Patient is new to me here to establish care  Previously seen by Dr. Weller  Here today with complaints of bilateral lower extremity edema that is worse to the left leg  Patient reports that with injury to left leg she develops blister with poor wound healing  She keeps her left leg wrapped with an Ace wrap  Reports that these issues have been going on for about 6 months now  She was previously on Lasix for chronic lower extremity edema unsure why medication was discontinued or by who  Reports she has not seen primary care provider at over a year  Otherwise she is asymptomatic from a cardiac standpoint  Denies chest pain, SOB, or difficulty breathing  Denies palpitations, lightheadedness, dizziness, or syncope/presyncope  Denies cough, orthopnea, or PND  Denies falls or head injures  Denies easy bruising, hematochezia, or hemoptysis  Denies fever, chills, or NVD  Denies any recent travels or close contact with sick individuals  Denies tobacco or alcohol use    EKG sinus Reji rate 50s with PACs nonspecific ST and T-wave  abnormalities    Cardiology course:  Followed by electrophysiology Dr Singletary / JONELLE Magaña  Previously admitted and treated for atrial fibrillation RVR last month required amiodarone  Patient states he was taken off of metoprolol previously  Seen by electrophysiology plan is to stay with amiodarone for now and make a decision for an alternative antiarrhythmic in the near future  The patient is doing well postoperatively  Blood pressure soft today will stop Lasix    Medications  Current Medications[1]     History  Past Medical History:   Diagnosis Date    Arthritis     Cancer     Thyroid    Edema     Hyperlipidemia     Hypertension     Thyroid disease      Past Surgical History:   Procedure Laterality Date    ADENOIDECTOMY      COLONOSCOPY  10/18/2018    COLONOSCOPY N/A 10/18/2018    Procedure: COLONOSCOPY;  Surgeon: Yosvany Alejandra MD;  Location: [facility] ENDO;  Service: General;  Laterality: N/A;    ECHOCARDIOGRAM,TRANSESOPHAGEAL N/A 10/4/2023    Procedure: Transesophageal echo (CARLYLE) intra-procedure log documentation;  Surgeon: Mark Ortega MD;  Location: [facility];  Service: Cardiology;  Laterality: N/A;    EPIDURAL STEROID INJECTION N/A 9/30/2022    Procedure: LUMBAR L3/4 MALKA CONTRAST DIRRECT REFERRAL MONICA CARRERA IN CHART;  Surgeon: Nicholas Kumar MD;  Location: [facility] PAIN MGT;  Service: Pain Management;  Laterality: N/A;    FACETECTOMY OF VERTEBRA N/A 9/29/2023    Procedure: FACETECTOMY;  Surgeon: Scooter Salter MD;  Location: [facility] OR;  Service: Neurosurgery;  Laterality: N/A;    FORAMINOTOMY N/A 9/29/2023    Procedure: FORAMINOTOMY, SPINE;  Surgeon: Scooter Salter MD;  Location: [facility] OR;  Service: Neurosurgery;  Laterality: N/A;    FUSION OF LUMBAR SPINE USING POSTERIOR INTERBODY TECHNIQUE N/A 9/29/2023    Procedure: FUSION, SPINE, LUMBAR, PLIF;  Surgeon: Scooter Salter MD;  Location: [facility] OR;  Service: Neurosurgery;  Laterality: N/A;  L3-S1 posterior instrumentation   L3-L5 Lmainectomy sacro  pelvic fixation Globus cage   -lop 3 hr   -los 5 days   -Lami segments 1  -anterior fusion insterspace 2  -general   -type and screen   -Ziehm brain notified cc  -EMG bairon notified cc  -SEP  -MEP  -TLSO       FUSION, SPINE, CERVICAL N/A 6/3/2024    Procedure: FUSION, SPINE, CERVICAL;  Surgeon: Scooter Salter MD;  Location: [facility] OR;  Service: Neurosurgery;  Laterality: N/A;  Procedure:C5-6,C6-7 ACDF and plate  Length of procedure:3 hours  LOS: 0-1 night  Anesthesia:General  Blood:Type and screen  Radiology:C-arm  SNS:EMG,SEP,MEP  Brace:[place]  Bed:Regular Bed  Headrest:Charles Wells, Horse shoe  Position:Prone  Equipment:[employer], Orthof    HYSTERECTOMY  1992    total hyst     KNEE SURGERY      16 pins and two plates implanted    LAMINECTOMY N/A 9/29/2023    Procedure: LAMINECTOMY, SPINE;  Surgeon: Scooter Salter MD;  Location: [facility] OR;  Service: Neurosurgery;  Laterality: N/A;    MINIMALLY INVASIVE TRANSFORAMINAL LUMBAR INTERBODY FUSION (TLIF) N/A 9/29/2023    Procedure: FUSION, SPINE, LUMBAR, TLIF, MINIMALLY INVASIVE;  Surgeon: Scooter Salter MD;  Location: [facility] OR;  Service: Neurosurgery;  Laterality: N/A;  L3-L5 OLIF Globus ATP  L5-S1 ALIF Globus ATP  -Lami add segment 1  -anterior fusion interspances 2  -lop 3 hr   -general   -type and screen   -C arm   -EMG bairon notified cc  -SEP  -MEP  -TLSO  -Regular Bed   -Lateral right down   -globus reji    THORACIC DISCECTOMY Right 5/12/2023    Procedure: DISCECTOMY, SPINE, THORACIC Right T6-7, T7-8 interior discectomy retropleural;  Surgeon: Scooter Salter MD;  Location: [facility] OR;  Service: Neurosurgery;  Laterality: Right;  general  eliquis   type and screen  C-arm  Metrx  EMG bairon notified cc  SEP  MEP  Regular bed   lateral left down   globus (Jose Enrique) notified cc    THYROIDECTOMY      TONSILLECTOMY       Social History[2]  Family History   Problem Relation Name Age of Onset    Alcohol abuse Mother      Heart disease Mother      Alcohol abuse  Father      Heart disease Father      Heart disease Maternal Grandmother      Heart disease Maternal Grandfather          Allergies  Review of patient's allergies indicates:   Allergen Reactions    Vancomycin Tinitus    Vancomycin analogues Tinitus       Review of Systems   Review of Systems   Constitutional: Negative for chills, decreased appetite, diaphoresis, fever, malaise/fatigue, weight gain and weight loss.   Eyes:  Negative for blurred vision.   Cardiovascular:  Positive for leg swelling. Negative for chest pain, claudication, dyspnea on exertion, irregular heartbeat, near-syncope, orthopnea, palpitations, paroxysmal nocturnal dyspnea and syncope.   Respiratory:  Negative for cough, shortness of breath, snoring, sputum production and wheezing.    Endocrine: Negative for cold intolerance, heat intolerance, polydipsia, polyphagia and polyuria.   Skin:  Positive for poor wound healing. Negative for color change, dry skin, itching and nail changes.   Musculoskeletal:  Negative for back pain, gout, joint pain and joint swelling.   Gastrointestinal:  Negative for bloating, abdominal pain, constipation, diarrhea, hematemesis, hematochezia, melena, nausea and vomiting.   Genitourinary:  Negative for dysuria and hematuria.   Neurological:  Negative for dizziness, headaches, light-headedness, numbness, paresthesias and weakness.   Psychiatric/Behavioral:  Negative for altered mental status, depression and memory loss.        Physical Exam  Vitals:    03/25/25 1337   BP: (!) 150/72   Pulse: 68     Wt Readings from Last 1 Encounters:   03/25/25 81.2 kg (179 lb)     Physical Exam  Constitutional:       General: She is not in acute distress.  HENT:      Head: Normocephalic and atraumatic.      Mouth/Throat:      Mouth: Mucous membranes are moist.   Eyes:      Extraocular Movements: Extraocular movements intact.      Pupils: Pupils are equal, round, and reactive to light.   Neck:      Vascular: No carotid bruit or JVD.    Cardiovascular:      Rate and Rhythm: Normal rate. Rhythm irregular.      Heart sounds: No murmur heard.     No friction rub. No gallop.   Pulmonary:      Effort: Pulmonary effort is normal.      Breath sounds: Normal breath sounds.   Abdominal:      General: Abdomen is flat.      Palpations: Abdomen is soft.   Musculoskeletal:      Right lower leg: Edema present.      Left lower leg: Edema present.   Skin:     General: Skin is warm.      Capillary Refill: Capillary refill takes less than 2 seconds.      Findings: Ecchymosis and erythema present.   Neurological:      General: No focal deficit present.   Psychiatric:         Mood and Affect: Mood normal.         Labs  No visits with results within 6 Month(s) from this visit.   Latest known visit with results is:   Hospital Outpatient Visit on 08/19/2024   Component Date Value Ref Range Status    BSA 08/19/2024 1.93  m2 Final    LVOT stroke volume 08/19/2024 48.34  cm3 Final    LVIDd 08/19/2024 3.98  3.5 - 6.0 cm Final    LV Systolic Volume 08/19/2024 23.35  mL Final    LV Systolic Volume Index 08/19/2024 12.4  mL/m2 Final    LVIDs 08/19/2024 2.55  2.1 - 4.0 cm Final    LV Diastolic Volume 08/19/2024 69.09  mL Final    LV Diastolic Volume Index 08/19/2024 36.56  mL/m2 Final    Left Ventricular End Systolic Volu* 08/19/2024 23.35  mL Final    Left Ventricular End Diastolic Vol* 08/19/2024 69.09  mL Final    IVS 08/19/2024 1.2 (A)  0.6 - 1.1 cm Final    LVOT diameter 08/19/2024 1.47  cm Final    LVOT area 08/19/2024 1.7  cm2 Final    FS 08/19/2024 36  28 - 44 % Final    Left Ventricle Relative Wall Thick* 08/19/2024 0.60  cm Final    PW 08/19/2024 1.2 (A)  0.6 - 1.1 cm Final    LV mass 08/19/2024 164.21  g Final    LV Mass Index 08/19/2024 87  g/m2 Final    MV Peak E Mathew 08/19/2024 1.10  m/s Final    TR Max Mathew 08/19/2024 2.5  m/s Final    IVRT 08/19/2024 57.09  msec Final    E wave deceleration time 08/19/2024 306.37  msec Final    PV Peak S Mathew 08/19/2024 0.67  m/s  Final    PV Peak D Mathew 08/19/2024 0.72  m/s Final    Pulm vein S/D ratio 08/19/2024 0.93   Final    LVOT peak mathew 08/19/2024 1.09  m/s Final    Left Ventricular Outflow Tract Sonal* 08/19/2024 0.84  cm/s Final    Left Ventricular Outflow Tract Sonal* 08/19/2024 2.91  mmHg Final    RV/LV Ratio 08/19/2024 0.72  cm Final    LA size 08/19/2024 3.58  cm Final    Left Atrium Minor Axis 08/19/2024 4.79  cm Final    Left Atrium Major Axis 08/19/2024 5.33  cm Final    RA Major Rice 08/19/2024 5.02  cm Final    AV mean gradient 08/19/2024 6  mmHg Final    AV peak gradient 08/19/2024 10  mmHg Final    Ao peak mathew 08/19/2024 1.56  m/s Final    Ao VTI 08/19/2024 41.00  cm Final    LVOT peak VTI 08/19/2024 28.50  cm Final    AV valve area 08/19/2024 1.18  cm² Final    AV Velocity Ratio 08/19/2024 0.70   Final    AV index (prosthetic) 08/19/2024 0.70   Final    MADY by Velocity Ratio 08/19/2024 1.19  cm² Final    MV mean gradient 08/19/2024 1  mmHg Final    MV peak gradient 08/19/2024 5  mmHg Final    MV stenosis pressure 1/2 time 08/19/2024 88.85  ms Final    MV valve area p 1/2 method 08/19/2024 2.48  cm2 Final    MV valve area by continuity eq 08/19/2024 1.46  cm2 Final    MV VTI 08/19/2024 33.1  cm Final    Triscuspid Valve Regurgitation Pea* 08/19/2024 25  mmHg Final    PV PEAK VELOCITY 08/19/2024 1.11  m/s Final    PV peak gradient 08/19/2024 5  mmHg Final    Pulmonary Valve Mean Velocity 08/19/2024 0.78  m/s Final    Ao root annulus 08/19/2024 2.50  cm Final    STJ 08/19/2024 2.60  cm Final    Ascending aorta 08/19/2024 2.26  cm Final    IVC diameter 08/19/2024 1.78  cm Final    ZLVIDS 08/19/2024 -1.94   Final    ZLVIDD 08/19/2024 -2.85   Final    RVDD 08/19/2024 2.88  cm Final    AORTIC VALVE CUSP SEPERATION 08/19/2024 1.26  cm Final    GRACIE 08/19/2024 34.9  mL/m2 Final    LA Vol 08/19/2024 66.02  cm3 Final    LA Vol (MOD) 08/19/2024 84.00  cm3 Final    LA WIDTH 08/19/2024 4.3  cm Final    GRACIE (MOD) 08/19/2024 44.4  mL/m2  Final    RA Width 08/19/2024 3.0  cm Final       EKG  Reviewed    Echo   Results for orders placed or performed during the hospital encounter of 08/19/24   Echo   Result Value Ref Range    BSA 1.93 m2    LVOT stroke volume 48.34 cm3    LVIDd 3.98 3.5 - 6.0 cm    LV Systolic Volume 23.35 mL    LV Systolic Volume Index 12.4 mL/m2    LVIDs 2.55 2.1 - 4.0 cm    LV Diastolic Volume 69.09 mL    LV Diastolic Volume Index 36.56 mL/m2    Left Ventricular End Systolic Volume by Teichholz Method 23.35 mL    Left Ventricular End Diastolic Volume by Teichholz Method 69.09 mL    IVS 1.2 (A) 0.6 - 1.1 cm    LVOT diameter 1.47 cm    LVOT area 1.7 cm2    FS 36 28 - 44 %    Left Ventricle Relative Wall Thickness 0.60 cm    PW 1.2 (A) 0.6 - 1.1 cm    LV mass 164.21 g    LV Mass Index 87 g/m2    MV Peak E Mathew 1.10 m/s    TR Max Mathew 2.5 m/s    IVRT 57.09 msec    E wave deceleration time 306.37 msec    PV Peak S Mathew 0.67 m/s    PV Peak D Mathew 0.72 m/s    Pulm vein S/D ratio 0.93     LVOT peak mathew 1.09 m/s    Left Ventricular Outflow Tract Mean Velocity 0.84 cm/s    Left Ventricular Outflow Tract Mean Gradient 2.91 mmHg    RV/LV Ratio 0.72 cm    LA size 3.58 cm    Left Atrium Minor Axis 4.79 cm    Left Atrium Major Axis 5.33 cm    RA Major Axis 5.02 cm    AV mean gradient 6 mmHg    AV peak gradient 10 mmHg    Ao peak mathew 1.56 m/s    Ao VTI 41.00 cm    LVOT peak VTI 28.50 cm    AV valve area 1.18 cm²    AV Velocity Ratio 0.70     AV index (prosthetic) 0.70     MADY by Velocity Ratio 1.19 cm²    MV mean gradient 1 mmHg    MV peak gradient 5 mmHg    MV stenosis pressure 1/2 time 88.85 ms    MV valve area p 1/2 method 2.48 cm2    MV valve area by continuity eq 1.46 cm2    MV VTI 33.1 cm    Triscuspid Valve Regurgitation Peak Gradient 25 mmHg    PV PEAK VELOCITY 1.11 m/s    PV peak gradient 5 mmHg    Pulmonary Valve Mean Velocity 0.78 m/s    Ao root annulus 2.50 cm    STJ 2.60 cm    Ascending aorta 2.26 cm    IVC diameter 1.78 cm    ZLVIDS -1.94      ZLVIDD -2.85     RVDD 2.88 cm    AORTIC VALVE CUSP SEPERATION 1.26 cm    GRACIE 34.9 mL/m2    LA Vol 66.02 cm3    LA Vol (MOD) 84.00 cm3    LA WIDTH 4.3 cm    GRACIE (MOD) 44.4 mL/m2    RA Width 3.0 cm    Narrative      Left Ventricle: The left ventricle is normal in size. Mildly increased   wall thickness.  Sigmoid septum. Normal wall motion. There is normal   systolic function with a visually estimated ejection fraction of 60 - 65%.   Unable to assess diastolic function due to atrial fibrillation.    Left Atrium: Left atrium is mildly to moderately dilated.    Right Ventricle: Normal right ventricular cavity size. Systolic   function is normal.    Central venous pressure estimated at 3-8 mmHg.         Imaging  X-Ray Cervical Spine AP And Lateral  Result Date: 3/5/2025  EXAMINATION: XR CERVICAL SPINE AP LATERAL CLINICAL HISTORY: Arthrodesis status TECHNIQUE: AP, lateral and open mouth views of the cervical spine were performed. COMPARISON: 09/04/2024. FINDINGS: There are postsurgical changes related to prior anterior cervical fusion at the C5-C7 level with anterior fusion plate and interbody spacers present.  There is straightening of the normal cervical lordosis.  Otherwise, posterior vertebral alignment is satisfactory.  There are degenerative changes with facet arthropathy within the mid to lower cervical spine.  The odontoid is intact.  Prevertebral soft tissues are unremarkable.     Postsurgical changes of anterior cervical fusion extending from C5-C7 without evidence for hardware failure. Straightening of the normal cervical lordosis. Mild cervical spondylosis. Electronically signed by: Pancho Doss MD Date:    03/05/2025 Time:    09:51      Prior coronary angiogram / intervention:  No priors    Assessment and Plan  Ms. Barton is a 75-year-old female with a past medical history of thyroid cancer/hypothyroidism, BPPV, multiple sclerosis, ABLA postoperatively 10/2023, hypertension, hyperlipidemia, paroxysmal  atrial fibrillation/atrial flutter on propafenone previously at home cardioversion as an inpatient 10/2023, transesophageal echocardiogram 10/2023 normal estimated EF normal RV prior echocardiogram 09/2023 normal EF diastolic function indeterminate mild left atrial dilation mild mitral valve regurgitation normal RV mild right atrial enlargement mild tricuspid regurgitation PASP 44 prior echo 2022 normal EF normal diastolic function mild left atrial enlargement normal RV PASP 30 prior stress echo 2019 normal EF no ischemia on echocardiogram epic images    PAF (paroxysmal atrial fibrillation)  Typical atrial flutter  Continue amiodarone and Eliquis  Unclear why patient was taken off beta-blocker or if she self DC'd   - amiodarone (PACERONE) 200 MG Tab; Take 1 tablet (200 mg total) by mouth once daily.  Dispense: 90 tablet; Refill: 3  - apixaban (ELIQUIS) 5 mg Tab; Take 1 tablet (5 mg total) by mouth 2 (two) times daily. Resume on 06/09/24  Dispense: 180 tablet; Refill: 3    Bilateral leg edema (Primary)  Ultrasound bilateral lower extremity vein insufficiency ordered  Echo ordered  BNP ordered  Reordered Lasix PO for 3 days for symptom relief  Encouraged compression stockings and leg elevation with sitting or lying  Encourage her to discuss with PCP if symptoms worsen or if she develops fever or chills   - US Lower Extremity Veins Bilateral Insufficiency; Future  - furosemide (LASIX) 40 MG tablet; Take 1 tablet (40 mg total) by mouth once daily.  Dispense: 30 tablet; Refill: 11    Pulmonary HTN  Noted on echo in the past  Presents today with worsening bilateral lower extremity edema  Previously on Lasix but medication discontinue unsure by home  Echo ordered  BNP ordered    Anticoagulant long-term use  No bleeding concerns at this time    Aortic atherosclerosis  Continue statin and anticoagulation    Essential hypertension, benign  Controlled; BP at home within normal limits  Continue current medication    Mixed  hyperlipidemia  Continue statin    Follow Up  Next available or sooner if new or worsening symptoms       Brandi R. Carter, FNP-C [facility] [place] - Cardiology    Total professional time spent for the encounter: 40 minutes  Time was spent preparing to see the patient, reviewing results of prior testing, obtaining and/or reviewing separately obtained history, performing a medically appropriate examination and interview, counseling and educating the patient/family, ordering medications/tests/procedures, referring and communicating with other health care professionals, documenting clinical information in the electronic health record, and independently interpreting results.    Disclaimer: This document was created using voice recognition software ([employer]). Although it may be edited, this document may contain errors related to incorrect recognition of the spoken word. Please call the physician if clarification is needed.          [1]   Current Outpatient Medications   Medication Sig Dispense Refill    ascorbic acid, vitamin C, (VITAMIN C) 100 MG tablet Take 100 mg by mouth once daily.      atorvastatin (LIPITOR) 20 MG tablet TAKE 1 TABLET BY MOUTH ONCE DAILY IN THE EVENING 90 tablet 1    b complex vitamins tablet Take 1 tablet by mouth once daily.      hydroCHLOROthiazide (HYDRODIURIL) 12.5 MG Tab Take 1 tablet (12.5 mg total) by mouth once daily. 90 tablet 3    levothyroxine (SYNTHROID) 150 MCG tablet TAKE 1 TABLET BY MOUTH BEFORE BREAKFAST 90 tablet 0    meclizine (ANTIVERT) 25 mg tablet Take 25 mg by mouth. PRN      multivitamin (ONE DAILY MULTIVITAMIN) per tablet Take 1 tablet by mouth once daily.      acetaminophen (TYLENOL) 325 MG tablet Take 650 mg by mouth every 6 (six) hours.      amiodarone (PACERONE) 200 MG Tab Take 1 tablet (200 mg total) by mouth once daily. 90 tablet 3    apixaban (ELIQUIS) 5 mg Tab Take 1 tablet (5 mg total) by mouth 2 (two) times daily. Resume on 06/09/24 180 tablet 3     furosemide (LASIX) 40 MG tablet Take 1 tablet (40 mg total) by mouth once daily. 30 tablet 11    nitrofurantoin, macrocrystal-monohydrate, (MACROBID) 100 MG capsule Take 1 capsule (100 mg total) by mouth 2 (two) times daily. (Patient not taking: Reported on 9/4/2024) 5 capsule 0    ondansetron (ZOFRAN-ODT) 8 MG TbDL Take 1 tablet (8 mg total) by mouth every 6 (six) hours as needed (nausea/vomiting). (Patient not taking: Reported on 9/4/2024)      potassium bicarbonate disintegrating tablet Take 1 tablet (10 mEq total) by mouth once daily. (Patient not taking: Reported on 9/4/2024)      valACYclovir (VALTREX) 1000 MG tablet Take 2 tablets (2,000 mg total) by mouth every 12 (twelve) hours. for 2 doses 4 tablet 0     No current facility-administered medications for this visit.   [2]   Social History  Socioeconomic History    Marital status:    Tobacco Use    Smoking status: Never    Smokeless tobacco: Never   Substance and Sexual Activity    Alcohol use: No    Drug use: No    Sexual activity: Not Currently     Partners: Male   Social History Narrative    ** Merged History Encounter **          Social Drivers of Health     Financial Resource Strain: Low Risk  (6/26/2024)    Overall Financial Resource Strain (CARDIA)     Difficulty of Paying Living Expenses: Not hard at all   Food Insecurity: No Food Insecurity (6/26/2024)    Hunger Vital Sign     Worried About Running Out of Food in the Last Year: Never true     Ran Out of Food in the Last Year: Never true   Transportation Needs: No Transportation Needs (6/26/2024)    PRAPARE - Transportation     Lack of Transportation (Medical): No     Lack of Transportation (Non-Medical): No   Recent Concern: Transportation Needs - Unmet Transportation Needs (5/22/2024)    TRANSPORTATION NEEDS     Transportation : Yes, it has kept me from medical appointments or from getting my medications.   Physical Activity: Patient Unable To Answer (6/26/2024)    Exercise Vital Sign      Days of Exercise per Week: Patient unable to answer     Minutes of Exercise per Session: Patient unable to answer   Stress: Patient Unable To Answer (6/26/2024)    [employer] - Occupational Stress Questionnaire     Feeling of Stress : Patient unable to answer   Housing Stability: Unknown (6/26/2024)    Housing Stability Vital Sign     Unable to Pay for Housing in the Last Year: No     Homeless in the Last Year: No

## 2025-03-26 ENCOUNTER — OFFICE VISIT (OUTPATIENT)
Dept: PRIMARY CARE CLINIC | Facility: CLINIC | Age: 75
End: 2025-03-26
Payer: MEDICARE

## 2025-03-26 VITALS
OXYGEN SATURATION: 97 % | HEART RATE: 68 BPM | SYSTOLIC BLOOD PRESSURE: 130 MMHG | BODY MASS INDEX: 36.86 KG/M2 | DIASTOLIC BLOOD PRESSURE: 70 MMHG | TEMPERATURE: 98 F | WEIGHT: 221.25 LBS | RESPIRATION RATE: 18 BRPM | HEIGHT: 65 IN

## 2025-03-26 DIAGNOSIS — I10 ESSENTIAL HYPERTENSION, BENIGN: ICD-10-CM

## 2025-03-26 DIAGNOSIS — L73.9 FOLLICULITIS: Primary | ICD-10-CM

## 2025-03-26 DIAGNOSIS — L03.116 CELLULITIS OF LEFT LOWER LEG: ICD-10-CM

## 2025-03-26 DIAGNOSIS — M79.89 LEFT LEG SWELLING: ICD-10-CM

## 2025-03-26 PROCEDURE — 99999 PR PBB SHADOW E&M-EST. PATIENT-LVL V: CPT | Mod: PBBFAC,,, | Performed by: FAMILY MEDICINE

## 2025-03-26 PROCEDURE — 99215 OFFICE O/P EST HI 40 MIN: CPT | Mod: PBBFAC,PN | Performed by: FAMILY MEDICINE

## 2025-03-26 RX ORDER — DOXYCYCLINE 100 MG/1
100 CAPSULE ORAL EVERY 12 HOURS
Qty: 28 CAPSULE | Refills: 0 | Status: SHIPPED | OUTPATIENT
Start: 2025-03-26 | End: 2025-04-09

## 2025-03-26 NOTE — PROGRESS NOTES
Assessment:       1. Folliculitis    2. Cellulitis of left lower leg    3. Left leg swelling    4. Essential hypertension, benign         Plan:       Folliculitis  -     doxycycline (VIBRAMYCIN) 100 MG Cap; Take 1 capsule (100 mg total) by mouth every 12 (twelve) hours. for 14 days  Dispense: 28 capsule; Refill: 0    Cellulitis of left lower leg  -     doxycycline (VIBRAMYCIN) 100 MG Cap; Take 1 capsule (100 mg total) by mouth every 12 (twelve) hours. for 14 days  Dispense: 28 capsule; Refill: 0    Left leg swelling    Essential hypertension, benign      Assessment & Plan    - Assessed left leg blisters, noting chronic swelling and potential folliculitis.  - Considered cellulitis as contributing factor to leg condition.  - Determined antibiotic treatment necessary to address infection and reduce redness.  - Recognized ongoing swelling as factor in continued blistering and weeping.    SEVERE OBESITY (BMI 35.0-39.9) WITH COMORBIDITY:   Observed chronic, severe swelling in the patient's left leg, accompanied by blistering and folliculitis.   Acknowledged the impact of swelling on the patient's skin condition.   Recommend elevating the leg as much as possible to help with swelling and infection.   Ordered ultrasound imaging of the legs for further evaluation and management of the swelling.    MULTIPLE SCLEROSIS:   Noted previous concerns about multiple sclerosis, though recent medical opinions suggest it may not be MS.   Ordered 3 MRI scans to investigate the patient's neurological symptoms and rule out multiple sclerosis.   Acknowledged that these scans have already been scheduled.    CELLULITIS OF LEFT LOWER LIMB:   Prescribed doxycycline 100 mg capsule, to be taken twice daily with food for 10-14 days.   Medication may be discontinued after 10 days if condition is completely resolved.   Patient denies fever or chills.   Noted previous treatment for cellulitis of the foot last year.    BULLOUS DISORDER AND CHRONIC  SWELLING:   Patient reports blisters and fluid in the left leg present for months and keep moving.   Explained the connection between chronic swelling and blistering to the patient.   Noted that compression stockings are not tolerated.    ESSENTIAL HYPERTENSION:   Patient reports home blood pressure readings of 130-140/70 and is on blood pressure medication.   Advised to take blood pressure medication with oatmeal.    FOLLOW-UP:   Awaiting results of ultrasound ordered by Dr. Allison for further vascular assessment.       Medication List with Changes/Refills   New Medications    DOXYCYCLINE (VIBRAMYCIN) 100 MG CAP    Take 1 capsule (100 mg total) by mouth every 12 (twelve) hours. for 14 days   Current Medications    ACETAMINOPHEN (TYLENOL) 325 MG TABLET    Take 650 mg by mouth every 6 (six) hours.    AMIODARONE (PACERONE) 200 MG TAB    Take 1 tablet (200 mg total) by mouth once daily.    APIXABAN (ELIQUIS) 5 MG TAB    Take 1 tablet (5 mg total) by mouth 2 (two) times daily. Resume on 06/09/24    ASCORBIC ACID, VITAMIN C, (VITAMIN C) 100 MG TABLET    Take 100 mg by mouth once daily.    ATORVASTATIN (LIPITOR) 20 MG TABLET    TAKE 1 TABLET BY MOUTH ONCE DAILY IN THE EVENING    B COMPLEX VITAMINS TABLET    Take 1 tablet by mouth once daily.    FUROSEMIDE (LASIX) 40 MG TABLET    Take 1 tablet (40 mg total) by mouth once daily.    LEVOTHYROXINE (SYNTHROID) 150 MCG TABLET    TAKE 1 TABLET BY MOUTH BEFORE BREAKFAST    MECLIZINE (ANTIVERT) 25 MG TABLET    Take 25 mg by mouth. PRN    MULTIVITAMIN (ONE DAILY MULTIVITAMIN) PER TABLET    Take 1 tablet by mouth once daily.    ONDANSETRON (ZOFRAN-ODT) 8 MG TBDL    Take 1 tablet (8 mg total) by mouth every 6 (six) hours as needed (nausea/vomiting).    POTASSIUM BICARBONATE DISINTEGRATING TABLET    Take 1 tablet (10 mEq total) by mouth once daily.    VALACYCLOVIR (VALTREX) 1000 MG TABLET    Take 2 tablets (2,000 mg total) by mouth every 12 (twelve) hours. for 2 doses   Discontinued  Medications    HYDROCHLOROTHIAZIDE (HYDRODIURIL) 12.5 MG TAB    Take 1 tablet (12.5 mg total) by mouth once daily.    NITROFURANTOIN, MACROCRYSTAL-MONOHYDRATE, (MACROBID) 100 MG CAPSULE    Take 1 capsule (100 mg total) by mouth 2 (two) times daily.         Subjective:    Patient ID: Graciela Barton is a 75 y.o. female.  Chief Complaint: Blister (Left leg)    HPI  History of Present Illness    CHIEF COMPLAINT:  Patient presents today for blisters on left leg.    LEFT LEG BLISTERS:  She reports persistent, migratory blisters on left leg present for months. She experiences pain only where the blister is open on the back of the leg. She denies itching, fevers, or chills. She reports irritation from various dressings including tape, non-stick telofers, cling wrap, and H wrap. She uses a hospital bed for leg elevation as recommended by Dr. Thurman. She is unable to wear compression stockings due to them being too tight and causing cuts.    CARDIOVASCULAR:  Recent neurological evaluation with Dr. Maurice identified two spots on her head, which were determined to be consistent with minor heart attacks rather than multiple sclerosis. Three MRI studies are scheduled. Her blood pressure is monitored daily by a home health nurse with usual readings of 130-140/70. She did not take her blood pressure medication this morning.    PAST MEDICAL HISTORY:  She has a history of leg fracture approximately 18 years ago, which was complicated by staph and enterococcus infection requiring D&C procedure. She also has a history of cellulitis of the foot treated last year.      ROS:  Constitutional: -fevers, -chills  Respiratory: -difficulty breathing  Cardiovascular: -chest pain, -palpitations, +lower extremity edema       Review of Systems    Objective:      Vitals:    03/26/25 1126 03/26/25 1156   BP: (!) 180/70 130/70   BP Location: Left arm    Patient Position: Sitting    Pulse: 68    Resp: 18    Temp: 97.8 °F (36.6 °C)    TempSrc: Temporal   "  SpO2: 97%    Weight: 100.4 kg (221 lb 3.7 oz)    Height: 5' 5" (1.651 m)      BP Readings from Last 5 Encounters:   03/26/25 130/70   03/25/25 (!) 150/72   09/04/24 (!) 143/68   08/01/24 (!) 102/54   07/17/24 135/65     Wt Readings from Last 5 Encounters:   03/26/25 100.4 kg (221 lb 3.7 oz)   03/25/25 81.2 kg (179 lb)   09/04/24 81.6 kg (179 lb 14.3 oz)   08/19/24 81.6 kg (179 lb 14.3 oz)   08/01/24 81.6 kg (180 lb)     Physical Exam  Physical Exam    General: Well-developed. Well-nourished. No acute distress.  Eyes: EOMI. Sclerae anicteric.  HENT: Normocephalic. Atraumatic. Nares patent. Moist oral mucosa.  Cardiovascular: Regular rate. Regular rhythm. No murmurs. No rubs. No gallops. Normal S1, S2.  Respiratory: Normal respiratory effort. Clear to auscultation bilaterally. No rales. No rhonchi. No wheezing.  Musculoskeletal: No  obvious deformity.  Extremities: 1+ edema RLE, 2+ LLE.  Neurological: Alert & oriented x3. No slurred speech.   Psychiatric: Normal mood. Normal affect. Good insight. Good judgment.  Skin: Warm. Cellulitis in left leg. Blisters on left leg. Folliculitis in left leg.         Lab Results   Component Value Date    WBC 12.57 06/04/2024    HGB 11.7 (L) 06/04/2024    HCT 35.8 (L) 06/04/2024     06/04/2024    CHOL 127 06/20/2022    TRIG 135 06/20/2022    HDL 56 06/20/2022    ALT 15 03/18/2025    AST 20 03/18/2025     03/18/2025    K 4.0 03/18/2025     08/19/2024    CREATININE 0.84 03/18/2025    BUN 24 03/18/2025    CO2 28 03/18/2025    TSH 5.155 (H) 04/16/2024    INR 1.1 09/06/2023    HGBA1C 5.5 06/20/2022      This note was generated with the assistance of ambient listening technology. Verbal consent was obtained by the patient and accompanying visitor(s) for the recording of patient appointment to facilitate this note. I attest to having reviewed and edited the generated note for accuracy, though some syntax or spelling errors may persist. Please contact the author of this " note for any clarification.

## 2025-04-03 ENCOUNTER — HOSPITAL ENCOUNTER (OUTPATIENT)
Dept: RADIOLOGY | Facility: HOSPITAL | Age: 75
Discharge: HOME | End: 2025-04-03
Attending: FAMILY MEDICINE
Payer: MEDICARE

## 2025-04-03 DIAGNOSIS — Z12.31 ENCOUNTER FOR SCREENING MAMMOGRAM FOR BREAST CANCER: ICD-10-CM

## 2025-04-03 PROCEDURE — 77067 SCR MAMMO BI INCL CAD: CPT | Mod: 26,,, | Performed by: RADIOLOGY

## 2025-04-03 PROCEDURE — 77063 BREAST TOMOSYNTHESIS BI: CPT | Mod: 26,,, | Performed by: RADIOLOGY

## 2025-04-03 PROCEDURE — 77063 BREAST TOMOSYNTHESIS BI: CPT | Mod: TC

## 2025-04-06 ENCOUNTER — RESULTS FOLLOW-UP (OUTPATIENT)
Dept: PRIMARY CARE CLINIC | Facility: CLINIC | Age: 75
End: 2025-04-06

## 2025-04-07 ENCOUNTER — HOSPITAL ENCOUNTER (OUTPATIENT)
Dept: RADIOLOGY | Facility: HOSPITAL | Age: 75
Discharge: HOME | End: 2025-04-07
Payer: MEDICARE

## 2025-04-07 ENCOUNTER — RESULTS FOLLOW-UP (OUTPATIENT)
Dept: CARDIOLOGY | Facility: CLINIC | Age: 75
End: 2025-04-07

## 2025-04-07 DIAGNOSIS — R60.0 BILATERAL LEG EDEMA: ICD-10-CM

## 2025-04-07 PROCEDURE — 93970 EXTREMITY STUDY: CPT | Mod: TC

## 2025-04-07 PROCEDURE — 93970 EXTREMITY STUDY: CPT | Mod: 26,,, | Performed by: RADIOLOGY

## 2025-04-09 ENCOUNTER — DOCUMENT SCAN (OUTPATIENT)
Dept: HOME HEALTH SERVICES | Facility: HOSPITAL | Age: 75
End: 2025-04-09
Payer: MEDICARE

## 2025-04-11 ENCOUNTER — TELEPHONE (OUTPATIENT)
Dept: PRIMARY CARE CLINIC | Facility: CLINIC | Age: 75
End: 2025-04-11
Payer: MEDICARE

## 2025-04-11 NOTE — TELEPHONE ENCOUNTER
----- Message from [place] sent at 4/11/2025 10:24 AM CDT -----  Contact: [phone]  Patient called, requested a call back from Dr Miller's medical assistant what the amount of milligram that patient have to take on her calcium medication. Thank you.

## 2025-04-15 ENCOUNTER — TELEPHONE (OUTPATIENT)
Dept: CARDIOLOGY | Facility: CLINIC | Age: 75
End: 2025-04-15
Payer: MEDICARE

## 2025-04-15 NOTE — TELEPHONE ENCOUNTER
Spoke with patient, informed that provider sent a new prescription to the pharmacy on 03/25/2025 for a 90 day supply with 3 additional refills.  Patient verbalized understanding.

## 2025-04-15 NOTE — TELEPHONE ENCOUNTER
----- Message from Mauro sent at 4/15/2025  7:39 AM CDT -----  Contact: pt  Type: Requesting refill Who Called: PTRegarding:  apixaban (ELIQUIS) 5 mg Tab Would the patient rather a call back or a response via [facility]? Call Kamilah Call Back Number:  [phone] Additional Information:   [facility] [id number] - [place], [place] - [facility] [address] Phone: [phone]: [phone]

## 2025-04-23 ENCOUNTER — PATIENT MESSAGE (OUTPATIENT)
Dept: ADMINISTRATIVE | Facility: HOSPITAL | Age: 75
End: 2025-04-23
Payer: MEDICARE

## 2025-04-24 ENCOUNTER — PATIENT OUTREACH (OUTPATIENT)
Dept: ADMINISTRATIVE | Facility: HOSPITAL | Age: 75
End: 2025-04-24
Payer: MEDICARE

## 2025-04-24 NOTE — PROGRESS NOTES
Epic campaigns outreach done     Care Coordination Encounter Details:       MyChart Portal Status:         [x]  Reviewed MyChart Portal Status offered / enrolled if applicable        Additional Notes:     MyChart Outcomes: Pt is enrolled & active          Updates Requested / Reviewed:        Updated Care Coordination Note, Care Everywhere, , External Sources: [facility] and [facility], and Immunizations Reconciliation Completed or Queried: [place]         Health Maintenance Screening(s) Due:      Health Maintenance Topics Overdue:      VBHM Score: 1     Colon Cancer Screening    Influenza Vaccine  Shingles/Zoster Vaccine  RSV Vaccine            Health Maintenance Topic(s) Outreach Outcomes & Actions Taken:    Colorectal Cancer Screening - Outreach Outcomes & Actions Taken  : External Records Requested & Care Team Updated if Applicable. LARY sent to [facility] medical records for most recent colonoscopy records       Chronic Disease Management:     Diabetes Measures        Lab Results   Component Value Date    HGBA1C 5.5 06/20/2022           [x]  Reviewed chart for active Diabetes diagnosis     []  Scheduled necessary follow up appointments if needed            Hypertension Measures        BP Readings from Last 1 Encounters:   03/26/25 130/70           [x]  Reviewed chart for active Hypertension diagnosis     []  Reviewed & documented Home BP Cuff     []  Documented a Remote BP if needed & applicable     []  Scheduled necessary follow up appointments with Primary Care if needed          Provider Team Continuity:     Last PCP Visit Date: 3/26/2025          [x]  Reviewed Primary Care Provider Visits, Annual Wellness Visit, and Future          Appointments to ensure appointments have been scheduled and/or           completed         Social Determinants of Health          [x]  Reviewed, completed, and/or updated the following sections:                  Food Insecurity, Transportation Needs, Financial Resource Strain,                  Tobacco Use          Care Management, Digital Medicine, and/or Education Referrals    OPCM Risk Score: 45.5         Next Steps - Referral Actions: Patient declined         Additional Notes:

## 2025-04-24 NOTE — LETTER
AUTHORIZATION FOR RELEASE OF   CONFIDENTIAL INFORMATION    Dear Medical Records,    We are seeing Graciela Barton, date of birth 1950, in the clinic at [facility]. Rocco Miller MD is the patient's PCP. Graciela Barton has an outstanding lab/procedure at the time we reviewed her chart. In order to help keep her health information updated, she has authorized us to request the following medical record(s):        (  )  MAMMOGRAM                                      (X)  COLONOSCOPY      (  )  PAP SMEAR                                          (  )  OUTSIDE LAB RESULTS     (  )  DEXA SCAN                                          (  )  EYE EXAM            (  )  FOOT EXAM                                          (  )  ENTIRE RECORD     (  )  OUTSIDE IMMUNIZATIONS                 (  )  _______________       ATTN: RISSA      Please fax records to Rocco Miller MD, [phone]     If you have any questions, please contact Rissa at [phone]           Patient Name: Graciela Barton  : 1950  Patient Phone #: [phone]

## 2025-05-01 ENCOUNTER — EXTERNAL HOME HEALTH (OUTPATIENT)
Dept: HOME HEALTH SERVICES | Facility: HOSPITAL | Age: 75
End: 2025-05-01
Payer: MEDICARE

## 2025-05-02 ENCOUNTER — PATIENT OUTREACH (OUTPATIENT)
Dept: ADMINISTRATIVE | Facility: HOSPITAL | Age: 75
End: 2025-05-02
Payer: MEDICARE

## 2025-05-07 NOTE — TELEPHONE ENCOUNTER
Refill Routing Note   Medication(s) are not appropriate for processing by [facility] for the following reason(s):      Medication outside of protocol    [facility] action(s):  Route Care Due:  None identified            Appointments  past 12m or future 3m with PCP    Date Provider   Last Visit   3/14/2023 Rocco Miller MD   Next Visit   Visit date not found Rocco Miller MD   ED visits in past 90 days: 0        Note composed:1:06 PM 07/17/2023           Abnormal Lactate: > 2

## 2025-05-08 ENCOUNTER — EXTERNAL HOME HEALTH (OUTPATIENT)
Dept: HOME HEALTH SERVICES | Facility: HOSPITAL | Age: 75
End: 2025-05-08
Payer: MEDICARE

## 2025-05-13 ENCOUNTER — PATIENT MESSAGE (OUTPATIENT)
Dept: PSYCHIATRY | Facility: CLINIC | Age: 75
End: 2025-05-13
Payer: MEDICARE

## 2025-05-16 ENCOUNTER — TELEPHONE (OUTPATIENT)
Dept: PRIMARY CARE CLINIC | Facility: CLINIC | Age: 75
End: 2025-05-16
Payer: MEDICARE

## 2025-05-16 RX ORDER — BENZONATATE 100 MG/1
100 CAPSULE ORAL 3 TIMES DAILY PRN
Qty: 30 CAPSULE | Refills: 0 | Status: SHIPPED | OUTPATIENT
Start: 2025-05-16 | End: 2025-05-26

## 2025-05-16 RX ORDER — PROMETHAZINE HYDROCHLORIDE AND DEXTROMETHORPHAN HYDROBROMIDE 6.25; 15 MG/5ML; MG/5ML
5 SYRUP ORAL
Qty: 118 ML | Refills: 0 | Status: SHIPPED | OUTPATIENT
Start: 2025-05-16

## 2025-05-16 NOTE — TELEPHONE ENCOUNTER
----- Message from Roopaying sent at 5/16/2025  9:14 AM CDT -----  Contact: [phone] .1MEDICALADVICE Patient is calling for Medical Advice regarding:Symptoms: Cough, Sinus Symptoms, Runny NoseOutcome: Schedule an appointment to be seen within 24 hours.Reason: Caller denied all higher acuity questionsThe caller accepted this outcome.How long has patient had these symptoms:2 days Pharmacy name and phone#:[facility] [id number] [facility], [place] - [address] [facility] [address]Hhqfv: [phone] Fax: [phone] wants a call back or thru [facility]: derik back Comments:Please advise patient replies from provider may take up to 48 hours.

## 2025-05-16 NOTE — TELEPHONE ENCOUNTER
Symptoms are likely viral. Will treat with cough medications. If symptoms continue past 7 days let us know and we will get her seen.

## 2025-05-19 RX ORDER — LEVOTHYROXINE SODIUM 150 UG/1
150 TABLET ORAL
Qty: 90 TABLET | Refills: 3 | Status: SHIPPED | OUTPATIENT
Start: 2025-05-19

## 2025-05-19 NOTE — TELEPHONE ENCOUNTER
Care Due:                  Date            Visit Type   Department     Provider  --------------------------------------------------------------------------------                                EP -                              PRIMARY      [facility]  Last Visit: 03-      CARE ([facility])   PRIMARY CARE   Rocco Miller  Next Visit: None Scheduled  None         None Found                                                            Last  Test          Frequency    Reason                     Performed    Due Date  --------------------------------------------------------------------------------    CBC.........  12 months..  valACYclovir.............  06- 05-    Cr..........  12 months..  valACYclovir.............  08- 08-    [facility] Embedded Care Due Messages. Reference number: [id number].   5/19/2025 8:47:51 AM CDT

## 2025-05-19 NOTE — TELEPHONE ENCOUNTER
Refill Routing Note   Medication(s) are not appropriate for processing by [facility] for the following reason(s):        Required labs outdated  No active prescription written by provider    [facility] action(s):  Defer   Requires labs : Yes             Appointments  past 12m or future 3m with PCP    Date Provider   Last Visit   3/26/2025 Rocco Miller MD   Next Visit   Visit date not found Rocco Miller MD   ED visits in past 90 days: 0        Note composed:9:24 AM 05/19/2025

## 2025-05-21 ENCOUNTER — OFFICE VISIT (OUTPATIENT)
Dept: PRIMARY CARE CLINIC | Facility: CLINIC | Age: 75
End: 2025-05-21
Payer: MEDICARE

## 2025-05-21 VITALS
HEIGHT: 65 IN | WEIGHT: 232.38 LBS | DIASTOLIC BLOOD PRESSURE: 70 MMHG | TEMPERATURE: 98 F | RESPIRATION RATE: 18 BRPM | SYSTOLIC BLOOD PRESSURE: 130 MMHG | BODY MASS INDEX: 38.72 KG/M2 | HEART RATE: 60 BPM | OXYGEN SATURATION: 97 %

## 2025-05-21 DIAGNOSIS — J01.10 ACUTE NON-RECURRENT FRONTAL SINUSITIS: Primary | ICD-10-CM

## 2025-05-21 DIAGNOSIS — G35 MULTIPLE SCLEROSIS: ICD-10-CM

## 2025-05-21 DIAGNOSIS — E66.01 SEVERE OBESITY (BMI 35.0-39.9) WITH COMORBIDITY: ICD-10-CM

## 2025-05-21 DIAGNOSIS — I10 ESSENTIAL HYPERTENSION, BENIGN: ICD-10-CM

## 2025-05-21 PROBLEM — E44.1 MILD PROTEIN-CALORIE MALNUTRITION: Status: RESOLVED | Noted: 2024-02-12 | Resolved: 2025-05-21

## 2025-05-21 PROBLEM — F32.0 MAJOR DEPRESSIVE DISORDER, SINGLE EPISODE, MILD: Status: RESOLVED | Noted: 2024-02-12 | Resolved: 2025-05-21

## 2025-05-21 PROCEDURE — 99214 OFFICE O/P EST MOD 30 MIN: CPT | Mod: S$PBB,,, | Performed by: FAMILY MEDICINE

## 2025-05-21 PROCEDURE — 99215 OFFICE O/P EST HI 40 MIN: CPT | Mod: PBBFAC,PN | Performed by: FAMILY MEDICINE

## 2025-05-21 PROCEDURE — 99999 PR PBB SHADOW E&M-EST. PATIENT-LVL V: CPT | Mod: PBBFAC,,, | Performed by: FAMILY MEDICINE

## 2025-05-21 RX ORDER — AMOXICILLIN AND CLAVULANATE POTASSIUM 875; 125 MG/1; MG/1
1 TABLET, FILM COATED ORAL 2 TIMES DAILY
Qty: 14 TABLET | Refills: 0 | Status: SHIPPED | OUTPATIENT
Start: 2025-05-21 | End: 2025-05-28

## 2025-05-21 NOTE — PROGRESS NOTES
Assessment:       1. Acute non-recurrent frontal sinusitis    2. Severe obesity (BMI 35.0-39.9) with comorbidity    3. Multiple sclerosis    4. Essential hypertension, benign         Plan:       Acute non-recurrent frontal sinusitis  -     amoxicillin-clavulanate 875-125mg (AUGMENTIN) 875-125 mg per tablet; Take 1 tablet by mouth 2 (two) times daily. for 7 days  Dispense: 14 tablet; Refill: 0    Severe obesity (BMI 35.0-39.9) with comorbidity    Multiple sclerosis    Essential hypertension, benign      Assessment & Plan    - Diagnosed sinus infection based on reported symptoms and exam.  - Determined antibiotic treatment necessary for sinus infection.  - BP readings reported by patient's family member deemed acceptable.    ACUTE SINUSITIS:   Patient presents with significant congestion, sinus pressure, and green and yellow discharge persisting for 7 days.   Examination of bi-lab sinus confirmed sinus infection.   Prescribed Augmentin twice daily for 1 week to treat the infection.   Explained that cough may linger for a few weeks but should gradually improve if loosening up.    EPISTAXIS:   Patient reports experiencing epistaxis for 2 days over the weekend.    HYPERTENSION:   Patient's home blood pressure readings range from 140-150/60-70 at worst, normally 130/60-70.   Advised to continue low-salt diet.    EDEMA:   Patient reports leg swelling.   Instructed to elevate legs to manage edema.    BULLOUS DISORDER:   Patient reports blisters on leg.    FOLLOW-UP:   Recommend follow-up with Dr. Maurice regarding MRI results for potential MS diagnosis.   Will contact Dr. Maurice's office to obtain definitive results of MRI.       Medication List with Changes/Refills   New Medications    AMOXICILLIN-CLAVULANATE 875-125MG (AUGMENTIN) 875-125 MG PER TABLET    Take 1 tablet by mouth 2 (two) times daily. for 7 days   Current Medications    ACETAMINOPHEN (TYLENOL) 325 MG TABLET    Take 650 mg by mouth every 6 (six) hours.     AMIODARONE (PACERONE) 200 MG TAB    Take 1 tablet (200 mg total) by mouth once daily.    APIXABAN (ELIQUIS) 5 MG TAB    Take 1 tablet (5 mg total) by mouth 2 (two) times daily. Resume on 06/09/24    ASCORBIC ACID, VITAMIN C, (VITAMIN C) 100 MG TABLET    Take 100 mg by mouth once daily.    ATORVASTATIN (LIPITOR) 20 MG TABLET    TAKE 1 TABLET BY MOUTH ONCE DAILY IN THE EVENING    B COMPLEX VITAMINS TABLET    Take 1 tablet by mouth once daily.    BENZONATATE (TESSALON) 100 MG CAPSULE    Take 1 capsule (100 mg total) by mouth 3 (three) times daily as needed for Cough.    FUROSEMIDE (LASIX) 40 MG TABLET    Take 1 tablet (40 mg total) by mouth once daily.    LEVOTHYROXINE (SYNTHROID) 150 MCG TABLET    TAKE 1 TABLET BY MOUTH BEFORE BREAKFAST    MECLIZINE (ANTIVERT) 25 MG TABLET    Take 25 mg by mouth. PRN    ONDANSETRON (ZOFRAN-ODT) 8 MG TBDL    Take 1 tablet (8 mg total) by mouth every 6 (six) hours as needed (nausea/vomiting).    POTASSIUM BICARBONATE DISINTEGRATING TABLET    Take 1 tablet (10 mEq total) by mouth once daily.    VALACYCLOVIR (VALTREX) 1000 MG TABLET    Take 2 tablets (2,000 mg total) by mouth every 12 (twelve) hours. for 2 doses   Discontinued Medications    MULTIVITAMIN (ONE DAILY MULTIVITAMIN) PER TABLET    Take 1 tablet by mouth once daily.    PROMETHAZINE-DEXTROMETHORPHAN (PROMETHAZINE-DM) 6.25-15 MG/5 ML SYRP    Take 5 mLs by mouth every 4 to 6 hours as needed.         Subjective:    Patient ID: Graciela Barton is a 75 y.o. female.  Chief Complaint: Cough and Nasal Congestion (Started 1 week ago)    HPI  History of Present Illness    CHIEF COMPLAINT:  Patient presents today with sinus congestion and upper respiratory symptoms for seven days.    UPPER RESPIRATORY INFECTION:  She reports nasal congestion with epistaxis over the weekend and significant sinus pressure with green and yellow discharge. Symptoms are now progressing to the chest with minimal cough. She denies fever, chills, or shortness of  "breath. She has been using ice cubes for symptom relief.    HYPERTENSION:  She monitors blood pressure weekly at home with usual readings around 130/60-70, though recent highest readings have been 140-150/60-70. She follows a low salt diet.    LOWER EXTREMITY EDEMA:  She reports blisters and swelling in her leg. She elevates her legs, particularly when in bed.    IMAGING:  She recently underwent an MRI ordered by Dr. Maurice's office but has not received the results yet.      ROS:  Constitutional: -fevers, -chills  ENT: +nasal congestion, +sinus pressure, +nosebleeds, -sore throat, +nasal discharge  Respiratory: +shortness of breath, +cough, +chest congestion  Cardiovascular: +lower extremity edema       Review of Systems    Objective:      Vitals:    05/21/25 1019 05/21/25 1036   BP: (!) 164/78 130/70   BP Location: Left arm    Patient Position: Sitting    Pulse: 60    Resp: 18    Temp: 98 °F (36.7 °C)    TempSrc: Oral    SpO2: 97%    Weight: 105.4 kg (232 lb 5.8 oz)    Height: 5' 5" (1.651 m)      BP Readings from Last 5 Encounters:   05/21/25 130/70   03/26/25 130/70   03/25/25 (!) 150/72   09/04/24 (!) 143/68   08/01/24 (!) 102/54     Wt Readings from Last 5 Encounters:   05/21/25 105.4 kg (232 lb 5.8 oz)   03/26/25 100.4 kg (221 lb 3.7 oz)   03/25/25 81.2 kg (179 lb)   09/04/24 81.6 kg (179 lb 14.3 oz)   08/19/24 81.6 kg (179 lb 14.3 oz)     Physical Exam  Physical Exam    General: Well-developed. Well-nourished. No acute distress.  Eyes: EOMI. Sclerae anicteric.  HENT: Normocephalic. Atraumatic. Nares patent. Moist oral mucosa.  Cardiovascular: Regular rate. Regular rhythm. No murmurs. No rubs. No gallops. Normal S1, S2.  Respiratory: Normal respiratory effort. Clear to auscultation bilaterally. No rales. No rhonchi. No wheezing.  Musculoskeletal: No  obvious deformity.  Extremities: No lower extremity edema.  Neurological: Alert & oriented x3. No slurred speech. Normal gait.  Psychiatric: Normal mood. Normal " affect. Good insight. Good judgment.  Skin: Warm. Dry. No rash.         Lab Results   Component Value Date    WBC 12.57 06/04/2024    HGB 11.7 (L) 06/04/2024    HCT 35.8 (L) 06/04/2024     06/04/2024    CHOL 127 06/20/2022    TRIG 135 06/20/2022    HDL 56 06/20/2022    ALT 15 03/18/2025    AST 20 03/18/2025     03/18/2025    K 4.0 03/18/2025     08/19/2024    CREATININE 0.84 03/18/2025    BUN 24 03/18/2025    CO2 28 03/18/2025    TSH 5.155 (H) 04/16/2024    INR 1.1 09/06/2023    HGBA1C 5.5 06/20/2022      This note was generated with the assistance of ambient listening technology. Verbal consent was obtained by the patient and accompanying visitor(s) for the recording of patient appointment to facilitate this note. I attest to having reviewed and edited the generated note for accuracy, though some syntax or spelling errors may persist. Please contact the author of this note for any clarification.

## 2025-06-19 ENCOUNTER — PATIENT MESSAGE (OUTPATIENT)
Dept: PSYCHIATRY | Facility: CLINIC | Age: 75
End: 2025-06-19
Payer: MEDICARE

## 2025-07-16 PROCEDURE — G0179 MD RECERTIFICATION HHA PT: HCPCS | Mod: ,,, | Performed by: FAMILY MEDICINE

## 2025-07-21 ENCOUNTER — OFFICE VISIT (OUTPATIENT)
Dept: PRIMARY CARE CLINIC | Facility: CLINIC | Age: 75
End: 2025-07-21
Payer: MEDICARE

## 2025-07-21 VITALS
HEART RATE: 118 BPM | RESPIRATION RATE: 18 BRPM | HEIGHT: 65 IN | SYSTOLIC BLOOD PRESSURE: 136 MMHG | DIASTOLIC BLOOD PRESSURE: 78 MMHG | TEMPERATURE: 98 F | WEIGHT: 174.06 LBS | BODY MASS INDEX: 29 KG/M2 | OXYGEN SATURATION: 97 %

## 2025-07-21 DIAGNOSIS — E78.2 MIXED HYPERLIPIDEMIA: ICD-10-CM

## 2025-07-21 DIAGNOSIS — L03.116 CELLULITIS OF LEFT LOWER LEG: Primary | ICD-10-CM

## 2025-07-21 PROCEDURE — 99999 PR PBB SHADOW E&M-EST. PATIENT-LVL IV: CPT | Mod: PBBFAC,,, | Performed by: FAMILY MEDICINE

## 2025-07-21 PROCEDURE — 99214 OFFICE O/P EST MOD 30 MIN: CPT | Mod: S$PBB,,, | Performed by: FAMILY MEDICINE

## 2025-07-21 PROCEDURE — 99214 OFFICE O/P EST MOD 30 MIN: CPT | Mod: PBBFAC,PN | Performed by: FAMILY MEDICINE

## 2025-07-21 RX ORDER — CLINDAMYCIN HYDROCHLORIDE 300 MG/1
300 CAPSULE ORAL 3 TIMES DAILY
Qty: 30 CAPSULE | Refills: 0 | Status: SHIPPED | OUTPATIENT
Start: 2025-07-21 | End: 2025-07-31

## 2025-07-21 NOTE — PROGRESS NOTES
Assessment:       1. Cellulitis of left lower leg    2. Mixed hyperlipidemia         Plan:       Cellulitis of left lower leg  -     clindamycin (CLEOCIN) 300 MG capsule; Take 1 capsule (300 mg total) by mouth 3 (three) times daily. for 10 days  Dispense: 30 capsule; Refill: 0    Mixed hyperlipidemia  -     CBC Auto Differential; Future; Expected date: 07/21/2025  -     Comprehensive Metabolic Panel; Future; Expected date: 07/21/2025  -     Lipid Panel; Future; Expected date: 07/21/2025      Assessment & Plan    L03.116 Cellulitis of left lower leg  E78.2 Mixed hyperlipidemia    - Diagnosed cellulitis of the left lower leg based on clinical presentation: erythematous, warm, edematous, and tender from mid-shin down.  - Considered history of previous cellulitis episodes.  - Explained that some blistering may be due to swelling.    CELLULITIS OF LEFT LOWER LEG:   Patient to elevate affected leg as much as possible to reduce swelling.   Discussed that wrapping the leg could help prevent worsening of the condition.   Started Clindamycin, oral antibiotic, to be taken 3 times daily for 10 days, given prior experience with this medication.   Increased Lasix (furosemide) by adding half a tablet to current dose for a few days to reduce leg swelling.   Advised hospital visit if condition worsens, fever develops, or significant pain occurs.       Medication List with Changes/Refills   New Medications    CLINDAMYCIN (CLEOCIN) 300 MG CAPSULE    Take 1 capsule (300 mg total) by mouth 3 (three) times daily. for 10 days   Current Medications    ACETAMINOPHEN (TYLENOL) 325 MG TABLET    Take 650 mg by mouth every 6 (six) hours.    AMIODARONE (PACERONE) 200 MG TAB    Take 1 tablet (200 mg total) by mouth once daily.    APIXABAN (ELIQUIS) 5 MG TAB    Take 1 tablet (5 mg total) by mouth 2 (two) times daily. Resume on 06/09/24    ASCORBIC ACID, VITAMIN C, (VITAMIN C) 100 MG TABLET    Take 100 mg by mouth once daily.    ATORVASTATIN  (LIPITOR) 20 MG TABLET    TAKE 1 TABLET BY MOUTH ONCE DAILY IN THE EVENING    B COMPLEX VITAMINS TABLET    Take 1 tablet by mouth once daily.    FUROSEMIDE (LASIX) 40 MG TABLET    Take 1 tablet (40 mg total) by mouth once daily.    LEVOTHYROXINE (SYNTHROID) 150 MCG TABLET    TAKE 1 TABLET BY MOUTH BEFORE BREAKFAST    MECLIZINE (ANTIVERT) 25 MG TABLET    Take 25 mg by mouth. PRN    ONDANSETRON (ZOFRAN-ODT) 8 MG TBDL    Take 1 tablet (8 mg total) by mouth every 6 (six) hours as needed (nausea/vomiting).   Discontinued Medications    POTASSIUM BICARBONATE DISINTEGRATING TABLET    Take 1 tablet (10 mEq total) by mouth once daily.    VALACYCLOVIR (VALTREX) 1000 MG TABLET    Take 2 tablets (2,000 mg total) by mouth every 12 (twelve) hours. for 2 doses         Subjective:    Patient ID: Graciela Barton is a 75 y.o. female.  Chief Complaint: Blister (Left leg)    HPI  History of Present Illness    CHIEF COMPLAINT:  Patient presents today with a blister on the back of the leg.    HISTORY OF PRESENT ILLNESS:  She reports a blister on the back of her left lower leg that appeared this morning with bloody drainage on her sheets. The entire left lower leg appears red and swollen but is not painful. She denies any known injury. She also notes a swollen blister on her third toe following a bite from the other night, which she treats with Epsom gel. She denies itching.    REVIEW OF SYSTEMS:  She denies fevers, chills, chest pain, or shortness of breath.    MEDICAL HISTORY:  She has a history of recurrent cellulitis.    CURRENT MEDICATIONS:  She takes Lasix with recent modification to half tablet which has improved symptoms. She reports recently taking multiple doses (1.5 tablets) to address leg swelling.    HOME CARE:  She receives weekly caregiver visits. She has been advised to use leg wraps but expresses difficulty with compliance due to discomfort.    ALLERGIES:  Allergic to Vancomycin.    PREVIOUS ANTIBIOTIC USE:  She has  "previously been treated with Clindamycin, though exact timing of last antibiotic course is unclear.      ROS:  Constitutional: -fevers, -chills  Respiratory: -shortness of breath  Cardiovascular: -chest pain, +lower extremity edema  Musculoskeletal: -limb pain  Integumentary: +skin redness       Review of Systems    Objective:      Vitals:    07/21/25 1458   BP: 136/78   BP Location: Left arm   Patient Position: Sitting   Pulse: (!) 118   Resp: 18   Temp: 98.3 °F (36.8 °C)   TempSrc: Oral   SpO2: 97%   Weight: 78.9 kg (174 lb 0.9 oz)   Height: 5' 5" (1.651 m)     BP Readings from Last 5 Encounters:   07/21/25 136/78   05/21/25 130/70   03/26/25 130/70   03/25/25 (!) 150/72   09/04/24 (!) 143/68     Wt Readings from Last 5 Encounters:   07/21/25 78.9 kg (174 lb 0.9 oz)   05/21/25 105.4 kg (232 lb 5.8 oz)   03/26/25 100.4 kg (221 lb 3.7 oz)   03/25/25 81.2 kg (179 lb)   09/04/24 81.6 kg (179 lb 14.3 oz)     Physical Exam  Physical Exam    General: Well-developed. Well-nourished. No acute distress.  Eyes: EOMI. Sclerae anicteric.  HENT: Normocephalic. Atraumatic. Nares patent. Moist oral mucosa.  Cardiovascular: Regular rate. Regular rhythm. No murmurs. No rubs. No gallops. Normal S1, S2.  Respiratory: Normal respiratory effort. Clear to auscultation bilaterally. No rales. No rhonchi. No wheezing.  Musculoskeletal: No  obvious deformity.  Extremities: No lower extremity edema.  Neurological: Alert & oriented x3. No slurred speech.   Psychiatric: Normal mood. Normal affect. Good insight. Good judgment.  Skin: Warm. Ruptured superficial vesicle on left anterior leg. Left lower leg erythematous from mid shin down. Left lower leg warm. Left lower leg edematous. Left lower leg tender.         Lab Results   Component Value Date    WBC 6.88 07/21/2025    HGB 12.9 07/21/2025    HCT 40.8 07/21/2025     07/21/2025    CHOL 155 07/21/2025    TRIG 100 07/21/2025    HDL 69 07/21/2025    ALT 15 07/21/2025    AST 25 07/21/2025 "     07/21/2025    K 4.3 07/21/2025     07/21/2025    CREATININE 1.1 07/21/2025    BUN 22 07/21/2025    CO2 32 (H) 07/21/2025    TSH 5.155 (H) 04/16/2024    INR 1.0 06/19/2025    HGBA1C 5.5 06/20/2022      This note was generated with the assistance of ambient listening technology. Verbal consent was obtained by the patient and accompanying visitor(s) for the recording of patient appointment to facilitate this note. I attest to having reviewed and edited the generated note for accuracy, though some syntax or spelling errors may persist. Please contact the author of this note for any clarification.

## 2025-07-28 ENCOUNTER — HOSPITAL ENCOUNTER (EMERGENCY)
Facility: HOSPITAL | Age: 75
Discharge: HOME | End: 2025-07-29
Attending: EMERGENCY MEDICINE
Payer: MEDICARE

## 2025-07-28 DIAGNOSIS — R07.9 CHEST PAIN: ICD-10-CM

## 2025-07-28 DIAGNOSIS — M79.89 LEFT LEG SWELLING: ICD-10-CM

## 2025-07-28 LAB
ABSOLUTE EOSINOPHIL (OHS): 0.26 K/UL
ABSOLUTE MONOCYTE (OHS): 0.9 K/UL (ref 0.3–1)
ABSOLUTE NEUTROPHIL COUNT (OHS): 6.13 K/UL (ref 1.8–7.7)
ALBUMIN SERPL BCP-MCNC: 4.4 G/DL (ref 3.5–5.2)
ALP SERPL-CCNC: 121 UNIT/L (ref 40–150)
ALT SERPL W/O P-5'-P-CCNC: 16 UNIT/L (ref 0–55)
ANION GAP (OHS): 11 MMOL/L (ref 8–16)
AST SERPL-CCNC: 29 UNIT/L (ref 0–50)
BASOPHILS # BLD AUTO: 0.04 K/UL
BASOPHILS NFR BLD AUTO: 0.4 %
BILIRUB SERPL-MCNC: 0.5 MG/DL (ref 0.1–1)
BUN SERPL-MCNC: 21 MG/DL (ref 8–23)
CALCIUM SERPL-MCNC: 9.5 MG/DL (ref 8.7–10.5)
CHLORIDE SERPL-SCNC: 101 MMOL/L (ref 95–110)
CO2 SERPL-SCNC: 27 MMOL/L (ref 23–29)
CREAT SERPL-MCNC: 1.1 MG/DL (ref 0.5–1.4)
D DIMER PPP IA.FEU-MCNC: 0.23 MG/L FEU
ERYTHROCYTE [DISTWIDTH] IN BLOOD BY AUTOMATED COUNT: 12.6 % (ref 11.5–14.5)
GFR SERPLBLD CREATININE-BSD FMLA CKD-EPI: 53 ML/MIN/1.73/M2
GLUCOSE SERPL-MCNC: 93 MG/DL (ref 70–110)
HCT VFR BLD AUTO: 41.5 % (ref 37–48.5)
HCV AB SERPL QL IA: NORMAL
HGB BLD-MCNC: 13.4 GM/DL (ref 12–16)
HIV 1+2 AB+HIV1 P24 AG SERPL QL IA: NORMAL
IMM GRANULOCYTES # BLD AUTO: 0.03 K/UL (ref 0–0.04)
IMM GRANULOCYTES NFR BLD AUTO: 0.3 % (ref 0–0.5)
LYMPHOCYTES # BLD AUTO: 1.53 K/UL (ref 1–4.8)
MCH RBC QN AUTO: 31.7 PG (ref 27–31)
MCHC RBC AUTO-ENTMCNC: 32.3 G/DL (ref 32–36)
MCV RBC AUTO: 98 FL (ref 82–98)
NT-PROBNP SERPL-MCNC: 882 PG/ML
NUCLEATED RBC (/100WBC) (OHS): 0 /100 WBC
PLATELET # BLD AUTO: 288 K/UL (ref 150–450)
PMV BLD AUTO: 10.7 FL (ref 9.2–12.9)
POTASSIUM SERPL-SCNC: 3.6 MMOL/L (ref 3.5–5.1)
PROT SERPL-MCNC: 8.2 GM/DL (ref 6–8.4)
RBC # BLD AUTO: 4.23 M/UL (ref 4–5.4)
RELATIVE EOSINOPHIL (OHS): 2.9 %
RELATIVE LYMPHOCYTE (OHS): 17.2 % (ref 18–48)
RELATIVE MONOCYTE (OHS): 10.1 % (ref 4–15)
RELATIVE NEUTROPHIL (OHS): 69.1 % (ref 38–73)
SODIUM SERPL-SCNC: 139 MMOL/L (ref 136–145)
TROPONIN I SERPL HS-MCNC: 13 NG/L
TROPONIN I SERPL HS-MCNC: 16 NG/L
WBC # BLD AUTO: 8.89 K/UL (ref 3.9–12.7)

## 2025-07-28 PROCEDURE — 86803 HEPATITIS C AB TEST: CPT | Performed by: PHYSICIAN ASSISTANT

## 2025-07-28 PROCEDURE — 84484 ASSAY OF TROPONIN QUANT: CPT | Performed by: EMERGENCY MEDICINE

## 2025-07-28 PROCEDURE — 87389 HIV-1 AG W/HIV-1&-2 AB AG IA: CPT | Performed by: PHYSICIAN ASSISTANT

## 2025-07-28 PROCEDURE — 93005 ELECTROCARDIOGRAM TRACING: CPT

## 2025-07-28 PROCEDURE — 83880 ASSAY OF NATRIURETIC PEPTIDE: CPT | Performed by: EMERGENCY MEDICINE

## 2025-07-28 PROCEDURE — 99285 EMERGENCY DEPT VISIT HI MDM: CPT | Mod: 25

## 2025-07-28 PROCEDURE — 82040 ASSAY OF SERUM ALBUMIN: CPT | Performed by: EMERGENCY MEDICINE

## 2025-07-28 PROCEDURE — 85025 COMPLETE CBC W/AUTO DIFF WBC: CPT | Performed by: EMERGENCY MEDICINE

## 2025-07-28 PROCEDURE — 93010 ELECTROCARDIOGRAM REPORT: CPT | Mod: ,,, | Performed by: INTERNAL MEDICINE

## 2025-07-28 PROCEDURE — 85379 FIBRIN DEGRADATION QUANT: CPT | Performed by: EMERGENCY MEDICINE

## 2025-07-29 VITALS
BODY MASS INDEX: 28.98 KG/M2 | HEIGHT: 65 IN | DIASTOLIC BLOOD PRESSURE: 102 MMHG | HEART RATE: 112 BPM | WEIGHT: 173.94 LBS | TEMPERATURE: 98 F | RESPIRATION RATE: 18 BRPM | SYSTOLIC BLOOD PRESSURE: 160 MMHG | OXYGEN SATURATION: 98 %

## 2025-07-29 LAB
OHS QRS DURATION: 76 MS
OHS QRS DURATION: 86 MS
OHS QTC CALCULATION: 240 MS
OHS QTC CALCULATION: 356 MS

## 2025-07-29 NOTE — ED PROVIDER NOTES
Encounter Date: 7/28/2025       History     Chief Complaint   Patient presents with    Chest Pain     Presents with mid sternal chest pain non radiating, has hx of aflutter and afib, took amiodarone today     Graciela Barton is a 75-year-old female with a history of hypertension, hyperlipidemia, thyroid disease, edema presenting today for chest pain and shortness of breath.  Symptoms started around 11:00 a.m..  She reports pressure-like pain in the center of the chest, she felt it was indigestion.  Decided to eat some oatmeal and take her medications.  Home health came, noted her to be tachycardic to the 120s.  She checked it several more times throughout the day and it remained elevated.  She has had to come to emergency department for evaluation.  Of note, she reports swelling to her left leg that has chronic however she has had increased redness and small fluid-filled blisters over the past week and started on clindamycin.  She states the redness has improved while on antibiotics but not resolved.        Review of patient's allergies indicates:   Allergen Reactions    Vancomycin Tinitus    Vancomycin analogues Tinitus     Past Medical History:   Diagnosis Date    Arthritis     Cancer     Thyroid    Edema     Hyperlipidemia     Hypertension     Thyroid disease      Past Surgical History:   Procedure Laterality Date    ADENOIDECTOMY      COLONOSCOPY  10/18/2018    COLONOSCOPY N/A 10/18/2018    Procedure: COLONOSCOPY;  Surgeon: Yosvany Alejandra MD;  Location: [facility] ENDO;  Service: General;  Laterality: N/A;    ECHOCARDIOGRAM,TRANSESOPHAGEAL N/A 10/4/2023    Procedure: Transesophageal echo (CARLYLE) intra-procedure log documentation;  Surgeon: Mark Ortega MD;  Location: [facility] OR;  Service: Cardiology;  Laterality: N/A;    EPIDURAL STEROID INJECTION N/A 9/30/2022    Procedure: LUMBAR L3/4 MALKA CONTRAST DIRRECT REFERRAL MONICA CARRERA IN CHART;  Surgeon: Nicholas Kumar MD;  Location: [facility] PAIN MGT;  Service: Pain  Management;  Laterality: N/A;    FACETECTOMY OF VERTEBRA N/A 9/29/2023    Procedure: FACETECTOMY;  Surgeon: Scooter Salter MD;  Location: [facility] OR;  Service: Neurosurgery;  Laterality: N/A;    FORAMINOTOMY N/A 9/29/2023    Procedure: FORAMINOTOMY, SPINE;  Surgeon: Scooter Salter MD;  Location: [facility] OR;  Service: Neurosurgery;  Laterality: N/A;    FUSION OF LUMBAR SPINE USING POSTERIOR INTERBODY TECHNIQUE N/A 9/29/2023    Procedure: FUSION, SPINE, LUMBAR, PLIF;  Surgeon: Scooter Salter MD;  Location: [facility] OR;  Service: Neurosurgery;  Laterality: N/A;  L3-S1 posterior instrumentation   L3-L5 Lmainectomy sacro pelvic fixation Globus cage   -lop 3 hr   -los 5 days   -Lami segments 1  -anterior fusion insterspace 2  -general   -type and screen   -Ziehm brain notified cc  -EMG bairon notified cc  -SEP  -MEP  -TLSO       FUSION, SPINE, CERVICAL N/A 6/3/2024    Procedure: FUSION, SPINE, CERVICAL;  Surgeon: Scooter Salter MD;  Location: [facility] OR;  Service: Neurosurgery;  Laterality: N/A;  Procedure:C5-6,C6-7 ACDF and plate  Length of procedure:3 hours  LOS: 0-1 night  Anesthesia:General  Blood:Type and screen  Radiology:C-arm  SNS:EMG,SEP,MEP  Brace:[place]  Bed:Regular Bed  Headrest:Charles Wells, Horse shoe  Position:Prone  Equipment:Depuy-Charles, Ortho    HYSTERECTOMY  1992    total hyst     KNEE SURGERY      16 pins and two plates implanted    LAMINECTOMY N/A 9/29/2023    Procedure: LAMINECTOMY, SPINE;  Surgeon: Scooter Salter MD;  Location: [facility] OR;  Service: Neurosurgery;  Laterality: N/A;    MINIMALLY INVASIVE TRANSFORAMINAL LUMBAR INTERBODY FUSION (TLIF) N/A 9/29/2023    Procedure: FUSION, SPINE, LUMBAR, TLIF, MINIMALLY INVASIVE;  Surgeon: Scooter Salter MD;  Location: [facility] OR;  Service: Neurosurgery;  Laterality: N/A;  L3-L5 OLIF Globus ATP  L5-S1 ALIF Globus ATP  -Lami add segment 1  -anterior fusion interspances 2  -lop 3 hr   -general   -type and screen   -C arm   -EMG bairon notified  cc  -SEP  -MEP  -TLSO  -Regular Bed   -Lateral right down   -globus reji    THORACIC DISCECTOMY Right 5/12/2023    Procedure: DISCECTOMY, SPINE, THORACIC Right T6-7, T7-8 interior discectomy retropleural;  Surgeon: Scooter Salter MD;  Location: [facility];  Service: Neurosurgery;  Laterality: Right;  general  eliquis   type and screen  C-arm  Metrx  EMG bairon notified cc  SEP  MEP  Regular bed   lateral left down   globus (Jose Enrique) notified cc    THYROIDECTOMY      TONSILLECTOMY       Family History   Problem Relation Name Age of Onset    Alcohol abuse Mother      Heart disease Mother      Alcohol abuse Father      Heart disease Father      Heart disease Maternal Grandmother      Heart disease Maternal Grandfather       Social History[1]  Review of Systems    Physical Exam     Initial Vitals [07/28/25 1936]   BP Pulse Resp Temp SpO2   (!) 194/113 (!) 111 18 98.1 °F (36.7 °C) 98 %      MAP       --         Physical Exam    Nursing note and vitals reviewed.  Constitutional: She appears well-developed and well-nourished. No distress.   HENT: Mouth/Throat: Oropharynx is clear and moist.   Eyes: Conjunctivae are normal.   Neck: No JVD present.   Cardiovascular:  Regular rhythm and intact distal pulses.           Tachycardic   Pulmonary/Chest: Breath sounds normal. She has no wheezes. She has no rales. She exhibits no tenderness.   Abdominal: Abdomen is soft. Bowel sounds are normal. There is no abdominal tenderness. There is no rebound.   Musculoskeletal:         General: No edema (2+ edema on the left).     Neurological: She is alert and oriented to person, place, and time.   Skin: No rash (Lower extremity cellulitis with several small blisters with serous fluid) noted.   Psychiatric: She has a normal mood and affect.         ED Course   Procedures  Labs Reviewed   COMPREHENSIVE METABOLIC PANEL - Abnormal       Result Value    Sodium 139      Potassium 3.6      Chloride 101      CO2 27      Glucose 93      BUN 21       Creatinine 1.1      Calcium 9.5      Protein Total 8.2      Albumin 4.4      Bilirubin Total 0.5            AST 29      ALT 16      Anion Gap 11      eGFR 53 (*)    NT-PRO NATRIURETIC PEPTIDE - Abnormal    NT-proBNP 882 (*)     Narrative:     NOTE:  Access complete set of age - and/or gender-specific reference intervals for this test in the [facility] Collection Manual.   CBC WITH DIFFERENTIAL - Abnormal    WBC 8.89      RBC 4.23      HGB 13.4      HCT 41.5      MCV 98      MCH 31.7 (*)     MCHC 32.3      RDW 12.6      Platelet Count 288      MPV 10.7      Nucleated RBC 0      Neut % 69.1      Lymph % 17.2 (*)     Mono % 10.1      Eos % 2.9      Basophil % 0.4      Imm Grans % 0.3      Neut # 6.13      Lymph # 1.53      Mono # 0.90      Eos # 0.26      Baso # 0.04      Imm Grans # 0.03     TROPONIN I HIGH SENSITIVITY - Normal    Troponin High Sensitive 13     HEPATITIS C ANTIBODY - Normal    Hep C Ab Interp Non-Reactive     HIV 1 / 2 ANTIBODY - Normal    HIV 1/2 Ag/Ab Non-Reactive     CBC W/ AUTO DIFFERENTIAL    Narrative:     The following orders were created for panel order CBC auto differential.  Procedure                               Abnormality         Status                     ---------                               -----------         ------                     CBC with Differential[[id number]]       Abnormal            Final result                 Please view results for these tests on the individual orders.   TROPONIN I HIGH SENSITIVITY   HEP C VIRUS HOLD SPECIMEN   D DIMER, QUANTITATIVE     EKG Readings: (Independently Interpreted)   EKG: Sinus tachycardia 110 with normal axis, nonspecific ST change.       Imaging Results              X-Ray Chest AP Portable (In process)                      Medications - No data to display  Medical Decision Making  Emergent evaluation a 75-year-old female presenting today for chest pain, found to be tachycardic in the 120s.  Currently on clindamycin for  left leg cellulitis.    Differential includes but not limited to cellulitis, CHF, ACS, PE, DVT    EKG with no acute ischemia, sinus tachycardia noted.    Plan for labs, EKG, cardiac monitoring, ultrasound left lower extremity    10:00 PM  Patient turned over to oncoming team pending labs, re-evaluation and final disposition    Amount and/or Complexity of Data Reviewed  Labs: ordered. Decision-making details documented in ED Course.  Radiology: ordered and independent interpretation performed.  ECG/medicine tests: ordered and independent interpretation performed.                                          Clinical Impression:  Final diagnoses:  [R07.9] Chest pain  [M79.89] Left leg swelling                       [1]   Social History  Tobacco Use    Smoking status: Never    Smokeless tobacco: Never   Vaping Use    Vaping status: Never Used   Substance Use Topics    Alcohol use: No    Drug use: No        Esther Redd MD  07/28/25 [id number]

## 2025-07-29 NOTE — DISCHARGE INSTRUCTIONS
You have been given a referral to see a cardiologist in clinic.  I have provided a phone number to call to set up an appointment, however they should be reaching out to you.  Please continue to take your Lasix/take additional doses if you find that the swelling gets worse.  You may always return back to the emergency department should her chest pain get worse or develop any new symptoms.

## 2025-07-29 NOTE — FIRST PROVIDER EVALUATION
Medical screening examination initiated.  I have conducted a focused provider triage encounter, findings are as follows:    Brief history of present illness:      Midsternal CP this AM  +Indigestion  No SOB  Hx afib, pulm HTN, HTN    There were no vitals filed for this visit.    Pertinent physical exam:  In wheelchair    Brief workup plan:  EKG, labs, CXR    Preliminary workup initiated; this workup will be continued and followed by the physician or advanced practice provider that is assigned to the patient when roomed.

## 2025-07-29 NOTE — PROVIDER PROGRESS NOTES - EMERGENCY DEPT.
Encounter Date: 7/28/2025    ED Physician Progress Notes          Physician Note:   Signout Note     Chief complaint:  Chest pain     Per sign out and chart review:  Graciela Barton is a 75 y.o. female,  presenting with complaints of chest pain.  Of note, she also has a left lower extremity cellulitis for which he is currently being treated with clindamycin.      During ED stay patient received:  Medications - No data to display     Pt signed out to me pending:  Laboratory/radiologic workup     Update/ Disposition:  Upon my initial evaluation of the patient, she was noted to be hemodynamically stable and very comfortable appearing.  She was mildly tachycardic to the 110s.  She did not appear short of breath and confirmed to me that at no point has she felt short of breath.  BNP noted to be elevated and in the setting of her having lower extremity edema, clinical concern for possible CHF.  We will give a dose of IV Lasix.  Patient states that she has been taking Lasix at home, and occasionally takes an additional dose of Lasix when she feels her legs her becoming larger.  Chest x-ray without signs of significant pulmonary edema.  Patient feels that the cellulitis on her left lower extremity is getting better, and she still has 3 more days of clindamycin.    Shared decision-making conversation had with the patient, at which time it was decided that she very much desire to be discharged and would continue taking her Lasix/antibiotics at home.  I felt this was appropriate, however I warned her to return to the emergency department should she started develop any worsening shortness of breath as this could be a manifestation of heart failure.  She verbalized understanding was in agreement with the plan.  Stable for discharge at this time.     Patient, caregiver and/or family understands the plan and verbalized agreement. All questions answered.      Diagnostic Impression:    Left lower extremity cellulitis, bilateral lower  extremity swelling

## 2025-07-29 NOTE — ED TRIAGE NOTES
Graciela Barton, a 75 y.o. female presents to the ED via cab from home w/ complaint of midsternal cp pressure this morning and tachycardia. -sob -n/v/d. Took amio pta. Pmhx aflutter, afib, htn, thyroid problems    Triage note:  Chief Complaint   Patient presents with    Chest Pain     Presents with mid sternal chest pain non radiating, has hx of aflutter and afib, took amiodarone today     Review of patient's allergies indicates:   Allergen Reactions    Vancomycin Tinitus    Vancomycin analogues Tinitus

## 2025-07-30 ENCOUNTER — PATIENT MESSAGE (OUTPATIENT)
Dept: PSYCHIATRY | Facility: CLINIC | Age: 75
End: 2025-07-30
Payer: MEDICARE

## 2025-07-30 ENCOUNTER — OFFICE VISIT (OUTPATIENT)
Dept: PRIMARY CARE CLINIC | Facility: CLINIC | Age: 75
End: 2025-07-30
Payer: MEDICARE

## 2025-07-30 ENCOUNTER — TELEPHONE (OUTPATIENT)
Dept: PRIMARY CARE CLINIC | Facility: CLINIC | Age: 75
End: 2025-07-30
Payer: MEDICARE

## 2025-07-30 VITALS
RESPIRATION RATE: 18 BRPM | HEART RATE: 101 BPM | BODY MASS INDEX: 28.98 KG/M2 | TEMPERATURE: 98 F | OXYGEN SATURATION: 97 % | WEIGHT: 173.94 LBS | SYSTOLIC BLOOD PRESSURE: 162 MMHG | HEIGHT: 65 IN | DIASTOLIC BLOOD PRESSURE: 102 MMHG

## 2025-07-30 DIAGNOSIS — I48.0 PAF (PAROXYSMAL ATRIAL FIBRILLATION): ICD-10-CM

## 2025-07-30 DIAGNOSIS — L03.116 CELLULITIS OF LEFT LOWER LEG: ICD-10-CM

## 2025-07-30 DIAGNOSIS — I10 ESSENTIAL HYPERTENSION, BENIGN: Primary | ICD-10-CM

## 2025-07-30 PROCEDURE — 99214 OFFICE O/P EST MOD 30 MIN: CPT | Mod: PBBFAC,PN | Performed by: FAMILY MEDICINE

## 2025-07-30 PROCEDURE — 99214 OFFICE O/P EST MOD 30 MIN: CPT | Mod: S$PBB,,, | Performed by: FAMILY MEDICINE

## 2025-07-30 PROCEDURE — 99999 PR PBB SHADOW E&M-EST. PATIENT-LVL IV: CPT | Mod: PBBFAC,,, | Performed by: FAMILY MEDICINE

## 2025-07-30 RX ORDER — METOPROLOL SUCCINATE 25 MG/1
25 TABLET, EXTENDED RELEASE ORAL DAILY
Qty: 90 TABLET | Refills: 0 | Status: SHIPPED | OUTPATIENT
Start: 2025-07-30

## 2025-07-30 NOTE — PROGRESS NOTES
Assessment:       1. Essential hypertension, benign    2. Cellulitis of left lower leg    3. PAF (paroxysmal atrial fibrillation)         Plan:       Essential hypertension, benign  -     metoprolol succinate (TOPROL-XL) 25 MG 24 hr tablet; Take 1 tablet (25 mg total) by mouth once daily.  Dispense: 90 tablet; Refill: 0    Cellulitis of left lower leg    PAF (paroxysmal atrial fibrillation)  -     metoprolol succinate (TOPROL-XL) 25 MG 24 hr tablet; Take 1 tablet (25 mg total) by mouth once daily.  Dispense: 90 tablet; Refill: 0      Assessment & Plan    I48.0 PAF (paroxysmal atrial fibrillation)  L03.116 Cellulitis of left lower leg  I10 Essential hypertension, benign    - Reviewed ER visit results: EKG showed sinus rhythm, troponins normal, BNP slightly elevated.  - Assessed current symptoms and recent medical history.  - Evaluated cellulitis treatment progress.    PAF (PAROXYSMAL ATRIAL FIBRILLATION):   Explained difference between Amiodarone (anti-arrhythmic) and BP medications.   Continued Amiodarone at current dose.    CELLULITIS OF LEFT LOWER LEG:   Patient to elevate legs for cellulitis management.   Continued current antibiotic regimen for cellulitis, to complete full course as prescribed.   Follow up on the 6th with Silvia for leg follow-up.    ESSENTIAL HYPERTENSION, BENIGN:   Started Metoprolol 25 mg daily for BP and heart rate control.   Nurse to call in 1 week for updated BP and pulse readings.   Explained difference between Amiodarone (anti-arrhythmic) and BP medications.       Medication List with Changes/Refills   New Medications    METOPROLOL SUCCINATE (TOPROL-XL) 25 MG 24 HR TABLET    Take 1 tablet (25 mg total) by mouth once daily.   Current Medications    ACETAMINOPHEN (TYLENOL) 325 MG TABLET    Take 650 mg by mouth every 6 (six) hours.    AMIODARONE (PACERONE) 200 MG TAB    Take 1 tablet (200 mg total) by mouth once daily.    APIXABAN (ELIQUIS) 5 MG TAB    Take 1 tablet (5 mg total) by mouth 2  "(two) times daily. Resume on 06/09/24    ASCORBIC ACID, VITAMIN C, (VITAMIN C) 100 MG TABLET    Take 100 mg by mouth once daily.    ATORVASTATIN (LIPITOR) 20 MG TABLET    TAKE 1 TABLET BY MOUTH ONCE DAILY IN THE EVENING    B COMPLEX VITAMINS TABLET    Take 1 tablet by mouth once daily.    CLINDAMYCIN (CLEOCIN) 300 MG CAPSULE    Take 1 capsule (300 mg total) by mouth 3 (three) times daily. for 10 days    FUROSEMIDE (LASIX) 40 MG TABLET    Take 1 tablet (40 mg total) by mouth once daily.    LEVOTHYROXINE (SYNTHROID) 150 MCG TABLET    TAKE 1 TABLET BY MOUTH BEFORE BREAKFAST    MECLIZINE (ANTIVERT) 25 MG TABLET    Take 25 mg by mouth. PRN    ONDANSETRON (ZOFRAN-ODT) 8 MG TBDL    Take 1 tablet (8 mg total) by mouth every 6 (six) hours as needed (nausea/vomiting).         Subjective:    Patient ID: Graciela Barton is a 75 y.o. female.  Chief Complaint: ER follow up    HPI  History of Present Illness    CHIEF COMPLAINT:  Patient presents today for follow up after recent emergency room visit for chest tightness.    HISTORY OF PRESENT ILLNESS:  She experienced chest tightness while walking around her house, describing it as a progressive tightness that initially subsided but then recurred intermittently. She denies shortness of breath, fevers, or chills. During her recent emergency room visit, EKG showed sinus rhythm, troponins were normal, and BNP was slightly elevated. Ultrasound revealed no evidence of blood clots. She continued to experience chest discomfort last night, which she initially attributed to potential cardiac sphincter issues. She denies trouble swallowing, nausea, or vomiting.    CELLULITIS:  She reports improvement in cellulitis with her leg appearing "a lot better". She is currently elevating her legs as recommended and completing her antibiotic course with three total days of treatment. One leg remains slightly tight. She is adherent with the treatment plan.    MEDICATIONS:  She is currently taking " "Amiodarone for heart rhythm, which was started a few months ago during her last hospitalization. She is being restarted on Metoprolol 25 mg. She understands that Amiodarone is for heart rhythm and does not significantly impact blood pressure. She reports following medication instructions from her home health aide regarding Amiodarone dosing.      ROS:  Constitutional: -fevers, -chills  ENT: -difficulty swallowing  Respiratory: -shortness of breath  Cardiovascular: +chest tightness  Gastrointestinal: -nausea, -vomiting  Integumentary: +skin tightness       Review of Systems    Objective:      Vitals:    07/30/25 1110   BP: (!) 162/102   BP Location: Left arm   Patient Position: Sitting   Pulse: 101   Resp: 18   Temp: 97.8 °F (36.6 °C)   TempSrc: Oral   SpO2: 97%   Weight: 78.9 kg (173 lb 15.1 oz)   Height: 5' 5" (1.651 m)     BP Readings from Last 5 Encounters:   07/30/25 (!) 162/102   07/29/25 (!) 160/102   07/21/25 136/78   05/21/25 130/70   03/26/25 130/70     Wt Readings from Last 5 Encounters:   07/30/25 78.9 kg (173 lb 15.1 oz)   07/28/25 78.9 kg (173 lb 15.1 oz)   07/21/25 78.9 kg (174 lb 0.9 oz)   05/21/25 105.4 kg (232 lb 5.8 oz)   03/26/25 100.4 kg (221 lb 3.7 oz)     Physical Exam  Physical Exam    General: Well-developed. Well-nourished. No acute distress.  Eyes: EOMI. Sclerae anicteric.  HENT: Normocephalic. Atraumatic. Nares patent. Moist oral mucosa.  Cardiovascular: Tachycardia. Regular rhythm. No murmurs. No rubs. No gallops. Normal S1, S2.  Respiratory: Normal respiratory effort. Clear to auscultation bilaterally. No rales. No rhonchi. No wheezing.  Musculoskeletal: No  obvious deformity.  Extremities: No lower extremity edema.  Neurological: Alert & oriented x3. No slurred speech.   Psychiatric: Normal mood. Normal affect. Good insight. Good judgment.  Skin: Warm. Dry. No rash. Cellulitis improving in leg.         Lab Results   Component Value Date    WBC 8.89 07/28/2025    HGB 13.4 07/28/2025    " HCT 41.5 07/28/2025     07/28/2025    CHOL 155 07/21/2025    TRIG 100 07/21/2025    HDL 69 07/21/2025    ALT 16 07/28/2025    AST 29 07/28/2025     07/28/2025    K 3.6 07/28/2025     07/28/2025    CREATININE 1.1 07/28/2025    BUN 21 07/28/2025    CO2 27 07/28/2025    TSH 5.155 (H) 04/16/2024    INR 1.0 06/19/2025    HGBA1C 5.5 06/20/2022      This note was generated with the assistance of ambient listening technology. Verbal consent was obtained by the patient and accompanying visitor(s) for the recording of patient appointment to facilitate this note. I attest to having reviewed and edited the generated note for accuracy, though some syntax or spelling errors may persist. Please contact the author of this note for any clarification.

## 2025-07-30 NOTE — TELEPHONE ENCOUNTER
Pt arrived to appointment     Copied from CRM #[id number]. Topic: Appointments - Same Day Access  >> Jul 30, 2025  9:51 AM Lakisha wrote:  Type:  Needs Medical Advice    Who Called:  Patient   Symptoms (please be specific): n/a    How long has patient had these symptoms:  n/a  Pharmacy name and phone #:  n/a   Would the patient rather a call back or a response via [facility]?  Call back   Best Call Back Number: [phone]   Additional Information:  Patient stated her cab just arrived she will be there in 10 minutes Please Assist

## 2025-07-31 LAB — HOLD SPECIMEN: NORMAL

## 2025-08-01 ENCOUNTER — PATIENT MESSAGE (OUTPATIENT)
Dept: PSYCHIATRY | Facility: CLINIC | Age: 75
End: 2025-08-01
Payer: MEDICARE

## 2025-08-04 ENCOUNTER — TELEPHONE (OUTPATIENT)
Dept: PRIMARY CARE CLINIC | Facility: CLINIC | Age: 75
End: 2025-08-04
Payer: MEDICARE

## 2025-08-04 NOTE — TELEPHONE ENCOUNTER
Pt is calling requesting results of IR FLOUROSCOPY PROCEDURE ([facility] ONLY) 6/19/25        Copied from CRM #[id number]. Topic: General Inquiry - Test Results  >> Aug 4, 2025  1:50 PM Apryl wrote:  Type:  Test Results    Who Called: Patient  Name of Test (Lab/Mammo/Etc):  Spinal tap  Date of Test: 06/19/2025  Ordering Provider: ?  Where the test was performed: [facility]   Would the patient rather a call back or a response via [facility]?  Call back  Best Call Back Number:  [phone]  Additional Information:  N/A

## 2025-08-04 NOTE — TELEPHONE ENCOUNTER
This was a lumbar puncture performed at [facility]. She needs to contact her neurologist, Dr. Mi (the ordering physician), to go over results.

## 2025-08-06 ENCOUNTER — OFFICE VISIT (OUTPATIENT)
Dept: PRIMARY CARE CLINIC | Facility: CLINIC | Age: 75
End: 2025-08-06
Payer: MEDICARE

## 2025-08-06 ENCOUNTER — TELEPHONE (OUTPATIENT)
Dept: PRIMARY CARE CLINIC | Facility: CLINIC | Age: 75
End: 2025-08-06
Payer: MEDICARE

## 2025-08-06 VITALS
OXYGEN SATURATION: 96 % | DIASTOLIC BLOOD PRESSURE: 78 MMHG | BODY MASS INDEX: 39.85 KG/M2 | WEIGHT: 239.19 LBS | HEIGHT: 65 IN | SYSTOLIC BLOOD PRESSURE: 132 MMHG | RESPIRATION RATE: 16 BRPM | HEART RATE: 110 BPM | TEMPERATURE: 98 F

## 2025-08-06 DIAGNOSIS — Z86.79 HISTORY OF ATRIAL FLUTTER: ICD-10-CM

## 2025-08-06 DIAGNOSIS — E78.2 MIXED HYPERLIPIDEMIA: ICD-10-CM

## 2025-08-06 DIAGNOSIS — Z79.01 ANTICOAGULANT LONG-TERM USE: ICD-10-CM

## 2025-08-06 DIAGNOSIS — R00.0 SINUS TACHYCARDIA: ICD-10-CM

## 2025-08-06 DIAGNOSIS — I87.2 VENOUS STASIS DERMATITIS: Primary | ICD-10-CM

## 2025-08-06 DIAGNOSIS — I48.0 PAF (PAROXYSMAL ATRIAL FIBRILLATION): ICD-10-CM

## 2025-08-06 DIAGNOSIS — R60.0 BILATERAL LEG EDEMA: ICD-10-CM

## 2025-08-06 DIAGNOSIS — I10 ESSENTIAL HYPERTENSION, BENIGN: ICD-10-CM

## 2025-08-06 DIAGNOSIS — I47.9 PAROXYSMAL TACHYCARDIA: ICD-10-CM

## 2025-08-06 PROCEDURE — 93005 ELECTROCARDIOGRAM TRACING: CPT | Mod: PBBFAC,PN | Performed by: INTERNAL MEDICINE

## 2025-08-06 PROCEDURE — 99214 OFFICE O/P EST MOD 30 MIN: CPT | Mod: S$PBB,,, | Performed by: NURSE PRACTITIONER

## 2025-08-06 PROCEDURE — G2211 COMPLEX E/M VISIT ADD ON: HCPCS | Mod: ,,, | Performed by: NURSE PRACTITIONER

## 2025-08-06 PROCEDURE — 99999 PR PBB SHADOW E&M-EST. PATIENT-LVL IV: CPT | Mod: PBBFAC,,, | Performed by: NURSE PRACTITIONER

## 2025-08-06 PROCEDURE — 99214 OFFICE O/P EST MOD 30 MIN: CPT | Mod: PBBFAC,PN | Performed by: NURSE PRACTITIONER

## 2025-08-06 PROCEDURE — 93010 ELECTROCARDIOGRAM REPORT: CPT | Mod: S$PBB,,, | Performed by: INTERNAL MEDICINE

## 2025-08-06 RX ORDER — METOPROLOL SUCCINATE 25 MG/1
25 TABLET, EXTENDED RELEASE ORAL 2 TIMES DAILY
Qty: 180 TABLET | Refills: 1 | Status: SHIPPED | OUTPATIENT
Start: 2025-08-06

## 2025-08-06 RX ORDER — FUROSEMIDE 40 MG/1
40 TABLET ORAL DAILY PRN
Qty: 30 TABLET | Refills: 11 | Status: SHIPPED | OUTPATIENT
Start: 2025-08-06 | End: 2026-08-06

## 2025-08-06 NOTE — PROGRESS NOTES
Assessment:       1. Venous stasis dermatitis    2. Bilateral leg edema    3. PAF (paroxysmal atrial fibrillation)    4. Essential hypertension, benign    5. Sinus tachycardia    6. History of atrial flutter    7. Anticoagulant long-term use    8. Paroxysmal tachycardia    9. Mixed hyperlipidemia         Plan:       Venous stasis dermatitis  Unresponsive to antibiotics with no warmth.  Slight tenderness.  Likely venous stasis dermatitis.  Will hold off on re-treatment in monitor closely.    Bilateral leg edema  -     furosemide (LASIX) 40 MG tablet; Take 1 tablet (40 mg total) by mouth daily as needed (swelling).  Dispense: 30 tablet; Refill: 11  Encouraged patient to elevate lower extremity instead of treating with fluid pill.  Agreeable to compression by home health.    PAF (paroxysmal atrial fibrillation)  -     metoprolol succinate (TOPROL-XL) 25 MG 24 hr tablet; Take 1 tablet (25 mg total) by mouth 2 (two) times daily.  Dispense: 180 tablet; Refill: 1  Increase metoprolol to b.i.d..  Patient needs to follow up with the EP.  -     EKG 12-lead; Future    Essential hypertension, benign  -     metoprolol succinate (TOPROL-XL) 25 MG 24 hr tablet; Take 1 tablet (25 mg total) by mouth 2 (two) times daily.  Dispense: 180 tablet; Refill: 1    Sinus tachycardia  Increase metoprolol to b.i.d..  Needs to follow up with the EP.    History of atrial flutter    Anticoagulant long-term use  Continue anticoagulation with Eliquis.    Paroxysmal tachycardia    Mixed hyperlipidemia  Continue atorvastatin.  As managed per PCP.      Assessment & Plan    IMPRESSION:  - Patient presents with persistent left leg cellulitis, ongoing for approximately a year.  - Recent course of Clindamycin completed, but symptoms have recurred.  - Leg remains swollen and tender, with redness extending up the leg.  - Considered need for further antibiotic treatment.  - Noted elevated heart rate (110-118 bpm), recently restarted on metoprolol.  -  Evaluated cardiac status in context of leg cellulitis and recent medication changes.    ## CELLULITIS OF LEFT LOWER LIMB:   Graciela reports redness and swelling in the left leg present for nearly a year.   While redness has decreased,   Graciela was previously on Clindamycin 3 times daily with initial effectiveness, but swelling returned after completing the course.   Will take a picture to document current condition and instruct patient to elevate leg when sitting using ottoman or other elevated surface.   Consulting with Dr. Miller for second opinion on condition and treatment approach.                ## TACHYCARDIA:   Graciela's heart rate is consistently elevated around 110, reaching 116-118 after minimal exertion (walking from wheelchair to shower).   Blood pressure remains within normal limits and no fever reported.   Graciela was restarted on Metoprolol last month to manage the elevated heart rate but continues in the 120s.  We will defer to EP for management.    ## BACK PAIN AND BALANCE ISSUES:   Graciela reports back pain and balance issues following back surgery.   Also experiences soreness in the chest area where a rib  was used during the procedure.   Recommend using an ottoman to elevate the leg while sitting at the computer to help manage back pain.      ## ALLERGY TO MEDICAL MATERIALS:   Graciela reports allergies to Curlex and tape used in bandages, which cause skin reactions including heat and swelling  .   Advised to avoid using wraps and bandages containing these materials to prevent allergic reactions.   Consulting with another physician for further evaluation of these allergies.    ## DEPENDENCE ON MEDICAL DEVICES:   Graciela currently uses a hospital bed and ottoman for leg elevation and a wheelchair for mobility following back surgery.   Recommend continued use of these assistive devices to support recovery and manage symptoms. Encouraged compression/lymphedema clinic. Patient would prefer HH to treat         Medication List with Changes/Refills   Current Medications    ACETAMINOPHEN (TYLENOL) 325 MG TABLET    Take 650 mg by mouth every 6 (six) hours.    AMIODARONE (PACERONE) 200 MG TAB    Take 1 tablet (200 mg total) by mouth once daily.    APIXABAN (ELIQUIS) 5 MG TAB    Take 1 tablet (5 mg total) by mouth 2 (two) times daily. Resume on 06/09/24    ASCORBIC ACID, VITAMIN C, (VITAMIN C) 100 MG TABLET    Take 100 mg by mouth once daily.    ATORVASTATIN (LIPITOR) 20 MG TABLET    TAKE 1 TABLET BY MOUTH ONCE DAILY IN THE EVENING    B COMPLEX VITAMINS TABLET    Take 1 tablet by mouth once daily.    LEVOTHYROXINE (SYNTHROID) 150 MCG TABLET    TAKE 1 TABLET BY MOUTH BEFORE BREAKFAST    MECLIZINE (ANTIVERT) 25 MG TABLET    Take 25 mg by mouth. PRN    ONDANSETRON (ZOFRAN-ODT) 8 MG TBDL    Take 1 tablet (8 mg total) by mouth every 6 (six) hours as needed (nausea/vomiting).   Changed and/or Refilled Medications    Modified Medication Previous Medication    FUROSEMIDE (LASIX) 40 MG TABLET furosemide (LASIX) 40 MG tablet       Take 1 tablet (40 mg total) by mouth daily as needed (swelling).    Take 1 tablet (40 mg total) by mouth once daily.    METOPROLOL SUCCINATE (TOPROL-XL) 25 MG 24 HR TABLET metoprolol succinate (TOPROL-XL) 25 MG 24 hr tablet       Take 1 tablet (25 mg total) by mouth 2 (two) times daily.    Take 1 tablet (25 mg total) by mouth once daily.         Subjective:    Patient ID: Graciela Barton is a 75 y.o. female.  Chief Complaint: Recurrent Skin Infections    History of Present Illness    CHIEF COMPLAINT:  Graciela presents today with left leg cellulitis    LEFT LEG CELLULITIS:  She reports left leg issues present for approximately one year, related to a previous surgery. She is currently experiencing increased tenderness and persistent swelling. Redness has decreased but continues to extend up the leg. She has developed blisters, with one recently ruptured. She recently completed a course of Clindamycin 3 times daily  approximately one week ago, which provided temporary relief. She has been previously evaluated for blood clots. She currently uses a medical bed, though reports it does not significantly help her symptoms. She has attempted various topical treatments including Epsom salt gel, Mederma, Triamcinolone, and Silvadene. She denies current fevers. She notes difficulty managing wound care, using creative methods like Q-tip extensions to reach areas of the leg she cannot access directly. She reports challenges with leg mobility, requiring use of a wheelchair and careful positioning to avoid aggravating the affected leg. The wound continues to be problematic, with her expressing concern about potential need for further antibiotic treatment.    CARDIOVASCULAR:  She reports recent cardiac symptoms including chest tightness last week, which prompted an emergency room visit. She denies current chest pain. Her heart rate has been elevated, recently running above her typical rate of under 100 BPM. She is currently taking amiodarone and metoprolol, which was recently restarted after being temporarily discontinued when amiodarone was initially started.  Patient typically runs in the 50s to 70s has been running greater than 100-120 with the past few weeks.  Has seen EP in the past with cardioversion a flutter 1:2.  Has been on amio and metoprolol 25 daily    SURGICAL HISTORY:  She has a history of back surgery during which a rib  was utilized. She notes ongoing soreness related to the surgical procedure and mentions that the rib spreading technique resulted in significant tissue disruption, impacting her ability to wear a bra comfortably. She continues to be mindful of maintaining balance while walking due to the surgical intervention.    ALLERGIES:  She reports allergies to Curlex and medical tape.       Review of Systems   Constitutional:  Positive for fatigue. Negative for chills, diaphoresis and fever.   HENT:  Negative  "for ear pain, nosebleeds, sinus pressure and sore throat.    Respiratory:  Positive for chest tightness. Negative for cough and shortness of breath.    Cardiovascular:  Positive for leg swelling. Negative for chest pain and palpitations.   Gastrointestinal:  Negative for diarrhea, nausea and vomiting.   Genitourinary: Negative.    Psychiatric/Behavioral:  Negative for dysphoric mood and sleep disturbance. The patient is not nervous/anxious.        Objective:      Vitals:    08/06/25 0907   BP: 132/78   BP Location: Left arm   Patient Position: Sitting   Pulse: 110   Resp: 16   Temp: 97.7 °F (36.5 °C)   TempSrc: Oral   SpO2: 96%   Weight: 108.5 kg (239 lb 3.2 oz)   Height: 5' 5" (1.651 m)     BP Readings from Last 5 Encounters:   08/06/25 132/78   07/30/25 (!) 162/102   07/29/25 (!) 160/102   07/21/25 136/78   05/21/25 130/70     Wt Readings from Last 5 Encounters:   08/06/25 108.5 kg (239 lb 3.2 oz)   07/30/25 78.9 kg (173 lb 15.1 oz)   07/28/25 78.9 kg (173 lb 15.1 oz)   07/21/25 78.9 kg (174 lb 0.9 oz)   05/21/25 105.4 kg (232 lb 5.8 oz)     Physical Exam  Constitutional:       General: She is not in acute distress.     Appearance: Normal appearance. She is not ill-appearing.   HENT:      Head: Normocephalic.      Mouth/Throat:      Mouth: Mucous membranes are moist.      Pharynx: No pharyngeal swelling or oropharyngeal exudate.      Tonsils: No tonsillar exudate.   Eyes:      Conjunctiva/sclera:      Right eye: Right conjunctiva is not injected.      Left eye: Left conjunctiva is not injected.   Cardiovascular:      Rate and Rhythm: Normal rate and regular rhythm.      Pulses:           Radial pulses are 1+ on the right side and 1+ on the left side.      Heart sounds: No murmur heard.     No systolic murmur is present.      No diastolic murmur is present.   Pulmonary:      Effort: No tachypnea or bradypnea.      Breath sounds: Normal breath sounds. No decreased breath sounds, wheezing, rhonchi or rales. "   Abdominal:      General: There is no distension.   Musculoskeletal:      Right lower leg: No edema.      Left lower leg: No edema.   Skin:     General: Skin is warm and dry.          Neurological:      Mental Status: She is alert.   Psychiatric:         Attention and Perception: Attention normal.         Speech: Speech normal.         Behavior: Behavior normal.           Lab Results   Component Value Date    WBC 8.89 07/28/2025    HGB 13.4 07/28/2025    HCT 41.5 07/28/2025     07/28/2025    CHOL 155 07/21/2025    TRIG 100 07/21/2025    HDL 69 07/21/2025    ALT 16 07/28/2025    AST 29 07/28/2025     07/28/2025    K 3.6 07/28/2025     07/28/2025    CREATININE 1.1 07/28/2025    BUN 21 07/28/2025    CO2 27 07/28/2025    TSH 5.155 (H) 04/16/2024    INR 1.0 06/19/2025    HGBA1C 5.5 06/20/2022      This note was generated with the assistance of ambient listening technology. Verbal consent was obtained by the patient and accompanying visitor(s) for the recording of patient appointment to facilitate this note. I attest to having reviewed and edited the generated note for accuracy, though some syntax or spelling errors may persist. Please contact the author of this note for any clarification.

## 2025-08-06 NOTE — TELEPHONE ENCOUNTER
----- Message from Rocco Miller MD sent at 7/30/2025 11:37 AM CDT -----  Please call [facility] (nurse is Be) to get updated home BP and HR readings

## 2025-08-08 LAB
OHS QRS DURATION: 72 MS
OHS QTC CALCULATION: 439 MS

## 2025-08-18 ENCOUNTER — PATIENT MESSAGE (OUTPATIENT)
Dept: PSYCHIATRY | Facility: CLINIC | Age: 75
End: 2025-08-18
Payer: MEDICARE

## 2025-08-20 ENCOUNTER — PATIENT OUTREACH (OUTPATIENT)
Facility: OTHER | Age: 75
End: 2025-08-20
Payer: MEDICARE

## 2025-08-20 ENCOUNTER — OCHSNER VIRTUAL EMERGENCY DEPARTMENT (OUTPATIENT)
Facility: CLINIC | Age: 75
End: 2025-08-20
Payer: MEDICARE

## 2025-08-20 ENCOUNTER — NURSE TRIAGE (OUTPATIENT)
Dept: ADMINISTRATIVE | Facility: CLINIC | Age: 75
End: 2025-08-20
Payer: MEDICARE

## 2025-08-21 ENCOUNTER — OFFICE VISIT (OUTPATIENT)
Dept: PRIMARY CARE CLINIC | Facility: CLINIC | Age: 75
End: 2025-08-21
Payer: MEDICARE

## 2025-08-21 VITALS
RESPIRATION RATE: 15 BRPM | HEIGHT: 65 IN | OXYGEN SATURATION: 95 % | DIASTOLIC BLOOD PRESSURE: 88 MMHG | TEMPERATURE: 98 F | BODY MASS INDEX: 39.8 KG/M2 | HEART RATE: 106 BPM | SYSTOLIC BLOOD PRESSURE: 138 MMHG

## 2025-08-21 DIAGNOSIS — Z86.79 HISTORY OF ATRIAL FLUTTER: ICD-10-CM

## 2025-08-21 DIAGNOSIS — R00.0 TACHYCARDIA: Primary | ICD-10-CM

## 2025-08-21 DIAGNOSIS — I27.20 PULMONARY HTN: ICD-10-CM

## 2025-08-21 DIAGNOSIS — I48.0 PAROXYSMAL ATRIAL FIBRILLATION: ICD-10-CM

## 2025-08-21 DIAGNOSIS — I10 ESSENTIAL HYPERTENSION, BENIGN: ICD-10-CM

## 2025-08-21 LAB
OHS QRS DURATION: 72 MS
OHS QTC CALCULATION: 458 MS

## 2025-08-21 PROCEDURE — 93010 ELECTROCARDIOGRAM REPORT: CPT | Mod: S$PBB,,, | Performed by: INTERNAL MEDICINE

## 2025-08-21 PROCEDURE — 93005 ELECTROCARDIOGRAM TRACING: CPT | Mod: PBBFAC,PN | Performed by: INTERNAL MEDICINE

## 2025-08-21 PROCEDURE — 99999 PR PBB SHADOW E&M-EST. PATIENT-LVL IV: CPT | Mod: PBBFAC,,, | Performed by: NURSE PRACTITIONER

## 2025-08-21 PROCEDURE — 99214 OFFICE O/P EST MOD 30 MIN: CPT | Mod: PBBFAC,PN,25 | Performed by: NURSE PRACTITIONER

## 2025-08-21 PROCEDURE — 99214 OFFICE O/P EST MOD 30 MIN: CPT | Mod: S$PBB,,, | Performed by: NURSE PRACTITIONER

## 2025-08-22 ENCOUNTER — TELEPHONE (OUTPATIENT)
Dept: PRIMARY CARE CLINIC | Facility: CLINIC | Age: 75
End: 2025-08-22
Payer: MEDICARE

## 2025-08-23 ENCOUNTER — DOCUMENT SCAN (OUTPATIENT)
Dept: HOME HEALTH SERVICES | Facility: HOSPITAL | Age: 75
End: 2025-08-23
Payer: MEDICARE

## 2025-08-25 ENCOUNTER — TELEPHONE (OUTPATIENT)
Dept: CARDIOLOGY | Facility: CLINIC | Age: 75
End: 2025-08-25
Payer: MEDICARE

## 2025-08-26 ENCOUNTER — EXTERNAL HOME HEALTH (OUTPATIENT)
Dept: HOME HEALTH SERVICES | Facility: HOSPITAL | Age: 75
End: 2025-08-26
Payer: MEDICARE

## 2025-08-27 ENCOUNTER — EXTERNAL HOME HEALTH (OUTPATIENT)
Dept: HOME HEALTH SERVICES | Facility: HOSPITAL | Age: 75
End: 2025-08-27
Payer: MEDICARE

## 2025-09-02 RX ORDER — ATORVASTATIN CALCIUM 20 MG/1
20 TABLET, FILM COATED ORAL NIGHTLY
Qty: 90 TABLET | Refills: 3 | Status: SHIPPED | OUTPATIENT
Start: 2025-09-02

## (undated) DEVICE — GLOVE BIOGEL PI MICRO SZ 7.5

## (undated) DEVICE — DRESSING ABSRBNT ISLAND 3.6X8

## (undated) DEVICE — BURR MIS CURVED 3.0MM

## (undated) DEVICE — GOWN POLY REINF BRTH SLV XL

## (undated) DEVICE — DRESSING SURGICAL 1/2X1/2

## (undated) DEVICE — COVER BACK TBL HD 2-TIER 72IN

## (undated) DEVICE — DRAPE LEICA MICROSCOPE 48X120

## (undated) DEVICE — SPONGE COTTON TRAY 4X4IN

## (undated) DEVICE — DRAPE C-ARM 41X125IN

## (undated) DEVICE — SEE MEDLINE ITEM 156905

## (undated) DEVICE — DRAPE STERI-DRAPE 1000 17X11IN

## (undated) DEVICE — DRAPE TOP 53X102IN

## (undated) DEVICE — DRAPE T TRNSVRS LAP 102X78X121

## (undated) DEVICE — DRESSING TRANS 4X4 3/4

## (undated) DEVICE — ADHESIVE MASTISOL VIAL 48/BX

## (undated) DEVICE — BLADE ELECTRO EDGE INSULATED

## (undated) DEVICE — DRESSING TEGADERM 2 3/8 X 2.75

## (undated) DEVICE — ELECTRODE REM PLYHSV RETURN 9

## (undated) DEVICE — DRESSING TELFA N ADH 3X8

## (undated) DEVICE — GLOVE BIOGEL ECLIPSE SZ 7

## (undated) DEVICE — DRAIN CHANNEL ROUND 10FR

## (undated) DEVICE — SUT VICRYL ANTIMICRO VIL BR

## (undated) DEVICE — SUT 2-0 ETHILON 18 FS

## (undated) DEVICE — DRESSING AQUACEL FOAM 5 X 5

## (undated) DEVICE — DRAPE C-ARM/MOBILE XRAY 44X80

## (undated) DEVICE — NDL SPINAL SPINOCAN 22GX3.5

## (undated) DEVICE — COVER OVERHEAD SURG LT BLUE

## (undated) DEVICE — SUT VICRYL 2 0 CT 2

## (undated) DEVICE — SUT CTD VICRYL 3-0 CR/SH

## (undated) DEVICE — TOWEL OR NONABSORB ADH 17X26

## (undated) DEVICE — GLOVE SURGICAL LATEX SZ 7

## (undated) DEVICE — TUBING SUC UNIV W/CONN 12FT

## (undated) DEVICE — APPLICATOR CHLORAPREP ORN 26ML

## (undated) DEVICE — DRAPE INCISE IOBAN 2 23X23IN

## (undated) DEVICE — BLADE 4IN EDGE INSULATED

## (undated) DEVICE — DRAPE LAP T SHT W/ INSTR PAD

## (undated) DEVICE — CORD BIPOLAR 12 FOOT

## (undated) DEVICE — PENCIL ROCKER SWITCH 10FT CORD

## (undated) DEVICE — TRAY FOLEY 16FR INFECTION CONT

## (undated) DEVICE — STAPLER SKIN ROTATING HEAD

## (undated) DEVICE — TAPE CURAD SILK ADH 3INX10YD

## (undated) DEVICE — KIT SPINAL PATIENT CARE JACK

## (undated) DEVICE — SKINMARKER W/RULER DEVON

## (undated) DEVICE — ALCOHOL 70% ISOP W/GREEN 16OZ

## (undated) DEVICE — SUT MCRYL PLUS 4-0 PS2 27IN

## (undated) DEVICE — YANKAUER OPEN TIP W/O VENT

## (undated) DEVICE — GELATIN SURGIPOWDER ABSORBABLE

## (undated) DEVICE — BLADE MILL BONE MEDIUM

## (undated) DEVICE — DRAPE THREE-QTR REINF 53X77IN

## (undated) DEVICE — SYS ILLUMINATION MARS3V SPINE

## (undated) DEVICE — GOWN POLY REINF BRTH SLV LG

## (undated) DEVICE — SUT VICRYL PLUS 0 CT1 18IN

## (undated) DEVICE — TAPE SILK 3IN

## (undated) DEVICE — DISSECTOR 5MM ENDOPATH

## (undated) DEVICE — EVACUATOR WOUND BULB 100CC

## (undated) DEVICE — SUPPORT ULNA NERVE PROTECTOR

## (undated) DEVICE — SET MALIS GNRTR IRR BPLR CORD

## (undated) DEVICE — CLOSURE SKIN STERI STRIP 1/2X4

## (undated) DEVICE — DRESSING AQUACEL FOAM 4X4IN

## (undated) DEVICE — SUT STRATAFIX PDS PLS 45CM

## (undated) DEVICE — COLLAR CERVICAL ADULT REGULAR

## (undated) DEVICE — GAUZE SPONGE 4X4 12PLY

## (undated) DEVICE — Device

## (undated) DEVICE — PACK ECLIPSE UNIVERSAL STERILE

## (undated) DEVICE — SPHERE MARKER REFLECTIVE DISP